# Patient Record
Sex: FEMALE | Race: WHITE | NOT HISPANIC OR LATINO | Employment: UNEMPLOYED | URBAN - METROPOLITAN AREA
[De-identification: names, ages, dates, MRNs, and addresses within clinical notes are randomized per-mention and may not be internally consistent; named-entity substitution may affect disease eponyms.]

---

## 2022-10-25 ENCOUNTER — HOSPITAL ENCOUNTER (INPATIENT)
Facility: HOSPITAL | Age: 52
LOS: 27 days | Discharge: NURSING, REHAB OR LONG-TERM CARE | DRG: 003 | End: 2022-11-21
Attending: STUDENT IN AN ORGANIZED HEALTH CARE EDUCATION/TRAINING PROGRAM | Admitting: INTERNAL MEDICINE
Payer: MEDICARE

## 2022-10-25 DIAGNOSIS — M86.9 OSTEOMYELITIS: ICD-10-CM

## 2022-10-25 DIAGNOSIS — R20.2 PARESTHESIAS: ICD-10-CM

## 2022-10-25 DIAGNOSIS — R78.81 MRSA BACTEREMIA: ICD-10-CM

## 2022-10-25 DIAGNOSIS — M54.9 ACUTE BACK PAIN, UNSPECIFIED BACK LOCATION, UNSPECIFIED BACK PAIN LATERALITY: ICD-10-CM

## 2022-10-25 DIAGNOSIS — Z97.8 ENDOTRACHEALLY INTUBATED: ICD-10-CM

## 2022-10-25 DIAGNOSIS — M46.22 OSTEOMYELITIS OF CERVICAL SPINE: ICD-10-CM

## 2022-10-25 DIAGNOSIS — R20.0 NUMBNESS: ICD-10-CM

## 2022-10-25 DIAGNOSIS — E11.9 DIABETES MELLITUS, NEW ONSET: ICD-10-CM

## 2022-10-25 DIAGNOSIS — B95.62 MRSA BACTEREMIA: ICD-10-CM

## 2022-10-25 DIAGNOSIS — R00.1 BRADYCARDIA: ICD-10-CM

## 2022-10-25 DIAGNOSIS — R06.1 STRIDOR: ICD-10-CM

## 2022-10-25 DIAGNOSIS — J98.8 AIRWAY OBSTRUCTION: ICD-10-CM

## 2022-10-25 DIAGNOSIS — R10.13 EPIGASTRIC PAIN: ICD-10-CM

## 2022-10-25 DIAGNOSIS — R78.81 BACTEREMIA: ICD-10-CM

## 2022-10-25 DIAGNOSIS — D72.829 LEUKOCYTOSIS, UNSPECIFIED TYPE: Primary | ICD-10-CM

## 2022-10-25 DIAGNOSIS — J96.01 ACUTE RESPIRATORY FAILURE WITH HYPOXIA: ICD-10-CM

## 2022-10-25 DIAGNOSIS — M48.02 CERVICAL STENOSIS OF SPINAL CANAL: ICD-10-CM

## 2022-10-25 PROBLEM — G06.2 EPIDURAL ABSCESS: Status: ACTIVE | Noted: 2022-10-25

## 2022-10-25 PROBLEM — R73.9 HYPERGLYCEMIA: Status: ACTIVE | Noted: 2022-10-25

## 2022-10-25 PROBLEM — E87.1 HYPONATREMIA: Status: ACTIVE | Noted: 2022-10-25

## 2022-10-25 PROBLEM — F11.20 OPIOID USE DISORDER, SEVERE, ON MAINTENANCE THERAPY, DEPENDENCE: Status: ACTIVE | Noted: 2022-10-25

## 2022-10-25 PROBLEM — G95.20 SPINAL CORD COMPRESSION: Status: ACTIVE | Noted: 2022-10-25

## 2022-10-25 LAB
ABO + RH BLD: NORMAL
ALBUMIN SERPL BCP-MCNC: 3 G/DL (ref 3.5–5.2)
ALP SERPL-CCNC: 152 U/L (ref 55–135)
ALT SERPL W/O P-5'-P-CCNC: 11 U/L (ref 10–44)
AMPHET+METHAMPHET UR QL: NEGATIVE
ANION GAP SERPL CALC-SCNC: 9 MMOL/L (ref 8–16)
APTT BLDCRRT: 29.7 SEC (ref 21–32)
ASCENDING AORTA: 3.33 CM
AST SERPL-CCNC: 11 U/L (ref 10–40)
AV INDEX (PROSTH): 0.59
AV MEAN GRADIENT: 8 MMHG
AV PEAK GRADIENT: 15 MMHG
AV VALVE AREA: 2.43 CM2
AV VELOCITY RATIO: 0.59
BACTERIA #/AREA URNS AUTO: ABNORMAL /HPF
BACTERIA #/AREA URNS AUTO: ABNORMAL /HPF
BARBITURATES UR QL SCN>200 NG/ML: NEGATIVE
BASOPHILS # BLD AUTO: 0.02 K/UL (ref 0–0.2)
BASOPHILS NFR BLD: 0.1 % (ref 0–1.9)
BENZODIAZ UR QL SCN>200 NG/ML: NEGATIVE
BILIRUB SERPL-MCNC: 0.3 MG/DL (ref 0.1–1)
BILIRUB UR QL STRIP: NEGATIVE
BILIRUB UR QL STRIP: NEGATIVE
BLD GP AB SCN CELLS X3 SERPL QL: NORMAL
BSA FOR ECHO PROCEDURE: 1.72 M2
BUN SERPL-MCNC: 13 MG/DL (ref 6–20)
BZE UR QL SCN: NEGATIVE
CALCIUM SERPL-MCNC: 10.2 MG/DL (ref 8.7–10.5)
CANNABINOIDS UR QL SCN: NEGATIVE
CHLORIDE SERPL-SCNC: 92 MMOL/L (ref 95–110)
CHOLEST SERPL-MCNC: 135 MG/DL (ref 120–199)
CHOLEST/HDLC SERPL: 4.5 {RATIO} (ref 2–5)
CK SERPL-CCNC: 15 U/L (ref 20–180)
CLARITY UR REFRACT.AUTO: ABNORMAL
CLARITY UR REFRACT.AUTO: ABNORMAL
CO2 SERPL-SCNC: 26 MMOL/L (ref 23–29)
COLOR UR AUTO: YELLOW
COLOR UR AUTO: YELLOW
CREAT SERPL-MCNC: 0.8 MG/DL (ref 0.5–1.4)
CREAT UR-MCNC: 75 MG/DL (ref 15–325)
CRP SERPL-MCNC: 113.8 MG/L (ref 0–8.2)
CV ECHO LV RWT: 0.39 CM
DIFFERENTIAL METHOD: ABNORMAL
DOP CALC AO PEAK VEL: 1.93 M/S
DOP CALC AO VTI: 39.92 CM
DOP CALC LVOT AREA: 4.2 CM2
DOP CALC LVOT DIAMETER: 2.3 CM
DOP CALC LVOT PEAK VEL: 1.13 M/S
DOP CALC LVOT STROKE VOLUME: 97.09 CM3
DOP CALCLVOT PEAK VEL VTI: 23.38 CM
ECHO LV POSTERIOR WALL: 0.97 CM (ref 0.6–1.1)
EJECTION FRACTION: 55 %
EOSINOPHIL # BLD AUTO: 0 K/UL (ref 0–0.5)
EOSINOPHIL NFR BLD: 0.1 % (ref 0–8)
ERYTHROCYTE [DISTWIDTH] IN BLOOD BY AUTOMATED COUNT: 12 % (ref 11.5–14.5)
ERYTHROCYTE [SEDIMENTATION RATE] IN BLOOD BY PHOTOMETRIC METHOD: 103 MM/HR (ref 0–36)
EST. GFR  (NO RACE VARIABLE): >60 ML/MIN/1.73 M^2
ESTIMATED AVG GLUCOSE: 128 MG/DL (ref 68–131)
ETHANOL UR-MCNC: <10 MG/DL
FRACTIONAL SHORTENING: 26 % (ref 28–44)
GLUCOSE SERPL-MCNC: 269 MG/DL (ref 70–110)
GLUCOSE UR QL STRIP: NEGATIVE
GLUCOSE UR QL STRIP: NEGATIVE
HBA1C MFR BLD: 6.1 % (ref 4–5.6)
HCT VFR BLD AUTO: 34 % (ref 37–48.5)
HCV AB SERPL QL IA: NORMAL
HDLC SERPL-MCNC: 30 MG/DL (ref 40–75)
HDLC SERPL: 22.2 % (ref 20–50)
HGB BLD-MCNC: 11.3 G/DL (ref 12–16)
HGB UR QL STRIP: ABNORMAL
HGB UR QL STRIP: ABNORMAL
HIV 1+2 AB+HIV1 P24 AG SERPL QL IA: NORMAL
HYALINE CASTS UR QL AUTO: 0 /LPF
HYALINE CASTS UR QL AUTO: 0 /LPF
IMM GRANULOCYTES # BLD AUTO: 0.14 K/UL (ref 0–0.04)
IMM GRANULOCYTES NFR BLD AUTO: 0.8 % (ref 0–0.5)
INR PPP: 1 (ref 0.8–1.2)
INR PPP: 1.1 (ref 0.8–1.2)
INTERVENTRICULAR SEPTUM: 0.75 CM (ref 0.6–1.1)
KETONES UR QL STRIP: NEGATIVE
KETONES UR QL STRIP: NEGATIVE
LA MAJOR: 4.91 CM
LA MINOR: 4.46 CM
LA WIDTH: 3.75 CM
LDLC SERPL CALC-MCNC: 85.4 MG/DL (ref 63–159)
LEFT ATRIUM SIZE: 3.48 CM
LEFT ATRIUM VOLUME INDEX MOD: 31.6 ML/M2
LEFT ATRIUM VOLUME INDEX: 30.1 ML/M2
LEFT ATRIUM VOLUME MOD: 54.41 CM3
LEFT ATRIUM VOLUME: 51.85 CM3
LEFT INTERNAL DIMENSION IN SYSTOLE: 3.65 CM (ref 2.1–4)
LEFT VENTRICLE DIASTOLIC VOLUME INDEX: 66.85 ML/M2
LEFT VENTRICLE DIASTOLIC VOLUME: 114.99 ML
LEFT VENTRICLE MASS INDEX: 85 G/M2
LEFT VENTRICLE SYSTOLIC VOLUME INDEX: 32.8 ML/M2
LEFT VENTRICLE SYSTOLIC VOLUME: 56.37 ML
LEFT VENTRICULAR INTERNAL DIMENSION IN DIASTOLE: 4.94 CM (ref 3.5–6)
LEFT VENTRICULAR MASS: 146.08 G
LEUKOCYTE ESTERASE UR QL STRIP: ABNORMAL
LEUKOCYTE ESTERASE UR QL STRIP: NEGATIVE
LIPASE SERPL-CCNC: 7 U/L (ref 4–60)
LYMPHOCYTES # BLD AUTO: 2.9 K/UL (ref 1–4.8)
LYMPHOCYTES NFR BLD: 17.4 % (ref 18–48)
MCH RBC QN AUTO: 30.3 PG (ref 27–31)
MCHC RBC AUTO-ENTMCNC: 33.2 G/DL (ref 32–36)
MCV RBC AUTO: 91 FL (ref 82–98)
METHADONE UR QL SCN>300 NG/ML: ABNORMAL
MICROSCOPIC COMMENT: ABNORMAL
MICROSCOPIC COMMENT: ABNORMAL
MONOCYTES # BLD AUTO: 1.5 K/UL (ref 0.3–1)
MONOCYTES NFR BLD: 8.6 % (ref 4–15)
NEUTROPHILS # BLD AUTO: 12.4 K/UL (ref 1.8–7.7)
NEUTROPHILS NFR BLD: 73 % (ref 38–73)
NITRITE UR QL STRIP: NEGATIVE
NITRITE UR QL STRIP: NEGATIVE
NONHDLC SERPL-MCNC: 105 MG/DL
NRBC BLD-RTO: 0 /100 WBC
OPIATES UR QL SCN: NEGATIVE
OSMOLALITY UR: 320 MOSM/KG (ref 50–1200)
PCP UR QL SCN>25 NG/ML: NEGATIVE
PH UR STRIP: 6 [PH] (ref 5–8)
PH UR STRIP: 6 [PH] (ref 5–8)
PISA TR MAX VEL: 2.08 M/S
PLATELET # BLD AUTO: 443 K/UL (ref 150–450)
PMV BLD AUTO: 9.2 FL (ref 9.2–12.9)
POCT GLUCOSE: 131 MG/DL (ref 70–110)
POCT GLUCOSE: 266 MG/DL (ref 70–110)
POTASSIUM SERPL-SCNC: 4.2 MMOL/L (ref 3.5–5.1)
PROCALCITONIN SERPL IA-MCNC: 0.13 NG/ML
PROT SERPL-MCNC: 8.7 G/DL (ref 6–8.4)
PROT UR QL STRIP: ABNORMAL
PROT UR QL STRIP: ABNORMAL
PROTHROMBIN TIME: 10.8 SEC (ref 9–12.5)
PROTHROMBIN TIME: 10.9 SEC (ref 9–12.5)
RA MAJOR: 5.08 CM
RA PRESSURE: 3 MMHG
RA WIDTH: 3.28 CM
RBC # BLD AUTO: 3.73 M/UL (ref 4–5.4)
RBC #/AREA URNS AUTO: 41 /HPF (ref 0–4)
RBC #/AREA URNS AUTO: >100 /HPF (ref 0–4)
RIGHT VENTRICULAR END-DIASTOLIC DIMENSION: 3.28 CM
SINUS: 3.38 CM
SODIUM SERPL-SCNC: 127 MMOL/L (ref 136–145)
SODIUM UR-SCNC: 12 MMOL/L (ref 20–250)
SP GR UR STRIP: 1.01 (ref 1–1.03)
SP GR UR STRIP: 1.01 (ref 1–1.03)
SQUAMOUS #/AREA URNS AUTO: 13 /HPF
SQUAMOUS #/AREA URNS AUTO: 4 /HPF
STJ: 2.61 CM
TDI LATERAL: 0.12 M/S
TDI SEPTAL: 0.14 M/S
TDI: 0.13 M/S
TOXICOLOGY INFORMATION: ABNORMAL
TR MAX PG: 17 MMHG
TRICUSPID ANNULAR PLANE SYSTOLIC EXCURSION: 2.08 CM
TRIGL SERPL-MCNC: 98 MG/DL (ref 30–150)
TROPONIN I SERPL DL<=0.01 NG/ML-MCNC: <0.006 NG/ML (ref 0–0.03)
TSH SERPL DL<=0.005 MIU/L-ACNC: 0.71 UIU/ML (ref 0.4–4)
TV REST PULMONARY ARTERY PRESSURE: 20 MMHG
URN SPEC COLLECT METH UR: ABNORMAL
URN SPEC COLLECT METH UR: ABNORMAL
WBC # BLD AUTO: 16.94 K/UL (ref 3.9–12.7)
WBC #/AREA URNS AUTO: 5 /HPF (ref 0–5)
WBC #/AREA URNS AUTO: 5 /HPF (ref 0–5)

## 2022-10-25 PROCEDURE — 25000003 PHARM REV CODE 250: Performed by: INTERNAL MEDICINE

## 2022-10-25 PROCEDURE — 84443 ASSAY THYROID STIM HORMONE: CPT | Performed by: PHYSICIAN ASSISTANT

## 2022-10-25 PROCEDURE — 93005 ELECTROCARDIOGRAM TRACING: CPT

## 2022-10-25 PROCEDURE — 99223 1ST HOSP IP/OBS HIGH 75: CPT | Mod: GC,,,

## 2022-10-25 PROCEDURE — 80307 DRUG TEST PRSMV CHEM ANLYZR: CPT

## 2022-10-25 PROCEDURE — 25000003 PHARM REV CODE 250

## 2022-10-25 PROCEDURE — S4991 NICOTINE PATCH NONLEGEND: HCPCS | Performed by: PHYSICIAN ASSISTANT

## 2022-10-25 PROCEDURE — 25000003 PHARM REV CODE 250: Performed by: PSYCHIATRY & NEUROLOGY

## 2022-10-25 PROCEDURE — 96361 HYDRATE IV INFUSION ADD-ON: CPT

## 2022-10-25 PROCEDURE — 36415 COLL VENOUS BLD VENIPUNCTURE: CPT | Performed by: PSYCHIATRY & NEUROLOGY

## 2022-10-25 PROCEDURE — 86140 C-REACTIVE PROTEIN: CPT

## 2022-10-25 PROCEDURE — 83935 ASSAY OF URINE OSMOLALITY: CPT

## 2022-10-25 PROCEDURE — 25500020 PHARM REV CODE 255: Performed by: PSYCHIATRY & NEUROLOGY

## 2022-10-25 PROCEDURE — 20000000 HC ICU ROOM

## 2022-10-25 PROCEDURE — 63600175 PHARM REV CODE 636 W HCPCS: Performed by: PSYCHIATRY & NEUROLOGY

## 2022-10-25 PROCEDURE — 85652 RBC SED RATE AUTOMATED: CPT | Performed by: INTERNAL MEDICINE

## 2022-10-25 PROCEDURE — 99223 PR INITIAL HOSPITAL CARE,LEVL III: ICD-10-PCS | Mod: FS,,, | Performed by: PHYSICIAN ASSISTANT

## 2022-10-25 PROCEDURE — 87186 SC STD MICRODIL/AGAR DIL: CPT | Performed by: EMERGENCY MEDICINE

## 2022-10-25 PROCEDURE — 80061 LIPID PANEL: CPT | Performed by: PHYSICIAN ASSISTANT

## 2022-10-25 PROCEDURE — 25000003 PHARM REV CODE 250: Performed by: STUDENT IN AN ORGANIZED HEALTH CARE EDUCATION/TRAINING PROGRAM

## 2022-10-25 PROCEDURE — 85730 THROMBOPLASTIN TIME PARTIAL: CPT

## 2022-10-25 PROCEDURE — 85610 PROTHROMBIN TIME: CPT | Performed by: STUDENT IN AN ORGANIZED HEALTH CARE EDUCATION/TRAINING PROGRAM

## 2022-10-25 PROCEDURE — 81001 URINALYSIS AUTO W/SCOPE: CPT | Mod: 91 | Performed by: STUDENT IN AN ORGANIZED HEALTH CARE EDUCATION/TRAINING PROGRAM

## 2022-10-25 PROCEDURE — 99285 EMERGENCY DEPT VISIT HI MDM: CPT | Mod: 25

## 2022-10-25 PROCEDURE — A9585 GADOBUTROL INJECTION: HCPCS | Performed by: PSYCHIATRY & NEUROLOGY

## 2022-10-25 PROCEDURE — 87389 HIV-1 AG W/HIV-1&-2 AB AG IA: CPT | Performed by: PHYSICIAN ASSISTANT

## 2022-10-25 PROCEDURE — 86803 HEPATITIS C AB TEST: CPT | Performed by: PHYSICIAN ASSISTANT

## 2022-10-25 PROCEDURE — 85610 PROTHROMBIN TIME: CPT | Mod: 91

## 2022-10-25 PROCEDURE — 83690 ASSAY OF LIPASE: CPT | Performed by: STUDENT IN AN ORGANIZED HEALTH CARE EDUCATION/TRAINING PROGRAM

## 2022-10-25 PROCEDURE — 25000003 PHARM REV CODE 250: Performed by: PHYSICIAN ASSISTANT

## 2022-10-25 PROCEDURE — 96374 THER/PROPH/DIAG INJ IV PUSH: CPT

## 2022-10-25 PROCEDURE — 99223 PR INITIAL HOSPITAL CARE,LEVL III: ICD-10-PCS | Mod: GC,,,

## 2022-10-25 PROCEDURE — 83036 HEMOGLOBIN GLYCOSYLATED A1C: CPT | Performed by: STUDENT IN AN ORGANIZED HEALTH CARE EDUCATION/TRAINING PROGRAM

## 2022-10-25 PROCEDURE — 99285 EMERGENCY DEPT VISIT HI MDM: CPT | Mod: ,,, | Performed by: STUDENT IN AN ORGANIZED HEALTH CARE EDUCATION/TRAINING PROGRAM

## 2022-10-25 PROCEDURE — 63600175 PHARM REV CODE 636 W HCPCS: Performed by: STUDENT IN AN ORGANIZED HEALTH CARE EDUCATION/TRAINING PROGRAM

## 2022-10-25 PROCEDURE — 99285 PR EMERGENCY DEPT VISIT,LEVEL V: ICD-10-PCS | Mod: ,,, | Performed by: STUDENT IN AN ORGANIZED HEALTH CARE EDUCATION/TRAINING PROGRAM

## 2022-10-25 PROCEDURE — 99223 1ST HOSP IP/OBS HIGH 75: CPT | Mod: FS,,, | Performed by: PHYSICIAN ASSISTANT

## 2022-10-25 PROCEDURE — 87077 CULTURE AEROBIC IDENTIFY: CPT | Performed by: EMERGENCY MEDICINE

## 2022-10-25 PROCEDURE — 93010 EKG 12-LEAD: ICD-10-PCS | Mod: ,,, | Performed by: INTERNAL MEDICINE

## 2022-10-25 PROCEDURE — 82550 ASSAY OF CK (CPK): CPT | Performed by: STUDENT IN AN ORGANIZED HEALTH CARE EDUCATION/TRAINING PROGRAM

## 2022-10-25 PROCEDURE — 84300 ASSAY OF URINE SODIUM: CPT

## 2022-10-25 PROCEDURE — 84145 PROCALCITONIN (PCT): CPT

## 2022-10-25 PROCEDURE — 80307 DRUG TEST PRSMV CHEM ANLYZR: CPT | Performed by: STUDENT IN AN ORGANIZED HEALTH CARE EDUCATION/TRAINING PROGRAM

## 2022-10-25 PROCEDURE — 93010 ELECTROCARDIOGRAM REPORT: CPT | Mod: ,,, | Performed by: INTERNAL MEDICINE

## 2022-10-25 PROCEDURE — 85025 COMPLETE CBC W/AUTO DIFF WBC: CPT | Performed by: STUDENT IN AN ORGANIZED HEALTH CARE EDUCATION/TRAINING PROGRAM

## 2022-10-25 PROCEDURE — 87040 BLOOD CULTURE FOR BACTERIA: CPT | Mod: 59 | Performed by: EMERGENCY MEDICINE

## 2022-10-25 PROCEDURE — 63600175 PHARM REV CODE 636 W HCPCS: Performed by: INTERNAL MEDICINE

## 2022-10-25 PROCEDURE — 81001 URINALYSIS AUTO W/SCOPE: CPT | Performed by: PHYSICIAN ASSISTANT

## 2022-10-25 PROCEDURE — 86901 BLOOD TYPING SEROLOGIC RH(D): CPT | Performed by: PSYCHIATRY & NEUROLOGY

## 2022-10-25 PROCEDURE — 63600175 PHARM REV CODE 636 W HCPCS: Performed by: EMERGENCY MEDICINE

## 2022-10-25 PROCEDURE — 80053 COMPREHEN METABOLIC PANEL: CPT | Performed by: STUDENT IN AN ORGANIZED HEALTH CARE EDUCATION/TRAINING PROGRAM

## 2022-10-25 PROCEDURE — 63600175 PHARM REV CODE 636 W HCPCS: Performed by: PHYSICIAN ASSISTANT

## 2022-10-25 PROCEDURE — 84484 ASSAY OF TROPONIN QUANT: CPT | Performed by: STUDENT IN AN ORGANIZED HEALTH CARE EDUCATION/TRAINING PROGRAM

## 2022-10-25 RX ORDER — IBUPROFEN 200 MG
24 TABLET ORAL
Status: DISCONTINUED | OUTPATIENT
Start: 2022-10-25 | End: 2022-10-29

## 2022-10-25 RX ORDER — SODIUM CHLORIDE 0.9 % (FLUSH) 0.9 %
10 SYRINGE (ML) INJECTION
Status: DISCONTINUED | OUTPATIENT
Start: 2022-10-25 | End: 2022-11-21 | Stop reason: HOSPADM

## 2022-10-25 RX ORDER — KETOROLAC TROMETHAMINE 30 MG/ML
15 INJECTION, SOLUTION INTRAMUSCULAR; INTRAVENOUS
Status: COMPLETED | OUTPATIENT
Start: 2022-10-25 | End: 2022-10-25

## 2022-10-25 RX ORDER — GLUCAGON 1 MG
1 KIT INJECTION
Status: DISCONTINUED | OUTPATIENT
Start: 2022-10-25 | End: 2022-10-29

## 2022-10-25 RX ORDER — INSULIN ASPART 100 [IU]/ML
1-10 INJECTION, SOLUTION INTRAVENOUS; SUBCUTANEOUS
Status: DISCONTINUED | OUTPATIENT
Start: 2022-10-25 | End: 2022-10-29

## 2022-10-25 RX ORDER — OXYCODONE AND ACETAMINOPHEN 5; 325 MG/1; MG/1
1 TABLET ORAL EVERY 4 HOURS PRN
Status: DISCONTINUED | OUTPATIENT
Start: 2022-10-25 | End: 2022-10-26

## 2022-10-25 RX ORDER — ACETAMINOPHEN 325 MG/1
650 TABLET ORAL EVERY 6 HOURS PRN
Status: DISCONTINUED | OUTPATIENT
Start: 2022-10-25 | End: 2022-10-31

## 2022-10-25 RX ORDER — LIDOCAINE, MENTHOL 4; 1 G/100G; G/100G
1 PATCH TOPICAL 2 TIMES DAILY PRN
COMMUNITY

## 2022-10-25 RX ORDER — ONDANSETRON 2 MG/ML
4 INJECTION INTRAMUSCULAR; INTRAVENOUS EVERY 8 HOURS PRN
Status: DISCONTINUED | OUTPATIENT
Start: 2022-10-25 | End: 2022-11-21 | Stop reason: HOSPADM

## 2022-10-25 RX ORDER — HYDROMORPHONE HYDROCHLORIDE 1 MG/ML
1 INJECTION, SOLUTION INTRAMUSCULAR; INTRAVENOUS; SUBCUTANEOUS
Status: COMPLETED | OUTPATIENT
Start: 2022-10-25 | End: 2022-10-25

## 2022-10-25 RX ORDER — AMOXICILLIN 250 MG
1 CAPSULE ORAL 2 TIMES DAILY
Status: DISCONTINUED | OUTPATIENT
Start: 2022-10-25 | End: 2022-10-31

## 2022-10-25 RX ORDER — HYDROXYZINE HYDROCHLORIDE 25 MG/1
25 TABLET, FILM COATED ORAL 3 TIMES DAILY PRN
Status: DISCONTINUED | OUTPATIENT
Start: 2022-10-25 | End: 2022-10-31

## 2022-10-25 RX ORDER — ONDANSETRON 2 MG/ML
4 INJECTION INTRAMUSCULAR; INTRAVENOUS
Status: COMPLETED | OUTPATIENT
Start: 2022-10-25 | End: 2022-10-25

## 2022-10-25 RX ORDER — ASPIRIN 81 MG/1
81 TABLET ORAL DAILY
COMMUNITY

## 2022-10-25 RX ORDER — PREGABALIN 75 MG/1
75 CAPSULE ORAL 2 TIMES DAILY
Status: DISCONTINUED | OUTPATIENT
Start: 2022-10-25 | End: 2022-10-27

## 2022-10-25 RX ORDER — GADOBUTROL 604.72 MG/ML
7 INJECTION INTRAVENOUS
Status: COMPLETED | OUTPATIENT
Start: 2022-10-25 | End: 2022-10-25

## 2022-10-25 RX ORDER — LIDOCAINE 50 MG/G
1 PATCH TOPICAL
Status: COMPLETED | OUTPATIENT
Start: 2022-10-25 | End: 2022-10-25

## 2022-10-25 RX ORDER — IBUPROFEN 200 MG
1 TABLET ORAL DAILY
Status: DISCONTINUED | OUTPATIENT
Start: 2022-10-25 | End: 2022-11-21 | Stop reason: HOSPADM

## 2022-10-25 RX ORDER — HYDROMORPHONE HYDROCHLORIDE 1 MG/ML
0.5 INJECTION, SOLUTION INTRAMUSCULAR; INTRAVENOUS; SUBCUTANEOUS
Status: COMPLETED | OUTPATIENT
Start: 2022-10-25 | End: 2022-10-25

## 2022-10-25 RX ORDER — OXYCODONE AND ACETAMINOPHEN 5; 325 MG/1; MG/1
1 TABLET ORAL
Status: COMPLETED | OUTPATIENT
Start: 2022-10-25 | End: 2022-10-25

## 2022-10-25 RX ORDER — MULTIVITAMIN
1 TABLET ORAL DAILY
COMMUNITY

## 2022-10-25 RX ORDER — IBUPROFEN 200 MG
16 TABLET ORAL
Status: DISCONTINUED | OUTPATIENT
Start: 2022-10-25 | End: 2022-10-29

## 2022-10-25 RX ADMIN — LIDOCAINE 1 PATCH: 50 PATCH CUTANEOUS at 03:10

## 2022-10-25 RX ADMIN — ACETAMINOPHEN 650 MG: 325 TABLET ORAL at 08:10

## 2022-10-25 RX ADMIN — INSULIN ASPART 3 UNITS: 100 INJECTION, SOLUTION INTRAVENOUS; SUBCUTANEOUS at 07:10

## 2022-10-25 RX ADMIN — GADOBUTROL 7 ML: 604.72 INJECTION INTRAVENOUS at 03:10

## 2022-10-25 RX ADMIN — OXYCODONE HYDROCHLORIDE AND ACETAMINOPHEN 1 TABLET: 5; 325 TABLET ORAL at 01:10

## 2022-10-25 RX ADMIN — KETOROLAC TROMETHAMINE 15 MG: 30 INJECTION, SOLUTION INTRAMUSCULAR; INTRAVENOUS at 03:10

## 2022-10-25 RX ADMIN — SODIUM CHLORIDE, SODIUM LACTATE, POTASSIUM CHLORIDE, AND CALCIUM CHLORIDE 1000 ML: .6; .31; .03; .02 INJECTION, SOLUTION INTRAVENOUS at 05:10

## 2022-10-25 RX ADMIN — PREGABALIN 75 MG: 75 CAPSULE ORAL at 08:10

## 2022-10-25 RX ADMIN — OXYCODONE HYDROCHLORIDE AND ACETAMINOPHEN 1 TABLET: 5; 325 TABLET ORAL at 03:10

## 2022-10-25 RX ADMIN — HYDROMORPHONE HYDROCHLORIDE 1 MG: 1 INJECTION, SOLUTION INTRAMUSCULAR; INTRAVENOUS; SUBCUTANEOUS at 10:10

## 2022-10-25 RX ADMIN — OXYCODONE HYDROCHLORIDE AND ACETAMINOPHEN 1 TABLET: 5; 325 TABLET ORAL at 07:10

## 2022-10-25 RX ADMIN — HYDROMORPHONE HYDROCHLORIDE 0.5 MG: 1 INJECTION, SOLUTION INTRAMUSCULAR; INTRAVENOUS; SUBCUTANEOUS at 09:10

## 2022-10-25 RX ADMIN — VANCOMYCIN HYDROCHLORIDE 1500 MG: 1.5 INJECTION, POWDER, LYOPHILIZED, FOR SOLUTION INTRAVENOUS at 11:10

## 2022-10-25 RX ADMIN — PREGABALIN 75 MG: 75 CAPSULE ORAL at 10:10

## 2022-10-25 RX ADMIN — Medication 1 PATCH: at 07:10

## 2022-10-25 RX ADMIN — CEFTRIAXONE 2 G: 2 INJECTION, SOLUTION INTRAVENOUS at 09:10

## 2022-10-25 RX ADMIN — ONDANSETRON 4 MG: 2 INJECTION INTRAMUSCULAR; INTRAVENOUS at 09:10

## 2022-10-25 RX ADMIN — VANCOMYCIN HYDROCHLORIDE 750 MG: 750 INJECTION, POWDER, LYOPHILIZED, FOR SOLUTION INTRAVENOUS at 11:10
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
## 2022-10-25 NOTE — ASSESSMENT & PLAN NOTE
Na 127 on admission. Suspect in the setting of stress vs. Acute neurological sequelae (I.e SIADH)  Continue to monitor Na; repeat in afternoon   Tammy and U Osm
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
## 2022-10-25 NOTE — ASSESSMENT & PLAN NOTE
Hx of, but echo today 55% EF and normal size chambers    The left ventricle is normal in size with normal systolic function. The estimated ejection fraction is 55%.   Normal left ventricular diastolic function.   Normal right ventricular size with normal right ventricular systolic function.   Mild tricuspid regurgitation.   The estimated PA systolic pressure is 20 mmHg.   Normal central venous pressure (3 mmHg).

## 2022-10-25 NOTE — Clinical Note
Diagnosis: Leukocytosis, unspecified type [[id number]]   Admitting Provider:: MOHINDER JOYA [[id number]]   Future Attending Provider: ANG MCDONALD [[id number]]   Reason for IP Medical Treatment  (Clinical interventions that can only be accomplished in the IP setting? ) :: diskitis   Estimated Length of Stay:: 2 midnights   I certify that Inpatient services for greater than or equal to 2 midnights are medically necessary:: Yes   Plans for Post-Acute care--if anticipated (pick the single best option):: A. No post acute care anticipated at this time

## 2022-10-25 NOTE — ASSESSMENT & PLAN NOTE
Hx of opioid dependence on methadone.   If patient shows signs of withdrawal will consider consult to Psychiatry.

## 2022-10-25 NOTE — CONSULTS
Inpatient consult to Physical Medicine Rehab  Consult performed by: Do Beverly NP  Consult ordered by: Christina Ren PA-C    Consult received.  Reviewed patient history and current admission.  PM&R following.    Do Beverly NP  Physical Medicine & Rehabilitation   10/25/2022
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
## 2022-10-25 NOTE — ASSESSMENT & PLAN NOTE
Patient presents with neck pain and leg pain ever since a driving lesson with her daughter. She reports new onset numbness in the abdomen and legs. She is still able to urinate and she control of hew bowels. Ct with C5-C6 discitis/OM, MRI with similar findings and sever canal stenosis.     Patient's presentation is most likely secondary to canal stenosis vs. OM given neurological symptoms in the last couple weeks that have intensified in the last day. He neuro exam is not particularly consistent with imaging findings given a sensory level around T5-T6, however the discitis and OM is consistent with the pain. WBC elevated to 16.94, afebrile; could be secondary to infectious process vs. acute stress. Patient anxious as well on exam.     - Neuro ICU following patient  - NSGY consulted for possible surgical intervention   - Rocephin anc Vanc started in ED, pending UCX, BCX  - continue lyrica 75 bid  - q4h neuro checks  - aspiration and fall precautions
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
## 2022-10-25 NOTE — ASSESSMENT & PLAN NOTE
Patient presents with neck pain and leg pain ever since a driving lesson with her daughter. She reports new onset numbness in the abdomen and legs. She is still able to urinate and she control of hew bowels. Ct with C5-C6 discitis/OM, MRI with similar findings and sever canal stenosis.     Patient's presentation is most likely secondary to canal stenosis vs. OM given neurological symptoms in the last couple weeks that have intensified in the last day. He neuro exam is not particularly consistent with imaging findings given a sensory level around T5-T6, however the discitis and OM is consistent with the pain. WBC down-trending to 12.91 today, afebrile. Blood cultures from 10/25 positive for S aureus (identification and susceptibility pending); repeat blood cultures on 10/27 pending.    - patient is scheduled for neurosurgery on 10/28  - continue Rocephin and vancomycin  - ID consulted for bacteremia  - patient may require LISHA to rule out infective endocarditis  - continue lyrica 75 bid  - continue methocarbamol 500mg QID  - q4h neuro checks  - aspiration and fall precautions
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
## 2022-10-25 NOTE — ASSESSMENT & PLAN NOTE
No noted history of diabetes. Hba1c 6.1. Gluc 269 on admission.  Continue MDSSI  140-180 goal  Accuchecks 4X daily

## 2022-10-25 NOTE — SUBJECTIVE & OBJECTIVE
Past Medical History:   Diagnosis Date    CHF (congestive heart failure)     Essential (primary) hypertension     Opioid dependence on maintenance agonist therapy, no symptoms     Smoking        Past Surgical History:   Procedure Laterality Date    APPENDECTOMY       SECTION      x2    HYSTERECTOMY         Review of patient's allergies indicates:   Allergen Reactions    Morphine Shortness Of Breath and Other (See Comments)     Throat closed       No current facility-administered medications on file prior to encounter.     Current Outpatient Medications on File Prior to Encounter   Medication Sig    aspirin (ECOTRIN) 81 MG EC tablet Take 81 mg by mouth once daily.    LIDOcaine-menthol (ICY HOT PATCH, LIDO-MENTHOL,) 4-1 % PtMd Apply 1 patch topically 2 (two) times daily as needed (Pain).    multivitamin (ONE DAILY MULTIVITAMIN) per tablet Take 1 tablet by mouth once daily.    lorazepam (ATIVAN) 1 MG tablet Take one-half to one tablet (Patient not taking: Reported on 10/25/2022)    [DISCONTINUED] bisoprolol (ZEBETA) 5 MG tablet Take 1 tablet (5 mg total) by mouth once daily.    [DISCONTINUED] furosemide (LASIX) 20 MG tablet Take 1 tablet (20 mg total) by mouth daily as needed.    [DISCONTINUED] lisinopril 10 MG Tab Take 1 tablet (10 mg total) by mouth once daily.    [DISCONTINUED] paroxetine (PAXIL) 20 MG tablet Take one-half tablet a day for first week    [DISCONTINUED] ranitidine (ZANTAC) 150 MG tablet      Family History       Problem Relation (Age of Onset)    Cancer Mother    Hearing loss Father          Tobacco Use    Smoking status: Every Day     Packs/day: 2.00     Years: 34.00     Pack years: 68.00     Types: Cigarettes    Smokeless tobacco: Current   Substance and Sexual Activity    Alcohol use: No    Drug use: No    Sexual activity: Not on file     Review of Systems   Constitutional:  Positive for activity change. Negative for chills and fever.   HENT:  Negative for rhinorrhea and trouble swallowing.     Eyes:  Negative for discharge.   Respiratory:  Negative for cough and shortness of breath.    Cardiovascular:  Negative for chest pain and leg swelling.   Gastrointestinal:  Negative for abdominal pain and nausea.   Genitourinary:  Negative for dysuria and hematuria.   Musculoskeletal:  Positive for back pain, myalgias and neck pain. Negative for arthralgias.   Neurological:  Positive for weakness and numbness. Negative for tremors and headaches.   Psychiatric/Behavioral:  Negative for agitation and confusion. The patient is nervous/anxious.    Objective:     Vital Signs (Most Recent):  Temp: 98.1 °F (36.7 °C) (10/25/22 0811)  Pulse: 68 (10/25/22 1325)  Resp: 16 (10/25/22 1336)  BP: (!) 152/77 (10/25/22 1325)  SpO2: 97 % (10/25/22 1325)   Vital Signs (24h Range):  Temp:  [98 °F (36.7 °C)-98.5 °F (36.9 °C)] 98.1 °F (36.7 °C)  Pulse:  [] 68  Resp:  [15-20] 16  SpO2:  [96 %-98 %] 97 %  BP: (134-178)/(71-94) 152/77     Weight: 63.5 kg (140 lb)  Body mass index is 22.6 kg/m².    Physical Exam  Constitutional:       Appearance: Normal appearance.      Comments: Patient in pain and anxious.    HENT:      Head: Normocephalic and atraumatic.      Nose: Nose normal.      Mouth/Throat:      Pharynx: Oropharynx is clear.   Eyes:      Extraocular Movements: Extraocular movements intact.      Conjunctiva/sclera: Conjunctivae normal.   Cardiovascular:      Rate and Rhythm: Normal rate and regular rhythm.      Pulses: Normal pulses.   Pulmonary:      Effort: Pulmonary effort is normal.   Abdominal:      General: Abdomen is flat. Bowel sounds are normal. There is distension.      Palpations: Abdomen is soft.      Comments: Numbness on palpation. No rebound tenderness or guarding.    Musculoskeletal:         General: Normal range of motion.      Cervical back: Normal range of motion.   Neurological:      General: No focal deficit present.      Mental Status: She is alert.      Sensory: Sensory deficit present.      Motor:  Weakness present.      Comments: Patient alert and oriented. No signs of confusion. She notes posterior neck pain and lower back pain.CN II-XII intact. Strength 3/5 bue, 4/5 ble. Distinct sensory level around T5-T6 with noted numbness. Sensation intact in all extremities but more numbness in bilateral lower extremities. Tingling appreciated in fingers and toes. DTR 2+ bilaterally in all extremities. FNF intact.    Psychiatric:         Mood and Affect: Mood normal.      Comments: Anxious           Significant Labs: All pertinent labs within the past 24 hours have been reviewed.  CBC:   Recent Labs   Lab 10/25/22  0343   WBC 16.94*   HGB 11.3*   HCT 34.0*        CMP:   Recent Labs   Lab 10/25/22  0343   *   K 4.2   CL 92*   CO2 26   *   BUN 13   CREATININE 0.8   CALCIUM 10.2   PROT 8.7*   ALBUMIN 3.0*   BILITOT 0.3   ALKPHOS 152*   AST 11   ALT 11   ANIONGAP 9       Significant Imaging: I have reviewed all pertinent imaging results/findings within the past 24 hours.

## 2022-10-25 NOTE — ED NOTES
Pt up to bathroom via wheel chair (pt states she has BLE numbness) with family stand by assistance. Back to bed without incident.

## 2022-10-25 NOTE — ASSESSMENT & PLAN NOTE
ECHO 2014:  1 - Left ventricular cavity size near the upper limit of normal.     2 - Mildly depressed left ventricular systolic function (EF 45-50%).     3 - Normal right ventricular systolic function .     4 - Normal left ventricular diastolic function.     - Repeat echo   - Sinus tachycardia on EKG

## 2022-10-25 NOTE — ED NOTES
Bed: [facility]  Expected date:   Expected time:   Means of arrival:   Comments:  di

## 2022-10-25 NOTE — ED NOTES
Patient identifiers for Peter Stiles 51 y.o. female checked and correct.  Chief Complaint   Patient presents with    Neck Pain     Pt reports neck pain that started 10/13. Now having generalized numbness and tingling. Pt states can barely walk without assistance. Pt also endorses bilateral leg swelling.      Past Medical History:   Diagnosis Date    CHF (congestive heart failure)      Allergies reported:   Review of patient's allergies indicates:   Allergen Reactions    Morphine Shortness Of Breath and Other (See Comments)     Throat closed         LOC: Patient is awake, alert, and aware of environment with an appropriate affect. Patient is oriented x 4 and speaking appropriately.  APPEARANCE: Patient resting comfortably and in no acute distress. Patient is clean and well groomed, patient's clothing is properly fastened.  HEENT: report neck pain  SKIN: The skin is warm and dry. Patient has normal skin turgor and moist mucus membranes.   MUSKULOSKELETAL: Patient is moving all extremities well, no obvious deformities noted. Pulses intact.   RESPIRATORY: Airway is open and patent. Respirations are spontaneous and non-labored with normal effort and rate.  CARDIAC: Patient has a normal rate and rhythm on cardiac monitor. No peripheral edema noted.   ABDOMEN: No distention noted. Soft and non-tender upon palpation.  NEUROLOGICAL: PERRL. Facial expression is symmetrical. Hand grasps are equal bilaterally. Normal sensation in all extremities when touched with finger. Bilateral leg numbness and tingling

## 2022-10-25 NOTE — SUBJECTIVE & OBJECTIVE
(Not in a hospital admission)      Review of patient's allergies indicates:   Allergen Reactions    Morphine Shortness Of Breath and Other (See Comments)     Throat closed       Past Medical History:   Diagnosis Date    CHF (congestive heart failure)      Past Surgical History:   Procedure Laterality Date    APPENDECTOMY       SECTION      x2    HYSTERECTOMY       Family History       Problem Relation (Age of Onset)    Cancer Mother    Hearing loss Father          Tobacco Use    Smoking status: Some Days    Smokeless tobacco: Not on file   Substance and Sexual Activity    Alcohol use: No    Drug use: No    Sexual activity: Not on file     Review of Systems   Constitutional:  Negative for fatigue and fever.   HENT:  Negative for dental problem.    Eyes:  Negative for visual disturbance.   Respiratory:  Negative for chest tightness and shortness of breath.    Cardiovascular:  Negative for chest pain.   Gastrointestinal:  Positive for abdominal pain. Negative for diarrhea and nausea.   Genitourinary:  Negative for difficulty urinating.   Musculoskeletal:  Positive for gait problem, neck pain and neck stiffness. Negative for back pain.   Neurological:  Positive for numbness. Negative for weakness and headaches.   Psychiatric/Behavioral:  Negative for confusion.      Objective:     Weight: 63.5 kg (140 lb)  Body mass index is 22.6 kg/m².  Vital Signs (Most Recent):  Temp: 98.1 °F (36.7 °C) (10/25/22 08)  Pulse: 89 (10/25/22 08)  Resp: 18 (10/25/22 0901)  BP: (!) 155/74 (10/25/22 08)  SpO2: 96 % (10/25/22 0811)   Vital Signs (24h Range):  Temp:  [98 °F (36.7 °C)-98.5 °F (36.9 °C)] 98.1 °F (36.7 °C)  Pulse:  [] 89  Resp:  [15-20] 18  SpO2:  [96 %-98 %] 96 %  BP: (134-178)/(71-94) 155/74       Neurosurgery Physical Exam  General: Well developed, well nourished, not in acute distress.   Head: Normocephalic, atraumatic.  Neck: TTP of posterior cervical.  Neurologic: AOx4. Thought content appropriate.  GCS:  Motor:6  Verbal:5  Eyes:4   GCS Total: 15  Language: No aphasia.  Speech: No dysarthria.  Cranial nerves: Face symmetric, tongue midline, CN II-XII grossly intact.   Eyes: PEERL, EOMI.   Pulmonary: Normal respirations, no signs of respiratory distress.  Abdomen: Soft, non-distended, non-tender to palpation.  Sensory: Decreased sensation from T5 level down.  Motor Strength: Moves all extremities spontaneously with good tone. No abnormal movements seen.   Strength  Deltoids Triceps Biceps Wrist Extension Wrist Flexion Hand    Upper: R 5/5 5/5 5/5 5/5 5/5 4/5    L 5/5 5/5 5/5 5/5 5/5 5/5     Hip Flexion Knee Extension Knee  Flexion Dorsiflexion Plantar flexion EHL   Lower: R 5/5 5/5 5/5 5/5 5/5 5/5    L 5/5 5/5 5/5 5/5 5/5 5/5   Bilateral hand interosseous 5/5.  Luna: Absent.  Clonus: Absent.  Skin: Extensive scabbed wounds and scarring on bilateral forearms. Small pustule on left. No drainage or odor.    Significant Labs:  Recent Labs   Lab 10/25/22  0343   *   *   K 4.2   CL 92*   CO2 26   BUN 13   CREATININE 0.8   CALCIUM 10.2     Recent Labs   Lab 10/25/22  0343   WBC 16.94*   HGB 11.3*   HCT 34.0*        Recent Labs   Lab 10/25/22  0343   INR 1.0     Microbiology Results (last 7 days)       Procedure Component Value Units Date/Time    Blood culture #1 **CANNOT BE ORDERED STAT** [[id number]] Collected: 10/25/22 0917    Order Status: Sent Specimen: Blood from Peripheral, Antecubital, Right Updated: 10/25/22 0925    Blood culture #2 **CANNOT BE ORDERED STAT** [[id number]] Collected: 10/25/22 0917    Order Status: Sent Specimen: Blood from Peripheral, Wrist, Left Updated: 10/25/22 0925    Blood culture [[id number]]     Order Status: Sent Specimen: Blood           All pertinent labs from the last 24 hours have been reviewed.    Significant Diagnostics:  I have reviewed and interpreted all pertinent imaging results/findings within the past 24 hours.    Imaging Results              CT Cervical  Spine Without Contrast (In process)                       MRI Spine Cervical-Thoracic-Lumbar Without Contrast (XPD) (Final result)  Result time 10/25/22 08:35:36      Final result by Jaxon Gonzáles MD (10/25/22 08:35:36)                   Impression:      Findings suggestive of discitis/osteomyelitis at C5-C6 with associated disc abnormality and epidural collection extending into the central canal resulting in severe central canal stenosis and cord signal abnormality at C5-C6.  Prevertebral soft tissue edema and probable paravertebral abscess or phlegmon at this level.  Overall evaluation is limited by motion and absence of IV contrast.  Suggest spine surgery evaluation and follow-up MRI with contrast and cervical spine CT when clinically appropriate.    No acute abnormality identified in the thoracic or lumbar spine.    Partially visualized nonspecific marrow signal abnormality involving the left iliac bone.  Consider follow-up with pelvic MRI or CT to further evaluate when clinically warranted.    Additional findings discussed in the body of the report.    This report was flagged in Epic as abnormal.      Electronically signed by: Jaxon Gonzáles MD  Date:    10/25/2022  Time:    08:35               Narrative:    EXAMINATION:  MRI SPINE CERVICAL-THORACIC-LUMBAR WITHOUT CONTRAST (XPD)    CLINICAL HISTORY:  Myelopathy, acute;    TECHNIQUE:  Multiplanar, multisequence MR images of the cervical, thoracic, and lumbar spine were performed without the administration of contrast.    COMPARISON:  Neck radiograph, 09/02/2012.    FINDINGS:  CERVICAL    Alignment: Focal kyphosis at C5-C6.  Mild spondylolisthesis at C5-C6.  Sagittal alignment is otherwise within normal limits.    Vertebrae: Increased STIR signal involving the C5 and C6 vertebral bodies.  Question minimal increased STIR signal involving the inferior endplate of C4 vertebral body.  Increased STIR signal involving the posterior elements at C5-C6.    Discs:  Significant loss of disc height and disc space irregularity at C5-C6.  Mild disc space height loss at C4-C5 and C6-C7. Disc and epidural collection extends approximately 6 mm into the central canal at the level of C5-C6 with associated effacement of the ventral and dorsal thecal sacs and cord compression.    Cord: Evaluation of the cord is limited by significant motion on the axial images.  Disc and epidural collection extends approximately 6 mm into the central canal at the level of C5-C6 with associated effacement of the ventral and dorsal thecal sacs and cord compression.  There is probable focal cord signal abnormality at the level of C5-C6 related to compressive myelopathy or edema.    Skull base and craniocervical junction: Normal.    Severe central canal stenosis at C5-C6.  Significant bilateral neural foraminal narrowing at C4 through C7, though likely most pronounced at C5-C6.    Probable epidural collection at C5-C6 though evaluation is limited by motion.  There is significant prevertebral soft tissue thickening extending from C2 through T1.  Prevertebral abscess or phlegmon is suspected at C3 through C7 measuring up to 1.3 cm in AP thickness (sagittal series 16, image 8).  There is inter spinous edema at C5-C7.    THORACIC    Alignment: Normal.    Vertebrae: Normal marrow signal. No fracture.    Discs: Normal height and signal.    Cord: Normal.    No advanced degenerative changes.  No severe central canal stenosis or severe neural foraminal narrowing.    Limited evaluation of the posterior chest and abdomen are unremarkable.    LUMBAR    Suspected transitional lumbosacral vertebral body at S1 with rudimentary S1-S2 disc space.    Alignment: Normal.    Vertebrae: Degenerative endplate changes at L5-S1.  Otherwise, normal marrow signal. No fracture.    Discs: Moderate loss of disc height at L5-S1.  Minimal degenerative disc changes elsewhere in the lumbar spine.    Cord: Normal.    Degenerative  findings:    L1-L2: No significant disc protrusion, central canal stenosis, or neural foraminal narrowing.    L2-L3: Minimal posterior disc bulge.  No significant central canal stenosis or neural foraminal narrowing.    L3-L4: Minimal posterior disc bulge.  Mild facet arthropathy and ligamentum flavum thickening.  No significant central canal stenosis or neural foraminal narrowing.    L4-L5: Minimal posterior disc bulge.  Mild facet arthropathy and ligamentum flavum thickening.  No significant central canal stenosis or neural foraminal narrowing.    L5-S1: Posterior disc bulge extends approximately 5 mm into the central canal.  Mild facet arthropathy and ligamentum flavum thickening.  Mild left neural foraminal narrowing.  No significant central canal stenosis.    Paraspinal muscles & soft tissues: Large region of signal abnormality in the left iliac wing of unclear clinical significance.  This area measures up to 4 cm in size.  Paraspinal soft tissues are unremarkable.

## 2022-10-25 NOTE — PHARMACY MED REC
"Admission Medication History     The home medication history was taken by Tawana Daly.    You may go to "Admission" then "Reconcile Home Medications" tabs to review and/or act upon these items.     The home medication list has been updated by the Pharmacy department.   Please read ALL comments highlighted in yellow.   Please address this information as you see fit.    Feel free to contact us if you have any questions or require assistance.      The medications listed below were removed from the home medication list. Please reorder if appropriate:  Patient reports no longer taking the following medication(s):  BISOPROLOL 5 MG TAB  FUROSEMIDE 20 MG TAB  LISINOPRIL 10 MG TAB  PAROXETINE 20 MG TAB  RANITIDINE 150 MG TAB    Medications listed below were obtained from: Patient    Current Outpatient Medications on File Prior to Encounter   Medication Sig    aspirin (ECOTRIN) 81 MG EC tablet   Take 81 mg by mouth once daily.    LIDOcaine-menthol (ICY HOT PATCH, LIDO-MENTHOL,) 4-1 % PtMd   Apply 1 patch topically 2 (two) times daily as needed (Pain).    methadone (METHADOSE) 40 mg disintegrating tablet   Take 2 &1/4 (90 mg)  tablet by mouth in water once daily.    multivitamin (ONE DAILY MULTIVITAMIN) per tablet   Take 1 tablet by mouth once daily.       Potential issues to be addressed PRIOR TO DISCHARGE  Patient reported not taking the following medications: (ATIVAN). These medications remain on the home medication list. Please address accordingly.     Tawana Daly, [facility]  EXT [id number]                  .        "

## 2022-10-25 NOTE — ASSESSMENT & PLAN NOTE
2/2 to osteomyelitis, discitis and epidural abscess  -NSGY following   -Q 1 neuro checks   -Q 1 vitals   -MAP goal >85  -plan for OR Friday   -c-collar   -PT/OT

## 2022-10-25 NOTE — ASSESSMENT & PLAN NOTE
C5-6, unknown source at this time, possibly from wounds on arms  -NSGY following, plan for OR Thursday   -broad spectrum ABX   -blood cultures   -urine culture
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
## 2022-10-25 NOTE — ASSESSMENT & PLAN NOTE
Peter Stiles is a 51 y.o. female with PMHx of HTN, dilated cardiomyopathy, h/o opioid dependence, cigarette smoker (2pks/day), and daily baby ASA use who presents with 1 day of decreased sensation from T5 level down and tingling in her bilateral fingers and bilateral toes. MRI imaging obtained in the ED showing possible C5-6 osteodiscitis/myelitis. Patient tachycardic and hypertensive on arrival, afebrile, with leukocytosis.     - Recommend admission to hospital medicine given comorbidites, infectious workup/sepsis watch, hyponatremia and hypertension on arrival.   - Recommend q4hr neurochecks.   - All pertinent imaging personally reviewed and interpreted.   - MRI pan spine without contrast showing possible C5/6 discitis/osteomyelitis w/ epidural collection extending into the central spinal canal resulting in severe stenosis and cord signal abnormality.  Prevertebral soft tissue edema and probable paravertebral abscess or phlegmon at this level. Thoracic and lumbar spine unremarkable.   - MRI cervical spine with contrast STAT.  - CT cervical spine without contrast STAT.  - ESR/CRP/Procal & Blood cultures ordered.   - Toxicology screen.  - Protime-INR/APTT/type&screen ordered.  - Monitor for urinary retention with PVR.   - Neurosurgical intervention to be determined upon completion of imaging and discussion with staff. Patient will need medical clearance prior to any intervention if indicated during this stay.     Please contact NSGY with any changes in neuro exam or patient status.
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
## 2022-10-25 NOTE — PROGRESS NOTES
Pharmacokinetic Initial Assessment: IV Vancomycin    Assessment/Plan:    Initiate intravenous vancomycin with loading dose of 1500 mg once followed by a maintenance dose of vancomycin 750mg IV every 12 hours  Desired empiric serum trough concentration is 10 to 20 mcg/mL  Draw vancomycin trough level 60 min prior to fourth dose on 10/26/22 at approximately 2200  Pharmacy will continue to follow and monitor vancomycin.      Please contact pharmacy at extension [phone] with any questions regarding this assessment.     Thank you for the consult,   Cindy Claros       Patient brief summary:  Peter Stiles is a 51 y.o. female initiated on antimicrobial therapy with IV Vancomycin for treatment of suspected bone/joint infection    Drug Allergies:   Review of patient's allergies indicates:   Allergen Reactions    Morphine Shortness Of Breath and Other (See Comments)     Throat closed       Actual Body Weight:   63.5 kg    Renal Function:   Estimated Creatinine Clearance: 77 mL/min (based on SCr of 0.8 mg/dL).,     Dialysis Method (if applicable):  N/A    CBC (last 72 hours):  Recent Labs   Lab Result Units 10/25/22  0343   WBC K/uL 16.94*   Hemoglobin g/dL 11.3*   Hemoglobin A1C % 6.1*   Hematocrit % 34.0*   Platelets K/uL 443   Gran % % 73.0   Lymph % % 17.4*   Mono % % 8.6   Eosinophil % % 0.1   Basophil % % 0.1   Differential Method  Automated       Metabolic Panel (last 72 hours):  Recent Labs   Lab Result Units 10/25/22  0343 10/25/22  0802   Sodium mmol/L 127*  --    Potassium mmol/L 4.2  --    Chloride mmol/L 92*  --    CO2 mmol/L 26  --    Glucose mg/dL 269*  --    Glucose, UA   --  Negative   BUN mg/dL 13  --    Creatinine mg/dL 0.8  --    Albumin g/dL 3.0*  --    Total Bilirubin mg/dL 0.3  --    Alkaline Phosphatase U/L 152*  --    AST U/L 11  --    ALT U/L 11  --        Drug levels (last 3 results):  No results for input(s): VANCOMYCINRA, VANCORANDOM, VANCOMYCINPE, VANCOPEAK, VANCOMYCINTR, VANCOTROUGH in  the last 72 hours.    Microbiologic Results:  Microbiology Results (last 7 days)       Procedure Component Value Units Date/Time    Blood culture #1 **CANNOT BE ORDERED STAT** [[id number]] Collected: 10/25/22 0917    Order Status: Sent Specimen: Blood from Peripheral, Antecubital, Right Updated: 10/25/22 0925    Blood culture #2 **CANNOT BE ORDERED STAT** [[id number]] Collected: 10/25/22 0917    Order Status: Sent Specimen: Blood from Peripheral, Wrist, Left Updated: 10/25/22 0925    Blood culture [[id number]]     Order Status: Canceled Specimen: Blood

## 2022-10-25 NOTE — ASSESSMENT & PLAN NOTE
Methadone prescribed at [facility] clinic on [facility]

## 2022-10-25 NOTE — ED NOTES
Patient turned over to me by Dr. Leger at 0600    Briefly:   50 yo F with new ascending numbness of bilateral LE.  Awaiting MRI.    MRI with diskitis and osteomyelitis.    Discussed with NS who evaluated at bedside.  IV abx and cultures ordered.  Admitted to .    Did bedside teaching.  All questions answered.  Patient acknowledges understanding.      Bony Bhatti MD  10/25/22 0978

## 2022-10-26 LAB
ALBUMIN SERPL BCP-MCNC: 2.5 G/DL (ref 3.5–5.2)
ALP SERPL-CCNC: 128 U/L (ref 55–135)
ALT SERPL W/O P-5'-P-CCNC: 9 U/L (ref 10–44)
ANION GAP SERPL CALC-SCNC: 7 MMOL/L (ref 8–16)
APTT BLDCRRT: 29.6 SEC (ref 21–32)
AST SERPL-CCNC: 10 U/L (ref 10–40)
BASOPHILS # BLD AUTO: 0.04 K/UL (ref 0–0.2)
BASOPHILS NFR BLD: 0.3 % (ref 0–1.9)
BILIRUB SERPL-MCNC: 0.2 MG/DL (ref 0.1–1)
BUN SERPL-MCNC: 13 MG/DL (ref 6–20)
CALCIUM SERPL-MCNC: 9.5 MG/DL (ref 8.7–10.5)
CHLORIDE SERPL-SCNC: 98 MMOL/L (ref 95–110)
CO2 SERPL-SCNC: 30 MMOL/L (ref 23–29)
CREAT SERPL-MCNC: 0.9 MG/DL (ref 0.5–1.4)
DIFFERENTIAL METHOD: ABNORMAL
EOSINOPHIL # BLD AUTO: 0.1 K/UL (ref 0–0.5)
EOSINOPHIL NFR BLD: 1 % (ref 0–8)
ERYTHROCYTE [DISTWIDTH] IN BLOOD BY AUTOMATED COUNT: 12.1 % (ref 11.5–14.5)
EST. GFR  (NO RACE VARIABLE): >60 ML/MIN/1.73 M^2
GLUCOSE SERPL-MCNC: 221 MG/DL (ref 70–110)
HCT VFR BLD AUTO: 30.8 % (ref 37–48.5)
HGB BLD-MCNC: 10.1 G/DL (ref 12–16)
IMM GRANULOCYTES # BLD AUTO: 0.11 K/UL (ref 0–0.04)
IMM GRANULOCYTES NFR BLD AUTO: 0.8 % (ref 0–0.5)
INR PPP: 1 (ref 0.8–1.2)
LYMPHOCYTES # BLD AUTO: 4.1 K/UL (ref 1–4.8)
LYMPHOCYTES NFR BLD: 27.8 % (ref 18–48)
MAGNESIUM SERPL-MCNC: 2 MG/DL (ref 1.6–2.6)
MCH RBC QN AUTO: 30.5 PG (ref 27–31)
MCHC RBC AUTO-ENTMCNC: 32.8 G/DL (ref 32–36)
MCV RBC AUTO: 93 FL (ref 82–98)
MONOCYTES # BLD AUTO: 1.6 K/UL (ref 0.3–1)
MONOCYTES NFR BLD: 10.6 % (ref 4–15)
NEUTROPHILS # BLD AUTO: 8.7 K/UL (ref 1.8–7.7)
NEUTROPHILS NFR BLD: 59.5 % (ref 38–73)
NRBC BLD-RTO: 0 /100 WBC
PHOSPHATE SERPL-MCNC: 2.6 MG/DL (ref 2.7–4.5)
PHOSPHATE SERPL-MCNC: 2.7 MG/DL (ref 2.7–4.5)
PLATELET # BLD AUTO: 372 K/UL (ref 150–450)
PMV BLD AUTO: 9.1 FL (ref 9.2–12.9)
POCT GLUCOSE: 177 MG/DL (ref 70–110)
POCT GLUCOSE: 227 MG/DL (ref 70–110)
POCT GLUCOSE: 238 MG/DL (ref 70–110)
POCT GLUCOSE: 257 MG/DL (ref 70–110)
POCT GLUCOSE: 299 MG/DL (ref 70–110)
POTASSIUM SERPL-SCNC: 4.1 MMOL/L (ref 3.5–5.1)
PROT SERPL-MCNC: 7.6 G/DL (ref 6–8.4)
PROTHROMBIN TIME: 10.9 SEC (ref 9–12.5)
RBC # BLD AUTO: 3.31 M/UL (ref 4–5.4)
SARS-COV-2 RNA RESP QL NAA+PROBE: NOT DETECTED
SODIUM SERPL-SCNC: 135 MMOL/L (ref 136–145)
VANCOMYCIN TROUGH SERPL-MCNC: 11.3 UG/ML (ref 10–22)
WBC # BLD AUTO: 14.65 K/UL (ref 3.9–12.7)

## 2022-10-26 PROCEDURE — 99233 SBSQ HOSP IP/OBS HIGH 50: CPT | Mod: FS,,, | Performed by: NURSE PRACTITIONER

## 2022-10-26 PROCEDURE — 97530 THERAPEUTIC ACTIVITIES: CPT

## 2022-10-26 PROCEDURE — 63600175 PHARM REV CODE 636 W HCPCS: Performed by: PSYCHIATRY & NEUROLOGY

## 2022-10-26 PROCEDURE — 99232 SBSQ HOSP IP/OBS MODERATE 35: CPT | Mod: ,,, | Performed by: STUDENT IN AN ORGANIZED HEALTH CARE EDUCATION/TRAINING PROGRAM

## 2022-10-26 PROCEDURE — 25000003 PHARM REV CODE 250: Performed by: NURSE PRACTITIONER

## 2022-10-26 PROCEDURE — 25000003 PHARM REV CODE 250: Performed by: STUDENT IN AN ORGANIZED HEALTH CARE EDUCATION/TRAINING PROGRAM

## 2022-10-26 PROCEDURE — 85730 THROMBOPLASTIN TIME PARTIAL: CPT | Performed by: PHYSICIAN ASSISTANT

## 2022-10-26 PROCEDURE — 51798 US URINE CAPACITY MEASURE: CPT

## 2022-10-26 PROCEDURE — 83735 ASSAY OF MAGNESIUM: CPT | Performed by: PHYSICIAN ASSISTANT

## 2022-10-26 PROCEDURE — S4991 NICOTINE PATCH NONLEGEND: HCPCS | Performed by: PHYSICIAN ASSISTANT

## 2022-10-26 PROCEDURE — 25000003 PHARM REV CODE 250: Performed by: PHYSICIAN ASSISTANT

## 2022-10-26 PROCEDURE — 99233 PR SUBSEQUENT HOSPITAL CARE,LEVL III: ICD-10-PCS | Mod: FS,,, | Performed by: NURSE PRACTITIONER

## 2022-10-26 PROCEDURE — 97162 PT EVAL MOD COMPLEX 30 MIN: CPT

## 2022-10-26 PROCEDURE — 36415 COLL VENOUS BLD VENIPUNCTURE: CPT | Performed by: PSYCHIATRY & NEUROLOGY

## 2022-10-26 PROCEDURE — 97166 OT EVAL MOD COMPLEX 45 MIN: CPT

## 2022-10-26 PROCEDURE — U0003 INFECTIOUS AGENT DETECTION BY NUCLEIC ACID (DNA OR RNA); SEVERE ACUTE RESPIRATORY SYNDROME CORONAVIRUS 2 (SARS-COV-2) (CORONAVIRUS DISEASE [COVID-19]), AMPLIFIED PROBE TECHNIQUE, MAKING USE OF HIGH THROUGHPUT TECHNOLOGIES AS DESCRIBED BY CMS-2020-01-R: HCPCS | Performed by: STUDENT IN AN ORGANIZED HEALTH CARE EDUCATION/TRAINING PROGRAM

## 2022-10-26 PROCEDURE — 84100 ASSAY OF PHOSPHORUS: CPT | Performed by: PHYSICIAN ASSISTANT

## 2022-10-26 PROCEDURE — 84100 ASSAY OF PHOSPHORUS: CPT | Mod: 91 | Performed by: PSYCHIATRY & NEUROLOGY

## 2022-10-26 PROCEDURE — U0005 INFEC AGEN DETEC AMPLI PROBE: HCPCS | Performed by: STUDENT IN AN ORGANIZED HEALTH CARE EDUCATION/TRAINING PROGRAM

## 2022-10-26 PROCEDURE — 99232 PR SUBSEQUENT HOSPITAL CARE,LEVL II: ICD-10-PCS | Mod: ,,, | Performed by: STUDENT IN AN ORGANIZED HEALTH CARE EDUCATION/TRAINING PROGRAM

## 2022-10-26 PROCEDURE — 94761 N-INVAS EAR/PLS OXIMETRY MLT: CPT

## 2022-10-26 PROCEDURE — 97535 SELF CARE MNGMENT TRAINING: CPT

## 2022-10-26 PROCEDURE — 25000003 PHARM REV CODE 250

## 2022-10-26 PROCEDURE — 85025 COMPLETE CBC W/AUTO DIFF WBC: CPT | Performed by: PHYSICIAN ASSISTANT

## 2022-10-26 PROCEDURE — 85610 PROTHROMBIN TIME: CPT | Performed by: PHYSICIAN ASSISTANT

## 2022-10-26 PROCEDURE — 80053 COMPREHEN METABOLIC PANEL: CPT | Performed by: PHYSICIAN ASSISTANT

## 2022-10-26 PROCEDURE — 25000003 PHARM REV CODE 250: Performed by: PSYCHIATRY & NEUROLOGY

## 2022-10-26 PROCEDURE — 63600175 PHARM REV CODE 636 W HCPCS: Performed by: NURSE PRACTITIONER

## 2022-10-26 PROCEDURE — 11000001 HC ACUTE MED/SURG PRIVATE ROOM

## 2022-10-26 PROCEDURE — 63600175 PHARM REV CODE 636 W HCPCS: Performed by: PHYSICIAN ASSISTANT

## 2022-10-26 PROCEDURE — 51701 INSERT BLADDER CATHETER: CPT

## 2022-10-26 PROCEDURE — 80202 ASSAY OF VANCOMYCIN: CPT | Performed by: PSYCHIATRY & NEUROLOGY

## 2022-10-26 RX ORDER — LANOLIN ALCOHOL/MO/W.PET/CERES
800 CREAM (GRAM) TOPICAL
Status: DISCONTINUED | OUTPATIENT
Start: 2022-10-26 | End: 2022-10-27

## 2022-10-26 RX ORDER — METHOCARBAMOL 500 MG/1
500 TABLET, FILM COATED ORAL 3 TIMES DAILY
Status: DISCONTINUED | OUTPATIENT
Start: 2022-10-26 | End: 2022-10-26

## 2022-10-26 RX ORDER — HYDROCODONE BITARTRATE AND ACETAMINOPHEN 500; 5 MG/1; MG/1
TABLET ORAL
Status: DISCONTINUED | OUTPATIENT
Start: 2022-10-26 | End: 2022-10-29

## 2022-10-26 RX ORDER — SODIUM,POTASSIUM PHOSPHATES 280-250MG
2 POWDER IN PACKET (EA) ORAL
Status: DISCONTINUED | OUTPATIENT
Start: 2022-10-26 | End: 2022-10-27

## 2022-10-26 RX ORDER — FENTANYL CITRATE 50 UG/ML
12.5 INJECTION, SOLUTION INTRAMUSCULAR; INTRAVENOUS ONCE
Status: DISCONTINUED | OUTPATIENT
Start: 2022-10-26 | End: 2022-10-27

## 2022-10-26 RX ORDER — OXYCODONE HYDROCHLORIDE 5 MG/1
5 TABLET ORAL EVERY 4 HOURS PRN
Status: DISCONTINUED | OUTPATIENT
Start: 2022-10-26 | End: 2022-10-26

## 2022-10-26 RX ORDER — METHOCARBAMOL 500 MG/1
500 TABLET, FILM COATED ORAL 4 TIMES DAILY
Status: DISCONTINUED | OUTPATIENT
Start: 2022-10-26 | End: 2022-10-27

## 2022-10-26 RX ORDER — FENTANYL CITRATE 50 UG/ML
25 INJECTION, SOLUTION INTRAMUSCULAR; INTRAVENOUS ONCE
Status: COMPLETED | OUTPATIENT
Start: 2022-10-26 | End: 2022-10-26

## 2022-10-26 RX ADMIN — VANCOMYCIN HYDROCHLORIDE 750 MG: 750 INJECTION, POWDER, LYOPHILIZED, FOR SOLUTION INTRAVENOUS at 10:10

## 2022-10-26 RX ADMIN — METHADONE HYDROCHLORIDE 45 MG: 10 TABLET ORAL at 03:10

## 2022-10-26 RX ADMIN — METHOCARBAMOL 500 MG: 500 TABLET ORAL at 06:10

## 2022-10-26 RX ADMIN — OXYCODONE HYDROCHLORIDE AND ACETAMINOPHEN 1 TABLET: 5; 325 TABLET ORAL at 04:10

## 2022-10-26 RX ADMIN — OXYCODONE 5 MG: 5 TABLET ORAL at 12:10

## 2022-10-26 RX ADMIN — METHOCARBAMOL 500 MG: 500 TABLET ORAL at 03:10

## 2022-10-26 RX ADMIN — PREGABALIN 75 MG: 75 CAPSULE ORAL at 07:10

## 2022-10-26 RX ADMIN — METHOCARBAMOL 500 MG: 500 TABLET ORAL at 12:10

## 2022-10-26 RX ADMIN — INSULIN ASPART 4 UNITS: 100 INJECTION, SOLUTION INTRAVENOUS; SUBCUTANEOUS at 08:10

## 2022-10-26 RX ADMIN — INSULIN ASPART 3 UNITS: 100 INJECTION, SOLUTION INTRAVENOUS; SUBCUTANEOUS at 10:10

## 2022-10-26 RX ADMIN — HYDROXYZINE HYDROCHLORIDE 25 MG: 25 TABLET, FILM COATED ORAL at 10:10

## 2022-10-26 RX ADMIN — ACETAMINOPHEN 650 MG: 325 TABLET ORAL at 12:10

## 2022-10-26 RX ADMIN — Medication 1 PATCH: at 09:10

## 2022-10-26 RX ADMIN — FENTANYL CITRATE 25 MCG: 0.05 INJECTION, SOLUTION INTRAMUSCULAR; INTRAVENOUS at 10:10

## 2022-10-26 RX ADMIN — POTASSIUM & SODIUM PHOSPHATES POWDER PACK 280-160-250 MG 2 PACKET: 280-160-250 PACK at 08:10

## 2022-10-26 RX ADMIN — ACETAMINOPHEN 650 MG: 325 TABLET ORAL at 10:10

## 2022-10-26 RX ADMIN — CEFTRIAXONE SODIUM 2 G: 2 INJECTION, SOLUTION INTRAVENOUS at 08:10

## 2022-10-26 RX ADMIN — SENNOSIDES AND DOCUSATE SODIUM 1 TABLET: 50; 8.6 TABLET ORAL at 08:10

## 2022-10-26 RX ADMIN — INSULIN ASPART 1 UNITS: 100 INJECTION, SOLUTION INTRAVENOUS; SUBCUTANEOUS at 12:10

## 2022-10-26 RX ADMIN — INSULIN ASPART 4 UNITS: 100 INJECTION, SOLUTION INTRAVENOUS; SUBCUTANEOUS at 01:10

## 2022-10-26 RX ADMIN — INSULIN ASPART 6 UNITS: 100 INJECTION, SOLUTION INTRAVENOUS; SUBCUTANEOUS at 05:10

## 2022-10-26 RX ADMIN — POTASSIUM & SODIUM PHOSPHATES POWDER PACK 280-160-250 MG 2 PACKET: 280-160-250 PACK at 01:10

## 2022-10-26 RX ADMIN — OXYCODONE 5 MG: 5 TABLET ORAL at 09:10

## 2022-10-26 RX ADMIN — ACETAMINOPHEN 650 MG: 325 TABLET ORAL at 06:10

## 2022-10-26 RX ADMIN — CEFTRIAXONE 2 G: 2 INJECTION, SOLUTION INTRAVENOUS at 08:10

## 2022-10-26 RX ADMIN — PREGABALIN 75 MG: 75 CAPSULE ORAL at 08:10

## 2022-10-26 NOTE — PLAN OF CARE
Problem: Physical Therapy  Goal: Physical Therapy Goal  Description: PT goals until 11/6/22    1. Pt supine to sit with CGA through sidelying-not met  2. Pt sit to supine with CGA through sidelying-not met  3. Pt sit to stand with RW with minimal assist-not met  4. Pt to perform gait 30ft with RW with minimal assist.-not met  5. Pt to transfer bed to/from bedside chair with RW with minimal assist.-not met  6. Pt to up/down 5 steps with L UE rail with minimal assist.-not met  7. Pt to perform B LE exs in sitting or supine x 10 reps to strengthen B LE to improve functional mobility.-not met    Outcome: Ongoing, Progressing   Pt's goals set and pt will benefit from skilled PT services to work towards improved functional mobility including: bed mobility, transfers, up/down steps, and gait.   10/26/2022

## 2022-10-26 NOTE — ASSESSMENT & PLAN NOTE
2/2 to osteomyelitis, discitis and epidural abscess  -NSGY following   -Q 1 neuro checks   -Q 1 vitals   -MAP goal >85, maintaining on own  -c-collar   -PT/OT  -Electrolytes replaced, Patiño placed, and Plan for OR on 10/28/22

## 2022-10-26 NOTE — PT/OT/SLP EVAL
"Physical Therapy Evaluation/co eval with OT    Patient Name:  Peter Stiles   MRN:  [id number]    Recommendations:     Discharge Recommendations:  rehabilitation facility   Discharge Equipment Recommendations:  (TBD as pt progresses)   Barriers to discharge: Inaccessible home and Decreased caregiver support bed/bathroom on 2nd floor and lives with young daughters    Assessment:     Peter Stiles is a 51 y.o. female admitted with a medical diagnosis of Spinal cord compression.  She presents with the following impairments/functional limitations:  weakness, impaired balance, pain, impaired functional mobility, impaired self care skills, gait instability, decreased lower extremity function, decreased upper extremity function, impaired sensation . Pt is unsafe with functional mobility at this time due to pt requires moderate assist for bed mobility, moderate assist of 2 for transfers, and moderate assist of 2 for gait due to weakness, numbness, and instability. Pt is motivated to progress with functional mobility.     Rehab Prognosis: Good; patient would benefit from acute skilled PT services to address these deficits and reach maximum level of function.    Recent Surgery: Procedure(s) (LRB):  CORPECTOMY, SPINE, CERVICAL (N/A)      Plan:     During this hospitalization, patient to be seen 4 x/week to address the identified rehab impairments via gait training, therapeutic activities, therapeutic exercises, neuromuscular re-education and progress toward the following goals:    Plan of Care Expires:  11/25/22    Subjective   "This all came on suddenly"    Pain/Comfort:  Pain Rating 1: 6/10  Location - Orientation 1: posterior  Location 1: neck (B shoulders)  Pain Addressed 1: Reposition, Cessation of Activity, Nurse notified  Pain Rating Post-Intervention 1: 6/10    Patients cultural, spiritual, Yazidism conflicts given the current situation: no    Living Environment:  Pt lives with her 17 yo and 17yo daughters " in a 2 story home with no CARMELA and full flight of steps with L UE rail.  Prior to admission, patients level of function was independent, used SC at times.  Equipment used at home: none.  Upon discharge, patient will have assistance from unknown.    Objective:     Communicated with nurse prior to session.  Patient found HOB elevated with bed alarm, peripheral IV, pulse ox (continuous), telemetry, oxygen, PureWick  upon PT entry to room.    General Precautions: Standard, fall   Orthopedic Precautions:spinal precautions   Braces: Aspen collar  Respiratory Status: Room air    Exams:  Cognitive Exam:  Patient is oriented to Person, Place, and Time  Sensation:    -      absent/impaired light/touch B LE/pt c/o increased sensitivity to touch in R>L LE  RLE ROM: WFL  RLE Strength: WFL except hip flex 4/5  LLE ROM: WFL  LLE Strength: Deficits: hip flex 4-/5; knee flex/ext 4/5; ankle DF 4/5    Functional Mobility:  Bed Mobility:     Rolling Left:  minimum assistance  Supine to Sit: moderate assistance  Sit to Supine: moderate assistance  Transfers:     Sit to Stand:  moderate assistance and of 2 persons with hand-held assist  Gait: 4 side steps with HHA with moderate assist of 2. Pt with decreased step length and decreased clearance of B feet during swing phase.    AM-PAC 6 CLICK MOBILITY  Total Score:12     Treatment & Education:  Pt sat on the EOB ~ 10 min with CGA between standing trials with HHA with moderate assist of 2. Pt educated in cervical spinal precautions with mobility.  Co-treat with OT due to medical complexity of pt and need for skilled hands for safe intervention.     Patient left HOB elevated with all lines intact, call button in reach, bed alarm on, and nurse notified.    GOALS:   Multidisciplinary Problems       Physical Therapy Goals          Problem: Physical Therapy    Goal Priority Disciplines Outcome Goal Variances Interventions   Physical Therapy Goal     PT, PT/OT Ongoing, Progressing      Description: PT goals until 22    1. Pt supine to sit with CGA through sidelying-not met  2. Pt sit to supine with CGA through sidelying-not met  3. Pt sit to stand with RW with minimal assist-not met  4. Pt to perform gait 30ft with RW with minimal assist.-not met  5. Pt to transfer bed to/from bedside chair with RW with minimal assist.-not met  6. Pt to up/down 5 steps with L UE rail with minimal assist.-not met  7. Pt to perform B LE exs in sitting or supine x 10 reps to strengthen B LE to improve functional mobility.-not met                         History:     Past Medical History:   Diagnosis Date    CHF (congestive heart failure)     Essential (primary) hypertension     Opioid dependence on maintenance agonist therapy, no symptoms     Smoking        Past Surgical History:   Procedure Laterality Date    APPENDECTOMY       SECTION      x2    HYSTERECTOMY         Time Tracking:     PT Received On: 10/26/22  PT Start Time: 906     PT Stop Time: 923  PT Total Time (min): 17 min     Billable Minutes: Evaluation 8 and Therapeutic Activity 9      10/26/2022
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
## 2022-10-26 NOTE — PLAN OF CARE
[facility]  Initial Discharge Assessment       Primary Care Provider: Primary Doctor No    Admission Diagnosis: Numbness [R20.0]  Epigastric pain [R10.13]  Paresthesias [R20.2]  Osteomyelitis [M86.9]  Diabetes mellitus, new onset [E11.9]  Osteomyelitis of cervical spine [M46.22]  Leukocytosis, unspecified type [D72.829]  Acute back pain, unspecified back location, unspecified back pain laterality [M54.9]    Admission Date: 10/25/2022  Expected Discharge Date: 10/31/2022    Discharge Barriers Identified: None    Payor: MEDICARE / Plan: MEDICARE PART A & B / Product Type: Government /     Extended Emergency Contact Information  Primary Emergency Contact: RosyraulAria  Mobile Phone: [phone]  Relation: Daughter  Secondary Emergency Contact: No,Contact   [facility]  Home Phone: [phone]  Relation: None    Discharge Plan A: Rehab  Discharge Plan B: Rehab      [id number] [address] AT [address] [address]  Phone: [phone] Fax: [phone]      Transferred from:     Past Medical History:   Diagnosis Date    CHF (congestive heart failure)     Essential (primary) hypertension     Opioid dependence on maintenance agonist therapy, no symptoms     Smoking          SW met with patient in room for Discharge Planning Assessment.  Patient was able to answer questions.  Per Pt, she lives with her two daughters, 18 and 16 in a two story condo with no step(s) to enter, and a flight of stairs inside the home. Per Pt, she was independent with ADLS and used no DME for ambulation.  Patient will have assistance from her daughters upon discharge. SW reported that Pt will have surgery but therapy has evaluated her  and thinks she will need rehab. Another eval will be completed post op 10/28. Pt agreeable to rehab and will review for preferences. Discharge Planning Booklet given to  patient/family and discussed.  All questions addressed.  CM/SW will follow for needs.      Initial Assessment (most recent)       Adult Discharge Assessment - 10/26/22 8684          Discharge Assessment    Assessment Type Discharge Planning Assessment     Confirmed/corrected address, phone number and insurance Yes     Confirmed Demographics Correct on Facesheet     Source of Information patient     Communicated YESSI with patient/caregiver Date not available/Unable to determine     Reason For Admission Spinal cord compression     Lives With child(fareed), dependent;child(fareed), adult     Do you expect to return to your current living situation? Yes     Do you have help at home or someone to help you manage your care at home? Yes     Who are your caregiver(s) and their phone number(s)? Aria Stiles (daughter) [phone]     Prior to hospitilization cognitive status: Alert/Oriented     Current cognitive status: Alert/Oriented     Walking or Climbing Stairs Difficulty none     Dressing/Bathing Difficulty none     Home Accessibility stairs within home     Stairs, Within Home, Primary 15 with rail on one side and wall on the other     Number of Stairs, Within Home, Primary other (see comments)     Stair Railings, Within Home, Primary railings safe and in good condition     Home Layout Able to live on 1st floor;Bedroom on 2nd floor     Equipment Currently Used at Home none     Readmission within 30 days? No     Patient currently being followed by outpatient case management? No     Do you currently have service(s) that help you manage your care at home? No     Do you take prescription medications? Yes     Do you have prescription coverage? Yes     Coverage Medicare     Do you have any problems affording any of your prescribed medications? No     Is the patient taking medications as prescribed? yes     Who is going to help you get home at discharge? Aria Stiles (daughter) [phone]     How do you get to doctors  appointments? car, drives self;family or friend will provide     Are you on dialysis? No     Do you take coumadin? No     Discharge Plan A Rehab     Discharge Plan B Rehab     DME Needed Upon Discharge  none     Discharge Plan discussed with: Patient     Discharge Barriers Identified None        Relationship/Environment    Name(s) of Who Lives With Patient Aria Stiles (daughter) [phone] and dependent daughter Diann Butts Arlene, TERA  Neurocritical Care   [facility]  [id number]

## 2022-10-26 NOTE — PLAN OF CARE
[facility] Care Plan    POC reviewed with Peter Calderón Rosyraul and family at 1400. Pt verbalized understanding. Questions and concerns addressed. No acute events today. Pt progressing toward goals. Will continue to monitor. See below and flowsheets for full assessment and VS info.     -courtney placed for neurogenic bladder  -replaced Phos  -pain control optimized  -pt maintaining MAP goals without pharmacological intervention   -cBG elevated slide scale insulin given as per MAR  -pt stepped down until surgery Friday, consents in record (anesthesia still needed), site marin done, COVID screen sent to lab.       Is this a stroke patient? no    Neuro:  [place] Coma Scale  Best Eye Response: 4-->(E4) spontaneous  Best Motor Response: 6-->(M6) obeys commands  Best Verbal Response: 5-->(V5) oriented  [place] Coma Scale Score: 15  Assessment Qualifiers: no eye obstruction present  Pupil PERRLA: yes     24 hr Temp:  [98.1 °F (36.7 °C)-99.6 °F (37.6 °C)]     CV:   Rhythm: sinus tachycardia  BP goals:   SBP < 220  MAP > 85    Resp:   O2 Device (Oxygen Therapy): room air       Plan: N/A    GI/:     Diet/Nutrition Received: consistent carb/diabetic diet  Last Bowel Movement: 10/22/22  Voiding Characteristics: due to void, unable to void    Intake/Output Summary (Last 24 hours) at 10/26/2022 1813  Last data filed at 10/26/2022 1701  Gross per 24 hour   Intake 1164.67 ml   Output 3320 ml   Net -2155.33 ml     Unmeasured Output  Stool Occurrence: 0    Labs/Accuchecks:  Recent Labs   Lab 10/26/22  0223   WBC 14.65*   RBC 3.31*   HGB 10.1*   HCT 30.8*         Recent Labs   Lab 10/26/22  0223   *   K 4.1   CO2 30*   CL 98   BUN 13   CREATININE 0.9   ALKPHOS 128   ALT 9*   AST 10   BILITOT 0.2      Recent Labs   Lab 10/26/22  0223   INR 1.0   APTT 29.6      Recent Labs   Lab 10/25/22  0343   CPK 15*   TROPONINI <0.006       Electrolytes: Electrolytes replaced  Accuchecks: ACHS    Gtts:      LDA/Wounds:  Lines/Drains/Airways        Drain  Duration                  Urethral Catheter 10/26/22 1020 Non-latex <1 day              Peripheral Intravenous Line  Duration                  Peripheral IV - Single Lumen 10/25/22 0340 22 G Anterior;Distal;Left Wrist 1 day         Peripheral IV - Single Lumen 10/25/22 1830 20 G Anterior;Left;Proximal Forearm <1 day                  Wounds: Yes  Wound care consulted: Yes

## 2022-10-26 NOTE — PT/OT/SLP EVAL
Occupational Therapy  Co Evaluation    Name: Peter Stiles  MRN: [id number]  Admitting Diagnosis:  Spinal cord compression  Recent Surgery: Procedure(s) (LRB):  CORPECTOMY, SPINE, CERVICAL (N/A)      Recommendations:     Discharge Recommendations: rehabilitation facility  Discharge Equipment Recommendations:   (TBD)  Barriers to discharge:  Decreased caregiver support  Co-evaluation/treatment performed due to patient's multiple deficits requiring two skilled therapists to appropriately and safely assess patient's strength and endurance while facilitating functional tasks in addition to accommodating for patient's activity tolerance.    Assessment:     Peter Stiles is a 51 y.o. female with a medical diagnosis of Spinal cord compression.  She presents with friend in the room and MD entered and discussed upcoming surgery.. Performance deficits affecting function: weakness, gait instability, impaired self care skills, pain, impaired functional mobility, decreased coordination, impaired sensation, impaired fine motor, impaired endurance, impaired balance, decreased lower extremity function, impaired cardiopulmonary response to activity, decreased upper extremity function.  Patient states she lives in two story home with railing on left, her bedroom and full baths are on second story. She lives with two daughters 18 and 15 years old.She has Tub/shower and was independent in home and community without device including money and medication management, driving. She reports numbness in hands and finger tips with banding at belly button level. Diminished sensation in Les with testing. She reports pain in neck and shoulders with movement in cervical collar. Fine motor coordination diminished requiring assist to complete grooming and self feeding. Supine to sit with mod (A), EOB sit with CGA, Sit to stand with HHA mod (A) of two, side steps to head of bed mod (A) of two with LE weakness and instability.     Rehab  "Prognosis: Good; patient would benefit from acute skilled OT services to address these deficits and reach maximum level of function.       Plan:     Patient to be seen 3 x/week to address the above listed problems via self-care/home management, therapeutic activities, therapeutic exercises, neuromuscular re-education  Plan of Care Expires: 11/26/22  Plan of Care Reviewed with: patient, friend    Subjective     Chief Complaint: "My neck and shoulders hurt everytime I move. My legs hurt with touch."   Patient/Family Comments/goals: rehab    Occupational Profile:    Equipment Used at Home:  none  Assistance upon Discharge: teenage daughters    Pain/Comfort:  Pain Rating 1: 6/10  Location - Side 1: Bilateral  Location 1: shoulder  Pain Addressed 1: Pre-medicate for activity, Reposition, Cessation of Activity  Pain Rating Post-Intervention 1: 6/10    Patients cultural, spiritual, Methodist conflicts given the current situation: no    Objective:     Communicated with: RN prior to session.  Patient found HOB elevated with bed alarm, blood pressure cuff, pulse ox (continuous), telemetry, cervical collar, PureWick, oxygen upon OT entry to room.    General Precautions: Standard, fall   Orthopedic Precautions:spinal precautions   Braces: Aspen collar  Respiratory Status: Nasal cannula, flow 2 L/min    Occupational Performance:    Bed Mobility:    Patient completed Supine to Sit with moderate assistance  Patient completed Sit to Supine with moderate assistance    Functional Mobility/Transfers:  Patient completed Sit <> Stand Transfer with moderate assistance and of 2 persons  with  hand-held assist   Functional Mobility: side steps at EOB with mod (A) of two, noted LE instability and weakness    Activities of Daily Living:  Feeding:  moderate assistance    Grooming: moderate assistance    Upper Body Dressing: maximal assistance    Lower Body Dressing: total assistance    Toileting: total assistance      Cognitive/Visual " Perceptual:  Cognitive/Psychosocial Skills:     -       Oriented to: Person, Place, Time, and Situation   -       Follows Commands/attention:Follows multistep  commands  -       Memory: No Deficits noted  -       Safety awareness/insight to disability: intact     Physical Exam:  Upper Extremity Range of Motion:     -       Right Upper Extremity: flexion limited by pain  -       Left Upper Extremity: shoulder flexion limited by pain  Upper Extremity Strength:    -       Right Upper Extremity: decreased strength in shoulders 2/5, hand and  2/5  -       Left Upper Extremity: Deficits: decreased shoulder 2/5, hand and  2/5    AMPAC 6 Click ADL:  AMPAC Total Score: 10    Treatment & Education:  Education on rehab process and POC    Patient left HOB elevated with all lines intact, call button in reach, bed alarm on, and RN notified    GOALS:   Multidisciplinary Problems       Occupational Therapy Goals          Problem: Occupational Therapy    Goal Priority Disciplines Outcome Interventions   Occupational Therapy Goal     OT, PT/OT Ongoing, Progressing    Description: Goals to be met by: 22     Patient will increase functional independence with ADLs by performing:    Feeding with Contact Guard Assistance.  UE Dressing with Contact Guard Assistance.  Grooming while EOB with Contact Guard Assistance  Patient will complete sit to stand with RW min (A).                         History:     Past Medical History:   Diagnosis Date    CHF (congestive heart failure)     Essential (primary) hypertension     Opioid dependence on maintenance agonist therapy, no symptoms     Smoking          Past Surgical History:   Procedure Laterality Date    APPENDECTOMY       SECTION      x2    HYSTERECTOMY         Time Tracking:     OT Date of Treatment: 10/26/22  OT Start Time: 905  OT Stop Time: 929  OT Total Time (min): 24 min    Billable Minutes:Evaluation 5  Self Care/Home Management 19    10/26/2022
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
## 2022-10-26 NOTE — ASSESSMENT & PLAN NOTE
51 y.o. female with PMHx of HTN, dilated cardiomyopathy, h/o opioid dependence, cigarette smoker (2pks/day), and daily baby ASA use who was found to have C5-6 osteodiscitis/myelitis with cord compression. ENT consulted for aid in approach due to prevertebral fluid collection seen on MRI, likely reactive/inflammatory effusion as no discrete abscess is seen.     - To OR with NSGY on Friday  - ENT available to aid with approach if done Friday am   - Bedside scope with normal vocal fold function bilaterally   - Please page ENT with any questions or concerns

## 2022-10-26 NOTE — PLAN OF CARE
Eval and POC in place, spinal precautions and Cervical collar at all times.   Problem: Occupational Therapy  Goal: Occupational Therapy Goal  Description: Goals to be met by: 11/26/22     Patient will increase functional independence with ADLs by performing:    Feeding with Contact Guard Assistance.  UE Dressing with Contact Guard Assistance.  Grooming while EOB with Contact Guard Assistance  Patient will complete sit to stand with RW min (A).    Outcome: Ongoing, Progressing

## 2022-10-26 NOTE — HPI
52 yo female with PMHx of HTN, dilated cardiomyopathy and remote opioid dependence admitted on 10/25 for cervical osteomyelitis s/p C5-C6 anterior corpectomy on 10/28 and C2-T2 posterior fusion with Dr Merino on 11/2. Patient originally failed extubation and was emergently reintubated in the OR after her first surgery. Patient was extubated postoperatively after her second surgery for 14 hours before developing stridor requiring reintubation. NCC requesting tracheostomy as intubation is limiting her ability to advance care and neuro rehab. ENT preop scope prior to 10/28 surgery showed a normal laryngeal exam. Unclear at this time whether patient would also require PEG also. SLP previously recommending NPO but unable to fully assess. Patient is awake with ETT in place today. Communicates with writing. Asking to avoid trach if possible.

## 2022-10-26 NOTE — PROGRESS NOTES
10/26/22 0900   WOCN Assessment   WOCN Total Time (mins) 20   Visit Date 10/26/22   Visit Time 0900   Consult Type New   WOCN Speciality Wound   Intervention assessed;changed;applied;chart review;coordination of care;orders        Altered Skin Integrity 10/26/22 0900 Right anterior;lower Arm   Date First Assessed/Time First Assessed: 10/26/22 0900   Side: Right  Orientation: anterior;lower  Location: Arm   Wound Image    Drainage Amount None   Red (%), Wound Tissue Color 100 %   Wound Length (cm) 15 cm   Wound Width (cm) 7 cm   Wound Depth (cm) 0.1 cm   Wound Volume (cm^3) 10.5 cm^3   Wound Surface Area (cm^2) 105 cm^2        Altered Skin Integrity 10/26/22 0900 Left anterior;distal;lower Arm   Date First Assessed/Time First Assessed: 10/26/22 0900   Side: Left  Orientation: anterior;distal;lower  Location: Arm   Wound Image    Drainage Amount None   Red (%), Wound Tissue Color 100 %   Wound Length (cm) 5 cm   Wound Width (cm) 3.5 cm   Wound Depth (cm) 0.1 cm   Wound Volume (cm^3) 1.75 cm^3   Wound Surface Area (cm^2) 17.5 cm^2   [facility]  Neuro Critical Care  Wound Care    Patient Name:  Peter Stiles   MRN:  [id number]  Date: 10/26/2022  Diagnosis: Spinal cord compression    History:     Past Medical History:   Diagnosis Date    CHF (congestive heart failure)     Essential (primary) hypertension     Opioid dependence on maintenance agonist therapy, no symptoms     Smoking        Social History     Socioeconomic History    Marital status: Single   Tobacco Use    Smoking status: Every Day     Packs/day: 2.00     Years: 34.00     Pack years: 68.00     Types: Cigarettes    Smokeless tobacco: Current   Substance and Sexual Activity    Alcohol use: No    Drug use: No       Precautions:     Allergies as of 10/25/2022 - Reviewed 10/25/2022   Allergen Reaction Noted    Morphine Shortness Of Breath and Other (See Comments) 10/09/2014       WO Assessment Details/Treatment   Patient consult for wound care to -  lateral upper arm. Both arms have dried scabs. The left arm have I.V. sites in place that are secured with co band. The  right arm is cleanse with normal saline apply xeroform to loosen the scabs and heal the skin cover with dry gauze and wrap with kerlix every third day and prn. The left arm dry scab sites are monitored daily for any change.     (Insert flowsheet data here)    Recommendations made to primary team for the above Orders placed.     10/26/2022

## 2022-10-26 NOTE — ASSESSMENT & PLAN NOTE
C5-6, unknown source at this time, possibly from wounds on arms  -NSGY following, plan for OR Friday  -broad spectrum ABX   -blood cultures NGTD  -UA neg

## 2022-10-26 NOTE — ASSESSMENT & PLAN NOTE
Peter Stiles is a 51 y.o. female with PMHx of HTN, dilated cardiomyopathy, h/o opioid dependence, cigarette smoker (2pks/day), and daily baby ASA use who presents with 1 day of decreased sensation from T5 level down and tingling in her bilateral fingers and bilateral toes. MRI imaging obtained in the ED showing possible C5-6 osteodiscitis/myelitis with cord compression. Patient tachycardic and hypertensive on arrival, afebrile, with leukocytosis.     Continue ICU care   Neurochech Q1h  Continue C collar for now  Pain control   MAP >80  OR Friday for C5-C6 corpectomy with C4-C7 anterior column reconstruction   She will need to backup posteriorly   All pertinent imaging personally reviewed and interpreted.   MRI pan spine without contrast showing possible C5/6 discitis/osteomyelitis w/ epidural collection extending into the central spinal canal resulting in severe stenosis and cord signal abnormality.  Prevertebral soft tissue edema and probable paravertebral abscess or phlegmon at this level. Thoracic and lumbar spine unremarkable.   ESR/CRP/Procal elevated. Bcx NGTD  Urinary retention over night, okay to place courtney

## 2022-10-26 NOTE — PLAN OF CARE
[facility] Care Plan    POC reviewed with Peter Stiles at 0500. Pt verbalized understanding. Questions and concerns addressed. No acute events overnight. Pt progressing toward goals. Will continue to monitor. See below and flowsheets for full assessment and VS info.     PRN percocet administered twice overnight for pt report of pain  Straight cath done for retention- yielded 950cc output  MAP >85 overnight  VSS      Is this a stroke patient? no    Neuro:  Lucien Coma Scale  Best Eye Response: 4-->(E4) spontaneous  Best Motor Response: 6-->(M6) obeys commands  Best Verbal Response: 5-->(V5) oriented  [place] Coma Scale Score: 15  Assessment Qualifiers: patient not sedated/intubated, no eye obstruction present  Pupil PERRLA: yes     24hr Temp:  [98 °F (36.7 °C)-98.6 °F (37 °C)]     CV:   Rhythm: normal sinus rhythm  BP goals:   SBP <  200  MAP > 85    Resp:   O2 Device (Oxygen Therapy): room air       Plan: N/A    GI/:     Diet/Nutrition Received: consistent carb/diabetic diet  Last Bowel Movement: 10/22/22  Voiding Characteristics: external catheter    Intake/Output Summary (Last 24 hours) at 10/26/2022 0508  Last data filed at 10/26/2022 0438  Gross per 24 hour   Intake 1416 ml   Output 950 ml   Net 466 ml     Unmeasured Output  Stool Occurrence: 0    Labs/Accuchecks:  Recent Labs   Lab 10/26/22  0223   WBC 14.65*   RBC 3.31*   HGB 10.1*   HCT 30.8*         Recent Labs   Lab 10/26/22  0223   *   K 4.1   CO2 30*   CL 98   BUN 13   CREATININE 0.9   ALKPHOS 128   ALT 9*   AST 10   BILITOT 0.2      Recent Labs   Lab 10/26/22  0223   INR 1.0   APTT 29.6      Recent Labs   Lab 10/25/22  0343   CPK 15*   TROPONINI <0.006       Electrolytes: No replacement orders  Accuchecks: ACHS    Gtts:      LDA/Wounds:  Lines/Drains/Airways       Drain  Duration             Female External Urinary Catheter 10/25/22 1830 <1 day              Peripheral Intravenous Line  Duration                  Peripheral IV - Single  Lumen 10/25/22 0340 22 G Anterior;Distal;Left Wrist 1 day         Peripheral IV - Single Lumen 10/25/22 1830 20 G Anterior;Left;Proximal Forearm <1 day                  Wounds: Yes  Wound care consulted: Yes   Problem: Adult Inpatient Plan of Care  Goal: Plan of Care Review  10/26/2022 0507 by Uriel Torres RN  Outcome: Ongoing, Progressing  10/26/2022 0504 by Uriel Torres RN  Outcome: Ongoing, Progressing  Goal: Patient-Specific Goal (Individualized)  10/26/2022 0507 by Uriel Torres RN  Outcome: Ongoing, Progressing  10/26/2022 0504 by Uriel Torres RN  Outcome: Ongoing, Progressing  Goal: Absence of Hospital-Acquired Illness or Injury  10/26/2022 0507 by Uriel Torres RN  Outcome: Ongoing, Progressing  10/26/2022 0504 by Uriel Torres RN  Outcome: Ongoing, Progressing  Goal: Optimal Comfort and Wellbeing  10/26/2022 0507 by Uriel Torres RN  Outcome: Ongoing, Progressing  10/26/2022 0504 by Uriel Torres RN  Outcome: Ongoing, Progressing  Goal: Readiness for Transition of Care  10/26/2022 0507 by Uriel Torres RN  Outcome: Ongoing, Progressing  10/26/2022 0504 by Uriel Torres RN  Outcome: Ongoing, Progressing     Problem: Fall Injury Risk  Goal: Absence of Fall and Fall-Related Injury  10/26/2022 0507 by Uriel Torres RN  Outcome: Ongoing, Progressing  10/26/2022 0504 by Uriel Torres RN  Outcome: Ongoing, Progressing     Problem: Skin Injury Risk Increased  Goal: Skin Health and Integrity  10/26/2022 0507 by Uriel Torres RN  Outcome: Ongoing, Progressing  10/26/2022 0504 by Uriel Torres RN  Outcome: Ongoing, Progressing     Problem: Pain Acute  Goal: Acceptable Pain Control and Functional Ability  Outcome: Ongoing, Progressing     Problem: Autonomic Dysreflexia (Spinal Cord Injury)  Goal: Effective Autonomic Dysreflexia Prevention  Outcome: Ongoing, Progressing     Problem: Bowel Elimination Impaired (Spinal Cord Injury)  Goal: Effective Bowel Elimination  Outcome: Ongoing, Progressing      Problem: Extended Neurologic Impairment (Spinal Cord Injury)  Goal: Absence of Extended Neurologic Injury  Outcome: Ongoing, Progressing     Problem: Functional Ability Impaired (Spinal Cord Injury)  Goal: Optimal Functional Ability  Outcome: Ongoing, Progressing     Problem: Neurogenic Shock (Spinal Cord Injury)  Goal: Effective Tissue Perfusion  Outcome: Ongoing, Progressing     Problem: Nutrition Impaired (Spinal Cord Injury)  Goal: Optimal Nutrition Intake  Outcome: Ongoing, Progressing

## 2022-10-26 NOTE — SUBJECTIVE & OBJECTIVE
Medications:  Continuous Infusions:  Scheduled Meds:   cefTRIAXone (ROCEPHIN) IVPB  2 g Intravenous Q24H    fentaNYL  12.5 mcg Intravenous Once    methocarbamoL  500 mg Oral TID    nicotine  1 patch Transdermal Daily    pregabalin  75 mg Oral BID    senna-docusate 8.6-50 mg  1 tablet Oral BID    vancomycin (VANCOCIN) IVPB  750 mg Intravenous Q12H     PRN Meds:sodium chloride, acetaminophen, dextrose 10%, dextrose 10%, glucagon (human recombinant), glucose, glucose, hydrOXYzine HCL, insulin aspart U-100, magnesium oxide, magnesium oxide, ondansetron, oxyCODONE, potassium bicarbonate, potassium bicarbonate, potassium bicarbonate, potassium, sodium phosphates, potassium, sodium phosphates, potassium, sodium phosphates, sodium chloride 0.9%, Pharmacy to dose Vancomycin consult **AND** vancomycin - pharmacy to dose     No current facility-administered medications on file prior to encounter.     Current Outpatient Medications on File Prior to Encounter   Medication Sig    aspirin (ECOTRIN) 81 MG EC tablet Take 81 mg by mouth once daily.    LIDOcaine-menthol (ICY HOT PATCH, LIDO-MENTHOL,) 4-1 % PtMd Apply 1 patch topically 2 (two) times daily as needed (Pain).    multivitamin (ONE DAILY MULTIVITAMIN) per tablet Take 1 tablet by mouth once daily.    lorazepam (ATIVAN) 1 MG tablet Take one-half to one tablet (Patient not taking: Reported on 10/25/2022)       Review of patient's allergies indicates:   Allergen Reactions    Morphine Shortness Of Breath and Other (See Comments)     Throat closed       Past Medical History:   Diagnosis Date    CHF (congestive heart failure)     Essential (primary) hypertension     Opioid dependence on maintenance agonist therapy, no symptoms     Smoking      Past Surgical History:   Procedure Laterality Date    APPENDECTOMY       SECTION      x2    HYSTERECTOMY       Family History       Problem Relation (Age of Onset)    Cancer Mother    Hearing loss Father          Tobacco Use     Smoking status: Every Day     Packs/day: 2.00     Years: 34.00     Pack years: 68.00     Types: Cigarettes    Smokeless tobacco: Current   Substance and Sexual Activity    Alcohol use: No    Drug use: No    Sexual activity: Not on file     Review of Systems  See HPI for focused ROS     Objective:     Vital Signs (Most Recent):  Temp: 98.4 °F (36.9 °C) (10/26/22 1101)  Pulse: 83 (10/26/22 1201)  Resp: (!) 24 (10/26/22 1236)  BP: (!) 148/72 (10/26/22 1201)  SpO2: 97 % (10/26/22 1201)   Vital Signs (24h Range):  Temp:  [98.3 °F (36.8 °C)-98.8 °F (37.1 °C)] 98.4 °F (36.9 °C)  Pulse:  [71-97] 83  Resp:  [14-26] 24  SpO2:  [92 %-98 %] 97 %  BP: (112-165)/(59-93) 148/72     Weight: 63.5 kg (139 lb 15.9 oz)  Body mass index is 22.6 kg/m².    Date 10/26/22 0700 - 10/27/22 0659   Shift 4304-0383 [id number] 0754-5522 24 Hour Total   INTAKE   Shift Total(mL/kg)       OUTPUT   Urine(mL/kg/hr) 1350   1350   Shift Total(mL/kg) 1350(21.3)   1350(21.3)   Weight (kg) 63.5 63.5 63.5 63.5       Physical Exam  NAD   Resting in bed with C collar in place   No stridor or stertor   MMM, no pooling of secretions   No dyspnea or respiratory distress   Normal vocal quality    Significant Labs:  CBC:   Recent Labs   Lab 10/26/22  0223   WBC 14.65*   RBC 3.31*   HGB 10.1*   HCT 30.8*      MCV 93   MCH 30.5   MCHC 32.8       Significant Diagnostics:  MRI: I have reviewed all pertinent results/findings within the past 24 hours and my personal findings are:  as mentioned in HPI, cervical osteomyelitis with prevertebral effusion, no well formed or discrete fluid drainable fluid collection       Flexible Fiberoptic Laryngoscopy    After verbal consent was obtained, the flexible laryngoscope was introduced with the following findings:  Nasal cavity: no masses or lesions, pink mucosa, no purulence  Nasopharynx: no masses or lesions, viv wnl  Oropharynx: no masses or lesions, tonsils WNL, BOT WNL  Larynx and Hypopharynx: Bilateral vocal folds  freely mobile to adduction and abduction, no masses or lesions visualized.   The patient tolerated the procedure well.
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
## 2022-10-26 NOTE — ASSESSMENT & PLAN NOTE
Methadone prescribed at [facility] clinic on [facility]
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
## 2022-10-26 NOTE — HOSPITAL COURSE
10/26/2022 Electrolytes replaced, Courtney placed, and Plan for OR on 10/28/22  10/28/22: s/p C5, C6 anterior cervical corpectomy, C4-7 fusion  10/29/2022L place susan tube, start TFm d.c courtney cath, d/c A- line, nutrition consult  10/30/2022: NPO after MN for LISHA tomorrow, plan for OR w/ NSGY on Tuesday. Weaning parameters, atropine SL added for oral secretions, Miralax  10/31/2022: LISHA and NSGY intervention postponed to [place], start TF  11/1/2022: LISHA rescheduled after neurosurgical intervention, NPO after MN for OR tomorrow w/ NSGY, add suppository, cxr tomorrow AM  11/2/2022: OR with NSGY for C2-T2 posterior decompression and fusion, LISHA now on 11/4. Complains of L sided abdominal pain and neck pain.   11/3/2022: POD1. Pain adequately controlled on fent drip, remains intubated. No MAP goals, confirmed with nsgy. Numbness of BLE, increasing lyrica. CXR/KUB with L sided atelectasis/ground glass infiltrate/pleural effusion.   11/4/22: extubate today  11/5/22: re-intubated overnight  11/6/22: possible extubation  11/7/2022: plan for trach placement today   11/8/2022: CXR reviewed today, trach collar tolerating   11/09/2022 transfer to  today

## 2022-10-26 NOTE — CONSULTS
[facility]  Otorhinolaryngology-Head & Neck Surgery  Consult Note    Patient Name: Peter Stiles  MRN: [id number]  Code Status: Full Code  Admission Date: 10/25/2022  Hospital Length of Stay: 1 days  Attending Physician: Julito Quintanilla MD  Primary Care Provider: Primary Doctor No    Patient information was obtained from patient and primary team.     Inpatient consult to ENT  Consult performed by: Amy Joshi MD  Consult ordered by: Juan David Sumner MD        Subjective:     Chief Complaint/Reason for Consult: prevertebral effusion    History of Present Illness: 50 yo female with PMHx of HTN, dilated cardiomyopathy and remote opioid dependence who presented to the ED with neck and back pain and lower body numbness x2 weeks on 10/25. Noted to have WBC elevated and MRI spine revealed C5-6 osteomyelitis, discitis and epidural collection with prevertebral effusion likely reactive in nature. NSGY planning to take pt to OR on Friday and ENT was consulted to aid in anterior approach.     Pt denies any history of prior neck surgery. No current complaints of SOB, difficulty swallowing, change in voice, difficulty speaking.      Medications:  Continuous Infusions:  Scheduled Meds:   cefTRIAXone (ROCEPHIN) IVPB  2 g Intravenous Q24H    fentaNYL  12.5 mcg Intravenous Once    methocarbamoL  500 mg Oral TID    nicotine  1 patch Transdermal Daily    pregabalin  75 mg Oral BID    senna-docusate 8.6-50 mg  1 tablet Oral BID    vancomycin (VANCOCIN) IVPB  750 mg Intravenous Q12H     PRN Meds:sodium chloride, acetaminophen, dextrose 10%, dextrose 10%, glucagon (human recombinant), glucose, glucose, hydrOXYzine HCL, insulin aspart U-100, magnesium oxide, magnesium oxide, ondansetron, oxyCODONE, potassium bicarbonate, potassium bicarbonate, potassium bicarbonate, potassium, sodium phosphates, potassium, sodium phosphates, potassium, sodium phosphates, sodium chloride 0.9%, Pharmacy to dose  Vancomycin consult **AND** vancomycin - pharmacy to dose     No current facility-administered medications on file prior to encounter.     Current Outpatient Medications on File Prior to Encounter   Medication Sig    aspirin (ECOTRIN) 81 MG EC tablet Take 81 mg by mouth once daily.    LIDOcaine-menthol (ICY HOT PATCH, LIDO-MENTHOL,) 4-1 % PtMd Apply 1 patch topically 2 (two) times daily as needed (Pain).    multivitamin (ONE DAILY MULTIVITAMIN) per tablet Take 1 tablet by mouth once daily.    lorazepam (ATIVAN) 1 MG tablet Take one-half to one tablet (Patient not taking: Reported on 10/25/2022)       Review of patient's allergies indicates:   Allergen Reactions    Morphine Shortness Of Breath and Other (See Comments)     Throat closed       Past Medical History:   Diagnosis Date    CHF (congestive heart failure)     Essential (primary) hypertension     Opioid dependence on maintenance agonist therapy, no symptoms     Smoking      Past Surgical History:   Procedure Laterality Date    APPENDECTOMY       SECTION      x2    HYSTERECTOMY       Family History       Problem Relation (Age of Onset)    Cancer Mother    Hearing loss Father          Tobacco Use    Smoking status: Every Day     Packs/day: 2.00     Years: 34.00     Pack years: 68.00     Types: Cigarettes    Smokeless tobacco: Current   Substance and Sexual Activity    Alcohol use: No    Drug use: No    Sexual activity: Not on file     Review of Systems  See HPI for focused ROS     Objective:     Vital Signs (Most Recent):  Temp: 98.4 °F (36.9 °C) (10/26/22 1101)  Pulse: 83 (10/26/22 1201)  Resp: (!) 24 (10/26/22 1236)  BP: (!) 148/72 (10/26/22 1201)  SpO2: 97 % (10/26/22 1201)   Vital Signs (24h Range):  Temp:  [98.3 °F (36.8 °C)-98.8 °F (37.1 °C)] 98.4 °F (36.9 °C)  Pulse:  [71-97] 83  Resp:  [14-26] 24  SpO2:  [92 %-98 %] 97 %  BP: (112-165)/(59-93) 148/72     Weight: 63.5 kg (139 lb 15.9 oz)  Body mass index is 22.6 kg/m².    Date  10/26/22 0700 - 10/27/22 0659   Shift 8641-8603 0159-7644 8902-5900 24 Hour Total   INTAKE   Shift Total(mL/kg)       OUTPUT   Urine(mL/kg/hr) 1350   1350   Shift Total(mL/kg) 1350(21.3)   1350(21.3)   Weight (kg) 63.5 63.5 63.5 63.5       Physical Exam  NAD   Resting in bed with C collar in place   No stridor or stertor   MMM, no pooling of secretions   No dyspnea or respiratory distress   Normal vocal quality    Significant Labs:  CBC:   Recent Labs   Lab 10/26/22  0223   WBC 14.65*   RBC 3.31*   HGB 10.1*   HCT 30.8*      MCV 93   MCH 30.5   MCHC 32.8       Significant Diagnostics:  MRI: I have reviewed all pertinent results/findings within the past 24 hours and my personal findings are:  as mentioned in HPI, cervical osteomyelitis with prevertebral effusion, no well formed or discrete fluid drainable fluid collection       Flexible Fiberoptic Laryngoscopy    After verbal consent was obtained, the flexible laryngoscope was introduced with the following findings:  Nasal cavity: no masses or lesions, pink mucosa, no purulence  Nasopharynx: no masses or lesions, viv wnl  Oropharynx: no masses or lesions, tonsils WNL, BOT WNL  Larynx and Hypopharynx: Bilateral vocal folds freely mobile to adduction and abduction, no masses or lesions visualized.   The patient tolerated the procedure well.             Assessment/Plan:     Osteomyelitis of cervical spine  51 y.o. female with PMHx of HTN, dilated cardiomyopathy, h/o opioid dependence, cigarette smoker (2pks/day), and daily baby ASA use who was found to have C5-6 osteodiscitis/myelitis with cord compression. ENT consulted for aid in approach due to prevertebral fluid collection seen on MRI, likely reactive/inflammatory effusion as no discrete abscess is seen.     - To OR with NSGY on Friday  - ENT available to aid with approach if done Friday am   - Bedside scope with normal vocal fold function bilaterally   - Please page ENT with any questions or concerns        VTE Risk Mitigation (From admission, onward)         Ordered     IP VTE LOW RISK PATIENT  Once         10/25/22 1109     Place sequential compression device  Until discontinued         10/25/22 1109                Thank you for your consult. I will follow-up with patient. Please contact us if you have any additional questions.    Amy Joshi MD  Otorhinolaryngology-Head & Neck Surgery  [facility]

## 2022-10-26 NOTE — SUBJECTIVE & OBJECTIVE
Interval History:  NAEON. On MAP. Urinary retention. Bcx NGTD    Medications:  Continuous Infusions:  Scheduled Meds:   cefTRIAXone (ROCEPHIN) IVPB  2 g Intravenous Q24H    nicotine  1 patch Transdermal Daily    pregabalin  75 mg Oral BID    senna-docusate 8.6-50 mg  1 tablet Oral BID    vancomycin (VANCOCIN) IVPB  750 mg Intravenous Q12H     PRN Meds:acetaminophen, dextrose 10%, dextrose 10%, glucagon (human recombinant), glucose, glucose, hydrOXYzine HCL, insulin aspart U-100, ondansetron, oxyCODONE-acetaminophen, sodium chloride 0.9%, Pharmacy to dose Vancomycin consult **AND** vancomycin - pharmacy to dose     Review of Systems  Objective:     Weight: 63.5 kg (139 lb 15.9 oz)  Body mass index is 22.6 kg/m².  Vital Signs (Most Recent):  Temp: 98.4 °F (36.9 °C) (10/26/22 0305)  Pulse: 81 (10/26/22 0605)  Resp: 18 (10/26/22 0605)  BP: 130/63 (10/26/22 0605)  SpO2: 97 % (10/26/22 0605) Vital Signs (24h Range):  Temp:  [98.1 °F (36.7 °C)-98.6 °F (37 °C)] 98.4 °F (36.9 °C)  Pulse:  [68-97] 81  Resp:  [14-25] 18  SpO2:  [92 %-98 %] 97 %  BP: (112-165)/(59-81) 130/63                     Female External Urinary Catheter 10/25/22 1830 (Active)   Skin no redness;no breakdown;perineum cleansed w/ soap and water 10/26/22 0438   Tolerance did not assist with appliance change 10/26/22 0438   Suction Continuous suction at 70 mmHg 10/26/22 0438   Date of last wick change 10/26/22 10/26/22 0438   Time of last wick change 0435 10/26/22 0438       PE:   General: Well developed, well nourished, not in acute distress.   Head: Normocephalic, atraumatic.  Neck: TTP of posterior cervical.  Neurologic: AOx4. Thought content appropriate.  GCS: Motor:6  Verbal:5  Eyes:4   GCS Total: 15  Language: No aphasia.  Speech: No dysarthria.  Cranial nerves: Face symmetric, tongue midline, CN II-XII grossly intact.   Eyes: PEERL, EOMI.   Pulmonary: Normal respirations, no signs of respiratory distress.  Abdomen: Soft, non-distended, non-tender to  palpation.  Sensory: Decreased sensation from T4/T5 level down.  Motor Strength: Moves all extremities spontaneously with good tone. No abnormal movements seen.   Strength   Deltoids Triceps Biceps Wrist Extension Wrist Flexion Hand    Upper: R 4/5 4/5 4/5 4/5 4/5 3/5     L 4-/5 4-/5 4-/5 4-/5 4-/5 4/5       Hip Flexion Knee Extension Knee  Flexion Dorsiflexion Plantar flexion EHL   Lower: R 4/5 5/5 5/5 5/5 5/5 5/5     L 4-/5 5/5 5/5 5/5 5/5 5/5   Luna: L hand   DTR 3+ on knees   Clonus: Absent.  Skin: Extensive scabbed wounds and scarring on bilateral forearms. Small pustule on left. No drainage or odor.             Significant Labs:  Recent Labs   Lab 10/25/22  0343 10/26/22  0223   * 221*   * 135*   K 4.2 4.1   CL 92* 98   CO2 26 30*   BUN 13 13   CREATININE 0.8 0.9   CALCIUM 10.2 9.5   MG  --  2.0     Recent Labs   Lab 10/25/22  0343 10/26/22  0223   WBC 16.94* 14.65*   HGB 11.3* 10.1*   HCT 34.0* 30.8*    372     Recent Labs   Lab 10/25/22  0343 10/25/22  0916 10/26/22  0223   INR 1.0 1.1 1.0   APTT  --  29.7 29.6     Microbiology Results (last 7 days)       Procedure Component Value Units Date/Time    Blood culture #1 **CANNOT BE ORDERED STAT** [[id number]] Collected: 10/25/22 0917    Order Status: Completed Specimen: Blood from Peripheral, Antecubital, Right Updated: 10/25/22 1715     Blood Culture, Routine No Growth to date    Blood culture #2 **CANNOT BE ORDERED STAT** [[id number]] Collected: 10/25/22 0917    Order Status: Completed Specimen: Blood from Peripheral, Wrist, Left Updated: 10/25/22 1715     Blood Culture, Routine No Growth to date    Blood culture [[id number]]     Order Status: Canceled Specimen: Blood           Recent Lab Results  (Last 5 results in the past 24 hours)        10/26/22  0223   10/26/22  0005   10/25/22  1833   10/25/22  1507   10/25/22  1507        Albumin 2.5               Alkaline Phosphatase 128               ALT 9               Anion Gap 7                Appearance, UA               aPTT 29.6  Comment: aPTT therapeutic range = 39-69 seconds               AST 10               Bacteria, UA         Few       Baso # 0.04               Basophil % 0.3               Bilirubin (UA)               BILIRUBIN TOTAL 0.2  Comment: For infants and newborns, interpretation of results should be based  on gestational age, weight and in agreement with clinical  observations.    Premature Infant recommended reference ranges:  Up to 24 hours.............<8.0 mg/dL  Up to 48 hours............<12.0 mg/dL  3-5 days..................<15.0 mg/dL  6-29 days.................<15.0 mg/dL                 BUN 13               Calcium 9.5               Chloride 98               CO2 30               Color, UA               Creatinine 0.9               Differential Method Automated               eGFR >60.0               Eos # 0.1               Eosinophil % 1.0               Glucose 221               Glucose, UA               Gran # (ANC) 8.7               Gran % 59.5               Hematocrit 30.8               Hemoglobin 10.1               Hyaline Casts, UA         0       Immature Grans (Abs) 0.11  Comment: Mild elevation in immature granulocytes is non specific and   can be seen in a variety of conditions including stress response,   acute inflammation, trauma and pregnancy. Correlation with other   laboratory and clinical findings is essential.                 Immature Granulocytes 0.8               INR 1.0  Comment: Coumadin Therapy:  2.0 - 3.0 for INR for all indicators except mechanical heart valves  and antiphospholipid syndromes which should use 2.5 - 3.5.                 Ketones, UA               Leukocytes, UA               Lymph # 4.1               Lymph % 27.8               Magnesium 2.0               MCH 30.5               MCHC 32.8               MCV 93               Microscopic Comment         SEE COMMENT  Comment: Other formed elements not mentioned in the report are not   present  in the microscopic examination.          Mono # 1.6               Mono % 10.6               MPV 9.1               NITRITE UA               nRBC 0               Occult Blood UA               Osmolality, Urine         320  Comment: The random urine reference ranges provided were established   for 24 hour urine collections.  No reference ranges exist for  random urine specimens.  Correlate clinically.         pH, UA               Phosphorus 2.6               Platelets 372               POCT Glucose   177   266   131         Potassium 4.1               PROTEIN TOTAL 7.6               Protein, UA               Protime 10.9               RBC 3.31               RBC, UA         41       RDW 12.1               Sodium 135               Sodium, Urine         12  Comment: The random urine reference ranges provided were established   for 24 hour urine collections.  No reference ranges exist for  random urine specimens.  Correlate clinically.         Specific Mcgregor, UA               Specimen UA               Squam Epithel, UA         13       WBC, UA         5       WBC 14.65                                      Significant Diagnostics:  CT: No results found in the last 24 hours.  MRI: No results found in the last 24 hours.

## 2022-10-27 ENCOUNTER — ANESTHESIA EVENT (OUTPATIENT)
Dept: SURGERY | Facility: HOSPITAL | Age: 52
DRG: 003 | End: 2022-10-27
Payer: MEDICARE

## 2022-10-27 PROBLEM — Z01.818 PRE-OPERATIVE EXAMINATION: Status: ACTIVE | Noted: 2022-10-27

## 2022-10-27 PROBLEM — R78.81 BACTEREMIA: Status: ACTIVE | Noted: 2022-10-27

## 2022-10-27 LAB
ALBUMIN SERPL BCP-MCNC: 2.3 G/DL (ref 3.5–5.2)
ALP SERPL-CCNC: 152 U/L (ref 55–135)
ALT SERPL W/O P-5'-P-CCNC: 14 U/L (ref 10–44)
AMPHETAMINES SERPL QL: NEGATIVE
ANION GAP SERPL CALC-SCNC: 11 MMOL/L (ref 8–16)
AST SERPL-CCNC: 15 U/L (ref 10–40)
BARBITURATES SERPL QL SCN: NEGATIVE
BASOPHILS # BLD AUTO: 0.03 K/UL (ref 0–0.2)
BASOPHILS NFR BLD: 0.2 % (ref 0–1.9)
BENZODIAZ SERPL QL SCN: NEGATIVE
BILIRUB SERPL-MCNC: 0.1 MG/DL (ref 0.1–1)
BUN SERPL-MCNC: 13 MG/DL (ref 6–20)
BZE SERPL QL: NEGATIVE
CALCIUM SERPL-MCNC: 9.2 MG/DL (ref 8.7–10.5)
CARBOXYTHC SERPL QL SCN: NEGATIVE
CHLORIDE SERPL-SCNC: 97 MMOL/L (ref 95–110)
CO2 SERPL-SCNC: 27 MMOL/L (ref 23–29)
CREAT SERPL-MCNC: 0.9 MG/DL (ref 0.5–1.4)
DIFFERENTIAL METHOD: ABNORMAL
EOSINOPHIL # BLD AUTO: 0.2 K/UL (ref 0–0.5)
EOSINOPHIL NFR BLD: 1.2 % (ref 0–8)
ERYTHROCYTE [DISTWIDTH] IN BLOOD BY AUTOMATED COUNT: 12.2 % (ref 11.5–14.5)
EST. GFR  (NO RACE VARIABLE): >60 ML/MIN/1.73 M^2
ETHANOL SERPL QL SCN: NEGATIVE
GLUCOSE SERPL-MCNC: 253 MG/DL (ref 70–110)
HCT VFR BLD AUTO: 31.3 % (ref 37–48.5)
HGB BLD-MCNC: 10.1 G/DL (ref 12–16)
IMM GRANULOCYTES # BLD AUTO: 0.12 K/UL (ref 0–0.04)
IMM GRANULOCYTES NFR BLD AUTO: 0.9 % (ref 0–0.5)
LYMPHOCYTES # BLD AUTO: 4.6 K/UL (ref 1–4.8)
LYMPHOCYTES NFR BLD: 35.7 % (ref 18–48)
MAGNESIUM SERPL-MCNC: 2 MG/DL (ref 1.6–2.6)
MCH RBC QN AUTO: 29.9 PG (ref 27–31)
MCHC RBC AUTO-ENTMCNC: 32.3 G/DL (ref 32–36)
MCV RBC AUTO: 93 FL (ref 82–98)
METHADONE SERPL QL SCN: POSITIVE
MONOCYTES # BLD AUTO: 1.2 K/UL (ref 0.3–1)
MONOCYTES NFR BLD: 9.5 % (ref 4–15)
NEUTROPHILS # BLD AUTO: 6.8 K/UL (ref 1.8–7.7)
NEUTROPHILS NFR BLD: 52.5 % (ref 38–73)
NRBC BLD-RTO: 0 /100 WBC
OPIATES SERPL QL SCN: NEGATIVE
PCP SERPL QL SCN: NEGATIVE
PHOSPHATE SERPL-MCNC: 3.6 MG/DL (ref 2.7–4.5)
PLATELET # BLD AUTO: 380 K/UL (ref 150–450)
PMV BLD AUTO: 9.3 FL (ref 9.2–12.9)
POCT GLUCOSE: 133 MG/DL (ref 70–110)
POCT GLUCOSE: 202 MG/DL (ref 70–110)
POCT GLUCOSE: 217 MG/DL (ref 70–110)
POTASSIUM SERPL-SCNC: 4.1 MMOL/L (ref 3.5–5.1)
PROPOXYPH SERPL QL: NEGATIVE
PROT SERPL-MCNC: 7.4 G/DL (ref 6–8.4)
RBC # BLD AUTO: 3.38 M/UL (ref 4–5.4)
SODIUM SERPL-SCNC: 135 MMOL/L (ref 136–145)
WBC # BLD AUTO: 12.91 K/UL (ref 3.9–12.7)

## 2022-10-27 PROCEDURE — 99233 SBSQ HOSP IP/OBS HIGH 50: CPT | Mod: GC,,, | Performed by: HOSPITALIST

## 2022-10-27 PROCEDURE — 99223 PR INITIAL HOSPITAL CARE,LEVL III: ICD-10-PCS | Mod: ,,, | Performed by: INTERNAL MEDICINE

## 2022-10-27 PROCEDURE — 36415 COLL VENOUS BLD VENIPUNCTURE: CPT | Performed by: HOSPITALIST

## 2022-10-27 PROCEDURE — 99233 SBSQ HOSP IP/OBS HIGH 50: CPT | Mod: 57,,, | Performed by: PHYSICIAN ASSISTANT

## 2022-10-27 PROCEDURE — 85025 COMPLETE CBC W/AUTO DIFF WBC: CPT | Performed by: PHYSICIAN ASSISTANT

## 2022-10-27 PROCEDURE — 80053 COMPREHEN METABOLIC PANEL: CPT | Performed by: PHYSICIAN ASSISTANT

## 2022-10-27 PROCEDURE — 84100 ASSAY OF PHOSPHORUS: CPT | Performed by: PHYSICIAN ASSISTANT

## 2022-10-27 PROCEDURE — 83735 ASSAY OF MAGNESIUM: CPT | Performed by: PHYSICIAN ASSISTANT

## 2022-10-27 PROCEDURE — 99223 1ST HOSP IP/OBS HIGH 75: CPT | Mod: ,,, | Performed by: INTERNAL MEDICINE

## 2022-10-27 PROCEDURE — 25000003 PHARM REV CODE 250

## 2022-10-27 PROCEDURE — 11000001 HC ACUTE MED/SURG PRIVATE ROOM

## 2022-10-27 PROCEDURE — 63600175 PHARM REV CODE 636 W HCPCS: Performed by: INTERNAL MEDICINE

## 2022-10-27 PROCEDURE — 87040 BLOOD CULTURE FOR BACTERIA: CPT | Mod: 59 | Performed by: HOSPITALIST

## 2022-10-27 PROCEDURE — 99233 PR SUBSEQUENT HOSPITAL CARE,LEVL III: ICD-10-PCS | Mod: GC,,, | Performed by: HOSPITALIST

## 2022-10-27 PROCEDURE — 25000003 PHARM REV CODE 250: Performed by: PHYSICIAN ASSISTANT

## 2022-10-27 PROCEDURE — 25000003 PHARM REV CODE 250: Performed by: INTERNAL MEDICINE

## 2022-10-27 PROCEDURE — 25000003 PHARM REV CODE 250: Performed by: NURSE PRACTITIONER

## 2022-10-27 PROCEDURE — S4991 NICOTINE PATCH NONLEGEND: HCPCS | Performed by: PHYSICIAN ASSISTANT

## 2022-10-27 PROCEDURE — 36415 COLL VENOUS BLD VENIPUNCTURE: CPT | Performed by: PHYSICIAN ASSISTANT

## 2022-10-27 PROCEDURE — 25000003 PHARM REV CODE 250: Performed by: STUDENT IN AN ORGANIZED HEALTH CARE EDUCATION/TRAINING PROGRAM

## 2022-10-27 PROCEDURE — 99222 1ST HOSP IP/OBS MODERATE 55: CPT | Mod: ,,, | Performed by: NURSE PRACTITIONER

## 2022-10-27 PROCEDURE — 63600175 PHARM REV CODE 636 W HCPCS: Performed by: PSYCHIATRY & NEUROLOGY

## 2022-10-27 PROCEDURE — 99222 PR INITIAL HOSPITAL CARE,LEVL II: ICD-10-PCS | Mod: ,,, | Performed by: NURSE PRACTITIONER

## 2022-10-27 PROCEDURE — 25000003 PHARM REV CODE 250: Performed by: PSYCHIATRY & NEUROLOGY

## 2022-10-27 PROCEDURE — 63600175 PHARM REV CODE 636 W HCPCS: Performed by: NURSE PRACTITIONER

## 2022-10-27 PROCEDURE — 99233 PR SUBSEQUENT HOSPITAL CARE,LEVL III: ICD-10-PCS | Mod: 57,,, | Performed by: PHYSICIAN ASSISTANT

## 2022-10-27 RX ORDER — VANCOMYCIN HCL IN 5 % DEXTROSE 1G/250ML
1000 PLASTIC BAG, INJECTION (ML) INTRAVENOUS
Status: DISCONTINUED | OUTPATIENT
Start: 2022-10-27 | End: 2022-10-28

## 2022-10-27 RX ORDER — METHADONE HYDROCHLORIDE 40 MG/1
TABLET ORAL
Status: ON HOLD | COMMUNITY
End: 2022-12-28 | Stop reason: HOSPADM

## 2022-10-27 RX ORDER — METHOCARBAMOL 500 MG/1
1000 TABLET, FILM COATED ORAL 4 TIMES DAILY
Status: DISCONTINUED | OUTPATIENT
Start: 2022-10-27 | End: 2022-10-28

## 2022-10-27 RX ORDER — PREGABALIN 75 MG/1
150 CAPSULE ORAL 2 TIMES DAILY
Status: DISCONTINUED | OUTPATIENT
Start: 2022-10-27 | End: 2022-10-30

## 2022-10-27 RX ORDER — HYDROCODONE BITARTRATE AND ACETAMINOPHEN 500; 5 MG/1; MG/1
TABLET ORAL
Status: DISCONTINUED | OUTPATIENT
Start: 2022-10-27 | End: 2022-10-29

## 2022-10-27 RX ADMIN — PREGABALIN 150 MG: 75 CAPSULE ORAL at 08:10

## 2022-10-27 RX ADMIN — VANCOMYCIN HYDROCHLORIDE 1000 MG: 1 INJECTION, POWDER, LYOPHILIZED, FOR SOLUTION INTRAVENOUS at 12:10

## 2022-10-27 RX ADMIN — CEFTRIAXONE SODIUM 2 G: 2 INJECTION, SOLUTION INTRAVENOUS at 08:10

## 2022-10-27 RX ADMIN — ACETAMINOPHEN 650 MG: 325 TABLET ORAL at 04:10

## 2022-10-27 RX ADMIN — METHOCARBAMOL 500 MG: 500 TABLET ORAL at 12:10

## 2022-10-27 RX ADMIN — METHOCARBAMOL 1000 MG: 500 TABLET ORAL at 04:10

## 2022-10-27 RX ADMIN — INSULIN ASPART 4 UNITS: 100 INJECTION, SOLUTION INTRAVENOUS; SUBCUTANEOUS at 04:10

## 2022-10-27 RX ADMIN — Medication 1 PATCH: at 08:10

## 2022-10-27 RX ADMIN — INSULIN ASPART 4 UNITS: 100 INJECTION, SOLUTION INTRAVENOUS; SUBCUTANEOUS at 12:10

## 2022-10-27 RX ADMIN — ACETAMINOPHEN 650 MG: 325 TABLET ORAL at 01:10

## 2022-10-27 RX ADMIN — SENNOSIDES AND DOCUSATE SODIUM 1 TABLET: 50; 8.6 TABLET ORAL at 08:10

## 2022-10-27 RX ADMIN — PREGABALIN 75 MG: 75 CAPSULE ORAL at 08:10

## 2022-10-27 RX ADMIN — VANCOMYCIN HYDROCHLORIDE 750 MG: 750 INJECTION, POWDER, LYOPHILIZED, FOR SOLUTION INTRAVENOUS at 01:10

## 2022-10-27 RX ADMIN — ACETAMINOPHEN 650 MG: 325 TABLET ORAL at 08:10

## 2022-10-27 RX ADMIN — HYDROXYZINE HYDROCHLORIDE 25 MG: 25 TABLET, FILM COATED ORAL at 10:10

## 2022-10-27 RX ADMIN — METHADONE HYDROCHLORIDE 45 MG: 10 TABLET ORAL at 09:10

## 2022-10-27 RX ADMIN — METHOCARBAMOL 500 MG: 500 TABLET ORAL at 08:10

## 2022-10-27 RX ADMIN — METHOCARBAMOL 1000 MG: 500 TABLET ORAL at 08:10

## 2022-10-27 NOTE — PROGRESS NOTES
Alessandro Graf - Neurosurgery ([facility])  [facility] Medicine  Progress Note    Patient Name: Peter Stiles  MRN: [id number]  Patient Class: IP- Inpatient   Admission Date: 10/25/2022  Length of Stay: 2 days  Attending Physician: Isa Baltazar MD  Primary Care Provider: Og Victoria NP        Subjective:     Principal Problem:Osteomyelitis of cervical spine        HPI:  51 y.o F with hx of htn, opioid dependence on methadone, smoking (2ppd since 17 y.o), and dilated cardiomyopathy presents with neck pain and abdominal numbness. 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 At the time the daughter slammed the breaks and came to an abrupt stop. The patient braced herself by covering her face with both her forearms. She did not notice severe symptoms at the time or a popping sensation but a vague soreness in her neck. In the following 2 weeks her symptoms gradually deteriorated with increased neck pain notably. Her symptoms became worse early this morning when she went to the bathroom. She noticed an unusual numbness and bloating of her stomach and generalized numbness below that level. She was able to urinate. She also had increased pain in her legs, bilateral shoulders, and tingling in fingers and toes. Patient denies IV drug or alcohol use. She smokes 2ppd since 17 y.o.         Overview/Hospital Course:  Ms. Stiles presented for acute neck pain, along with numbness and weakness of the bilateral upper and lower extremities. MRI demonstrated C5-6 osteomyelitis, discitis, and epidural abscess, as well as narrowing and signal abnormality of the spinal cord. She was admitted to the neuroICU and started on Rocephin and vancomycin. Stepped down to hospital medicine on 10/26/22, and is awaiting neurosurgical intervention on 10/28.      Interval History: complaining of neck pain that radiates bilaterally to the arms and legs, as well as numbness; reports  weakness, arms moreso than legs, with motion significantly limited due to pain.    Review of Systems   Constitutional:  Negative for chills and fever.   HENT:  Negative for hearing loss, rhinorrhea and sore throat.    Eyes:  Negative for photophobia and visual disturbance.   Respiratory:  Negative for chest tightness, shortness of breath and wheezing.    Cardiovascular:  Negative for chest pain, palpitations and leg swelling.   Gastrointestinal:  Negative for abdominal pain, diarrhea, nausea and vomiting.   Genitourinary:  Negative for dysuria, hematuria and urgency.        Urinary retention.   Musculoskeletal:  Positive for back pain and neck pain.   Skin:  Negative for rash.   Neurological:  Positive for weakness and numbness. Negative for facial asymmetry and headaches.   Psychiatric/Behavioral:  Negative for agitation and confusion.    Objective:     Vital Signs (Most Recent):  Temp: 98.7 °F (37.1 °C) (10/27/22 1204)  Pulse: 77 (10/27/22 1204)  Resp: 17 (10/27/22 1204)  BP: 139/67 (10/27/22 1204)  SpO2: 95 % (10/27/22 1204) Vital Signs (24h Range):  Temp:  [97.8 °F (36.6 °C)-99.6 °F (37.6 °C)] 98.7 °F (37.1 °C)  Pulse:  [] 77  Resp:  [15-24] 17  SpO2:  [95 %-98 %] 95 %  BP: (115-147)/(64-85) 139/67     Weight: 63.5 kg (139 lb 15.9 oz)  Body mass index is 22.6 kg/m².    Intake/Output Summary (Last 24 hours) at 10/27/2022 1418  Last data filed at 10/27/2022 0818  Gross per 24 hour   Intake 294.67 ml   Output 3180 ml   Net -2885.33 ml      Physical Exam  Vitals and nursing note reviewed.   Constitutional:       Appearance: She is ill-appearing.   HENT:      Head: Normocephalic and atraumatic.      Comments: Cervical collar in place.     Nose: Nose normal.      Mouth/Throat:      Mouth: Mucous membranes are moist.   Eyes:      General:         Right eye: No discharge.         Left eye: No discharge.      Extraocular Movements: Extraocular movements intact.      Conjunctiva/sclera: Conjunctivae normal.       Pupils: Pupils are equal, round, and reactive to light.   Cardiovascular:      Rate and Rhythm: Normal rate and regular rhythm.      Heart sounds: No murmur heard.  Pulmonary:      Effort: Pulmonary effort is normal.   Abdominal:      General: There is no distension.      Tenderness: There is no abdominal tenderness. There is no guarding or rebound.   Musculoskeletal:         General: Tenderness present.      Right lower leg: No edema.      Left lower leg: No edema.   Skin:     General: Skin is warm and dry.   Neurological:      General: No focal deficit present.      Mental Status: She is alert and oriented to person, place, and time.      Motor: Weakness present.      Comments: Upper extremity strength 4- bilaterally.  Lower extremity strength 4+ bilaterally.  Patellar reflex 3+ bilaterally.  Exam significantly encumbered due to patient pain.   Psychiatric:         Mood and Affect: Mood normal.         Behavior: Behavior normal.       Significant Labs: All pertinent labs within the past 24 hours have been reviewed.    Significant Imaging: I have reviewed all pertinent imaging results/findings within the past 24 hours.      Assessment/Plan:     * Osteomyelitis of cervical spine  Patient presents with neck pain and leg pain ever since a driving lesson with her daughter. She reports new onset numbness in the abdomen and legs. She is still able to urinate and she control of hew bowels. Ct with C5-C6 discitis/OM, MRI with similar findings and sever canal stenosis, as well as epidural collection.     Patient's presentation is most likely secondary to canal stenosis vs. OM given neurological symptoms in the last couple weeks that have intensified in the last day. He neuro exam is not particularly consistent with imaging findings given a sensory level around T5-T6, however the discitis and OM is consistent with the pain. WBC down-trending to 12.91 today, afebrile. Blood cultures from 10/25 positive for S aureus  (identification and susceptibility pending); repeat blood cultures on 10/27 pending.    - patient is scheduled for neurosurgery on 10/28  - continue Rocephin and vancomycin  - ID consulted for bacteremia  - patient may require LISHA to rule out infective endocarditis  - continue lyrica 75 bid  - continue methocarbamol 500mg QID  - q4h neuro checks  - aspiration and fall precautions      Pre-operative examination  There is no evidence of acute coronary syndrome or other unstable cardiovascular process that necessitates expedited evaluation and treatment.    Surgical Risk Assessment   Active cardiac issues:  Active decompensated heart failure? No   Unstable angina?  No   Significant uncontrolled arrhythmias? No   Severe valvular heart disease-Aortic or Mitral Stenosis? No   Recent MI or coronary revascularization < 30 days? No     Cardiac Risk Factors  History of CAD/ischemic heart disease? No   History of cerebrovascular disease? No   History of compensated heart failure? Yes   Type 2 diabetes requiring insulin? Yes   Serum Creatinine > 2? No   Total cardiac risk factors 2     Functional mets >4  < 4* METs -unable to walk > 2 blocks on level ground without stopping due to symptoms  - eating, dressing, toileting, walking indoors, light housework. POOR   > 4* METs -climbing > 1 flight of stairs without stopping  -walking up hill > 1-2 blocks  -scrubbing floors  -moving furniture  - golf, bowling, dancing or tennis  -running short distance MODERATE to EXCELLENT   * performance of any one of the activities     Assessment/Plan:   Cardiovascular Risk Assessment:  No active cardiac problems (such as unstable angina, decompensated heart failure, significant uncontrolled arrhythmias or severe valvular disease).  Functional Status: able to climb a flight of stairs (> 4 METS)    Revised Cardiac Risk Index   1. History of ischemic heart disease   2. History of congestive heart failure   3. History of cerebrovascular disease  "(stroke or transient ischemic attack)   4. History of diabetes requiring preoperative insulin use 5. Chronic kidney disease (creatinine > 2 mg/dL)   5. Undergoing suprainguinal vascular, intraperitoneal, or intrathoracic surgery Risk for cardiac death, nonfatal myocardial infarction, and nonfatal cardiac arrest     0 predictors = 3.9%, 1 predictor = 6.0%, 2 predictors = 10.1%, ?3 predictors = >15%    RCRI Calculator Class and Risk percentage:  10.1%             Moderate Risk    Recommendation:  1. Proceed to Surgery    Bacteremia  -blood cultures on 10/25 positive for S aureus  -blood cultures 10/27 pending  -ID consulted: appreciate recommendations  -please see "osteomyelitis"      Spinal cord compression  -see osteomyelitis      Epidural abscess  -see osteomyelitis      Hyponatremia  -Na 127 on admission: suspected to be due to stress vs. Acute neurological sequelae (I.e SIADH)  -has trended up to 135 on 10/27  -will continue to monitor         Hyperglycemia  No noted history of diabetes. Hba1c 6.1. Gluc 269 on admission.  Continue MDSSI  140-180 goal  Accuchecks 4X daily  If continues to be hyperglycemic, may require scheduled insulin: will continue to monitor        Opioid use disorder, severe, on maintenance therapy, dependence  -methadone 45mg daily  -multimodal pain regimen includes Tylenol, Robaxin, and Lyrica      Anxiety and depression  -Atarax prn for agitation      Tobacco abuse  -continue nicotine patch  -smoking cessation counseling      Dilated cardiomyopathy  Echo from 2014  - Left ventricular cavity size near the upper limit of normal.   - Mildly depressed left ventricular systolic function (EF 45-50%).   - Normal right ventricular systolic function .   - Normal left ventricular diastolic function.     Echo from this admission  The left ventricle is normal in size with normal systolic function. The estimated ejection fraction is 55%.  Normal left ventricular diastolic function.  Normal right " ventricular size with normal right ventricular systolic function.  Mild tricuspid regurgitation.  The estimated PA systolic pressure is 20 mmHg.  Normal central venous pressure (3 mmHg).    Patient may require LISHA to evaluate for infective endocarditis given blood cultures positive for S. Aureus: ID on board, and will defer to them on this.    VTE Risk Mitigation (From admission, onward)           Ordered     IP VTE LOW RISK PATIENT  Once         10/25/22 1109     Place sequential compression device  Until discontinued         10/25/22 1109                    Discharge Planning   YESSI: 10/31/2022     Code Status: Full Code   Is the patient medically ready for discharge?: No    Reason for patient still in hospital (select all that apply): Treatment  Discharge Plan A: Rehab                  Jeferson Torres DO  Department of Hospital Medicine   [facility] Neurosurgery ([facility])

## 2022-10-27 NOTE — SUBJECTIVE & OBJECTIVE
Past Medical History:   Diagnosis Date    CHF (congestive heart failure)     Essential (primary) hypertension     Opioid dependence on maintenance agonist therapy, no symptoms     Smoking      Past Surgical History:   Procedure Laterality Date    APPENDECTOMY       SECTION      x2    HYSTERECTOMY       Review of patient's allergies indicates:   Allergen Reactions    Morphine Shortness Of Breath and Other (See Comments)     Throat closed       Scheduled Medications:    cefTRIAXone (ROCEPHIN) IVPB  2 g Intravenous Q12H    methadone  45 mg Oral Daily    methocarbamoL  1,000 mg Oral QID    nicotine  1 patch Transdermal Daily    pregabalin  150 mg Oral BID    senna-docusate 8.6-50 mg  1 tablet Oral BID    vancomycin (VANCOCIN) IVPB  1,000 mg Intravenous Q12H       PRN Medications: sodium chloride, acetaminophen, dextrose 10%, dextrose 10%, glucagon (human recombinant), glucose, glucose, hydrOXYzine HCL, insulin aspart U-100, ondansetron, sodium chloride 0.9%, Pharmacy to dose Vancomycin consult **AND** vancomycin - pharmacy to dose    Family History       Problem Relation (Age of Onset)    Cancer Mother    Hearing loss Father          Tobacco Use    Smoking status: Every Day     Packs/day: 2.00     Years: 34.00     Pack years: 68.00     Types: Cigarettes    Smokeless tobacco: Current   Substance and Sexual Activity    Alcohol use: No    Drug use: No    Sexual activity: Not on file     Review of Systems   Constitutional:  Positive for activity change. Negative for fatigue and fever.   HENT:  Negative for sore throat and trouble swallowing.    Eyes:  Negative for visual disturbance.   Respiratory:  Negative for cough and shortness of breath.    Cardiovascular:  Negative for chest pain and leg swelling.   Gastrointestinal:  Negative for abdominal distention and abdominal pain.   Genitourinary:  Negative for difficulty urinating.   Musculoskeletal:  Positive for gait problem. Negative for back pain.   Skin:  Negative  for color change.   Neurological:  Negative for dizziness, light-headedness and headaches.   Psychiatric/Behavioral:  Negative for agitation and confusion.    Objective:     Vital Signs (Most Recent):  Temp: 98.7 °F (37.1 °C) (10/27/22 1631)  Pulse: 82 (10/27/22 1631)  Resp: 16 (10/27/22 1631)  BP: (!) 119/57 (10/27/22 1631)  SpO2: 97 % (10/27/22 1631)      Vital Signs (24h Range):  Temp:  [97.8 °F (36.6 °C)-99.6 °F (37.6 °C)] 98.7 °F (37.1 °C)  Pulse:  [] 82  Resp:  [16-20] 16  SpO2:  [95 %-98 %] 97 %  BP: (119-147)/(57-85) 119/57     Body mass index is 22.6 kg/m².    Physical Exam  Vitals and nursing note reviewed.   Constitutional:       Appearance: Normal appearance. She is well-developed.   Eyes:      Pupils: Pupils are equal, round, and reactive to light.   Neck:      Comments: C collar  Pulmonary:      Effort: Pulmonary effort is normal. No respiratory distress.   Abdominal:      General: Bowel sounds are normal.      Palpations: Abdomen is soft.   Musculoskeletal:      Comments: BLE weakness   Skin:     General: Skin is warm and dry.   Neurological:      Mental Status: Mental status is at baseline.     NEUROLOGICAL EXAMINATION:     CRANIAL NERVES     CN III, IV, VI   Pupils are equal, round, and reactive to light.    Diagnostic Results: Labs: Reviewed  ECG: Reviewed  MRI: Reviewed
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
## 2022-10-27 NOTE — CONSULTS
Alessandro Graf - Neurosurgery ([facility])  Physical Medicine & Rehab  Consult Note    Patient Name: Peter Stiles  MRN: [id number]  Admission Date: 10/25/2022  Hospital Length of Stay: 2 days  Attending Physician: Isa Baltazar MD    Inpatient consult to Physical Medicine & Rehabilitation  Consult performed by: Beth Che NP  Consult requested by:  Isa Baltazar MD    Collaborating Physician: Yanira Chaudhry MD  Reason for Consult:  Assess rehabilitation needs     Consults  Subjective:     Principal Problem: Osteomyelitis of cervical spine    HPI: Cindi Stiles is a 51-year-old female with PMHx of HTN, dilated cardiomyopathy and remote opioid dependence (on Methadone). Patient presented to [facility] on 10/25/22 for new ascending numbness of bilateral LE. MRI with diskitis and osteomyelitis. Plan  for OR Friday 10/28 for C5-C6 corpectomy with C4-C7 anterior column reconstruction.    Functional History: Patient lives with daughters in a 2 story home. Prior to admission, I. DME: none.       Hospital Course: 10/26/22: participated w/ PT. Bed [facility]. Ambulated 4 steps modA. [facility]  12.      Past Medical History:   Diagnosis Date    CHF (congestive heart failure)     Essential (primary) hypertension     Opioid dependence on maintenance agonist therapy, no symptoms     Smoking      Past Surgical History:   Procedure Laterality Date    APPENDECTOMY       SECTION      x2    HYSTERECTOMY       Review of patient's allergies indicates:   Allergen Reactions    Morphine Shortness Of Breath and Other (See Comments)     Throat closed       Scheduled Medications:    cefTRIAXone (ROCEPHIN) IVPB  2 g Intravenous Q12H    methadone  45 mg Oral Daily    methocarbamoL  1,000 mg Oral QID    nicotine  1 patch Transdermal Daily    pregabalin  150 mg Oral BID    senna-docusate 8.6-50 mg  1 tablet Oral BID    vancomycin (VANCOCIN) IVPB  1,000 mg Intravenous Q12H       PRN Medications: sodium chloride, acetaminophen,  dextrose 10%, dextrose 10%, glucagon (human recombinant), glucose, glucose, hydrOXYzine HCL, insulin aspart U-100, ondansetron, sodium chloride 0.9%, Pharmacy to dose Vancomycin consult **AND** vancomycin - pharmacy to dose    Family History       Problem Relation (Age of Onset)    Cancer Mother    Hearing loss Father          Tobacco Use    Smoking status: Every Day     Packs/day: 2.00     Years: 34.00     Pack years: 68.00     Types: Cigarettes    Smokeless tobacco: Current   Substance and Sexual Activity    Alcohol use: No    Drug use: No    Sexual activity: Not on file     Review of Systems   Constitutional:  Positive for activity change. Negative for fatigue and fever.   HENT:  Negative for sore throat and trouble swallowing.    Eyes:  Negative for visual disturbance.   Respiratory:  Negative for cough and shortness of breath.    Cardiovascular:  Negative for chest pain and leg swelling.   Gastrointestinal:  Negative for abdominal distention and abdominal pain.   Genitourinary:  Negative for difficulty urinating.   Musculoskeletal:  Positive for gait problem. Negative for back pain.   Skin:  Negative for color change.   Neurological:  Negative for dizziness, light-headedness and headaches.   Psychiatric/Behavioral:  Negative for agitation and confusion.    Objective:     Vital Signs (Most Recent):  Temp: 98.7 °F (37.1 °C) (10/27/22 1631)  Pulse: 82 (10/27/22 1631)  Resp: 16 (10/27/22 1631)  BP: (!) 119/57 (10/27/22 1631)  SpO2: 97 % (10/27/22 1631)      Vital Signs (24h Range):  Temp:  [97.8 °F (36.6 °C)-99.6 °F (37.6 °C)] 98.7 °F (37.1 °C)  Pulse:  [] 82  Resp:  [16-20] 16  SpO2:  [95 %-98 %] 97 %  BP: (119-147)/(57-85) 119/57     Body mass index is 22.6 kg/m².    Physical Exam  Vitals and nursing note reviewed.   Constitutional:       Appearance: Normal appearance. She is well-developed.   Eyes:      Pupils: Pupils are equal, round, and reactive to light.   Neck:      Comments: C collar  Pulmonary:       Effort: Pulmonary effort is normal. No respiratory distress.   Abdominal:      General: Bowel sounds are normal.      Palpations: Abdomen is soft.   Musculoskeletal:      Comments: BLE weakness   Skin:     General: Skin is warm and dry.   Neurological:      Mental Status: Mental status is at baseline.     Diagnostic Results:   Labs: Reviewed  ECG: Reviewed  MRI: Reviewed    Assessment/Plan:     * Osteomyelitis of cervical spine  - MRI with diskitis and osteomyelitis.  - Plan  for OR Friday 10/28 for C5-C6 corpectomy with C4-C7 anterior column reconstruction.    - Related to prolonged/acute hospital course.     Recommendations  -  Encourage mobility, OOB in chair at least 3 hours per day, and early ambulation as appropriate  -  PT/OT evaluate and treat  -  Pain management  -  Monitor for and prevent skin breakdown and pressure ulcers  · Early mobility, repositioning/weight shifting every 20-30 minutes when sitting, turn patient every 2 hours, proper mattress/overlay and chair cushioning, pressure relief/heel protector boots  -  DVT prophylaxis    -  Reviewed discharge options (IP rehab, SNF, HH therapy, and OP therapy)    Opioid use disorder, severe, on maintenance therapy, dependence  -on Methadone    PM&R Recommendation:     At this time, the PM&R team has reviewed this patient's ongoing medical case including inpatient diagnosis, medical history, clinical examination, labs, vitals, current social and functional history to provide the post-acute recommendation as follows:     RECOMMENDATIONS: inpatient rehabilitation due to good motivation/participation with therapies, has been determined to tolerate 3 hours of therapy and good potential for recovery.     The patient will be admitted for comprehensive interdisciplinary inpatient rehabilitation to address the impairments due to medical diagnosis of Osteomyelitis of cervical spine. The patient will benefit from an inpatient rehabilitation program to promote  functional recovery, implement compensatory strategies and will undergo assessment for needs for durable medical equipment for safe discharge to the community. This patient will benefit from a coordinated interdisciplinary rehabilitation program services that require close monitoring and treatment with 24-hour rehabilitative nursing and physical/occupational therapies for 3 hours/day for 5 days/week.This interdisciplinary program will be performed under the direction of a physiatrist.    At this time on Methadone and unable to go to [facility].     We will sign off.    Thank you for your consult.     Beth Che NP  Department of Physical Medicine & Rehab  [facility])

## 2022-10-27 NOTE — ASSESSMENT & PLAN NOTE
- MRI with diskitis and osteomyelitis.  - Plan  for OR Friday 10/28 for C5-C6 corpectomy with C4-C7 anterior column reconstruction.    - Related to prolonged/acute hospital course.     Recommendations  -  Encourage mobility, OOB in chair at least 3 hours per day, and early ambulation as appropriate  -  PT/OT evaluate and treat  -  Pain management  -  Monitor for and prevent skin breakdown and pressure ulcers  · Early mobility, repositioning/weight shifting every 20-30 minutes when sitting, turn patient every 2 hours, proper mattress/overlay and chair cushioning, pressure relief/heel protector boots  -  DVT prophylaxis    -  Reviewed discharge options (IP rehab, SNF, HH therapy, and OP therapy)

## 2022-10-27 NOTE — HPI
Cindi Stiles is a 51-year-old female with PMHx of HTN, dilated cardiomyopathy and remote opioid dependence (on Methadone). [place] [facility] [place] for new ascending numbness of bilateral LE. MRI with diskitis and osteomyelitis. Plan  for OR Friday 10/28 for C5-C6 corpectomy with C4-C7 anterior column reconstruction.    Functional History: Patient lives with daughters in a 2 story home. Prior to admission, I. DME: none.

## 2022-10-27 NOTE — ASSESSMENT & PLAN NOTE
Echo from 2014  - Left ventricular cavity size near the upper limit of normal.   - Mildly depressed left ventricular systolic function (EF 45-50%).   - Normal right ventricular systolic function .   - Normal left ventricular diastolic function.     Echo from this admission   The left ventricle is normal in size with normal systolic function. The estimated ejection fraction is 55%.   Normal left ventricular diastolic function.   Normal right ventricular size with normal right ventricular systolic function.   Mild tricuspid regurgitation.   The estimated PA systolic pressure is 20 mmHg.   Normal central venous pressure (3 mmHg).    Patient may require LISHA to evaluate for infective endocarditis given blood cultures positive for S. Aureus: ID on board, and will defer to them on this.
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
## 2022-10-27 NOTE — ASSESSMENT & PLAN NOTE
No noted history of diabetes. Hba1c 6.1. Gluc 269 on admission.  Continue MDSSI  140-180 goal  Accuchecks 4X daily  If continues to be hyperglycemic, may require scheduled insulin: will continue to monitor

## 2022-10-27 NOTE — ASSESSMENT & PLAN NOTE
Peter Stiles is a 51 y.o. female with PMHx of HTN, dilated cardiomyopathy, h/o opioid dependence, cigarette smoker (2pks/day), and daily baby ASA use who presents with 1 day of decreased sensation from T5 level down and tingling in her bilateral fingers and bilateral toes. MRI imaging obtained in the ED showing possible C5-6 osteodiscitis/myelitis with cord compression. Patient tachycardic and hypertensive on arrival, afebrile, with leukocytosis.     -All pertinent imaging/labs personally reviewed and interpreted.    -MRI C/T/L spine w/o contrast 10/25: focal kyphotic deformity at C5/6 with possible discitis/osteomyelitis, epidural collection extending into the central spinal canal resulting in severe stenosis and cord signal abnormality.  Prevertebral soft tissue edema and probable paravertebral abscess or phlegmon at this level. Thoracic and lumbar spine unremarkable.    -MRI C spine w/ contrast 10/25: confirmed enhancement at C5/6 consistent with osteomyelitis/discitis and epidural phlegmon   -ESR/CRP elevated on admission, Procal WNL.   -BCx 10/25 growing S. Aureus, f/u sensitivities. Repeat BCx's 10/27 pending/NGTD.    Admitted to [facility] on admission for MAP goals, maintained independently  Stepped down to floor under  service on 10/26    Plan:  -Neurochecks Q4h on floor  -Continue C collar for now  -Pain control as needed  -MAP goal >80 (maintaining)   -OR Friday 10/28 for C5-C6 corpectomy with C4-C7 anterior column reconstruction    -NPO at midnight tonight   -Hold any AC/AP   -ENT consulted to assist in approach, vocal cord scope WNL   - to document preop risk stratification and clearance, appreciate assistance  -Will need 2nd stage of surgery with posterior decompression/instrumentation, timing TBD   -On broad spectrum Abx with Vanc/Rocephin, continue to follow BCx's and OR Cx's postop  -Patiño placed 10/26 for urinary retention, continue for now  -Please call NSGY with any questions/concerns
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
## 2022-10-27 NOTE — PLAN OF CARE
Following up on discharge planning. Spoke to patient at bedside. She has chosen [facility] if rehab is recommended after her surgery.  Sent referral to [facility] via [facility].  PMR and PT/OT evaluations will be needed after her surgery.    Monique Dooley RN Case Manager  [facility]  10/27/2022

## 2022-10-27 NOTE — ASSESSMENT & PLAN NOTE
There is no evidence of acute coronary syndrome or other unstable cardiovascular process that necessitates expedited evaluation and treatment.    Surgical Risk Assessment   Active cardiac issues:  Active decompensated heart failure? No   Unstable angina?  No   Significant uncontrolled arrhythmias? No   Severe valvular heart disease-Aortic or Mitral Stenosis? No   Recent MI or coronary revascularization < 30 days? No     Cardiac Risk Factors  History of CAD/ischemic heart disease? No   History of cerebrovascular disease? No   History of compensated heart failure? Yes   Type 2 diabetes requiring insulin? Yes   Serum Creatinine > 2? No   Total cardiac risk factors 2     Functional mets >4  < 4* METs -unable to walk > 2 blocks on level ground without stopping due to symptoms  - eating, dressing, toileting, walking indoors, light housework. POOR   > 4* METs -climbing > 1 flight of stairs without stopping  -walking up hill > 1-2 blocks  -scrubbing floors  -moving furniture  - golf, bowling, dancing or tennis  -running short distance MODERATE to EXCELLENT   * performance of any one of the activities     Assessment/Plan:   Cardiovascular Risk Assessment:  No active cardiac problems (such as unstable angina, decompensated heart failure, significant uncontrolled arrhythmias or severe valvular disease).  Functional Status: able to climb a flight of stairs (> 4 METS)    Revised Cardiac Risk Index   1. History of ischemic heart disease   2. History of congestive heart failure   3. History of cerebrovascular disease (stroke or transient ischemic attack)   4. History of diabetes requiring preoperative insulin use 5. Chronic kidney disease (creatinine > 2 mg/dL)   5. Undergoing suprainguinal vascular, intraperitoneal, or intrathoracic surgery Risk for cardiac death, nonfatal myocardial infarction, and nonfatal cardiac arrest     0 predictors = 3.9%, 1 predictor = 6.0%, 2 predictors = 10.1%, ?3 predictors = >15%    RCRI Calculator  Class and Risk percentage:  10.1%             Moderate Risk    Recommendation:  1. Proceed to Surgery

## 2022-10-27 NOTE — SUBJECTIVE & OBJECTIVE
Interval History: NAEON. AFVSS. Stepped down from ICU. Maintaining MAPs independently. Pt reports ongoing posterior neck pain radiating to b/l shoulders, paresthesias to b/l hands and BLE's. OR tomorrow for C5/6 corpectomy. NPO at [facility].     Medications:  Continuous Infusions:  Scheduled Meds:   cefTRIAXone (ROCEPHIN) IVPB  2 g Intravenous Q12H    methadone  45 mg Oral Daily    methocarbamoL  500 mg Oral QID    nicotine  1 patch Transdermal Daily    pregabalin  75 mg Oral BID    senna-docusate 8.6-50 mg  1 tablet Oral BID    vancomycin (VANCOCIN) IVPB  1,000 mg Intravenous Q12H     PRN Meds:sodium chloride, acetaminophen, dextrose 10%, dextrose 10%, glucagon (human recombinant), glucose, glucose, hydrOXYzine HCL, insulin aspart U-100, ondansetron, sodium chloride 0.9%, Pharmacy to dose Vancomycin consult **AND** vancomycin - pharmacy to dose     Review of Systems   Constitutional:  Negative for chills and fever.   HENT:  Negative for facial swelling and trouble swallowing.    Eyes:  Negative for photophobia and visual disturbance.   Respiratory:  Negative for cough and shortness of breath.    Gastrointestinal:  Negative for nausea and vomiting.   Genitourinary:  Positive for difficulty urinating. Negative for dysuria.   Musculoskeletal:  Positive for gait problem and neck pain.   Skin:  Positive for wound. Negative for rash.   Neurological:  Positive for weakness and numbness. Negative for headaches.   Psychiatric/Behavioral:  Negative for agitation and confusion.    Objective:     Weight: 63.5 kg (139 lb 15.9 oz)  Body mass index is 22.6 kg/m².  Vital Signs (Most Recent):  Temp: 97.8 °F (36.6 °C) (10/27/22 0835)  Pulse: 87 (10/27/22 1002)  Resp: 17 (10/27/22 0951)  BP: 135/85 (10/27/22 0821)  SpO2: 98 % (10/27/22 0821) Vital Signs (24h Range):  Temp:  [97.8 °F (36.6 °C)-99.6 °F (37.6 °C)] 97.8 °F (36.6 °C)  Pulse:  [] 87  Resp:  [14-25] 17  SpO2:  [94 %-99 %] 98 %  BP: (115-148)/(58-85) 135/85     Date 10/27/22  "0700 - 10/28/22 0659   Shift 1710-0987 3847-2564 1881-6865 24 Hour Total   INTAKE   Shift Total(mL/kg)       OUTPUT   Urine(mL/kg/hr) 2500   2500   Shift Total(mL/kg) 2500(39.4)   2500(39.4)   Weight (kg) 63.5 63.5 63.5 63.5                        Urethral Catheter 10/26/22 1020 Non-latex (Active)   Site Assessment Clean;Intact 10/26/22 2200   Collection Container Urimeter 10/26/22 2200   Securement Method secured to top of thigh w/ adhesive device 10/26/22 2200   Reason for Continuing Urinary Catheterization Required immobilization 10/26/22 2200   CAUTI Prevention Bundle Drainage bag/urimeter below bladder;Drainage bag/urimeter not overfilled (<2/3 full);No dependent loops or kinks;Sheeting clip in use;Drainage bag/urimeter off the floor;Intact seal between catheter & drainage tubing;Securement Device in place with 1" slack 10/26/22 2000   Output (mL) 250 mL 10/26/22 1701       Physical Exam    Neurosurgery Physical Exam    General: Well developed, well nourished, not in acute distress.   Head: Normocephalic, atraumatic.  Neck: Cervical collar in place  Neurologic: AOx4. Thought content appropriate.  GCS: Motor:6  Verbal:5  Eyes:4   GCS Total: 15  Language: No aphasia.  Speech: No dysarthria.  Cranial nerves: Face symmetric, tongue midline, CN II-XII grossly intact.   Eyes: PEERL, EOMI.   Pulmonary: Normal respirations, no signs of respiratory distress.  Abdomen: Soft, non-distended, non-tender to palpation.  Sensory: Decreased sensation from T4/T5 level down.  Motor Strength: Moves all extremities spontaneously with good tone. No abnormal movements seen.   Strength   Deltoids Triceps Biceps Wrist Extension Wrist Flexion Hand    Upper: R 4/5 4/5 4/5 4/5 4/5 4/5     L 4-/5 4-/5 4-/5 4-/5 4-/5 4/5       Hip Flexion Knee Extension Knee  Flexion Dorsiflexion Plantar flexion EHL   Lower: R 4/5 5/5 5/5 5/5 5/5 5/5     L 4-/5 5/5 5/5 5/5 5/5 5/5   Luna: positive on L hand   DTR 3+ on knees   Clonus: " Absent.  Skin: Healing scabbed wounds on WILBERT HERNANDEZ wrapped in Kerlix dressing, C/D/I. No drainage or odor.    Significant Labs:  Recent Labs   Lab 10/26/22  0223 10/27/22  0303   * 253*   * 135*   K 4.1 4.1   CL 98 97   CO2 30* 27   BUN 13 13   CREATININE 0.9 0.9   CALCIUM 9.5 9.2   MG 2.0 2.0     Recent Labs   Lab 10/26/22  0223 10/27/22  0303   WBC 14.65* 12.91*   HGB 10.1* 10.1*   HCT 30.8* 31.3*    380     Recent Labs   Lab 10/26/22  0223   INR 1.0   APTT 29.6     Microbiology Results (last 7 days)       Procedure Component Value Units Date/Time    Blood culture #2 **CANNOT BE ORDERED STAT** [[id number]] Collected: 10/25/22 0917    Order Status: Completed Specimen: Blood from Peripheral, Wrist, Left Updated: 10/27/22 1012     Blood Culture, Routine No Growth to date      No Growth to date      No Growth to date    Blood culture [[id number]] Collected: 10/27/22 0939    Order Status: Sent Specimen: Blood Updated: 10/27/22 1007    Narrative:      Rt wrist    Blood culture [[id number]] Collected: 10/27/22 0938    Order Status: Sent Specimen: Blood Updated: 10/27/22 1007    Narrative:      Rt AC    Blood culture #1 **CANNOT BE ORDERED STAT** [[id number]]  (Abnormal) Collected: 10/25/22 0917    Order Status: Completed Specimen: Blood from Peripheral, Antecubital, Right Updated: 10/27/22 0945     Blood Culture, Routine Gram stain yumi bottle: Gram positive cocci in clusters resembling Staph      Results called to and read back by: Jaxon Allen RN  17:39  10/26/2022      STAPHYLOCOCCUS AUREUS  Identification and susceptibility pending      Blood culture [[id number]]     Order Status: Canceled Specimen: Blood           All pertinent labs from the last 24 hours have been reviewed.    Significant Diagnostics:  I have reviewed and interpreted all pertinent imaging results/findings within the past 24 hours.

## 2022-10-27 NOTE — PROGRESS NOTES
Pharmacokinetic Assessment Follow Up: IV Vancomycin    Vancomycin serum concentration assessment(s):    The trough level was drawn correctly and can be used to guide therapy at this time. The measurement is below the desired definitive target range of 15 to 20 mcg/mL.  - The trough level was drawn ~12 hours after previous dose and resulted at 11.3.    Vancomycin Regimen Plan:    Change regimen to Vancomycin 1000 mg IV every 12 hours with next serum trough concentration measured at 0000 prior to 4th dose on 10/29.  - Mr. Stiles's renal function appears stable and at baseline.  - Please draw random level sooner than scheduled trough if renal function changes significantly.    Drug levels (last 3 results):  Recent Labs   Lab Result Units 10/26/22  2252   Vancomycin-Trough ug/mL 11.3       Pharmacy will continue to follow and monitor vancomycin.    Please contact pharmacy at extension [phone] for questions regarding this assessment.    Thank you for the consult,   Fela Blanchard       Patient brief summary:  Peter Stiles is a 51 y.o. female initiated on antimicrobial therapy with IV Vancomycin for treatment of bone/joint infection    Actual Body Weight:   63.5 kg    Renal Function:   Estimated Creatinine Clearance: 68.5 mL/min (based on SCr of 0.9 mg/dL).     Dialysis Method (if applicable):  N/A

## 2022-10-27 NOTE — HOSPITAL COURSE
Ms. Stiles presented for acute neck pain, along with numbness and weakness of the bilateral upper and lower extremities. MRI demonstrated C5-6 osteomyelitis, discitis, and epidural abscess, as well as narrowing and signal abnormality of the spinal cord. She was admitted to the neuroICU and started on Rocephin and vancomycin. Stepped down to hospital medicine on 10/26/22. Patient completed C5-C6 corpectomy and C4-C7 anterior column reconstruction with NSGY with assistance from ENT. BCX's subsequently grew MSSA and patient transitioned from vanc/ceftriaxone to oxacillin per ID recommendations. Spiked fever again and antbiotics broadened and re-imaging without new/worsening source identified. Fever did not recur and she felt better. Plan to discharge on long course of IV antibiotics. Patient deemed ready for discharge. Plan discussed with pt, who was agreeable and amenable; medications were discussed and reviewed, outpatient follow-up arranged, ER precautions were given, all questions were answered to the pt's satisfaction, and Peter Stiles  was subsequently discharged.

## 2022-10-27 NOTE — SUBJECTIVE & OBJECTIVE
Interval History: complaining of neck pain that radiates bilaterally to the arms and legs, as well as numbness; reports weakness, arms moreso than legs, with motion significantly limited due to pain.    Review of Systems   Constitutional:  Negative for chills and fever.   HENT:  Negative for hearing loss, rhinorrhea and sore throat.    Eyes:  Negative for photophobia and visual disturbance.   Respiratory:  Negative for chest tightness, shortness of breath and wheezing.    Cardiovascular:  Negative for chest pain, palpitations and leg swelling.   Gastrointestinal:  Negative for abdominal pain, diarrhea, nausea and vomiting.   Genitourinary:  Negative for dysuria, hematuria and urgency.        Urinary retention.   Musculoskeletal:  Positive for back pain and neck pain.   Skin:  Negative for rash.   Neurological:  Positive for weakness and numbness. Negative for facial asymmetry and headaches.   Psychiatric/Behavioral:  Negative for agitation and confusion.    Objective:     Vital Signs (Most Recent):  Temp: 98.7 °F (37.1 °C) (10/27/22 1204)  Pulse: 77 (10/27/22 1204)  Resp: 17 (10/27/22 1204)  BP: 139/67 (10/27/22 1204)  SpO2: 95 % (10/27/22 1204) Vital Signs (24h Range):  Temp:  [97.8 °F (36.6 °C)-99.6 °F (37.6 °C)] 98.7 °F (37.1 °C)  Pulse:  [] 77  Resp:  [15-24] 17  SpO2:  [95 %-98 %] 95 %  BP: (115-147)/(64-85) 139/67     Weight: 63.5 kg (139 lb 15.9 oz)  Body mass index is 22.6 kg/m².    Intake/Output Summary (Last 24 hours) at 10/27/2022 1418  Last data filed at 10/27/2022 0818  Gross per 24 hour   Intake 294.67 ml   Output 3180 ml   Net -2885.33 ml      Physical Exam  Vitals and nursing note reviewed.   Constitutional:       Appearance: She is ill-appearing.   HENT:      Head: Normocephalic and atraumatic.      Comments: Cervical collar in place.     Nose: Nose normal.      Mouth/Throat:      Mouth: Mucous membranes are moist.   Eyes:      General:         Right eye: No discharge.         Left eye: No  discharge.      Extraocular Movements: Extraocular movements intact.      Conjunctiva/sclera: Conjunctivae normal.      Pupils: Pupils are equal, round, and reactive to light.   Cardiovascular:      Rate and Rhythm: Normal rate and regular rhythm.      Heart sounds: No murmur heard.  Pulmonary:      Effort: Pulmonary effort is normal.   Abdominal:      General: There is no distension.      Tenderness: There is no abdominal tenderness. There is no guarding or rebound.   Musculoskeletal:         General: Tenderness present.      Right lower leg: No edema.      Left lower leg: No edema.   Skin:     General: Skin is warm and dry.   Neurological:      General: No focal deficit present.      Mental Status: She is alert and oriented to person, place, and time.      Motor: Weakness present.      Comments: Upper extremity strength 4- bilaterally.  Lower extremity strength 4+ bilaterally.  Patellar reflex 3+ bilaterally.  Exam significantly encumbered due to patient pain.   Psychiatric:         Mood and Affect: Mood normal.         Behavior: Behavior normal.       Significant Labs: All pertinent labs within the past 24 hours have been reviewed.    Significant Imaging: I have reviewed all pertinent imaging results/findings within the past 24 hours.
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
## 2022-10-27 NOTE — ASSESSMENT & PLAN NOTE
"-blood cultures on 10/25 positive for S aureus  -blood cultures 10/27 pending  -ID consulted: appreciate recommendations  -please see "osteomyelitis"    "

## 2022-10-27 NOTE — HOSPITAL COURSE
10/26/22: participated w/ PT. Bed mob Timothy. Ambulated 4 steps modA. [facility]  12.

## 2022-10-27 NOTE — ASSESSMENT & PLAN NOTE
-Na 127 on admission: suspected to be due to stress vs. Acute neurological sequelae (I.e SIADH)  -has trended up to 135 on 10/27  -will continue to monitor
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
## 2022-10-28 ENCOUNTER — ANESTHESIA (OUTPATIENT)
Dept: SURGERY | Facility: HOSPITAL | Age: 52
DRG: 003 | End: 2022-10-28
Payer: MEDICARE

## 2022-10-28 PROBLEM — B95.61 MSSA BACTEREMIA: Status: ACTIVE | Noted: 2022-10-27

## 2022-10-28 PROBLEM — J96.02 ACUTE RESPIRATORY FAILURE WITH HYPERCAPNIA: Status: ACTIVE | Noted: 2022-10-28

## 2022-10-28 LAB
ALLENS TEST: ABNORMAL
BACTERIA BLD CULT: ABNORMAL
BLD PROD TYP BPU: NORMAL
BLOOD UNIT EXPIRATION DATE: NORMAL
BLOOD UNIT TYPE CODE: 5100
BLOOD UNIT TYPE: NORMAL
CODING SYSTEM: NORMAL
DELSYS: ABNORMAL
DISPENSE STATUS: NORMAL
ERYTHROCYTE [SEDIMENTATION RATE] IN BLOOD BY WESTERGREN METHOD: 14 MM/H
FIO2: 50
GRAM STN SPEC: NORMAL
HCO3 UR-SCNC: 28.2 MMOL/L (ref 24–28)
MIN VOL: 8.76
MODE: ABNORMAL
PCO2 BLDA: 48.6 MMHG (ref 35–45)
PEEP: 5
PH SMN: 7.37 [PH] (ref 7.35–7.45)
PO2 BLDA: 85 MMHG (ref 80–100)
POC BE: 3 MMOL/L
POC SATURATED O2: 96 % (ref 95–100)
POC TCO2: 30 MMOL/L (ref 23–27)
POCT GLUCOSE: 185 MG/DL (ref 70–110)
POCT GLUCOSE: 221 MG/DL (ref 70–110)
POCT GLUCOSE: 226 MG/DL (ref 70–110)
POCT GLUCOSE: 273 MG/DL (ref 70–110)
SAMPLE: ABNORMAL
SITE: ABNORMAL
SP02: 94
UNIT NUMBER: NORMAL
VT: 450

## 2022-10-28 PROCEDURE — 20930 PR ALLOGRAFT FOR SPINE SURGERY ONLY MORSELIZED: ICD-10-PCS | Mod: ,,, | Performed by: STUDENT IN AN ORGANIZED HEALTH CARE EDUCATION/TRAINING PROGRAM

## 2022-10-28 PROCEDURE — 63301 REMOVE VERT XDRL BODY THRC: CPT | Mod: ,,, | Performed by: STUDENT IN AN ORGANIZED HEALTH CARE EDUCATION/TRAINING PROGRAM

## 2022-10-28 PROCEDURE — 22554 ARTHRD ANT NTRBD MIN DSC CRV: CPT | Mod: 51,,, | Performed by: STUDENT IN AN ORGANIZED HEALTH CARE EDUCATION/TRAINING PROGRAM

## 2022-10-28 PROCEDURE — 63300 PR REMV VERT,EXDUR,CERV TUMOR: ICD-10-PCS | Mod: 82,,, | Performed by: OTOLARYNGOLOGY

## 2022-10-28 PROCEDURE — 87077 CULTURE AEROBIC IDENTIFY: CPT | Mod: 59 | Performed by: STUDENT IN AN ORGANIZED HEALTH CARE EDUCATION/TRAINING PROGRAM

## 2022-10-28 PROCEDURE — 27800903 OPTIME MED/SURG SUP & DEVICES OTHER IMPLANTS: Performed by: STUDENT IN AN ORGANIZED HEALTH CARE EDUCATION/TRAINING PROGRAM

## 2022-10-28 PROCEDURE — 36000710: Performed by: STUDENT IN AN ORGANIZED HEALTH CARE EDUCATION/TRAINING PROGRAM

## 2022-10-28 PROCEDURE — 63600175 PHARM REV CODE 636 W HCPCS: Performed by: PHYSICIAN ASSISTANT

## 2022-10-28 PROCEDURE — 87186 SC STD MICRODIL/AGAR DIL: CPT | Performed by: STUDENT IN AN ORGANIZED HEALTH CARE EDUCATION/TRAINING PROGRAM

## 2022-10-28 PROCEDURE — 87015 SPECIMEN INFECT AGNT CONCNTJ: CPT | Mod: 59 | Performed by: STUDENT IN AN ORGANIZED HEALTH CARE EDUCATION/TRAINING PROGRAM

## 2022-10-28 PROCEDURE — P9035 PLATELET PHERES LEUKOREDUCED: HCPCS | Performed by: STUDENT IN AN ORGANIZED HEALTH CARE EDUCATION/TRAINING PROGRAM

## 2022-10-28 PROCEDURE — 20000000 HC ICU ROOM

## 2022-10-28 PROCEDURE — 22585 PR ARTHRODESIS ANT INTERBODY MIN DISCECTOMY,EA ADDL: ICD-10-PCS | Mod: ,,, | Performed by: STUDENT IN AN ORGANIZED HEALTH CARE EDUCATION/TRAINING PROGRAM

## 2022-10-28 PROCEDURE — 37000008 HC ANESTHESIA 1ST 15 MINUTES: Performed by: STUDENT IN AN ORGANIZED HEALTH CARE EDUCATION/TRAINING PROGRAM

## 2022-10-28 PROCEDURE — 27201423 OPTIME MED/SURG SUP & DEVICES STERILE SUPPLY: Performed by: STUDENT IN AN ORGANIZED HEALTH CARE EDUCATION/TRAINING PROGRAM

## 2022-10-28 PROCEDURE — 63300 REMOVE VERT XDRL BODY CRVCL: CPT | Mod: 82,,, | Performed by: OTOLARYNGOLOGY

## 2022-10-28 PROCEDURE — 87116 MYCOBACTERIA CULTURE: CPT | Mod: 59 | Performed by: STUDENT IN AN ORGANIZED HEALTH CARE EDUCATION/TRAINING PROGRAM

## 2022-10-28 PROCEDURE — 94002 VENT MGMT INPAT INIT DAY: CPT

## 2022-10-28 PROCEDURE — 87070 CULTURE OTHR SPECIMN AEROBIC: CPT | Mod: 59 | Performed by: STUDENT IN AN ORGANIZED HEALTH CARE EDUCATION/TRAINING PROGRAM

## 2022-10-28 PROCEDURE — 63300 REMOVE VERT XDRL BODY CRVCL: CPT | Mod: ,,, | Performed by: STUDENT IN AN ORGANIZED HEALTH CARE EDUCATION/TRAINING PROGRAM

## 2022-10-28 PROCEDURE — 63600175 PHARM REV CODE 636 W HCPCS: Performed by: NURSE PRACTITIONER

## 2022-10-28 PROCEDURE — 87102 FUNGUS ISOLATION CULTURE: CPT | Mod: 59 | Performed by: STUDENT IN AN ORGANIZED HEALTH CARE EDUCATION/TRAINING PROGRAM

## 2022-10-28 PROCEDURE — 22585 ARTHRD ANT NTRBD MIN DSC EA: CPT | Mod: 82,,, | Performed by: OTOLARYNGOLOGY

## 2022-10-28 PROCEDURE — D9220A PRA ANESTHESIA: Mod: ANES,,, | Performed by: ANESTHESIOLOGY

## 2022-10-28 PROCEDURE — 99900026 HC AIRWAY MAINTENANCE (STAT)

## 2022-10-28 PROCEDURE — 37000009 HC ANESTHESIA EA ADD 15 MINS: Performed by: STUDENT IN AN ORGANIZED HEALTH CARE EDUCATION/TRAINING PROGRAM

## 2022-10-28 PROCEDURE — 63600175 PHARM REV CODE 636 W HCPCS: Performed by: INTERNAL MEDICINE

## 2022-10-28 PROCEDURE — 20930 SP BONE ALGRFT MORSEL ADD-ON: CPT | Mod: ,,, | Performed by: STUDENT IN AN ORGANIZED HEALTH CARE EDUCATION/TRAINING PROGRAM

## 2022-10-28 PROCEDURE — 22554 PR ARTHRODESIS ANT INTERBODY MIN DISCECTOMY, CERVICAL BELOW C2: ICD-10-PCS | Mod: 51,,, | Performed by: STUDENT IN AN ORGANIZED HEALTH CARE EDUCATION/TRAINING PROGRAM

## 2022-10-28 PROCEDURE — 99291 PR CRITICAL CARE, E/M 30-74 MINUTES: ICD-10-PCS | Mod: ,,, | Performed by: NURSE PRACTITIONER

## 2022-10-28 PROCEDURE — 94761 N-INVAS EAR/PLS OXIMETRY MLT: CPT

## 2022-10-28 PROCEDURE — 63301 REMOVE VERT XDRL BODY THRC: CPT | Mod: 82,,, | Performed by: OTOLARYNGOLOGY

## 2022-10-28 PROCEDURE — 22854 PR INS BIOMECH DEV, VERT CORPECTOMY W/INTRBODY ARTHRODESIS EA INTERSPC: ICD-10-PCS | Mod: ,,, | Performed by: STUDENT IN AN ORGANIZED HEALTH CARE EDUCATION/TRAINING PROGRAM

## 2022-10-28 PROCEDURE — 27000221 HC OXYGEN, UP TO 24 HOURS

## 2022-10-28 PROCEDURE — 27200966 HC CLOSED SUCTION SYSTEM

## 2022-10-28 PROCEDURE — 25000003 PHARM REV CODE 250: Performed by: NURSE ANESTHETIST, CERTIFIED REGISTERED

## 2022-10-28 PROCEDURE — 25000003 PHARM REV CODE 250: Performed by: INTERNAL MEDICINE

## 2022-10-28 PROCEDURE — 82800 BLOOD PH: CPT

## 2022-10-28 PROCEDURE — 87075 CULTR BACTERIA EXCEPT BLOOD: CPT | Mod: 59 | Performed by: STUDENT IN AN ORGANIZED HEALTH CARE EDUCATION/TRAINING PROGRAM

## 2022-10-28 PROCEDURE — 27201037 HC PRESSURE MONITORING SET UP

## 2022-10-28 PROCEDURE — 36000711: Performed by: STUDENT IN AN ORGANIZED HEALTH CARE EDUCATION/TRAINING PROGRAM

## 2022-10-28 PROCEDURE — 87176 TISSUE HOMOGENIZATION CULTR: CPT | Performed by: STUDENT IN AN ORGANIZED HEALTH CARE EDUCATION/TRAINING PROGRAM

## 2022-10-28 PROCEDURE — 63600175 PHARM REV CODE 636 W HCPCS: Performed by: NURSE ANESTHETIST, CERTIFIED REGISTERED

## 2022-10-28 PROCEDURE — 99233 PR SUBSEQUENT HOSPITAL CARE,LEVL III: ICD-10-PCS | Mod: GC,,, | Performed by: HOSPITALIST

## 2022-10-28 PROCEDURE — C1769 GUIDE WIRE: HCPCS | Performed by: STUDENT IN AN ORGANIZED HEALTH CARE EDUCATION/TRAINING PROGRAM

## 2022-10-28 PROCEDURE — D9220A PRA ANESTHESIA: ICD-10-PCS | Mod: ANES,,, | Performed by: ANESTHESIOLOGY

## 2022-10-28 PROCEDURE — 87118 MYCOBACTERIC IDENTIFICATION: CPT | Mod: 59 | Performed by: STUDENT IN AN ORGANIZED HEALTH CARE EDUCATION/TRAINING PROGRAM

## 2022-10-28 PROCEDURE — 22846 PR ANTERIOR INSTRUMENTATION 4-7 VERTEBRAL SEGMENTS: ICD-10-PCS | Mod: 59,,, | Performed by: STUDENT IN AN ORGANIZED HEALTH CARE EDUCATION/TRAINING PROGRAM

## 2022-10-28 PROCEDURE — 25000003 PHARM REV CODE 250: Performed by: STUDENT IN AN ORGANIZED HEALTH CARE EDUCATION/TRAINING PROGRAM

## 2022-10-28 PROCEDURE — 36410 VNPNXR 3YR/> PHY/QHP DX/THER: CPT

## 2022-10-28 PROCEDURE — 87186 SC STD MICRODIL/AGAR DIL: CPT | Mod: 91 | Performed by: STUDENT IN AN ORGANIZED HEALTH CARE EDUCATION/TRAINING PROGRAM

## 2022-10-28 PROCEDURE — 82962 GLUCOSE BLOOD TEST: CPT | Performed by: STUDENT IN AN ORGANIZED HEALTH CARE EDUCATION/TRAINING PROGRAM

## 2022-10-28 PROCEDURE — C1713 ANCHOR/SCREW BN/BN,TIS/BN: HCPCS | Performed by: STUDENT IN AN ORGANIZED HEALTH CARE EDUCATION/TRAINING PROGRAM

## 2022-10-28 PROCEDURE — 99900035 HC TECH TIME PER 15 MIN (STAT)

## 2022-10-28 PROCEDURE — 87118 MYCOBACTERIC IDENTIFICATION: CPT

## 2022-10-28 PROCEDURE — 87118 MYCOBACTERIC IDENTIFICATION: CPT | Performed by: STUDENT IN AN ORGANIZED HEALTH CARE EDUCATION/TRAINING PROGRAM

## 2022-10-28 PROCEDURE — 87206 SMEAR FLUORESCENT/ACID STAI: CPT | Mod: 91 | Performed by: STUDENT IN AN ORGANIZED HEALTH CARE EDUCATION/TRAINING PROGRAM

## 2022-10-28 PROCEDURE — 25000242 PHARM REV CODE 250 ALT 637 W/ HCPCS: Performed by: NURSE ANESTHETIST, CERTIFIED REGISTERED

## 2022-10-28 PROCEDURE — C1751 CATH, INF, PER/CENT/MIDLINE: HCPCS

## 2022-10-28 PROCEDURE — 63301 PR REMV VERT,EXUDR,XTHOR,THOR TUMOR: ICD-10-PCS | Mod: ,,, | Performed by: STUDENT IN AN ORGANIZED HEALTH CARE EDUCATION/TRAINING PROGRAM

## 2022-10-28 PROCEDURE — C1729 CATH, DRAINAGE: HCPCS | Performed by: STUDENT IN AN ORGANIZED HEALTH CARE EDUCATION/TRAINING PROGRAM

## 2022-10-28 PROCEDURE — 63301 PR REMV VERT,EXUDR,XTHOR,THOR TUMOR: ICD-10-PCS | Mod: 82,,, | Performed by: OTOLARYNGOLOGY

## 2022-10-28 PROCEDURE — 22846 INSERT SPINE FIXATION DEVICE: CPT | Mod: 59,,, | Performed by: STUDENT IN AN ORGANIZED HEALTH CARE EDUCATION/TRAINING PROGRAM

## 2022-10-28 PROCEDURE — 22585 PR ARTHRODESIS ANT INTERBODY MIN DISCECTOMY,EA ADDL: ICD-10-PCS | Mod: 82,,, | Performed by: OTOLARYNGOLOGY

## 2022-10-28 PROCEDURE — 25000003 PHARM REV CODE 250: Performed by: NURSE PRACTITIONER

## 2022-10-28 PROCEDURE — D9220A PRA ANESTHESIA: ICD-10-PCS | Mod: CRNA,,, | Performed by: NURSE ANESTHETIST, CERTIFIED REGISTERED

## 2022-10-28 PROCEDURE — 63600175 PHARM REV CODE 636 W HCPCS: Performed by: STUDENT IN AN ORGANIZED HEALTH CARE EDUCATION/TRAINING PROGRAM

## 2022-10-28 PROCEDURE — 76937 US GUIDE VASCULAR ACCESS: CPT

## 2022-10-28 PROCEDURE — 99291 CRITICAL CARE FIRST HOUR: CPT | Mod: ,,, | Performed by: NURSE PRACTITIONER

## 2022-10-28 PROCEDURE — 22554 PR ARTHRODESIS ANT INTERBODY MIN DISCECTOMY, CERVICAL BELOW C2: ICD-10-PCS | Mod: 82,51,, | Performed by: OTOLARYNGOLOGY

## 2022-10-28 PROCEDURE — 22854 INSJ BIOMECHANICAL DEVICE: CPT | Mod: ,,, | Performed by: STUDENT IN AN ORGANIZED HEALTH CARE EDUCATION/TRAINING PROGRAM

## 2022-10-28 PROCEDURE — 22585 ARTHRD ANT NTRBD MIN DSC EA: CPT | Mod: ,,, | Performed by: STUDENT IN AN ORGANIZED HEALTH CARE EDUCATION/TRAINING PROGRAM

## 2022-10-28 PROCEDURE — 63300 PR REMV VERT,EXDUR,CERV TUMOR: ICD-10-PCS | Mod: ,,, | Performed by: STUDENT IN AN ORGANIZED HEALTH CARE EDUCATION/TRAINING PROGRAM

## 2022-10-28 PROCEDURE — 82803 BLOOD GASES ANY COMBINATION: CPT

## 2022-10-28 PROCEDURE — 87154 CUL TYP ID BLD PTHGN 6+ TRGT: CPT | Performed by: STUDENT IN AN ORGANIZED HEALTH CARE EDUCATION/TRAINING PROGRAM

## 2022-10-28 PROCEDURE — 25000003 PHARM REV CODE 250: Performed by: PHYSICIAN ASSISTANT

## 2022-10-28 PROCEDURE — 22554 ARTHRD ANT NTRBD MIN DSC CRV: CPT | Mod: 82,51,, | Performed by: OTOLARYNGOLOGY

## 2022-10-28 PROCEDURE — 99233 SBSQ HOSP IP/OBS HIGH 50: CPT | Mod: GC,,, | Performed by: HOSPITALIST

## 2022-10-28 PROCEDURE — D9220A PRA ANESTHESIA: Mod: CRNA,,, | Performed by: NURSE ANESTHETIST, CERTIFIED REGISTERED

## 2022-10-28 PROCEDURE — 87205 SMEAR GRAM STAIN: CPT | Mod: 59 | Performed by: STUDENT IN AN ORGANIZED HEALTH CARE EDUCATION/TRAINING PROGRAM

## 2022-10-28 PROCEDURE — 37799 UNLISTED PX VASCULAR SURGERY: CPT

## 2022-10-28 DEVICE — IMPLANTABLE DEVICE: Type: IMPLANTABLE DEVICE | Site: NECK | Status: FUNCTIONAL

## 2022-10-28 DEVICE — SCREW BONE SPINAL VA 4.2X14MM: Type: IMPLANTABLE DEVICE | Site: NECK | Status: FUNCTIONAL

## 2022-10-28 DEVICE — PIN DISTRACTOR 14MM: Type: IMPLANTABLE DEVICE | Site: NECK | Status: FUNCTIONAL

## 2022-10-28 DEVICE — GRAFT ALTAPORE BIOACTIVE 2.5ML: Type: IMPLANTABLE DEVICE | Site: NECK | Status: FUNCTIONAL

## 2022-10-28 RX ORDER — PROPOFOL 10 MG/ML
VIAL (ML) INTRAVENOUS CONTINUOUS PRN
Status: DISCONTINUED | OUTPATIENT
Start: 2022-10-28 | End: 2022-10-28

## 2022-10-28 RX ORDER — PROPOFOL 10 MG/ML
INJECTION, EMULSION INTRAVENOUS
Status: DISPENSED
Start: 2022-10-28 | End: 2022-10-29

## 2022-10-28 RX ORDER — FAMOTIDINE 10 MG/ML
20 INJECTION INTRAVENOUS 2 TIMES DAILY
Status: DISCONTINUED | OUTPATIENT
Start: 2022-10-28 | End: 2022-10-30

## 2022-10-28 RX ORDER — OXYCODONE HYDROCHLORIDE 10 MG/1
10 TABLET ORAL EVERY 4 HOURS PRN
Status: DISCONTINUED | OUTPATIENT
Start: 2022-10-28 | End: 2022-10-31

## 2022-10-28 RX ORDER — LIDOCAINE HCL/PF 100 MG/5ML
SYRINGE (ML) INTRAVENOUS
Status: DISCONTINUED | OUTPATIENT
Start: 2022-10-28 | End: 2022-10-28

## 2022-10-28 RX ORDER — NICARDIPINE HYDROCHLORIDE 0.2 MG/ML
INJECTION INTRAVENOUS CONTINUOUS PRN
Status: DISCONTINUED | OUTPATIENT
Start: 2022-10-28 | End: 2022-10-28

## 2022-10-28 RX ORDER — LABETALOL HCL 20 MG/4 ML
10 SYRINGE (ML) INTRAVENOUS EVERY 4 HOURS PRN
Status: DISCONTINUED | OUTPATIENT
Start: 2022-10-28 | End: 2022-11-10

## 2022-10-28 RX ORDER — ONDANSETRON 2 MG/ML
4 INJECTION INTRAMUSCULAR; INTRAVENOUS ONCE AS NEEDED
Status: CANCELLED | OUTPATIENT
Start: 2022-10-28 | End: 2034-03-25

## 2022-10-28 RX ORDER — FENTANYL CITRATE-0.9 % NACL/PF 10 MCG/ML
0-200 PLASTIC BAG, INJECTION (ML) INTRAVENOUS CONTINUOUS
Status: DISCONTINUED | OUTPATIENT
Start: 2022-10-28 | End: 2022-11-04

## 2022-10-28 RX ORDER — DIAZEPAM 5 MG/1
5 TABLET ORAL EVERY 8 HOURS PRN
Status: DISCONTINUED | OUTPATIENT
Start: 2022-10-28 | End: 2022-10-31

## 2022-10-28 RX ORDER — HYDROMORPHONE HYDROCHLORIDE 1 MG/ML
0.2 INJECTION, SOLUTION INTRAMUSCULAR; INTRAVENOUS; SUBCUTANEOUS EVERY 5 MIN PRN
Status: CANCELLED | OUTPATIENT
Start: 2022-10-28

## 2022-10-28 RX ORDER — REMIFENTANIL HYDROCHLORIDE 1 MG/ML
INJECTION, POWDER, LYOPHILIZED, FOR SOLUTION INTRAVENOUS CONTINUOUS PRN
Status: DISCONTINUED | OUTPATIENT
Start: 2022-10-28 | End: 2022-10-28

## 2022-10-28 RX ORDER — FENTANYL CITRATE 50 UG/ML
25 INJECTION, SOLUTION INTRAMUSCULAR; INTRAVENOUS EVERY 5 MIN PRN
Status: CANCELLED | OUTPATIENT
Start: 2022-10-28

## 2022-10-28 RX ORDER — KETAMINE HCL IN 0.9 % NACL 50 MG/5 ML
SYRINGE (ML) INTRAVENOUS
Status: DISCONTINUED | OUTPATIENT
Start: 2022-10-28 | End: 2022-10-28

## 2022-10-28 RX ORDER — ONDANSETRON 2 MG/ML
INJECTION INTRAMUSCULAR; INTRAVENOUS
Status: DISCONTINUED | OUTPATIENT
Start: 2022-10-28 | End: 2022-10-28

## 2022-10-28 RX ORDER — FENTANYL CITRATE 50 UG/ML
INJECTION, SOLUTION INTRAMUSCULAR; INTRAVENOUS
Status: DISCONTINUED | OUTPATIENT
Start: 2022-10-28 | End: 2022-10-28

## 2022-10-28 RX ORDER — PROPOFOL 10 MG/ML
VIAL (ML) INTRAVENOUS
Status: DISCONTINUED | OUTPATIENT
Start: 2022-10-28 | End: 2022-10-28

## 2022-10-28 RX ORDER — SODIUM CHLORIDE 9 MG/ML
INJECTION, SOLUTION INTRAVENOUS CONTINUOUS PRN
Status: DISCONTINUED | OUTPATIENT
Start: 2022-10-28 | End: 2022-10-28

## 2022-10-28 RX ORDER — METHYLPREDNISOLONE ACETATE 40 MG/ML
INJECTION, SUSPENSION INTRA-ARTICULAR; INTRALESIONAL; INTRAMUSCULAR; SOFT TISSUE
Status: DISCONTINUED | OUTPATIENT
Start: 2022-10-28 | End: 2022-10-28 | Stop reason: HOSPADM

## 2022-10-28 RX ORDER — DEXAMETHASONE SODIUM PHOSPHATE 4 MG/ML
INJECTION, SOLUTION INTRA-ARTICULAR; INTRALESIONAL; INTRAMUSCULAR; INTRAVENOUS; SOFT TISSUE
Status: DISCONTINUED | OUTPATIENT
Start: 2022-10-28 | End: 2022-10-28

## 2022-10-28 RX ORDER — SUCCINYLCHOLINE CHLORIDE 20 MG/ML
INJECTION INTRAMUSCULAR; INTRAVENOUS
Status: DISCONTINUED | OUTPATIENT
Start: 2022-10-28 | End: 2022-10-28

## 2022-10-28 RX ORDER — LABETALOL HCL 20 MG/4 ML
10 SYRINGE (ML) INTRAVENOUS EVERY 10 MIN PRN
Status: DISCONTINUED | OUTPATIENT
Start: 2022-10-28 | End: 2022-10-28

## 2022-10-28 RX ORDER — PROPOFOL 10 MG/ML
0-50 INJECTION, EMULSION INTRAVENOUS CONTINUOUS
Status: DISCONTINUED | OUTPATIENT
Start: 2022-10-28 | End: 2022-11-04

## 2022-10-28 RX ORDER — HYDROMORPHONE HYDROCHLORIDE 1 MG/ML
1 INJECTION, SOLUTION INTRAMUSCULAR; INTRAVENOUS; SUBCUTANEOUS EVERY 4 HOURS PRN
Status: DISCONTINUED | OUTPATIENT
Start: 2022-10-28 | End: 2022-11-04

## 2022-10-28 RX ORDER — DEXAMETHASONE SODIUM PHOSPHATE 4 MG/ML
4 INJECTION, SOLUTION INTRA-ARTICULAR; INTRALESIONAL; INTRAMUSCULAR; INTRAVENOUS; SOFT TISSUE EVERY 6 HOURS
Status: COMPLETED | OUTPATIENT
Start: 2022-10-28 | End: 2022-10-29

## 2022-10-28 RX ORDER — OXYCODONE HYDROCHLORIDE 10 MG/1
10 TABLET ORAL EVERY 4 HOURS PRN
Status: DISCONTINUED | OUTPATIENT
Start: 2022-10-28 | End: 2022-10-28

## 2022-10-28 RX ORDER — DEXMEDETOMIDINE HYDROCHLORIDE 100 UG/ML
INJECTION, SOLUTION INTRAVENOUS
Status: DISCONTINUED | OUTPATIENT
Start: 2022-10-28 | End: 2022-10-28

## 2022-10-28 RX ORDER — DEXMEDETOMIDINE HYDROCHLORIDE 4 UG/ML
0-1.4 INJECTION, SOLUTION INTRAVENOUS CONTINUOUS
Status: DISCONTINUED | OUTPATIENT
Start: 2022-10-28 | End: 2022-10-28

## 2022-10-28 RX ORDER — LIDOCAINE HYDROCHLORIDE AND EPINEPHRINE 10; 10 MG/ML; UG/ML
INJECTION, SOLUTION INFILTRATION; PERINEURAL
Status: DISCONTINUED | OUTPATIENT
Start: 2022-10-28 | End: 2022-10-28 | Stop reason: HOSPADM

## 2022-10-28 RX ORDER — MIDAZOLAM HYDROCHLORIDE 1 MG/ML
INJECTION INTRAMUSCULAR; INTRAVENOUS
Status: DISCONTINUED | OUTPATIENT
Start: 2022-10-28 | End: 2022-10-28

## 2022-10-28 RX ORDER — ROCURONIUM BROMIDE 10 MG/ML
INJECTION, SOLUTION INTRAVENOUS
Status: DISCONTINUED | OUTPATIENT
Start: 2022-10-28 | End: 2022-10-28

## 2022-10-28 RX ADMIN — SODIUM CHLORIDE, SODIUM GLUCONATE, SODIUM ACETATE, POTASSIUM CHLORIDE, MAGNESIUM CHLORIDE, SODIUM PHOSPHATE, DIBASIC, AND POTASSIUM PHOSPHATE: .53; .5; .37; .037; .03; .012; .00082 INJECTION, SOLUTION INTRAVENOUS at 08:10

## 2022-10-28 RX ADMIN — Medication 50 MCG/HR: at 02:10

## 2022-10-28 RX ADMIN — Medication 10 MG: at 10:10

## 2022-10-28 RX ADMIN — DEXAMETHASONE SODIUM PHOSPHATE 4 MG: 4 INJECTION INTRA-ARTICULAR; INTRALESIONAL; INTRAMUSCULAR; INTRAVENOUS; SOFT TISSUE at 05:10

## 2022-10-28 RX ADMIN — FAMOTIDINE 20 MG: 10 INJECTION INTRAVENOUS at 09:10

## 2022-10-28 RX ADMIN — LIDOCAINE HYDROCHLORIDE 100 MG: 20 INJECTION, SOLUTION INTRAVENOUS at 07:10

## 2022-10-28 RX ADMIN — RACEPINEPHRINE HYDROCHLORIDE 0.5 ML: 11.25 SOLUTION RESPIRATORY (INHALATION) at 12:10

## 2022-10-28 RX ADMIN — CEFTRIAXONE SODIUM 2 G: 2 INJECTION, SOLUTION INTRAVENOUS at 08:10

## 2022-10-28 RX ADMIN — PROPOFOL 50 MCG/KG/MIN: 10 INJECTION, EMULSION INTRAVENOUS at 04:10

## 2022-10-28 RX ADMIN — ROCURONIUM BROMIDE 5 MG: 10 INJECTION INTRAVENOUS at 07:10

## 2022-10-28 RX ADMIN — Medication 15 MG: at 11:10

## 2022-10-28 RX ADMIN — PROPOFOL 200 MCG/KG/MIN: 10 INJECTION, EMULSION INTRAVENOUS at 08:10

## 2022-10-28 RX ADMIN — REMIFENTANIL HYDROCHLORIDE 0.3 MCG/KG/MIN: 1 INJECTION, POWDER, LYOPHILIZED, FOR SOLUTION INTRAVENOUS at 08:10

## 2022-10-28 RX ADMIN — INSULIN ASPART 6 UNITS: 100 INJECTION, SOLUTION INTRAVENOUS; SUBCUTANEOUS at 05:10

## 2022-10-28 RX ADMIN — DEXAMETHASONE SODIUM PHOSPHATE 4 MG: 4 INJECTION INTRA-ARTICULAR; INTRALESIONAL; INTRAMUSCULAR; INTRAVENOUS; SOFT TISSUE at 11:10

## 2022-10-28 RX ADMIN — PROPOFOL 30 MCG/KG/MIN: 10 INJECTION, EMULSION INTRAVENOUS at 08:10

## 2022-10-28 RX ADMIN — NICARDIPINE HYDROCHLORIDE 5 MG/HR: 0.2 INJECTION, SOLUTION INTRAVENOUS at 10:10

## 2022-10-28 RX ADMIN — INSULIN ASPART 2 UNITS: 100 INJECTION, SOLUTION INTRAVENOUS; SUBCUTANEOUS at 11:10

## 2022-10-28 RX ADMIN — DEXAMETHASONE SODIUM PHOSPHATE 10 MG: 4 INJECTION, SOLUTION INTRAMUSCULAR; INTRAVENOUS at 07:10

## 2022-10-28 RX ADMIN — PROPOFOL 200 MG: 10 INJECTION, EMULSION INTRAVENOUS at 07:10

## 2022-10-28 RX ADMIN — MIDAZOLAM HYDROCHLORIDE 2 MG: 1 INJECTION, SOLUTION INTRAMUSCULAR; INTRAVENOUS at 07:10

## 2022-10-28 RX ADMIN — SODIUM CHLORIDE: 0.9 INJECTION, SOLUTION INTRAVENOUS at 07:10

## 2022-10-28 RX ADMIN — ROCURONIUM BROMIDE 50 MG: 10 INJECTION INTRAVENOUS at 12:10

## 2022-10-28 RX ADMIN — SUCCINYLCHOLINE CHLORIDE 160 MG: 20 INJECTION, SOLUTION INTRAMUSCULAR; INTRAVENOUS at 07:10

## 2022-10-28 RX ADMIN — VANCOMYCIN HYDROCHLORIDE 1000 MG: 1 INJECTION, POWDER, LYOPHILIZED, FOR SOLUTION INTRAVENOUS at 12:10

## 2022-10-28 RX ADMIN — DEXMEDETOMIDINE HYDROCHLORIDE 4 MCG: 100 INJECTION, SOLUTION INTRAVENOUS at 07:10

## 2022-10-28 RX ADMIN — DEXMEDETOMIDINE HYDROCHLORIDE 0.4 MCG/KG/HR: 4 INJECTION INTRAVENOUS at 02:10

## 2022-10-28 RX ADMIN — ACETAMINOPHEN 650 MG: 325 TABLET ORAL at 06:10

## 2022-10-28 RX ADMIN — ONDANSETRON 4 MG: 2 INJECTION INTRAMUSCULAR; INTRAVENOUS at 06:10

## 2022-10-28 RX ADMIN — ONDANSETRON 4 MG: 2 INJECTION INTRAMUSCULAR; INTRAVENOUS at 07:10

## 2022-10-28 RX ADMIN — Medication 25 MG: at 07:10

## 2022-10-28 RX ADMIN — FENTANYL CITRATE 100 MCG: 50 INJECTION, SOLUTION INTRAMUSCULAR; INTRAVENOUS at 07:10

## 2022-10-28 RX ADMIN — METHOCARBAMOL 1000 MG: 100 INJECTION, SOLUTION INTRAMUSCULAR; INTRAVENOUS at 09:10

## 2022-10-28 RX ADMIN — OXACILLIN 12 G: 2 INJECTION, POWDER, FOR SOLUTION INTRAMUSCULAR; INTRAVENOUS at 04:10

## 2022-10-28 RX ADMIN — METHOCARBAMOL 1000 MG: 100 INJECTION, SOLUTION INTRAMUSCULAR; INTRAVENOUS at 05:10

## 2022-10-28 RX ADMIN — PROPOFOL 180 MG: 10 INJECTION, EMULSION INTRAVENOUS at 12:10

## 2022-10-28 NOTE — TRANSFER OF CARE
"Anesthesia Transfer of Care Note    Patient: Peter Stiles    Procedure(s) Performed: Procedure(s) (LRB):  CORPECTOMY, SPINE, CERVICAL (N/A)    Patient location: ICU    Transport from OR: Transported from OR intubated on 100% O2 by AMBU with assisted ventilation. Continuous ECG monitoring in transport. Continuos invasive BP monitoring in transport. Continuous SpO2 monitoring in transport    Post pain: adequate analgesia    Post assessment: tolerated procedure well    Post vital signs: stable    Level of consciousness: responds to stimulation and sedated    Nausea/Vomiting: no vomiting    Complications: respiratory complications    Transfer of care protocol was followed      Last vitals:   Visit Vitals  BP (!) 140/66 (BP Location: Right arm, Patient Position: Lying)   Pulse 88   Temp 37.1 °C (98.7 °F) (Oral)   Resp 20   Ht 5' 6" (1.676 m)   Wt 63.5 kg (140 lb)   SpO2 96%   Breastfeeding No   BMI 22.60 kg/m²     "

## 2022-10-28 NOTE — ANESTHESIA POSTPROCEDURE EVALUATION
Anesthesia Post Evaluation    Patient: Peter Stiles    Procedure(s) Performed: Procedure(s) (LRB):  CORPECTOMY, SPINE, CERVICAL (N/A)    Final Anesthesia Type: general      Patient location during evaluation: ICU  Patient participation: No - Unable to Participate, Intubation  Level of consciousness: sedated  Post-procedure vital signs: reviewed and stable  Pain management: adequate  Airway patency: patent    PONV status at discharge: No PONV  Anesthetic complications: yes  Perioperative Events: reintubation      Helen-operative Events Comments:       stridorous on extubation requiring reintubation      Cardiovascular status: hemodynamically stable  Respiratory status: intubated and ventilator  Hydration status: euvolemic  Follow-up not needed.          Vitals Value Taken Time   's over 90's 10/28/22 1257   Temp  10/28/22 1257   Pulse 150's 10/28/22 1257   Resp vent 10/28/22 1257   SpO2 100 10/28/22 1257         No case tracking events are documented in the log.      Pain/Jak Score: Pain Rating Prior to Med Admin: 8 (10/28/2022  6:39 AM)  Pain Rating Post Med Admin: 8 (10/27/2022 10:00 PM)

## 2022-10-28 NOTE — NURSING
Patient arrived to [facility] from [facility] OR by bed     Report received from: CRNA,      Type of stroke/diagnosis:  post op corpectomy     Current symptoms: intubated, sedated, c collar in place     Skin assessment done: yes     Wounds noted: skin scabbing/scarring and wounds of various stages of healing on bilateral forearms     *If wounds noted, was Wound Care consulted? Yes    Jan Completed? Pt intubated     Patient Belongings on Admit: none     NCC notified: MD New

## 2022-10-28 NOTE — RESPIRATORY THERAPY
Patient came from OR intubated with a 7.5 ETT measuring 23 at the lip.  Vent settings are AC/VC  rate-14, VT-450, peep-5, FIO2-50%.  Patient maintaining current settings will continue to monitor.

## 2022-10-28 NOTE — PT/OT/SLP PROGRESS
Physical Therapy      Patient Name:  Peter Stiles   MRN:  [id number]    Patient not seen today secondary to pt off floor first and second attempt. Will follow-up per PT POC.

## 2022-10-28 NOTE — SUBJECTIVE & OBJECTIVE
Interval History: Patient on way to OR for C5-C6 corpectomy and C4-C7 anterior column reconstruction.    Review of Systems   Constitutional:  Negative for chills and fever.   HENT:  Negative for hearing loss, rhinorrhea and sore throat.    Eyes:  Negative for photophobia and visual disturbance.   Respiratory:  Negative for chest tightness, shortness of breath and wheezing.    Cardiovascular:  Negative for chest pain, palpitations and leg swelling.   Gastrointestinal:  Negative for abdominal pain, diarrhea, nausea and vomiting.   Genitourinary:  Negative for dysuria, hematuria and urgency.        Urinary retention.   Musculoskeletal:  Positive for back pain and neck pain.   Skin:  Negative for rash.   Neurological:  Positive for weakness and numbness. Negative for facial asymmetry and headaches.   Psychiatric/Behavioral:  Negative for agitation and confusion.    Objective:     Vital Signs (Most Recent):  Temp: 98.4 °F (36.9 °C) (10/28/22 1301)  Pulse: 98 (10/28/22 1522)  Resp: (!) 39 (10/28/22 1522)  BP: (!) 160/92 (10/28/22 1522)  SpO2: 96 % (10/28/22 1522)   Vital Signs (24h Range):  Temp:  [98.4 °F (36.9 °C)-99.1 °F (37.3 °C)] 98.4 °F (36.9 °C)  Pulse:  [] 98  Resp:  [16-39] 39  SpO2:  [94 %-99 %] 96 %  BP: (108-160)/(57-92) 160/92     Weight: 63.5 kg (140 lb)  Body mass index is 22.6 kg/m².    Intake/Output Summary (Last 24 hours) at 10/28/2022 1551  Last data filed at 10/28/2022 1301  Gross per 24 hour   Intake 2727.83 ml   Output 4350 ml   Net -1622.17 ml      Physical Exam  Vitals and nursing note reviewed.   Constitutional:       Appearance: She is ill-appearing.   HENT:      Head: Normocephalic and atraumatic.      Comments: Cervical collar in place.     Nose: Nose normal.      Mouth/Throat:      Mouth: Mucous membranes are moist.   Eyes:      General:         Right eye: No discharge.         Left eye: No discharge.      Extraocular Movements: Extraocular movements intact.      Conjunctiva/sclera:  Conjunctivae normal.      Pupils: Pupils are equal, round, and reactive to light.   Cardiovascular:      Rate and Rhythm: Normal rate and regular rhythm.      Heart sounds: No murmur heard.  Pulmonary:      Effort: Pulmonary effort is normal.   Abdominal:      General: There is no distension.      Tenderness: There is no abdominal tenderness. There is no guarding or rebound.   Musculoskeletal:         General: Tenderness present.      Right lower leg: No edema.      Left lower leg: No edema.   Skin:     General: Skin is warm and dry.   Neurological:      General: No focal deficit present.      Mental Status: She is alert and oriented to person, place, and time.      Motor: Weakness present.      Comments: Upper extremity strength 4- bilaterally.  Lower extremity strength 4+ bilaterally.  Patellar reflex 3+ bilaterally.  Exam significantly encumbered due to patient pain.   Psychiatric:         Mood and Affect: Mood normal.         Behavior: Behavior normal.       Significant Labs: All pertinent labs within the past 24 hours have been reviewed.  CBC:   Recent Labs   Lab 10/27/22  0303   WBC 12.91*   HGB 10.1*   HCT 31.3*        CMP:   Recent Labs   Lab 10/27/22  0303   *   K 4.1   CL 97   CO2 27   *   BUN 13   CREATININE 0.9   CALCIUM 9.2   PROT 7.4   ALBUMIN 2.3*   BILITOT 0.1   ALKPHOS 152*   AST 15   ALT 14   ANIONGAP 11       Significant Imaging: I have reviewed all pertinent imaging results/findings within the past 24 hours.

## 2022-10-28 NOTE — ASSESSMENT & PLAN NOTE
Echo from 2014  - Left ventricular cavity size near the upper limit of normal.   - Mildly depressed left ventricular systolic function (EF 45-50%).   - Normal right ventricular systolic function .   - Normal left ventricular diastolic function.     Echo from this admission   The left ventricle is normal in size with normal systolic function. The estimated ejection fraction is 55%.   Normal left ventricular diastolic function.   Normal right ventricular size with normal right ventricular systolic function.   Mild tricuspid regurgitation.   The estimated PA systolic pressure is 20 mmHg.   Normal central venous pressure (3 mmHg).    Patient scheduled for LISHA 10/31 to evaluate for infective endocarditis given blood cultures positive for MSSA

## 2022-10-28 NOTE — NURSING
Spoke with surgery to update on pt condition, IV status, NPO since midnight, hysterectomy, activity level- bedfast, allergy-morphine, courtney.

## 2022-10-28 NOTE — PLAN OF CARE
Received message from [facility] - they are unable to accept due to methadone.    Monique Dooley RN Case Manager  [facility]  10/28/2022

## 2022-10-28 NOTE — CONSULTS
SHERIE placed 18g, 10 cm Midline in right basilic vein using u/s guidance.  Max dwell date 11/26/2022.  Lot # [id number].    MIDLINE inserted for long term PVA

## 2022-10-28 NOTE — PROGRESS NOTES
Otorhinolaryngology-Head & Neck Surgery  Progress Note    Subjective:     Interval History: no changes overnight     Objective:     Vital Signs (24h Range):  Temp:  [97.8 °F (36.6 °C)-99.1 °F (37.3 °C)] 98.7 °F (37.1 °C)  Pulse:  [75-94] 88  Resp:  [16-20] 20  SpO2:  [94 %-99 %] 96 %  BP: (108-140)/(57-85) 140/66     Awake, Alert and Oriented. NAD, E4V5M6  Vision grossly intact, Hearing grossly intact  External ears normal, external nose normal  Face symmetric, non-edematous,  Hard cervical collar in place  Normal work of breathing, no stridor  Good phonation    Assessment/Plan:     Cervical sondylodiscitis  - OR today, will assist nsgy

## 2022-10-28 NOTE — PROGRESS NOTES
[facility]  [facility]  Progress Note    Patient Name: Peter Stiles  MRN: [id number]  Patient Class: IP- Inpatient   Admission Date: 10/25/2022  Length of Stay: 3 days  Attending Physician: Hair Wolff MD  Primary Care Provider: Og Victoria NP        Subjective:     Principal Problem:Osteomyelitis of cervical spine        HPI:  51 y.o F with hx of htn, opioid dependence on methadone, smoking (2ppd since 17 y.o), and dilated cardiomyopathy presents with neck pain and abdominal numbness. 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 At the time the daughter slammed the breaks and came to an abrupt stop. The patient braced herself by covering her face with both her forearms. She did not notice severe symptoms at the time or a popping sensation but a vague soreness in her neck. In the following 2 weeks her symptoms gradually deteriorated with increased neck pain notably. Her symptoms became worse early this morning when she went to the bathroom. She noticed an unusual numbness and bloating of her stomach and generalized numbness below that level. She was able to urinate. She also had increased pain in her legs, bilateral shoulders, and tingling in fingers and toes. Patient denies IV drug or alcohol use. She smoke 2ppd since 17 y.o.                 Overview/Hospital Course:  Ms. Stiles presented for acute neck pain, along with numbness and weakness of the bilateral upper and lower extremities. MRI demonstrated C5-6 osteomyelitis, discitis, and epidural abscess, as well as narrowing and signal abnormality of the spinal cord. She was admitted to the neuroICU and started on Rocephin and vancomycin. Stepped down to hospital medicine on 10/26/22. Patient completed C5-C6 corpectomy and C4-C7 anterior column reconstruction with NSGY with assistance from ENT. BCX's subsequently grew MSSA and patient transitioned from  vanc/ceftriaxone to oxacillin per ID recommendations.       Interval History: Patient on way to OR for C5-C6 corpectomy and C4-C7 anterior column reconstruction.    Review of Systems   Constitutional:  Negative for chills and fever.   HENT:  Negative for hearing loss, rhinorrhea and sore throat.    Eyes:  Negative for photophobia and visual disturbance.   Respiratory:  Negative for chest tightness, shortness of breath and wheezing.    Cardiovascular:  Negative for chest pain, palpitations and leg swelling.   Gastrointestinal:  Negative for abdominal pain, diarrhea, nausea and vomiting.   Genitourinary:  Negative for dysuria, hematuria and urgency.        Urinary retention.   Musculoskeletal:  Positive for back pain and neck pain.   Skin:  Negative for rash.   Neurological:  Positive for weakness and numbness. Negative for facial asymmetry and headaches.   Psychiatric/Behavioral:  Negative for agitation and confusion.    Objective:     Vital Signs (Most Recent):  Temp: 98.4 °F (36.9 °C) (10/28/22 1301)  Pulse: 98 (10/28/22 1522)  Resp: (!) 39 (10/28/22 1522)  BP: (!) 160/92 (10/28/22 1522)  SpO2: 96 % (10/28/22 1522)   Vital Signs (24h Range):  Temp:  [98.4 °F (36.9 °C)-99.1 °F (37.3 °C)] 98.4 °F (36.9 °C)  Pulse:  [] 98  Resp:  [16-39] 39  SpO2:  [94 %-99 %] 96 %  BP: (108-160)/(57-92) 160/92     Weight: 63.5 kg (140 lb)  Body mass index is 22.6 kg/m².    Intake/Output Summary (Last 24 hours) at 10/28/2022 1551  Last data filed at 10/28/2022 1301  Gross per 24 hour   Intake 2727.83 ml   Output 4350 ml   Net -1622.17 ml      Physical Exam  Vitals and nursing note reviewed.   Constitutional:       Appearance: She is ill-appearing.   HENT:      Head: Normocephalic and atraumatic.      Comments: Cervical collar in place.     Nose: Nose normal.      Mouth/Throat:      Mouth: Mucous membranes are moist.   Eyes:      General:         Right eye: No discharge.         Left eye: No discharge.      Extraocular Movements:  Extraocular movements intact.      Conjunctiva/sclera: Conjunctivae normal.      Pupils: Pupils are equal, round, and reactive to light.   Cardiovascular:      Rate and Rhythm: Normal rate and regular rhythm.      Heart sounds: No murmur heard.  Pulmonary:      Effort: Pulmonary effort is normal.   Abdominal:      General: There is no distension.      Tenderness: There is no abdominal tenderness. There is no guarding or rebound.   Musculoskeletal:         General: Tenderness present.      Right lower leg: No edema.      Left lower leg: No edema.   Skin:     General: Skin is warm and dry.   Neurological:      General: No focal deficit present.      Mental Status: She is alert and oriented to person, place, and time.      Motor: Weakness present.      Comments: Upper extremity strength 4- bilaterally.  Lower extremity strength 4+ bilaterally.  Patellar reflex 3+ bilaterally.  Exam significantly encumbered due to patient pain.   Psychiatric:         Mood and Affect: Mood normal.         Behavior: Behavior normal.       Significant Labs: All pertinent labs within the past 24 hours have been reviewed.  CBC:   Recent Labs   Lab 10/27/22  0303   WBC 12.91*   HGB 10.1*   HCT 31.3*        CMP:   Recent Labs   Lab 10/27/22  0303   *   K 4.1   CL 97   CO2 27   *   BUN 13   CREATININE 0.9   CALCIUM 9.2   PROT 7.4   ALBUMIN 2.3*   BILITOT 0.1   ALKPHOS 152*   AST 15   ALT 14   ANIONGAP 11       Significant Imaging: I have reviewed all pertinent imaging results/findings within the past 24 hours.      Assessment/Plan:      * Osteomyelitis of cervical spine  Patient presents with neck pain and leg pain ever since a driving lesson with her daughter. She reports new onset numbness in the abdomen and legs. She is still able to urinate and she control of hew bowels. Ct with C5-C6 discitis/OM, MRI with similar findings and sever canal stenosis.     Patient's presentation is most likely secondary to canal stenosis vs.  OM given neurological symptoms in the last couple weeks that have intensified in the last day. He neuro exam is not particularly consistent with imaging findings given a sensory level around T5-T6, however the discitis and OM is consistent with the pain. WBC down-trending to 12.91 today, afebrile. Blood cultures from 10/25 positive for S aureus (identification and susceptibility pending); repeat blood cultures on 10/27 pending.    - patient completed corpectomy and anterior column reconstruction 10/28 with planned second operation on 11/1  - transitioned to oxacillin from Rocephin and vancomycin given BCX MSSA per ID rec's  -  LISHA scheduled for 10/31 to rule out infective endocarditis  - continue lyrica 75 bid  - continue methocarbamol 500mg QID  - q4h neuro checks  - aspiration and fall precautions      Pre-operative examination  There is no evidence of acute coronary syndrome or other unstable cardiovascular process that necessitates expedited evaluation and treatment.    Surgical Risk Assessment   Active cardiac issues:  Active decompensated heart failure? No   Unstable angina?  No   Significant uncontrolled arrhythmias? No   Severe valvular heart disease-Aortic or Mitral Stenosis? No   Recent MI or coronary revascularization < 30 days? No     Cardiac Risk Factors  History of CAD/ischemic heart disease? No   History of cerebrovascular disease? No   History of compensated heart failure? Yes   Type 2 diabetes requiring insulin? Yes   Serum Creatinine > 2? No   Total cardiac risk factors 2     Functional mets >4  < 4* METs -unable to walk > 2 blocks on level ground without stopping due to symptoms  - eating, dressing, toileting, walking indoors, light housework. POOR   > 4* METs -climbing > 1 flight of stairs without stopping  -walking up hill > 1-2 blocks  -scrubbing floors  -moving furniture  - golf, bowling, dancing or tennis  -running short distance MODERATE to EXCELLENT   * performance of any one of the  activities     Assessment/Plan:   Cardiovascular Risk Assessment:  No active cardiac problems (such as unstable angina, decompensated heart failure, significant uncontrolled arrhythmias or severe valvular disease).  Functional Status: able to climb a flight of stairs (> 4 METS)    Revised Cardiac Risk Index   1. History of ischemic heart disease   2. History of congestive heart failure   3. History of cerebrovascular disease (stroke or transient ischemic attack)   4. History of diabetes requiring preoperative insulin use 5. Chronic kidney disease (creatinine > 2 mg/dL)   5. Undergoing suprainguinal vascular, intraperitoneal, or intrathoracic surgery Risk for cardiac death, nonfatal myocardial infarction, and nonfatal cardiac arrest     0 predictors = 3.9%, 1 predictor = 6.0%, 2 predictors = 10.1%, ?3 predictors = >15%    RCRI Calculator Class and Risk percentage:  10.1%             Moderate Risk    Recommendation:  1. Proceed to Surgery    Bacteremia  - Blood cultures with MSSA  - ID recommending transition to oxacillin from vanc/ceftriaxone      Spinal cord compression  -see osteomyelitis      Epidural abscess  -see osteomyelitis      Hyponatremia  -Na 127 on admission: suspected to be due to stress vs. Acute neurological sequelae (I.e SIADH)  -has trended up to 135 on 10/27  -will continue to monitor         Hyperglycemia  No noted history of diabetes. Hba1c 6.1. Gluc 269 on admission.  Continue MDSSI  140-180 goal  Accuchecks 4X daily  If continues to be hyperglycemic, may require scheduled insulin: will continue to monitor        Opioid use disorder, severe, on maintenance therapy, dependence  -methadone 45mg daily; will need to readjust dose after operation   -multimodal pain regimen includes Tylenol, Robaxin, and Lyrica      Anxiety and depression  -Atarax prn for agitation      Tobacco abuse  -continue nicotine patch  -smoking cessation counseling      Dilated cardiomyopathy  Echo from 2014  - Left  ventricular cavity size near the upper limit of normal.   - Mildly depressed left ventricular systolic function (EF 45-50%).   - Normal right ventricular systolic function .   - Normal left ventricular diastolic function.     Echo from this admission   The left ventricle is normal in size with normal systolic function. The estimated ejection fraction is 55%.   Normal left ventricular diastolic function.   Normal right ventricular size with normal right ventricular systolic function.   Mild tricuspid regurgitation.   The estimated PA systolic pressure is 20 mmHg.   Normal central venous pressure (3 mmHg).    Patient scheduled for LISHA 10/31 to evaluate for infective endocarditis given blood cultures positive for MSSA    VTE Risk Mitigation (From admission, onward)         Ordered     IP VTE LOW RISK PATIENT  Once         10/25/22 1109     Place sequential compression device  Until discontinued         10/25/22 1109                Discharge Planning   YESSI: 11/2/2022     Code Status: Full Code   Is the patient medically ready for discharge?: No    Reason for patient still in hospital (select all that apply): Treatment  Discharge Plan A: Rehab                  Fabian Connor MD  Department of Hospital Medicine   [facility] - Neuro Critical Care

## 2022-10-28 NOTE — PLAN OF CARE
[facility] Care Plan    POC reviewed with Peter Stiles at 1400. No acute events today. Pt progressing toward goals. Will continue to monitor. See below and flowsheets for full assessment and VS info.     Propofol gtt at 30   Fentanyl gtt at 12.5   Oxacillin 24 hour running, blood cultures timed for 0600   Failed attempts at NGT placement, see note for details   PO medications held -- Love, NP aware   Postop X rays completed  Neck circumference measured hourly, no changes (16.5 inches)      Is this a stroke patient? no    Neuro:  Sebring Coma Scale  Best Eye Response: 1-->(E1) none  Best Motor Response: 1-->(M1) none  Best Verbal Response: 1-->(V1) none  Sebring Coma Scale Score: 3  Assessment Qualifiers: patient chemically sedated or paralyzed, patient intubated  Pupil PERRLA: yes     24 hr Temp:  [98.4 °F (36.9 °C)-99.1 °F (37.3 °C)]     CV:   Rhythm: sinus tachycardia  BP goals:   SBP < 160  MAP > 65    Resp:   O2 Device (Oxygen Therapy): ventilator  Vent Mode: A/C  Set Rate: 14 BPM  Oxygen Concentration (%): 50  Vt Set: 450 mL  PEEP/CPAP: 5 cmH20    Plan: wean to extubate    GI/:     Diet/Nutrition Received: consistent carb/diabetic diet  Last Bowel Movement: 10/27/22  Voiding Characteristics: urethral catheter (bladder)    Intake/Output Summary (Last 24 hours) at 10/28/2022 1717  Last data filed at 10/28/2022 1701  Gross per 24 hour   Intake 2764.01 ml   Output 3450 ml   Net -685.99 ml     Unmeasured Output  Stool Occurrence: 0    Labs/Accuchecks:  Recent Labs   Lab 10/27/22  0303   WBC 12.91*   RBC 3.38*   HGB 10.1*   HCT 31.3*         Recent Labs   Lab 10/27/22  0303   *   K 4.1   CO2 27   CL 97   BUN 13   CREATININE 0.9   ALKPHOS 152*   ALT 14   AST 15   BILITOT 0.1      Recent Labs   Lab 10/26/22  0223   INR 1.0   APTT 29.6      Recent Labs   Lab 10/25/22  0343   CPK 15*   TROPONINI <0.006       Electrolytes: N/A - electrolytes WDL  Accuchecks: Q6H    Gtts:   fentanyl 12.5 mcg/hr (10/28/22  1701)    propofoL 50 mcg/kg/min (10/28/22 1701)       LDA/Wounds:  Lines/Drains/Airways       Drain  Duration                  Urethral Catheter 10/26/22 1020 Non-latex 2 days         Closed/Suction Drain 10/28/22 1129 Anterior Neck Accordion 10 Fr. <1 day              Airway  Duration                  Airway - Non-Surgical 10/28/22 1245 Endotracheal Tube <1 day              Arterial Line  Duration             Arterial Line 10/28/22 1300 Left Radial <1 day              Peripheral Intravenous Line  Duration                  Peripheral IV - Single Lumen 10/28/22 1300 18 G Anterior;Right Shoulder <1 day         Peripheral IV - Single Lumen 10/28/22 1300 22 G Anterior;Right Foot <1 day                  Wounds: Yes  Wound care consulted: Yes      Problem: Adult Inpatient Plan of Care  Goal: Plan of Care Review  Outcome: Ongoing, Progressing  Goal: Patient-Specific Goal (Individualized)  Outcome: Ongoing, Progressing  Goal: Absence of Hospital-Acquired Illness or Injury  Outcome: Ongoing, Progressing  Goal: Optimal Comfort and Wellbeing  Outcome: Ongoing, Progressing

## 2022-10-28 NOTE — PROGRESS NOTES
Therapy with Vancomycin complete and/or consult discontinued by provider.  Pharmacy will sign off, please re-consult as needed.     Thank you,  Abby Faith, PharmD, [facility]  Internal Medicine Clinical Pharmacy Specialist   Ext [phone]

## 2022-10-28 NOTE — PT/OT/SLP DISCHARGE
Occupational Therapy Discharge Summary    Peter Stiles  MRN: [id number]   Principal Problem: Osteomyelitis of cervical spine      Patient Discharged from acute Occupational Therapy on 10/28/22.  Please refer to prior OT note dated 10/26/22 for functional status.    Assessment:       Patient in surgery will require new orders    Objective:     GOALS:   Multidisciplinary Problems       Occupational Therapy Goals          Problem: Occupational Therapy    Goal Priority Disciplines Outcome Interventions   Occupational Therapy Goal     OT, PT/OT Ongoing, Progressing    Description: Goals to be met by: 11/26/22     Patient will increase functional independence with ADLs by performing:    Feeding with Contact Guard Assistance.  UE Dressing with Contact Guard Assistance.  Grooming while EOB with Contact Guard Assistance  Patient will complete sit to stand with RW min (A).                         Reasons for Discontinuation of Therapy Services  In surgery       Plan:     Patient Discharged to:  new orders required post op.    10/28/2022

## 2022-10-28 NOTE — BRIEF OP NOTE
[facility] - Neurosurgery ([facility])  Brief Operative Note  Neurosurgery    SUMMARY     Surgery Date: 10/28/2022     Surgeon(s) and Role:     * [place],  - Primary     * Judson Cuevas MD - Assisting     * Yash Ag MD - Resident - Assisting        Pre-op Diagnosis:  Osteomyelitis of cervical spine [M46.22]    Post-op Diagnosis: Post-Op Diagnosis Codes:     * Osteomyelitis of cervical spine [M46.22]    Procedure Performed:     Procedure(s) (LRB):  CORPECTOMY, SPINE, CERVICAL (N/A)    Technical Procedures Used:     C5, C6 anterior cervical corpectomy, C4-7 fusion  Expandable cage with anterior plate  Epidural phlegmon sent for culture  1 HV drain    Operative Findings: see full op note    Estimated Blood Loss: 100cc         Specimens:   Specimen (24h ago, onward)      None            At the end of the procedure the patient was extubated and transferred to regular bed in a collar. She was moving all extremities with full strength and became stridorous.  The anesthesiologists felt re-intubation was the safest option and this was obtained with neck in neutral position in cervical collar.  The neck incision was dry and flat, the drain was intact and working.  ICU team notified of patient condition.

## 2022-10-28 NOTE — ASSESSMENT & PLAN NOTE
Patient presents with neck pain and leg pain ever since a driving lesson with her daughter. She reports new onset numbness in the abdomen and legs. She is still able to urinate and she control of hew bowels. Ct with C5-C6 discitis/OM, MRI with similar findings and sever canal stenosis.     Patient's presentation is most likely secondary to canal stenosis vs. OM given neurological symptoms in the last couple weeks that have intensified in the last day. He neuro exam is not particularly consistent with imaging findings given a sensory level around T5-T6, however the discitis and OM is consistent with the pain. WBC down-trending to 12.91 today, afebrile. Blood cultures from 10/25 positive for S aureus (identification and susceptibility pending); repeat blood cultures on 10/27 pending.    - patient completed corpectomy and anterior column reconstruction 10/28 with planned second operation on 11/1  - transitioned to oxacillin from Rocephin and vancomycin given BCX MSSA per ID rec's  -  LISHA scheduled for 10/31 to rule out infective endocarditis  - continue lyrica 75 bid  - continue methocarbamol 500mg QID  - q4h neuro checks  - aspiration and fall precautions
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
## 2022-10-28 NOTE — ASSESSMENT & PLAN NOTE
C5-6, unknown source at this time, possibly from wounds on arms  -NSGY following,  -broad spectrum ABX   -blood cultures NGTD  -UA neg

## 2022-10-28 NOTE — ASSESSMENT & PLAN NOTE
-methadone 45mg daily; will need to readjust dose after operation   -multimodal pain regimen includes Tylenol, Robaxin, and Lyrica

## 2022-10-28 NOTE — NURSING
Verbal ordered received from JEREMIAH Grant, to place NGT. NGT placement attempted by RN x2, attempts unsuccessful. JEREMIAH Grant, notified of inability to place NGT and administer PO medications at this time.

## 2022-10-28 NOTE — PROGRESS NOTES
[facility] - Neuro Critical Care  Wound Care    Patient Name:  Peter Stiles   MRN:  [id number]  Date: 10/28/2022  Diagnosis: Osteomyelitis of cervical spine    History:     Past Medical History:   Diagnosis Date    CHF (congestive heart failure)     Essential (primary) hypertension     Opioid dependence on maintenance agonist therapy, no symptoms     Smoking        Social History     Socioeconomic History    Marital status: Single   Tobacco Use    Smoking status: Every Day     Packs/day: 2.00     Years: 34.00     Pack years: 68.00     Types: Cigarettes    Smokeless tobacco: Current   Substance and Sexual Activity    Alcohol use: No    Drug use: No       Precautions:     Allergies as of 10/25/2022 - Reviewed 10/25/2022   Allergen Reaction Noted    Morphine Shortness Of Breath and Other (See Comments) 10/09/2014       [facility] Assessment Details/Treatment     Wound care consult place by RN for bilateral forearms. Consult for same completed on 10/26. Confirmed with nurse that entered order that there are no new concerns. RN states entered order accidentally.    Wound Care will sign off.       Lizzy Fernando RN  Wound Care-Neuro Critical Care  10/28/2022

## 2022-10-28 NOTE — NURSING
Transport picked up pt- traveled in bed to surgery. VS stable, significant other informed of surgical floor waiting room.

## 2022-10-28 NOTE — SUBJECTIVE & OBJECTIVE
Past Medical History:   Diagnosis Date    CHF (congestive heart failure)     Essential (primary) hypertension     Opioid dependence on maintenance agonist therapy, no symptoms     Smoking      Past Surgical History:   Procedure Laterality Date    APPENDECTOMY       SECTION      x2    HYSTERECTOMY        No current facility-administered medications on file prior to encounter.     Current Outpatient Medications on File Prior to Encounter   Medication Sig Dispense Refill    aspirin (ECOTRIN) 81 MG EC tablet Take 81 mg by mouth once daily.      LIDOcaine-menthol (ICY HOT PATCH, LIDO-MENTHOL,) 4-1 % PtMd Apply 1 patch topically 2 (two) times daily as needed (Pain).      methadone (METHADOSE) 40 mg disintegrating tablet Take 2 &1/4 tablet (90 mg) by mouth in water once daily.      multivitamin (ONE DAILY MULTIVITAMIN) per tablet Take 1 tablet by mouth once daily.      lorazepam (ATIVAN) 1 MG tablet Take one-half to one tablet (Patient not taking: Reported on 10/25/2022) 30 tablet 1      Allergies: Morphine    Family History   Adopted: Yes   Problem Relation Age of Onset    Cancer Mother          69    Hearing loss Father         valve problem;  75     Social History     Tobacco Use    Smoking status: Every Day     Packs/day: 2.00     Years: 34.00     Pack years: 68.00     Types: Cigarettes    Smokeless tobacco: Current   Substance Use Topics    Alcohol use: No    Drug use: No     Review of Systems   Reason unable to perform ROS: intubated decrease LOC.   Objective:     Vitals:    Temp: 98.6 °F (37 °C)  Pulse: 82  Rhythm: sinus tachycardia  BP: (!) 115/58  MAP (mmHg): 80  Resp: 15  SpO2: 98 %  Oxygen Concentration (%): 50  O2 Device (Oxygen Therapy): ventilator  Vent Mode: A/C  Set Rate: 14 BPM  Vt Set: 450 mL  PEEP/CPAP: 5 cmH20  Peak Airway Pressure: 20 cmH2O  Mean Airway Pressure: 7.6 cmH20  Plateau Pressure: 0 cmH20    Temp  Min: 98.4 °F (36.9 °C)  Max: 99.1 °F (37.3 °C)  Pulse  Min: 75  Max: 124  BP   Min: 108/61  Max: 160/92  MAP (mmHg)  Min: 78  Max: 106  Resp  Min: 15  Max: 39  SpO2  Min: 94 %  Max: 99 %  Oxygen Concentration (%)  Min: 50  Max: 50    10/27 0701 - 10/28 0700  In: -   Out: 5100 [Urine:5100]   Unmeasured Output  Stool Occurrence: 0       Physical Exam  Vitals and nursing note reviewed.   Constitutional:       Appearance: She is ill-appearing.   HENT:      Head: Normocephalic.      Right Ear: Tympanic membrane and ear canal normal.      Nose: Nose normal.      Mouth/Throat:      Mouth: Mucous membranes are moist.      Pharynx: Oropharynx is clear.   Cardiovascular:      Rate and Rhythm: Normal rate and regular rhythm.      Pulses: Normal pulses.      Heart sounds: Normal heart sounds.   Pulmonary:      Effort: Pulmonary effort is normal.      Breath sounds: Normal breath sounds.   Abdominal:      General: Bowel sounds are normal.      Palpations: Abdomen is soft.   Musculoskeletal:         General: Normal range of motion.      Cervical back: Normal range of motion.   Skin:     General: Skin is warm and dry.      Capillary Refill: Capillary refill takes less than 2 seconds.   Neurological:      Comments: GCST3- on sedation  PERRL  VILLAGRAN spontaneously  Sensation intact       Unable to test orientation, language, memory, judgment, insight, fund of knowledge, hearing, shoulder shrug, tongue protrusion, coordination, gait due to level of consciousness.    Today I personally reviewed pertinent medications, lines/drains/airways, imaging, cardiology results, laboratory results, microbiology results, notably:

## 2022-10-28 NOTE — ASSESSMENT & PLAN NOTE
2/2 to osteomyelitis, discitis and epidural abscess  -NSGY following   -Q 1 neuro checks   -Q 1 vitals   -c-collar   -PT/OT  -Electrolytes replaced, Patiño placed, and Plan for OR on 10/28/22

## 2022-10-28 NOTE — PLAN OF CARE
Chart reviewed.   Pt in the OR this morning undergoing anterior corpectomy C5/6 and resection of epidural phlegmon.   BCx grew MSSA    --Will switch vanc/ Ceftriaxone to oxacillin.  --will f/u surgical cultures  --repeat BCx with AM labs  --LISHA ordered    ID will follow.  Merced Guido MD  Infectious Disease

## 2022-10-29 LAB
ABO + RH BLD: NORMAL
ALBUMIN SERPL BCP-MCNC: 2.3 G/DL (ref 3.5–5.2)
ALLENS TEST: ABNORMAL
ALP SERPL-CCNC: 144 U/L (ref 55–135)
ALT SERPL W/O P-5'-P-CCNC: 19 U/L (ref 10–44)
ANION GAP SERPL CALC-SCNC: 11 MMOL/L (ref 8–16)
AST SERPL-CCNC: 14 U/L (ref 10–40)
BASOPHILS # BLD AUTO: 0.02 K/UL (ref 0–0.2)
BASOPHILS NFR BLD: 0.1 % (ref 0–1.9)
BILIRUB SERPL-MCNC: 0.2 MG/DL (ref 0.1–1)
BLD GP AB SCN CELLS X3 SERPL QL: NORMAL
BUN SERPL-MCNC: 19 MG/DL (ref 6–20)
CALCIUM SERPL-MCNC: 9.3 MG/DL (ref 8.7–10.5)
CHLORIDE SERPL-SCNC: 102 MMOL/L (ref 95–110)
CO2 SERPL-SCNC: 25 MMOL/L (ref 23–29)
CREAT SERPL-MCNC: 0.8 MG/DL (ref 0.5–1.4)
DELSYS: ABNORMAL
DIFFERENTIAL METHOD: ABNORMAL
EOSINOPHIL # BLD AUTO: 0 K/UL (ref 0–0.5)
EOSINOPHIL NFR BLD: 0 % (ref 0–8)
ERYTHROCYTE [DISTWIDTH] IN BLOOD BY AUTOMATED COUNT: 11.9 % (ref 11.5–14.5)
ERYTHROCYTE [SEDIMENTATION RATE] IN BLOOD BY WESTERGREN METHOD: 14 MM/H
EST. GFR  (NO RACE VARIABLE): >60 ML/MIN/1.73 M^2
FIO2: 40
FIO2: 40
FIO2: 50
GLUCOSE SERPL-MCNC: 197 MG/DL (ref 70–110)
HCO3 UR-SCNC: 25.4 MMOL/L (ref 24–28)
HCO3 UR-SCNC: 26.9 MMOL/L (ref 24–28)
HCO3 UR-SCNC: 29 MMOL/L (ref 24–28)
HCT VFR BLD AUTO: 29.3 % (ref 37–48.5)
HGB BLD-MCNC: 9.7 G/DL (ref 12–16)
IMM GRANULOCYTES # BLD AUTO: 0.2 K/UL (ref 0–0.04)
IMM GRANULOCYTES NFR BLD AUTO: 1.2 % (ref 0–0.5)
LYMPHOCYTES # BLD AUTO: 2.2 K/UL (ref 1–4.8)
LYMPHOCYTES NFR BLD: 13.3 % (ref 18–48)
MAGNESIUM SERPL-MCNC: 2.2 MG/DL (ref 1.6–2.6)
MCH RBC QN AUTO: 30.4 PG (ref 27–31)
MCHC RBC AUTO-ENTMCNC: 33.1 G/DL (ref 32–36)
MCV RBC AUTO: 92 FL (ref 82–98)
MIN VOL: 6.5
MIN VOL: 8.3
MODE: ABNORMAL
MONOCYTES # BLD AUTO: 0.6 K/UL (ref 0.3–1)
MONOCYTES NFR BLD: 3.6 % (ref 4–15)
NEUTROPHILS # BLD AUTO: 13.3 K/UL (ref 1.8–7.7)
NEUTROPHILS NFR BLD: 81.8 % (ref 38–73)
NRBC BLD-RTO: 0 /100 WBC
PCO2 BLDA: 21.8 MMHG (ref 35–45)
PCO2 BLDA: 40.6 MMHG (ref 35–45)
PCO2 BLDA: 47.6 MMHG (ref 35–45)
PEEP: 5
PEEP: 5
PH SMN: 7.39 [PH] (ref 7.35–7.45)
PH SMN: 7.43 [PH] (ref 7.35–7.45)
PH SMN: 7.67 [PH] (ref 7.35–7.45)
PHOSPHATE SERPL-MCNC: 3.6 MG/DL (ref 2.7–4.5)
PIP: 19
PIP: 23
PLATELET # BLD AUTO: 406 K/UL (ref 150–450)
PMV BLD AUTO: 9.4 FL (ref 9.2–12.9)
PO2 BLDA: 45 MMHG (ref 40–60)
PO2 BLDA: 90 MMHG (ref 80–100)
PO2 BLDA: 96 MMHG (ref 80–100)
POC BE: 3 MMOL/L
POC BE: 4 MMOL/L
POC BE: 5 MMOL/L
POC SATURATED O2: 80 % (ref 95–100)
POC SATURATED O2: 98 % (ref 95–100)
POC SATURATED O2: 99 % (ref 95–100)
POC TCO2: 26 MMOL/L (ref 23–27)
POC TCO2: 28 MMOL/L (ref 23–27)
POC TCO2: 30 MMOL/L (ref 24–29)
POCT GLUCOSE: 171 MG/DL (ref 70–110)
POCT GLUCOSE: 200 MG/DL (ref 70–110)
POTASSIUM SERPL-SCNC: 4 MMOL/L (ref 3.5–5.1)
PROT SERPL-MCNC: 7.6 G/DL (ref 6–8.4)
RBC # BLD AUTO: 3.19 M/UL (ref 4–5.4)
SAMPLE: ABNORMAL
SITE: ABNORMAL
SODIUM SERPL-SCNC: 138 MMOL/L (ref 136–145)
SP02: 96
SP02: 97
VT: 450
WBC # BLD AUTO: 16.28 K/UL (ref 3.9–12.7)

## 2022-10-29 PROCEDURE — 25000003 PHARM REV CODE 250

## 2022-10-29 PROCEDURE — 94761 N-INVAS EAR/PLS OXIMETRY MLT: CPT

## 2022-10-29 PROCEDURE — 82803 BLOOD GASES ANY COMBINATION: CPT

## 2022-10-29 PROCEDURE — 25000003 PHARM REV CODE 250: Performed by: INTERNAL MEDICINE

## 2022-10-29 PROCEDURE — 99233 SBSQ HOSP IP/OBS HIGH 50: CPT | Mod: ,,, | Performed by: NURSE PRACTITIONER

## 2022-10-29 PROCEDURE — 99900035 HC TECH TIME PER 15 MIN (STAT)

## 2022-10-29 PROCEDURE — 99233 SBSQ HOSP IP/OBS HIGH 50: CPT | Mod: ,,, | Performed by: INTERNAL MEDICINE

## 2022-10-29 PROCEDURE — 25000003 PHARM REV CODE 250: Performed by: NURSE PRACTITIONER

## 2022-10-29 PROCEDURE — S4991 NICOTINE PATCH NONLEGEND: HCPCS | Performed by: PHYSICIAN ASSISTANT

## 2022-10-29 PROCEDURE — 99233 PR SUBSEQUENT HOSPITAL CARE,LEVL III: ICD-10-PCS | Mod: ,,, | Performed by: NURSE PRACTITIONER

## 2022-10-29 PROCEDURE — 84100 ASSAY OF PHOSPHORUS: CPT | Performed by: PHYSICIAN ASSISTANT

## 2022-10-29 PROCEDURE — 99233 PR SUBSEQUENT HOSPITAL CARE,LEVL III: ICD-10-PCS | Mod: ,,, | Performed by: INTERNAL MEDICINE

## 2022-10-29 PROCEDURE — 85025 COMPLETE CBC W/AUTO DIFF WBC: CPT | Performed by: PHYSICIAN ASSISTANT

## 2022-10-29 PROCEDURE — 99900026 HC AIRWAY MAINTENANCE (STAT)

## 2022-10-29 PROCEDURE — 80053 COMPREHEN METABOLIC PANEL: CPT | Performed by: PHYSICIAN ASSISTANT

## 2022-10-29 PROCEDURE — 63600175 PHARM REV CODE 636 W HCPCS: Performed by: INTERNAL MEDICINE

## 2022-10-29 PROCEDURE — 63600175 PHARM REV CODE 636 W HCPCS

## 2022-10-29 PROCEDURE — 83735 ASSAY OF MAGNESIUM: CPT | Performed by: PHYSICIAN ASSISTANT

## 2022-10-29 PROCEDURE — 94003 VENT MGMT INPAT SUBQ DAY: CPT

## 2022-10-29 PROCEDURE — 63600175 PHARM REV CODE 636 W HCPCS: Performed by: PHYSICIAN ASSISTANT

## 2022-10-29 PROCEDURE — 20000000 HC ICU ROOM

## 2022-10-29 PROCEDURE — 87040 BLOOD CULTURE FOR BACTERIA: CPT | Mod: 59 | Performed by: INTERNAL MEDICINE

## 2022-10-29 PROCEDURE — 27000221 HC OXYGEN, UP TO 24 HOURS

## 2022-10-29 PROCEDURE — 63600175 PHARM REV CODE 636 W HCPCS: Performed by: NURSE PRACTITIONER

## 2022-10-29 PROCEDURE — 25000003 PHARM REV CODE 250: Performed by: STUDENT IN AN ORGANIZED HEALTH CARE EDUCATION/TRAINING PROGRAM

## 2022-10-29 PROCEDURE — 86901 BLOOD TYPING SEROLOGIC RH(D): CPT | Performed by: INTERNAL MEDICINE

## 2022-10-29 PROCEDURE — 25000003 PHARM REV CODE 250: Performed by: PHYSICIAN ASSISTANT

## 2022-10-29 PROCEDURE — 37799 UNLISTED PX VASCULAR SURGERY: CPT

## 2022-10-29 RX ORDER — INSULIN ASPART 100 [IU]/ML
1-10 INJECTION, SOLUTION INTRAVENOUS; SUBCUTANEOUS EVERY 4 HOURS PRN
Status: DISCONTINUED | OUTPATIENT
Start: 2022-10-29 | End: 2022-11-01

## 2022-10-29 RX ORDER — GLUCAGON 1 MG
1 KIT INJECTION
Status: DISCONTINUED | OUTPATIENT
Start: 2022-10-29 | End: 2022-11-01

## 2022-10-29 RX ORDER — DEXTROSE MONOHYDRATE 100 MG/ML
INJECTION, SOLUTION INTRAVENOUS CONTINUOUS PRN
Status: DISCONTINUED | OUTPATIENT
Start: 2022-10-29 | End: 2022-10-31

## 2022-10-29 RX ORDER — SCOLOPAMINE TRANSDERMAL SYSTEM 1 MG/1
1 PATCH, EXTENDED RELEASE TRANSDERMAL
Status: DISCONTINUED | OUTPATIENT
Start: 2022-10-29 | End: 2022-10-29

## 2022-10-29 RX ORDER — FENTANYL CITRATE 50 UG/ML
50 INJECTION, SOLUTION INTRAMUSCULAR; INTRAVENOUS ONCE
Status: COMPLETED | OUTPATIENT
Start: 2022-10-29 | End: 2022-10-29

## 2022-10-29 RX ADMIN — PROPOFOL 50 MCG/KG/MIN: 10 INJECTION, EMULSION INTRAVENOUS at 03:10

## 2022-10-29 RX ADMIN — FAMOTIDINE 20 MG: 10 INJECTION INTRAVENOUS at 09:10

## 2022-10-29 RX ADMIN — DIAZEPAM 5 MG: 5 TABLET ORAL at 09:10

## 2022-10-29 RX ADMIN — ACETAMINOPHEN 650 MG: 325 TABLET ORAL at 09:10

## 2022-10-29 RX ADMIN — PROPOFOL 40 MCG/KG/MIN: 10 INJECTION, EMULSION INTRAVENOUS at 09:10

## 2022-10-29 RX ADMIN — METHOCARBAMOL 1000 MG: 100 INJECTION, SOLUTION INTRAMUSCULAR; INTRAVENOUS at 05:10

## 2022-10-29 RX ADMIN — FENTANYL CITRATE 50 MCG: 50 INJECTION, SOLUTION INTRAMUSCULAR; INTRAVENOUS at 12:10

## 2022-10-29 RX ADMIN — INSULIN ASPART 2 UNITS: 100 INJECTION, SOLUTION INTRAVENOUS; SUBCUTANEOUS at 05:10

## 2022-10-29 RX ADMIN — OXACILLIN 12 G: 2 INJECTION, POWDER, FOR SOLUTION INTRAMUSCULAR; INTRAVENOUS at 02:10

## 2022-10-29 RX ADMIN — PROPOFOL 50 MCG/KG/MIN: 10 INJECTION, EMULSION INTRAVENOUS at 08:10

## 2022-10-29 RX ADMIN — INSULIN ASPART 2 UNITS: 100 INJECTION, SOLUTION INTRAVENOUS; SUBCUTANEOUS at 08:10

## 2022-10-29 RX ADMIN — DEXAMETHASONE SODIUM PHOSPHATE 4 MG: 4 INJECTION INTRA-ARTICULAR; INTRALESIONAL; INTRAMUSCULAR; INTRAVENOUS; SOFT TISSUE at 11:10

## 2022-10-29 RX ADMIN — METHOCARBAMOL 1000 MG: 100 INJECTION, SOLUTION INTRAMUSCULAR; INTRAVENOUS at 01:10

## 2022-10-29 RX ADMIN — Medication 1 PATCH: at 08:10

## 2022-10-29 RX ADMIN — DEXAMETHASONE SODIUM PHOSPHATE 4 MG: 4 INJECTION INTRA-ARTICULAR; INTRALESIONAL; INTRAMUSCULAR; INTRAVENOUS; SOFT TISSUE at 05:10

## 2022-10-29 RX ADMIN — PREGABALIN 150 MG: 75 CAPSULE ORAL at 09:10

## 2022-10-29 RX ADMIN — SCOPALAMINE 1 PATCH: 1 PATCH, EXTENDED RELEASE TRANSDERMAL at 12:10

## 2022-10-29 RX ADMIN — METHADONE HYDROCHLORIDE 45 MG: 10 TABLET ORAL at 03:10

## 2022-10-29 NOTE — PLAN OF CARE
[facility] Care Plan    POC reviewed with Peter Stiles and family at 0300. Pt unable to verbalize understanding. Questions and concerns addressed. No acute events overnight. Pt progressing toward goals. Will continue to monitor. See below and flowsheets for full assessment and VS info.     -Propofol and fentanyl gtts titrated to control agitation and anxiety.  -Blood cultures collected.      Is this a stroke patient? no    Neuro:  Lucien Coma Scale  Best Eye Response: 2-->(E2) to pain  Best Motor Response: 6-->(M6) obeys commands  Best Verbal Response: 1-->(V1) none  Lucien Coma Scale Score: 9  Assessment Qualifiers: patient chemically sedated or paralyzed, patient intubated  Pupil PERRLA: yes     24hr Temp:  [96.7 °F (35.9 °C)-98.7 °F (37.1 °C)]     CV:   Rhythm: normal sinus rhythm  BP goals:   SBP < 160  MAP > 65    Resp:   O2 Device (Oxygen Therapy): ventilator  Vent Mode: A/C  Set Rate: 14 BPM  Oxygen Concentration (%): 50  Vt Set: 450 mL  PEEP/CPAP: 5 cmH20    Plan: wean to extubate    GI/:     Diet/Nutrition Received: NPO  Last Bowel Movement: 10/27/22  Voiding Characteristics: urethral catheter (bladder)    Intake/Output Summary (Last 24 hours) at 10/29/2022 0542  Last data filed at 10/29/2022 0505  Gross per 24 hour   Intake 3447.47 ml   Output 3140 ml   Net 307.47 ml     Unmeasured Output  Stool Occurrence: 0    Labs/Accuchecks:  Recent Labs   Lab 10/29/22  0135   WBC 16.28*   RBC 3.19*   HGB 9.7*   HCT 29.3*         Recent Labs   Lab 10/29/22  0135      K 4.0   CO2 25      BUN 19   CREATININE 0.8   ALKPHOS 144*   ALT 19   AST 14   BILITOT 0.2      Recent Labs   Lab 10/26/22  0223   INR 1.0   APTT 29.6      Recent Labs   Lab 10/25/22  0343   CPK 15*   TROPONINI <0.006       Electrolytes: N/A - electrolytes WDL  Accuchecks: ACHS    Gtts:   fentanyl 75 mcg/hr (10/29/22 0505)    propofoL 50 mcg/kg/min (10/29/22 0505)       LDA/Wounds:  Lines/Drains/Airways       Drain  Duration                   Urethral Catheter 10/26/22 1020 Non-latex 2 days         Closed/Suction Drain 10/28/22 1129 Anterior Neck Accordion 10 Fr. <1 day              Airway  Duration                  Airway - Non-Surgical 10/28/22 1245 Endotracheal Tube <1 day              Arterial Line  Duration             Arterial Line 10/28/22 1300 Left Radial <1 day              Peripheral Intravenous Line  Duration                  Midline Catheter Insertion/Assessment  - Single Lumen 10/28/22 1858 Right basilic vein (medial side of arm) 18g x 10cm <1 day         Peripheral IV - Single Lumen 10/28/22 1300 18 G Anterior;Right Shoulder <1 day         Peripheral IV - Single Lumen 10/28/22 1300 22 G Anterior;Left Foot <1 day         Peripheral IV - Single Lumen 10/28/22 1300 22 G Anterior;Right Foot <1 day                  Wounds: Yes  Wound care consulted: Yes
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
## 2022-10-29 NOTE — ASSESSMENT & PLAN NOTE
Peter Stiles is a 51 y.o. female with PMHx of HTN, dilated cardiomyopathy, h/o opioid dependence, cigarette smoker (2pks/day), and daily baby ASA use who presents with 1 day of decreased sensation from T5 level down and tingling in her bilateral fingers and bilateral toes. MRI imaging obtained in the ED showing possible C5-6 osteodiscitis/myelitis with cord compression. Patient tachycardic and hypertensive on arrival, afebrile, with leukocytosis.     Now s/p C5-6 corpectomy on 10/28/22    -All pertinent imaging/labs personally reviewed and interpreted.    -MRI C/T/L spine w/o contrast 10/25: focal kyphotic deformity at C5/6 with possible discitis/osteomyelitis, epidural collection extending into the central spinal canal resulting in severe stenosis and cord signal abnormality.  Prevertebral soft tissue edema and probable paravertebral abscess or phlegmon at this level. Thoracic and lumbar spine unremarkable.    -MRI C spine w/ contrast 10/25: confirmed enhancement at C5/6 consistent with osteomyelitis/discitis and epidural phlegmon   -BCx 10/25 growing S. Aureus, f/u sensitivities. Repeat BCx's 10/27 pending/NGTD.   -OR cultures 10./28 no organisms on gram stain, culture pending    -Post op xrays, hardware in good position  Admitted to [facility] on admission for MAP goals, maintained independently  Stepped down to floor under  service on 10/26    Plan:  -Neurochecks Q4h on floor  -Continue C collar for now  -Pain control as needed  -Will need 2nd stage of surgery with posterior decompression/instrumentation, timing TBD   -Abx: Oxacillin  -Patiño   -WTE per NCC  -Please call NSGY with any questions/concerns

## 2022-10-29 NOTE — RESPIRATORY THERAPY
VBG done per Dr order to confirm whether abg done earlier was accurate and it was not VBG is as follows PH:7.39 CO2:47.6 PO2 45 HCO3: 29 SO2: 80

## 2022-10-29 NOTE — ASSESSMENT & PLAN NOTE
See epidural abscess  S/p POD#1 corpectomy and fusion   plan to go back to IR for the second part of the procedure w/ NSGY TBD
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
## 2022-10-29 NOTE — ASSESSMENT & PLAN NOTE
2/2 to osteomyelitis, discitis and epidural abscess  -NSGY following   -Q 1 neuro checks   -Q 1 vitals   -c-collar   -PT/OT  - discontinue courtney cath

## 2022-10-29 NOTE — SUBJECTIVE & OBJECTIVE
Review of Systems  Unable to obtain a complete ROS due to level of consciousness.  Objective:     Vitals:  Temp: 97.7 °F (36.5 °C)  Pulse: 79  Rhythm: normal sinus rhythm  BP: (!) 106/55  MAP (mmHg): 77  Resp: 14  SpO2: 98 %  Oxygen Concentration (%): 40  O2 Device (Oxygen Therapy): ventilator  Vent Mode: A/C  Set Rate: 14 BPM  Vt Set: 450 mL  PEEP/CPAP: 5 cmH20  Peak Airway Pressure: 18 cmH2O  Mean Airway Pressure: 7.1 cmH20  Plateau Pressure: 0 cmH20    Temp  Min: 96.7 °F (35.9 °C)  Max: 98.6 °F (37 °C)  Pulse  Min: 65  Max: 98  BP  Min: 106/55  Max: 160/92  MAP (mmHg)  Min: 77  Max: 108  Resp  Min: 14  Max: 39  SpO2  Min: 90 %  Max: 100 %  Oxygen Concentration (%)  Min: 40  Max: 50    10/28 0701 - 10/29 0700  In: 3585.8 [I.V.:1530.3]  Out: 3140 [Urine:3125; Drains:15]   Unmeasured Output  Stool Occurrence: 0       Physical Exam  GA: Alert, comfortable, no acute distress.   HEENT: No scleral icterus or JVD.   Pulmonary: Clear to auscultation A/L.   Cardiac: RRR S1 & S2 w/o rubs/murmurs/gallops.   Abdominal: Bowel sounds present x 4. No appreciable hepatosplenomegaly.  Skin: No jaundice, rashes, or visible lesions.  Neuro:  --GCS: E3 Vt1 M6  --Mental Status:  opens eyes, follows simple commands  --CN II-XII grossly intact.   --Pupils 2mm, PERRL.   --Corneal reflex, gag, cough intact.  -- VILLAGRAN spont    Medications:  Continuousfentanyl, Last Rate: 87.5 mcg/hr (10/29/22 1305)  propofoL, Last Rate: 50 mcg/kg/min (10/29/22 1305)    Scheduledfamotidine (PF), 20 mg, BID  methadone, 45 mg, Daily  methocarbamol (ROBAXIN) IVPB, 1,000 mg, Q8H  nicotine, 1 patch, Daily  oxacillin 12 g in  mL CONTINUOUS INFUSION, 12 g, Q24H  pregabalin, 150 mg, BID  scopolamine, 1 patch, Q3 Days  senna-docusate 8.6-50 mg, 1 tablet, BID    PRNsodium chloride, , Q24H PRN  sodium chloride, , Q24H PRN  acetaminophen, 650 mg, Q6H PRN  dextrose 10%, 12.5 g, PRN  dextrose 10%, 25 g, PRN  diazePAM, 5 mg, Q8H PRN  glucagon (human recombinant),  1 mg, PRN  glucose, 16 g, PRN  glucose, 24 g, PRN  HYDROmorphone, 1 mg, Q4H PRN  hydrOXYzine HCL, 25 mg, TID PRN  insulin aspart U-100, 1-10 Units, QID (AC + HS) PRN  labetalol, 10 mg, Q4H PRN  ondansetron, 4 mg, Q8H PRN  oxyCODONE, 10 mg, Q4H PRN  sodium chloride 0.9%, 10 mL, PRN      Today I personally reviewed pertinent medications, lines/drains/airways, imaging, cardiology results, laboratory results, microbiology results,     Diet  Diet NPO

## 2022-10-29 NOTE — ASSESSMENT & PLAN NOTE
Methadone prescribed at [facility] clinic on [facility]

## 2022-10-29 NOTE — SUBJECTIVE & OBJECTIVE
Interval History: 10/29: OR yesterday for C5-6 corpectomy. Stridorous after extubation and immediately reintubated in OR. Neck circumference stabele at 16.5 in. Agitated overnight, requiring sedation. Moving all extremities AG while off sedation. Hb 9.7. Drain output 10 ml overnight. OR culture pending     Medications:  Continuous Infusions:   fentanyl 87.5 mcg/hr (10/29/22 0705)    propofoL 50 mcg/kg/min (10/29/22 0705)     Scheduled Meds:   dexAMETHasone  4 mg Intravenous Q6H    famotidine (PF)  20 mg Intravenous BID    methadone  45 mg Oral Daily    methocarbamol (ROBAXIN) IVPB  1,000 mg Intravenous Q8H    nicotine  1 patch Transdermal Daily    oxacillin 12 g in  mL CONTINUOUS INFUSION  12 g Intravenous Q24H    pregabalin  150 mg Oral BID    senna-docusate 8.6-50 mg  1 tablet Oral BID     PRN Meds:sodium chloride, sodium chloride, acetaminophen, dextrose 10%, dextrose 10%, diazePAM, glucagon (human recombinant), glucose, glucose, HYDROmorphone, hydrOXYzine HCL, insulin aspart U-100, labetalol, ondansetron, oxyCODONE, sodium chloride 0.9%     Review of Systems   Constitutional:  Negative for chills and fever.   HENT:  Negative for facial swelling and trouble swallowing.    Eyes:  Negative for photophobia and visual disturbance.   Respiratory:  Negative for cough and shortness of breath.    Gastrointestinal:  Negative for nausea and vomiting.   Genitourinary:  Positive for difficulty urinating. Negative for dysuria.   Musculoskeletal:  Positive for gait problem and neck pain.   Skin:  Positive for wound. Negative for rash.   Neurological:  Positive for weakness and numbness. Negative for headaches.   Psychiatric/Behavioral:  Negative for agitation and confusion.    Objective:     Weight: 63.5 kg (140 lb)  Body mass index is 22.6 kg/m².  Vital Signs (Most Recent):  Temp: 97 °F (36.1 °C) (10/29/22 0305)  Pulse: 84 (10/29/22 0716)  Resp: (!) 23 (10/29/22 0716)  BP: 117/63 (10/29/22 0605)  SpO2: (!) 90 %  "(10/29/22 0716) Vital Signs (24h Range):  Temp:  [96.7 °F (35.9 °C)-98.6 °F (37 °C)] 97 °F (36.1 °C)  Pulse:  [] 84  Resp:  [14-39] 23  SpO2:  [90 %-100 %] 90 %  BP: (110-160)/(57-92) 117/63  Arterial Line BP: ()/() 111/67     Date 10/29/22 0700 - 10/30/22 0659   Shift 7711-1110 1711-0979 6975-6480 24 Hour Total   INTAKE   I.V.(mL/kg) 26.6(0.4)   26.6(0.4)   IV Piggyback 27.5   27.5   Shift Total(mL/kg) 54.1(0.9)   54.1(0.9)   OUTPUT   Urine(mL/kg/hr) 125   125   Shift Total(mL/kg) 125(2)   125(2)   Weight (kg) 63.5 63.5 63.5 63.5                Vent Mode: A/C  Oxygen Concentration (%):  [50] 50  Resp Rate Total:  [14 br/min-50 br/min] 17 br/min  Vt Set:  [450 mL] 450 mL  PEEP/CPAP:  [5 cmH20] 5 cmH20  Mean Airway Pressure:  [7.6 cmH20-9.6 cmH20] 7.8 cmH20         Urethral Catheter 10/26/22 1020 Non-latex (Active)   Site Assessment Clean;Intact 10/26/22 2200   Collection Container Urimeter 10/26/22 2200   Securement Method secured to top of thigh w/ adhesive device 10/26/22 2200   Reason for Continuing Urinary Catheterization Required immobilization 10/26/22 2200   CAUTI Prevention Bundle Drainage bag/urimeter below bladder;Drainage bag/urimeter not overfilled (<2/3 full);No dependent loops or kinks;Sheeting clip in use;Drainage bag/urimeter off the floor;Intact seal between catheter & drainage tubing;Securement Device in place with 1" slack 10/26/22 2000   Output (mL) 250 mL 10/26/22 1701       Physical Exam    Neurosurgery Physical Exam    General: Well developed, well nourished, not in acute distress.   Head: Normocephalic, atraumatic.  Neck: Cervical collar in place  Neurologic: AOx4. Thought content appropriate.  GCS: Motor:6  Verbal:T  Eyes:3   GCS  Cranial nerves: Face symmetric, tongue midline, CN II-XII grossly intact.   Eyes: PEERL, EOMI.   Pulmonary: intubated.  Abdomen: Soft, non-distended, non-tender to palpation.  Sensory: Decreased sensation from T4/T5 level down.  Motor Strength: " Moves all extremities spontaneously with good tone. No abnormal movements seen.   Luna: positive on L hand   DTR 3+ on knees   Clonus: Absent.  Skin: Healing scabbed wounds on LUE. RUE wrapped in Kerlix dressing, C/D/I. No drainage or odor.    Follows commands x4 antigravity  Exam effort limited, sedation limited        Significant Labs:  Recent Labs   Lab 10/29/22  0135   *      K 4.0      CO2 25   BUN 19   CREATININE 0.8   CALCIUM 9.3   MG 2.2       Recent Labs   Lab 10/29/22  0135   WBC 16.28*   HGB 9.7*   HCT 29.3*          No results for input(s): LABPT, INR, APTT in the last 48 hours.    Microbiology Results (last 7 days)       Procedure Component Value Units Date/Time    Aerobic culture [[id number]] Collected: 10/28/22 0950    Order Status: Completed Specimen: Wound from Neck Updated: 10/29/22 0731     Aerobic Bacterial Culture No growth    Narrative:      3) Osteodiscitis    Aerobic culture [[id number]] Collected: 10/28/22 1029    Order Status: Completed Specimen: Wound from Neck Updated: 10/29/22 0731     Aerobic Bacterial Culture No growth    Narrative:      4) Epidural phlegman    Blood culture [[id number]] Collected: 10/29/22 0621    Order Status: Sent Specimen: Blood from Peripheral, Foot, Right Updated: 10/29/22 0651    Blood culture [[id number]] Collected: 10/29/22 0623    Order Status: Sent Specimen: Blood from Peripheral, Foot, Left Updated: 10/29/22 0651    Gram stain [[id number]] Collected: 10/28/22 0950    Order Status: Completed Specimen: Wound from Neck Updated: 10/28/22 1538     Gram Stain Result No WBC's      No organisms seen    Narrative:      3) Osteodiscitis    Gram stain [[id number]] Collected: 10/28/22 1029    Order Status: Completed Specimen: Wound from Neck Updated: 10/28/22 1423     Gram Stain Result No WBC's      No organisms seen    Narrative:      4) Epidural phlegman    AFB Culture & Smear [[id number]] Collected: 10/28/22 1029    Order Status: Sent Specimen:  Wound from Neck Updated: 10/28/22 1132    Fungus culture [[id number]] Collected: 10/28/22 1029    Order Status: Sent Specimen: Wound from Neck Updated: 10/28/22 1131    Culture, Anaerobe [[id number]] Collected: 10/28/22 1029    Order Status: Sent Specimen: Wound from Neck Updated: 10/28/22 1131    Gram stain [[id number]] Collected: 10/28/22 0924    Order Status: Completed Specimen: Abscess from Neck Updated: 10/28/22 1046     Gram Stain Result No WBC's      No organisms seen    Narrative:      2) Prevertebral absess    Gram stain [[id number]] Collected: 10/28/22 0906    Order Status: Completed Specimen: Abscess from Back Updated: 10/28/22 1044     Gram Stain Result No WBC's      No organisms seen    Narrative:      Prevertebral Abscess    Blood culture #1 **CANNOT BE ORDERED STAT** [[id number]]  (Abnormal)  (Susceptibility) Collected: 10/25/22 0917    Order Status: Completed Specimen: Blood from Peripheral, Antecubital, Right Updated: 10/28/22 1040     Blood Culture, Routine Gram stain yumi bottle: Gram positive cocci in clusters resembling Staph      Results called to and read back by: Jaxon Allen RN  17:39  10/26/2022      STAPHYLOCOCCUS AUREUS    Blood culture [[id number]] Collected: 10/27/22 0938    Order Status: Completed Specimen: Blood Updated: 10/28/22 1012     Blood Culture, Routine No Growth to date      No Growth to date    Narrative:      Rt AC    Blood culture [[id number]] Collected: 10/27/22 0939    Order Status: Completed Specimen: Blood Updated: 10/28/22 1012     Blood Culture, Routine No Growth to date      No Growth to date    Narrative:      Rt wrist    Blood culture #2 **CANNOT BE ORDERED STAT** [[id number]] Collected: 10/25/22 0917    Order Status: Completed Specimen: Blood from Peripheral, Wrist, Left Updated: 10/28/22 1012     Blood Culture, Routine No Growth to date      No Growth to date      No Growth to date      No Growth to date    Fungus culture [[id number]] Collected: 10/28/22 0950    Order  Status: Sent Specimen: Wound from Neck Updated: 10/28/22 1007    AFB Culture & Smear [[id number]] Collected: 10/28/22 0950    Order Status: Sent Specimen: Wound from Neck Updated: 10/28/22 1007    Culture, Anaerobe [[id number]] Collected: 10/28/22 0950    Order Status: Sent Specimen: Wound from Neck Updated: 10/28/22 1006    AFB Culture & Smear [[id number]] Collected: 10/28/22 0924    Order Status: Sent Specimen: Abscess from Neck Updated: 10/28/22 0937    Fungus culture [[id number]] Collected: 10/28/22 0924    Order Status: Sent Specimen: Abscess from Neck Updated: 10/28/22 0937    Aerobic culture [[id number]] Collected: 10/28/22 0924    Order Status: Sent Specimen: Abscess from Neck Updated: 10/28/22 0936    Culture, Anaerobe [[id number]] Collected: 10/28/22 0924    Order Status: Sent Specimen: Abscess from Neck Updated: 10/28/22 0935    Culture, Anaerobe [[id number]] Collected: 10/28/22 0906    Order Status: Sent Specimen: Abscess from Back Updated: 10/28/22 0914    Fungus culture [[id number]] Collected: 10/28/22 0906    Order Status: Sent Specimen: Abscess from Back Updated: 10/28/22 0914    AFB Culture & Smear [[id number]] Collected: 10/28/22 0906    Order Status: Sent Specimen: Abscess from Back Updated: 10/28/22 0914    Aerobic culture [[id number]] Collected: 10/28/22 0906    Order Status: Sent Specimen: Abscess from Back Updated: 10/28/22 0913    Blood culture [[id number]]     Order Status: Canceled Specimen: Blood           All pertinent labs from the last 24 hours have been reviewed.    Significant Diagnostics:  I have reviewed and interpreted all pertinent imaging results/findings within the past 24 hours.

## 2022-10-29 NOTE — HOSPITAL COURSE
10/29: OR yesterday for C5-6 corpectomy. Stridorous after extubation and immediately reintubated in OR. Neck circumference stabele at 16.5 in. Agitated overnight, requiring sedation. Moving all extremities AG while off sedation. Hb 9.7. Drain output 10 ml overnight. OR culture pending   10/30: NAEON. AFVSS. Exam stable. Remains intubated.   10/31: NAEON. AFVSS. Exam stable. Remains intubated and sedated. Tentative OR tomorrow.  11/1: NAEON. Pending OR Wednesday.   11/3: POD 1 s/p C2-T2 PCDF. Remains intubated but following commands today and moving LE's. AF, VSS. Drains with 10/130cc output.   11/5: Extubated yesterday morning but required reintubation later in the day due to respiratory distress; inspiratory stridor reported. NAEON. AFVSS. Exam stable.   11/6: NAEON. Drains 15/20cc output.   11/9: NAEON. Drain minimal output. Tolerating trach. Moving extremities well.   11/10: NAEON. Stepped down to HM today. Pending further swallow study. NPO per speech.  11/13: NAEON. Tm 100.5, ow VSS. Exam stable. Pain controlled. Pending SLP re-eval.
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
## 2022-10-29 NOTE — ASSESSMENT & PLAN NOTE
52 yo F with PMHx of HTN, dilated cardiomyopathy and remote opioid dependence presented on 10/25 with neck and back pain and lower body numbness and was found to have staph aureus bacteremia due to C5-6 osteomyelitis, discitis and epidural collection. Suspects infection could have started at site of cat scratch on her back or burn wounds on b/l arms. Now s/p corpectomy/ fusion and epidural abscess evacuation on 10/28. Remains intubated.    BCx 10/25-MSSA  TTE no veg    Recommendations:  --continue oxacillin  --LISHA (unclear if already performed; no report available)  --f/u BCx to ensure clearance of bacteremia  --f/u surgical cultures

## 2022-10-30 LAB
ALBUMIN SERPL BCP-MCNC: 2.3 G/DL (ref 3.5–5.2)
ALLENS TEST: ABNORMAL
ALP SERPL-CCNC: 123 U/L (ref 55–135)
ALT SERPL W/O P-5'-P-CCNC: 14 U/L (ref 10–44)
ANION GAP SERPL CALC-SCNC: 12 MMOL/L (ref 8–16)
AST SERPL-CCNC: 9 U/L (ref 10–40)
BACTERIA BLD CULT: NORMAL
BASOPHILS # BLD AUTO: 0.01 K/UL (ref 0–0.2)
BASOPHILS NFR BLD: 0.1 % (ref 0–1.9)
BILIRUB SERPL-MCNC: 0.1 MG/DL (ref 0.1–1)
BUN SERPL-MCNC: 29 MG/DL (ref 6–20)
CALCIUM SERPL-MCNC: 8.5 MG/DL (ref 8.7–10.5)
CHLORIDE SERPL-SCNC: 106 MMOL/L (ref 95–110)
CO2 SERPL-SCNC: 22 MMOL/L (ref 23–29)
CREAT SERPL-MCNC: 0.8 MG/DL (ref 0.5–1.4)
DIFFERENTIAL METHOD: ABNORMAL
EOSINOPHIL # BLD AUTO: 0 K/UL (ref 0–0.5)
EOSINOPHIL NFR BLD: 0 % (ref 0–8)
ERYTHROCYTE [DISTWIDTH] IN BLOOD BY AUTOMATED COUNT: 12.3 % (ref 11.5–14.5)
EST. GFR  (NO RACE VARIABLE): >60 ML/MIN/1.73 M^2
GLUCOSE SERPL-MCNC: 125 MG/DL (ref 70–110)
HCO3 UR-SCNC: 30.4 MMOL/L (ref 24–28)
HCT VFR BLD AUTO: 26.1 % (ref 37–48.5)
HGB BLD-MCNC: 8.5 G/DL (ref 12–16)
IMM GRANULOCYTES # BLD AUTO: 0.21 K/UL (ref 0–0.04)
IMM GRANULOCYTES NFR BLD AUTO: 1.2 % (ref 0–0.5)
LYMPHOCYTES # BLD AUTO: 3.4 K/UL (ref 1–4.8)
LYMPHOCYTES NFR BLD: 19.1 % (ref 18–48)
MAGNESIUM SERPL-MCNC: 2.5 MG/DL (ref 1.6–2.6)
MCH RBC QN AUTO: 30.8 PG (ref 27–31)
MCHC RBC AUTO-ENTMCNC: 32.6 G/DL (ref 32–36)
MCV RBC AUTO: 95 FL (ref 82–98)
MONOCYTES # BLD AUTO: 1 K/UL (ref 0.3–1)
MONOCYTES NFR BLD: 5.8 % (ref 4–15)
NEUTROPHILS # BLD AUTO: 13.2 K/UL (ref 1.8–7.7)
NEUTROPHILS NFR BLD: 73.8 % (ref 38–73)
NRBC BLD-RTO: 0 /100 WBC
PCO2 BLDA: 42.2 MMHG (ref 35–45)
PH SMN: 7.47 [PH] (ref 7.35–7.45)
PHOSPHATE SERPL-MCNC: 4.1 MG/DL (ref 2.7–4.5)
PLATELET # BLD AUTO: 376 K/UL (ref 150–450)
PMV BLD AUTO: 9.2 FL (ref 9.2–12.9)
PO2 BLDA: 81 MMHG (ref 80–100)
POC BE: 7 MMOL/L
POC SATURATED O2: 96 % (ref 95–100)
POC TCO2: 32 MMOL/L (ref 23–27)
POCT GLUCOSE: 105 MG/DL (ref 70–110)
POCT GLUCOSE: 122 MG/DL (ref 70–110)
POCT GLUCOSE: 92 MG/DL (ref 70–110)
POTASSIUM SERPL-SCNC: 4.3 MMOL/L (ref 3.5–5.1)
PROT SERPL-MCNC: 6.4 G/DL (ref 6–8.4)
RBC # BLD AUTO: 2.76 M/UL (ref 4–5.4)
SAMPLE: ABNORMAL
SITE: ABNORMAL
SODIUM SERPL-SCNC: 140 MMOL/L (ref 136–145)
WBC # BLD AUTO: 17.82 K/UL (ref 3.9–12.7)

## 2022-10-30 PROCEDURE — 63600175 PHARM REV CODE 636 W HCPCS: Performed by: INTERNAL MEDICINE

## 2022-10-30 PROCEDURE — 25000003 PHARM REV CODE 250: Performed by: STUDENT IN AN ORGANIZED HEALTH CARE EDUCATION/TRAINING PROGRAM

## 2022-10-30 PROCEDURE — 85025 COMPLETE CBC W/AUTO DIFF WBC: CPT | Performed by: PHYSICIAN ASSISTANT

## 2022-10-30 PROCEDURE — 94761 N-INVAS EAR/PLS OXIMETRY MLT: CPT

## 2022-10-30 PROCEDURE — 27000221 HC OXYGEN, UP TO 24 HOURS

## 2022-10-30 PROCEDURE — 51798 US URINE CAPACITY MEASURE: CPT

## 2022-10-30 PROCEDURE — 83735 ASSAY OF MAGNESIUM: CPT | Performed by: PHYSICIAN ASSISTANT

## 2022-10-30 PROCEDURE — 99233 SBSQ HOSP IP/OBS HIGH 50: CPT | Mod: ,,, | Performed by: NURSE PRACTITIONER

## 2022-10-30 PROCEDURE — 84100 ASSAY OF PHOSPHORUS: CPT | Performed by: PHYSICIAN ASSISTANT

## 2022-10-30 PROCEDURE — 51701 INSERT BLADDER CATHETER: CPT

## 2022-10-30 PROCEDURE — S4991 NICOTINE PATCH NONLEGEND: HCPCS | Performed by: PHYSICIAN ASSISTANT

## 2022-10-30 PROCEDURE — 82803 BLOOD GASES ANY COMBINATION: CPT

## 2022-10-30 PROCEDURE — 25000003 PHARM REV CODE 250: Performed by: INTERNAL MEDICINE

## 2022-10-30 PROCEDURE — 20000000 HC ICU ROOM

## 2022-10-30 PROCEDURE — 99900035 HC TECH TIME PER 15 MIN (STAT)

## 2022-10-30 PROCEDURE — 99233 PR SUBSEQUENT HOSPITAL CARE,LEVL III: ICD-10-PCS | Mod: ,,, | Performed by: NURSE PRACTITIONER

## 2022-10-30 PROCEDURE — 99900026 HC AIRWAY MAINTENANCE (STAT)

## 2022-10-30 PROCEDURE — 25000003 PHARM REV CODE 250: Performed by: PHYSICIAN ASSISTANT

## 2022-10-30 PROCEDURE — 99233 SBSQ HOSP IP/OBS HIGH 50: CPT | Mod: ,,, | Performed by: INTERNAL MEDICINE

## 2022-10-30 PROCEDURE — 36600 WITHDRAWAL OF ARTERIAL BLOOD: CPT

## 2022-10-30 PROCEDURE — 63600175 PHARM REV CODE 636 W HCPCS: Performed by: NURSE PRACTITIONER

## 2022-10-30 PROCEDURE — 99233 PR SUBSEQUENT HOSPITAL CARE,LEVL III: ICD-10-PCS | Mod: ,,, | Performed by: INTERNAL MEDICINE

## 2022-10-30 PROCEDURE — 25000003 PHARM REV CODE 250: Performed by: NURSE PRACTITIONER

## 2022-10-30 PROCEDURE — 80053 COMPREHEN METABOLIC PANEL: CPT | Performed by: PHYSICIAN ASSISTANT

## 2022-10-30 RX ORDER — MUPIROCIN 20 MG/G
OINTMENT TOPICAL 2 TIMES DAILY
Status: DISCONTINUED | OUTPATIENT
Start: 2022-10-30 | End: 2022-10-31

## 2022-10-30 RX ORDER — PREGABALIN 75 MG/1
150 CAPSULE ORAL 3 TIMES DAILY
Status: DISCONTINUED | OUTPATIENT
Start: 2022-10-30 | End: 2022-10-30

## 2022-10-30 RX ORDER — FAMOTIDINE 20 MG/1
20 TABLET, FILM COATED ORAL 2 TIMES DAILY
Status: DISCONTINUED | OUTPATIENT
Start: 2022-10-30 | End: 2022-11-04

## 2022-10-30 RX ORDER — POLYETHYLENE GLYCOL 3350 17 G/17G
17 POWDER, FOR SOLUTION ORAL DAILY
Status: CANCELLED | OUTPATIENT
Start: 2022-10-30

## 2022-10-30 RX ORDER — HEPARIN SODIUM 5000 [USP'U]/ML
5000 INJECTION, SOLUTION INTRAVENOUS; SUBCUTANEOUS EVERY 8 HOURS
Status: CANCELLED | OUTPATIENT
Start: 2022-10-30

## 2022-10-30 RX ORDER — GLYCOPYRROLATE 0.2 MG/ML
0.2 INJECTION INTRAMUSCULAR; INTRAVENOUS ONCE
Status: COMPLETED | OUTPATIENT
Start: 2022-10-30 | End: 2022-10-30

## 2022-10-30 RX ORDER — POLYETHYLENE GLYCOL 3350 17 G/17G
17 POWDER, FOR SOLUTION ORAL DAILY
Status: DISCONTINUED | OUTPATIENT
Start: 2022-10-30 | End: 2022-11-21 | Stop reason: HOSPADM

## 2022-10-30 RX ORDER — ATROPINE SULFATE 10 MG/ML
1 SOLUTION/ DROPS OPHTHALMIC 3 TIMES DAILY
Status: DISPENSED | OUTPATIENT
Start: 2022-10-30 | End: 2022-11-01

## 2022-10-30 RX ORDER — AMOXICILLIN 250 MG
2 CAPSULE ORAL 2 TIMES DAILY
Status: CANCELLED | OUTPATIENT
Start: 2022-10-30

## 2022-10-30 RX ADMIN — MUPIROCIN: 20 OINTMENT TOPICAL at 09:10

## 2022-10-30 RX ADMIN — PROPOFOL 40 MCG/KG/MIN: 10 INJECTION, EMULSION INTRAVENOUS at 01:10

## 2022-10-30 RX ADMIN — PROPOFOL 40 MCG/KG/MIN: 10 INJECTION, EMULSION INTRAVENOUS at 11:10

## 2022-10-30 RX ADMIN — SENNOSIDES AND DOCUSATE SODIUM 1 TABLET: 50; 8.6 TABLET ORAL at 08:10

## 2022-10-30 RX ADMIN — ATROPINE SULFATE 1 DROP: 10 SOLUTION OPHTHALMIC at 03:10

## 2022-10-30 RX ADMIN — OXYCODONE HYDROCHLORIDE 10 MG: 10 TABLET ORAL at 11:10

## 2022-10-30 RX ADMIN — OXACILLIN 12 G: 2 INJECTION, POWDER, FOR SOLUTION INTRAMUSCULAR; INTRAVENOUS at 03:10

## 2022-10-30 RX ADMIN — PROPOFOL 40 MCG/KG/MIN: 10 INJECTION, EMULSION INTRAVENOUS at 06:10

## 2022-10-30 RX ADMIN — HYDROXYZINE HYDROCHLORIDE 25 MG: 25 TABLET, FILM COATED ORAL at 09:10

## 2022-10-30 RX ADMIN — OXYCODONE HYDROCHLORIDE 10 MG: 10 TABLET ORAL at 09:10

## 2022-10-30 RX ADMIN — PREGABALIN 150 MG: 75 CAPSULE ORAL at 08:10

## 2022-10-30 RX ADMIN — Medication 87.5 MCG/HR: at 04:10

## 2022-10-30 RX ADMIN — FAMOTIDINE 20 MG: 20 TABLET ORAL at 08:10

## 2022-10-30 RX ADMIN — PROPOFOL 40 MCG/KG/MIN: 10 INJECTION, EMULSION INTRAVENOUS at 04:10

## 2022-10-30 RX ADMIN — Medication 1 PATCH: at 08:10

## 2022-10-30 RX ADMIN — ACETAMINOPHEN 650 MG: 325 TABLET ORAL at 09:10

## 2022-10-30 RX ADMIN — GLYCOPYRROLATE 0.2 MG: 0.2 INJECTION INTRAMUSCULAR; INTRAVENOUS at 08:10

## 2022-10-30 RX ADMIN — FAMOTIDINE 20 MG: 10 INJECTION INTRAVENOUS at 08:10

## 2022-10-30 RX ADMIN — POLYETHYLENE GLYCOL 3350 17 G: 17 POWDER, FOR SOLUTION ORAL at 11:10

## 2022-10-30 RX ADMIN — ATROPINE SULFATE 1 DROP: 10 SOLUTION OPHTHALMIC at 08:10

## 2022-10-30 RX ADMIN — DIAZEPAM 5 MG: 5 TABLET ORAL at 11:10

## 2022-10-30 RX ADMIN — METHADONE HYDROCHLORIDE 45 MG: 10 TABLET ORAL at 04:10

## 2022-10-30 NOTE — ASSESSMENT & PLAN NOTE
See epidural abscess  S/p POD#2 corpectomy and fusion   plan to go back to IR for the second part of the procedure w/ JORDAN  On tuesday

## 2022-10-30 NOTE — CONSULTS
"  [facility]  Adult Nutrition  Consult Note    SUMMARY     Recommendations    As tolerated, increase TF rate (of Diabetisource) to 50 mL/hr - 1440 kcals, 72 g of protein, 982 mL fluid.  RD to monitor & follow-up.    Goals: Meet % EEN, EPN by RD f/u date  Nutrition Goal Status: new  Communication of RD Recs: reviewed with RN    Assessment and Plan    Nutrition Problem:  Inadequate energy intake    Related to (etiology):   Inability to consume sufficient energy    Signs and Symptoms (as evidenced by):   NPO    Interventions(treatment strategy):  Collaboration of nutrition care w/ other providers  EN    Nutrition Diagnosis Status:   New    Reason for Assessment    Reason For Assessment: consult, new tube feeding  Diagnosis: other (see comments) (Osteomyelitis)  Relevant Medical History: HTN, Opioid dependence  Interdisciplinary Rounds: did not attend    General Information Comments: Pt intubated/sedated, able to communicate through writing. TFs infusing & pt tolerating thus far, states she is starving. Reports good appetite PTA & stable weight. Pt w/ no indicators of malnutrition. S/p corpectomy.  Nutrition Discharge Planning: Pending clinical course    Nutrition/Diet History    Spiritual, Cultural Beliefs, Christianity Practices, Values that Affect Care: no  Factors Affecting Nutritional Intake: NPO, on mechanical ventilation    Anthropometrics    Temp: 98.2 °F (36.8 °C)  Height Method: Stated  Height: 5' 6" (167.6 cm)  Height (inches): 66 in  Weight Method: Bed Scale  Weight: 63.5 kg (139 lb 15.9 oz)  Weight (lb): 139.99 lb  Ideal Body Weight (IBW), Female: 130 lb  % Ideal Body Weight, Female (lb): 107.68 %  BMI (Calculated): 22.6  BMI Grade: 18.5-24.9 - normal    Lab/Procedures/Meds    Pertinent Labs Reviewed: reviewed  Pertinent Labs Comments: A1C 6.1  Pertinent Medications Reviewed: reviewed  Pertinent Medications Comments: Fentanyl, Propofol    Estimated/Assessed Needs    Weight Used For " Calorie Calculations: 63.5 kg (139 lb 15.9 oz)    Energy Calorie Requirements (kcal): 1472 kcal/d  Energy Need Method: [facility]    Protein Requirements: 76-95 g/d (1.2-1.5 g/kg)  Weight Used For Protein Calculations: 63.5 kg (139 lb 15.9 oz)    Estimated Fluid Requirement Method: other (see comments) (Per MD or 1 mL/kcal)  RDA Method (mL): 1472    CHO Requirement: 184g    Nutrition Prescription Ordered    Current Diet Order: NPO  Current Nutrition Support Formula Ordered: Diabetisource AC  Current Nutrition Support Rate Ordered: 30 mL/hr    Evaluation of Received Nutrient/Fluid Intake    Enteral Calories (kcal): 864  Enteral Protein (gm): 43  Enteral (Free Water) Fluid (mL): 589    Other Calories (kcal): 401 (Propofol)    Total Calories (kcal): 1265    % Kcal Needs: 86%  % Protein Needs: 57%    I/O: -6.1L since admit    Energy Calories Required: meeting needs  Protein Required: not meeting needs  Fluid Required: other (see comments) (Per MD)    Comments: LBM: 10/27    Tolerance: tolerating    Nutrition Risk    Level of Risk/Frequency of Follow-up:  (1x/week)     Monitor and Evaluation    Food and Nutrient Intake: energy intake, food and beverage intake, enteral nutrition intake  Food and Nutrient Adminstration: diet order, enteral and parenteral nutrition administration  Physical Activity and Function: nutrition-related ADLs and IADLs  Anthropometric Measurements: weight, weight change  Biochemical Data, Medical Tests and Procedures: glucose/endocrine profile, lipid profile, inflammatory profile, gastrointestinal profile, electrolyte and renal panel  Nutrition-Focused Physical Findings: overall appearance     Nutrition Follow-Up    RD Follow-up?: Yes

## 2022-10-30 NOTE — ASSESSMENT & PLAN NOTE
2/2 to osteomyelitis, discitis and epidural abscess  -NSGY following   -Q 1 neuro checks   -Q 1 vitals   -c-collar   -PT/OT   plan to go to OR on Tuesday for the 2nd portion of surgery
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
## 2022-10-30 NOTE — SUBJECTIVE & OBJECTIVE
Review of Systems   Unable to obtain a complete ROS due to  pt intubated but awake  Objective:     Vitals:  Temp: 98.2 °F (36.8 °C)  Pulse: 84  Rhythm: normal sinus rhythm  BP: 124/73  MAP (mmHg): 93  Resp: 20  SpO2: 98 %  Oxygen Concentration (%): 40  O2 Device (Oxygen Therapy): ventilator  Vent Mode: (S) Spont  Set Rate: 14 BPM  Vt Set: 450 mL  Pressure Support: 10 cmH20  PEEP/CPAP: 5 cmH20  Peak Airway Pressure: 15 cmH2O  Mean Airway Pressure: 7.4 cmH20  Plateau Pressure: 0 cmH20    Temp  Min: 98 °F (36.7 °C)  Max: 98.2 °F (36.8 °C)  Pulse  Min: 60  Max: 91  BP  Min: 112/62  Max: 158/70  MAP (mmHg)  Min: 80  Max: 104  Resp  Min: 14  Max: 34  SpO2  Min: 95 %  Max: 98 %  Oxygen Concentration (%)  Min: 40  Max: 40    10/29 0701 - 10/30 0700  In: 993.4 [I.V.:581.7]  Out: 1523 [Urine:1511; Drains:12]   Unmeasured Output  Stool Occurrence: 0       Physical Exam  GA: Alert, comfortable, no acute distress.   HEENT: No scleral icterus or JVD.   Pulmonary: Clear to auscultation A/P/L. No wheezing, crackles, or rhonchi.  Cardiac: RRR S1 & S2 w/o rubs/murmurs/gallops.   Abdominal: Bowel sounds present x 4. No appreciable hepatosplenomegaly.  Skin: No jaundice, rashes, or visible lesions.  Neuro:  --GCS: E4 Vt1 M6  --Mental Status:  opens eyes, tracks  --CN II-XII grossly intact.   --Pupils 3mm, PERRL.   --Corneal reflex, gag, cough intact.  --VILLAGRAN spont    Medications:  Continuousdextrose 10 % in water (D10W)  fentanyl, Last Rate: 100 mcg/hr (10/30/22 0731)  propofoL, Last Rate: 40 mcg/kg/min (10/30/22 1300)    Scheduledatropine 1%, 1 drop, TID  famotidine (PF), 20 mg, BID  methadone, 45 mg, Daily  nicotine, 1 patch, Daily  oxacillin 12 g in  mL CONTINUOUS INFUSION, 12 g, Q24H  polyethylene glycol, 17 g, Daily  pregabalin, 150 mg, TID  senna-docusate 8.6-50 mg, 1 tablet, BID    PRNacetaminophen, 650 mg, Q6H PRN  dextrose 10 % in water (D10W), , Continuous PRN  dextrose 10%, 12.5 g, PRN  dextrose 10%, 25 g,  PRN  diazePAM, 5 mg, Q8H PRN  glucagon (human recombinant), 1 mg, PRN  HYDROmorphone, 1 mg, Q4H PRN  hydrOXYzine HCL, 25 mg, TID PRN  insulin aspart U-100, 1-10 Units, Q4H PRN  labetalol, 10 mg, Q4H PRN  ondansetron, 4 mg, Q8H PRN  oxyCODONE, 10 mg, Q4H PRN  sodium chloride 0.9%, 10 mL, PRN      Today I personally reviewed pertinent medications, lines/drains/airways, imaging, cardiology results, laboratory results, microbiology results,    Diet  Diet NPO  Diet NPO Except for: Medication
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
## 2022-10-30 NOTE — SUBJECTIVE & OBJECTIVE
Interval History:   Transferred to ICU intubated following surgery 10/28.   Remains intubated.     Review of Systems   Unable to perform ROS: Intubated   Objective:     Vital Signs (Most Recent):  Temp: 98.2 °F (36.8 °C) (10/30/22 0800)  Pulse: 87 (10/30/22 1521)  Resp: (!) 21 (10/30/22 1521)  BP: 124/73 (10/30/22 1100)  SpO2: 96 % (10/30/22 1521) Vital Signs (24h Range):  Temp:  [98 °F (36.7 °C)-98.2 °F (36.8 °C)] 98.2 °F (36.8 °C)  Pulse:  [60-91] 87  Resp:  [14-34] 21  SpO2:  [96 %-98 %] 96 %  BP: (112-158)/(57-75) 124/73     Weight: 63.5 kg (139 lb 15.9 oz)  Body mass index is 22.6 kg/m².    Estimated Creatinine Clearance: 77 mL/min (based on SCr of 0.8 mg/dL).    Physical Exam  Vitals reviewed.   Constitutional:       Appearance: Normal appearance.      Comments: Alert and following commands while intubated.   HENT:      Head: Normocephalic.      Mouth/Throat:      Comments: Poor dentition    Neck brace in place  Eyes:      Conjunctiva/sclera: Conjunctivae normal.      Pupils: Pupils are equal, round, and reactive to light.   Cardiovascular:      Rate and Rhythm: Normal rate and regular rhythm.   Pulmonary:      Effort: Pulmonary effort is normal. No respiratory distress.      Breath sounds: Normal breath sounds.   Skin:     Comments: B/L UE- scabs present at recent burn wounds.  Rt side upper back palpable knot at site of cat scratch- no purulence expressed.   Neurological:      Mental Status: She is alert. Mental status is at baseline.       Significant Labs: Blood Culture:   Recent Labs   Lab 10/25/22  0917 10/27/22  0938 10/27/22  0939 10/29/22  0621 10/29/22  0623   LABBLOO No growth after 5 days.  Gram stain yumi bottle: Gram positive cocci in clusters resembling Staph  Results called to and read back by: Jaxon Allen RN  17:39  10/26/2022  STAPHYLOCOCCUS AUREUS* No Growth to date  No Growth to date  No Growth to date  No Growth to date No Growth to date  No Growth to date  No Growth to date  No  Growth to date No Growth to date  No Growth to date No Growth to date  No Growth to date       All pertinent labs within the past 24 hours have been reviewed.    Significant Imaging: I have reviewed all pertinent imaging results/findings within the past 24 hours.

## 2022-10-30 NOTE — PLAN OF CARE
Recommendations     As tolerated, increase TF rate (of Diabetisource) to 50 mL/hr - 1440 kcals, 72 g of protein, 982 mL fluid.  RD to monitor & follow-up.     Goals: Meet % EEN, EPN by RD f/u date  Nutrition Goal Status: new  Communication of RD Recs: reviewed with RN

## 2022-10-30 NOTE — PLAN OF CARE
[facility] Care Plan    POC reviewed with Peter Stiles and friend at 1000. Pt unable to verbalize understanding. Questions and concerns addressed. No acute events today. Pt progressing toward goals. Will continue to monitor. See below and flowsheets for full assessment and VS info.   -susan tube inserted by MD New; KUB confirmed and OK to use   -courtney dc'd  -ETT advanced 1cm  -tube feedings started and at 10; goal 40; formula diabetisource  -arterial line dc'd per verbal order          Is this a stroke patient? no    Neuro:  [place] Coma Scale  Best Eye Response: 4-->(E4) spontaneous  Best Motor Response: 6-->(M6) obeys commands  Best Verbal Response: 1-->(V1) none (pt can communicate by nodding or writing)  [place] Coma Scale Score: 11  Assessment Qualifiers: patient intubated, patient chemically sedated or paralyzed  Pupil PERRLA: yes     24 hr Temp:  [96.7 °F (35.9 °C)-98.2 °F (36.8 °C)]     CV:   Rhythm: normal sinus rhythm  BP goals:   SBP < 160  MAP > 65    Resp:   O2 Device (Oxygen Therapy): ventilator  Vent Mode: A/C  Set Rate: 14 BPM  Oxygen Concentration (%): 40  Vt Set: 450 mL  PEEP/CPAP: 5 cmH20    Plan: wean to extubate    GI/:     Diet/Nutrition Received: NPO  Last Bowel Movement: 10/27/22  Voiding Characteristics: urethral catheter (bladder)    Intake/Output Summary (Last 24 hours) at 10/29/2022 1926  Last data filed at 10/29/2022 1805  Gross per 24 hour   Intake 1158.35 ml   Output 1691 ml   Net -532.65 ml     Unmeasured Output  Stool Occurrence: 0    Labs/Accuchecks:  Recent Labs   Lab 10/29/22  0135   WBC 16.28*   RBC 3.19*   HGB 9.7*   HCT 29.3*         Recent Labs   Lab 10/29/22  0135      K 4.0   CO2 25      BUN 19   CREATININE 0.8   ALKPHOS 144*   ALT 19   AST 14   BILITOT 0.2      Recent Labs   Lab 10/26/22  0223   INR 1.0   APTT 29.6      Recent Labs   Lab 10/25/22  0343   CPK 15*   TROPONINI <0.006       Electrolytes: No replacement orders  Accuchecks: Q4H    Gtts:    dextrose 10 % in water (D10W)      fentanyl 75 mcg/hr (10/29/22 1805)    propofoL 40 mcg/kg/min (10/29/22 1805)       LDA/Wounds:  Lines/Drains/Airways       Drain  Duration                   3 days         Closed/Suction Drain 10/28/22 1129 Anterior Neck Accordion 10 Fr. 1 day         NG/OG Tube 10/29/22 1000 Left nostril <1 day              Airway  Duration                  Airway - Non-Surgical 10/28/22 1245 Endotracheal Tube 1 day              Peripheral Intravenous Line  Duration                  Midline Catheter Insertion/Assessment  - Single Lumen 10/28/22 1858 Right basilic vein (medial side of arm) 18g x 10cm 1 day         Peripheral IV - Single Lumen 10/28/22 1300 18 G Anterior;Right Shoulder 1 day         Peripheral IV - Single Lumen 10/28/22 1300 22 G Anterior;Left Foot 1 day         Peripheral IV - Single Lumen 10/28/22 1300 22 G Anterior;Right Foot 1 day                  Wounds: Yes  Wound care consulted: No

## 2022-10-30 NOTE — ASSESSMENT & PLAN NOTE
Hx of, but echo today 55% EF and normal size chambers    The left ventricle is normal in size with normal systolic function. The estimated ejection fraction is 55%.   Normal left ventricular diastolic function.   Normal right ventricular size with normal right ventricular systolic function.   Mild tricuspid regurgitation.   The estimated PA systolic pressure is 20 mmHg.   Normal central venous pressure (3 mmHg).    NPO after MN for LISHA tomorrow

## 2022-10-30 NOTE — PLAN OF CARE
[facility] Care Plan    POC reviewed with Peter Stiles and family at 0300. Pt verbalized understanding. Questions and concerns addressed. No acute events overnight. Pt progressing toward goals. Will continue to monitor. See below and flowsheets for full assessment and VS info.           Is this a stroke patient? no    Neuro:  [place] Coma Scale  Best Eye Response: 4-->(E4) spontaneous  Best Motor Response: 6-->(M6) obeys commands  Best Verbal Response: 1-->(V1) none  [place] Coma Scale Score: 11  Assessment Qualifiers: patient intubated  Pupil PERRLA: yes     24hr Temp:  [97.7 °F (36.5 °C)-98.2 °F (36.8 °C)]     CV:   Rhythm: normal sinus rhythm  BP goals:   SBP < 160  MAP > 65    Resp:   O2 Device (Oxygen Therapy): ventilator  Vent Mode: A/C  Set Rate: 14 BPM  Oxygen Concentration (%): 40  Vt Set: 450 mL  PEEP/CPAP: 5 cmH20    Plan: wean to extubate    GI/:     Diet/Nutrition Received: NPO, tube feeding  Last Bowel Movement: 10/27/22  Voiding Characteristics: external catheter    Intake/Output Summary (Last 24 hours) at 10/30/2022 1053  Last data filed at 10/30/2022 0601  Gross per 24 hour   Intake 793.35 ml   Output 1123 ml   Net -329.65 ml     Unmeasured Output  Stool Occurrence: 0    Labs/Accuchecks:  Recent Labs   Lab 10/30/22  0100   WBC 17.82*   RBC 2.76*   HGB 8.5*   HCT 26.1*         Recent Labs   Lab 10/30/22  0100      K 4.3   CO2 22*      BUN 29*   CREATININE 0.8   ALKPHOS 123   ALT 14   AST 9*   BILITOT 0.1      Recent Labs   Lab 10/26/22  0223   INR 1.0   APTT 29.6      Recent Labs   Lab 10/25/22  0343   CPK 15*   TROPONINI <0.006       Electrolytes: N/A - electrolytes WDL  Accuchecks: Q4H    Gtts:   dextrose 10 % in water (D10W)      fentanyl 100 mcg/hr (10/30/22 0731)    propofoL 40 mcg/kg/min (10/30/22 0601)       LDA/Wounds:  Lines/Drains/Airways       Drain  Duration                  Urethral Catheter 10/26/22 1020 Non-latex 4 days         Closed/Suction Drain 10/28/22 1122  Anterior Neck Accordion 10 Fr. 1 day         NG/OG Tube 10/29/22 1000 Left nostril 1 day              Airway  Duration                  Airway - Non-Surgical 10/28/22 1245 Endotracheal Tube 1 day              Peripheral Intravenous Line  Duration                  Midline Catheter Insertion/Assessment  - Single Lumen 10/28/22 1858 Right basilic vein (medial side of arm) 18g x 10cm 1 day         Peripheral IV - Single Lumen 10/28/22 1300 18 G Anterior;Right Shoulder 1 day         Peripheral IV - Single Lumen 10/28/22 1300 22 G Anterior;Left Foot 1 day         Peripheral IV - Single Lumen 10/28/22 1300 22 G Anterior;Right Foot 1 day                  Wounds: Yes  Wound care consulted: Yes

## 2022-10-30 NOTE — SUBJECTIVE & OBJECTIVE
Interval History: 10/30: NAEON. AFVSS. Exam stable. Remains intubated.     Medications:  Continuous Infusions:   dextrose 10 % in water (D10W)      fentanyl 100 mcg/hr (10/30/22 0731)    propofoL 40 mcg/kg/min (10/30/22 1300)     Scheduled Meds:   atropine 1%  1 drop Sublingual TID    famotidine  20 mg Per NG tube BID    methadone  45 mg Oral Daily    mupirocin   Nasal BID    nicotine  1 patch Transdermal Daily    oxacillin 12 g in  mL CONTINUOUS INFUSION  12 g Intravenous Q24H    polyethylene glycol  17 g Per NG tube Daily    senna-docusate 8.6-50 mg  1 tablet Oral BID     PRN Meds:acetaminophen, dextrose 10 % in water (D10W), dextrose 10%, dextrose 10%, diazePAM, glucagon (human recombinant), HYDROmorphone, hydrOXYzine HCL, insulin aspart U-100, labetalol, ondansetron, oxyCODONE, sodium chloride 0.9%     Review of Systems   Constitutional:  Negative for chills and fever.   HENT:  Negative for facial swelling and trouble swallowing.    Eyes:  Negative for photophobia and visual disturbance.   Respiratory:  Negative for cough and shortness of breath.    Gastrointestinal:  Negative for nausea and vomiting.   Genitourinary:  Positive for difficulty urinating. Negative for dysuria.   Musculoskeletal:  Positive for gait problem and neck pain.   Skin:  Positive for wound. Negative for rash.   Neurological:  Positive for weakness and numbness. Negative for headaches.   Psychiatric/Behavioral:  Negative for agitation and confusion.    Objective:     Weight: 63.5 kg (139 lb 15.9 oz)  Body mass index is 22.6 kg/m².  Vital Signs (Most Recent):  Temp: 98.2 °F (36.8 °C) (10/30/22 0800)  Pulse: 87 (10/30/22 1521)  Resp: (!) 22 (10/30/22 1630)  BP: 124/73 (10/30/22 1100)  SpO2: 96 % (10/30/22 1521) Vital Signs (24h Range):  Temp:  [98 °F (36.7 °C)-98.2 °F (36.8 °C)] 98.2 °F (36.8 °C)  Pulse:  [60-91] 87  Resp:  [14-34] 22  SpO2:  [96 %-98 %] 96 %  BP: (112-158)/(57-75) 124/73     Date 10/30/22 0700 - 10/31/22 0659   Shift  "8901-1376 3770-2548 0827-3701 24 Hour Total   INTAKE   NG/GT 30   30   Shift Total(mL/kg) 30(0.5)   30(0.5)   OUTPUT   Shift Total(mL/kg)       Weight (kg) 63.5 63.5 63.5 63.5                Vent Mode: Spont  Oxygen Concentration (%):  [40] 40  Resp Rate Total:  [12 br/min-24 br/min] 15 br/min  Vt Set:  [450 mL] 450 mL  PEEP/CPAP:  [5 cmH20] 5 cmH20  Pressure Support:  [10 cmH20] 10 cmH20  Mean Airway Pressure:  [7.4 cmH20-9.3 cmH20] 8.1 cmH20         Urethral Catheter 10/26/22 1020 Non-latex (Active)   Site Assessment Clean;Intact 10/26/22 2200   Collection Container Urimeter 10/26/22 2200   Securement Method secured to top of thigh w/ adhesive device 10/26/22 2200   Reason for Continuing Urinary Catheterization Required immobilization 10/26/22 2200   CAUTI Prevention Bundle Drainage bag/urimeter below bladder;Drainage bag/urimeter not overfilled (<2/3 full);No dependent loops or kinks;Sheeting clip in use;Drainage bag/urimeter off the floor;Intact seal between catheter & drainage tubing;Securement Device in place with 1" slack 10/26/22 2000   Output (mL) 250 mL 10/26/22 1701       Physical Exam    Neurosurgery Physical Exam    General: Well developed, well nourished, not in acute distress.   Head: Normocephalic, atraumatic.  Neck: Cervical collar in place  Neurologic: AOx4. Thought content appropriate.  GCS: Motor:6  Verbal:T  Eyes:3   GCS  Cranial nerves: Face symmetric, tongue midline, CN II-XII grossly intact.   Eyes: PEERL, EOMI.   Pulmonary: intubated.  Abdomen: Soft, non-distended, non-tender to palpation.  Sensory: Decreased sensation from T4/T5 level down.  Motor Strength: Moves all extremities spontaneously with good tone. No abnormal movements seen.   Luna: positive on L hand   DTR 3+ on knees   Clonus: Absent.  Skin: Healing scabbed wounds on LUE. RUE wrapped in Kerlix dressing, C/D/I. No drainage or odor.    Follows commands x4 antigravity  Exam effort limited, sedation limited        Significant " Labs:  Recent Labs   Lab 10/29/22  0135 10/30/22  0100   * 125*    140   K 4.0 4.3    106   CO2 25 22*   BUN 19 29*   CREATININE 0.8 0.8   CALCIUM 9.3 8.5*   MG 2.2 2.5       Recent Labs   Lab 10/29/22  0135 10/30/22  0100   WBC 16.28* 17.82*   HGB 9.7* 8.5*   HCT 29.3* 26.1*    376       No results for input(s): LABPT, INR, APTT in the last 48 hours.    Microbiology Results (last 7 days)       Procedure Component Value Units Date/Time    Aerobic culture [[id number]]  (Abnormal)  (Susceptibility) Collected: 10/28/22 0924    Order Status: Completed Specimen: Abscess from Neck Updated: 10/30/22 1142     Aerobic Bacterial Culture STAPHYLOCOCCUS AUREUS  Moderate      Narrative:      2) Prevertebral absess    Aerobic culture [[id number]]  (Abnormal) Collected: 10/28/22 0906    Order Status: Completed Specimen: Abscess from Back Updated: 10/30/22 1141     Aerobic Bacterial Culture STAPHYLOCOCCUS AUREUS  Rare  Susceptibility pending      Narrative:      Prevertebral Abscess    Blood culture [[id number]] Collected: 10/27/22 0939    Order Status: Completed Specimen: Blood Updated: 10/30/22 1012     Blood Culture, Routine No Growth to date      No Growth to date      No Growth to date      No Growth to date    Narrative:      Rt wrist    Blood culture [[id number]] Collected: 10/27/22 0938    Order Status: Completed Specimen: Blood Updated: 10/30/22 1012     Blood Culture, Routine No Growth to date      No Growth to date      No Growth to date      No Growth to date    Narrative:      Rt AC    Blood culture #2 **CANNOT BE ORDERED STAT** [[id number]] Collected: 10/25/22 0917    Order Status: Completed Specimen: Blood from Peripheral, Wrist, Left Updated: 10/30/22 1012     Blood Culture, Routine No growth after 5 days.    Blood culture [[id number]] Collected: 10/29/22 0623    Order Status: Completed Specimen: Blood from Peripheral, Foot, Left Updated: 10/30/22 0812     Blood Culture, Routine No Growth to date       No Growth to date    Blood culture [[id number]] Collected: 10/29/22 0621    Order Status: Completed Specimen: Blood from Peripheral, Foot, Right Updated: 10/30/22 0812     Blood Culture, Routine No Growth to date      No Growth to date    AFB Culture & Smear [[id number]] Collected: 10/28/22 0924    Order Status: Completed Specimen: Abscess from Neck Updated: 10/29/22 2127     AFB Culture & Smear Culture in progress    Narrative:      2) Prevertebral absess    AFB Culture & Smear [[id number]] Collected: 10/28/22 0906    Order Status: Completed Specimen: Abscess from Back Updated: 10/29/22 2127     AFB Culture & Smear Culture in progress    Narrative:      Prevertebral Abscess    AFB Culture & Smear [[id number]] Collected: 10/28/22 0950    Order Status: Completed Specimen: Wound from Neck Updated: 10/29/22 2127     AFB Culture & Smear Culture in progress    Narrative:      3) Osteodiscitis    AFB Culture & Smear [[id number]] Collected: 10/28/22 1029    Order Status: Completed Specimen: Wound from Neck Updated: 10/29/22 2127     AFB Culture & Smear Culture in progress    Narrative:      4) Epidural phlegman    Culture, Anaerobe [[id number]] Collected: 10/28/22 0906    Order Status: Completed Specimen: Abscess from Back Updated: 10/29/22 1256     Anaerobic Culture Culture in progress    Narrative:      Prevertebral Abscess    Culture, Anaerobe [[id number]] Collected: 10/28/22 1029    Order Status: Completed Specimen: Wound from Neck Updated: 10/29/22 1256     Anaerobic Culture Culture in progress    Narrative:      4) Epidural phlegman    Culture, Anaerobe [[id number]] Collected: 10/28/22 0924    Order Status: Completed Specimen: Abscess from Neck Updated: 10/29/22 1256     Anaerobic Culture Culture in progress    Narrative:      2) Prevertebral absess    Culture, Anaerobe [[id number]] Collected: 10/28/22 0950    Order Status: Completed Specimen: Wound from Neck Updated: 10/29/22 1256     Anaerobic Culture Culture in  progress    Narrative:      3) Osteodiscitis    Aerobic culture [[id number]] Collected: 10/28/22 0950    Order Status: Completed Specimen: Wound from Neck Updated: 10/29/22 0731     Aerobic Bacterial Culture No growth    Narrative:      3) Osteodiscitis    Aerobic culture [[id number]] Collected: 10/28/22 1029    Order Status: Completed Specimen: Wound from Neck Updated: 10/29/22 0731     Aerobic Bacterial Culture No growth    Narrative:      4) Epidural phlegman    Gram stain [[id number]] Collected: 10/28/22 0950    Order Status: Completed Specimen: Wound from Neck Updated: 10/28/22 1538     Gram Stain Result No WBC's      No organisms seen    Narrative:      3) Osteodiscitis    Gram stain [[id number]] Collected: 10/28/22 1029    Order Status: Completed Specimen: Wound from Neck Updated: 10/28/22 1423     Gram Stain Result No WBC's      No organisms seen    Narrative:      4) Epidural phlegman    Fungus culture [[id number]] Collected: 10/28/22 1029    Order Status: Sent Specimen: Wound from Neck Updated: 10/28/22 1131    Gram stain [[id number]] Collected: 10/28/22 0924    Order Status: Completed Specimen: Abscess from Neck Updated: 10/28/22 1046     Gram Stain Result No WBC's      No organisms seen    Narrative:      2) Prevertebral absess    Gram stain [[id number]] Collected: 10/28/22 0906    Order Status: Completed Specimen: Abscess from Back Updated: 10/28/22 1044     Gram Stain Result No WBC's      No organisms seen    Narrative:      Prevertebral Abscess    Blood culture #1 **CANNOT BE ORDERED STAT** [[id number]]  (Abnormal)  (Susceptibility) Collected: 10/25/22 0917    Order Status: Completed Specimen: Blood from Peripheral, Antecubital, Right Updated: 10/28/22 1040     Blood Culture, Routine Gram stain yumi bottle: Gram positive cocci in clusters resembling Staph      Results called to and read back by: Jaxon Allen RN  17:39  10/26/2022      STAPHYLOCOCCUS AUREUS    Fungus culture [[id number]] Collected: 10/28/22  0950    Order Status: Sent Specimen: Wound from Neck Updated: 10/28/22 1007    Fungus culture [[id number]] Collected: 10/28/22 0924    Order Status: Sent Specimen: Abscess from Neck Updated: 10/28/22 0937    Fungus culture [[id number]] Collected: 10/28/22 0906    Order Status: Sent Specimen: Abscess from Back Updated: 10/28/22 0914    Blood culture [[id number]]     Order Status: Canceled Specimen: Blood           All pertinent labs from the last 24 hours have been reviewed.    Significant Diagnostics:  I have reviewed and interpreted all pertinent imaging results/findings within the past 24 hours.  X-Ray Chest AP Portable    Result Date: 10/30/2022  Stable chest Electronically signed by: Fabian Alba Jr Date:    10/30/2022 Time:    09:01

## 2022-10-30 NOTE — ASSESSMENT & PLAN NOTE
Peter Stiles is a 51 y.o. female with PMHx of HTN, dilated cardiomyopathy, h/o opioid dependence, cigarette smoker (2pks/day), and daily baby ASA use who presents with 1 day of decreased sensation from T5 level down and tingling in her bilateral fingers and bilateral toes. MRI imaging obtained in the ED showing possible C5-6 osteodiscitis/myelitis with cord compression. Patient tachycardic and hypertensive on arrival, afebrile, with leukocytosis.     Now s/p C5-6 corpectomy on 10/28/22    -All pertinent imaging/labs personally reviewed and interpreted.    -MRI C/T/L spine w/o contrast 10/25: focal kyphotic deformity at C5/6 with possible discitis/osteomyelitis, epidural collection extending into the central spinal canal resulting in severe stenosis and cord signal abnormality.  Prevertebral soft tissue edema and probable paravertebral abscess or phlegmon at this level. Thoracic and lumbar spine unremarkable.    -MRI C spine w/ contrast 10/25: confirmed enhancement at C5/6 consistent with osteomyelitis/discitis and epidural phlegmon   -BCx 10/25 growing S. Aureus, f/u sensitivities. Repeat BCx's 10/27 pending/NGTD.   -OR cultures 10./28 no organisms on gram stain, culture pending    -Post op xrays, hardware in good position  Admitted to [facility] on admission for MAP goals, maintained independently  Stepped down to floor under  service on 10/26    Plan:  -Neurochecks Q4h on floor; q1h in ICU  -Continue C collar for now  -Pain control as needed  -Will need 2nd stage of surgery with posterior decompression/instrumentation, tentative 11/1  -Abx: Oxacillin  -Patiño   -Keep intubated for OR tomorrow  -Please call NSGY with any questions/concerns    Dispo: ICU

## 2022-10-31 ENCOUNTER — ANESTHESIA EVENT (OUTPATIENT)
Dept: SURGERY | Facility: HOSPITAL | Age: 52
DRG: 003 | End: 2022-10-31
Payer: MEDICARE

## 2022-10-31 PROBLEM — M48.02 CERVICAL STENOSIS OF SPINAL CANAL: Status: ACTIVE | Noted: 2022-10-31

## 2022-10-31 PROBLEM — Z97.8 ENDOTRACHEALLY INTUBATED: Status: ACTIVE | Noted: 2022-10-31

## 2022-10-31 LAB
ALBUMIN SERPL BCP-MCNC: 2.3 G/DL (ref 3.5–5.2)
ALLENS TEST: ABNORMAL
ALP SERPL-CCNC: 115 U/L (ref 55–135)
ALT SERPL W/O P-5'-P-CCNC: 15 U/L (ref 10–44)
ANION GAP SERPL CALC-SCNC: 8 MMOL/L (ref 8–16)
AST SERPL-CCNC: 17 U/L (ref 10–40)
BACTERIA SPEC AEROBE CULT: ABNORMAL
BACTERIA SPEC AEROBE CULT: NO GROWTH
BASOPHILS # BLD AUTO: 0.02 K/UL (ref 0–0.2)
BASOPHILS NFR BLD: 0.2 % (ref 0–1.9)
BILIRUB SERPL-MCNC: 0.1 MG/DL (ref 0.1–1)
BUN SERPL-MCNC: 32 MG/DL (ref 6–20)
CALCIUM SERPL-MCNC: 8.2 MG/DL (ref 8.7–10.5)
CHLORIDE SERPL-SCNC: 108 MMOL/L (ref 95–110)
CO2 SERPL-SCNC: 26 MMOL/L (ref 23–29)
CREAT SERPL-MCNC: 0.8 MG/DL (ref 0.5–1.4)
DELSYS: ABNORMAL
DIFFERENTIAL METHOD: ABNORMAL
EOSINOPHIL # BLD AUTO: 0.1 K/UL (ref 0–0.5)
EOSINOPHIL NFR BLD: 0.7 % (ref 0–8)
ERYTHROCYTE [DISTWIDTH] IN BLOOD BY AUTOMATED COUNT: 12.4 % (ref 11.5–14.5)
ERYTHROCYTE [SEDIMENTATION RATE] IN BLOOD BY WESTERGREN METHOD: 14 MM/H
EST. GFR  (NO RACE VARIABLE): >60 ML/MIN/1.73 M^2
FIO2: 40
GLUCOSE SERPL-MCNC: 110 MG/DL (ref 70–110)
HCO3 UR-SCNC: 29.9 MMOL/L (ref 24–28)
HCT VFR BLD AUTO: 26.3 % (ref 37–48.5)
HGB BLD-MCNC: 8.6 G/DL (ref 12–16)
IMM GRANULOCYTES # BLD AUTO: 0.16 K/UL (ref 0–0.04)
IMM GRANULOCYTES NFR BLD AUTO: 1.4 % (ref 0–0.5)
LYMPHOCYTES # BLD AUTO: 4.7 K/UL (ref 1–4.8)
LYMPHOCYTES NFR BLD: 39.8 % (ref 18–48)
MAGNESIUM SERPL-MCNC: 2.5 MG/DL (ref 1.6–2.6)
MCH RBC QN AUTO: 30.6 PG (ref 27–31)
MCHC RBC AUTO-ENTMCNC: 32.7 G/DL (ref 32–36)
MCV RBC AUTO: 94 FL (ref 82–98)
MODE: ABNORMAL
MONOCYTES # BLD AUTO: 0.8 K/UL (ref 0.3–1)
MONOCYTES NFR BLD: 6.7 % (ref 4–15)
NEUTROPHILS # BLD AUTO: 6 K/UL (ref 1.8–7.7)
NEUTROPHILS NFR BLD: 51.2 % (ref 38–73)
NRBC BLD-RTO: 0 /100 WBC
PCO2 BLDA: 42.8 MMHG (ref 35–45)
PEEP: 5
PH SMN: 7.45 [PH] (ref 7.35–7.45)
PHOSPHATE SERPL-MCNC: 3.5 MG/DL (ref 2.7–4.5)
PLATELET # BLD AUTO: 388 K/UL (ref 150–450)
PMV BLD AUTO: 9.1 FL (ref 9.2–12.9)
PO2 BLDA: 143 MMHG (ref 80–100)
POC BE: 6 MMOL/L
POC SATURATED O2: 99 % (ref 95–100)
POC TCO2: 31 MMOL/L (ref 23–27)
POCT GLUCOSE: 101 MG/DL (ref 70–110)
POCT GLUCOSE: 266 MG/DL (ref 70–110)
POCT GLUCOSE: 84 MG/DL (ref 70–110)
POCT GLUCOSE: 84 MG/DL (ref 70–110)
POCT GLUCOSE: 99 MG/DL (ref 70–110)
POTASSIUM SERPL-SCNC: 3.8 MMOL/L (ref 3.5–5.1)
PROT SERPL-MCNC: 6.2 G/DL (ref 6–8.4)
RBC # BLD AUTO: 2.81 M/UL (ref 4–5.4)
SAMPLE: ABNORMAL
SARS-COV-2 RDRP RESP QL NAA+PROBE: NORMAL
SITE: ABNORMAL
SODIUM SERPL-SCNC: 142 MMOL/L (ref 136–145)
VT: 450
WBC # BLD AUTO: 11.74 K/UL (ref 3.9–12.7)

## 2022-10-31 PROCEDURE — 84100 ASSAY OF PHOSPHORUS: CPT | Performed by: PHYSICIAN ASSISTANT

## 2022-10-31 PROCEDURE — 36600 WITHDRAWAL OF ARTERIAL BLOOD: CPT

## 2022-10-31 PROCEDURE — 99233 SBSQ HOSP IP/OBS HIGH 50: CPT | Mod: ,,, | Performed by: STUDENT IN AN ORGANIZED HEALTH CARE EDUCATION/TRAINING PROGRAM

## 2022-10-31 PROCEDURE — S4991 NICOTINE PATCH NONLEGEND: HCPCS | Performed by: PHYSICIAN ASSISTANT

## 2022-10-31 PROCEDURE — 25000003 PHARM REV CODE 250: Performed by: NURSE PRACTITIONER

## 2022-10-31 PROCEDURE — 99900035 HC TECH TIME PER 15 MIN (STAT)

## 2022-10-31 PROCEDURE — 25000003 PHARM REV CODE 250: Performed by: INTERNAL MEDICINE

## 2022-10-31 PROCEDURE — 99233 PR SUBSEQUENT HOSPITAL CARE,LEVL III: ICD-10-PCS | Mod: ,,, | Performed by: STUDENT IN AN ORGANIZED HEALTH CARE EDUCATION/TRAINING PROGRAM

## 2022-10-31 PROCEDURE — 99291 CRITICAL CARE FIRST HOUR: CPT | Mod: ,,, | Performed by: PSYCHIATRY & NEUROLOGY

## 2022-10-31 PROCEDURE — 20000000 HC ICU ROOM

## 2022-10-31 PROCEDURE — 63600175 PHARM REV CODE 636 W HCPCS: Performed by: INTERNAL MEDICINE

## 2022-10-31 PROCEDURE — 99900026 HC AIRWAY MAINTENANCE (STAT)

## 2022-10-31 PROCEDURE — 51798 US URINE CAPACITY MEASURE: CPT

## 2022-10-31 PROCEDURE — 94761 N-INVAS EAR/PLS OXIMETRY MLT: CPT

## 2022-10-31 PROCEDURE — 85025 COMPLETE CBC W/AUTO DIFF WBC: CPT | Performed by: PHYSICIAN ASSISTANT

## 2022-10-31 PROCEDURE — 25000003 PHARM REV CODE 250: Performed by: PSYCHIATRY & NEUROLOGY

## 2022-10-31 PROCEDURE — 83735 ASSAY OF MAGNESIUM: CPT | Performed by: PHYSICIAN ASSISTANT

## 2022-10-31 PROCEDURE — 82803 BLOOD GASES ANY COMBINATION: CPT

## 2022-10-31 PROCEDURE — 27000221 HC OXYGEN, UP TO 24 HOURS

## 2022-10-31 PROCEDURE — U0002 COVID-19 LAB TEST NON-CDC: HCPCS | Performed by: INTERNAL MEDICINE

## 2022-10-31 PROCEDURE — 80053 COMPREHEN METABOLIC PANEL: CPT | Performed by: PHYSICIAN ASSISTANT

## 2022-10-31 PROCEDURE — 99291 PR CRITICAL CARE, E/M 30-74 MINUTES: ICD-10-PCS | Mod: ,,, | Performed by: PSYCHIATRY & NEUROLOGY

## 2022-10-31 PROCEDURE — 63600175 PHARM REV CODE 636 W HCPCS: Performed by: NURSE PRACTITIONER

## 2022-10-31 PROCEDURE — 94003 VENT MGMT INPAT SUBQ DAY: CPT

## 2022-10-31 PROCEDURE — 51702 INSERT TEMP BLADDER CATH: CPT

## 2022-10-31 PROCEDURE — 51701 INSERT BLADDER CATHETER: CPT

## 2022-10-31 PROCEDURE — 25000003 PHARM REV CODE 250: Performed by: STUDENT IN AN ORGANIZED HEALTH CARE EDUCATION/TRAINING PROGRAM

## 2022-10-31 PROCEDURE — 25000003 PHARM REV CODE 250: Performed by: PHYSICIAN ASSISTANT

## 2022-10-31 RX ORDER — PREGABALIN 75 MG/1
150 CAPSULE ORAL 2 TIMES DAILY
Status: DISCONTINUED | OUTPATIENT
Start: 2022-10-31 | End: 2022-11-03

## 2022-10-31 RX ORDER — OXYCODONE HYDROCHLORIDE 10 MG/1
10 TABLET ORAL EVERY 4 HOURS PRN
Status: DISCONTINUED | OUTPATIENT
Start: 2022-10-31 | End: 2022-11-04

## 2022-10-31 RX ORDER — HYDROXYZINE HYDROCHLORIDE 25 MG/1
25 TABLET, FILM COATED ORAL 3 TIMES DAILY PRN
Status: DISCONTINUED | OUTPATIENT
Start: 2022-10-31 | End: 2022-11-21 | Stop reason: HOSPADM

## 2022-10-31 RX ORDER — AMOXICILLIN 250 MG
1 CAPSULE ORAL 2 TIMES DAILY
Status: DISCONTINUED | OUTPATIENT
Start: 2022-10-31 | End: 2022-11-21 | Stop reason: HOSPADM

## 2022-10-31 RX ORDER — METHADONE HYDROCHLORIDE 10 MG/1
90 TABLET ORAL DAILY
Status: DISCONTINUED | OUTPATIENT
Start: 2022-10-31 | End: 2022-11-20

## 2022-10-31 RX ORDER — DIAZEPAM 5 MG/1
5 TABLET ORAL EVERY 8 HOURS PRN
Status: DISCONTINUED | OUTPATIENT
Start: 2022-10-31 | End: 2022-11-03

## 2022-10-31 RX ORDER — ACETAMINOPHEN 325 MG/1
650 TABLET ORAL EVERY 6 HOURS PRN
Status: DISCONTINUED | OUTPATIENT
Start: 2022-10-31 | End: 2022-11-21 | Stop reason: HOSPADM

## 2022-10-31 RX ADMIN — HYDROXYZINE HYDROCHLORIDE 25 MG: 25 TABLET, FILM COATED ORAL at 06:10

## 2022-10-31 RX ADMIN — PROPOFOL 30 MCG/KG/MIN: 10 INJECTION, EMULSION INTRAVENOUS at 01:10

## 2022-10-31 RX ADMIN — DIAZEPAM 5 MG: 5 TABLET ORAL at 11:10

## 2022-10-31 RX ADMIN — DIAZEPAM 5 MG: 5 TABLET ORAL at 06:10

## 2022-10-31 RX ADMIN — PROPOFOL 40 MCG/KG/MIN: 10 INJECTION, EMULSION INTRAVENOUS at 06:10

## 2022-10-31 RX ADMIN — SENNOSIDES AND DOCUSATE SODIUM 1 TABLET: 50; 8.6 TABLET ORAL at 11:10

## 2022-10-31 RX ADMIN — PREGABALIN 150 MG: 75 CAPSULE ORAL at 11:10

## 2022-10-31 RX ADMIN — PREGABALIN 150 MG: 75 CAPSULE ORAL at 08:10

## 2022-10-31 RX ADMIN — ATROPINE SULFATE 1 DROP: 10 SOLUTION OPHTHALMIC at 03:10

## 2022-10-31 RX ADMIN — Medication 1 PATCH: at 08:10

## 2022-10-31 RX ADMIN — Medication 112.5 MCG/HR: at 02:10

## 2022-10-31 RX ADMIN — POLYETHYLENE GLYCOL 3350 17 G: 17 POWDER, FOR SOLUTION ORAL at 11:10

## 2022-10-31 RX ADMIN — SENNOSIDES AND DOCUSATE SODIUM 1 TABLET: 50; 8.6 TABLET ORAL at 08:10

## 2022-10-31 RX ADMIN — FAMOTIDINE 20 MG: 20 TABLET ORAL at 11:10

## 2022-10-31 RX ADMIN — ATROPINE SULFATE 1 DROP: 10 SOLUTION OPHTHALMIC at 08:10

## 2022-10-31 RX ADMIN — ACETAMINOPHEN 650 MG: 325 TABLET ORAL at 06:10

## 2022-10-31 RX ADMIN — MUPIROCIN: 20 OINTMENT TOPICAL at 08:10

## 2022-10-31 RX ADMIN — Medication 137.5 MCG/HR: at 11:10

## 2022-10-31 RX ADMIN — METHADONE HYDROCHLORIDE 90 MG: 10 TABLET ORAL at 12:10

## 2022-10-31 RX ADMIN — OXYCODONE HYDROCHLORIDE 10 MG: 10 TABLET ORAL at 04:10

## 2022-10-31 RX ADMIN — PROPOFOL 15 MCG/KG/MIN: 10 INJECTION, EMULSION INTRAVENOUS at 11:10

## 2022-10-31 RX ADMIN — FAMOTIDINE 20 MG: 20 TABLET ORAL at 08:10

## 2022-10-31 RX ADMIN — OXYCODONE HYDROCHLORIDE 10 MG: 10 TABLET ORAL at 08:10

## 2022-10-31 RX ADMIN — OXACILLIN 12 G: 2 INJECTION, POWDER, FOR SOLUTION INTRAMUSCULAR; INTRAVENOUS at 03:10

## 2022-10-31 NOTE — ASSESSMENT & PLAN NOTE
See epidural abscess  S/p POD# corpectomy and fusion   plan to go back to IR for the second part of the procedure w/ JORDAN  On Wednesday

## 2022-10-31 NOTE — ASSESSMENT & PLAN NOTE
50 yo F with PMHx of HTN, dilated cardiomyopathy and remote opioid dependence (on methadone as an outpatient) presented on 10/25 with neck and back pain and lower body numbness and was found to have MSSA bacteremia due to C5-6 osteomyelitis, discitis and epidural collection. Suspects infection could have started at site of cat scratch on her back or burn wounds on b/l arms. TTE negative and repeat blcx ngtd. Now s/p corpectomy/ fusion and epidural abscess evacuation on 10/28. Remains intubated. Plan for OR 11/1. LISHA pending.     Recommendations:  -continue oxacillin given cx data  -surgical disposition pending  -follow up cx data

## 2022-10-31 NOTE — SUBJECTIVE & OBJECTIVE
Interval History: No fevers documented overnight. Remains intubated and on sedation. Plan for OR tomorrow.       Review of Systems   Unable to perform ROS: Intubated   Objective:     Vital Signs (Most Recent):  Temp: 98.1 °F (36.7 °C) (10/31/22 0627)  Pulse: 77 (10/31/22 0737)  Resp: 15 (10/31/22 0737)  BP: 110/74 (10/31/22 0601)  SpO2: 98 % (10/31/22 0737) Vital Signs (24h Range):  Temp:  [98 °F (36.7 °C)-98.2 °F (36.8 °C)] 98.1 °F (36.7 °C)  Pulse:  [56-93] 77  Resp:  [14-39] 15  SpO2:  [96 %-100 %] 98 %  BP: ()/(54-79) 110/74     Weight: 63.5 kg (139 lb 15.9 oz)  Body mass index is 22.6 kg/m².    Estimated Creatinine Clearance: 77 mL/min (based on SCr of 0.8 mg/dL).    Physical Exam  Constitutional:       General: She is not in acute distress.     Appearance: She is not ill-appearing or toxic-appearing.   HENT:      Head: Normocephalic and atraumatic.      Mouth/Throat:      Comments: ETT    Eyes:      General:         Right eye: No discharge.         Left eye: No discharge.   Cardiovascular:      Rate and Rhythm: Normal rate and regular rhythm.   Pulmonary:      Effort: Pulmonary effort is normal. No respiratory distress.      Breath sounds: No stridor. No wheezing or rhonchi.   Abdominal:      General: There is no distension.      Palpations: Abdomen is soft.      Tenderness: There is no abdominal tenderness. There is no guarding.   Skin:     General: Skin is warm and dry.      Coloration: Skin is not jaundiced.      Findings: No bruising.      Comments: R midline  Neck drain  RUE - no drainage present       Significant Labs:   Microbiology Results (last 7 days)       Procedure Component Value Units Date/Time    Blood culture [[id number]] Collected: 10/29/22 0623    Order Status: Completed Specimen: Blood from Peripheral, Foot, Left Updated: 10/31/22 0812     Blood Culture, Routine No Growth to date      No Growth to date      No Growth to date    Blood culture [[id number]] Collected: 10/29/22 0621    Order  Status: Completed Specimen: Blood from Peripheral, Foot, Right Updated: 10/31/22 0812     Blood Culture, Routine No Growth to date      No Growth to date      No Growth to date    Aerobic culture [[id number]]  (Abnormal)  (Susceptibility) Collected: 10/28/22 0924    Order Status: Completed Specimen: Abscess from Neck Updated: 10/30/22 1142     Aerobic Bacterial Culture STAPHYLOCOCCUS AUREUS  Moderate      Narrative:      2) Prevertebral absess    Aerobic culture [[id number]]  (Abnormal) Collected: 10/28/22 0906    Order Status: Completed Specimen: Abscess from Back Updated: 10/30/22 1141     Aerobic Bacterial Culture STAPHYLOCOCCUS AUREUS  Rare  Susceptibility pending      Narrative:      Prevertebral Abscess    Blood culture [[id number]] Collected: 10/27/22 0939    Order Status: Completed Specimen: Blood Updated: 10/30/22 1012     Blood Culture, Routine No Growth to date      No Growth to date      No Growth to date      No Growth to date    Narrative:      Rt wrist    Blood culture [[id number]] Collected: 10/27/22 0938    Order Status: Completed Specimen: Blood Updated: 10/30/22 1012     Blood Culture, Routine No Growth to date      No Growth to date      No Growth to date      No Growth to date    Narrative:      Rt AC    Blood culture #2 **CANNOT BE ORDERED STAT** [[id number]] Collected: 10/25/22 0917    Order Status: Completed Specimen: Blood from Peripheral, Wrist, Left Updated: 10/30/22 1012     Blood Culture, Routine No growth after 5 days.    AFB Culture & Smear [[id number]] Collected: 10/28/22 0924    Order Status: Completed Specimen: Abscess from Neck Updated: 10/29/22 2127     AFB Culture & Smear Culture in progress    Narrative:      2) Prevertebral absess    AFB Culture & Smear [[id number]] Collected: 10/28/22 0906    Order Status: Completed Specimen: Abscess from Back Updated: 10/29/22 2127     AFB Culture & Smear Culture in progress    Narrative:      Prevertebral Abscess    AFB Culture & Smear [[id number]]  Collected: 10/28/22 0950    Order Status: Completed Specimen: Wound from Neck Updated: 10/29/22 2127     AFB Culture & Smear Culture in progress    Narrative:      3) Osteodiscitis    AFB Culture & Smear [[id number]] Collected: 10/28/22 1029    Order Status: Completed Specimen: Wound from Neck Updated: 10/29/22 2127     AFB Culture & Smear Culture in progress    Narrative:      4) Epidural phlegman    Culture, Anaerobe [[id number]] Collected: 10/28/22 0906    Order Status: Completed Specimen: Abscess from Back Updated: 10/29/22 1256     Anaerobic Culture Culture in progress    Narrative:      Prevertebral Abscess    Culture, Anaerobe [[id number]] Collected: 10/28/22 1029    Order Status: Completed Specimen: Wound from Neck Updated: 10/29/22 1256     Anaerobic Culture Culture in progress    Narrative:      4) Epidural phlegman    Culture, Anaerobe [[id number]] Collected: 10/28/22 0924    Order Status: Completed Specimen: Abscess from Neck Updated: 10/29/22 1256     Anaerobic Culture Culture in progress    Narrative:      2) Prevertebral absess    Culture, Anaerobe [[id number]] Collected: 10/28/22 0950    Order Status: Completed Specimen: Wound from Neck Updated: 10/29/22 1256     Anaerobic Culture Culture in progress    Narrative:      3) Osteodiscitis    Aerobic culture [[id number]] Collected: 10/28/22 0950    Order Status: Completed Specimen: Wound from Neck Updated: 10/29/22 0731     Aerobic Bacterial Culture No growth    Narrative:      3) Osteodiscitis    Aerobic culture [[id number]] Collected: 10/28/22 1029    Order Status: Completed Specimen: Wound from Neck Updated: 10/29/22 0731     Aerobic Bacterial Culture No growth    Narrative:      4) Epidural phlegman    Gram stain [[id number]] Collected: 10/28/22 0950    Order Status: Completed Specimen: Wound from Neck Updated: 10/28/22 1538     Gram Stain Result No WBC's      No organisms seen    Narrative:      3) Osteodiscitis    Gram stain [[id number]] Collected: 10/28/22  1029    Order Status: Completed Specimen: Wound from Neck Updated: 10/28/22 1423     Gram Stain Result No WBC's      No organisms seen    Narrative:      4) Epidural phlegman    Fungus culture [[id number]] Collected: 10/28/22 1029    Order Status: Sent Specimen: Wound from Neck Updated: 10/28/22 1131    Gram stain [[id number]] Collected: 10/28/22 0924    Order Status: Completed Specimen: Abscess from Neck Updated: 10/28/22 1046     Gram Stain Result No WBC's      No organisms seen    Narrative:      2) Prevertebral absess    Gram stain [[id number]] Collected: 10/28/22 0906    Order Status: Completed Specimen: Abscess from Back Updated: 10/28/22 1044     Gram Stain Result No WBC's      No organisms seen    Narrative:      Prevertebral Abscess    Blood culture #1 **CANNOT BE ORDERED STAT** [[id number]]  (Abnormal)  (Susceptibility) Collected: 10/25/22 0917    Order Status: Completed Specimen: Blood from Peripheral, Antecubital, Right Updated: 10/28/22 1040     Blood Culture, Routine Gram stain yumi bottle: Gram positive cocci in clusters resembling Staph      Results called to and read back by: Jaxon Allen RN  17:39  10/26/2022      STAPHYLOCOCCUS AUREUS    Fungus culture [[id number]] Collected: 10/28/22 0950    Order Status: Sent Specimen: Wound from Neck Updated: 10/28/22 1007    Fungus culture [[id number]] Collected: 10/28/22 0924    Order Status: Sent Specimen: Abscess from Neck Updated: 10/28/22 0937    Fungus culture [[id number]] Collected: 10/28/22 0906    Order Status: Sent Specimen: Abscess from Back Updated: 10/28/22 0914    Blood culture [[id number]]     Order Status: Canceled Specimen: Blood             Significant Imaging: I have reviewed all pertinent imaging results/findings within the past 24 hours.

## 2022-10-31 NOTE — PLAN OF CARE
[facility] Care Plan    POC reviewed with Peter tSiles and family at 0300. Pt verbalized understanding. Questions and concerns addressed. No acute events overnight. Pt progressing toward goals. Will continue to monitor. See below and flowsheets for full assessment and VS info.           Is this a stroke patient? no    Neuro:  Mallory Coma Scale  Best Eye Response: 4-->(E4) spontaneous  Best Motor Response: 6-->(M6) obeys commands  Best Verbal Response: 1-->(V1) none  Lucien Coma Scale Score: 11  Assessment Qualifiers: patient intubated  Pupil PERRLA: yes     24hr Temp:  [98 °F (36.7 °C)-98.2 °F (36.8 °C)]     CV:   Rhythm: sinus bradycardia  BP goals:   SBP < 160  MAP > 65    Resp:   O2 Device (Oxygen Therapy): ventilator  Vent Mode: A/C  Set Rate: 14 BPM  Oxygen Concentration (%): 40  Vt Set: 450 mL  PEEP/CPAP: 5 cmH20  Pressure Support: 10 cmH20    Plan: wean to extubate    GI/:     Diet/Nutrition Received: NPO, tube feeding  Last Bowel Movement: 10/27/22  Voiding Characteristics: external catheter    Intake/Output Summary (Last 24 hours) at 10/31/2022 1018  Last data filed at 10/31/2022 0601  Gross per 24 hour   Intake 1488.29 ml   Output 700 ml   Net 788.29 ml     Unmeasured Output  Stool Occurrence: 0    Labs/Accuchecks:  Recent Labs   Lab 10/31/22  0030   WBC 11.74   RBC 2.81*   HGB 8.6*   HCT 26.3*         Recent Labs   Lab 10/31/22  0030      K 3.8   CO2 26      BUN 32*   CREATININE 0.8   ALKPHOS 115   ALT 15   AST 17   BILITOT 0.1      Recent Labs   Lab 10/26/22  0223   INR 1.0   APTT 29.6      Recent Labs   Lab 10/25/22  0343   CPK 15*   TROPONINI <0.006       Electrolytes: N/A - electrolytes WDL  Accuchecks: Q4H    Gtts:   dextrose 10 % in water (D10W)      fentanyl 112.5 mcg/hr (10/31/22 0601)    propofoL 40 mcg/kg/min (10/31/22 0613)       LDA/Wounds:  Lines/Drains/Airways       Drain  Duration                  Closed/Suction Drain 10/28/22 1129 Anterior Neck Accordion 10 Fr. 2  days         NG/OG Tube 10/29/22 1000 Left nostril 2 days              Airway  Duration                  Airway - Non-Surgical 10/28/22 1245 Endotracheal Tube 2 days              Peripheral Intravenous Line  Duration                  Midline Catheter Insertion/Assessment  - Single Lumen 10/28/22 1858 Right basilic vein (medial side of arm) 18g x 10cm 2 days         Peripheral IV - Single Lumen 10/28/22 1300 18 G Anterior;Right Shoulder 2 days         Peripheral IV - Single Lumen 10/28/22 1300 22 G Anterior;Left Foot 2 days         Peripheral IV - Single Lumen 10/28/22 1300 22 G Anterior;Right Foot 2 days                  Wounds: Yes  Wound care consulted: Yes

## 2022-10-31 NOTE — PT/OT/SLP DISCHARGE
Physical Therapy Discharge Summary    Name: Peter Stiles  MRN: [id number]   Principal Problem: Osteomyelitis of cervical spine     Patient Discharged from acute Physical Therapy on 10/31/22.  Please refer to prior PT noted date on 10/26/22 for functional status.     Assessment:     Patient transferred to ICU -10/28 and currently intubated. Will need new orders when appropriate for re-evaluation.    Objective:     GOALS:   Multidisciplinary Problems       Physical Therapy Goals          Problem: Physical Therapy    Goal Priority Disciplines Outcome Goal Variances Interventions   Physical Therapy Goal     PT, PT/OT Ongoing, Progressing     Description: PT goals until 11/6/22    1. Pt supine to sit with CGA through sidelying-not met  2. Pt sit to supine with CGA through sidelying-not met  3. Pt sit to stand with RW with minimal assist-not met  4. Pt to perform gait 30ft with RW with minimal assist.-not met  5. Pt to transfer bed to/from bedside chair with RW with minimal assist.-not met  6. Pt to up/down 5 steps with L UE rail with minimal assist.-not met  7. Pt to perform B LE exs in sitting or supine x 10 reps to strengthen B LE to improve functional mobility.-not met                         Reasons for Discontinuation of Therapy Services  Patient is unable to continue work toward goals because of medical or psychosocial complications.      Plan:     Patient Discharged to:  prior rec: IPR; remains in house .      10/31/2022
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
## 2022-10-31 NOTE — ASSESSMENT & PLAN NOTE
Hx of, but echo today 55% EF and normal size chambers    The left ventricle is normal in size with normal systolic function. The estimated ejection fraction is 55%.   Normal left ventricular diastolic function.   Normal right ventricular size with normal right ventricular systolic function.   Mild tricuspid regurgitation.   The estimated PA systolic pressure is 20 mmHg.   Normal central venous pressure (3 mmHg).  LISHA was rescheduled for wednesday

## 2022-10-31 NOTE — SUBJECTIVE & OBJECTIVE
Review of Systems  Unable to obtain a complete ROS due to level of consciousness.  Objective:     Vitals:  Temp: 97.8 °F (36.6 °C)  Pulse: 60  Rhythm: normal sinus rhythm, sinus bradycardia  BP: (!) 98/54  MAP (mmHg): 74  Resp: 14  SpO2: 99 %  Oxygen Concentration (%): 40  O2 Device (Oxygen Therapy): ventilator  Vent Mode: A/C  Set Rate: 14 BPM  Vt Set: 450 mL  PEEP/CPAP: 5 cmH20  Peak Airway Pressure: 27 cmH2O  Mean Airway Pressure: 8.9 cmH20  Plateau Pressure: 0 cmH20    Temp  Min: 97.8 °F (36.6 °C)  Max: 98.4 °F (36.9 °C)  Pulse  Min: 55  Max: 89  BP  Min: 88/53  Max: 129/66  MAP (mmHg)  Min: 67  Max: 92  Resp  Min: 14  Max: 25  SpO2  Min: 97 %  Max: 100 %  Oxygen Concentration (%)  Min: 40  Max: 40    10/30 0701 - 10/31 0700  In: 1677 [I.V.:621.5]  Out: 1125 [Urine:1125]   Unmeasured Output  Stool Occurrence: 0       Physical Exam  GA: Alert, comfortable, no acute distress.   HEENT: No scleral icterus or JVD.   Pulmonary: Clear to auscultation A/L.  Cardiac: RRR S1 & S2 w/o rubs/murmurs/gallops.   Abdominal: Bowel sounds present x 4. No appreciable hepatosplenomegaly.  Skin: No jaundice, rashes, or visible lesions.  Neuro:  --GCS: E4 Vt1 M6  --Mental Status:  awake, oriented, nods appropriately, follows commands  --CN II-XII grossly intact.   --Pupils 3mm, PERRL.   --Corneal reflex, gag, cough intact.  --VILLAGRAN spont    Medications:  Continuousfentanyl, Last Rate: 112.5 mcg/hr (10/31/22 1605)  propofoL, Last Rate: 20 mcg/kg/min (10/31/22 1605)    Scheduledatropine 1%, 1 drop, TID  famotidine, 20 mg, BID  methadone, 90 mg, Daily  nicotine, 1 patch, Daily  oxacillin 12 g in  mL CONTINUOUS INFUSION, 12 g, Q24H  polyethylene glycol, 17 g, Daily  pregabalin, 150 mg, BID  senna-docusate 8.6-50 mg, 1 tablet, BID    PRNacetaminophen, 650 mg, Q6H PRN  dextrose 10%, 12.5 g, PRN  dextrose 10%, 25 g, PRN  diazePAM, 5 mg, Q8H PRN  glucagon (human recombinant), 1 mg, PRN  HYDROmorphone, 1 mg, Q4H PRN  hydrOXYzine HCL,  25 mg, TID PRN  insulin aspart U-100, 1-10 Units, Q4H PRN  labetalol, 10 mg, Q4H PRN  ondansetron, 4 mg, Q8H PRN  oxyCODONE, 10 mg, Q4H PRN  sodium chloride 0.9%, 10 mL, PRN      Today I personally reviewed pertinent medications, lines/drains/airways, imaging, cardiology results, laboratory results, microbiology results,    Diet  Diet NPO Except for: Medication

## 2022-10-31 NOTE — PLAN OF CARE
[facility]  Discharge Reassessment    Primary Care Provider: Og Victoria NP    Expected Discharge Date: 11/6/2022    Per MD notes:  10/28/22: s/p corpectomy and fusion  10/29/2022L place susan tube, start TFm d.c courtney cath, d/c A- line, nutrition consult  10/30/2022: NPO after MN for LISHA tomorrow, plan for OR w/ NSGY on Tuesday. Weaning parameters, atropine SL added for oral secretions, Miralax     Pt to the OR tomorrow. Tentative DC plan is LTAC (referrals sent) pending IV antibiotic needs. Per therapy, recs were rehab.     Reassessment (most recent)       Discharge Reassessment - 10/31/22 1346          Discharge Reassessment    Assessment Type Discharge Planning Reassessment     Did the patient's condition or plan change since previous assessment? No     Discharge Plan discussed with: Patient     Communicated YESSI with patient/caregiver Date not available/Unable to determine     Discharge Plan A Long-term acute care facility (LTAC)     Discharge Plan B Rehab     DME Needed Upon Discharge  none     Discharge Barriers Identified None     Why the patient remains in the hospital Requires continued medical care        Post-Acute Status    Post-Acute Authorization Placement     Post-Acute Placement Status Pending medical clearance/testing   OR 10/28, 11/1    Discharge Delays None known at this time                   Beckie Jacobs LCSW  Neurocritical Care   [facility]  [id number]

## 2022-10-31 NOTE — ANESTHESIA PREPROCEDURE EVALUATION
[facility]  Anesthesia Pre-Operative Evaluation         Patient Name: Peter Stiles  YOB: 1970  MRN: [id number]    SUBJECTIVE:     Pre-operative evaluation for Procedure(s) (LRB):  FUSION, SPINE, CERVICAL, POSTERIOR APPROACH C2-T2 posterior decompression/fusion; Depuy, neuromonitoring monica Marrero (N/A)     10/31/2022    Peter Stiles is a 51 y.o. female w/ a significant PMHx of  HTN, dilated cardiomyopathy and remote opioid dependence who presented to the ED with neck and back pain and lower body numbness. She first noted the symptoms 2 weeks ago when she was in the car with her daughter. Her daughter slammed on the breaks and the patient noted a soreness in her neck. Since then, she has had increasing neck pain. Workup revealed WBC elevation and MRI spine revealed C5-6 osteomyelitis, discitis and epidural collection s/p cervical corpectomy 10/28/22.     Patient now presents for the above procedure(s).    TTE: 10/25/22   The left ventricle is normal in size with normal systolic function. The estimated ejection fraction is 55%.   Normal left ventricular diastolic function.   Normal right ventricular size with normal right ventricular systolic function.   Mild tricuspid regurgitation.  The estimated PA systolic pressure is 20 mmHg.    LDA:        Peripheral IV - Single Lumen 10/28/22 1300 22 G Anterior;Right Foot (Active)   Site Assessment Clean;Dry;Intact;No redness;No swelling 10/30/22 2301   Extremity Assessment Distal to IV No abnormal discoloration;No redness;No swelling;No warmth 10/30/22 2301   Line Status Infusing 10/30/22 2301   Dressing Status Clean;Dry;Intact 10/30/22 2301   Dressing Intervention Integrity maintained 10/30/22 2301   Dressing Change Due 11/01/22 10/30/22 2301   Site Change Due 11/01/22 10/30/22 2301   Reason Not Rotated Not due 10/30/22 2301   Number of days: 2            Peripheral IV - Single Lumen 10/28/22 1300 18 G Anterior;Right  Shoulder (Active)   Site Assessment Clean;Dry;Intact;No redness;No swelling 10/30/22 2301   Extremity Assessment Distal to IV No abnormal discoloration;No redness;No swelling;No warmth 10/30/22 2301   Line Status Infusing 10/30/22 2301   Dressing Status Clean;Dry;Intact 10/30/22 2301   Dressing Intervention Integrity maintained 10/30/22 2301   Dressing Change Due 11/01/22 10/30/22 2301   Site Change Due 11/01/22 10/30/22 2301   Reason Not Rotated Not due 10/30/22 2301   Number of days: 2            Peripheral IV - Single Lumen 10/28/22 1300 22 G Anterior;Left Foot (Active)   Site Assessment Clean;Dry;Intact;No redness;No swelling 10/30/22 2301   Extremity Assessment Distal to IV No abnormal discoloration;No redness;No swelling;No warmth 10/30/22 2301   Line Status Flushed 10/30/22 2301   Dressing Status Clean;Dry;Intact 10/30/22 2301   Dressing Intervention Integrity maintained 10/30/22 2301   Dressing Change Due 11/01/22 10/30/22 2301   Site Change Due 11/01/22 10/30/22 2301   Reason Not Rotated Not due 10/30/22 2301   Number of days: 2            Midline Catheter Insertion/Assessment  - Single Lumen 10/28/22 1858 Right basilic vein (medial side of arm) 18g x 10cm (Active)   Site Assessment Clean;Dry;Intact;No redness;No swelling 10/30/22 2301   IV Device Securement catheter securement device 10/30/22 2301   Line Status Flushed;Saline locked 10/30/22 2301   Dressing Type Biopatch in place;Central line dressing 10/30/22 2301   Dressing Status Clean;Dry;Intact 10/30/22 2301   Dressing Intervention Integrity maintained 10/30/22 2301   Dressing Change Due 11/04/22 10/30/22 2301   Site Change Due 11/26/22 10/30/22 2301   Reason Not Rotated Not due 10/30/22 2301   Number of days: 2            Closed/Suction Drain 10/28/22 1129 Anterior Neck Accordion 10 Fr. (Active)   Site Description Unable to view 10/30/22 2301   Dressing Type Other (Comment) 10/30/22 2301   Dressing Status Clean;Dry;Intact 10/30/22 2301   Dressing  Intervention Integrity maintained 10/30/22 2301   Status To bulb suction 10/30/22 2301   Output (mL) 2 mL 10/30/22 0601   Number of days: 2            NG/OG Tube 10/29/22 1000 Left nostril (Active)   Placement Check placement verified by x-ray 10/30/22 2301   Securement secured to nostril center w/ adhesive device 10/30/22 2301   Clamp Status/Tolerance clamped 10/30/22 2301   Suction Setting/Drainage Method suction at the bedside 10/30/22 2301   Insertion Site Appearance no redness, warmth, tenderness, skin breakdown, drainage 10/30/22 2301   Drainage None 10/30/22 2301   Feeding Type continuous;by pump 10/30/22 2301   Feeding Action feeding continued 10/30/22 2301   Current Rate (mL/hr) 40 mL/hr 10/30/22 2301   Goal Rate (mL/hr) 40 mL/hr 10/30/22 2301   Rate Formula Tube Feeding (mL/hr) 40 mL/hr 10/30/22 2301   Formula Name Diabetisource 10/30/22 2301   Intake (mL) - Formula Tube Feeding 40 10/31/22 0101   Residual Amount (ml) 20 ml 10/30/22 2301   Number of days: 1       Prev airway: None documented.    Drips:    dextrose 10 % in water (D10W)      fentanyl 112.5 mcg/hr (10/31/22 0202)    propofoL 40 mcg/kg/min (10/31/22 0613)       Patient Active Problem List   Diagnosis    Dilated cardiomyopathy    HTN (hypertension)    Tobacco abuse    Anxiety and depression    Osteomyelitis of cervical spine    Opioid use disorder, severe, on maintenance therapy, dependence    Hyperglycemia    Hyponatremia    Epidural abscess    Spinal cord compression    MSSA bacteremia    Pre-operative examination    Acute respiratory failure with hypercapnia       Review of patient's allergies indicates:   Allergen Reactions    Morphine Shortness Of Breath and Other (See Comments)     Throat closed       Current Inpatient Medications:   atropine 1%  1 drop Sublingual TID    famotidine  20 mg Per NG tube BID    methadone  45 mg Oral Daily    mupirocin   Nasal BID    nicotine  1 patch Transdermal Daily    oxacillin 12 g  in  mL CONTINUOUS INFUSION  12 g Intravenous Q24H    polyethylene glycol  17 g Per NG tube Daily    senna-docusate 8.6-50 mg  1 tablet Oral BID       No current facility-administered medications on file prior to encounter.     Current Outpatient Medications on File Prior to Encounter   Medication Sig Dispense Refill    aspirin (ECOTRIN) 81 MG EC tablet Take 81 mg by mouth once daily.      LIDOcaine-menthol (ICY HOT PATCH, LIDO-MENTHOL,) 4-1 % PtMd Apply 1 patch topically 2 (two) times daily as needed (Pain).      methadone (METHADOSE) 40 mg disintegrating tablet Take 2 &1/4 tablet (90 mg) by mouth in water once daily.      multivitamin (ONE DAILY MULTIVITAMIN) per tablet Take 1 tablet by mouth once daily.      lorazepam (ATIVAN) 1 MG tablet Take one-half to one tablet (Patient not taking: Reported on 10/25/2022) 30 tablet 1       Past Surgical History:   Procedure Laterality Date    APPENDECTOMY       SECTION      x2    HYSTERECTOMY      SURGICAL REMOVAL OF VERTEBRAL BODY OF CERVICAL SPINE N/A 10/28/2022    Procedure: CORPECTOMY, SPINE, CERVICAL;  Surgeon: West Merino DO;  Location: [facility] OR [address] [place] [address];  Service: Neurosurgery;  Laterality: N/A;  anterior C5-6 corpectomy, globus, caspar head prado and tongs, omnipaque, 15lbs weights, microscrope, round cutting lynsey and M8, ENT assistance for exposure       Social History     Socioeconomic History    Marital status: Single   Tobacco Use    Smoking status: Every Day     Packs/day: 2.00     Years: 34.00     Pack years: 68.00     Types: Cigarettes    Smokeless tobacco: Current   Substance and Sexual Activity    Alcohol use: No    Drug use: No       OBJECTIVE:     Vital Signs Range (Last 24H):  Temp:  [36.7 °C (98 °F)-36.8 °C (98.2 °F)]   Pulse:  [56-93]   Resp:  [14-39]   BP: ()/(54-79)   SpO2:  [96 %-100 %]       Significant Labs:  Lab Results   Component Value Date    WBC 11.74 10/31/2022    HGB 8.6 (L) 10/31/2022    HCT 26.3  (L) 10/31/2022     10/31/2022    CHOL 135 10/25/2022    TRIG 98 10/25/2022    HDL 30 (L) 10/25/2022    ALT 15 10/31/2022    AST 17 10/31/2022     10/31/2022    K 3.8 10/31/2022     10/31/2022    CREATININE 0.8 10/31/2022    BUN 32 (H) 10/31/2022    CO2 26 10/31/2022    TSH 0.706 10/25/2022    INR 1.0 10/26/2022    HGBA1C 6.1 (H) 10/25/2022       Diagnostic Studies: No relevant studies.    EKG:   Results for orders placed or performed during the hospital encounter of 10/25/22   EKG 12-lead    Collection Time: 10/25/22  3:51 AM    Narrative    Test Reason : R10.13,    Vent. Rate : 102 BPM     Atrial Rate : 102 BPM     P-R Int : 194 ms          QRS Dur : 100 ms      QT Int : 370 ms       P-R-T Axes : 074 022 065 degrees     QTc Int : 482 ms    Sinus tachycardia  Otherwise normal ECG  When compared with ECG of 02-SEP-2012 02:37,  T wave inversion no longer evident in Inferior leads  T wave inversion no longer evident in Anterior leads  QT has lengthened  Confirmed by PK JAIN MD ([id number]) on 10/25/2022 10:16:44 AM    Referred By: [email or web address]   SELF           Confirmed By:PK JAIN MD       2D ECHO:  TTE:  Results for orders placed or performed during the hospital encounter of 10/25/22   Echo   Result Value Ref Range    Ascending aorta 3.33 cm    STJ 2.61 cm    AV mean gradient 8 mmHg    Ao peak denny 1.93 m/s    Ao VTI 39.92 cm    IVS 0.75 0.6 - 1.1 cm    LA size 3.48 cm    Left Atrium Major Axis 4.91 cm    Left Atrium Minor Axis 4.46 cm    LVIDd 4.94 3.5 - 6.0 cm    LVIDs 3.65 2.1 - 4.0 cm    LVOT diameter 2.30 cm    LVOT peak VTI 23.38 cm    Posterior Wall 0.97 0.6 - 1.1 cm    RA Major Axis 5.08 cm    RA Width 3.28 cm    RVDD 3.28 cm    Sinus 3.38 cm    TAPSE 2.08 cm    TR Max Denny 2.08 m/s    TDI LATERAL 0.12 m/s    TDI SEPTAL 0.14 m/s    LA WIDTH 3.75 cm    LV Diastolic Volume 114.99 mL    LV Systolic Volume 56.37 mL    LVOT peak denny 1.13 m/s    LA volume (mod) 54.41 cm3    FS 26 %    LA volume  51.85 cm3    LV mass 146.08 g    Left Ventricle Relative Wall Thickness 0.39 cm    AV valve area 2.43 cm2    AV Velocity Ratio 0.59     AV index (prosthetic) 0.59     Mean e' 0.13 m/s    LVOT area 4.2 cm2    LVOT stroke volume 97.09 cm3    AV peak gradient 15 mmHg    LV Systolic Volume Index 32.8 mL/m2    LV Diastolic Volume Index 66.85 mL/m2    LA Volume Index 30.1 mL/m2    LV Mass Index 85 g/m2    Triscuspid Valve Regurgitation Peak Gradient 17 mmHg    LA Volume Index (Mod) 31.6 mL/m2    BSA 1.72 m2    Right Atrial Pressure (from IVC) 3 mmHg    EF 55 %    TV rest pulmonary artery pressure 20 mmHg    Narrative    · The left ventricle is normal in size with normal systolic function. The   estimated ejection fraction is 55%.  · Normal left ventricular diastolic function.  · Normal right ventricular size with normal right ventricular systolic   function.  · Mild tricuspid regurgitation.  · The estimated PA systolic pressure is 20 mmHg.  · Normal central venous pressure (3 mmHg).          LISHA:  No results found for this or any previous visit.    ASSESSMENT/PLAN:                                                                                                                  10/31/2022  Peter Stiles is a 51 y.o., female.      Pre-op Assessment    I have reviewed the Patient Summary Reports.     I have reviewed the Nursing Notes. I have reviewed the NPO Status.   I have reviewed the Medications.     Review of Systems  Anesthesia Hx:  No problems with previous Anesthesia  History of prior surgery of interest to airway management or planning: Denies Family Hx of Anesthesia complications.   Denies Personal Hx of Anesthesia complications.   Social:  Smoker    Cardiovascular:   Hypertension Denies MI.  Denies CAD.    CHF no hyperlipidemia    Pulmonary:  Pulmonary Normal    Renal/:  Renal/ Normal     Hepatic/GI:   Denies GERD.    Endocrine:  Denies Obesity / BMI > 30  Psych:   Psychiatric History anxiety depression           Physical Exam  General: Well nourished and Cooperative    Airway:  Mallampati: unable to assess   Mouth Opening: Normal  TM Distance: Normal  Tongue: Normal  Neck ROM: Normal ROM  Pre-Existing Airway: Oral Endotracheal tube        Anesthesia Plan  Type of Anesthesia, risks & benefits discussed:    Anesthesia Type: Gen ETT  Intra-op Monitoring Plan: Standard ASA Monitors and Art Line  Post Op Pain Control Plan: multimodal analgesia  Induction:  IV  Airway Plan: Direct, Post-Induction  ASA Score: 3  Day of Surgery Review of History & Physical: H&P Update referred to the surgeon/provider.I have interviewed and examined the patient. I have reviewed the patient's H&P dated: There are no significant changes.     Ready For Surgery From Anesthesia Perspective.     .

## 2022-10-31 NOTE — NURSING
RN spoke with patient's daughter, Aria Stiles, over the phone. Daughter's age (18) confirmed via [employer]. Daughter's phone number updated in chart. Daughter informed that MD would be calling for consent today for LISHA and procedure tmr.

## 2022-10-31 NOTE — SUBJECTIVE & OBJECTIVE
Interval History: 10/31: NAEON. AFVSS. Exam stable. Remains intubated and sedated. Tentative OR tomorrow.    Medications:  Continuous Infusions:   dextrose 10 % in water (D10W)      fentanyl 112.5 mcg/hr (10/31/22 0202)    propofoL 40 mcg/kg/min (10/31/22 0613)     Scheduled Meds:   atropine 1%  1 drop Sublingual TID    famotidine  20 mg Per NG tube BID    methadone  45 mg Oral Daily    mupirocin   Nasal BID    nicotine  1 patch Transdermal Daily    oxacillin 12 g in  mL CONTINUOUS INFUSION  12 g Intravenous Q24H    polyethylene glycol  17 g Per NG tube Daily    senna-docusate 8.6-50 mg  1 tablet Oral BID     PRN Meds:acetaminophen, dextrose 10 % in water (D10W), dextrose 10%, dextrose 10%, diazePAM, glucagon (human recombinant), HYDROmorphone, hydrOXYzine HCL, insulin aspart U-100, labetalol, ondansetron, oxyCODONE, sodium chloride 0.9%     Objective:     Weight: 63.5 kg (139 lb 15.9 oz)  Body mass index is 22.6 kg/m².  Vital Signs (Most Recent):  Temp: 98.1 °F (36.7 °C) (10/31/22 0627)  Pulse: 63 (10/31/22 0450)  Resp: 14 (10/31/22 0450)  BP: (!) 106/58 (10/31/22 0103)  SpO2: 99 % (10/31/22 0450) Vital Signs (24h Range):  Temp:  [98 °F (36.7 °C)-98.2 °F (36.8 °C)] 98.1 °F (36.7 °C)  Pulse:  [56-93] 63  Resp:  [14-39] 14  SpO2:  [96 %-100 %] 99 %  BP: ()/(54-79) 106/58                  Vent Mode: A/C  Oxygen Concentration (%):  [40] 40  Resp Rate Total:  [9.6 br/min-23 br/min] 14 br/min  Vt Set:  [450 mL] 450 mL  PEEP/CPAP:  [5 cmH20] 5 cmH20  Pressure Support:  [10 cmH20] 10 cmH20  Mean Airway Pressure:  [7.4 cmH20-9.1 cmH20] 9 cmH20         Urethral Catheter 10/26/22 1020 Non-latex (Active)   Site Assessment Clean;Intact 10/26/22 2200   Collection Container Urimeter 10/26/22 2200   Securement Method secured to top of thigh w/ adhesive device 10/26/22 2200   Reason for Continuing Urinary Catheterization Required immobilization 10/26/22 2200   CAUTI Prevention Bundle Drainage bag/urimeter below  "bladder;Drainage bag/urimeter not overfilled (<2/3 full);No dependent loops or kinks;Sheeting clip in use;Drainage bag/urimeter off the floor;Intact seal between catheter & drainage tubing;Securement Device in place with 1" slack 10/26/22 2000   Output (mL) 250 mL 10/26/22 1701       Physical Exam    Neurosurgery Physical Exam    General: Well developed, well nourished, not in acute distress.   Head: Normocephalic, atraumatic.  Neck: Cervical collar in place  Neurologic: AOx4. Thought content appropriate.  GCS: Motor:6  Verbal:T  Eyes:3   GCS  Cranial nerves: Face symmetric, tongue midline, CN II-XII grossly intact.   Eyes: PEERL, EOMI.   Pulmonary: intubated.  Abdomen: Soft, non-distended, non-tender to palpation.  Sensory: Decreased sensation from T4/T5 level down.  Motor Strength: Moves all extremities spontaneously with good tone. No abnormal movements seen.   Luna: positive on L hand   DTR 3+ on knees   Clonus: Absent.  Skin: Healing scabbed wounds on LUE. RUE wrapped in Kerlix dressing, C/D/I. No drainage or odor.    Follows commands x4 antigravity  Exam effort limited, sedation limited        Significant Labs:  Recent Labs   Lab 10/30/22  0100 10/31/22  0030   * 110    142   K 4.3 3.8    108   CO2 22* 26   BUN 29* 32*   CREATININE 0.8 0.8   CALCIUM 8.5* 8.2*   MG 2.5 2.5       Recent Labs   Lab 10/30/22  0100 10/31/22  0030   WBC 17.82* 11.74   HGB 8.5* 8.6*   HCT 26.1* 26.3*    388       No results for input(s): LABPT, INR, APTT in the last 48 hours.    Microbiology Results (last 7 days)       Procedure Component Value Units Date/Time    Aerobic culture [[id number]]  (Abnormal)  (Susceptibility) Collected: 10/28/22 0924    Order Status: Completed Specimen: Abscess from Neck Updated: 10/30/22 1142     Aerobic Bacterial Culture STAPHYLOCOCCUS AUREUS  Moderate      Narrative:      2) Prevertebral absess    Aerobic culture [[id number]]  (Abnormal) Collected: 10/28/22 0906    Order " Status: Completed Specimen: Abscess from Back Updated: 10/30/22 1141     Aerobic Bacterial Culture STAPHYLOCOCCUS AUREUS  Rare  Susceptibility pending      Narrative:      Prevertebral Abscess    Blood culture [[id number]] Collected: 10/27/22 0939    Order Status: Completed Specimen: Blood Updated: 10/30/22 1012     Blood Culture, Routine No Growth to date      No Growth to date      No Growth to date      No Growth to date    Narrative:      Rt wrist    Blood culture [[id number]] Collected: 10/27/22 0938    Order Status: Completed Specimen: Blood Updated: 10/30/22 1012     Blood Culture, Routine No Growth to date      No Growth to date      No Growth to date      No Growth to date    Narrative:      Rt AC    Blood culture #2 **CANNOT BE ORDERED STAT** [[id number]] Collected: 10/25/22 0917    Order Status: Completed Specimen: Blood from Peripheral, Wrist, Left Updated: 10/30/22 1012     Blood Culture, Routine No growth after 5 days.    Blood culture [[id number]] Collected: 10/29/22 0623    Order Status: Completed Specimen: Blood from Peripheral, Foot, Left Updated: 10/30/22 0812     Blood Culture, Routine No Growth to date      No Growth to date    Blood culture [[id number]] Collected: 10/29/22 0621    Order Status: Completed Specimen: Blood from Peripheral, Foot, Right Updated: 10/30/22 0812     Blood Culture, Routine No Growth to date      No Growth to date    AFB Culture & Smear [[id number]] Collected: 10/28/22 0924    Order Status: Completed Specimen: Abscess from Neck Updated: 10/29/22 2127     AFB Culture & Smear Culture in progress    Narrative:      2) Prevertebral absess    AFB Culture & Smear [[id number]] Collected: 10/28/22 0906    Order Status: Completed Specimen: Abscess from Back Updated: 10/29/22 2127     AFB Culture & Smear Culture in progress    Narrative:      Prevertebral Abscess    AFB Culture & Smear [[id number]] Collected: 10/28/22 0950    Order Status: Completed Specimen: Wound from Neck Updated:  10/29/22 2127     AFB Culture & Smear Culture in progress    Narrative:      3) Osteodiscitis    AFB Culture & Smear [[id number]] Collected: 10/28/22 1029    Order Status: Completed Specimen: Wound from Neck Updated: 10/29/22 2127     AFB Culture & Smear Culture in progress    Narrative:      4) Epidural phlegman    Culture, Anaerobe [[id number]] Collected: 10/28/22 0906    Order Status: Completed Specimen: Abscess from Back Updated: 10/29/22 1256     Anaerobic Culture Culture in progress    Narrative:      Prevertebral Abscess    Culture, Anaerobe [[id number]] Collected: 10/28/22 1029    Order Status: Completed Specimen: Wound from Neck Updated: 10/29/22 1256     Anaerobic Culture Culture in progress    Narrative:      4) Epidural phlegman    Culture, Anaerobe [[id number]] Collected: 10/28/22 0924    Order Status: Completed Specimen: Abscess from Neck Updated: 10/29/22 1256     Anaerobic Culture Culture in progress    Narrative:      2) Prevertebral absess    Culture, Anaerobe [[id number]] Collected: 10/28/22 0950    Order Status: Completed Specimen: Wound from Neck Updated: 10/29/22 1256     Anaerobic Culture Culture in progress    Narrative:      3) Osteodiscitis    Aerobic culture [[id number]] Collected: 10/28/22 0950    Order Status: Completed Specimen: Wound from Neck Updated: 10/29/22 0731     Aerobic Bacterial Culture No growth    Narrative:      3) Osteodiscitis    Aerobic culture [[id number]] Collected: 10/28/22 1029    Order Status: Completed Specimen: Wound from Neck Updated: 10/29/22 0731     Aerobic Bacterial Culture No growth    Narrative:      4) Epidural phlegman    Gram stain [[id number]] Collected: 10/28/22 0950    Order Status: Completed Specimen: Wound from Neck Updated: 10/28/22 1538     Gram Stain Result No WBC's      No organisms seen    Narrative:      3) Osteodiscitis    Gram stain [[id number]] Collected: 10/28/22 1029    Order Status: Completed Specimen: Wound from Neck Updated: 10/28/22 1423      Gram Stain Result No WBC's      No organisms seen    Narrative:      4) Epidural phlegman    Fungus culture [[id number]] Collected: 10/28/22 1029    Order Status: Sent Specimen: Wound from Neck Updated: 10/28/22 1131    Gram stain [[id number]] Collected: 10/28/22 0924    Order Status: Completed Specimen: Abscess from Neck Updated: 10/28/22 1046     Gram Stain Result No WBC's      No organisms seen    Narrative:      2) Prevertebral absess    Gram stain [[id number]] Collected: 10/28/22 0906    Order Status: Completed Specimen: Abscess from Back Updated: 10/28/22 1044     Gram Stain Result No WBC's      No organisms seen    Narrative:      Prevertebral Abscess    Blood culture #1 **CANNOT BE ORDERED STAT** [[id number]]  (Abnormal)  (Susceptibility) Collected: 10/25/22 0917    Order Status: Completed Specimen: Blood from Peripheral, Antecubital, Right Updated: 10/28/22 1040     Blood Culture, Routine Gram stain yumi bottle: Gram positive cocci in clusters resembling Staph      Results called to and read back by: Jaxon Allen RN  17:39  10/26/2022      STAPHYLOCOCCUS AUREUS    Fungus culture [[id number]] Collected: 10/28/22 0950    Order Status: Sent Specimen: Wound from Neck Updated: 10/28/22 1007    Fungus culture [[id number]] Collected: 10/28/22 0924    Order Status: Sent Specimen: Abscess from Neck Updated: 10/28/22 0937    Fungus culture [[id number]] Collected: 10/28/22 0906    Order Status: Sent Specimen: Abscess from Back Updated: 10/28/22 0914    Blood culture [[id number]]     Order Status: Canceled Specimen: Blood           All pertinent labs from the last 24 hours have been reviewed.    Significant Diagnostics:  I have reviewed and interpreted all pertinent imaging results/findings within the past 24 hours.  No results found in the last 24 hours.

## 2022-10-31 NOTE — PROGRESS NOTES
[facility]  Infectious Disease  Progress Note    Patient Name: Peter Stiles  MRN: [id number]  Admission Date: 10/25/2022  Length of Stay: 6 days  Attending Physician: Truong Bourne DO  Primary Care Provider: Og Victoria NP    Isolation Status: No active isolations  Assessment/Plan:      MSSA bacteremia  See epidural abscess    Epidural abscess  52 yo F with PMHx of HTN, dilated cardiomyopathy and remote opioid dependence (on methadone as an outpatient) presented on 10/25 with neck and back pain and lower body numbness and was found to have MSSA bacteremia due to C5-6 osteomyelitis, discitis and epidural collection. Suspects infection could have started at site of cat scratch on her back or burn wounds on b/l arms. TTE negative and repeat blcx ngtd. Now s/p corpectomy/ fusion and epidural abscess evacuation on 10/28. Remains intubated. Plan for OR 11/1. LISHA pending.     Recommendations:  -continue oxacillin given cx data  -surgical disposition pending  -follow up cx data        Thank you for your consult. I will follow-up with patient. Please contact us if you have any additional questions. Above d/w primary team.     Time: 35 minutes   50% of time spent on face-to-face counseling and coordination of care. Counseling included review of test results, diagnosis, and treatment plan with patient and/or family.        Beth Wagner MD  Infectious Disease  [facility] - Neuro Critical [facility]    Subjective:     Principal Problem:Osteomyelitis of cervical spine    HPI: Ms. Stiles is a 52 yo F with PMHx of HTN, dilated cardiomyopathy and remote opioid dependence who presented on 10/25 with neck and back pain and lower body numbness and was found to have C5-6 osteomyelitis, discitis and epidural collection.    She first noted the symptoms 2 weeks ago when she was in the car with her daughter. Her daughter slammed on the breaks and the patient noted a soreness in her neck. Since then, she has  had increasing neck pain. This morning she noted numbess to her stomach and below and pain in her legs. She also endorses tingling to her fingers. WBC elevated and MRI spine revealed C5-6 osteomyelitis, discitis and epidural collection. She denies IVDU.     Of note, pt reports wounds to her arms after burning herself and her cat with hot grease. She also notes a knot to her R upper back at the site where her cat scratched her. Denies prior back surgery .          Interval History: No fevers documented overnight. Remains intubated and on sedation. Plan for OR tomorrow.       Review of Systems   Unable to perform ROS: Intubated   Objective:     Vital Signs (Most Recent):  Temp: 98.1 °F (36.7 °C) (10/31/22 0627)  Pulse: 77 (10/31/22 0737)  Resp: 15 (10/31/22 0737)  BP: 110/74 (10/31/22 0601)  SpO2: 98 % (10/31/22 0737) Vital Signs (24h Range):  Temp:  [98 °F (36.7 °C)-98.2 °F (36.8 °C)] 98.1 °F (36.7 °C)  Pulse:  [56-93] 77  Resp:  [14-39] 15  SpO2:  [96 %-100 %] 98 %  BP: ()/(54-79) 110/74     Weight: 63.5 kg (139 lb 15.9 oz)  Body mass index is 22.6 kg/m².    Estimated Creatinine Clearance: 77 mL/min (based on SCr of 0.8 mg/dL).    Physical Exam  Constitutional:       General: She is not in acute distress.     Appearance: She is not ill-appearing or toxic-appearing.   HENT:      Head: Normocephalic and atraumatic.      Mouth/Throat:      Comments: ETT    Eyes:      General:         Right eye: No discharge.         Left eye: No discharge.   Cardiovascular:      Rate and Rhythm: Normal rate and regular rhythm.   Pulmonary:      Effort: Pulmonary effort is normal. No respiratory distress.      Breath sounds: No stridor. No wheezing or rhonchi.   Abdominal:      General: There is no distension.      Palpations: Abdomen is soft.      Tenderness: There is no abdominal tenderness. There is no guarding.   Skin:     General: Skin is warm and dry.      Coloration: Skin is not jaundiced.      Findings: No bruising.       Comments: R midline  Neck drain  RUE - no drainage present       Significant Labs:   Microbiology Results (last 7 days)       Procedure Component Value Units Date/Time    Blood culture [[id number]] Collected: 10/29/22 0623    Order Status: Completed Specimen: Blood from Peripheral, Foot, Left Updated: 10/31/22 0812     Blood Culture, Routine No Growth to date      No Growth to date      No Growth to date    Blood culture [[id number]] Collected: 10/29/22 0621    Order Status: Completed Specimen: Blood from Peripheral, Foot, Right Updated: 10/31/22 0812     Blood Culture, Routine No Growth to date      No Growth to date      No Growth to date    Aerobic culture [[id number]]  (Abnormal)  (Susceptibility) Collected: 10/28/22 0924    Order Status: Completed Specimen: Abscess from Neck Updated: 10/30/22 1142     Aerobic Bacterial Culture STAPHYLOCOCCUS AUREUS  Moderate      Narrative:      2) Prevertebral absess    Aerobic culture [[id number]]  (Abnormal) Collected: 10/28/22 0906    Order Status: Completed Specimen: Abscess from Back Updated: 10/30/22 1141     Aerobic Bacterial Culture STAPHYLOCOCCUS AUREUS  Rare  Susceptibility pending      Narrative:      Prevertebral Abscess    Blood culture [[id number]] Collected: 10/27/22 0939    Order Status: Completed Specimen: Blood Updated: 10/30/22 1012     Blood Culture, Routine No Growth to date      No Growth to date      No Growth to date      No Growth to date    Narrative:      Rt wrist    Blood culture [[id number]] Collected: 10/27/22 0938    Order Status: Completed Specimen: Blood Updated: 10/30/22 1012     Blood Culture, Routine No Growth to date      No Growth to date      No Growth to date      No Growth to date    Narrative:      Rt AC    Blood culture #2 **CANNOT BE ORDERED STAT** [[id number]] Collected: 10/25/22 0917    Order Status: Completed Specimen: Blood from Peripheral, Wrist, Left Updated: 10/30/22 1012     Blood Culture, Routine No growth after 5 days.    AFB  Culture & Smear [[id number]] Collected: 10/28/22 0924    Order Status: Completed Specimen: Abscess from Neck Updated: 10/29/22 2127     AFB Culture & Smear Culture in progress    Narrative:      2) Prevertebral absess    AFB Culture & Smear [[id number]] Collected: 10/28/22 0906    Order Status: Completed Specimen: Abscess from Back Updated: 10/29/22 2127     AFB Culture & Smear Culture in progress    Narrative:      Prevertebral Abscess    AFB Culture & Smear [[id number]] Collected: 10/28/22 0950    Order Status: Completed Specimen: Wound from Neck Updated: 10/29/22 2127     AFB Culture & Smear Culture in progress    Narrative:      3) Osteodiscitis    AFB Culture & Smear [[id number]] Collected: 10/28/22 1029    Order Status: Completed Specimen: Wound from Neck Updated: 10/29/22 2127     AFB Culture & Smear Culture in progress    Narrative:      4) Epidural phlegman    Culture, Anaerobe [[id number]] Collected: 10/28/22 0906    Order Status: Completed Specimen: Abscess from Back Updated: 10/29/22 1256     Anaerobic Culture Culture in progress    Narrative:      Prevertebral Abscess    Culture, Anaerobe [[id number]] Collected: 10/28/22 1029    Order Status: Completed Specimen: Wound from Neck Updated: 10/29/22 1256     Anaerobic Culture Culture in progress    Narrative:      4) Epidural phlegman    Culture, Anaerobe [[id number]] Collected: 10/28/22 0924    Order Status: Completed Specimen: Abscess from Neck Updated: 10/29/22 1256     Anaerobic Culture Culture in progress    Narrative:      2) Prevertebral absess    Culture, Anaerobe [[id number]] Collected: 10/28/22 0950    Order Status: Completed Specimen: Wound from Neck Updated: 10/29/22 1256     Anaerobic Culture Culture in progress    Narrative:      3) Osteodiscitis    Aerobic culture [[id number]] Collected: 10/28/22 0950    Order Status: Completed Specimen: Wound from Neck Updated: 10/29/22 0731     Aerobic Bacterial Culture No growth    Narrative:      3) Osteodiscitis     Aerobic culture [[id number]] Collected: 10/28/22 1029    Order Status: Completed Specimen: Wound from Neck Updated: 10/29/22 0731     Aerobic Bacterial Culture No growth    Narrative:      4) Epidural phlegman    Gram stain [[id number]] Collected: 10/28/22 0950    Order Status: Completed Specimen: Wound from Neck Updated: 10/28/22 1538     Gram Stain Result No WBC's      No organisms seen    Narrative:      3) Osteodiscitis    Gram stain [[id number]] Collected: 10/28/22 1029    Order Status: Completed Specimen: Wound from Neck Updated: 10/28/22 1423     Gram Stain Result No WBC's      No organisms seen    Narrative:      4) Epidural phlegman    Fungus culture [[id number]] Collected: 10/28/22 1029    Order Status: Sent Specimen: Wound from Neck Updated: 10/28/22 1131    Gram stain [[id number]] Collected: 10/28/22 0924    Order Status: Completed Specimen: Abscess from Neck Updated: 10/28/22 1046     Gram Stain Result No WBC's      No organisms seen    Narrative:      2) Prevertebral absess    Gram stain [[id number]] Collected: 10/28/22 0906    Order Status: Completed Specimen: Abscess from Back Updated: 10/28/22 1044     Gram Stain Result No WBC's      No organisms seen    Narrative:      Prevertebral Abscess    Blood culture #1 **CANNOT BE ORDERED STAT** [[id number]]  (Abnormal)  (Susceptibility) Collected: 10/25/22 0917    Order Status: Completed Specimen: Blood from Peripheral, Antecubital, Right Updated: 10/28/22 1040     Blood Culture, Routine Gram stain yumi bottle: Gram positive cocci in clusters resembling Staph      Results called to and read back by: Jaxon Allen RN  17:39  10/26/2022      STAPHYLOCOCCUS AUREUS    Fungus culture [[id number]] Collected: 10/28/22 0950    Order Status: Sent Specimen: Wound from Neck Updated: 10/28/22 1007    Fungus culture [[id number]] Collected: 10/28/22 0924    Order Status: Sent Specimen: Abscess from Neck Updated: 10/28/22 0937    Fungus culture [[id number]] Collected: 10/28/22  0906    Order Status: Sent Specimen: Abscess from Back Updated: 10/28/22 0914    Blood culture [[id number]]     Order Status: Canceled Specimen: Blood             Significant Imaging: I have reviewed all pertinent imaging results/findings within the past 24 hours.

## 2022-10-31 NOTE — PLAN OF CARE
[facility] Care Plan    POC reviewed with Peter Calderón Rosyraul and family at 1400. Pt unable to verbalize understanding. Questions and concerns addressed. No acute events today. Pt progressing toward goals. Will continue to monitor. See below and flowsheets for full assessment and VS info.   -courtney inserted per verbal orders for urinary retention/preop  -TFs restarted, diabetisource @ 10, goal 40  -home lyrica and methadone added to scheduled meds  -procedures rescheduled for wednesday  -neck circumferences dc'd per verbal orders from nsgy            Is this a stroke patient? no    Neuro:  Lucien Coma Scale  Best Eye Response: 4-->(E4) spontaneous  Best Motor Response: 6-->(M6) obeys commands  Best Verbal Response: 1-->(V1) none  Toyah Coma Scale Score: 11  Assessment Qualifiers: patient intubated, patient chemically sedated or paralyzed  Pupil PERRLA: yes     24 hr Temp:  [97.8 °F (36.6 °C)-98.4 °F (36.9 °C)]     CV:   Rhythm: normal sinus rhythm, sinus bradycardia  BP goals:   SBP < 160  MAP > 65    Resp:   O2 Device (Oxygen Therapy): ventilator  Vent Mode: A/C  Set Rate: 14 BPM  Oxygen Concentration (%): 40  Vt Set: 450 mL  PEEP/CPAP: 5 cmH20  Pressure Support: 10 cmH20    Plan: wean to extubate    GI/:     Diet/Nutrition Received: tube feeding  Last Bowel Movement: 10/27/22  Voiding Characteristics: urethral catheter (bladder)    Intake/Output Summary (Last 24 hours) at 10/31/2022 1834  Last data filed at 10/31/2022 1805  Gross per 24 hour   Intake 1505.61 ml   Output 1090 ml   Net 415.61 ml     Unmeasured Output  Stool Occurrence: 0    Labs/Accuchecks:  Recent Labs   Lab 10/31/22  0030   WBC 11.74   RBC 2.81*   HGB 8.6*   HCT 26.3*         Recent Labs   Lab 10/31/22  0030      K 3.8   CO2 26      BUN 32*   CREATININE 0.8   ALKPHOS 115   ALT 15   AST 17   BILITOT 0.1      Recent Labs   Lab 10/26/22  0223   INR 1.0   APTT 29.6      Recent Labs   Lab 10/25/22  0343   CPK 15*   TROPONINI <0.006        Electrolytes: N/A - electrolytes WDL  Accuchecks: Q4H    Gtts:   fentanyl 112.5 mcg/hr (10/31/22 1805)    propofoL 20 mcg/kg/min (10/31/22 1805)       LDA/Wounds:  Lines/Drains/Airways       Drain  Duration                  Closed/Suction Drain 10/28/22 1129 Anterior Neck Accordion 10 Fr. 3 days         NG/OG Tube 10/29/22 1000 Left nostril 2 days         Urethral Catheter 10/31/22 1618 <1 day              Airway  Duration                  Airway - Non-Surgical 10/28/22 1245 Endotracheal Tube 3 days              Peripheral Intravenous Line  Duration                  Peripheral IV - Single Lumen 10/28/22 1300 18 G Anterior;Right Shoulder 3 days         Peripheral IV - Single Lumen 10/28/22 1300 22 G Anterior;Left Foot 3 days         Peripheral IV - Single Lumen 10/28/22 1300 22 G Anterior;Right Foot 3 days         Midline Catheter Insertion/Assessment  - Single Lumen 10/28/22 1858 Right basilic vein (medial side of arm) 18g x 10cm 2 days                  Wounds: Yes  Wound care consulted: No

## 2022-10-31 NOTE — ASSESSMENT & PLAN NOTE
Peter Stiles is a 51 y.o. female with PMHx of HTN, dilated cardiomyopathy, h/o opioid dependence, cigarette smoker (2pks/day), and daily baby ASA use who presents with 1 day of decreased sensation from T5 level down and tingling in her bilateral fingers and bilateral toes. MRI imaging obtained in the ED showing possible C5-6 osteodiscitis/myelitis with cord compression. Patient tachycardic and hypertensive on arrival, afebrile, with leukocytosis.     Now s/p C5-6 corpectomy on 10/28/22    -All pertinent imaging/labs personally reviewed and interpreted.    -MRI C/T/L spine w/o contrast 10/25: focal kyphotic deformity at C5/6 with possible discitis/osteomyelitis, epidural collection extending into the central spinal canal resulting in severe stenosis and cord signal abnormality.  Prevertebral soft tissue edema and probable paravertebral abscess or phlegmon at this level. Thoracic and lumbar spine unremarkable.    -MRI C spine w/ contrast 10/25: confirmed enhancement at C5/6 consistent with osteomyelitis/discitis and epidural phlegmon   -BCx 10/25 growing S. Aureus, f/u sensitivities. Repeat BCx's 10/27 pending/NGTD.   -OR cultures 10./28 no organisms on gram stain, culture pending    -Post op xrays, hardware in good position  Admitted to [facility] on admission for MAP goals, maintained independently  Stepped down to floor under  service on 10/26    Plan:  -Neurochecks Q4h on floor; q1h in ICU  -Continue C collar for now  -Pain control as needed  -Will need 2nd stage of surgery with posterior decompression/instrumentation, tentative 11/1  -Abx: Oxacillin  -Patiño   -Keep intubated for OR tomorrow  -Please call NSGY with any questions/concerns    Dispo: ICU

## 2022-10-31 NOTE — PROGRESS NOTES
[facility]  Neurocritical Care  Progress Note    Admit Date: 10/25/2022  Service Date: 10/31/2022  Length of Stay: 6    Subjective:     Chief Complaint: Osteomyelitis of cervical spine    History of Present Illness: Pt is a 52 yo female with PMHx of HTN, dilated cardiomyopathy and remote opioid dependence who presents to the ED with neck and back pain and lower body numbness. She first noted the symptoms 2 weeks ago when she was in the car with her daughter. Her daughter slammed on the breaks and the patient noted a soreness in her neck. Since then, she has had increasing neck pain. This morning she noted numbess to her stomach and below and pain in her legs. She also endorses tingling to her fingers. WBC elevated and MRI spine revealed C5-6 osteomyelitis, discitis and epidural collection. She denies IVDU. Since MRI showed narrowing and cord signal abnormality she will be admitted to [facility] for MAP goals until surgery.       Hospital Course: 10/26/2022 Electrolytes replaced, Courtney placed, and Plan for OR on 10/28/22  10/28/22: s/p corpectomy and fusion  10/29/2022L place susan tube, start TFm d.c courtney cath, d/c A- line, nutrition consult  10/30/2022: NPO after MN for LISHA tomorrow, plan for OR w/ NSGY on Tuesday. Weaning parameters, atropine SL added for oral secretions, Miralax  10/31/2022: LISHA and NSGY intervention postponed to Wensesday, start TF          Review of Systems  Unable to obtain a complete ROS due to level of consciousness.  Objective:     Vitals:  Temp: 97.8 °F (36.6 °C)  Pulse: 60  Rhythm: normal sinus rhythm, sinus bradycardia  BP: (!) 98/54  MAP (mmHg): 74  Resp: 14  SpO2: 99 %  Oxygen Concentration (%): 40  O2 Device (Oxygen Therapy): ventilator  Vent Mode: A/C  Set Rate: 14 BPM  Vt Set: 450 mL  PEEP/CPAP: 5 cmH20  Peak Airway Pressure: 27 cmH2O  Mean Airway Pressure: 8.9 cmH20  Plateau Pressure: 0 cmH20    Temp  Min: 97.8 °F (36.6 °C)  Max: 98.4 °F (36.9 °C)  Pulse  Min: 55  Max:  89  BP  Min: 88/53  Max: 129/66  MAP (mmHg)  Min: 67  Max: 92  Resp  Min: 14  Max: 25  SpO2  Min: 97 %  Max: 100 %  Oxygen Concentration (%)  Min: 40  Max: 40    10/30 0701 - 10/31 0700  In: 1677 [I.V.:621.5]  Out: 1125 [Urine:1125]   Unmeasured Output  Stool Occurrence: 0       Physical Exam  GA: Alert, comfortable, no acute distress.   HEENT: No scleral icterus or JVD.   Pulmonary: Clear to auscultation A/L.  Cardiac: RRR S1 & S2 w/o rubs/murmurs/gallops.   Abdominal: Bowel sounds present x 4. No appreciable hepatosplenomegaly.  Skin: No jaundice, rashes, or visible lesions.  Neuro:  --GCS: E4 Vt1 M6  --Mental Status:  awake, oriented, nods appropriately, follows commands  --CN II-XII grossly intact.   --Pupils 3mm, PERRL.   --Corneal reflex, gag, cough intact.  --VILLAGRAN spont    Medications:  Continuousfentanyl, Last Rate: 112.5 mcg/hr (10/31/22 1605)  propofoL, Last Rate: 20 mcg/kg/min (10/31/22 1605)    Scheduledatropine 1%, 1 drop, TID  famotidine, 20 mg, BID  methadone, 90 mg, Daily  nicotine, 1 patch, Daily  oxacillin 12 g in  mL CONTINUOUS INFUSION, 12 g, Q24H  polyethylene glycol, 17 g, Daily  pregabalin, 150 mg, BID  senna-docusate 8.6-50 mg, 1 tablet, BID    PRNacetaminophen, 650 mg, Q6H PRN  dextrose 10%, 12.5 g, PRN  dextrose 10%, 25 g, PRN  diazePAM, 5 mg, Q8H PRN  glucagon (human recombinant), 1 mg, PRN  HYDROmorphone, 1 mg, Q4H PRN  hydrOXYzine HCL, 25 mg, TID PRN  insulin aspart U-100, 1-10 Units, Q4H PRN  labetalol, 10 mg, Q4H PRN  ondansetron, 4 mg, Q8H PRN  oxyCODONE, 10 mg, Q4H PRN  sodium chloride 0.9%, 10 mL, PRN      Today I personally reviewed pertinent medications, lines/drains/airways, imaging, cardiology results, laboratory results, microbiology results,    Diet  Diet NPO Except for: Medication        Assessment/Plan:     Neuro  Spinal cord compression  2/2 to osteomyelitis, discitis and epidural abscess  -NSGY following   -Q 1 neuro checks   -Q 1 vitals   -c-collar   -PT/OT   plan to  go to OR on Wednesday for the 2nd portion of surgery    Psychiatric  Opioid use disorder, severe, on maintenance therapy, dependence  Methadone prescribed at [facility] on [facility]       Cardiac/Vascular  Dilated cardiomyopathy  Hx of, but echo today 55% EF and normal size chambers    The left ventricle is normal in size with normal systolic function. The estimated ejection fraction is 55%.   Normal left ventricular diastolic function.   Normal right ventricular size with normal right ventricular systolic function.   Mild tricuspid regurgitation.   The estimated PA systolic pressure is 20 mmHg.   Normal central venous pressure (3 mmHg).  LISHA was rescheduled for wednesday         ID  * Osteomyelitis of cervical spine  See epidural abscess  S/p POD# corpectomy and fusion   plan to go back to IR for the second part of the procedure w/ NSGY  On Wednesday    MSSA bacteremia  ID following  On Oxacillin   Plan for LISHA on Wednesday    Epidural abscess  C5-6, unknown source at this time, possibly from wounds on arms  -NSGY following,  -broad spectrum ABX   -blood cultures NGTD  -UA neg      Other  Tobacco abuse  Nicotine patch prn           The patient is being Prophylaxed for:  Venous Thromboembolism with: Chemical  Stress Ulcer with: H2B  Ventilator Pneumonia with: Chlorhexidine            Activity Orders          Diet NPO Except for: Medication: NPO starting at 10/31 1635    Turn patient starting at 10/25 1200    Elevate HOB starting at 10/25 1108        Full Code    Anali Kauffman NP  Neurocritical Care  [facility] Critical [facility]

## 2022-10-31 NOTE — ASSESSMENT & PLAN NOTE
2/2 to osteomyelitis, discitis and epidural abscess  -NSGY following   -Q 1 neuro checks   -Q 1 vitals   -c-collar   -PT/OT   plan to go to OR on Wednesday for the 2nd portion of surgery

## 2022-11-01 ENCOUNTER — ANESTHESIA (OUTPATIENT)
Dept: SURGERY | Facility: HOSPITAL | Age: 52
DRG: 003 | End: 2022-11-01
Payer: MEDICARE

## 2022-11-01 LAB
ABO + RH BLD: NORMAL
ALBUMIN SERPL BCP-MCNC: 2.3 G/DL (ref 3.5–5.2)
ALLENS TEST: ABNORMAL
ALP SERPL-CCNC: 144 U/L (ref 55–135)
ALT SERPL W/O P-5'-P-CCNC: 22 U/L (ref 10–44)
ANION GAP SERPL CALC-SCNC: 7 MMOL/L (ref 8–16)
APTT BLDCRRT: 26 SEC (ref 21–32)
AST SERPL-CCNC: 30 U/L (ref 10–40)
BACTERIA BLD CULT: NORMAL
BACTERIA BLD CULT: NORMAL
BACTERIA SPEC AEROBE CULT: ABNORMAL
BACTERIA SPEC AEROBE CULT: ABNORMAL
BACTERIA SPEC ANAEROBE CULT: NORMAL
BASOPHILS # BLD AUTO: 0.02 K/UL (ref 0–0.2)
BASOPHILS NFR BLD: 0.2 % (ref 0–1.9)
BILIRUB SERPL-MCNC: 0.3 MG/DL (ref 0.1–1)
BLD GP AB SCN CELLS X3 SERPL QL: NORMAL
BUN SERPL-MCNC: 24 MG/DL (ref 6–20)
CALCIUM SERPL-MCNC: 8.6 MG/DL (ref 8.7–10.5)
CHLORIDE SERPL-SCNC: 106 MMOL/L (ref 95–110)
CO2 SERPL-SCNC: 27 MMOL/L (ref 23–29)
CREAT SERPL-MCNC: 0.8 MG/DL (ref 0.5–1.4)
DIFFERENTIAL METHOD: ABNORMAL
EOSINOPHIL # BLD AUTO: 0.2 K/UL (ref 0–0.5)
EOSINOPHIL NFR BLD: 1.9 % (ref 0–8)
ERYTHROCYTE [DISTWIDTH] IN BLOOD BY AUTOMATED COUNT: 12.5 % (ref 11.5–14.5)
EST. GFR  (NO RACE VARIABLE): >60 ML/MIN/1.73 M^2
GLUCOSE SERPL-MCNC: 90 MG/DL (ref 70–110)
HCO3 UR-SCNC: 28.8 MMOL/L (ref 24–28)
HCT VFR BLD AUTO: 26.2 % (ref 37–48.5)
HGB BLD-MCNC: 8.5 G/DL (ref 12–16)
IMM GRANULOCYTES # BLD AUTO: 0.11 K/UL (ref 0–0.04)
IMM GRANULOCYTES NFR BLD AUTO: 1 % (ref 0–0.5)
INR PPP: 1 (ref 0.8–1.2)
LYMPHOCYTES # BLD AUTO: 3.9 K/UL (ref 1–4.8)
LYMPHOCYTES NFR BLD: 35.4 % (ref 18–48)
MAGNESIUM SERPL-MCNC: 2.3 MG/DL (ref 1.6–2.6)
MCH RBC QN AUTO: 30.6 PG (ref 27–31)
MCHC RBC AUTO-ENTMCNC: 32.4 G/DL (ref 32–36)
MCV RBC AUTO: 94 FL (ref 82–98)
MONOCYTES # BLD AUTO: 0.7 K/UL (ref 0.3–1)
MONOCYTES NFR BLD: 6.7 % (ref 4–15)
NEUTROPHILS # BLD AUTO: 6 K/UL (ref 1.8–7.7)
NEUTROPHILS NFR BLD: 54.8 % (ref 38–73)
NRBC BLD-RTO: 0 /100 WBC
PCO2 BLDA: 45.8 MMHG (ref 35–45)
PH SMN: 7.41 [PH] (ref 7.35–7.45)
PHOSPHATE SERPL-MCNC: 4 MG/DL (ref 2.7–4.5)
PLATELET # BLD AUTO: 350 K/UL (ref 150–450)
PMV BLD AUTO: 9.1 FL (ref 9.2–12.9)
PO2 BLDA: 48 MMHG (ref 40–60)
POC BE: 4 MMOL/L
POC SATURATED O2: 83 % (ref 95–100)
POC TCO2: 30 MMOL/L (ref 24–29)
POCT GLUCOSE: 104 MG/DL (ref 70–110)
POCT GLUCOSE: 115 MG/DL (ref 70–110)
POCT GLUCOSE: 84 MG/DL (ref 70–110)
POCT GLUCOSE: 89 MG/DL (ref 70–110)
POTASSIUM SERPL-SCNC: 3.7 MMOL/L (ref 3.5–5.1)
PROT SERPL-MCNC: 6.6 G/DL (ref 6–8.4)
PROTHROMBIN TIME: 10.4 SEC (ref 9–12.5)
RBC # BLD AUTO: 2.78 M/UL (ref 4–5.4)
SAMPLE: ABNORMAL
SITE: ABNORMAL
SODIUM SERPL-SCNC: 140 MMOL/L (ref 136–145)
TRIGL SERPL-MCNC: 197 MG/DL (ref 30–150)
WBC # BLD AUTO: 10.88 K/UL (ref 3.9–12.7)

## 2022-11-01 PROCEDURE — 25000003 PHARM REV CODE 250: Performed by: PHYSICIAN ASSISTANT

## 2022-11-01 PROCEDURE — 63600175 PHARM REV CODE 636 W HCPCS: Performed by: INTERNAL MEDICINE

## 2022-11-01 PROCEDURE — 94003 VENT MGMT INPAT SUBQ DAY: CPT

## 2022-11-01 PROCEDURE — 25000003 PHARM REV CODE 250: Performed by: NURSE PRACTITIONER

## 2022-11-01 PROCEDURE — S4991 NICOTINE PATCH NONLEGEND: HCPCS | Performed by: PHYSICIAN ASSISTANT

## 2022-11-01 PROCEDURE — 20000000 HC ICU ROOM

## 2022-11-01 PROCEDURE — 84100 ASSAY OF PHOSPHORUS: CPT | Performed by: PHYSICIAN ASSISTANT

## 2022-11-01 PROCEDURE — 99900035 HC TECH TIME PER 15 MIN (STAT)

## 2022-11-01 PROCEDURE — 99900026 HC AIRWAY MAINTENANCE (STAT)

## 2022-11-01 PROCEDURE — 85610 PROTHROMBIN TIME: CPT | Performed by: STUDENT IN AN ORGANIZED HEALTH CARE EDUCATION/TRAINING PROGRAM

## 2022-11-01 PROCEDURE — 82803 BLOOD GASES ANY COMBINATION: CPT

## 2022-11-01 PROCEDURE — 85025 COMPLETE CBC W/AUTO DIFF WBC: CPT | Performed by: PHYSICIAN ASSISTANT

## 2022-11-01 PROCEDURE — 99233 PR SUBSEQUENT HOSPITAL CARE,LEVL III: ICD-10-PCS | Mod: ,,, | Performed by: STUDENT IN AN ORGANIZED HEALTH CARE EDUCATION/TRAINING PROGRAM

## 2022-11-01 PROCEDURE — 27000221 HC OXYGEN, UP TO 24 HOURS

## 2022-11-01 PROCEDURE — 63600175 PHARM REV CODE 636 W HCPCS: Performed by: NURSE PRACTITIONER

## 2022-11-01 PROCEDURE — 99233 SBSQ HOSP IP/OBS HIGH 50: CPT | Mod: ,,, | Performed by: STUDENT IN AN ORGANIZED HEALTH CARE EDUCATION/TRAINING PROGRAM

## 2022-11-01 PROCEDURE — 25000003 PHARM REV CODE 250: Performed by: PSYCHIATRY & NEUROLOGY

## 2022-11-01 PROCEDURE — 27200966 HC CLOSED SUCTION SYSTEM

## 2022-11-01 PROCEDURE — 80053 COMPREHEN METABOLIC PANEL: CPT | Performed by: PHYSICIAN ASSISTANT

## 2022-11-01 PROCEDURE — 94761 N-INVAS EAR/PLS OXIMETRY MLT: CPT

## 2022-11-01 PROCEDURE — 84478 ASSAY OF TRIGLYCERIDES: CPT | Performed by: PSYCHIATRY & NEUROLOGY

## 2022-11-01 PROCEDURE — 83735 ASSAY OF MAGNESIUM: CPT | Performed by: PHYSICIAN ASSISTANT

## 2022-11-01 PROCEDURE — 86850 RBC ANTIBODY SCREEN: CPT | Performed by: STUDENT IN AN ORGANIZED HEALTH CARE EDUCATION/TRAINING PROGRAM

## 2022-11-01 PROCEDURE — 99291 PR CRITICAL CARE, E/M 30-74 MINUTES: ICD-10-PCS | Mod: ,,, | Performed by: PSYCHIATRY & NEUROLOGY

## 2022-11-01 PROCEDURE — 85347 COAGULATION TIME ACTIVATED: CPT

## 2022-11-01 PROCEDURE — 85730 THROMBOPLASTIN TIME PARTIAL: CPT | Performed by: STUDENT IN AN ORGANIZED HEALTH CARE EDUCATION/TRAINING PROGRAM

## 2022-11-01 PROCEDURE — 99291 CRITICAL CARE FIRST HOUR: CPT | Mod: ,,, | Performed by: PSYCHIATRY & NEUROLOGY

## 2022-11-01 PROCEDURE — 25000003 PHARM REV CODE 250: Performed by: INTERNAL MEDICINE

## 2022-11-01 RX ORDER — INSULIN ASPART 100 [IU]/ML
0-5 INJECTION, SOLUTION INTRAVENOUS; SUBCUTANEOUS EVERY 6 HOURS PRN
Status: DISCONTINUED | OUTPATIENT
Start: 2022-11-01 | End: 2022-11-04

## 2022-11-01 RX ORDER — PHENAZOPYRIDINE HYDROCHLORIDE 200 MG/1
200 TABLET, FILM COATED ORAL 3 TIMES DAILY
Status: DISCONTINUED | OUTPATIENT
Start: 2022-11-01 | End: 2022-11-01

## 2022-11-01 RX ORDER — PHENAZOPYRIDINE HYDROCHLORIDE 200 MG/1
200 TABLET, FILM COATED ORAL 3 TIMES DAILY
Status: DISPENSED | OUTPATIENT
Start: 2022-11-01 | End: 2022-11-03

## 2022-11-01 RX ORDER — BISACODYL 10 MG
10 SUPPOSITORY, RECTAL RECTAL DAILY
Status: DISCONTINUED | OUTPATIENT
Start: 2022-11-01 | End: 2022-11-10

## 2022-11-01 RX ORDER — GLUCAGON 1 MG
1 KIT INJECTION
Status: DISCONTINUED | OUTPATIENT
Start: 2022-11-01 | End: 2022-11-21 | Stop reason: HOSPADM

## 2022-11-01 RX ADMIN — SENNOSIDES AND DOCUSATE SODIUM 1 TABLET: 50; 8.6 TABLET ORAL at 09:11

## 2022-11-01 RX ADMIN — OXACILLIN 12 G: 2 INJECTION, POWDER, FOR SOLUTION INTRAMUSCULAR; INTRAVENOUS at 03:11

## 2022-11-01 RX ADMIN — OXYCODONE HYDROCHLORIDE 10 MG: 10 TABLET ORAL at 04:11

## 2022-11-01 RX ADMIN — FAMOTIDINE 20 MG: 20 TABLET ORAL at 09:11

## 2022-11-01 RX ADMIN — PROPOFOL 25 MCG/KG/MIN: 10 INJECTION, EMULSION INTRAVENOUS at 10:11

## 2022-11-01 RX ADMIN — DIAZEPAM 5 MG: 5 TABLET ORAL at 07:11

## 2022-11-01 RX ADMIN — PHENAZOPYRIDINE 200 MG: 200 TABLET ORAL at 11:11

## 2022-11-01 RX ADMIN — PHENAZOPYRIDINE 200 MG: 200 TABLET ORAL at 09:11

## 2022-11-01 RX ADMIN — BISACODYL 10 MG: 10 SUPPOSITORY RECTAL at 10:11

## 2022-11-01 RX ADMIN — OXYCODONE HYDROCHLORIDE 10 MG: 10 TABLET ORAL at 07:11

## 2022-11-01 RX ADMIN — PREGABALIN 150 MG: 75 CAPSULE ORAL at 09:11

## 2022-11-01 RX ADMIN — POLYETHYLENE GLYCOL 3350 17 G: 17 POWDER, FOR SOLUTION ORAL at 08:11

## 2022-11-01 RX ADMIN — PROPOFOL 15 MCG/KG/MIN: 10 INJECTION, EMULSION INTRAVENOUS at 04:11

## 2022-11-01 RX ADMIN — Medication 125 MCG/HR: at 09:11

## 2022-11-01 RX ADMIN — PREGABALIN 150 MG: 75 CAPSULE ORAL at 08:11

## 2022-11-01 RX ADMIN — OXYCODONE HYDROCHLORIDE 10 MG: 10 TABLET ORAL at 03:11

## 2022-11-01 RX ADMIN — SENNOSIDES AND DOCUSATE SODIUM 1 TABLET: 50; 8.6 TABLET ORAL at 08:11

## 2022-11-01 RX ADMIN — PHENAZOPYRIDINE 200 MG: 200 TABLET ORAL at 03:11

## 2022-11-01 RX ADMIN — Medication 1 PATCH: at 08:11

## 2022-11-01 RX ADMIN — FAMOTIDINE 20 MG: 20 TABLET ORAL at 08:11

## 2022-11-01 RX ADMIN — METHADONE HYDROCHLORIDE 90 MG: 10 TABLET ORAL at 03:11

## 2022-11-01 RX ADMIN — PROPOFOL 0 MCG/KG/MIN: 10 INJECTION, EMULSION INTRAVENOUS at 08:11

## 2022-11-01 NOTE — ASSESSMENT & PLAN NOTE
Hx of, but echo today 55% EF and normal size chambers    The left ventricle is normal in size with normal systolic function. The estimated ejection fraction is 55%.   Normal left ventricular diastolic function.   Normal right ventricular size with normal right ventricular systolic function.   Mild tricuspid regurgitation.   The estimated PA systolic pressure is 20 mmHg.   Normal central venous pressure (3 mmHg).   after neurosurgical intervention

## 2022-11-01 NOTE — ASSESSMENT & PLAN NOTE
52 yo F with PMHx of HTN, dilated cardiomyopathy and remote opioid dependence (on methadone as an outpatient) presented on 10/25 with neck and back pain and lower body numbness and was found to have MSSA bacteremia due to C5-6 osteomyelitis, discitis and epidural collection. Suspects infection could have started at site of cat scratch on her back or burn wounds on b/l arms. TTE negative and repeat blcx ngtd. Now s/p corpectomy/ fusion and epidural abscess evacuation on 10/28. Remains intubated. LISHA and surgery tentatively planned for 11/2.     Recommendations:  -continue oxacillin given cx data  -surgical disposition/LISHA pending, tentatively planned for 11/2  -follow up cx data

## 2022-11-01 NOTE — PLAN OF CARE
[facility] Care Plan    POC reviewed with Peter Stiles and family at 1400. Pt verbalized understanding. Questions and concerns addressed. No acute events today. Pt progressing toward goals. Will continue to monitor. See below and flowsheets for full assessment and VS info.     -posterior fusion scheduled for 1030am tmr  -LISHA following fusion at 1pm  -suppository given   -c collar changed   -PIVs replaced  -full bath and linen change        Is this a stroke patient? no    Neuro:  Lucien Coma Scale  Best Eye Response: 4-->(E4) spontaneous  Best Motor Response: 6-->(M6) obeys commands  Best Verbal Response: 1-->(V1) none  Lucien Coma Scale Score: 11  Assessment Qualifiers: patient intubated, patient chemically sedated or paralyzed  Pupil PERRLA: yes     24 hr Temp:  [97.5 °F (36.4 °C)-98.9 °F (37.2 °C)]     CV:   Rhythm: normal sinus rhythm, sinus bradycardia  BP goals:   SBP < 160  MAP > 65    Resp:   O2 Device (Oxygen Therapy): ventilator  Vent Mode: A/C  Set Rate: 14 BPM  Oxygen Concentration (%): 40  Vt Set: 450 mL  PEEP/CPAP: 5 cmH20  Pressure Support: 10 cmH20    Plan: wean to extubate    GI/:     Diet/Nutrition Received: NPO, tube feeding  Last Bowel Movement: 10/27/22  Voiding Characteristics: urethral catheter (bladder)    Intake/Output Summary (Last 24 hours) at 11/1/2022 1832  Last data filed at 11/1/2022 1805  Gross per 24 hour   Intake 1888.87 ml   Output 1713 ml   Net 175.87 ml     Unmeasured Output  Urine Occurrence: 1  Stool Occurrence: 0  Pad Count: 1    Labs/Accuchecks:  Recent Labs   Lab 11/01/22  0019   WBC 10.88   RBC 2.78*   HGB 8.5*   HCT 26.2*         Recent Labs   Lab 11/01/22  0019      K 3.7   CO2 27      BUN 24*   CREATININE 0.8   ALKPHOS 144*   ALT 22   AST 30   BILITOT 0.3      Recent Labs   Lab 10/26/22  0223   INR 1.0   APTT 29.6    No results for input(s): CPK, CPKMB, TROPONINI, MB in the last 168 hours.    Electrolytes: No replacement orders  Accuchecks:  Q4H    Gtts:   fentanyl 125 mcg/hr (11/01/22 1805)    propofoL 15 mcg/kg/min (11/01/22 1805)       LDA/Wounds:  Lines/Drains/Airways       Drain  Duration                  Closed/Suction Drain 10/28/22 1129 Anterior Neck Accordion 10 Fr. 4 days         NG/OG Tube 10/29/22 1000 Left nostril 3 days         Urethral Catheter 10/31/22 1618 1 day              Airway  Duration                  Airway - Non-Surgical 10/28/22 1245 Endotracheal Tube 4 days              Peripheral Intravenous Line  Duration                  Peripheral IV - Single Lumen 10/28/22 1300 18 G Anterior;Right Shoulder 4 days         Peripheral IV - Single Lumen 10/28/22 1300 22 G Anterior;Left Foot 4 days         Peripheral IV - Single Lumen 10/28/22 1300 22 G Anterior;Right Foot 4 days         Midline Catheter Insertion/Assessment  - Single Lumen 10/28/22 1858 Right basilic vein (medial side of arm) 18g x 10cm 3 days         Peripheral IV - Single Lumen 11/01/22 1206 18 G;3/4 in Anterior;Proximal;Right Upper Arm <1 day         Peripheral IV - Single Lumen 11/01/22 1417 18 G Anterior;Left Forearm <1 day                  Wounds: Yes  Wound care consulted: Yes

## 2022-11-01 NOTE — PHYSICIAN QUERY
"PT Name: Peter Stiles  MR #: [id number]     DOCUMENTATION CLARIFICATION     CDS/: Fawn Euceda RN, CDS              Contact information: [email or web address]   This form is a permanent document in the medical record.     Query Date: November 1, 2022    By submitting this query, we are merely seeking further clarification of documentation.  Please utilize your independent clinical judgment when addressing the question(s) below.  The Medical Record contains the following:  Indicators Supporting Clinical Findings Location in Medical Record   x HR         RR          BP        Temp --Temp: 98.1->98.9  --HR: 70's-90's VS flowsheets 10/24->11/1    Lactic Acid          Procalcitonin      WBC           Bands          CRP      x Culture(s) --Blood Culture, Routine STAPHYLOCOCCUS AUREUS Abnormal     --Culture  Abnormal STAPHYLOCOCCUS AUREUS Rare      Bl cx 10/25    Neck wound/Back Abscess cx 10/28    AMS, Confusion, LOC, etc.      Organ Dysfunction/Failure     x Bacteremia or Sepsis / Septic --admitted with Staph aureus bacteremia & C5-C6 discitis/osteomyelitis with epidural collection, severe central stenosis, cord signal abnormality, and possible paravertebral abscess  --Bacteremia  -blood cultures on 10/25 positive for S aureus  -blood cultures 10/27 pending  -ID consulted: appreciate recommendations  -please see "osteomyelitis"    --Epidural abscess  --s aureus bacteremia    --PREOPERATIVE DIAGNOSIS:  1. C5/6 osteodiscitis with epidural phlegmon  4. Staph bacteremia    --MSSA bacteremia  ID following  On Oxacillin    Hosp med Note 10/27 (Chidi Baltazar/Brian)                ID c/s 10/27      Op-Note 10/28        NCC Note 10/29-> 10/31   x Known or Suspected Source of Infection documented --found to have staph aureus bacteremia due to C5-6 osteomyelitis, discitis and epidural collection. Suspects infection could have started at site of cat scratch on her back or burn wounds on b/l arms.   ID c/s " 10/27    (Failed) Outpatient Treatment     x Medication --continue Rocephin and vancomycin      --patient transitioned from vanc/ceftriaxone to oxacillin Hosp med Note 10/27 (Chidi Baltazar/Brian)    Hosp med Note 10/28     x Treatment --PROCEDURE PERFORMED:  1. Anterior cervical corpectomy C5/6  2. Resection of epidural phlegmon C4-7  3. Anterior cervical plating C4-7   Op-Note 10/28   x Other --Osteomyelitis of cervical spine      -as well as epidural collection  --Epidural abscess  -see osteomyelitis Hosp med Note 10/27 (Chidi Baltazar/Brian)        Provider, please specify diagnosis or diagnoses associated with above clinical findings.    [  x ] MSSA Bacteremia without Sepsis     [   ] MSSA Bacteremia with Sepsis     [   ] Other Infectious Disease (please specify): __________     [  ] Clinically Undetermined       Please document in your progress notes daily for the duration of treatment until resolved and include in your discharge summary.

## 2022-11-01 NOTE — PROGRESS NOTES
[facility]  Infectious Disease  Progress Note    Patient Name: Peter Stiles  MRN: [id number]  Admission Date: 10/25/2022  Length of Stay: 7 days  Attending Physician: Truong Bourne DO  Primary Care Provider: Og Victoria NP    Isolation Status: No active isolations  Assessment/Plan:      MSSA bacteremia  See epidural abscess    Epidural abscess  52 yo F with PMHx of HTN, dilated cardiomyopathy and remote opioid dependence (on methadone as an outpatient) presented on 10/25 with neck and back pain and lower body numbness and was found to have MSSA bacteremia due to C5-6 osteomyelitis, discitis and epidural collection. Suspects infection could have started at site of cat scratch on her back or burn wounds on b/l arms. TTE negative and repeat blcx ngtd. Now s/p corpectomy/ fusion and epidural abscess evacuation on 10/28. Remains intubated. LISHA and surgery tentatively planned for 11/2.     Recommendations:  -continue oxacillin for MSSA  -surgical disposition/LISHA pending, tentatively planned for 11/2  -follow up cx data            Thank you for your consult. I will follow-up with patient. Please contact us if you have any additional questions. Above d/w primary team.     Time: 35 minutes   50% of time spent on face-to-face counseling and coordination of care. Counseling included review of test results, diagnosis, and treatment plan with patient and/or family.        Beth Wagner MD  Infectious Disease  [facility] - Neuro Critical Care    Subjective:     Principal Problem:Osteomyelitis of cervical spine    HPI: Ms. Stiles is a 52 yo F with PMHx of HTN, dilated cardiomyopathy and remote opioid dependence who presented on 10/25 with neck and back pain and lower body numbness and was found to have C5-6 osteomyelitis, discitis and epidural collection.    She first noted the symptoms 2 weeks ago when she was in the car with her daughter. Her daughter slammed on the breaks and the patient  noted a soreness in her neck. Since then, she has had increasing neck pain. This morning she noted numbess to her stomach and below and pain in her legs. She also endorses tingling to her fingers. WBC elevated and MRI spine revealed C5-6 osteomyelitis, discitis and epidural collection. She denies IVDU.     Of note, pt reports wounds to her arms after burning herself and her cat with hot grease. She also notes a knot to her R upper back at the site where her cat scratched her. Denies prior back surgery .          Interval History: No fevers documented overnight.   OR rescheduled to 11/2. Remains intubated until then.       Review of Systems   Unable to perform ROS: Intubated   Objective:     Vital Signs (Most Recent):  Temp: 97.8 °F (36.6 °C) (11/01/22 0301)  Pulse: 94 (11/01/22 1247)  Resp: 19 (11/01/22 1247)  BP: (!) 90/53 (11/01/22 0601)  SpO2: 98 % (11/01/22 1247) Vital Signs (24h Range):  Temp:  [97.8 °F (36.6 °C)-98.9 °F (37.2 °C)] 97.8 °F (36.6 °C)  Pulse:  [56-94] 94  Resp:  [14-32] 19  SpO2:  [98 %-100 %] 98 %  BP: ()/(50-74) 90/53     Weight: 63.5 kg (139 lb 15.9 oz)  Body mass index is 22.6 kg/m².    Estimated Creatinine Clearance: 77 mL/min (based on SCr of 0.8 mg/dL).    Physical Exam  Constitutional:       General: She is not in acute distress.     Appearance: She is not ill-appearing or toxic-appearing.   HENT:      Head: Normocephalic and atraumatic.      Mouth/Throat:      Comments: ETT    Eyes:      General:         Right eye: No discharge.         Left eye: No discharge.   Cardiovascular:      Rate and Rhythm: Normal rate and regular rhythm.   Pulmonary:      Effort: Pulmonary effort is normal. No respiratory distress.      Breath sounds: No stridor. No wheezing or rhonchi.   Abdominal:      General: There is no distension.      Palpations: Abdomen is soft.      Tenderness: There is no abdominal tenderness. There is no guarding.   Skin:     General: Skin is warm and dry.      Coloration: Skin  is not jaundiced.      Findings: No bruising.      Comments: R midline  Neck drain  LUE/RUE - no drainage present       Significant Labs:   Microbiology Results (last 7 days)       Procedure Component Value Units Date/Time    Aerobic culture [[id number]]  (Abnormal)  (Susceptibility) Collected: 10/28/22 0906    Order Status: Completed Specimen: Abscess from Back Updated: 11/01/22 1059     Aerobic Bacterial Culture STAPHYLOCOCCUS AUREUS  Rare      Narrative:      Prevertebral Abscess    Aerobic culture [[id number]]  (Abnormal)  (Susceptibility) Collected: 10/28/22 0950    Order Status: Completed Specimen: Wound from Neck Updated: 11/01/22 1039     Aerobic Bacterial Culture STAPHYLOCOCCUS AUREUS  Rare      Narrative:      3) Osteodiscitis    Blood culture [[id number]] Collected: 10/27/22 0939    Order Status: Completed Specimen: Blood Updated: 11/01/22 1012     Blood Culture, Routine No growth after 5 days.    Narrative:      Rt wrist    Blood culture [[id number]] Collected: 10/27/22 0938    Order Status: Completed Specimen: Blood Updated: 11/01/22 1012     Blood Culture, Routine No growth after 5 days.    Narrative:      Rt AC    Blood culture [[id number]] Collected: 10/29/22 0623    Order Status: Completed Specimen: Blood from Peripheral, Foot, Left Updated: 11/01/22 0812     Blood Culture, Routine No Growth to date      No Growth to date      No Growth to date      No Growth to date    Blood culture [[id number]] Collected: 10/29/22 0621    Order Status: Completed Specimen: Blood from Peripheral, Foot, Right Updated: 11/01/22 0812     Blood Culture, Routine No Growth to date      No Growth to date      No Growth to date      No Growth to date    Culture, Anaerobe [[id number]] Collected: 10/28/22 1029    Order Status: Completed Specimen: Wound from Neck Updated: 11/01/22 0727     Anaerobic Culture Culture in progress    Narrative:      4) Epidural phlegman    Culture, Anaerobe [[id number]] Collected: 10/28/22 0950    Order  Status: Completed Specimen: Wound from Neck Updated: 11/01/22 0724     Anaerobic Culture No anaerobes isolated    Narrative:      3) Osteodiscitis    Culture, Anaerobe [[id number]] Collected: 10/28/22 0924    Order Status: Completed Specimen: Abscess from Neck Updated: 11/01/22 0722     Anaerobic Culture No anaerobes isolated    Narrative:      2) Prevertebral absess    Culture, Anaerobe [[id number]] Collected: 10/28/22 0906    Order Status: Completed Specimen: Abscess from Back Updated: 11/01/22 0722     Anaerobic Culture No anaerobes isolated    Narrative:      Prevertebral Abscess    AFB Culture & Smear [[id number]] Collected: 10/28/22 1029    Order Status: Completed Specimen: Wound from Neck Updated: 10/31/22 1250     AFB Culture & Smear Culture in progress     AFB CULTURE STAIN No acid fast bacilli seen.    Narrative:      4) Epidural phlegman    AFB Culture & Smear [[id number]] Collected: 10/28/22 0924    Order Status: Completed Specimen: Abscess from Neck Updated: 10/31/22 1250     AFB Culture & Smear Culture in progress     AFB CULTURE STAIN No acid fast bacilli seen.    Narrative:      2) Prevertebral absess    AFB Culture & Smear [[id number]] Collected: 10/28/22 0906    Order Status: Completed Specimen: Abscess from Back Updated: 10/31/22 1250     AFB Culture & Smear Culture in progress     AFB CULTURE STAIN No acid fast bacilli seen.    Narrative:      Prevertebral Abscess    AFB Culture & Smear [[id number]] Collected: 10/28/22 0950    Order Status: Completed Specimen: Wound from Neck Updated: 10/31/22 1250     AFB Culture & Smear Culture in progress     AFB CULTURE STAIN No acid fast bacilli seen.    Narrative:      3) Osteodiscitis    Fungus culture [[id number]] Collected: 10/28/22 0950    Order Status: Completed Specimen: Wound from Neck Updated: 10/31/22 0958     Fungus (Mycology) Culture Culture in progress    Narrative:      3) Osteodiscitis    Fungus culture [[id number]] Collected: 10/28/22 1029    Order  Status: Completed Specimen: Wound from Neck Updated: 10/31/22 0958     Fungus (Mycology) Culture Culture in progress    Narrative:      4) Epidural phlegman    Fungus culture [[id number]] Collected: 10/28/22 0906    Order Status: Completed Specimen: Abscess from Back Updated: 10/31/22 0958     Fungus (Mycology) Culture Culture in progress    Narrative:      Prevertebral Abscess    Fungus culture [[id number]] Collected: 10/28/22 0924    Order Status: Completed Specimen: Abscess from Neck Updated: 10/31/22 0958     Fungus (Mycology) Culture Culture in progress    Narrative:      2) Prevertebral absess    Aerobic culture [[id number]]  (Abnormal)  (Susceptibility) Collected: 10/28/22 0924    Order Status: Completed Specimen: Abscess from Neck Updated: 10/31/22 0928     Aerobic Bacterial Culture STAPHYLOCOCCUS AUREUS  Moderate      Narrative:      2) Prevertebral absess    Aerobic culture [[id number]] Collected: 10/28/22 1029    Order Status: Completed Specimen: Wound from Neck Updated: 10/31/22 0901     Aerobic Bacterial Culture No growth    Narrative:      4) Epidural phlegman    Blood culture #2 **CANNOT BE ORDERED STAT** [[id number]] Collected: 10/25/22 0917    Order Status: Completed Specimen: Blood from Peripheral, Wrist, Left Updated: 10/30/22 1012     Blood Culture, Routine No growth after 5 days.    Gram stain [[id number]] Collected: 10/28/22 0950    Order Status: Completed Specimen: Wound from Neck Updated: 10/28/22 1538     Gram Stain Result No WBC's      No organisms seen    Narrative:      3) Osteodiscitis    Gram stain [[id number]] Collected: 10/28/22 1029    Order Status: Completed Specimen: Wound from Neck Updated: 10/28/22 1423     Gram Stain Result No WBC's      No organisms seen    Narrative:      4) Epidural phlegman    Gram stain [[id number]] Collected: 10/28/22 0924    Order Status: Completed Specimen: Abscess from Neck Updated: 10/28/22 1046     Gram Stain Result No WBC's      No organisms seen     Narrative:      2) Prevertebral absess    Gram stain [[id number]] Collected: 10/28/22 0906    Order Status: Completed Specimen: Abscess from Back Updated: 10/28/22 1044     Gram Stain Result No WBC's      No organisms seen    Narrative:      Prevertebral Abscess    Blood culture #1 **CANNOT BE ORDERED STAT** [[id number]]  (Abnormal)  (Susceptibility) Collected: 10/25/22 0917    Order Status: Completed Specimen: Blood from Peripheral, Antecubital, Right Updated: 10/28/22 1040     Blood Culture, Routine Gram stain yumi bottle: Gram positive cocci in clusters resembling Staph      Results called to and read back by: Jaxon Allen RN  17:39  10/26/2022      STAPHYLOCOCCUS AUREUS            Significant Imaging: I have reviewed all pertinent imaging results/findings within the past 24 hours.

## 2022-11-01 NOTE — SUBJECTIVE & OBJECTIVE
Review of Systems  Unable to obtain a complete ROS due to level of consciousness.  Objective:     Vitals:  Temp: 97.8 °F (36.6 °C)  Pulse: 81  Rhythm: normal sinus rhythm, sinus bradycardia  BP: (!) 121/57  MAP (mmHg): 79  Resp: 14  SpO2: 99 %  Oxygen Concentration (%): 40  O2 Device (Oxygen Therapy): ventilator  Vent Mode: A/C  Set Rate: 14 BPM  Vt Set: 450 mL  PEEP/CPAP: 5 cmH20  Peak Airway Pressure: 47 cmH2O  Mean Airway Pressure: 14 cmH20  Plateau Pressure: 0 cmH20    Temp  Min: 97.5 °F (36.4 °C)  Max: 98.9 °F (37.2 °C)  Pulse  Min: 56  Max: 103  BP  Min: 89/51  Max: 149/86  MAP (mmHg)  Min: 68  Max: 125  Resp  Min: 14  Max: 46  SpO2  Min: 97 %  Max: 100 %  Oxygen Concentration (%)  Min: 40  Max: 40    10/31 0701 - 11/01 0700  In: 1365.2 [I.V.:499.8]  Out: 1590 [Urine:1585; Drains:5]   Unmeasured Output  Urine Occurrence: 1  Stool Occurrence: 0  Pad Count: 1       Physical Exam  GA: comfortable, no acute distress.   HEENT: No scleral icterus or JVD.   Pulmonary: Clear to auscultation A/L.   Cardiac: RRR S1 & S2 w/o rubs/murmurs/gallops.   Abdominal: Bowel sounds present x 4. No appreciable hepatosplenomegaly.  Skin: No jaundice, rashes, or visible lesions.  Neuro:  --GCS: E4 Vt1 M6  --Mental Status:  awake, follows commands, nods appropriately  --CN II-XII grossly intact.   --Pupils 3mm, PERRL.   --Corneal reflex, gag, cough intact.  --VILLAGRAN spont    Medications:  Continuousfentanyl, Last Rate: 125 mcg/hr (11/01/22 1405)  propofoL, Last Rate: 25 mcg/kg/min (11/01/22 1405)    ScheduledbisacodyL, 10 mg, Daily  famotidine, 20 mg, BID  methadone, 90 mg, Daily  nicotine, 1 patch, Daily  oxacillin 12 g in  mL CONTINUOUS INFUSION, 12 g, Q24H  phenazopyridine, 200 mg, TID  polyethylene glycol, 17 g, Daily  pregabalin, 150 mg, BID  senna-docusate 8.6-50 mg, 1 tablet, BID    PRNacetaminophen, 650 mg, Q6H PRN  dextrose 10%, 12.5 g, PRN  dextrose 10%, 25 g, PRN  diazePAM, 5 mg, Q8H PRN  glucagon (human  recombinant), 1 mg, PRN  HYDROmorphone, 1 mg, Q4H PRN  hydrOXYzine HCL, 25 mg, TID PRN  insulin aspart U-100, 1-10 Units, Q4H PRN  labetalol, 10 mg, Q4H PRN  ondansetron, 4 mg, Q8H PRN  oxyCODONE, 10 mg, Q4H PRN  sodium chloride 0.9%, 10 mL, PRN      Today I personally reviewed pertinent medications, lines/drains/airways, imaging, cardiology results, laboratory results, microbiology results,     Diet  Diet NPO Except for: Medication

## 2022-11-01 NOTE — ASSESSMENT & PLAN NOTE
[facility]  HOSPITAL MEDICINE PROGRESS NOTE    Patient: Mayur Darnell Today's Date: 6/19/2022   YOB: 1977 Admission Date: 6/17/2022  3:43 PM   MRN: [id number] Inpatient LOS: 0 day(s)   Room:  [address] Hospital Day:  Hospital Day: 3       History and Subjective complaints     Chief Complaint:  Decubitus ulcer with necrotizing tissue infection and osteomyelitis of ischial tuberosity, paraplegic status, suprapubic catheter in place    Interval history/Subjective and Overnight events:      Patient seen and examined by me in follow up.  Patient was seen together with surgical team that was changing dressing and packing wound.   A patient is complaining of left-sided chest pain since last night..  Informed consent for blood transfusion  was obtained from patient's POA- daughter over the phone-form is completed and placed in blue folder    Hospital Course  Patients interval history reviewed/EHR notes reviewed.   Mayur Darnell is a 45 year old male who presented on 6/17/2022 with complaints of Wound Infection         Reviewed Pertinent Histories: Medical History, Surgical History, Social History, Family History,     ROS: 12-point review of systems negative except as above.    Allergies-not known drug allergies    Medications: Reviewed     Scheduled Medications:    pantoprazole, 40 mg, QAM AC  acetic acid, , Daily  epoetin che-epbx, 10,000 Units, Once per day on Tue Thu Sat  cefepime (MAXIPIME) IVPB, 1,000 mg, Q24H  VANCOMYCIN - PHARMACIST MONITORED, , See Admin Instructions  ARIPiprazole, 20 mg, Daily  B complex-vitamin C-folic acid, 1 tablet, Daily  calcitRIOL, 0.25 mcg, Daily  ferrous sulfate, 325 mg, Daily with breakfast  gabapentin, 100 mg, Nightly  hydroCORTisone, , Daily  oxybutynin, 5 mg, TID  sertraline, 200 mg, Daily  rOPINIRole, 3 mg, Nightly  sodium chloride (PF), 2 mL, 2 times per day  heparin (porcine), 5,000 Units, 3 times per day  vancomycin (VANCOCIN) IVPB, 1,000 mg, Once per day on  a1c 6.1  SSI protocol    TF   Nutrition consult   [facility] Sat      Continuous Infusions:    Current Facility-Administered Medications   Medication Dose Route Frequency Provider Last Rate Last Admin   • sodium chloride 0.9% infusion   Intravenous Continuous PRN Ana Espinal MD         PRN Medications:    Current Facility-Administered Medications   Medication Dose Route Frequency Provider Last Rate Last Admin   • sodium chloride 0.9% infusion   Intravenous Continuous PRN Ana Espinal MD       • nitroGLYcerin (NITROSTAT) sublingual tablet 0.4 mg  0.4 mg Sublingual Q5 Min PRN nAa Espinal MD       • sodium chloride (NORMAL SALINE) 0.9 % bolus 100-200 mL  100-200 mL Intravenous PRN Maria Ines Rosales MD       • albumin human (SPA) 25 % injection 12.5 g  12.5 g Intravenous PRN Maria Ines Rosales MD   Completed at 06/19/22 0100   • acetaminophen (TYLENOL) tablet 650 mg  650 mg Oral Q4H PRN Dejon Viera MD       • tiZANidine (ZANAFLEX) tablet 2 mg  2 mg Oral Q8H PRN Dejon Viera MD       • sodium chloride 0.9 % flush bag 25 mL  25 mL Intravenous PRN Dejon Viera MD             Physical Examination       Vital 24 Hour Range Most Recent Value   Temperature Temp  Min: 97.1 °F (36.2 °C)  Max: 98.4 °F (36.9 °C) 98 °F (36.7 °C)   Pulse Pulse  Min: 56  Max: 83 77   Respiratory Resp  Min: 18  Max: 20 18   Blood Pressure BP  Min: 91/49  Max: 127/59 105/59   Pulse Oximetry SpO2  Min: 90 %  Max: 100 % 100 %   Arterial BP No data recorded     O2 O2 Flow Rate (L/min)  Avg: 3 L/min  Min: 3 L/min   Min taken time: 06/18/22 1535  Max: 3 L/min   Max taken time: 06/18/22 1535       Intake and Output:      Intake/Output Summary (Last 24 hours) at 6/19/2022 1020  Last data filed at 6/19/2022 0600  Gross per 24 hour   Intake 650 ml   Output 1700 ml   Net -1050 ml       Last Stool Occurrence:       Vital Most Recent Value First Value   Weight 113.7 kg (250 lb 10.6 oz) Weight: 104.3 kg (230 lb)   Height 5' 4\" (162.6 cm) Height: 5' 4\" (162.6 cm)   BMI 43.03 N/A     Physical Examination:          General:  Awake and alert oriented x3, in discomfort with wound dressing change ,   HEENT: NCAT, pupils appear equal, pink conjunctiva, sclera anicteric, oral mucosa moist without lesions, trachea midline  CV: normal S1S2, regular rate and rhythm, no MRG appreciated, pedal pulses 2+, no JVD  Resp: normal respiratory effort, no increased work of breathing, CTAB  Abd: soft, non-tender, non-distended, normoactive bowel sounds  Ext: no edema, no cyanosis, no clubbing  Neuro:Non-focal. Paraplegic   Psych: alert and oriented x3  Skin:  after extensive debridement 35x20 cm clean base without purulent discharge, several deep pockets packed    Media Information                Document Information    Photographic Image:  Photo/Graph/Diagram      06/18/2022 09:49   Attached To:   Hospital Encounter on 6/17/22     Source Information    [place] Gomez, ED  Slm 11t       Test Results     Labs: The Laboratory values listed below have been reviewed and pertinent findings discussed in the Assessment and Plan.    Laboratory values:   Recent Labs   Lab 06/19/22  0531 06/18/22  0517 06/17/22  1624   WBC 6.5 9.4 11.2*   HGB 6.8* 7.4* 7.5*   HCT 21.2* 24.0* 23.9*    254 253       Recent Labs   Lab 06/19/22  0531 06/18/22  0517 06/17/22  1624   SODIUM 136 130* 128*   POTASSIUM 3.7 4.8 4.6   CHLORIDE 105 101 95*   CO2 24 22 24   CALCIUM 8.0* 8.3* 8.5   GLUCOSE 110* 89 88   BUN 33* 67* 62*   CREATININE 5.13* 9.73* 9.55*   MG  --   --  1.9        Recent Labs   Lab 06/17/22  1624   CRP 19.0*       Radiology: Imaging studies have been reviewed and pertinent findings discussed in the Assessment and Plan.    Results for orders placed or performed during the hospital encounter of 06/17/22 (from the past 48 hour(s))   XR Pelvis 1 View    Impression    IMPRESSION:  Gas is visualized within the soft tissues adjacent to the initial  tuberosities bilaterally. There are associated osseous erosive changes,  right worse than left, most likely  due to osteomyelitis.       Tubes, Devices, Monitoring     Telemetry: Off      Rushing: Yes-suprapubic catheter    Assessment and Plan     Bilateral pressure ischial wound infection and ischial tuberosity osteomyelitis (R>L), present on admission, S/p debridement in OR 6/18/22  History of paraplegia complicated by spasticity  (wheelchair bound)  -Pelvic radiograph concerning for right ischial tuberosity osteomyelitis  -No evidence of sepsis per sepsis screening tool  -await tissue culture results  -Vanc, cefepime started in the ED . Will continue, pending infectious disease recommendation.   -General surgery is following  -continue with wound care    Chest pain-stat EKG, troponins, BNP, obtain 2D echo and chest x-ray, nitroglycerin as needed, ppi, pain control with Tylenol     ESRD (TThSat)  Neph consulted     Essential HTN  Hold home amlodipine due to reports of lower blood pressures at Wound care clinic     Acute on chronic anemia due to underlying ESRD  and iron deficiency  Hb is 6.8 after wound debridement  -discussed with patient daughter who is POA for healthcare the need for blood transfusion and informed consent was obtained, for completed and placed in blue folder   -will also start IV Venofer with Fe sat 14%    Recurrent urinary tract infection with MDR Escherichia coli secondary to suprapubic catheter in place  Antibiotic per ID    VTE ppx: heparin subq  Code status: Full Resuscitation (usual medical care including resuscitation, intubation, vasopressors, and transfer to the ICU if/when indicated)     Consults:    IP CONSULT TO GENERAL SURGERY  IP CONSULT TO NEPHROLOGY  PHARMACY TO DOSE AND MONITOR VANCOMYCIN  IP CONSULT TO WOUND CARE MEDICAL PROVIDER  IP CONSULT TO NUTRITION SERVICES  IP CONSULT TO INFECTIOUS DISEASES    Diet:  Renal (2400mg Na+, 60meq K+, 1000mg P) Diet  Daily W Breakfast; Ensure High Protein/high Protein Low Carbohydrate Supplement, Vanilla (cold) Oral Nutrition Supplement  Daily W  Dinner; Ensure High Protein/high Protein Low Carbohydrate Supplement, Chocolate (cold) Oral Nutrition Supplement  Therapy Orders:  No orders of the defined types were placed in this encounter.      Smoking status- non smoker    Nutrition status- appropriate  Body mass index is 43.03 kg/m². - Obese (BMI 30-39 without a comorbid condition or 30-34 with a comorbid condition)  DVT Prophylaxis - SCDs  Limited English proficiency with :  No         Advanced Directives     Code Status: Full Resuscitation           Discharge Plan     The patients treatment plans were discussed with patient and RN.     Recommendations for Discharge   SW     PT     OT     SLP       Anticipated discharge destination: TBD pos Rehab pending surgical debridement  Expected Discharge Date: 6/23/22     Barriers to Discharge: Patient is not medically ready and needs to remain in the hospital today due to Severe pressure ulcer with osteomyelitis and necrotizing infection          Ana Espinal MD  Hospitalist  6/19/2022  10:20 AM    (Contact by secure chat)

## 2022-11-01 NOTE — ASSESSMENT & PLAN NOTE
Methadone prescribed at [facility] clinic on [facility]

## 2022-11-01 NOTE — ASSESSMENT & PLAN NOTE
See epidural abscess  S/p  corpectomy and fusion   plan to go back to IR for the second part of the procedure w/ JORDAN  On Wednesday

## 2022-11-01 NOTE — PHYSICIAN QUERY
PT Name: Peter Stiles  MR #: [id number]     Documentation Clarification      CDS/: Fawn Euceda RN, CDS               Contact information: [email or web address]     This form is a permanent document in the medical record.     Query Date: November 1, 2022    By submitting this query, we are merely seeking further clarification of documentation. Please utilize your independent clinical judgment when addressing the question(s) below.    The Medical Record reflects the following:    Supporting Clinical Findings Location in Medical Record     --Of note, her lab results are concerning for a leukocytosis, glucose of 269 in the absence of a diagnosis of diabetes  --Final Diagnosis:          -Diabetes mellitus, new onset    --Hyperglycemia  No noted history of diabetes. Hba1c 6.1. Gluc 269 on admission.  Continue MDSSI  140-180 goal  Accuchecks 4X daily    --Hyperglycemia  a1c 6.1  SSI protocol   Diabetic diet      ED Provider Note          Hosp med Note 10/25-> 10/28            NCC H&P 10/25     Glucose 269 (H) 221 (H) 253 (H) 197 (H) 125 (H) 110 90     Hemoglobin A1C External 6.1 (H)   Estimated Avg Glucose 128      Labs 10/25->11/1      Labs 10/25                                                                                  Provider, please provide diagnosis or diagnoses associated with above clinical findings.    [ x  ] New Onset Diabetes mellitus Type 2   [   ] Pre-Diabetes   [   ] Hyperglycemia, Only   [   ] Other (please specify): ____________   [  ] Clinically undetermined

## 2022-11-01 NOTE — PLAN OF CARE
[facility] Care Plan    POC reviewed with Peter Stiles and family at 0300. Pt indicated understanding via written communication. Questions and concerns addressed. No acute events overnight. Pt progressing toward goals. Will continue to monitor. See below and flowsheets for full assessment and VS info.     - Propofol, fentanyl gtt continued o/n; titrated per orders  - Bath given in early AM  - Improved mobility in LE per patient       Is this a stroke patient? no    Neuro:  [place] Coma Scale  Best Eye Response: 4-->(E4) spontaneous  Best Motor Response: 6-->(M6) obeys commands  Best Verbal Response: 1-->(V1) none  [place] Coma Scale Score: 11  Assessment Qualifiers: patient intubated, patient chemically sedated or paralyzed  Pupil PERRLA: yes     24hr Temp:  [97.8 °F (36.6 °C)-98.9 °F (37.2 °C)]     CV:   Rhythm: normal sinus rhythm  BP goals:   SBP < 160  MAP > 65    Resp:   O2 Device (Oxygen Therapy): ventilator  Vent Mode: A/C  Set Rate: 14 BPM  Oxygen Concentration (%): 40  Vt Set: 450 mL  PEEP/CPAP: 5 cmH20  Pressure Support: 10 cmH20    Plan: wean to extubate    GI/:     Diet/Nutrition Received: NPO, tube feeding  Last Bowel Movement: 10/27/22  Voiding Characteristics: urethral catheter (bladder)    Intake/Output Summary (Last 24 hours) at 11/1/2022 0515  Last data filed at 11/1/2022 0501  Gross per 24 hour   Intake 1344.2 ml   Output 1875 ml   Net -530.8 ml     Unmeasured Output  Stool Occurrence: 0    Labs/Accuchecks:  Recent Labs   Lab 11/01/22  0019   WBC 10.88   RBC 2.78*   HGB 8.5*   HCT 26.2*         Recent Labs   Lab 11/01/22  0019      K 3.7   CO2 27      BUN 24*   CREATININE 0.8   ALKPHOS 144*   ALT 22   AST 30   BILITOT 0.3      Recent Labs   Lab 10/26/22  0223   INR 1.0   APTT 29.6    No results for input(s): CPK, CPKMB, TROPONINI, MB in the last 168 hours.    Electrolytes: N/A - electrolytes WDL  Accuchecks: Q4H    Gtts:   fentanyl 100 mcg/hr (11/01/22 0501)    propofoL 20  mcg/kg/min (11/01/22 0501)       LDA/Wounds:  Lines/Drains/Airways       Drain  Duration                  Closed/Suction Drain 10/28/22 1129 Anterior Neck Accordion 10 Fr. 3 days         NG/OG Tube 10/29/22 1000 Left nostril 2 days         Urethral Catheter 10/31/22 1618 <1 day              Airway  Duration                  Airway - Non-Surgical 10/28/22 1245 Endotracheal Tube 3 days              Peripheral Intravenous Line  Duration                  Midline Catheter Insertion/Assessment  - Single Lumen 10/28/22 1858 Right basilic vein (medial side of arm) 18g x 10cm 3 days         Peripheral IV - Single Lumen 10/28/22 1300 18 G Anterior;Right Shoulder 3 days         Peripheral IV - Single Lumen 10/28/22 1300 22 G Anterior;Left Foot 3 days         Peripheral IV - Single Lumen 10/28/22 1300 22 G Anterior;Right Foot 3 days                  Wounds: Yes  Wound care consulted: Yes

## 2022-11-01 NOTE — PROGRESS NOTES
[facility]  Neurocritical Care  Progress Note    Admit Date: 10/25/2022  Service Date: 11/01/2022  Length of Stay: 7    Subjective:     Chief Complaint: Osteomyelitis of cervical spine    History of Present Illness: Pt is a 52 yo female with PMHx of HTN, dilated cardiomyopathy and remote opioid dependence who presents to the ED with neck and back pain and lower body numbness. She first noted the symptoms 2 weeks ago when she was in the car with her daughter. Her daughter slammed on the breaks and the patient noted a soreness in her neck. Since then, she has had increasing neck pain. This morning she noted numbess to her stomach and below and pain in her legs. She also endorses tingling to her fingers. WBC elevated and MRI spine revealed C5-6 osteomyelitis, discitis and epidural collection. She denies IVDU. Since MRI showed narrowing and cord signal abnormality she will be admitted to [facility] for MAP goals until surgery.       Hospital Course: 10/26/2022 Electrolytes replaced, Courtney placed, and Plan for OR on 10/28/22  10/28/22: s/p corpectomy and fusion  10/29/2022L place susan tube, start TFm d.c courtney cath, d/c A- line, nutrition consult  10/30/2022: NPO after MN for LISHA tomorrow, plan for OR w/ NSGY on Tuesday. Weaning parameters, atropine SL added for oral secretions, Miralax  10/31/2022: LISHA and NSGY intervention postponed to Wensesday, start TF  11/1/2022: LISHA rescheduled after neurosurgical intervention, NPO after MN for OR tomorrow w/ NSGY, add suppository, cxr tomorrow AM,           Review of Systems  Unable to obtain a complete ROS due to level of consciousness.  Objective:     Vitals:  Temp: 97.8 °F (36.6 °C)  Pulse: 81  Rhythm: normal sinus rhythm, sinus bradycardia  BP: (!) 121/57  MAP (mmHg): 79  Resp: 14  SpO2: 99 %  Oxygen Concentration (%): 40  O2 Device (Oxygen Therapy): ventilator  Vent Mode: A/C  Set Rate: 14 BPM  Vt Set: 450 mL  PEEP/CPAP: 5 cmH20  Peak Airway Pressure: 47  cmH2O  Mean Airway Pressure: 14 cmH20  Plateau Pressure: 0 cmH20    Temp  Min: 97.5 °F (36.4 °C)  Max: 98.9 °F (37.2 °C)  Pulse  Min: 56  Max: 103  BP  Min: 89/51  Max: 149/86  MAP (mmHg)  Min: 68  Max: 125  Resp  Min: 14  Max: 46  SpO2  Min: 97 %  Max: 100 %  Oxygen Concentration (%)  Min: 40  Max: 40    10/31 0701 - 11/01 0700  In: 1365.2 [I.V.:499.8]  Out: 1590 [Urine:1585; Drains:5]   Unmeasured Output  Urine Occurrence: 1  Stool Occurrence: 0  Pad Count: 1       Physical Exam  GA: comfortable, no acute distress.   HEENT: No scleral icterus or JVD.   Pulmonary: Clear to auscultation A/L.   Cardiac: RRR S1 & S2 w/o rubs/murmurs/gallops.   Abdominal: Bowel sounds present x 4. No appreciable hepatosplenomegaly.  Skin: No jaundice, rashes, or visible lesions.  Neuro:  --GCS: E4 Vt1 M6  --Mental Status:  awake, follows commands, nods appropriately  --CN II-XII grossly intact.   --Pupils 3mm, PERRL.   --Corneal reflex, gag, cough intact.  --VILLAGRAN spont    Medications:  Continuousfentanyl, Last Rate: 125 mcg/hr (11/01/22 1405)  propofoL, Last Rate: 25 mcg/kg/min (11/01/22 1405)    ScheduledbisacodyL, 10 mg, Daily  famotidine, 20 mg, BID  methadone, 90 mg, Daily  nicotine, 1 patch, Daily  oxacillin 12 g in  mL CONTINUOUS INFUSION, 12 g, Q24H  phenazopyridine, 200 mg, TID  polyethylene glycol, 17 g, Daily  pregabalin, 150 mg, BID  senna-docusate 8.6-50 mg, 1 tablet, BID    PRNacetaminophen, 650 mg, Q6H PRN  dextrose 10%, 12.5 g, PRN  dextrose 10%, 25 g, PRN  diazePAM, 5 mg, Q8H PRN  glucagon (human recombinant), 1 mg, PRN  HYDROmorphone, 1 mg, Q4H PRN  hydrOXYzine HCL, 25 mg, TID PRN  insulin aspart U-100, 1-10 Units, Q4H PRN  labetalol, 10 mg, Q4H PRN  ondansetron, 4 mg, Q8H PRN  oxyCODONE, 10 mg, Q4H PRN  sodium chloride 0.9%, 10 mL, PRN      Today I personally reviewed pertinent medications, lines/drains/airways, imaging, cardiology results, laboratory results, microbiology results,     Diet  Diet NPO Except for:  Medication        Assessment/Plan:     Neuro  Spinal cord compression  2/2 to osteomyelitis, discitis and epidural abscess  -NSGY following   -Q 1 neuro checks   -Q 1 vitals   -c-collar   -PT/OT   plan to go to OR on Wednesday for the 2nd portion of surgery  NPO    Psychiatric  Opioid use disorder, severe, on maintenance therapy, dependence  Methadone prescribed at [facility] clinic on [facility]       Anxiety and depression  No home meds    negative for benzo    Cardiac/Vascular  Dilated cardiomyopathy  Hx of, but echo today 55% EF and normal size chambers    The left ventricle is normal in size with normal systolic function. The estimated ejection fraction is 55%.   Normal left ventricular diastolic function.   Normal right ventricular size with normal right ventricular systolic function.   Mild tricuspid regurgitation.   The estimated PA systolic pressure is 20 mmHg.   Normal central venous pressure (3 mmHg).   after neurosurgical intervention         ID  * Osteomyelitis of cervical spine  See epidural abscess  S/p  corpectomy and fusion   plan to go back to IR for the second part of the procedure w/ NSGY  On Wednesday    MSSA bacteremia  ID following  On Oxacillin   Plan for LISHA later  This week    Endocrine  Hyperglycemia  a1c 6.1  SSI protocol    TF   Nutrition consult    Other  Tobacco abuse  Nicotine patch prn           The patient is being Prophylaxed for:  Venous Thromboembolism with: Mechanical  Stress Ulcer with: H2B  Ventilator Pneumonia with: chlorhexidine oral care    Activity Orders          Diet NPO Except for: Medication: NPO starting at 11/02 0001    Turn patient starting at 10/25 1200    Elevate HOB starting at 10/25 1108        Full Code    Anali Kauffman NP  Neurocritical Care  [facility]

## 2022-11-01 NOTE — ASSESSMENT & PLAN NOTE
2/2 to osteomyelitis, discitis and epidural abscess  -NSGY following   -Q 1 neuro checks   -Q 1 vitals   -c-collar   -PT/OT   plan to go to OR on Wednesday for the 2nd portion of surgery  NPO

## 2022-11-02 PROBLEM — E87.1 HYPONATREMIA: Status: RESOLVED | Noted: 2022-10-25 | Resolved: 2022-11-02

## 2022-11-02 LAB
ALBUMIN SERPL BCP-MCNC: 2.3 G/DL (ref 3.5–5.2)
ALLENS TEST: ABNORMAL
ALP SERPL-CCNC: 193 U/L (ref 55–135)
ALT SERPL W/O P-5'-P-CCNC: 31 U/L (ref 10–44)
ANION GAP SERPL CALC-SCNC: 8 MMOL/L (ref 8–16)
AST SERPL-CCNC: 36 U/L (ref 10–40)
BASOPHILS # BLD AUTO: 0.03 K/UL (ref 0–0.2)
BASOPHILS NFR BLD: 0.2 % (ref 0–1.9)
BILIRUB SERPL-MCNC: 0.4 MG/DL (ref 0.1–1)
BUN SERPL-MCNC: 17 MG/DL (ref 6–20)
CALCIUM SERPL-MCNC: 8.8 MG/DL (ref 8.7–10.5)
CHLORIDE SERPL-SCNC: 108 MMOL/L (ref 95–110)
CO2 SERPL-SCNC: 26 MMOL/L (ref 23–29)
CREAT SERPL-MCNC: 0.8 MG/DL (ref 0.5–1.4)
DELSYS: ABNORMAL
DIFFERENTIAL METHOD: ABNORMAL
EOSINOPHIL # BLD AUTO: 0.3 K/UL (ref 0–0.5)
EOSINOPHIL NFR BLD: 2.4 % (ref 0–8)
ERYTHROCYTE [DISTWIDTH] IN BLOOD BY AUTOMATED COUNT: 12.5 % (ref 11.5–14.5)
ERYTHROCYTE [SEDIMENTATION RATE] IN BLOOD BY WESTERGREN METHOD: 14 MM/H
EST. GFR  (NO RACE VARIABLE): >60 ML/MIN/1.73 M^2
FIO2: 40
GLUCOSE SERPL-MCNC: 112 MG/DL (ref 70–110)
HCO3 UR-SCNC: 31.8 MMOL/L (ref 24–28)
HCT VFR BLD AUTO: 29.4 % (ref 37–48.5)
HGB BLD-MCNC: 9.3 G/DL (ref 12–16)
IMM GRANULOCYTES # BLD AUTO: 0.2 K/UL (ref 0–0.04)
IMM GRANULOCYTES NFR BLD AUTO: 1.5 % (ref 0–0.5)
LYMPHOCYTES # BLD AUTO: 3.5 K/UL (ref 1–4.8)
LYMPHOCYTES NFR BLD: 26.5 % (ref 18–48)
MAGNESIUM SERPL-MCNC: 2.2 MG/DL (ref 1.6–2.6)
MCH RBC QN AUTO: 30 PG (ref 27–31)
MCHC RBC AUTO-ENTMCNC: 31.6 G/DL (ref 32–36)
MCV RBC AUTO: 95 FL (ref 82–98)
MODE: ABNORMAL
MONOCYTES # BLD AUTO: 0.8 K/UL (ref 0.3–1)
MONOCYTES NFR BLD: 6.3 % (ref 4–15)
NEUTROPHILS # BLD AUTO: 8.4 K/UL (ref 1.8–7.7)
NEUTROPHILS NFR BLD: 63.1 % (ref 38–73)
NRBC BLD-RTO: 0 /100 WBC
PCO2 BLDA: 49.4 MMHG (ref 35–45)
PEEP: 5
PH SMN: 7.42 [PH] (ref 7.35–7.45)
PHOSPHATE SERPL-MCNC: 4.1 MG/DL (ref 2.7–4.5)
PLATELET # BLD AUTO: 393 K/UL (ref 150–450)
PMV BLD AUTO: 9.2 FL (ref 9.2–12.9)
PO2 BLDA: 129 MMHG (ref 80–100)
POC BE: 7 MMOL/L
POC SATURATED O2: 99 % (ref 95–100)
POC TCO2: 33 MMOL/L (ref 23–27)
POCT GLUCOSE: 90 MG/DL (ref 70–110)
POTASSIUM SERPL-SCNC: 3.9 MMOL/L (ref 3.5–5.1)
PROT SERPL-MCNC: 6.7 G/DL (ref 6–8.4)
RBC # BLD AUTO: 3.1 M/UL (ref 4–5.4)
SAMPLE: ABNORMAL
SITE: ABNORMAL
SODIUM SERPL-SCNC: 142 MMOL/L (ref 136–145)
SP02: 98
VT: 450
WBC # BLD AUTO: 13.27 K/UL (ref 3.9–12.7)

## 2022-11-02 PROCEDURE — 63600175 PHARM REV CODE 636 W HCPCS: Performed by: INTERNAL MEDICINE

## 2022-11-02 PROCEDURE — 36000711: Performed by: STUDENT IN AN ORGANIZED HEALTH CARE EDUCATION/TRAINING PROGRAM

## 2022-11-02 PROCEDURE — 25000003 PHARM REV CODE 250: Performed by: NURSE ANESTHETIST, CERTIFIED REGISTERED

## 2022-11-02 PROCEDURE — 27201037 HC PRESSURE MONITORING SET UP

## 2022-11-02 PROCEDURE — 36000710: Performed by: STUDENT IN AN ORGANIZED HEALTH CARE EDUCATION/TRAINING PROGRAM

## 2022-11-02 PROCEDURE — 63015 REMOVE SPINE LAMINA >2 CRVCL: CPT | Mod: 58,22,62,GC | Performed by: ORTHOPAEDIC SURGERY

## 2022-11-02 PROCEDURE — 22843 INSERT SPINE FIXATION DEVICE: CPT | Mod: 82,GC,, | Performed by: ORTHOPAEDIC SURGERY

## 2022-11-02 PROCEDURE — D9220A PRA ANESTHESIA: ICD-10-PCS | Mod: ANES,,, | Performed by: STUDENT IN AN ORGANIZED HEALTH CARE EDUCATION/TRAINING PROGRAM

## 2022-11-02 PROCEDURE — 22614 ARTHRD PST TQ 1NTRSPC EA ADD: CPT | Mod: 62,GC,, | Performed by: ORTHOPAEDIC SURGERY

## 2022-11-02 PROCEDURE — 94761 N-INVAS EAR/PLS OXIMETRY MLT: CPT

## 2022-11-02 PROCEDURE — 63015 REMOVE SPINE LAMINA >2 CRVCL: CPT | Mod: 58,22,62,GC | Performed by: STUDENT IN AN ORGANIZED HEALTH CARE EDUCATION/TRAINING PROGRAM

## 2022-11-02 PROCEDURE — 22843 INSERT SPINE FIXATION DEVICE: CPT | Mod: GC,,, | Performed by: STUDENT IN AN ORGANIZED HEALTH CARE EDUCATION/TRAINING PROGRAM

## 2022-11-02 PROCEDURE — 37000008 HC ANESTHESIA 1ST 15 MINUTES: Performed by: STUDENT IN AN ORGANIZED HEALTH CARE EDUCATION/TRAINING PROGRAM

## 2022-11-02 PROCEDURE — 25000003 PHARM REV CODE 250: Performed by: NURSE PRACTITIONER

## 2022-11-02 PROCEDURE — 63600175 PHARM REV CODE 636 W HCPCS: Performed by: STUDENT IN AN ORGANIZED HEALTH CARE EDUCATION/TRAINING PROGRAM

## 2022-11-02 PROCEDURE — D9220A PRA ANESTHESIA: Mod: CRNA,,, | Performed by: NURSE ANESTHETIST, CERTIFIED REGISTERED

## 2022-11-02 PROCEDURE — 27800903 OPTIME MED/SURG SUP & DEVICES OTHER IMPLANTS: Performed by: STUDENT IN AN ORGANIZED HEALTH CARE EDUCATION/TRAINING PROGRAM

## 2022-11-02 PROCEDURE — 63600175 PHARM REV CODE 636 W HCPCS: Performed by: NURSE PRACTITIONER

## 2022-11-02 PROCEDURE — 20930 SP BONE ALGRFT MORSEL ADD-ON: CPT | Mod: GC,,, | Performed by: STUDENT IN AN ORGANIZED HEALTH CARE EDUCATION/TRAINING PROGRAM

## 2022-11-02 PROCEDURE — 22614 ARTHRD PST TQ 1NTRSPC EA ADD: CPT | Mod: 62,GC,, | Performed by: STUDENT IN AN ORGANIZED HEALTH CARE EDUCATION/TRAINING PROGRAM

## 2022-11-02 PROCEDURE — 83735 ASSAY OF MAGNESIUM: CPT | Performed by: PHYSICIAN ASSISTANT

## 2022-11-02 PROCEDURE — 63600175 PHARM REV CODE 636 W HCPCS: Performed by: PHYSICIAN ASSISTANT

## 2022-11-02 PROCEDURE — 63015 PR LAMINECTOMY,>2 SGMT,CERVICAL: ICD-10-PCS | Mod: 58,22,62,GC | Performed by: ORTHOPAEDIC SURGERY

## 2022-11-02 PROCEDURE — 25000003 PHARM REV CODE 250: Performed by: INTERNAL MEDICINE

## 2022-11-02 PROCEDURE — 63600175 PHARM REV CODE 636 W HCPCS: Performed by: NURSE ANESTHETIST, CERTIFIED REGISTERED

## 2022-11-02 PROCEDURE — 22614 PR ARTHRODESIS, POST/POSTLAT, SNGL INTERSPACE, EA ADDTL: ICD-10-PCS | Mod: 62,GC,, | Performed by: STUDENT IN AN ORGANIZED HEALTH CARE EDUCATION/TRAINING PROGRAM

## 2022-11-02 PROCEDURE — 22843 PR POSTERIOR SEGMENTAL INSTRUMENTATION 7-12 VRT SEG: ICD-10-PCS | Mod: GC,,, | Performed by: STUDENT IN AN ORGANIZED HEALTH CARE EDUCATION/TRAINING PROGRAM

## 2022-11-02 PROCEDURE — 84100 ASSAY OF PHOSPHORUS: CPT | Performed by: PHYSICIAN ASSISTANT

## 2022-11-02 PROCEDURE — 22614 PR ARTHRODESIS, POST/POSTLAT, SNGL INTERSPACE, EA ADDTL: ICD-10-PCS | Mod: 62,GC,, | Performed by: ORTHOPAEDIC SURGERY

## 2022-11-02 PROCEDURE — 25000003 PHARM REV CODE 250: Performed by: PSYCHIATRY & NEUROLOGY

## 2022-11-02 PROCEDURE — 22843 PR POSTERIOR SEGMENTAL INSTRUMENTATION 7-12 VRT SEG: ICD-10-PCS | Mod: 82,GC,, | Performed by: ORTHOPAEDIC SURGERY

## 2022-11-02 PROCEDURE — 80053 COMPREHEN METABOLIC PANEL: CPT | Performed by: PHYSICIAN ASSISTANT

## 2022-11-02 PROCEDURE — 37000009 HC ANESTHESIA EA ADD 15 MINS: Performed by: STUDENT IN AN ORGANIZED HEALTH CARE EDUCATION/TRAINING PROGRAM

## 2022-11-02 PROCEDURE — 22600 ARTHRD PST TQ 1NTRSPC CRV: CPT | Mod: 58,62,51,GC | Performed by: STUDENT IN AN ORGANIZED HEALTH CARE EDUCATION/TRAINING PROGRAM

## 2022-11-02 PROCEDURE — 27201423 OPTIME MED/SURG SUP & DEVICES STERILE SUPPLY: Performed by: STUDENT IN AN ORGANIZED HEALTH CARE EDUCATION/TRAINING PROGRAM

## 2022-11-02 PROCEDURE — 22600 PR ARTHRODESIS, POST/POSTLAT, SNGL INTERSPACE, CERVICAL BELOW C2: ICD-10-PCS | Mod: 58,62,51,GC | Performed by: STUDENT IN AN ORGANIZED HEALTH CARE EDUCATION/TRAINING PROGRAM

## 2022-11-02 PROCEDURE — C1713 ANCHOR/SCREW BN/BN,TIS/BN: HCPCS | Performed by: STUDENT IN AN ORGANIZED HEALTH CARE EDUCATION/TRAINING PROGRAM

## 2022-11-02 PROCEDURE — 99291 CRITICAL CARE FIRST HOUR: CPT | Mod: GC,,, | Performed by: PSYCHIATRY & NEUROLOGY

## 2022-11-02 PROCEDURE — 36600 WITHDRAWAL OF ARTERIAL BLOOD: CPT

## 2022-11-02 PROCEDURE — 27000207 HC ISOLATION

## 2022-11-02 PROCEDURE — 63015 PR LAMINECTOMY,>2 SGMT,CERVICAL: ICD-10-PCS | Mod: 58,22,62,GC | Performed by: STUDENT IN AN ORGANIZED HEALTH CARE EDUCATION/TRAINING PROGRAM

## 2022-11-02 PROCEDURE — 94003 VENT MGMT INPAT SUBQ DAY: CPT

## 2022-11-02 PROCEDURE — D9220A PRA ANESTHESIA: ICD-10-PCS | Mod: CRNA,,, | Performed by: NURSE ANESTHETIST, CERTIFIED REGISTERED

## 2022-11-02 PROCEDURE — 20930 PR ALLOGRAFT FOR SPINE SURGERY ONLY MORSELIZED: ICD-10-PCS | Mod: GC,,, | Performed by: STUDENT IN AN ORGANIZED HEALTH CARE EDUCATION/TRAINING PROGRAM

## 2022-11-02 PROCEDURE — 99291 PR CRITICAL CARE, E/M 30-74 MINUTES: ICD-10-PCS | Mod: GC,,, | Performed by: PSYCHIATRY & NEUROLOGY

## 2022-11-02 PROCEDURE — 25000003 PHARM REV CODE 250: Performed by: STUDENT IN AN ORGANIZED HEALTH CARE EDUCATION/TRAINING PROGRAM

## 2022-11-02 PROCEDURE — 27000221 HC OXYGEN, UP TO 24 HOURS

## 2022-11-02 PROCEDURE — 99900026 HC AIRWAY MAINTENANCE (STAT)

## 2022-11-02 PROCEDURE — 22600 PR ARTHRODESIS, POST/POSTLAT, SNGL INTERSPACE, CERVICAL BELOW C2: ICD-10-PCS | Mod: 58,62,51,GC | Performed by: ORTHOPAEDIC SURGERY

## 2022-11-02 PROCEDURE — 85025 COMPLETE CBC W/AUTO DIFF WBC: CPT | Performed by: PHYSICIAN ASSISTANT

## 2022-11-02 PROCEDURE — 20000000 HC ICU ROOM

## 2022-11-02 PROCEDURE — S4991 NICOTINE PATCH NONLEGEND: HCPCS | Performed by: PHYSICIAN ASSISTANT

## 2022-11-02 PROCEDURE — 25000242 PHARM REV CODE 250 ALT 637 W/ HCPCS: Performed by: NURSE ANESTHETIST, CERTIFIED REGISTERED

## 2022-11-02 PROCEDURE — 82803 BLOOD GASES ANY COMBINATION: CPT

## 2022-11-02 PROCEDURE — 25000003 PHARM REV CODE 250: Performed by: PHYSICIAN ASSISTANT

## 2022-11-02 PROCEDURE — 22600 ARTHRD PST TQ 1NTRSPC CRV: CPT | Mod: 58,62,51,GC | Performed by: ORTHOPAEDIC SURGERY

## 2022-11-02 PROCEDURE — D9220A PRA ANESTHESIA: Mod: ANES,,, | Performed by: STUDENT IN AN ORGANIZED HEALTH CARE EDUCATION/TRAINING PROGRAM

## 2022-11-02 PROCEDURE — 99900035 HC TECH TIME PER 15 MIN (STAT)

## 2022-11-02 DEVICE — SET SYMPHONY SCREW NS: Type: IMPLANTABLE DEVICE | Site: SCALP | Status: FUNCTIONAL

## 2022-11-02 DEVICE — SCREW SYMPHONY PA 3.5X16MM: Type: IMPLANTABLE DEVICE | Site: SCALP | Status: FUNCTIONAL

## 2022-11-02 DEVICE — SCREW SYMPHONY PA 3.5X12MM: Type: IMPLANTABLE DEVICE | Site: SCALP | Status: FUNCTIONAL

## 2022-11-02 DEVICE — CONNECTOR SYMPHONY 30-42MM: Type: IMPLANTABLE DEVICE | Site: SCALP | Status: FUNCTIONAL

## 2022-11-02 DEVICE — NUT SYMPHONY OUTER: Type: IMPLANTABLE DEVICE | Site: SCALP | Status: FUNCTIONAL

## 2022-11-02 DEVICE — GRAFT ALTAPORE LARGE CYL 8ML: Type: IMPLANTABLE DEVICE | Site: SCALP | Status: FUNCTIONAL

## 2022-11-02 DEVICE — SCREW SYMPHONY SET TALL: Type: IMPLANTABLE DEVICE | Site: SCALP | Status: FUNCTIONAL

## 2022-11-02 DEVICE — SCREW SYMPHONY PA 3.5X14MM: Type: IMPLANTABLE DEVICE | Site: SCALP | Status: FUNCTIONAL

## 2022-11-02 RX ORDER — DIAZEPAM 10 MG/2ML
5 INJECTION INTRAMUSCULAR EVERY 8 HOURS
Status: DISCONTINUED | OUTPATIENT
Start: 2022-11-02 | End: 2022-11-04

## 2022-11-02 RX ORDER — PROPOFOL 10 MG/ML
VIAL (ML) INTRAVENOUS
Status: DISCONTINUED | OUTPATIENT
Start: 2022-11-02 | End: 2022-11-02

## 2022-11-02 RX ORDER — KETAMINE HCL IN 0.9 % NACL 50 MG/5 ML
SYRINGE (ML) INTRAVENOUS
Status: DISCONTINUED | OUTPATIENT
Start: 2022-11-02 | End: 2022-11-02

## 2022-11-02 RX ORDER — FENTANYL CITRATE 50 UG/ML
INJECTION, SOLUTION INTRAMUSCULAR; INTRAVENOUS
Status: DISCONTINUED | OUTPATIENT
Start: 2022-11-02 | End: 2022-11-02

## 2022-11-02 RX ORDER — BACITRACIN ZINC AND POLYMYXIN B SULFATE 500; 1000 [USP'U]/G; [USP'U]/G
OINTMENT TOPICAL
Status: DISCONTINUED | OUTPATIENT
Start: 2022-11-02 | End: 2022-11-02 | Stop reason: HOSPADM

## 2022-11-02 RX ORDER — PHENYLEPHRINE HCL IN 0.9% NACL 1 MG/10 ML
SYRINGE (ML) INTRAVENOUS
Status: DISCONTINUED | OUTPATIENT
Start: 2022-11-02 | End: 2022-11-02

## 2022-11-02 RX ORDER — HYDRALAZINE HYDROCHLORIDE 20 MG/ML
10 INJECTION INTRAMUSCULAR; INTRAVENOUS EVERY 4 HOURS PRN
Status: DISCONTINUED | OUTPATIENT
Start: 2022-11-02 | End: 2022-11-10

## 2022-11-02 RX ORDER — DEXAMETHASONE SODIUM PHOSPHATE 4 MG/ML
INJECTION, SOLUTION INTRA-ARTICULAR; INTRALESIONAL; INTRAMUSCULAR; INTRAVENOUS; SOFT TISSUE
Status: DISCONTINUED | OUTPATIENT
Start: 2022-11-02 | End: 2022-11-02

## 2022-11-02 RX ORDER — LIDOCAINE HYDROCHLORIDE AND EPINEPHRINE 10; 10 MG/ML; UG/ML
INJECTION, SOLUTION INFILTRATION; PERINEURAL
Status: DISCONTINUED | OUTPATIENT
Start: 2022-11-02 | End: 2022-11-02 | Stop reason: HOSPADM

## 2022-11-02 RX ORDER — SIMETHICONE 80 MG
1 TABLET,CHEWABLE ORAL 3 TIMES DAILY PRN
Status: DISCONTINUED | OUTPATIENT
Start: 2022-11-02 | End: 2022-11-21 | Stop reason: HOSPADM

## 2022-11-02 RX ORDER — NALOXONE HCL 0.4 MG/ML
0.02 VIAL (ML) INJECTION
Status: DISCONTINUED | OUTPATIENT
Start: 2022-11-02 | End: 2022-11-03

## 2022-11-02 RX ORDER — DIAZEPAM 10 MG/2ML
10 INJECTION INTRAMUSCULAR ONCE
Status: COMPLETED | OUTPATIENT
Start: 2022-11-02 | End: 2022-11-02

## 2022-11-02 RX ORDER — ROCURONIUM BROMIDE 10 MG/ML
INJECTION, SOLUTION INTRAVENOUS
Status: DISCONTINUED | OUTPATIENT
Start: 2022-11-02 | End: 2022-11-02

## 2022-11-02 RX ORDER — ALBUTEROL SULFATE 90 UG/1
AEROSOL, METERED RESPIRATORY (INHALATION)
Status: DISCONTINUED | OUTPATIENT
Start: 2022-11-02 | End: 2022-11-02

## 2022-11-02 RX ORDER — MIDAZOLAM HYDROCHLORIDE 1 MG/ML
INJECTION, SOLUTION INTRAMUSCULAR; INTRAVENOUS
Status: DISCONTINUED | OUTPATIENT
Start: 2022-11-02 | End: 2022-11-02

## 2022-11-02 RX ORDER — LABETALOL HCL 20 MG/4 ML
SYRINGE (ML) INTRAVENOUS
Status: DISPENSED
Start: 2022-11-02 | End: 2022-11-03

## 2022-11-02 RX ORDER — PROPOFOL 10 MG/ML
VIAL (ML) INTRAVENOUS CONTINUOUS PRN
Status: DISCONTINUED | OUTPATIENT
Start: 2022-11-02 | End: 2022-11-02

## 2022-11-02 RX ADMIN — PREGABALIN 150 MG: 75 CAPSULE ORAL at 08:11

## 2022-11-02 RX ADMIN — PHENAZOPYRIDINE 200 MG: 200 TABLET ORAL at 09:11

## 2022-11-02 RX ADMIN — SENNOSIDES AND DOCUSATE SODIUM 1 TABLET: 50; 8.6 TABLET ORAL at 09:11

## 2022-11-02 RX ADMIN — DIAZEPAM 10 MG: 5 INJECTION, SOLUTION INTRAMUSCULAR; INTRAVENOUS at 05:11

## 2022-11-02 RX ADMIN — ROCURONIUM BROMIDE 30 MG: 10 INJECTION INTRAVENOUS at 12:11

## 2022-11-02 RX ADMIN — SODIUM CHLORIDE, SODIUM GLUCONATE, SODIUM ACETATE, POTASSIUM CHLORIDE, MAGNESIUM CHLORIDE, SODIUM PHOSPHATE, DIBASIC, AND POTASSIUM PHOSPHATE: .53; .5; .37; .037; .03; .012; .00082 INJECTION, SOLUTION INTRAVENOUS at 11:11

## 2022-11-02 RX ADMIN — ALBUTEROL SULFATE 2 PUFF: 108 INHALANT RESPIRATORY (INHALATION) at 12:11

## 2022-11-02 RX ADMIN — FAMOTIDINE 20 MG: 20 TABLET ORAL at 08:11

## 2022-11-02 RX ADMIN — SODIUM CHLORIDE: 9 INJECTION, SOLUTION INTRAVENOUS at 12:11

## 2022-11-02 RX ADMIN — SENNOSIDES AND DOCUSATE SODIUM 1 TABLET: 50; 8.6 TABLET ORAL at 08:11

## 2022-11-02 RX ADMIN — HYDROMORPHONE HYDROCHLORIDE 1 MG: 1 INJECTION, SOLUTION INTRAMUSCULAR; INTRAVENOUS; SUBCUTANEOUS at 01:11

## 2022-11-02 RX ADMIN — PROPOFOL 200 MG: 10 INJECTION, EMULSION INTRAVENOUS at 12:11

## 2022-11-02 RX ADMIN — MIDAZOLAM HYDROCHLORIDE 2 MG: 1 INJECTION, SOLUTION INTRAMUSCULAR; INTRAVENOUS at 12:11

## 2022-11-02 RX ADMIN — MIDAZOLAM HYDROCHLORIDE 2 MG: 1 INJECTION, SOLUTION INTRAMUSCULAR; INTRAVENOUS at 11:11

## 2022-11-02 RX ADMIN — DIAZEPAM 5 MG: 5 INJECTION, SOLUTION INTRAMUSCULAR; INTRAVENOUS at 09:11

## 2022-11-02 RX ADMIN — PROPOFOL 40 MCG/KG/MIN: 10 INJECTION, EMULSION INTRAVENOUS at 10:11

## 2022-11-02 RX ADMIN — Medication 200 MCG/HR: at 06:11

## 2022-11-02 RX ADMIN — OXACILLIN 12 G: 2 INJECTION, POWDER, FOR SOLUTION INTRAMUSCULAR; INTRAVENOUS at 05:11

## 2022-11-02 RX ADMIN — FAMOTIDINE 20 MG: 20 TABLET ORAL at 09:11

## 2022-11-02 RX ADMIN — PROPOFOL 50 MCG/KG/MIN: 10 INJECTION, EMULSION INTRAVENOUS at 07:11

## 2022-11-02 RX ADMIN — PHENAZOPYRIDINE 200 MG: 200 TABLET ORAL at 08:11

## 2022-11-02 RX ADMIN — HYDROMORPHONE HYDROCHLORIDE 1 MG: 1 INJECTION, SOLUTION INTRAMUSCULAR; INTRAVENOUS; SUBCUTANEOUS at 04:11

## 2022-11-02 RX ADMIN — POLYETHYLENE GLYCOL 3350 17 G: 17 POWDER, FOR SOLUTION ORAL at 08:11

## 2022-11-02 RX ADMIN — Medication 100 MCG: at 12:11

## 2022-11-02 RX ADMIN — Medication 10 MG: at 04:11

## 2022-11-02 RX ADMIN — Medication 150 MCG/KG/MIN: at 12:11

## 2022-11-02 RX ADMIN — HYDROMORPHONE HYDROCHLORIDE 1 MG: 1 INJECTION, SOLUTION INTRAMUSCULAR; INTRAVENOUS; SUBCUTANEOUS at 08:11

## 2022-11-02 RX ADMIN — METHADONE HYDROCHLORIDE 90 MG: 10 TABLET ORAL at 05:11

## 2022-11-02 RX ADMIN — REMIFENTANIL HYDROCHLORIDE 0.2 MCG/KG/MIN: 1 INJECTION, POWDER, LYOPHILIZED, FOR SOLUTION INTRAVENOUS at 12:11

## 2022-11-02 RX ADMIN — OXYCODONE HYDROCHLORIDE 10 MG: 10 TABLET ORAL at 08:11

## 2022-11-02 RX ADMIN — PREGABALIN 150 MG: 75 CAPSULE ORAL at 09:11

## 2022-11-02 RX ADMIN — SODIUM CHLORIDE 0.25 MCG/KG/MIN: 9 INJECTION, SOLUTION INTRAVENOUS at 01:11

## 2022-11-02 RX ADMIN — Medication 100 MCG: at 01:11

## 2022-11-02 RX ADMIN — DEXAMETHASONE SODIUM PHOSPHATE 4 MG: 4 INJECTION, SOLUTION INTRAMUSCULAR; INTRAVENOUS at 01:11

## 2022-11-02 RX ADMIN — HYDRALAZINE HYDROCHLORIDE 10 MG: 20 INJECTION, SOLUTION INTRAMUSCULAR; INTRAVENOUS at 04:11

## 2022-11-02 RX ADMIN — FENTANYL CITRATE 100 MCG: 50 INJECTION, SOLUTION INTRAMUSCULAR; INTRAVENOUS at 11:11

## 2022-11-02 RX ADMIN — Medication 1 PATCH: at 08:11

## 2022-11-02 RX ADMIN — OXYCODONE HYDROCHLORIDE 10 MG: 10 TABLET ORAL at 01:11

## 2022-11-02 RX ADMIN — Medication 50 MG: at 12:11

## 2022-11-02 RX ADMIN — KETAMINE HYDROCHLORIDE 2 MCG/KG/MIN: 50 INJECTION, SOLUTION INTRAMUSCULAR; INTRAVENOUS at 01:11

## 2022-11-02 NOTE — PROGRESS NOTES
[facility]  Neurocritical Care  Progress Note    Admit Date: 10/25/2022  Service Date: 11/02/2022  Length of Stay: 8    Subjective:     Chief Complaint: Osteomyelitis of cervical spine    History of Present Illness: Pt is a 50 yo female with PMHx of HTN, dilated cardiomyopathy and remote opioid dependence who presents to the ED with neck and back pain and lower body numbness. She first noted the symptoms 2 weeks ago when she was in the car with her daughter. Her daughter slammed on the breaks and the patient noted a soreness in her neck. Since then, she has had increasing neck pain. This morning she noted numbess to her stomach and below and pain in her legs. She also endorses tingling to her fingers. WBC elevated and MRI spine revealed C5-6 osteomyelitis, discitis and epidural collection. She denies IVDU. Since MRI showed narrowing and cord signal abnormality she will be admitted to [facility] for MAP goals until surgery.       Hospital Course: 10/26/2022 Electrolytes replaced, Courtney placed, and Plan for OR on 10/28/22  10/28/22: s/p C5, C6 anterior cervical corpectomy, C4-7 fusion  10/29/2022L place susan tube, start TFm d.c courtney cath, d/c A- line, nutrition consult  10/30/2022: NPO after MN for LISHA tomorrow, plan for OR w/ NSGY on Tuesday. Weaning parameters, atropine SL added for oral secretions, Miralax  10/31/2022: LISHA and NSGY intervention postponed to Wensesday, start TF  11/1/2022: LISHA rescheduled after neurosurgical intervention, NPO after MN for OR tomorrow w/ NSGY, add suppository, cxr tomorrow AM  11/2/2022: OR with NSGY for C2-T2 posterior decompression and fusion, LISHA now on 11/4. Complains of L sided abdominal pain and neck pain.       Review of Systems   Constitutional:  Negative for chills, fever and unexpected weight change.   HENT:  Negative for sore throat.    Eyes:  Negative for visual disturbance.   Respiratory:  Negative for cough and shortness of breath.    Cardiovascular:   Negative for chest pain and palpitations.   Gastrointestinal:  Positive for abdominal pain. Negative for blood in stool, constipation, diarrhea, nausea and vomiting.   Genitourinary:  Negative for difficulty urinating, dysuria and hematuria.   Musculoskeletal:  Positive for back pain and neck pain. Negative for myalgias.   Skin:  Negative for pallor and rash.   Neurological:  Negative for seizures, facial asymmetry, speech difficulty, weakness, numbness and headaches.     Objective:     Vitals:  Temp: 98.9 °F (37.2 °C)  Pulse: 101  Rhythm: normal sinus rhythm, sinus bradycardia  BP: 119/86  MAP (mmHg): 96  Resp: (!) 28  SpO2: 98 %  Oxygen Concentration (%): 40  O2 Device (Oxygen Therapy): ventilator  Vent Mode: Spont  Set Rate: 14 BPM  Vt Set: 450 mL  Pressure Support: 10 cmH20  PEEP/CPAP: 5 cmH20  Peak Airway Pressure: 16 cmH2O  Mean Airway Pressure: 8.3 cmH20  Plateau Pressure: 0 cmH20    Temp  Min: 97.6 °F (36.4 °C)  Max: 98.9 °F (37.2 °C)  Pulse  Min: 57  Max: 106  BP  Min: 95/58  Max: 167/82  MAP (mmHg)  Min: 71  Max: 117  Resp  Min: 14  Max: 42  SpO2  Min: 96 %  Max: 100 %  Oxygen Concentration (%)  Min: 40  Max: 40    11/01 0701 - 11/02 0700  In: 1999.9 [I.V.:528.1]  Out: 2093 [Urine:2090; Drains:3]   Unmeasured Output  Urine Occurrence: 1  Stool Occurrence: 1  Pad Count: 3     Physical Exam  General: well developed, well nourished, no distress, intubated  HEENT: normocephalic, atraumatic  CV: regular rate on tele  Pulmonary: intubated, no distress  Abdomen: soft, non-distended, not tender to palpation  Skin: Skin is warm, dry and intact.    Neuro:  --GCS: E4 Vt1 M6, intubated, on fent and prop   --Mental Status: awake, follows commands, interactive, writing  --PERRL, CN II-XII grossly intact.   --VILLAGRAN spont  Will reexamine post operatively    Medications:  Continuousfentanyl, Last Rate: 150 mcg/hr (11/02/22 1005)  propofoL, Last Rate: 40 mcg/kg/min (11/02/22 1005)    ScheduledbisacodyL, 10 mg,  Daily  famotidine, 20 mg, BID  methadone, 90 mg, Daily  nicotine, 1 patch, Daily  oxacillin 12 g in  mL CONTINUOUS INFUSION, 12 g, Q24H  phenazopyridine, 200 mg, TID  polyethylene glycol, 17 g, Daily  pregabalin, 150 mg, BID  senna-docusate 8.6-50 mg, 1 tablet, BID    PRNacetaminophen, 650 mg, Q6H PRN  bacitracin zinc-polymyxin B, , PRN  dextrose 10%, 12.5 g, PRN  dextrose 10%, 25 g, PRN  diazePAM, 5 mg, Q8H PRN  gelatin adsorbable, , PRN  glucagon (human recombinant), 1 mg, PRN  HYDROmorphone, 1 mg, Q4H PRN  hydrOXYzine HCL, 25 mg, TID PRN  insulin aspart U-100, 0-5 Units, Q6H PRN  labetalol, 10 mg, Q4H PRN  LIDOcaine-EPINEPHrine 1%-1:100,000, , PRN  ondansetron, 4 mg, Q8H PRN  oxyCODONE, 10 mg, Q4H PRN  simethicone, 1 tablet, TID PRN  sodium chloride 0.9%, 10 mL, PRN  thrombin (bovine), , PRN  vancomycin (VANCOCIN) irrigation (custom), , PRN      Today I personally reviewed pertinent medications, lines/drains/airways, imaging, laboratory results, microbiology results, notably:    Diet  Diet NPO Except for: Medication        Assessment/Plan:     Neuro  Spinal cord compression  (see epidural abscess)    Psychiatric  Opioid use disorder, severe, on maintenance therapy, dependence  -Methadone 90 daily  (prescribed at [facility] clinic on [facility])      Anxiety and depression  No home meds    negative for benzo    Pulmonary  Acute respiratory failure with hypercapnia  - currently intubated, on spontaneous  - OR today, will reassess and attempt to wean post procedurally   - CXR and KUB after OR    Cardiac/Vascular  Dilated cardiomyopathy  Hx of, but echo today 55% EF and normal size chambers    The left ventricle is normal in size with normal systolic function. The estimated ejection fraction is 55%.   Normal left ventricular diastolic function.   Normal right ventricular size with normal right ventricular systolic function.   Mild tricuspid regurgitation.   The estimated PA systolic pressure is 20  mmHg.   Normal central venous pressure (3 mmHg).   after neurosurgical intervention     LISHA 11/4      ID  * Osteomyelitis of cervical spine  (see epidural abscess)    MSSA bacteremia  10/25 1 of 2 BCx positive for MSSA  -ID following, appreciate recs   -BCx: 10/25 1/2 +MSSA, 10/27 negative, 10/29 NGTD  -OR cultures 10/28: +MSSA  -UA 10/25 negative  -continue on Oxacillin   -LISHA postponed to Friday 11/4    Epidural abscess  Spinal cord compression 2/2 to osteomyelitis, discitis and epidural abscess at C5-6. unknown source at this time, possibly from wounds on arms.  s/p C5, C6 anterior cervical corpectomy, C4-7 fusion on 10/28. OR Cx +MSSA.   -NSGY following, npo for OR today for C2-T2 posterior decompression and fusion  -continue oxacillin  -q1h neuro checks and vitals  -c-collar  -PT/OT    Endocrine  Hyperglycemia  a1c 6.1  SSI protocol    TF   Nutrition consult    Other  Endotracheally intubated  (see acute respiratory failure)    Tobacco abuse  Nicotine patch prn           The patient is being Prophylaxed for:  Venous Thromboembolism with: Mechanical  Stress Ulcer with: H2B  Ventilator Pneumonia with: chlorhexidine oral care    Activity Orders          Diet NPO Except for: Medication: NPO starting at 11/02 0001    Turn patient starting at 10/25 1200    Elevate HOB starting at 10/25 1108        Full Code    Marga Block MD  Neurocritical Care  [facility]

## 2022-11-02 NOTE — ASSESSMENT & PLAN NOTE
Peter Stiles is a 51 y.o. female with PMHx of HTN, dilated cardiomyopathy, h/o opioid dependence, cigarette smoker (2pks/day), and daily baby ASA use who presents with 1 day of decreased sensation from T5 level down and tingling in her bilateral fingers and bilateral toes. MRI imaging obtained in the ED showing possible C5-6 osteodiscitis/myelitis with cord compression. Patient tachycardic and hypertensive on arrival, afebrile, with leukocytosis.           -All pertinent imaging/labs personally reviewed and interpreted.    -MRI C/T/L spine w/o contrast 10/25: focal kyphotic deformity at C5/6 with possible discitis/osteomyelitis, epidural collection extending into the central spinal canal resulting in severe stenosis and cord signal abnormality.  Prevertebral soft tissue edema and probable paravertebral abscess or phlegmon at this level. Thoracic and lumbar spine unremarkable.    -MRI C spine w/ contrast 10/25: confirmed enhancement at C5/6 consistent with osteomyelitis/discitis and epidural phlegmon      Now s/p C5-6 corpectomy on 10/28/22        OR today for posterior cervical fusion   -BCx 10/25 growing S. Aureus, f/u sensitivities. Repeat BCx's 10/27 pending/NGTD.   -OR cultures 10./28 no organisms on gram stain, culture MSSA   -Post op xrays, hardware in good position  ID following   Consent obtained   Keep intubated for OR today   Discontinue HV   Admitted to [facility] on admission for MAP goals, maintained independently  Neurochecks Q1h  Continue C collar for now  Pain control as needed  LISHA today   Abx: Oxacillin  Patiño   Please call NSGY with any questions/concerns    Dispo: ICU. OR today

## 2022-11-02 NOTE — PLAN OF CARE
Tentative plan for surgery/LISHA today. Blcx remain ngtd.     Recommendations:  -continue oxacillin for MSSA  -follow up surgery/LISHA  -will follow up tomorrow

## 2022-11-02 NOTE — ASSESSMENT & PLAN NOTE
Hx of, but echo today 55% EF and normal size chambers    The left ventricle is normal in size with normal systolic function. The estimated ejection fraction is 55%.   Normal left ventricular diastolic function.   Normal right ventricular size with normal right ventricular systolic function.   Mild tricuspid regurgitation.   The estimated PA systolic pressure is 20 mmHg.   Normal central venous pressure (3 mmHg).   after neurosurgical intervention     LISHA 11/4

## 2022-11-02 NOTE — ASSESSMENT & PLAN NOTE
-Methadone 90 daily  (prescribed at [facility] clinic on [facility])

## 2022-11-02 NOTE — SUBJECTIVE & OBJECTIVE
Interval History:       NAEON. OR today     Medications:  Continuous Infusions:   fentanyl 150 mcg/hr (11/02/22 1105)    propofoL 40 mcg/kg/min (11/02/22 1105)     Scheduled Meds:   bisacodyL  10 mg Rectal Daily    famotidine  20 mg Per NG tube BID    methadone  90 mg Per NG tube Daily    nicotine  1 patch Transdermal Daily    oxacillin 12 g in  mL CONTINUOUS INFUSION  12 g Intravenous Q24H    phenazopyridine  200 mg Per NG tube TID    polyethylene glycol  17 g Per NG tube Daily    pregabalin  150 mg Per NG tube BID    senna-docusate 8.6-50 mg  1 tablet Per NG tube BID     PRN Meds:acetaminophen, dextrose 10%, dextrose 10%, diazePAM, glucagon (human recombinant), hydrALAZINE, HYDROmorphone, hydrOXYzine HCL, insulin aspart U-100, labetalol, ondansetron, oxyCODONE, simethicone, sodium chloride 0.9%     Review of Systems  Objective:     Weight: 63.5 kg (139 lb 15.9 oz)  Body mass index is 22.6 kg/m².  Vital Signs (Most Recent):  Temp: 98.4 °F (36.9 °C) (11/02/22 1105)  Pulse: 76 (11/02/22 1553)  Resp: 18 (11/02/22 1613)  BP: 107/83 (11/02/22 1105)  SpO2: 98 % (11/02/22 1553)   Vital Signs (24h Range):  Temp:  [97.6 °F (36.4 °C)-98.9 °F (37.2 °C)] 98.4 °F (36.9 °C)  Pulse:  [] 76  Resp:  [14-42] 18  SpO2:  [96 %-100 %] 98 %  BP: ()/(54-96) 107/83     Date 11/02/22 0700 - 11/03/22 0659   Shift 8413-0622 3329-0098 1681-0889 24 Hour Total   INTAKE   I.V.(mL/kg) 136(2.1)   136(2.1)   IV Piggyback 105.7 1400  1505.7   Shift Total(mL/kg) 241.7(3.8) 1400(22)  1641.7(25.9)   OUTPUT   Urine(mL/kg/hr) 290(0.6)   290   Blood 5   5   Shift Total(mL/kg) 295(4.6)   295(4.6)   Weight (kg) 63.5 63.5 63.5 63.5                Vent Mode: A/C  Oxygen Concentration (%):  [40] 40  Resp Rate Total:  [11 br/min-19 br/min] 14 br/min  Vt Set:  [450 mL] 450 mL  PEEP/CPAP:  [5 cmH20] 5 cmH20  Pressure Support:  [10 cmH20] 10 cmH20  Mean Airway Pressure:  [8.3 cmH20-10 cmH20] 9 cmH20         Closed/Suction Drain 10/28/22 3677  Anterior Neck Accordion 10 Fr. (Active)   Site Description Unable to view 11/01/22 1505   Dressing Type Other (Comment) 11/01/22 1505   Dressing Status Clean;Dry;Intact 11/01/22 1505   Dressing Intervention Integrity maintained 11/01/22 1505   Status To bulb suction 11/01/22 1505   Output (mL) 3 mL 11/01/22 0705            NG/OG Tube 10/29/22 1000 Left nostril (Active)   Placement Check placement verified by x-ray;placement verified by distal tube length measurement;placement verified by aspirate characteristics 11/01/22 1505   Tolerance no signs/symptoms of discomfort 11/01/22 1505   Securement secured to nostril center w/ adhesive device 11/01/22 1505   Clamp Status/Tolerance unclamped 11/01/22 1505   Suction Setting/Drainage Method suction at the bedside 11/01/22 1505   Insertion Site Appearance no redness, warmth, tenderness, skin breakdown, drainage 11/01/22 1505   Drainage None 11/01/22 1505   Flush/Irrigation flushed w/;water;no resistance met 11/01/22 1505   Feeding Type continuous;by pump 11/01/22 1505   Feeding Action feeding restarted 11/01/22 1505   Current Rate (mL/hr) 40 mL/hr 11/01/22 1505   Goal Rate (mL/hr) 40 mL/hr 11/01/22 1505   Intake (mL) 100 mL 11/01/22 1705   Water Bolus (mL) 50 mL 11/01/22 0301   Rate Formula Tube Feeding (mL/hr) 40 mL/hr 10/30/22 2301   Formula Name diabetisource 11/01/22 1505   Intake (mL) - Formula Tube Feeding 40 11/01/22 1805   Residual Amount (ml) 0 ml 10/31/22 1901            Urethral Catheter 10/31/22 1618 (Active)   $ Patiño Insertion Bedside Insertion Performed 10/31/22 1505   Site Assessment Clean;Intact 11/01/22 1505   Collection Container Urimeter 11/01/22 1505   Securement Method secured to top of thigh w/ adhesive device 11/01/22 1505   Catheter Care Performed yes 11/01/22 1505   Reason for Continuing Urinary Catheterization Urinary retention;Post operative;Critically ill in ICU and requiring hourly monitoring of intake/output 11/01/22 1505   CAUTI Prevention  "Bundle Securement Device in place with 1" slack;Intact seal between catheter & drainage tubing;Drainage bag/urimeter off the floor;Sheeting clip in use;No dependent loops or kinks;Drainage bag/urimeter not overfilled (<2/3 full);Drainage bag/urimeter below bladder 11/01/22 1505   Output (mL) 150 mL 11/01/22 1805         Neurosurgery Physical Exam  GCS  [id number]  FC x 4  Intubated, off of sedation  Grossly good strength 4 throughout   Effort dependent   CN intact  Sensation present x 4  Neck soft  Incision c/d/I          Significant Labs:  Recent Labs   Lab 11/01/22  0019 11/02/22  0136   GLU 90 112*    142   K 3.7 3.9    108   CO2 27 26   BUN 24* 17   CREATININE 0.8 0.8   CALCIUM 8.6* 8.8   MG 2.3 2.2       Recent Labs   Lab 11/01/22  0019 11/02/22  0136   WBC 10.88 13.27*   HGB 8.5* 9.3*   HCT 26.2* 29.4*    393       Recent Labs   Lab 11/01/22 2128   INR 1.0   APTT 26.0     Microbiology Results (last 7 days)       Procedure Component Value Units Date/Time    Blood culture [[id number]] Collected: 10/29/22 0623    Order Status: Completed Specimen: Blood from Peripheral, Foot, Left Updated: 11/02/22 0812     Blood Culture, Routine No Growth to date      No Growth to date      No Growth to date      No Growth to date      No Growth to date    Blood culture [[id number]] Collected: 10/29/22 0621    Order Status: Completed Specimen: Blood from Peripheral, Foot, Right Updated: 11/02/22 0812     Blood Culture, Routine No Growth to date      No Growth to date      No Growth to date      No Growth to date      No Growth to date    Aerobic culture [[id number]]  (Abnormal)  (Susceptibility) Collected: 10/28/22 0906    Order Status: Completed Specimen: Abscess from Back Updated: 11/01/22 1059     Aerobic Bacterial Culture STAPHYLOCOCCUS AUREUS  Rare      Narrative:      Prevertebral Abscess    Aerobic culture [[id number]]  (Abnormal)  (Susceptibility) Collected: 10/28/22 0950    Order Status: Completed Specimen: " Wound from Neck Updated: 11/01/22 1039     Aerobic Bacterial Culture STAPHYLOCOCCUS AUREUS  Rare      Narrative:      3) Osteodiscitis    Blood culture [[id number]] Collected: 10/27/22 0939    Order Status: Completed Specimen: Blood Updated: 11/01/22 1012     Blood Culture, Routine No growth after 5 days.    Narrative:      Rt wrist    Blood culture [[id number]] Collected: 10/27/22 0938    Order Status: Completed Specimen: Blood Updated: 11/01/22 1012     Blood Culture, Routine No growth after 5 days.    Narrative:      Rt AC    Culture, Anaerobe [[id number]] Collected: 10/28/22 1029    Order Status: Completed Specimen: Wound from Neck Updated: 11/01/22 0727     Anaerobic Culture Culture in progress    Narrative:      4) Epidural phlegman    Culture, Anaerobe [[id number]] Collected: 10/28/22 0950    Order Status: Completed Specimen: Wound from Neck Updated: 11/01/22 0724     Anaerobic Culture No anaerobes isolated    Narrative:      3) Osteodiscitis    Culture, Anaerobe [[id number]] Collected: 10/28/22 0924    Order Status: Completed Specimen: Abscess from Neck Updated: 11/01/22 0722     Anaerobic Culture No anaerobes isolated    Narrative:      2) Prevertebral absess    Culture, Anaerobe [[id number]] Collected: 10/28/22 0906    Order Status: Completed Specimen: Abscess from Back Updated: 11/01/22 0722     Anaerobic Culture No anaerobes isolated    Narrative:      Prevertebral Abscess    AFB Culture & Smear [[id number]] Collected: 10/28/22 1029    Order Status: Completed Specimen: Wound from Neck Updated: 10/31/22 1250     AFB Culture & Smear Culture in progress     AFB CULTURE STAIN No acid fast bacilli seen.    Narrative:      4) Epidural phlegman    AFB Culture & Smear [[id number]] Collected: 10/28/22 0924    Order Status: Completed Specimen: Abscess from Neck Updated: 10/31/22 1250     AFB Culture & Smear Culture in progress     AFB CULTURE STAIN No acid fast bacilli seen.    Narrative:      2) Prevertebral absess     AFB Culture & Smear [[id number]] Collected: 10/28/22 0906    Order Status: Completed Specimen: Abscess from Back Updated: 10/31/22 1250     AFB Culture & Smear Culture in progress     AFB CULTURE STAIN No acid fast bacilli seen.    Narrative:      Prevertebral Abscess    AFB Culture & Smear [[id number]] Collected: 10/28/22 0950    Order Status: Completed Specimen: Wound from Neck Updated: 10/31/22 1250     AFB Culture & Smear Culture in progress     AFB CULTURE STAIN No acid fast bacilli seen.    Narrative:      3) Osteodiscitis    Fungus culture [[id number]] Collected: 10/28/22 0950    Order Status: Completed Specimen: Wound from Neck Updated: 10/31/22 0958     Fungus (Mycology) Culture Culture in progress    Narrative:      3) Osteodiscitis    Fungus culture [[id number]] Collected: 10/28/22 1029    Order Status: Completed Specimen: Wound from Neck Updated: 10/31/22 0958     Fungus (Mycology) Culture Culture in progress    Narrative:      4) Epidural phlegman    Fungus culture [[id number]] Collected: 10/28/22 0906    Order Status: Completed Specimen: Abscess from Back Updated: 10/31/22 0958     Fungus (Mycology) Culture Culture in progress    Narrative:      Prevertebral Abscess    Fungus culture [[id number]] Collected: 10/28/22 0924    Order Status: Completed Specimen: Abscess from Neck Updated: 10/31/22 0958     Fungus (Mycology) Culture Culture in progress    Narrative:      2) Prevertebral absess    Aerobic culture [[id number]]  (Abnormal)  (Susceptibility) Collected: 10/28/22 0924    Order Status: Completed Specimen: Abscess from Neck Updated: 10/31/22 0928     Aerobic Bacterial Culture STAPHYLOCOCCUS AUREUS  Moderate      Narrative:      2) Prevertebral absess    Aerobic culture [[id number]] Collected: 10/28/22 1029    Order Status: Completed Specimen: Wound from Neck Updated: 10/31/22 0901     Aerobic Bacterial Culture No growth    Narrative:      4) Epidural phlegman    Blood culture #2 **CANNOT BE ORDERED STAT**  [[id number]] Collected: 10/25/22 0917    Order Status: Completed Specimen: Blood from Peripheral, Wrist, Left Updated: 10/30/22 1012     Blood Culture, Routine No growth after 5 days.    Gram stain [[id number]] Collected: 10/28/22 0950    Order Status: Completed Specimen: Wound from Neck Updated: 10/28/22 1538     Gram Stain Result No WBC's      No organisms seen    Narrative:      3) Osteodiscitis    Gram stain [[id number]] Collected: 10/28/22 1029    Order Status: Completed Specimen: Wound from Neck Updated: 10/28/22 1423     Gram Stain Result No WBC's      No organisms seen    Narrative:      4) Epidural phlegman    Gram stain [[id number]] Collected: 10/28/22 0924    Order Status: Completed Specimen: Abscess from Neck Updated: 10/28/22 1046     Gram Stain Result No WBC's      No organisms seen    Narrative:      2) Prevertebral absess    Gram stain [[id number]] Collected: 10/28/22 0906    Order Status: Completed Specimen: Abscess from Back Updated: 10/28/22 1044     Gram Stain Result No WBC's      No organisms seen    Narrative:      Prevertebral Abscess    Blood culture #1 **CANNOT BE ORDERED STAT** [[id number]]  (Abnormal)  (Susceptibility) Collected: 10/25/22 0917    Order Status: Completed Specimen: Blood from Peripheral, Antecubital, Right Updated: 10/28/22 1040     Blood Culture, Routine Gram stain yumi bottle: Gram positive cocci in clusters resembling Staph      Results called to and read back by: Jaxon Allen RN  17:39  10/26/2022      STAPHYLOCOCCUS AUREUS          All pertinent labs from the last 24 hours have been reviewed.    Significant Diagnostics:  I have reviewed all pertinent imaging results/findings within the past 24 hours.
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
## 2022-11-02 NOTE — NURSING
1605 - pt SBP in 180s. RUBIA Hooks NCC notified. 10mg prn hydralazine ordered and given    1615 - pt waking up and in pain. Fent gtt titrated up, prop gtt titrated to max. Ncc notified. Instructed to give 1mg prn dilaudid. Dilaudid given.     1630 - pt SBP in 180s. Pt hitting the bed, grimacing, nods when asked if in pain. RUBIA Hooks NCC notfied. Instructed to increase fentanyl gtt to max. 10mg labetalol verbally ordered and given.     1645  - SBPs in 190s. NCC notified.     1710 - SBP < 160. Pt still c/o pain. NCC aware. RN WCTM.     PCA pump ordered by JORDAN. Patient vented, unable to monitor ETCO2. NCC notified. NSGY paged. Pending orders for adequate pain control.

## 2022-11-02 NOTE — TRANSFER OF CARE
Anesthesia Transfer of Care Note    Patient: Peter Stiles    Procedure(s) Performed: Procedure(s) (LRB):  FUSION, SPINE, CERVICAL, POSTERIOR APPROACH C2-T2 posterior decompression/fusion; Depuy, neuromonitoring monica Marrero (N/A)    Patient location: ICU    Anesthesia Type: general    Transport from OR: Transported from OR intubated on 100% O2 by AMBU with assisted ventilation. Transported from OR intubated on 100% O2 by AMBU with adequate controlled ventilation. Upon arrival to PACU/ICU, patient attached to ventilator and auscultated to confirm bilateral breath sounds and adequate TV. Continuous ECG monitoring in transport. Continuous SpO2 monitoring in transport. Continuos invasive BP monitoring in transport    Post pain: adequate analgesia    Post assessment: no apparent anesthetic complications    Post vital signs: stable    Level of consciousness: sedated    Nausea/Vomiting: no nausea/vomiting    Complications: none    Transfer of care protocol was followed      Last vitals:

## 2022-11-02 NOTE — SUBJECTIVE & OBJECTIVE
Review of Systems   Constitutional:  Negative for chills, fever and unexpected weight change.   HENT:  Negative for sore throat.    Eyes:  Negative for visual disturbance.   Respiratory:  Negative for cough and shortness of breath.    Cardiovascular:  Negative for chest pain and palpitations.   Gastrointestinal:  Positive for abdominal pain. Negative for blood in stool, constipation, diarrhea, nausea and vomiting.   Genitourinary:  Negative for difficulty urinating, dysuria and hematuria.   Musculoskeletal:  Positive for back pain and neck pain. Negative for myalgias.   Skin:  Negative for pallor and rash.   Neurological:  Negative for seizures, facial asymmetry, speech difficulty, weakness, numbness and headaches.     Objective:     Vitals:  Temp: 98.9 °F (37.2 °C)  Pulse: 101  Rhythm: normal sinus rhythm, sinus bradycardia  BP: 119/86  MAP (mmHg): 96  Resp: (!) 28  SpO2: 98 %  Oxygen Concentration (%): 40  O2 Device (Oxygen Therapy): ventilator  Vent Mode: Spont  Set Rate: 14 BPM  Vt Set: 450 mL  Pressure Support: 10 cmH20  PEEP/CPAP: 5 cmH20  Peak Airway Pressure: 16 cmH2O  Mean Airway Pressure: 8.3 cmH20  Plateau Pressure: 0 cmH20    Temp  Min: 97.6 °F (36.4 °C)  Max: 98.9 °F (37.2 °C)  Pulse  Min: 57  Max: 106  BP  Min: 95/58  Max: 167/82  MAP (mmHg)  Min: 71  Max: 117  Resp  Min: 14  Max: 42  SpO2  Min: 96 %  Max: 100 %  Oxygen Concentration (%)  Min: 40  Max: 40    11/01 0701 - 11/02 0700  In: 1999.9 [I.V.:528.1]  Out: 2093 [Urine:2090; Drains:3]   Unmeasured Output  Urine Occurrence: 1  Stool Occurrence: 1  Pad Count: 3     Physical Exam  General: well developed, well nourished, no distress, intubated  HEENT: normocephalic, atraumatic  CV: regular rate on tele  Pulmonary: intubated, no distress  Abdomen: soft, non-distended, not tender to palpation  Skin: Skin is warm, dry and intact.    Neuro:  --GCS: E4 Vt1 M6, intubated, on fent and prop   --Mental Status: awake, follows commands, interactive,  writing  --PERRL, CN II-XII grossly intact.   --VILLAGRAN spont  Will reexamine post operatively    Medications:  Continuousfentanyl, Last Rate: 150 mcg/hr (11/02/22 1005)  propofoL, Last Rate: 40 mcg/kg/min (11/02/22 1005)    ScheduledbisacodyL, 10 mg, Daily  famotidine, 20 mg, BID  methadone, 90 mg, Daily  nicotine, 1 patch, Daily  oxacillin 12 g in  mL CONTINUOUS INFUSION, 12 g, Q24H  phenazopyridine, 200 mg, TID  polyethylene glycol, 17 g, Daily  pregabalin, 150 mg, BID  senna-docusate 8.6-50 mg, 1 tablet, BID    PRNacetaminophen, 650 mg, Q6H PRN  bacitracin zinc-polymyxin B, , PRN  dextrose 10%, 12.5 g, PRN  dextrose 10%, 25 g, PRN  diazePAM, 5 mg, Q8H PRN  gelatin adsorbable, , PRN  glucagon (human recombinant), 1 mg, PRN  HYDROmorphone, 1 mg, Q4H PRN  hydrOXYzine HCL, 25 mg, TID PRN  insulin aspart U-100, 0-5 Units, Q6H PRN  labetalol, 10 mg, Q4H PRN  LIDOcaine-EPINEPHrine 1%-1:100,000, , PRN  ondansetron, 4 mg, Q8H PRN  oxyCODONE, 10 mg, Q4H PRN  simethicone, 1 tablet, TID PRN  sodium chloride 0.9%, 10 mL, PRN  thrombin (bovine), , PRN  vancomycin (VANCOCIN) irrigation (custom), , PRN      Today I personally reviewed pertinent medications, lines/drains/airways, imaging, laboratory results, microbiology results, notably:    Diet  Diet NPO Except for: Medication

## 2022-11-02 NOTE — ASSESSMENT & PLAN NOTE
Spinal cord compression 2/2 to osteomyelitis, discitis and epidural abscess at C5-6. unknown source at this time, possibly from wounds on arms.  s/p C5, C6 anterior cervical corpectomy, C4-7 fusion on 10/28. OR Cx +MSSA.   -NSGY following, npo for OR today for C2-T2 posterior decompression and fusion  -continue oxacillin  -q1h neuro checks and vitals  -c-collar  -PT/OT

## 2022-11-02 NOTE — NURSING
1550 - Pt arrived back to room following OR       Surgical incision care orders in? No, incision with staples intact, bleeding controlled.    Drain care orders in? Verbal orders from nsgy for hemovac drains to full suction    HOB orders: HOB remains flat through shift change    NCC provider notified: RUBIA Hooks        Pt was escorted by OR MD and CRNA on cardiac monitoring, O2, ambu bag, and IV pole.        Patient placed back on bedside monitor with alarms audible, bed in low position with bed alarm on, call light within reach. WCTM.

## 2022-11-02 NOTE — ASSESSMENT & PLAN NOTE
10/25 1 of 2 BCx positive for MSSA  -ID following, appreciate recs   -BCx: 10/25 1/2 +MSSA, 10/27 negative, 10/29 NGTD  -OR cultures 10/28: +MSSA  -UA 10/25 negative  -continue on Oxacillin   -LISHA postponed to Friday 11/4

## 2022-11-02 NOTE — ASSESSMENT & PLAN NOTE
- currently intubated, on spontaneous  - OR today, will reassess and attempt to wean post procedurally   - CXR and KUB after OR

## 2022-11-02 NOTE — SUBJECTIVE & OBJECTIVE
Interval History:   11/1: NAEON. Pending OR Wednesday.     Medications:  Continuous Infusions:   fentanyl 125 mcg/hr (11/01/22 1805)    propofoL 15 mcg/kg/min (11/01/22 1805)     Scheduled Meds:   bisacodyL  10 mg Rectal Daily    famotidine  20 mg Per NG tube BID    methadone  90 mg Per NG tube Daily    nicotine  1 patch Transdermal Daily    oxacillin 12 g in  mL CONTINUOUS INFUSION  12 g Intravenous Q24H    phenazopyridine  200 mg Per NG tube TID    polyethylene glycol  17 g Per NG tube Daily    pregabalin  150 mg Per NG tube BID    senna-docusate 8.6-50 mg  1 tablet Per NG tube BID     PRN Meds:acetaminophen, dextrose 10%, dextrose 10%, diazePAM, glucagon (human recombinant), HYDROmorphone, hydrOXYzine HCL, insulin aspart U-100, labetalol, ondansetron, oxyCODONE, sodium chloride 0.9%     Review of Systems  Objective:     Weight: 63.5 kg (139 lb 15.9 oz)  Body mass index is 22.6 kg/m².  Vital Signs (Most Recent):  Temp: 97.8 °F (36.6 °C) (11/01/22 1505)  Pulse: 76 (11/01/22 1805)  Resp: 14 (11/01/22 1805)  BP: (!) 142/76 (11/01/22 1805)  SpO2: 100 % (11/01/22 1805)   Vital Signs (24h Range):  Temp:  [97.5 °F (36.4 °C)-98.1 °F (36.7 °C)] 97.8 °F (36.6 °C)  Pulse:  [] 76  Resp:  [14-46] 14  SpO2:  [97 %-100 %] 100 %  BP: ()/() 142/76     Date 11/01/22 0700 - 11/02/22 0659   Shift 8441-6702 0202-3250 6665-0366 24 Hour Total   INTAKE   I.V.(mL/kg) 162.9(2.6) 84(1.3)  246.9(3.9)   NG/ 260  620   IV Piggyback 168.1 66.5  234.6   Shift Total(mL/kg) 691(10.9) 410.5(6.5)  1101.5(17.3)   OUTPUT   Urine(mL/kg/hr) 515(1) 425  940   Drains 3   3   Shift Total(mL/kg) 518(8.2) 425(6.7)  943(14.9)   Weight (kg) 63.5 63.5 63.5 63.5              Vent Mode: A/C  Oxygen Concentration (%):  [40] 40  Resp Rate Total:  [14 br/min-31 br/min] 14 br/min  Vt Set:  [450 mL] 450 mL  PEEP/CPAP:  [5 cmH20] 5 cmH20  Mean Airway Pressure:  [8.7 eiI35-77 cmH20] 10 cmH20         Closed/Suction Drain 10/28/22 7345  Anterior Neck Accordion 10 Fr. (Active)   Site Description Unable to view 11/01/22 1505   Dressing Type Other (Comment) 11/01/22 1505   Dressing Status Clean;Dry;Intact 11/01/22 1505   Dressing Intervention Integrity maintained 11/01/22 1505   Status To bulb suction 11/01/22 1505   Output (mL) 3 mL 11/01/22 0705            NG/OG Tube 10/29/22 1000 Left nostril (Active)   Placement Check placement verified by x-ray;placement verified by distal tube length measurement;placement verified by aspirate characteristics 11/01/22 1505   Tolerance no signs/symptoms of discomfort 11/01/22 1505   Securement secured to nostril center w/ adhesive device 11/01/22 1505   Clamp Status/Tolerance unclamped 11/01/22 1505   Suction Setting/Drainage Method suction at the bedside 11/01/22 1505   Insertion Site Appearance no redness, warmth, tenderness, skin breakdown, drainage 11/01/22 1505   Drainage None 11/01/22 1505   Flush/Irrigation flushed w/;water;no resistance met 11/01/22 1505   Feeding Type continuous;by pump 11/01/22 1505   Feeding Action feeding restarted 11/01/22 1505   Current Rate (mL/hr) 40 mL/hr 11/01/22 1505   Goal Rate (mL/hr) 40 mL/hr 11/01/22 1505   Intake (mL) 100 mL 11/01/22 1705   Water Bolus (mL) 50 mL 11/01/22 0301   Rate Formula Tube Feeding (mL/hr) 40 mL/hr 10/30/22 2301   Formula Name diabetisource 11/01/22 1505   Intake (mL) - Formula Tube Feeding 40 11/01/22 1805   Residual Amount (ml) 0 ml 10/31/22 1901            Urethral Catheter 10/31/22 1618 (Active)   $ Patiño Insertion Bedside Insertion Performed 10/31/22 1505   Site Assessment Clean;Intact 11/01/22 1505   Collection Container Urimeter 11/01/22 1505   Securement Method secured to top of thigh w/ adhesive device 11/01/22 1505   Catheter Care Performed yes 11/01/22 1505   Reason for Continuing Urinary Catheterization Urinary retention;Post operative;Critically ill in ICU and requiring hourly monitoring of intake/output 11/01/22 1505   CAUTI Prevention  "Bundle Securement Device in place with 1" slack;Intact seal between catheter & drainage tubing;Drainage bag/urimeter off the floor;Sheeting clip in use;No dependent loops or kinks;Drainage bag/urimeter not overfilled (<2/3 full);Drainage bag/urimeter below bladder 11/01/22 1505   Output (mL) 150 mL 11/01/22 1805       Physical Exam  Neurosurgery Physical Exam  GCS  [id number]  FC x 4  Intubated, off of sedation  FS BUE  FS BLE  CN intact  Sensation present x 4          Significant Labs:  Recent Labs   Lab 10/31/22  0030 11/01/22  0019    90    140   K 3.8 3.7    106   CO2 26 27   BUN 32* 24*   CREATININE 0.8 0.8   CALCIUM 8.2* 8.6*   MG 2.5 2.3     Recent Labs   Lab 10/31/22  0030 11/01/22  0019   WBC 11.74 10.88   HGB 8.6* 8.5*   HCT 26.3* 26.2*    350     No results for input(s): LABPT, INR, APTT in the last 48 hours.  Microbiology Results (last 7 days)       Procedure Component Value Units Date/Time    Aerobic culture [[id number]]  (Abnormal)  (Susceptibility) Collected: 10/28/22 0906    Order Status: Completed Specimen: Abscess from Back Updated: 11/01/22 1059     Aerobic Bacterial Culture STAPHYLOCOCCUS AUREUS  Rare      Narrative:      Prevertebral Abscess    Aerobic culture [[id number]]  (Abnormal)  (Susceptibility) Collected: 10/28/22 0950    Order Status: Completed Specimen: Wound from Neck Updated: 11/01/22 1039     Aerobic Bacterial Culture STAPHYLOCOCCUS AUREUS  Rare      Narrative:      3) Osteodiscitis    Blood culture [[id number]] Collected: 10/27/22 0939    Order Status: Completed Specimen: Blood Updated: 11/01/22 1012     Blood Culture, Routine No growth after 5 days.    Narrative:      Rt wrist    Blood culture [[id number]] Collected: 10/27/22 0938    Order Status: Completed Specimen: Blood Updated: 11/01/22 1012     Blood Culture, Routine No growth after 5 days.    Narrative:      Rt AC    Blood culture [[id number]] Collected: 10/29/22 0623    Order Status: Completed Specimen: " Blood from Peripheral, Foot, Left Updated: 11/01/22 0812     Blood Culture, Routine No Growth to date      No Growth to date      No Growth to date      No Growth to date    Blood culture [[id number]] Collected: 10/29/22 0621    Order Status: Completed Specimen: Blood from Peripheral, Foot, Right Updated: 11/01/22 0812     Blood Culture, Routine No Growth to date      No Growth to date      No Growth to date      No Growth to date    Culture, Anaerobe [[id number]] Collected: 10/28/22 1029    Order Status: Completed Specimen: Wound from Neck Updated: 11/01/22 0727     Anaerobic Culture Culture in progress    Narrative:      4) Epidural phlegman    Culture, Anaerobe [[id number]] Collected: 10/28/22 0950    Order Status: Completed Specimen: Wound from Neck Updated: 11/01/22 0724     Anaerobic Culture No anaerobes isolated    Narrative:      3) Osteodiscitis    Culture, Anaerobe [[id number]] Collected: 10/28/22 0924    Order Status: Completed Specimen: Abscess from Neck Updated: 11/01/22 0722     Anaerobic Culture No anaerobes isolated    Narrative:      2) Prevertebral absess    Culture, Anaerobe [[id number]] Collected: 10/28/22 0906    Order Status: Completed Specimen: Abscess from Back Updated: 11/01/22 0722     Anaerobic Culture No anaerobes isolated    Narrative:      Prevertebral Abscess    AFB Culture & Smear [[id number]] Collected: 10/28/22 1029    Order Status: Completed Specimen: Wound from Neck Updated: 10/31/22 1250     AFB Culture & Smear Culture in progress     AFB CULTURE STAIN No acid fast bacilli seen.    Narrative:      4) Epidural phlegman    AFB Culture & Smear [[id number]] Collected: 10/28/22 0924    Order Status: Completed Specimen: Abscess from Neck Updated: 10/31/22 1250     AFB Culture & Smear Culture in progress     AFB CULTURE STAIN No acid fast bacilli seen.    Narrative:      2) Prevertebral absess    AFB Culture & Smear [[id number]] Collected: 10/28/22 0906    Order Status: Completed Specimen:  Abscess from Back Updated: 10/31/22 1250     AFB Culture & Smear Culture in progress     AFB CULTURE STAIN No acid fast bacilli seen.    Narrative:      Prevertebral Abscess    AFB Culture & Smear [[id number]] Collected: 10/28/22 0950    Order Status: Completed Specimen: Wound from Neck Updated: 10/31/22 1250     AFB Culture & Smear Culture in progress     AFB CULTURE STAIN No acid fast bacilli seen.    Narrative:      3) Osteodiscitis    Fungus culture [[id number]] Collected: 10/28/22 0950    Order Status: Completed Specimen: Wound from Neck Updated: 10/31/22 0958     Fungus (Mycology) Culture Culture in progress    Narrative:      3) Osteodiscitis    Fungus culture [[id number]] Collected: 10/28/22 1029    Order Status: Completed Specimen: Wound from Neck Updated: 10/31/22 0958     Fungus (Mycology) Culture Culture in progress    Narrative:      4) Epidural phlegman    Fungus culture [[id number]] Collected: 10/28/22 0906    Order Status: Completed Specimen: Abscess from Back Updated: 10/31/22 0958     Fungus (Mycology) Culture Culture in progress    Narrative:      Prevertebral Abscess    Fungus culture [[id number]] Collected: 10/28/22 0924    Order Status: Completed Specimen: Abscess from Neck Updated: 10/31/22 0958     Fungus (Mycology) Culture Culture in progress    Narrative:      2) Prevertebral absess    Aerobic culture [[id number]]  (Abnormal)  (Susceptibility) Collected: 10/28/22 0924    Order Status: Completed Specimen: Abscess from Neck Updated: 10/31/22 0928     Aerobic Bacterial Culture STAPHYLOCOCCUS AUREUS  Moderate      Narrative:      2) Prevertebral absess    Aerobic culture [[id number]] Collected: 10/28/22 1029    Order Status: Completed Specimen: Wound from Neck Updated: 10/31/22 0901     Aerobic Bacterial Culture No growth    Narrative:      4) Epidural phlegman    Blood culture #2 **CANNOT BE ORDERED STAT** [[id number]] Collected: 10/25/22 0917    Order Status: Completed Specimen: Blood from  Peripheral, Wrist, Left Updated: 10/30/22 1012     Blood Culture, Routine No growth after 5 days.    Gram stain [[id number]] Collected: 10/28/22 0950    Order Status: Completed Specimen: Wound from Neck Updated: 10/28/22 1538     Gram Stain Result No WBC's      No organisms seen    Narrative:      3) Osteodiscitis    Gram stain [[id number]] Collected: 10/28/22 1029    Order Status: Completed Specimen: Wound from Neck Updated: 10/28/22 1423     Gram Stain Result No WBC's      No organisms seen    Narrative:      4) Epidural phlegman    Gram stain [[id number]] Collected: 10/28/22 0924    Order Status: Completed Specimen: Abscess from Neck Updated: 10/28/22 1046     Gram Stain Result No WBC's      No organisms seen    Narrative:      2) Prevertebral absess    Gram stain [[id number]] Collected: 10/28/22 0906    Order Status: Completed Specimen: Abscess from Back Updated: 10/28/22 1044     Gram Stain Result No WBC's      No organisms seen    Narrative:      Prevertebral Abscess    Blood culture #1 **CANNOT BE ORDERED STAT** [[id number]]  (Abnormal)  (Susceptibility) Collected: 10/25/22 0917    Order Status: Completed Specimen: Blood from Peripheral, Antecubital, Right Updated: 10/28/22 1040     Blood Culture, Routine Gram stain yumi bottle: Gram positive cocci in clusters resembling Staph      Results called to and read back by: Jaxon Allen RN  17:39  10/26/2022      STAPHYLOCOCCUS AUREUS          All pertinent labs from the last 24 hours have been reviewed.    Significant Diagnostics:  I have reviewed all pertinent imaging results/findings within the past 24 hours.
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
## 2022-11-02 NOTE — ASSESSMENT & PLAN NOTE
Peter Stiles is a 51 y.o. female with PMHx of HTN, dilated cardiomyopathy, h/o opioid dependence, cigarette smoker (2pks/day), and daily baby ASA use who presents with 1 day of decreased sensation from T5 level down and tingling in her bilateral fingers and bilateral toes. MRI imaging obtained in the ED showing possible C5-6 osteodiscitis/myelitis with cord compression. Patient tachycardic and hypertensive on arrival, afebrile, with leukocytosis.     Now s/p C5-6 corpectomy on 10/28/22    -All pertinent imaging/labs personally reviewed and interpreted.    -MRI C/T/L spine w/o contrast 10/25: focal kyphotic deformity at C5/6 with possible discitis/osteomyelitis, epidural collection extending into the central spinal canal resulting in severe stenosis and cord signal abnormality.  Prevertebral soft tissue edema and probable paravertebral abscess or phlegmon at this level. Thoracic and lumbar spine unremarkable.   --OR Wednesday 11/2 for posterior cervical fusion   -MRI C spine w/ contrast 10/25: confirmed enhancement at C5/6 consistent with osteomyelitis/discitis and epidural phlegmon   -BCx 10/25 growing S. Aureus, f/u sensitivities. Repeat BCx's 10/27 pending/NGTD.   -OR cultures 10./28 no organisms on gram stain, culture pending    -Post op xrays, hardware in good position  Admitted to [facility] on admission for MAP goals, maintained independently  Stepped down to floor under  service on 10/26    Plan:  -Neurochecks Q4h on floor; q1h in ICU  -Continue C collar for now  -Pain control as needed  -Will need 2nd stage of surgery with posterior decompression/instrumentation, tentative 11/1  -Abx: Oxacillin  -Patiño   -Keep intubated for OR tomorrow  -Please call NSGY with any questions/concerns    Dispo: ICU
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
## 2022-11-03 LAB
ALBUMIN SERPL BCP-MCNC: 2.2 G/DL (ref 3.5–5.2)
ALLENS TEST: ABNORMAL
ALP SERPL-CCNC: 187 U/L (ref 55–135)
ALT SERPL W/O P-5'-P-CCNC: 30 U/L (ref 10–44)
ANION GAP SERPL CALC-SCNC: 11 MMOL/L (ref 8–16)
AST SERPL-CCNC: 36 U/L (ref 10–40)
BACTERIA BLD CULT: NORMAL
BACTERIA BLD CULT: NORMAL
BASOPHILS # BLD AUTO: 0.03 K/UL (ref 0–0.2)
BASOPHILS NFR BLD: 0.2 % (ref 0–1.9)
BILIRUB SERPL-MCNC: 0.4 MG/DL (ref 0.1–1)
BUN SERPL-MCNC: 14 MG/DL (ref 6–20)
CALCIUM SERPL-MCNC: 8.7 MG/DL (ref 8.7–10.5)
CHLORIDE SERPL-SCNC: 108 MMOL/L (ref 95–110)
CO2 SERPL-SCNC: 22 MMOL/L (ref 23–29)
CREAT SERPL-MCNC: 0.7 MG/DL (ref 0.5–1.4)
DELSYS: ABNORMAL
DIFFERENTIAL METHOD: ABNORMAL
EOSINOPHIL # BLD AUTO: 0 K/UL (ref 0–0.5)
EOSINOPHIL NFR BLD: 0.1 % (ref 0–8)
ERYTHROCYTE [DISTWIDTH] IN BLOOD BY AUTOMATED COUNT: 12.5 % (ref 11.5–14.5)
ERYTHROCYTE [SEDIMENTATION RATE] IN BLOOD BY WESTERGREN METHOD: 14 MM/H
EST. GFR  (NO RACE VARIABLE): >60 ML/MIN/1.73 M^2
FIO2: 40
GLUCOSE SERPL-MCNC: 88 MG/DL (ref 70–110)
HCO3 UR-SCNC: 28.3 MMOL/L (ref 24–28)
HCT VFR BLD AUTO: 25.5 % (ref 37–48.5)
HGB BLD-MCNC: 8.5 G/DL (ref 12–16)
IMM GRANULOCYTES # BLD AUTO: 0.42 K/UL (ref 0–0.04)
IMM GRANULOCYTES NFR BLD AUTO: 2.7 % (ref 0–0.5)
LYMPHOCYTES # BLD AUTO: 2.8 K/UL (ref 1–4.8)
LYMPHOCYTES NFR BLD: 17.9 % (ref 18–48)
MAGNESIUM SERPL-MCNC: 2.1 MG/DL (ref 1.6–2.6)
MCH RBC QN AUTO: 30.6 PG (ref 27–31)
MCHC RBC AUTO-ENTMCNC: 33.3 G/DL (ref 32–36)
MCV RBC AUTO: 92 FL (ref 82–98)
MODE: ABNORMAL
MONOCYTES # BLD AUTO: 1.1 K/UL (ref 0.3–1)
MONOCYTES NFR BLD: 7 % (ref 4–15)
NEUTROPHILS # BLD AUTO: 11.2 K/UL (ref 1.8–7.7)
NEUTROPHILS NFR BLD: 72.1 % (ref 38–73)
NRBC BLD-RTO: 0 /100 WBC
PCO2 BLDA: 43.1 MMHG (ref 35–45)
PEEP: 5
PH SMN: 7.43 [PH] (ref 7.35–7.45)
PHOSPHATE SERPL-MCNC: 4.2 MG/DL (ref 2.7–4.5)
PLATELET # BLD AUTO: 406 K/UL (ref 150–450)
PMV BLD AUTO: 9.4 FL (ref 9.2–12.9)
PO2 BLDA: 144 MMHG (ref 80–100)
POC BE: 4 MMOL/L
POC SATURATED O2: 99 % (ref 95–100)
POC TCO2: 30 MMOL/L (ref 23–27)
POCT GLUCOSE: 79 MG/DL (ref 70–110)
POCT GLUCOSE: 88 MG/DL (ref 70–110)
POCT GLUCOSE: 91 MG/DL (ref 70–110)
POTASSIUM SERPL-SCNC: 4.3 MMOL/L (ref 3.5–5.1)
PROT SERPL-MCNC: 6.5 G/DL (ref 6–8.4)
RBC # BLD AUTO: 2.78 M/UL (ref 4–5.4)
SAMPLE: ABNORMAL
SITE: ABNORMAL
SODIUM SERPL-SCNC: 141 MMOL/L (ref 136–145)
SP02: 95
VT: 450
WBC # BLD AUTO: 15.5 K/UL (ref 3.9–12.7)

## 2022-11-03 PROCEDURE — 36600 WITHDRAWAL OF ARTERIAL BLOOD: CPT

## 2022-11-03 PROCEDURE — 99900026 HC AIRWAY MAINTENANCE (STAT)

## 2022-11-03 PROCEDURE — 99233 PR SUBSEQUENT HOSPITAL CARE,LEVL III: ICD-10-PCS | Mod: ,,, | Performed by: STUDENT IN AN ORGANIZED HEALTH CARE EDUCATION/TRAINING PROGRAM

## 2022-11-03 PROCEDURE — 20000000 HC ICU ROOM

## 2022-11-03 PROCEDURE — 63600175 PHARM REV CODE 636 W HCPCS: Performed by: INTERNAL MEDICINE

## 2022-11-03 PROCEDURE — 99291 CRITICAL CARE FIRST HOUR: CPT | Mod: GC,,, | Performed by: PSYCHIATRY & NEUROLOGY

## 2022-11-03 PROCEDURE — 94761 N-INVAS EAR/PLS OXIMETRY MLT: CPT

## 2022-11-03 PROCEDURE — 63600175 PHARM REV CODE 636 W HCPCS: Performed by: PSYCHIATRY & NEUROLOGY

## 2022-11-03 PROCEDURE — 99233 SBSQ HOSP IP/OBS HIGH 50: CPT | Mod: ,,, | Performed by: STUDENT IN AN ORGANIZED HEALTH CARE EDUCATION/TRAINING PROGRAM

## 2022-11-03 PROCEDURE — 63600175 PHARM REV CODE 636 W HCPCS: Performed by: STUDENT IN AN ORGANIZED HEALTH CARE EDUCATION/TRAINING PROGRAM

## 2022-11-03 PROCEDURE — 99291 PR CRITICAL CARE, E/M 30-74 MINUTES: ICD-10-PCS | Mod: GC,,, | Performed by: PSYCHIATRY & NEUROLOGY

## 2022-11-03 PROCEDURE — 99900035 HC TECH TIME PER 15 MIN (STAT)

## 2022-11-03 PROCEDURE — 27000221 HC OXYGEN, UP TO 24 HOURS

## 2022-11-03 PROCEDURE — 82803 BLOOD GASES ANY COMBINATION: CPT

## 2022-11-03 PROCEDURE — 84100 ASSAY OF PHOSPHORUS: CPT | Performed by: PHYSICIAN ASSISTANT

## 2022-11-03 PROCEDURE — 63600175 PHARM REV CODE 636 W HCPCS

## 2022-11-03 PROCEDURE — 63600175 PHARM REV CODE 636 W HCPCS: Performed by: NURSE PRACTITIONER

## 2022-11-03 PROCEDURE — 27200966 HC CLOSED SUCTION SYSTEM

## 2022-11-03 PROCEDURE — 83735 ASSAY OF MAGNESIUM: CPT | Performed by: PHYSICIAN ASSISTANT

## 2022-11-03 PROCEDURE — 25000003 PHARM REV CODE 250: Performed by: NURSE PRACTITIONER

## 2022-11-03 PROCEDURE — S4991 NICOTINE PATCH NONLEGEND: HCPCS | Performed by: PHYSICIAN ASSISTANT

## 2022-11-03 PROCEDURE — 27000207 HC ISOLATION

## 2022-11-03 PROCEDURE — 85025 COMPLETE CBC W/AUTO DIFF WBC: CPT | Performed by: PHYSICIAN ASSISTANT

## 2022-11-03 PROCEDURE — 80053 COMPREHEN METABOLIC PANEL: CPT | Performed by: PHYSICIAN ASSISTANT

## 2022-11-03 PROCEDURE — 25000003 PHARM REV CODE 250: Performed by: PHYSICIAN ASSISTANT

## 2022-11-03 PROCEDURE — 25000003 PHARM REV CODE 250: Performed by: INTERNAL MEDICINE

## 2022-11-03 PROCEDURE — 25000003 PHARM REV CODE 250: Performed by: PSYCHIATRY & NEUROLOGY

## 2022-11-03 PROCEDURE — 94003 VENT MGMT INPAT SUBQ DAY: CPT

## 2022-11-03 RX ORDER — MAGNESIUM SULFATE HEPTAHYDRATE 40 MG/ML
2 INJECTION, SOLUTION INTRAVENOUS ONCE
Status: COMPLETED | OUTPATIENT
Start: 2022-11-03 | End: 2022-11-03

## 2022-11-03 RX ORDER — FUROSEMIDE 10 MG/ML
20 INJECTION INTRAMUSCULAR; INTRAVENOUS ONCE
Status: COMPLETED | OUTPATIENT
Start: 2022-11-03 | End: 2022-11-03

## 2022-11-03 RX ORDER — NALOXONE HCL 0.4 MG/ML
0.02 VIAL (ML) INJECTION
Status: DISCONTINUED | OUTPATIENT
Start: 2022-11-03 | End: 2022-11-04

## 2022-11-03 RX ORDER — PREGABALIN 75 MG/1
300 CAPSULE ORAL 2 TIMES DAILY
Status: DISCONTINUED | OUTPATIENT
Start: 2022-11-03 | End: 2022-11-05

## 2022-11-03 RX ORDER — DEXAMETHASONE SODIUM PHOSPHATE 4 MG/ML
10 INJECTION, SOLUTION INTRA-ARTICULAR; INTRALESIONAL; INTRAMUSCULAR; INTRAVENOUS; SOFT TISSUE ONCE
Status: COMPLETED | OUTPATIENT
Start: 2022-11-03 | End: 2022-11-03

## 2022-11-03 RX ORDER — DEXAMETHASONE SODIUM PHOSPHATE 4 MG/ML
10 INJECTION, SOLUTION INTRA-ARTICULAR; INTRALESIONAL; INTRAMUSCULAR; INTRAVENOUS; SOFT TISSUE ONCE
Status: COMPLETED | OUTPATIENT
Start: 2022-11-04 | End: 2022-11-04

## 2022-11-03 RX ORDER — PREGABALIN 75 MG/1
150 CAPSULE ORAL ONCE
Status: COMPLETED | OUTPATIENT
Start: 2022-11-03 | End: 2022-11-03

## 2022-11-03 RX ADMIN — PREGABALIN 150 MG: 75 CAPSULE ORAL at 11:11

## 2022-11-03 RX ADMIN — OXACILLIN 12 G: 2 INJECTION, POWDER, FOR SOLUTION INTRAMUSCULAR; INTRAVENOUS at 04:11

## 2022-11-03 RX ADMIN — DIAZEPAM 5 MG: 5 INJECTION, SOLUTION INTRAMUSCULAR; INTRAVENOUS at 06:11

## 2022-11-03 RX ADMIN — ACETAMINOPHEN 650 MG: 325 TABLET ORAL at 05:11

## 2022-11-03 RX ADMIN — Medication 1 PATCH: at 09:11

## 2022-11-03 RX ADMIN — METHADONE HYDROCHLORIDE 90 MG: 10 TABLET ORAL at 04:11

## 2022-11-03 RX ADMIN — POLYETHYLENE GLYCOL 3350 17 G: 17 POWDER, FOR SOLUTION ORAL at 09:11

## 2022-11-03 RX ADMIN — FUROSEMIDE 20 MG: 10 INJECTION, SOLUTION INTRAMUSCULAR; INTRAVENOUS at 06:11

## 2022-11-03 RX ADMIN — PROPOFOL 25 MCG/KG/MIN: 10 INJECTION, EMULSION INTRAVENOUS at 05:11

## 2022-11-03 RX ADMIN — DIAZEPAM 5 MG: 5 INJECTION, SOLUTION INTRAMUSCULAR; INTRAVENOUS at 09:11

## 2022-11-03 RX ADMIN — BISACODYL 10 MG: 10 SUPPOSITORY RECTAL at 10:11

## 2022-11-03 RX ADMIN — SENNOSIDES AND DOCUSATE SODIUM 1 TABLET: 50; 8.6 TABLET ORAL at 09:11

## 2022-11-03 RX ADMIN — FAMOTIDINE 20 MG: 20 TABLET ORAL at 09:11

## 2022-11-03 RX ADMIN — DIAZEPAM 5 MG: 5 INJECTION, SOLUTION INTRAMUSCULAR; INTRAVENOUS at 03:11

## 2022-11-03 RX ADMIN — Medication 150 MCG/HR: at 09:11

## 2022-11-03 RX ADMIN — MAGNESIUM SULFATE 2 G: 2 INJECTION INTRAVENOUS at 10:11

## 2022-11-03 RX ADMIN — PREGABALIN 150 MG: 75 CAPSULE ORAL at 09:11

## 2022-11-03 RX ADMIN — PROPOFOL 10 MCG/KG/MIN: 10 INJECTION, EMULSION INTRAVENOUS at 04:11

## 2022-11-03 RX ADMIN — DEXAMETHASONE SODIUM PHOSPHATE 10 MG: 4 INJECTION INTRA-ARTICULAR; INTRALESIONAL; INTRAMUSCULAR; INTRAVENOUS; SOFT TISSUE at 06:11

## 2022-11-03 RX ADMIN — PREGABALIN 300 MG: 75 CAPSULE ORAL at 09:11

## 2022-11-03 NOTE — PLAN OF CARE
[facility] Care Plan    POC reviewed with Peter Stiles  at 0300. Pt wrote on the board to show understanding. Questions and concerns addressed. No acute events over night. Propofol gtt and Fentanyl gtt titrated to the max , plus scheduled diazepam helping control pain. When gave diazepam,could titrated down fentanyl gtt and propofol gtt to maintain SBP goal.  Pt progressing toward goals. Will continue to monitor. See below and flowsheets for full assessment and VS info.           Is this a stroke patient? no    Neuro:  Lucien Coma Scale  Best Eye Response: 4-->(E4) spontaneous  Best Motor Response: 6-->(M6) obeys commands  Best Verbal Response: 1-->(V1) none  Lucien Coma Scale Score: 11  Assessment Qualifiers: patient intubated, patient chemically sedated or paralyzed  Pupil PERRLA: yes     24hr Temp:  [97.8 °F (36.6 °C)-98.9 °F (37.2 °C)]     CV:   Rhythm: normal sinus rhythm  BP goals:   SBP < 160    Resp:   O2 Device (Oxygen Therapy): ventilator  Vent Mode: A/C  Set Rate: 14 BPM  Oxygen Concentration (%): 40  Vt Set: 450 mL  PEEP/CPAP: 5 cmH20  Pressure Support: 10 cmH20    Plan: wean to extubate    GI/:     Diet/Nutrition Received: NPO  Last Bowel Movement: 11/02/22  Voiding Characteristics: urethral catheter (bladder)    Intake/Output Summary (Last 24 hours) at 11/3/2022 0831  Last data filed at 11/3/2022 0705  Gross per 24 hour   Intake 3462.19 ml   Output 2710 ml   Net 752.19 ml     Unmeasured Output  Urine Occurrence: 1  Stool Occurrence: 1  Pad Count: 3    Labs/Accuchecks:  Recent Labs   Lab 11/03/22 0147   WBC 15.50*   RBC 2.78*   HGB 8.5*   HCT 25.5*         Recent Labs   Lab 11/03/22 0147      K 4.3   CO2 22*      BUN 14   CREATININE 0.7   ALKPHOS 187*   ALT 30   AST 36   BILITOT 0.4      Recent Labs   Lab 11/01/22 2128   INR 1.0   APTT 26.0    No results for input(s): CPK, CPKMB, TROPONINI, MB in the last 168 hours.    Electrolytes: N/A - electrolytes WDL  Accuchecks:  Q6H    Gtts:   fentanyl 125 mcg/hr (11/03/22 0705)    propofoL 10 mcg/kg/min (11/03/22 0705)       LDA/Wounds:  Lines/Drains/Airways       Drain  Duration                  Closed/Suction Drain 10/28/22 1129 Anterior Neck Accordion 10 Fr. 5 days         NG/OG Tube 10/29/22 1000 Left nostril 4 days         Urethral Catheter 10/31/22 1618 2 days         Closed/Suction Drain 11/02/22 Right;Superior;Medial Back 1 day         Closed/Suction Drain 11/02/22 Superior;Medial Back Other (Comment) 1 day              Arterial Line  Duration             Arterial Line 11/02/22 1215 Right Radial <1 day              Peripheral Intravenous Line  Duration                  Midline Catheter Insertion/Assessment  - Single Lumen 10/28/22 1858 Right basilic vein (medial side of arm) 18g x 10cm 5 days         Peripheral IV - Single Lumen 10/28/22 1300 18 G Anterior;Right Shoulder 5 days         Peripheral IV - Single Lumen 10/28/22 1300 22 G Anterior;Right Foot 5 days         Peripheral IV - Single Lumen 11/01/22 1206 18 G;3/4 in Anterior;Proximal;Right Upper Arm 1 day         Peripheral IV - Single Lumen 11/01/22 1417 18 G Anterior;Left Forearm 1 day                  Wounds: Yes  Wound care consulted: Yes

## 2022-11-03 NOTE — ANESTHESIA POSTPROCEDURE EVALUATION
Anesthesia Post Evaluation    Patient: Peter Stiles    Procedure(s) Performed: Procedure(s) (LRB):  FUSION, SPINE, CERVICAL, POSTERIOR APPROACH C2-T2 posterior decompression/fusion; Depuy, neuromonitoring monica Marrero (N/A)    Final Anesthesia Type: general      Patient location during evaluation: ICU  Patient participation: No - Unable to Participate, Intubation  Level of consciousness: sedated  Post-procedure vital signs: reviewed and stable  Pain management: adequate  Airway patency: patent    PONV status at discharge: No PONV  Anesthetic complications: no      Cardiovascular status: blood pressure returned to baseline and hemodynamically stable  Respiratory status: intubated and ETT  Hydration status: euvolemic  Follow-up not needed.          Vitals Value Taken Time   /73 11/03/22 1132   Temp 37.1 °C (98.7 °F) 11/03/22 0305   Pulse 108 11/03/22 1324   Resp 13 11/03/22 1324   SpO2 97 % 11/03/22 1324   Vitals shown include unvalidated device data.      No case tracking events are documented in the log.      Pain/Jak Score: Pain Rating Prior to Med Admin: 8 (11/3/2022  9:51 AM)  Pain Rating Post Med Admin: 3 (11/3/2022 10:03 AM)
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
## 2022-11-03 NOTE — SUBJECTIVE & OBJECTIVE
Review of Systems  Intubated, Unable to obtain a complete ROS due to level of consciousness.  Objective:     Vitals:  Temp: 98.7 °F (37.1 °C)  Pulse: 82  Rhythm: normal sinus rhythm  BP: 121/68  MAP (mmHg): 90  Resp: 16  SpO2: 96 %  Oxygen Concentration (%): 40  O2 Device (Oxygen Therapy): ventilator  Vent Mode: A/C  Set Rate: 14 BPM  Vt Set: 450 mL  PEEP/CPAP: 5 cmH20  Peak Airway Pressure: 26 cmH2O  Mean Airway Pressure: 8.6 cmH20  Plateau Pressure: 0 cmH20    Temp  Min: 97.8 °F (36.6 °C)  Max: 98.9 °F (37.2 °C)  Pulse  Min: 76  Max: 102  BP  Min: 105/61  Max: 187/94  MAP (mmHg)  Min: 77  Max: 135  Resp  Min: 14  Max: 33  SpO2  Min: 95 %  Max: 100 %  Oxygen Concentration (%)  Min: 40  Max: 40    11/02 0701 - 11/03 0700  In: 3549.8 [I.V.:646.1]  Out: 2760 [Urine:2540; Drains:215]   Unmeasured Output  Urine Occurrence: 1  Stool Occurrence: 1  Pad Count: 3     Physical Exam  General: well developed, well nourished, no distress, intubated  HEENT: normocephalic, atraumatic  CV: regular rate on tele  Pulmonary: intubated, no distress  Abdomen: soft, non-distended, not tender to palpation  Skin: Skin is warm, dry and intact.     Neuro:   --GCS: E4 Vt1 M6, intubated, on fent 200,   --Mental Status: awake, follows commands x4, interactive, writing  --PERRL, CN II-XII grossly intact.   --VILLAGRAN spontaneously antigravity    Medications:  Continuousfentanyl, Last Rate: 125 mcg/hr (11/03/22 0705)  propofoL, Last Rate: 10 mcg/kg/min (11/03/22 0705)    ScheduledbisacodyL, 10 mg, Daily  diazePAM, 5 mg, Q8H  famotidine, 20 mg, BID  methadone, 90 mg, Daily  nicotine, 1 patch, Daily  oxacillin 12 g in  mL CONTINUOUS INFUSION, 12 g, Q24H  phenazopyridine, 200 mg, TID  polyethylene glycol, 17 g, Daily  pregabalin, 150 mg, BID  senna-docusate 8.6-50 mg, 1 tablet, BID    PRNacetaminophen, 650 mg, Q6H PRN  dextrose 10%, 12.5 g, PRN  dextrose 10%, 25 g, PRN  diazePAM, 5 mg, Q8H PRN  glucagon (human recombinant), 1 mg,  PRN  hydrALAZINE, 10 mg, Q4H PRN  HYDROmorphone, 1 mg, Q4H PRN  hydrOXYzine HCL, 25 mg, TID PRN  insulin aspart U-100, 0-5 Units, Q6H PRN  labetalol, 10 mg, Q4H PRN  ondansetron, 4 mg, Q8H PRN  oxyCODONE, 10 mg, Q4H PRN  simethicone, 1 tablet, TID PRN  sodium chloride 0.9%, 10 mL, PRN      Today I personally reviewed pertinent medications, lines/drains/airways, imaging, laboratory results, notably:    CXR Impression:     Small amount of pleural fluid on the left, as well as atelectatic change and suspected mild infiltrate on the left, these findings are superimposed on diminished depth of inspiration.     KUB Impression:     The distal aspect of the enteric tube extends just past the expected location of the gastroesophageal junction, advancement is recommended.     Mild prominent appearance of the colon with stool.       Diet  Diet NPO Except for: Medication

## 2022-11-03 NOTE — PLAN OF CARE
[facility]  Discharge Reassessment    Primary Care Provider: Og Victoria NP    Expected Discharge Date: 11/9/2022    Per MD notes:  11/2/2022: OR with NSGY for C2-T2 posterior decompression and fusion, LISHA now on 11/4. Complains of L sided abdominal pain and neck pain.   11/3/2022: POD1. Pain adequately controlled on fent drip, remains intubated. No MAP goals, confirmed with nsgy. Numbness of BLE, increasing lyrica. CXR/KUB with L sided atelectasis/ground glass infiltrate/pleural effusion.     Pt to OR tomorrow. Not medically stable to dc. Pending ID recs for long term IV antibiotic needs to determine LTAC vs Rehab.     [facility] [place] ([phone]) who reported she will follow as they are interested in accepting.        Reassessment (most recent)       Discharge Reassessment - 11/03/22 1414          Discharge Reassessment    Assessment Type Discharge Planning Reassessment     Did the patient's condition or plan change since previous assessment? No     Discharge Plan discussed with: Patient     Communicated YESSI with patient/caregiver Date not available/Unable to determine     Discharge Plan A Long-term acute care facility (LTAC)     Discharge Plan B Rehab     DME Needed Upon Discharge  none     Discharge Barriers Identified None     Why the patient remains in the hospital Requires continued medical care        Post-Acute Status    Post-Acute Authorization Other     Other Status See Comments   OR 11/4    Discharge Delays None known at this time                   Beckie Jacobs LCSW  Neurocritical Care   [facility]  [id number]

## 2022-11-03 NOTE — ASSESSMENT & PLAN NOTE
10/25 1 of 2 BCx positive for MSSA  -ID following, appreciate recs   -BCx: 10/25 1/2 +MSSA, 10/27 negative, 10/29 NGTD  -OR cultures 10/28: +MSSA  -UA 10/25 negative  -continue on Oxacillin   -LISHA postponed to Friday 11/4 - will confirm with cardiology. If confirmed will leave intubated for procedure, if not will extubate
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
## 2022-11-03 NOTE — PLAN OF CARE
[facility] Care Plan    POC reviewed with Peter Stiles and family at 1400. Pt unable to verbalize understanding. Questions and concerns addressed with friend and family at bedside. Will continue to monitor. See below and flowsheets for full assessment and VS info.     -posterior decompression and fusion today  -rebekah rescheduled for friday at 2pm  -prn dilaudid, oxycodone, and valium given throughout shift for pain control   -post op xray orders in place  -per nsgy, no c-collar needed until patient gets up with physical therapy  -2 HVDs full suction each output 3cc  -MAPs > 85 per NSGY          Is this a stroke patient? no    Neuro:  [place] Coma Scale  Best Eye Response: 4-->(E4) spontaneous  Best Motor Response: 6-->(M6) obeys commands  Best Verbal Response: 1-->(V1) none  [place] Coma Scale Score: 11  Assessment Qualifiers: patient intubated, patient chemically sedated or paralyzed  Pupil PERRLA: yes     24 hr Temp:  [97.6 °F (36.4 °C)-98.9 °F (37.2 °C)]     CV:   Rhythm: normal sinus rhythm, sinus bradycardia  BP goals:   SBP < 160  MAP > 85    Resp:   O2 Device (Oxygen Therapy): ventilator  Vent Mode: A/C  Set Rate: 14 BPM  Oxygen Concentration (%): 40  Vt Set: 450 mL  PEEP/CPAP: 5 cmH20  Pressure Support: 10 cmH20    Plan: wean to extubate    GI/:     Diet/Nutrition Received: NPO  Last Bowel Movement: 11/02/22  Voiding Characteristics: urethral catheter (bladder)    Intake/Output Summary (Last 24 hours) at 11/2/2022 2042  Last data filed at 11/2/2022 2005  Gross per 24 hour   Intake 3620.25 ml   Output 2885 ml   Net 735.25 ml     Unmeasured Output  Urine Occurrence: 1  Stool Occurrence: 1  Pad Count: 3    Labs/Accuchecks:  Recent Labs   Lab 11/02/22 0136   WBC 13.27*   RBC 3.10*   HGB 9.3*   HCT 29.4*         Recent Labs   Lab 11/02/22 0136      K 3.9   CO2 26      BUN 17   CREATININE 0.8   ALKPHOS 193*   ALT 31   AST 36   BILITOT 0.4      Recent Labs   Lab 11/01/22 2128   INR 1.0   APTT  History & Physical - Cardiothoracic Surgery   Cat Posey 65 y o  female MRN: [id number]    Physician Requesting Consult: Dr Edmundo Benavidez    Reason for Consult / Principal Problem: Aortic stenosis, Non-Rheumatic    History of Present Illness: Cat Posey is a 65y o  year old female who was previously evaluated in our office by CHAVEZ Andino  for transcatheter aortic valve replacement  During this initial consultation, arrangements were made for the following studies to be completed: Gated CTA of the chest/abdomen/pelvis, 3D GARRISON, left and right cardiac catheterization, dental clearance, pulmonary function tests with ABG, and carotid artery ultrasound  Cat Posey now presents to review the results of these tests and obtain a second surgeon to confirm the suitability of proceeding with transcatheter aortic valve replacment  Judy Alves was referred to our office after being hospitalized for acute exacerbation of CHF  She was evaluated with cardiac catheterization and echocardiogram which demonstrated severe aortic stenosis  She was stabilized and discharged home on supplemental oxygen and diuretic therapy  She admits to exertional dyspnea with minimal activity  She denies chest pain, presyncope/syncope, palpitations, PND or orthopnea  She admits to lower extremity edema which is improved with diuretic therapy  She also has recently been in the process of work up for anemia   She was discovered to have heme positive stool and will be seen post op by a GI specialist      Past Medical History:  Past Medical History:   Diagnosis Date    Anemia 08/22/2018    Aortic valve disease     Arthritis     Cardiac disease     "leaky valve"    CHF (congestive heart failure) (HCC)     Coronary artery disease     CPAP (continuous positive airway pressure) dependence     Disease of thyroid gland     Dislocation of right shoulder joint     Hyperlipidemia     Hypertension     Hypothyroid 08/22/2018    Murmur, 26.0    No results for input(s): CPK, CPKMB, TROPONINI, MB in the last 168 hours.    Electrolytes: No replacement orders  Accuchecks: Q6H    Gtts:   fentanyl 200 mcg/hr (11/02/22 2005)    HYDROmorphone PCA syringe 15 mg/30 mL (0.5 mg/mL) NS - HIGH CONC      propofoL 40 mcg/kg/min (11/02/22 2005)       LDA/Wounds:  Lines/Drains/Airways       Drain  Duration                  Closed/Suction Drain 10/28/22 1129 Anterior Neck Accordion 10 Fr. 5 days         NG/OG Tube 10/29/22 1000 Left nostril 4 days         Urethral Catheter 10/31/22 1618 2 days              Arterial Line  Duration             Arterial Line 11/02/22 1215 Right Radial <1 day              Peripheral Intravenous Line  Duration                  Midline Catheter Insertion/Assessment  - Single Lumen 10/28/22 1858 Right basilic vein (medial side of arm) 18g x 10cm 5 days         Peripheral IV - Single Lumen 10/28/22 1300 18 G Anterior;Right Shoulder 5 days         Peripheral IV - Single Lumen 10/28/22 1300 22 G Anterior;Right Foot 5 days         Peripheral IV - Single Lumen 11/01/22 1206 18 G;3/4 in Anterior;Proximal;Right Upper Arm 1 day         Peripheral IV - Single Lumen 11/01/22 1417 18 G Anterior;Left Forearm 1 day                  Wounds: Yes  Wound care consulted: Yes    cardiac     Obesity, morbid ([address] ) 08/22/2018    Pulmonary hypertension ([address] ) 08/22/2018    Shortness of breath     Simple goiter     Skin cyst     within the armpits, right    Sleep apnea          Past Surgical History:   Past Surgical History:   Procedure Laterality Date    BREAST BIOPSY      CARPAL TUNNEL RELEASE      CHOLECYSTECTOMY      DILATION AND CURETTAGE OF UTERUS      HYSTEROSCOPY      JOINT REPLACEMENT      b/l knee     JOINT REPLACEMENT  2007    left hip    PA COLONOSCOPY FLX DX W/COLLJ SPEC WHEN PFRMD N/A 9/6/2018    Procedure: COLONOSCOPY;  Surgeon: Smita Leung MD;  Location: [facility]; Service: Gastroenterology    PA ESOPHAGOGASTRODUODENOSCOPY TRANSORAL DIAGNOSTIC N/A 8/31/2018    Procedure: ESOPHAGOGASTRODUODENOSCOPY (EGD); Surgeon: Smita Leung MD;  Location: [facility]; Service: Gastroenterology         Family History:  Family History   Problem Relation Age of Onset    Diabetes Mother     Stroke Mother     Cancer Father     Lung cancer Father     Diabetes Sister     Heart disease Sister     Coronary artery disease Family     Diabetes Family     Hypertension Family     Cancer Family     Stroke Family          Social History:    History   Alcohol Use No     History   Drug Use No     History   Smoking Status    Never Smoker   Smokeless Tobacco    Never Used       Home Medications:   Prior to Admission medications    Medication Sig Start Date End Date Taking?  Authorizing Provider   aspirin (ECOTRIN LOW STRENGTH) 81 mg EC tablet Take 1 tablet by mouth daily 10/31/17  Yes Christiane Middleton MD   b complex vitamins capsule Take 1 capsule by mouth 2 (two) times a day     Yes Historical Provider, MD   Calcium Carb-Cholecalciferol (CALCIUM 600 + D PO) Take 1 tablet by mouth 2 (two) times a day   Yes Historical Provider, MD   Fesoterodine Fumarate ER (TOVIAZ) 8 MG TB24 Take 8 mg by mouth daily   Yes Historical Provider, MD   fluticasone (FLONASE) 50 mcg/act nasal spray 1 spray into each nostril daily   Yes Historical Provider, MD   furosemide (LASIX) 40 mg tablet Take 1 tablet (40 mg total) by mouth daily 9/18/18  Yes MABEL Castro   IRON-VITAMIN C PO Take 1 tablet by mouth daily   Yes Historical Provider, MD   levothyroxine 50 mcg tablet Take 50 mcg by mouth daily   Yes Historical Provider, MD   loratadine (CLARITIN) 10 mg tablet Take 10 mg by mouth daily   Yes Historical Provider, MD   losartan (COZAAR) 50 mg tablet Take 0 5 tablets (25 mg total) by mouth daily 8/13/18  Yes MABEL Castro   omeprazole (PriLOSEC) 40 MG capsule Take 40 mg by mouth 2 (two) times a day 6/23/18  Yes Historical Provider, MD   sertraline (ZOLOFT) 50 mg tablet Take 50 mg by mouth daily   Yes Historical Provider, MD   simvastatin (ZOCOR) 40 mg tablet Take 40 mg by mouth daily at bedtime   Yes Historical Provider, MD   temazepam (RESTORIL) 15 mg capsule Take 15 mg by mouth daily 7/2/18  Yes Historical Provider, MD   albuterol (2 5 mg/3 mL) 0 083 % nebulizer solution Take 3 mL by nebulization every 6 (six) hours as needed for wheezing or shortness of breath  Patient not taking: Reported on 9/21/2018  10/30/17   Roxie Welch MD   mupirocin (BACTROBAN) 2 % ointment Apply 1 application topically 2 (two) times a day for 5 days Apply to each nostril twice daily for five days before your operation  9/21/18 9/26/18  MABEL Hercules   polyethylene glycol-electrolytes (NULYTELY) 4000 mL solution Take 4,000 mL by mouth once for 1 dose 8/31/18 8/31/18  Mariah New MD       Allergies:   Allergies   Allergen Reactions    Latex Rash    Neosporin [Neomycin-Bacitracin Zn-Polymyx] Rash and Other (See Comments)     hives per [facility] order       Review of Systems:  Review of Systems - History obtained from chart review and the patient  General ROS: positive for  - fatigue  negative for - chills or fever  Psychological ROS: negative  Ophthalmic ROS: negative  Hematological and Lymphatic ROS: positive for anemia; negative for - bleeding problems, blood clots or bruising  Respiratory ROS: positive for - shortness of breath  negative for - cough or hemoptysis  Cardiovascular ROS: positive for - dyspnea on exertion, edema and murmur  negative for - chest pain  Gastrointestinal ROS: no abdominal pain, change in bowel habits, or black or bloody stools  Genito-Urinary ROS: no dysuria, trouble voiding, or hematuria  Musculoskeletal ROS: positive for - gait disturbance secondary to b/l TKR's and THR  negative for - muscular weakness  Neurological ROS: no TIA or stroke symptoms    Vital Signs:     Vitals:    09/21/18 1000 09/21/18 1036   BP: 122/56 126/52   BP Location: Left arm Right arm   Cuff Size: Adult    Pulse: 69    Resp: 16    Temp: 97 6 °F (36 4 °C)    TempSrc: Oral    SpO2: 93%    Weight: 99 8 kg (220 lb)    Height: 4' 7" (1 397 m)        Physical Exam:    General: morbidly obese, no acute distress  HEENT/NECK:  PERRLA  No jugular venous distention  Cardiac:Regular rate and rhythm, III/VI harsh systolic murmur RUSB  Carotids: 1+ pulses, delayed upstrokes, no bruits  Pulmonary:  Breath sounds clear bilaterally  Abdomen:  Non-tender, Non-distended  Positive bowel sounds  Upper extremities: 2+ radial pulses; brisk capillary refill; right hand dominant  Lower extremities: Extremities warm/dry  PT/DP pulses 1+ bilaterally  1+ edema B/L  Neuro: Alert and oriented X 3  Sensation is grossly intact  No focal deficits  Musculoskeletal: MAEE  Ambulates with walker  Skin: Warm/Dry, without rashes or lesions      Lab Results:   Lab Results   Component Value Date    WBC 8 75 08/15/2018    HGB 9 8 (L) 08/15/2018    HCT 33 2 (L) 08/15/2018    MCV 79 (L) 08/15/2018     (H) 08/15/2018     Lab Results   Component Value Date    CALCIUM 10 4 (H) 08/29/2018     08/29/2018    K 3 6 08/29/2018    CO2 34 (H) 08/29/2018     08/29/2018    BUN 24 08/29/2018 CREATININE 0 90 08/29/2018     No results found for: CHOL  Lab Results   Component Value Date    HDL 59 08/15/2018    HDL 65 (H) 06/05/2018     Lab Results   Component Value Date    LDLCALC 72 08/15/2018    LDLCALC 51 06/05/2018     Lab Results   Component Value Date    TRIG 112 08/15/2018    TRIG 83 06/05/2018     No components found for: CHOLHDL      No results found for: HGBA1C  Lab Results   Component Value Date    TROPONINI <0 02 08/14/2018       Imaging Studies:     Gated CTA of the chest/abdomen/pelvis:   VASCULAR STRUCTURES:       Annulus: diameter 25 x 22 mm      area: 430 sq mm    Annulus to LCA: 12 mm    Annulus to RCA: 13 mm    Minimal diameter right iliofemoral segment: 7 8 mm    Minimal diameter left iliofemoral segment: 7 9 mm    3D GARRISON:   SUMMARY     LEFT VENTRICLE:  Systolic function was normal  Ejection fraction was estimated to be 60 %  There were no regional wall motion abnormalities      LEFT ATRIUM:  The atrium was dilated      ATRIAL SEPTUM:  There was increased thickness of the septum, consistent with lipomatous hypertrophy  No defect or patent foramen ovale was identified by color doppler      MITRAL VALVE:  There was mild to moderate annular calcification  There was mild regurgitation      AORTIC VALVE:  LVOT and annular dimentions: Annular area 4 14 cm2; annular perimeter 7 31 cm; transverse diameter 2 31 cm; anteroposterior diameter 2 12 cm; LVOT 1 9 cm; aortic root 2 56cm, sinotubular junction 2 18 cm; ascending aorta 3 34cm  Transaortic velocity was increased due to valvular stenosis  There was severe stenosis  There was mild regurgitation  Valve mean gradient was 38 mmHg  Estimated aortic valve area (by VTI) was 0 71 cm squared      TRICUSPID VALVE:  There was mild regurgitation      AORTA:  There was mild atheroma in the proximal descending aorta  Left and right cardiac catheterization:   CORONARY CIRCULATION:  Left main: Normal   LAD: The vessel was normal sized  Angiography showed minor luminal irregularities  Circumflex: The vessel was small sized  Angiography showed minor luminal irregularities  Ramus intermedius: The vessel was normal sized  Angiography showed minor luminal irregularities  RCA: The vessel was large sized (dominant)  Angiography showed minor luminal irregularities      CARDIAC STRUCTURES:  There was severe aortic stenosis      Pulmonary function tests:   Results:  FEV1/FVC Ratio:  109 %  Forced Vital Capacity:  1 33 L, 72 % predicted  FEV1:  1 13 L, 79 % predicted  After administration of bronchodilator FEV1:  1 37 L, 95% predicted with an appreciable response to the bronchodilator      Lung volumes by body nitrogen wash out : Total Lung Capacity 78 % predicted Residual volume  61 % predicted     DLCO corrected for patients hemoglobin level:  16 % Data set appears appropriate for interpretation except for the DLCO, patient could not give a good effort  Carotid artery ultrasound:   RIGHT:  There is <50% stenosis noted in the internal carotid artery  Plaque is  heterogenous and irregular  Vertebral artery flow is antegrade  There is no significant subclavian artery  disease  LEFT:  There is <50% stenosis noted in the internal carotid artery  Plaque is  heterogenous and irregular  Vertebral artery flow is antegrade  There is no significant subclavian artery  disease  I have personally reviewed pertinent reports        TAVR evaluation Assessment:     5 Meter Walk Test:      Attempt 1: 9 sec   Attempt 2: 9 sec   Attempt 3: 9sec    STS Risk Score: 2 9%        Maribel 122: III    KCCQ-12 completed    Assessment:  Patient Active Problem List    Diagnosis Date Noted    Gastroesophageal reflux disease without esophagitis 08/28/2018    Anemia 08/22/2018    Obesity, morbid ([address] ) 08/22/2018    Shortness of breath 08/14/2018    Chronic diastolic (congestive) heart failure ([address] ) 08/14/2018    Severe aortic stenosis 07/03/2018    Reactive airway disease without complication 00/81/0226    JANICE (obstructive sleep apnea) 01/31/2018    Hyperlipidemia 12/04/2017    Hypertension 12/03/2017    Community acquired pneumonia of left lower lobe of lung [address] [address] ) 12/03/2017    GERD (gastroesophageal reflux disease) 10/29/2017    Hypothyroid 39/16/6036    Diastolic dysfunction 31/28/8545    LVH (left ventricular hypertrophy) 09/22/2016    Mitral annular calcification 09/22/2016    Mitral valve stenosis 09/22/2016    Pulmonary hypertension ([facility] [address] ) 09/22/2016    Morbid obesity ([facility] ) 06/02/2016     Severe aortic stenosis; Proceed with TAVR     Plan:    Shanice Yung has severe symptomatic aortic stenosis  Based on their STS risk assessment, they have undergone testing for transcatheter aortic valve replacement  The results of these studies have been interpreted by two independent cardiac surgeons who have determined the patient to be Intermediate risk for open aortic valve replacement  The risks, benefits, and alternatives to TAVR were discussed in detail with the patient today  They understand and wish to proceed with transfemoral transcatheter aortic valve replacement  Informed consent was obtained and a date for the intervention has been set  Shanice Yung was comfortable with our recommendations, and their questions were answered to their satisfaction  The following preoperative instructions were provided at the conclusion of their consultation:     1  You will receive a phone call from the hospital between 2:00 PM and 8:00 PM the day prior to surgery to confirm arrival time and location  For surgery on Mondays, you will receive a call on Friday  2  Do not drink or eat anything after midnight the night before surgery  That includes no water, candy, gum, lozenges, Lifesavers, etc  We recommend you not eat any salty or fatty snack food, consume alcohol or smoke the night before surgery  3  Continue taking aspirin but only 81 mg daily    4  If you take Coumadin and/or Plavix, discontinue it 5 days before surgery  5  If you are diabetic, do not take any of your diabetic pills the morning of surgery  If you take Lantus insulin, you may take it at your regularly scheduled time the day before surgery  Do not take any other insulins the morning of surgery  6  The 2 nights before surgery, take a shower each night using the special antiseptic soap or soap sponges you received from the office or [facility] Hammer your hair with regular shampoo and rinse completely before using the antiseptic sponges  Use the sponge to wash from your neck down, with special attention to the armpits and groin area  Do not use any other soap or cleanser on your skin  Do not use lotions, powder, deodorant or perfume of any kind on your skin after you shower  Use clean bed linens and wear clean pajamas after your shower  7  You will be prescribed Mupirocin nasal ointment  Apply to both nostrils twice a day for 5 days prior to surgery  8  Do not take a shower the morning of her surgery; you'll be given a special" bath" at the hospital   9  Notify the CT surgery office if you develop a cold, sore throat, cough, fever or other health issues before your surgery    10  Other medication changes included the following: stop multivitamin now and stop Losartan 3 days before surgery      SIGNATURE: MABEL Olsen  DATE: September 21, 2018  TIME: 11:42 AM

## 2022-11-03 NOTE — ASSESSMENT & PLAN NOTE
- currently intubated, on spontaneous  - OR today, will reassess and attempt to wean post procedurally   - CXR and KUB post op with L sided atelectasis/ground glass infiltrate/pleural effusion

## 2022-11-03 NOTE — ASSESSMENT & PLAN NOTE
Peter Stiles is a 51 y.o. female with PMHx of HTN, dilated cardiomyopathy, h/o opioid dependence, cigarette smoker (2pks/day), and daily baby ASA use who presents with 1 day of decreased sensation from T5 level down and tingling in her bilateral fingers and bilateral toes. MRI imaging obtained in the ED showing possible C5-6 osteodiscitis/myelitis with cord compression. Patient tachycardic and hypertensive on arrival, afebrile, with leukocytosis.     -All pertinent imaging/labs personally reviewed and interpreted.    -MRI C/T/L spine w/o contrast 10/25: focal kyphotic deformity at C5/6 with possible discitis/osteomyelitis, epidural collection extending into the central spinal canal resulting in severe stenosis and cord signal abnormality.  Prevertebral soft tissue edema and probable paravertebral abscess or phlegmon at this level. Thoracic and lumbar spine unremarkable.    -MRI C spine w/ contrast 10/25: confirmed enhancement at C5/6 consistent with osteomyelitis/discitis and epidural phlegmon    Now s/p C5-6 corpectomy on 10/28/22; C2-T2 PCDF (11/2).     --Admitted to neuro ICU  --Intubated after procedure; WTE per neuro ICU   May stay intubated for LISHA today, will consider extubation today if procedure delayed.   -BCx 10/25 growing S. Aureus, f/u sensitivities. Repeat BCx's 10/27 pending/NGTD.  -OR cultures 10./28 no organisms on gram stain, culture MSSA  -Post op xrays, hardware in good position  --ID following   --On fent/prop gtt, OK to start dilaudid PCA when extubated from neurosurgery standpoint.   --Maintain drains to full suction  --Neurochecks Q1h  --Continue C collar for now  --Pain control as needed  --LISHA today   --Abx: Oxacillin  --Haroon   --Please call NSGY with any questions/concerns    Dispo: ICU. LISHA, WTE today.
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
## 2022-11-03 NOTE — SUBJECTIVE & OBJECTIVE
Interval History:   11/3: POD 1 s/p C2-T2 PCDF. Remains intubated but following commands today and moving LE's. AF, VSS. Drains with 10/130cc output.     Medications:  Continuous Infusions:   fentanyl 125 mcg/hr (11/03/22 0705)    propofoL 10 mcg/kg/min (11/03/22 0705)     Scheduled Meds:   bisacodyL  10 mg Rectal Daily    diazePAM  5 mg Intravenous Q8H    famotidine  20 mg Per NG tube BID    methadone  90 mg Per NG tube Daily    nicotine  1 patch Transdermal Daily    oxacillin 12 g in  mL CONTINUOUS INFUSION  12 g Intravenous Q24H    phenazopyridine  200 mg Per NG tube TID    polyethylene glycol  17 g Per NG tube Daily    pregabalin  150 mg Per NG tube BID    senna-docusate 8.6-50 mg  1 tablet Per NG tube BID     PRN Meds:acetaminophen, dextrose 10%, dextrose 10%, diazePAM, glucagon (human recombinant), hydrALAZINE, HYDROmorphone, hydrOXYzine HCL, insulin aspart U-100, labetalol, ondansetron, oxyCODONE, simethicone, sodium chloride 0.9%     Review of Systems  Objective:     Weight: 63.5 kg (139 lb 15.9 oz)  Body mass index is 22.6 kg/m².  Vital Signs (Most Recent):  Temp: 98.7 °F (37.1 °C) (11/03/22 0305)  Pulse: 82 (11/03/22 0732)  Resp: 16 (11/03/22 0732)  BP: 121/68 (11/03/22 0732)  SpO2: 96 % (11/03/22 0732) Vital Signs (24h Range):  Temp:  [97.8 °F (36.6 °C)-98.9 °F (37.2 °C)] 98.7 °F (37.1 °C)  Pulse:  [] 82  Resp:  [14-28] 16  SpO2:  [95 %-100 %] 96 %  BP: (105-187)/(61-94) 121/68  Arterial Line BP: (109-313)/() 148/138     Date 11/03/22 0700 - 11/04/22 0659   Shift 3085-5863 9670-8603 6946-0082 24 Hour Total   INTAKE   I.V.(mL/kg) 5(0.1)   5(0.1)   Shift Total(mL/kg) 5(0.1)   5(0.1)   OUTPUT   Urine(mL/kg/hr) 70   70   Shift Total(mL/kg) 70(1.1)   70(1.1)   Weight (kg) 63.5 63.5 63.5 63.5              Vent Mode: A/C  Oxygen Concentration (%):  [40] 40  Resp Rate Total:  [11 br/min-24 br/min] 14 br/min  Vt Set:  [450 mL] 450 mL  PEEP/CPAP:  [5 cmH20] 5 cmH20  Pressure Support:  [10 cmH20]  10 cmH20  Mean Airway Pressure:  [8.3 cmH20-9 cmH20] 8.6 cmH20         Closed/Suction Drain 10/28/22 1129 Anterior Neck Accordion 10 Fr. (Active)   Site Description Unable to view 11/02/22 1550   Dressing Type Other (Comment) 11/02/22 1550   Dressing Status Clean;Dry;Intact 11/02/22 1550   Dressing Intervention Integrity maintained 11/02/22 1550   Status To bulb suction 11/02/22 1550   Output (mL) 3 mL 11/01/22 0705            Closed/Suction Drain 11/02/22 Superior;Medial Back Other (Comment) (Active)   Output (mL) 60 mL 11/03/22 0605            Closed/Suction Drain 11/02/22 Right;Superior;Medial Back (Active)   Output (mL) 15 mL 11/03/22 0605            NG/OG Tube 10/29/22 1000 Left nostril (Active)   Placement Check placement verified by x-ray;placement verified by distal tube length measurement;placement verified by aspirate characteristics 11/02/22 1550   Tolerance no signs/symptoms of discomfort 11/02/22 1550   Securement secured to nostril center w/ adhesive device 11/02/22 1550   Clamp Status/Tolerance clamped 11/02/22 1550   Suction Setting/Drainage Method suction at the bedside 11/02/22 1550   Insertion Site Appearance no redness, warmth, tenderness, skin breakdown, drainage 11/02/22 1550   Drainage None 11/02/22 1550   Flush/Irrigation flushed w/;water;no resistance met 11/02/22 1550   Feeding Type continuous;by pump 11/02/22 1550   Feeding Action feeding held 11/02/22 1550   Current Rate (mL/hr) 40 mL/hr 11/01/22 1901   Goal Rate (mL/hr) 40 mL/hr 11/01/22 1901   Intake (mL) 100 mL 11/02/22 2205   Water Bolus (mL) 50 mL 11/01/22 1901   Rate Formula Tube Feeding (mL/hr) 40 mL/hr 10/30/22 2301   Formula Name diabetisource 11/02/22 1550   Intake (mL) - Formula Tube Feeding 0 11/02/22 1905   Residual Amount (ml) 2 ml 11/01/22 1901            Urethral Catheter 10/31/22 1618 (Active)   $ Patiño Insertion Bedside Insertion Performed 10/31/22 1505   Site Assessment Clean;Intact 11/02/22 1550   Collection Container  "Urimeter 11/02/22 1550   Securement Method secured to top of thigh w/ adhesive device 11/02/22 1550   Catheter Care Performed yes 11/02/22 1550   Reason for Continuing Urinary Catheterization Urinary retention;Post operative;Critically ill in ICU and requiring hourly monitoring of intake/output 11/02/22 1550   CAUTI Prevention Bundle Securement Device in place with 1" slack;Intact seal between catheter & drainage tubing;Drainage bag/urimeter off the floor;Sheeting clip in use;No dependent loops or kinks;Drainage bag/urimeter not overfilled (<2/3 full);Drainage bag/urimeter below bladder 11/02/22 1550   Output (mL) 70 mL 11/03/22 0705       Physical Exam    Neurosurgery Physical Exam  [id number]  FC BLE  FC BUE  Pain limited weakness in BLE proximally  Distally appears full strength, limited due to intubation/sedation  PERRL, EOMI            Significant Labs:  Recent Labs   Lab 11/02/22  0136 11/03/22  0147   * 88    141   K 3.9 4.3    108   CO2 26 22*   BUN 17 14   CREATININE 0.8 0.7   CALCIUM 8.8 8.7   MG 2.2 2.1     Recent Labs   Lab 11/02/22  0136 11/03/22  0147   WBC 13.27* 15.50*   HGB 9.3* 8.5*   HCT 29.4* 25.5*    406     Recent Labs   Lab 11/01/22 2128   INR 1.0   APTT 26.0     Microbiology Results (last 7 days)       Procedure Component Value Units Date/Time    Blood culture [[id number]] Collected: 10/29/22 0623    Order Status: Completed Specimen: Blood from Peripheral, Foot, Left Updated: 11/03/22 0812     Blood Culture, Routine No growth after 5 days.    Blood culture [[id number]] Collected: 10/29/22 0621    Order Status: Completed Specimen: Blood from Peripheral, Foot, Right Updated: 11/03/22 0812     Blood Culture, Routine No growth after 5 days.    Aerobic culture [[id number]]  (Abnormal)  (Susceptibility) Collected: 10/28/22 0906    Order Status: Completed Specimen: Abscess from Back Updated: 11/01/22 1059     Aerobic Bacterial Culture STAPHYLOCOCCUS AUREUS  Rare      Narrative:   "    Prevertebral Abscess    Aerobic culture [[id number]]  (Abnormal)  (Susceptibility) Collected: 10/28/22 0950    Order Status: Completed Specimen: Wound from Neck Updated: 11/01/22 1039     Aerobic Bacterial Culture STAPHYLOCOCCUS AUREUS  Rare      Narrative:      3) Osteodiscitis    Blood culture [[id number]] Collected: 10/27/22 0939    Order Status: Completed Specimen: Blood Updated: 11/01/22 1012     Blood Culture, Routine No growth after 5 days.    Narrative:      Rt wrist    Blood culture [[id number]] Collected: 10/27/22 0938    Order Status: Completed Specimen: Blood Updated: 11/01/22 1012     Blood Culture, Routine No growth after 5 days.    Narrative:      Rt AC    Culture, Anaerobe [[id number]] Collected: 10/28/22 1029    Order Status: Completed Specimen: Wound from Neck Updated: 11/01/22 0727     Anaerobic Culture Culture in progress    Narrative:      4) Epidural phlegman    Culture, Anaerobe [[id number]] Collected: 10/28/22 0950    Order Status: Completed Specimen: Wound from Neck Updated: 11/01/22 0724     Anaerobic Culture No anaerobes isolated    Narrative:      3) Osteodiscitis    Culture, Anaerobe [[id number]] Collected: 10/28/22 0924    Order Status: Completed Specimen: Abscess from Neck Updated: 11/01/22 0722     Anaerobic Culture No anaerobes isolated    Narrative:      2) Prevertebral absess    Culture, Anaerobe [[id number]] Collected: 10/28/22 0906    Order Status: Completed Specimen: Abscess from Back Updated: 11/01/22 0722     Anaerobic Culture No anaerobes isolated    Narrative:      Prevertebral Abscess    AFB Culture & Smear [[id number]] Collected: 10/28/22 1029    Order Status: Completed Specimen: Wound from Neck Updated: 10/31/22 1250     AFB Culture & Smear Culture in progress     AFB CULTURE STAIN No acid fast bacilli seen.    Narrative:      4) Epidural phlegman    AFB Culture & Smear [[id number]] Collected: 10/28/22 0924    Order Status: Completed Specimen: Abscess from Neck Updated:  10/31/22 1250     AFB Culture & Smear Culture in progress     AFB CULTURE STAIN No acid fast bacilli seen.    Narrative:      2) Prevertebral absess    AFB Culture & Smear [[id number]] Collected: 10/28/22 0906    Order Status: Completed Specimen: Abscess from Back Updated: 10/31/22 1250     AFB Culture & Smear Culture in progress     AFB CULTURE STAIN No acid fast bacilli seen.    Narrative:      Prevertebral Abscess    AFB Culture & Smear [[id number]] Collected: 10/28/22 0950    Order Status: Completed Specimen: Wound from Neck Updated: 10/31/22 1250     AFB Culture & Smear Culture in progress     AFB CULTURE STAIN No acid fast bacilli seen.    Narrative:      3) Osteodiscitis    Fungus culture [[id number]] Collected: 10/28/22 0950    Order Status: Completed Specimen: Wound from Neck Updated: 10/31/22 0958     Fungus (Mycology) Culture Culture in progress    Narrative:      3) Osteodiscitis    Fungus culture [[id number]] Collected: 10/28/22 1029    Order Status: Completed Specimen: Wound from Neck Updated: 10/31/22 0958     Fungus (Mycology) Culture Culture in progress    Narrative:      4) Epidural phlegman    Fungus culture [[id number]] Collected: 10/28/22 0906    Order Status: Completed Specimen: Abscess from Back Updated: 10/31/22 0958     Fungus (Mycology) Culture Culture in progress    Narrative:      Prevertebral Abscess    Fungus culture [[id number]] Collected: 10/28/22 0924    Order Status: Completed Specimen: Abscess from Neck Updated: 10/31/22 0958     Fungus (Mycology) Culture Culture in progress    Narrative:      2) Prevertebral absess    Aerobic culture [[id number]]  (Abnormal)  (Susceptibility) Collected: 10/28/22 0924    Order Status: Completed Specimen: Abscess from Neck Updated: 10/31/22 0928     Aerobic Bacterial Culture STAPHYLOCOCCUS AUREUS  Moderate      Narrative:      2) Prevertebral absess    Aerobic culture [[id number]] Collected: 10/28/22 1029    Order Status: Completed Specimen: Wound from Neck  Updated: 10/31/22 0901     Aerobic Bacterial Culture No growth    Narrative:      4) Epidural phlegman    Blood culture #2 **CANNOT BE ORDERED STAT** [[id number]] Collected: 10/25/22 0917    Order Status: Completed Specimen: Blood from Peripheral, Wrist, Left Updated: 10/30/22 1012     Blood Culture, Routine No growth after 5 days.    Gram stain [[id number]] Collected: 10/28/22 0950    Order Status: Completed Specimen: Wound from Neck Updated: 10/28/22 1538     Gram Stain Result No WBC's      No organisms seen    Narrative:      3) Osteodiscitis    Gram stain [[id number]] Collected: 10/28/22 1029    Order Status: Completed Specimen: Wound from Neck Updated: 10/28/22 1423     Gram Stain Result No WBC's      No organisms seen    Narrative:      4) Epidural phlegman    Gram stain [[id number]] Collected: 10/28/22 0924    Order Status: Completed Specimen: Abscess from Neck Updated: 10/28/22 1046     Gram Stain Result No WBC's      No organisms seen    Narrative:      2) Prevertebral absess    Gram stain [[id number]] Collected: 10/28/22 0906    Order Status: Completed Specimen: Abscess from Back Updated: 10/28/22 1044     Gram Stain Result No WBC's      No organisms seen    Narrative:      Prevertebral Abscess    Blood culture #1 **CANNOT BE ORDERED STAT** [[id number]]  (Abnormal)  (Susceptibility) Collected: 10/25/22 0917    Order Status: Completed Specimen: Blood from Peripheral, Antecubital, Right Updated: 10/28/22 1040     Blood Culture, Routine Gram stain yumi bottle: Gram positive cocci in clusters resembling Staph      Results called to and read back by: Jaxon Allen RN  17:39  10/26/2022      STAPHYLOCOCCUS AUREUS          All pertinent labs from the last 24 hours have been reviewed.    Significant Diagnostics:  I have reviewed all pertinent imaging results/findings within the past 24 hours.

## 2022-11-03 NOTE — PLAN OF CARE
[facility] Care Plan    POC reviewed with Peter Stiles and family at 1400. Pt verbalized understanding. Questions and concerns addressed. No acute events today. Pt progressing toward goals. Will continue to monitor. See below and flowsheets for full assessment and VS info.             Is this a stroke patient? yes- Stroke booklet reviewed with patient and family, risk factors identified for patient and stroke booklet remains at bedside for ongoing education.     Neuro:  Lucien Coma Scale  Best Eye Response: 4-->(E4) spontaneous  Best Motor Response: 6-->(M6) obeys commands  Best Verbal Response: 1-->(V1) none  Lucien Coma Scale Score: 11  Assessment Qualifiers: patient intubated  Pupil PERRLA: yes     24 hr Temp:  [97.8 °F (36.6 °C)-98.9 °F (37.2 °C)]     CV:   Rhythm: normal sinus rhythm  BP goals:   SBP < 140  MAP > 65    Resp:   O2 Device (Oxygen Therapy): ventilator  Vent Mode: A/C  Set Rate: 14 BPM  Oxygen Concentration (%): 40  Vt Set: 450 mL  PEEP/CPAP: 5 cmH20  Pressure Support: 10 cmH20    Plan: N/A        GI/:     Diet/Nutrition Received: NPO  Last Bowel Movement: 11/02/22  Voiding Characteristics: ureteral catheter    Intake/Output Summary (Last 24 hours) at 11/3/2022 1820  Last data filed at 11/3/2022 1700  Gross per 24 hour   Intake 1108.37 ml   Output 1420 ml   Net -311.63 ml     Unmeasured Output  Urine Occurrence: 1  Stool Occurrence: 0  Pad Count: 3    Labs/Accuchecks:  Recent Labs   Lab 11/03/22 0147   WBC 15.50*   RBC 2.78*   HGB 8.5*   HCT 25.5*         Recent Labs   Lab 11/03/22 0147      K 4.3   CO2 22*      BUN 14   CREATININE 0.7   ALKPHOS 187*   ALT 30   AST 36   BILITOT 0.4      Recent Labs   Lab 11/01/22 2128   INR 1.0   APTT 26.0    No results for input(s): CPK, CPKMB, TROPONINI, MB in the last 168 hours.    Electrolytes: N/A - electrolytes WDL  Accuchecks: none    Gtts:   fentanyl 10 mcg/hr (11/03/22 1700)    HYDROmorphone PCA syringe 15 mg/30 mL (0.5 mg/mL) NS  - HIGH CONC      propofoL 10 mcg/kg/min (11/03/22 1645)       LDA/Wounds:  Lines/Drains/Airways       Drain  Duration                  Closed/Suction Drain 10/28/22 1129 Anterior Neck Accordion 10 Fr. 6 days         NG/OG Tube 10/29/22 1000 Left nostril 5 days         Urethral Catheter 10/31/22 1618 3 days         Closed/Suction Drain 11/02/22 Right;Superior;Medial Back 1 day         Closed/Suction Drain 11/02/22 Superior;Medial Back Other (Comment) 1 day              Airway  Duration                  Airway - Non-Surgical 10/28/22 1228 Endotracheal Tube 6 days              Arterial Line  Duration             Arterial Line 11/02/22 1215 Right Radial 1 day              Peripheral Intravenous Line  Duration                  Peripheral IV - Single Lumen 10/28/22 1300 18 G Anterior;Right Shoulder 6 days         Peripheral IV - Single Lumen 10/28/22 1300 22 G Anterior;Right Foot 6 days         Midline Catheter Insertion/Assessment  - Single Lumen 10/28/22 1858 Right basilic vein (medial side of arm) 18g x 10cm 5 days         Peripheral IV - Single Lumen 11/01/22 1206 18 G;3/4 in Anterior;Proximal;Right Upper Arm 2 days         Peripheral IV - Single Lumen 11/01/22 1417 18 G Anterior;Left Forearm 2 days                  Wounds: No  Wound care consulted: No

## 2022-11-03 NOTE — SUBJECTIVE & OBJECTIVE
Interval History: S/p OR yesterday with NSGY. No fevers documented overnight. CXR with some L haziness/atelectasis, L effusion. Stable vent settings. LISHA pending.       Review of Systems   Unable to perform ROS: Intubated   Objective:     Vital Signs (Most Recent):  Temp: 98.7 °F (37.1 °C) (11/03/22 0305)  Pulse: 82 (11/03/22 0732)  Resp: 16 (11/03/22 0732)  BP: 121/68 (11/03/22 0732)  SpO2: 96 % (11/03/22 0732) Vital Signs (24h Range):  Temp:  [97.8 °F (36.6 °C)-98.9 °F (37.2 °C)] 98.7 °F (37.1 °C)  Pulse:  [] 82  Resp:  [14-28] 16  SpO2:  [95 %-100 %] 96 %  BP: (105-187)/(61-94) 121/68  Arterial Line BP: (109-313)/() 148/138     Weight: 63.5 kg (139 lb 15.9 oz)  Body mass index is 22.6 kg/m².    Estimated Creatinine Clearance: 88 mL/min (based on SCr of 0.7 mg/dL).    Physical Exam  Constitutional:       General: She is not in acute distress.     Appearance: She is not ill-appearing or toxic-appearing.   HENT:      Head: Normocephalic and atraumatic.      Mouth/Throat:      Comments: ETT    Eyes:      General:         Right eye: No discharge.         Left eye: No discharge.   Neck:      Comments: collar  Cardiovascular:      Rate and Rhythm: Normal rate and regular rhythm.   Pulmonary:      Effort: Pulmonary effort is normal. No respiratory distress.      Breath sounds: No stridor. No wheezing or rhonchi.   Abdominal:      General: There is no distension.      Palpations: Abdomen is soft.      Tenderness: There is no abdominal tenderness. There is no guarding.   Skin:     General: Skin is warm and dry.      Coloration: Skin is not jaundiced.      Findings: No bruising.      Comments: R midline  Neck surgical drains  LUE/RUE scabbing- no drainage present       Significant Labs:   Microbiology Results (last 7 days)       Procedure Component Value Units Date/Time    Culture, Anaerobe [[id number]] Collected: 10/28/22 1029    Order Status: Completed Specimen: Wound from Neck Updated: 11/03/22 0908      Anaerobic Culture Culture in progress    Narrative:      4) Epidural phlegman    Blood culture [[id number]] Collected: 10/29/22 0623    Order Status: Completed Specimen: Blood from Peripheral, Foot, Left Updated: 11/03/22 0812     Blood Culture, Routine No growth after 5 days.    Blood culture [[id number]] Collected: 10/29/22 0621    Order Status: Completed Specimen: Blood from Peripheral, Foot, Right Updated: 11/03/22 0812     Blood Culture, Routine No growth after 5 days.    Aerobic culture [[id number]]  (Abnormal)  (Susceptibility) Collected: 10/28/22 0906    Order Status: Completed Specimen: Abscess from Back Updated: 11/01/22 1059     Aerobic Bacterial Culture STAPHYLOCOCCUS AUREUS  Rare      Narrative:      Prevertebral Abscess    Aerobic culture [[id number]]  (Abnormal)  (Susceptibility) Collected: 10/28/22 0950    Order Status: Completed Specimen: Wound from Neck Updated: 11/01/22 1039     Aerobic Bacterial Culture STAPHYLOCOCCUS AUREUS  Rare      Narrative:      3) Osteodiscitis    Blood culture [[id number]] Collected: 10/27/22 0939    Order Status: Completed Specimen: Blood Updated: 11/01/22 1012     Blood Culture, Routine No growth after 5 days.    Narrative:      Rt wrist    Blood culture [[id number]] Collected: 10/27/22 0938    Order Status: Completed Specimen: Blood Updated: 11/01/22 1012     Blood Culture, Routine No growth after 5 days.    Narrative:      Rt AC    Culture, Anaerobe [[id number]] Collected: 10/28/22 0950    Order Status: Completed Specimen: Wound from Neck Updated: 11/01/22 0724     Anaerobic Culture No anaerobes isolated    Narrative:      3) Osteodiscitis    Culture, Anaerobe [[id number]] Collected: 10/28/22 0924    Order Status: Completed Specimen: Abscess from Neck Updated: 11/01/22 0722     Anaerobic Culture No anaerobes isolated    Narrative:      2) Prevertebral absess    Culture, Anaerobe [[id number]] Collected: 10/28/22 0906    Order Status: Completed Specimen: Abscess from Back  Updated: 11/01/22 0722     Anaerobic Culture No anaerobes isolated    Narrative:      Prevertebral Abscess    AFB Culture & Smear [[id number]] Collected: 10/28/22 1029    Order Status: Completed Specimen: Wound from Neck Updated: 10/31/22 1250     AFB Culture & Smear Culture in progress     AFB CULTURE STAIN No acid fast bacilli seen.    Narrative:      4) Epidural phlegman    AFB Culture & Smear [[id number]] Collected: 10/28/22 0924    Order Status: Completed Specimen: Abscess from Neck Updated: 10/31/22 1250     AFB Culture & Smear Culture in progress     AFB CULTURE STAIN No acid fast bacilli seen.    Narrative:      2) Prevertebral absess    AFB Culture & Smear [[id number]] Collected: 10/28/22 0906    Order Status: Completed Specimen: Abscess from Back Updated: 10/31/22 1250     AFB Culture & Smear Culture in progress     AFB CULTURE STAIN No acid fast bacilli seen.    Narrative:      Prevertebral Abscess    AFB Culture & Smear [[id number]] Collected: 10/28/22 0950    Order Status: Completed Specimen: Wound from Neck Updated: 10/31/22 1250     AFB Culture & Smear Culture in progress     AFB CULTURE STAIN No acid fast bacilli seen.    Narrative:      3) Osteodiscitis    Fungus culture [[id number]] Collected: 10/28/22 0950    Order Status: Completed Specimen: Wound from Neck Updated: 10/31/22 0958     Fungus (Mycology) Culture Culture in progress    Narrative:      3) Osteodiscitis    Fungus culture [[id number]] Collected: 10/28/22 1029    Order Status: Completed Specimen: Wound from Neck Updated: 10/31/22 0958     Fungus (Mycology) Culture Culture in progress    Narrative:      4) Epidural phlegman    Fungus culture [[id number]] Collected: 10/28/22 0906    Order Status: Completed Specimen: Abscess from Back Updated: 10/31/22 0958     Fungus (Mycology) Culture Culture in progress    Narrative:      Prevertebral Abscess    Fungus culture [[id number]] Collected: 10/28/22 0924    Order Status: Completed Specimen: Abscess  from Neck Updated: 10/31/22 0958     Fungus (Mycology) Culture Culture in progress    Narrative:      2) Prevertebral absess    Aerobic culture [[id number]]  (Abnormal)  (Susceptibility) Collected: 10/28/22 0924    Order Status: Completed Specimen: Abscess from Neck Updated: 10/31/22 0928     Aerobic Bacterial Culture STAPHYLOCOCCUS AUREUS  Moderate      Narrative:      2) Prevertebral absess    Aerobic culture [[id number]] Collected: 10/28/22 1029    Order Status: Completed Specimen: Wound from Neck Updated: 10/31/22 0901     Aerobic Bacterial Culture No growth    Narrative:      4) Epidural phlegman    Blood culture #2 **CANNOT BE ORDERED STAT** [[id number]] Collected: 10/25/22 0917    Order Status: Completed Specimen: Blood from Peripheral, Wrist, Left Updated: 10/30/22 1012     Blood Culture, Routine No growth after 5 days.    Gram stain [[id number]] Collected: 10/28/22 0950    Order Status: Completed Specimen: Wound from Neck Updated: 10/28/22 1538     Gram Stain Result No WBC's      No organisms seen    Narrative:      3) Osteodiscitis    Gram stain [[id number]] Collected: 10/28/22 1029    Order Status: Completed Specimen: Wound from Neck Updated: 10/28/22 1423     Gram Stain Result No WBC's      No organisms seen    Narrative:      4) Epidural phlegman    Gram stain [[id number]] Collected: 10/28/22 0924    Order Status: Completed Specimen: Abscess from Neck Updated: 10/28/22 1046     Gram Stain Result No WBC's      No organisms seen    Narrative:      2) Prevertebral absess    Gram stain [[id number]] Collected: 10/28/22 0906    Order Status: Completed Specimen: Abscess from Back Updated: 10/28/22 1044     Gram Stain Result No WBC's      No organisms seen    Narrative:      Prevertebral Abscess    Blood culture #1 **CANNOT BE ORDERED STAT** [[id number]]  (Abnormal)  (Susceptibility) Collected: 10/25/22 0917    Order Status: Completed Specimen: Blood from Peripheral, Antecubital, Right Updated: 10/28/22 1040     Blood  Culture, Routine Gram stain yumi bottle: Gram positive cocci in clusters resembling Staph      Results called to and read back by: Jaxon Allen RN  17:39  10/26/2022      STAPHYLOCOCCUS AUREUS            Significant Imaging: I have reviewed all pertinent imaging results/findings within the past 24 hours.
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
## 2022-11-04 LAB
ALBUMIN SERPL BCP-MCNC: 2.2 G/DL (ref 3.5–5.2)
ALLENS TEST: ABNORMAL
ALP SERPL-CCNC: 172 U/L (ref 55–135)
ALT SERPL W/O P-5'-P-CCNC: 25 U/L (ref 10–44)
ANION GAP SERPL CALC-SCNC: 10 MMOL/L (ref 8–16)
AST SERPL-CCNC: 32 U/L (ref 10–40)
BASOPHILS # BLD AUTO: 0.02 K/UL (ref 0–0.2)
BASOPHILS NFR BLD: 0.1 % (ref 0–1.9)
BILIRUB SERPL-MCNC: 0.5 MG/DL (ref 0.1–1)
BUN SERPL-MCNC: 15 MG/DL (ref 6–20)
CALCIUM SERPL-MCNC: 8.7 MG/DL (ref 8.7–10.5)
CHLORIDE SERPL-SCNC: 108 MMOL/L (ref 95–110)
CO2 SERPL-SCNC: 23 MMOL/L (ref 23–29)
CREAT SERPL-MCNC: 0.8 MG/DL (ref 0.5–1.4)
DELSYS: ABNORMAL
DIFFERENTIAL METHOD: ABNORMAL
EOSINOPHIL # BLD AUTO: 0 K/UL (ref 0–0.5)
EOSINOPHIL NFR BLD: 0.1 % (ref 0–8)
ERYTHROCYTE [DISTWIDTH] IN BLOOD BY AUTOMATED COUNT: 12.6 % (ref 11.5–14.5)
EST. GFR  (NO RACE VARIABLE): >60 ML/MIN/1.73 M^2
FIO2: 40
GLUCOSE SERPL-MCNC: 134 MG/DL (ref 70–110)
HCO3 UR-SCNC: 30.1 MMOL/L (ref 24–28)
HCT VFR BLD AUTO: 24 % (ref 37–48.5)
HGB BLD-MCNC: 7.7 G/DL (ref 12–16)
IMM GRANULOCYTES # BLD AUTO: 0.25 K/UL (ref 0–0.04)
IMM GRANULOCYTES NFR BLD AUTO: 1.8 % (ref 0–0.5)
LYMPHOCYTES # BLD AUTO: 2 K/UL (ref 1–4.8)
LYMPHOCYTES NFR BLD: 14.7 % (ref 18–48)
MAGNESIUM SERPL-MCNC: 2.1 MG/DL (ref 1.6–2.6)
MCH RBC QN AUTO: 30 PG (ref 27–31)
MCHC RBC AUTO-ENTMCNC: 32.1 G/DL (ref 32–36)
MCV RBC AUTO: 93 FL (ref 82–98)
MIN VOL: 7.52
MODE: ABNORMAL
MONOCYTES # BLD AUTO: 0.6 K/UL (ref 0.3–1)
MONOCYTES NFR BLD: 4.3 % (ref 4–15)
NEUTROPHILS # BLD AUTO: 10.7 K/UL (ref 1.8–7.7)
NEUTROPHILS NFR BLD: 79 % (ref 38–73)
NRBC BLD-RTO: 0 /100 WBC
PCO2 BLDA: 49 MMHG (ref 35–45)
PEEP: 5
PH SMN: 7.4 [PH] (ref 7.35–7.45)
PHOSPHATE SERPL-MCNC: 3.9 MG/DL (ref 2.7–4.5)
PLATELET # BLD AUTO: 363 K/UL (ref 150–450)
PMV BLD AUTO: 9.2 FL (ref 9.2–12.9)
PO2 BLDA: 31 MMHG (ref 40–60)
POC BE: 5 MMOL/L
POC SATURATED O2: 59 % (ref 95–100)
POC TCO2: 32 MMOL/L (ref 24–29)
POCT GLUCOSE: 136 MG/DL (ref 70–110)
POCT GLUCOSE: 140 MG/DL (ref 70–110)
POTASSIUM SERPL-SCNC: 4 MMOL/L (ref 3.5–5.1)
PROT SERPL-MCNC: 6.6 G/DL (ref 6–8.4)
PS: 5
RBC # BLD AUTO: 2.57 M/UL (ref 4–5.4)
SAMPLE: ABNORMAL
SITE: ABNORMAL
SODIUM SERPL-SCNC: 141 MMOL/L (ref 136–145)
SP02: 96
SPONT RATE: 19
WBC # BLD AUTO: 13.54 K/UL (ref 3.9–12.7)

## 2022-11-04 PROCEDURE — 99900026 HC AIRWAY MAINTENANCE (STAT)

## 2022-11-04 PROCEDURE — 63600175 PHARM REV CODE 636 W HCPCS

## 2022-11-04 PROCEDURE — 94761 N-INVAS EAR/PLS OXIMETRY MLT: CPT

## 2022-11-04 PROCEDURE — 82803 BLOOD GASES ANY COMBINATION: CPT

## 2022-11-04 PROCEDURE — 27000207 HC ISOLATION

## 2022-11-04 PROCEDURE — 80053 COMPREHEN METABOLIC PANEL: CPT | Performed by: PHYSICIAN ASSISTANT

## 2022-11-04 PROCEDURE — 92610 EVALUATE SWALLOWING FUNCTION: CPT

## 2022-11-04 PROCEDURE — 94003 VENT MGMT INPAT SUBQ DAY: CPT

## 2022-11-04 PROCEDURE — 99291 CRITICAL CARE FIRST HOUR: CPT | Mod: ,,, | Performed by: NURSE PRACTITIONER

## 2022-11-04 PROCEDURE — 99900035 HC TECH TIME PER 15 MIN (STAT)

## 2022-11-04 PROCEDURE — 25000003 PHARM REV CODE 250: Performed by: PSYCHIATRY & NEUROLOGY

## 2022-11-04 PROCEDURE — 85025 COMPLETE CBC W/AUTO DIFF WBC: CPT | Performed by: PHYSICIAN ASSISTANT

## 2022-11-04 PROCEDURE — 63600175 PHARM REV CODE 636 W HCPCS: Performed by: STUDENT IN AN ORGANIZED HEALTH CARE EDUCATION/TRAINING PROGRAM

## 2022-11-04 PROCEDURE — 99233 SBSQ HOSP IP/OBS HIGH 50: CPT | Mod: ,,, | Performed by: STUDENT IN AN ORGANIZED HEALTH CARE EDUCATION/TRAINING PROGRAM

## 2022-11-04 PROCEDURE — 25000003 PHARM REV CODE 250: Performed by: NURSE PRACTITIONER

## 2022-11-04 PROCEDURE — 25000242 PHARM REV CODE 250 ALT 637 W/ HCPCS: Performed by: STUDENT IN AN ORGANIZED HEALTH CARE EDUCATION/TRAINING PROGRAM

## 2022-11-04 PROCEDURE — 20000000 HC ICU ROOM

## 2022-11-04 PROCEDURE — 99291 PR CRITICAL CARE, E/M 30-74 MINUTES: ICD-10-PCS | Mod: ,,, | Performed by: NURSE PRACTITIONER

## 2022-11-04 PROCEDURE — 25000242 PHARM REV CODE 250 ALT 637 W/ HCPCS

## 2022-11-04 PROCEDURE — 63600175 PHARM REV CODE 636 W HCPCS: Performed by: NURSE PRACTITIONER

## 2022-11-04 PROCEDURE — 94640 AIRWAY INHALATION TREATMENT: CPT

## 2022-11-04 PROCEDURE — 27000221 HC OXYGEN, UP TO 24 HOURS

## 2022-11-04 PROCEDURE — S4991 NICOTINE PATCH NONLEGEND: HCPCS | Performed by: PHYSICIAN ASSISTANT

## 2022-11-04 PROCEDURE — 99233 PR SUBSEQUENT HOSPITAL CARE,LEVL III: ICD-10-PCS | Mod: ,,, | Performed by: STUDENT IN AN ORGANIZED HEALTH CARE EDUCATION/TRAINING PROGRAM

## 2022-11-04 PROCEDURE — 27200966 HC CLOSED SUCTION SYSTEM

## 2022-11-04 PROCEDURE — 84100 ASSAY OF PHOSPHORUS: CPT | Performed by: PHYSICIAN ASSISTANT

## 2022-11-04 PROCEDURE — 25000003 PHARM REV CODE 250: Performed by: PHYSICIAN ASSISTANT

## 2022-11-04 PROCEDURE — 83735 ASSAY OF MAGNESIUM: CPT | Performed by: PHYSICIAN ASSISTANT

## 2022-11-04 RX ORDER — HEPARIN SODIUM 5000 [USP'U]/ML
5000 INJECTION, SOLUTION INTRAVENOUS; SUBCUTANEOUS EVERY 8 HOURS
Status: DISCONTINUED | OUTPATIENT
Start: 2022-11-04 | End: 2022-11-21 | Stop reason: HOSPADM

## 2022-11-04 RX ORDER — HYDROMORPHONE HCL IN 0.9% NACL 6 MG/30 ML
PATIENT CONTROLLED ANALGESIA SYRINGE INTRAVENOUS CONTINUOUS
Status: DISCONTINUED | OUTPATIENT
Start: 2022-11-04 | End: 2022-11-04

## 2022-11-04 RX ORDER — NALOXONE HCL 0.4 MG/ML
0.02 VIAL (ML) INJECTION
Status: DISCONTINUED | OUTPATIENT
Start: 2022-11-04 | End: 2022-11-04

## 2022-11-04 RX ORDER — OXYCODONE HYDROCHLORIDE 10 MG/1
10 TABLET ORAL EVERY 6 HOURS
Status: DISCONTINUED | OUTPATIENT
Start: 2022-11-04 | End: 2022-11-05

## 2022-11-04 RX ORDER — DIAZEPAM 5 MG/1
5 TABLET ORAL EVERY 8 HOURS
Status: DISCONTINUED | OUTPATIENT
Start: 2022-11-04 | End: 2022-11-05

## 2022-11-04 RX ADMIN — OXYCODONE HYDROCHLORIDE 10 MG: 10 TABLET ORAL at 12:11

## 2022-11-04 RX ADMIN — OXACILLIN SODIUM 12 G: 10 INJECTION, POWDER, FOR SOLUTION INTRAVENOUS at 02:11

## 2022-11-04 RX ADMIN — POLYETHYLENE GLYCOL 3350 17 G: 17 POWDER, FOR SOLUTION ORAL at 09:11

## 2022-11-04 RX ADMIN — DEXAMETHASONE SODIUM PHOSPHATE 10 MG: 4 INJECTION INTRA-ARTICULAR; INTRALESIONAL; INTRAMUSCULAR; INTRAVENOUS; SOFT TISSUE at 02:11

## 2022-11-04 RX ADMIN — PREGABALIN 300 MG: 75 CAPSULE ORAL at 09:11

## 2022-11-04 RX ADMIN — HYDROXYZINE HYDROCHLORIDE 25 MG: 25 TABLET, FILM COATED ORAL at 05:11

## 2022-11-04 RX ADMIN — RACEPINEPHRINE HYDROCHLORIDE 0.5 ML: 11.25 SOLUTION RESPIRATORY (INHALATION) at 09:11

## 2022-11-04 RX ADMIN — OXYCODONE HYDROCHLORIDE 10 MG: 10 TABLET ORAL at 06:11

## 2022-11-04 RX ADMIN — HEPARIN SODIUM 5000 UNITS: 5000 INJECTION INTRAVENOUS; SUBCUTANEOUS at 09:11

## 2022-11-04 RX ADMIN — DIAZEPAM 5 MG: 5 TABLET ORAL at 09:11

## 2022-11-04 RX ADMIN — SENNOSIDES AND DOCUSATE SODIUM 1 TABLET: 50; 8.6 TABLET ORAL at 09:11

## 2022-11-04 RX ADMIN — DIAZEPAM 5 MG: 5 INJECTION, SOLUTION INTRAMUSCULAR; INTRAVENOUS at 02:11

## 2022-11-04 RX ADMIN — Medication 1 PATCH: at 09:11

## 2022-11-04 RX ADMIN — METHADONE HYDROCHLORIDE 90 MG: 10 TABLET ORAL at 02:11

## 2022-11-04 RX ADMIN — BISACODYL 10 MG: 10 SUPPOSITORY RECTAL at 09:11

## 2022-11-04 RX ADMIN — HYDROXYZINE HYDROCHLORIDE 25 MG: 25 TABLET, FILM COATED ORAL at 09:11

## 2022-11-04 RX ADMIN — FAMOTIDINE 20 MG: 20 TABLET ORAL at 09:11
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
## 2022-11-04 NOTE — ASSESSMENT & PLAN NOTE
Peter Stiles is a 51 y.o. female with PMHx of HTN, dilated cardiomyopathy, h/o opioid dependence, cigarette smoker (2pks/day), and daily baby ASA use who presents with 1 day of decreased sensation from T5 level down and tingling in her bilateral fingers and bilateral toes. MRI imaging obtained in the ED showing possible C5-6 osteodiscitis/myelitis with cord compression. Patient tachycardic and hypertensive on arrival, afebrile, with leukocytosis.     -All pertinent imaging/labs personally reviewed and interpreted.    -MRI C/T/L spine w/o contrast 10/25: focal kyphotic deformity at C5/6 with possible discitis/osteomyelitis, epidural collection extending into the central spinal canal resulting in severe stenosis and cord signal abnormality.  Prevertebral soft tissue edema and probable paravertebral abscess or phlegmon at this level. Thoracic and lumbar spine unremarkable.    -MRI C spine w/ contrast 10/25: confirmed enhancement at C5/6 consistent with osteomyelitis/discitis and epidural phlegmon    Now s/p C5-6 corpectomy on 10/28/22  C2-T2 PCDF on 11/2    --Admitted to neuro ICU  --Intubated after procedure; WTE per neuro ICU   May stay intubated for LISHA today, will consider extubation today if procedure delayed.   -BCx 10/25 growing S. Aureus, f/u sensitivities. Repeat BCx's 10/27 pending/NGTD.  -OR cultures 10./28 no organisms on gram stain, culture MSSA  --Post op xrays, hardware in good position  --C spine xray pending   --ID following   --Maintain drains to full suction  --Neurochecks Q1h  --Continue C collar for now  --Pain control as needed  --WTE today  --Abx: Oxacillin  --Patiño   --PT/OT. SQH  --Please call NSGY with any questions/concerns    Dispo: LISHA, WTE today.

## 2022-11-04 NOTE — SUBJECTIVE & OBJECTIVE
Interval History:  Extubate today to NC. Consult ID and will need 6 weeks of oxacillin.    Review of Systems   Constitutional: Negative.    HENT: Negative.     Eyes: Negative.    Respiratory: Negative.     Cardiovascular: Negative.    Gastrointestinal: Negative.    Endocrine: Negative.    Genitourinary: Negative.    Musculoskeletal: Negative.    Skin: Negative.    Neurological: Negative.    2 systems   Objective:     Vitals:  Temp: 98.6 °F (37 °C)  Pulse: (!) 119  Rhythm: normal sinus rhythm  BP: (!) 87/55  MAP (mmHg): 67  Resp: (!) 6  SpO2: 96 %  Oxygen Concentration (%): (S) 32  O2 Device (Oxygen Therapy): (S) nasal cannula w/ humidification  Vent Mode: Spont  Pressure Support: 5 cmH20  PEEP/CPAP: 5 cmH20  Peak Airway Pressure: 11 cmH2O  Mean Airway Pressure: 6.8 cmH20  Plateau Pressure: 0 cmH20    Temp  Min: 98.3 °F (36.8 °C)  Max: 99.2 °F (37.3 °C)  Pulse  Min: 85  Max: 119  BP  Min: 84/51  Max: 114/67  MAP (mmHg)  Min: 63  Max: 90  Resp  Min: 6  Max: 32  SpO2  Min: 82 %  Max: 100 %  Oxygen Concentration (%)  Min: 32  Max: 40    11/03 0701 - 11/04 0700  In: 1076 [I.V.:307.4]  Out: 2737 [Urine:2532; Drains:205]   Unmeasured Output  Urine Occurrence: 1  Stool Occurrence: 0  Pad Count: 3       Physical Exam  Unable to test orientation, language, memory, judgment, insight, fund of knowledge, hearing, shoulder shrug, tongue protrusion, coordination, gait due to level of consciousness.    Medications:  Continuoushydromorphone in 0.9 % NaCl 6 mg/30 ml    ScheduledbisacodyL, 10 mg, Daily  diazePAM, 5 mg, Q8H  methadone, 90 mg, Daily  nicotine, 1 patch, Daily  oxacillin 12 g in  mL CONTINUOUS INFUSION, 12 g, Q24H  oxyCODONE, 10 mg, Q6H  polyethylene glycol, 17 g, Daily  pregabalin, 300 mg, BID  senna-docusate 8.6-50 mg, 1 tablet, BID    PRNacetaminophen, 650 mg, Q6H PRN  dextrose 10%, 12.5 g, PRN  dextrose 10%, 25 g, PRN  glucagon (human recombinant), 1 mg, PRN  hydrALAZINE, 10 mg, Q4H PRN  hydrOXYzine HCL, 25 mg,  TID PRN  insulin aspart U-100, 0-5 Units, Q6H PRN  labetalol, 10 mg, Q4H PRN  naloxone, 0.02 mg, PRN  ondansetron, 4 mg, Q8H PRN  racepinephrine, 0.5 mL, Q15 Min PRN  simethicone, 1 tablet, TID PRN  sodium chloride 0.9%, 10 mL, PRN      Today I personally reviewed pertinent medications, lines/drains/airways, imaging, cardiology results, laboratory results, microbiology results, notably:    Diet  Diet NPO Except for: Medication  Diet NPO Except for: Medication
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
## 2022-11-04 NOTE — PT/OT/SLP EVAL
Speech Language Pathology Evaluation  Bedside Swallow    Patient Name:  Peter Stiles   MRN:  [id number]  Admitting Diagnosis: Osteomyelitis of cervical spine    Recommendations:                 General Recommendations:  Dysphagia therapy  Diet recommendations:  NPO, NPO   Aspiration Precautions: Frequent oral care and Strict aspiration precautions   General Precautions: Standard, aspiration, fall, contact  Communication strategies:  go to room if call light pushed    History:     Past Medical History:   Diagnosis Date    CHF (congestive heart failure)     Essential (primary) hypertension     Opioid dependence on maintenance agonist therapy, no symptoms     Smoking        Past Surgical History:   Procedure Laterality Date    APPENDECTOMY       SECTION      x2    FUSION OF CERVICAL SPINE BY POSTERIOR APPROACH N/A 2022    Procedure: FUSION, SPINE, CERVICAL, POSTERIOR APPROACH C2-T2 posterior decompression/fusion; Depuy, neuromonitoring monica Marrero;  Surgeon: West Merino DO;  Location: [facility] OR [address] [place] [address];  Service: Neurosurgery;  Laterality: N/A;    HYSTERECTOMY      SURGICAL REMOVAL OF VERTEBRAL BODY OF CERVICAL SPINE N/A 10/28/2022    Procedure: CORPECTOMY, SPINE, CERVICAL;  Surgeon: West Merino DO;  Location: [facility] OR [address] [place] [address];  Service: Neurosurgery;  Laterality: N/A;  anterior C5-6 corpectomy, globus, caspar head prado and tongs, omnipaque, 15lbs weights, microscrope, round cutting lynsey and M8, ENT assistance for exposure       Social History: Patient lives in her home with her two daughters.    Prior Intubation HX:  10/28/22 - 22    Modified Barium Swallow: none prior at this facility     Chest X-Rays: 22: Continued demonstration of subsegmental atelectasis in the left lower lung zone.  Allowing for differences in patient positioning, there has been no significant detrimental interval change in the appearance of the chest since 2022 at 21:14.    Prior diet:  "Patient reports she ate softer textures prior to admit 2/2 dentition, drank thin liquids    Occupation/hobbies/homemaking: Currently not employed, Independent with ADLs prior to admit     Subjective     SLP reviewed Pt with RN, RN cleared for therapy  Pt presents calm  She explains, "I was so worried when they took the tube out"     Pain/Comfort:  Pain Rating 1: other (see comments) (Pt did not rate pain)  Location - Orientation 1: generalized  Location 1: neck  Pain Addressed 1: Nurse notified, Cessation of Activity    Respiratory Status: Nasal cannula, flow 3 L/min    Objective:     Oral Musculature Evaluation  Oral Musculature: WNL  Dentition: teeth in poor condition  Secretion Management: problems swallowing secretions (Pt used yankauer to suction secretions)  Mucosal Quality: adequate  Oral Labial Strength and Mobility: functional pursing, functional retraction  Lingual Strength and Mobility: functional strength  Volitional Cough: present  Volitional Swallow: elicited  Voice Prior to PO Intake: mildly hoarse and breathy, variable intensity    Bedside Swallow Eval:   Consistencies Assessed:  Pt politely declined SLP attempts to initiate PO trials (ice chip).      Oral Phase:   ANAIS    Pharyngeal Phase:   ANAIS    Compensatory Strategies  ANAIS    Treatment: Pt found awake, alert and partially upright in bed with cervical collar, NG and oxygen in place, holding yankauer to oral cavity. Pt suctioned secretions via yankauer x2. She demonstrated mildly decreased breath support at the conversational level and intermittently took deep breaths between sentences.  RR between 14 - 44 t/o simple conversational tasks.  RN notified and aware.  She endorsed neck pain when swallowing secretions.  HOB partially elevated per RN clearance. As assessment progressed,  Pt politely declined PO trials 2/2 pain and recent extubation.  SLP educated Pt on SLP role, swallow anatomy, definition and risk of aspiration, safe swallow precautions " and SLP POC including ongoing assessment of swallow as strength and PO acceptance improve. She verbalized partial understanding. No additional questions. No family present. RN notified of findings upon SLP exit/handoff with RN.       Assessment:     Peter Stiles is a 51 y.o. female with an SLP diagnosis of Dysphagia s/p extubation  She would benefit from ongoing swallow assessment to determine safest, least restrictive means nutrition/hydration.     Goals:   Multidisciplinary Problems       SLP Goals          Problem: SLP    Goal Priority Disciplines Outcome   SLP Goal     SLP Ongoing, Progressing   Description: Speech Language Pathology Goals  Goals expected to be met by 11/11/22    1. Pt will participate in ongoing assessment of swallow function to determine safest, least restrictive means of nutrition/hydration  2. Educate Pt and family on aspiration precautions and SLP POC                         Plan:     Patient to be seen:  4 x/week   Plan of Care expires:  12/04/22  Plan of Care reviewed with:  patient   SLP Follow-Up:  Yes       Discharge recommendations:  rehabilitation facility       Time Tracking:     SLP Treatment Date:   11/04/22  Speech Start Time:  1520  Speech Stop Time:  1534     Speech Total Time (min):  14 min    Billable Minutes: Eval Swallow and Oral Function 14    11/04/2022

## 2022-11-04 NOTE — PLAN OF CARE
Problem: SLP  Goal: SLP Goal  Description: Speech Language Pathology Goals  Goals expected to be met by 11/11/22    1. Pt will participate in ongoing assessment of swallow function to determine safest, least restrictive means of nutrition/hydration  2. Educate Pt and family on aspiration precautions and SLP POC    Outcome: Ongoing, Progressing     SLP Bedside Swallow Evaluation initiated. Pt politely declined PO trials upon SLP attempt. REC: continue NPO and ST to continue to follow for ongoing swallow assessment pending acceptance of PO trials.     11/4/2022

## 2022-11-04 NOTE — ASSESSMENT & PLAN NOTE
- currently intubated, on spontaneous  - OR today, will reassess and attempt to wean post procedurally   - CXR and KUB post op with L sided atelectasis/ground glass infiltrate/pleural effusion  - extubated to NC 11/4/22

## 2022-11-04 NOTE — PROGRESS NOTES
[facility]  Infectious Disease  Progress Note    Patient Name: Peter Stiles  MRN: [id number]  Admission Date: 10/25/2022  Length of Stay: 10 days  Attending Physician: Truong Bourne DO  Primary Care Provider: Og Victoria NP    Isolation Status: Contact  Assessment/Plan:      * Osteomyelitis of cervical spine  See epidural abscess    MSSA bacteremia  See epidural abscess    Epidural abscess  50 yo F with PMHx of HTN, dilated cardiomyopathy and remote opioid dependence (on methadone as an outpatient) presented on 10/25 with neck and back pain and lower body numbness and was found to have MSSA bacteremia due to C5-6 osteomyelitis, discitis and epidural collection. Suspects infection could have started at site of cat scratch on her back or burn wounds on b/l arms. TTE negative and repeat blcx ngtd. Now s/p corpectomy/ fusion and epidural abscess evacuation on 10/28 and s/p C2-T2 decompression/fusion with NSGY on 11/2. Unable to undergo LISHA due to c-collar per cards.          Recommendations:  -continue oxacillin for MSSA, anticipate 6w course from cleared blcx, rosalino 12/9   -unable to use adjunctive rifampin due to DDI (methadone)  -follow up cx data  -plan for placement per patient (SNF/LTAC) - pt requested [facility]    Outpatient Antibiotic Therapy Plan:    1) Infection: MSSA epidural abscess/bacteremia    2) Discharge Antibiotics:    Intravenous antibiotics:   Oxacillin 12g continuous infusion IV daily      3) Therapy Duration:  6w    Estimated end date of IV antibiotics: 12/9/22    4) Outpatient Weekly Labs:    Order the following labs to be drawn on Mondays:    CBC   CMP    CRP    5) Fax Lab Results to Infectious Diseases Provider: Bernard    [facility] Fax Number: [phone]    6) Outpatient Infectious Diseases Follow-up     Follow-up appointment will be arranged by the ID clinic and will be found in the patient's appointments tab.     Prior to discharge, please  ensure the patient's follow-up has been scheduled.     If there is still no follow-up scheduled prior to discharge, please send an EPIC message to Mireille Dougherty in Infectious Diseases.              Tobacco abuse  Discussed tobacco cessation with patient for optimal wound healing- amenable          Thank you for your consult. I will sign off. Please contact us if you have any additional questions. Above d/w primary team.     Time: 35 minutes   50% of time spent on face-to-face counseling and coordination of care. Counseling included review of test results, diagnosis, and treatment plan with patient and/or family.        Beth Wagner MD  Infectious Disease  [facility] - Neuro Critical Care    Subjective:     Principal Problem:Osteomyelitis of cervical spine    HPI: Ms. Stiles is a 52 yo F with PMHx of HTN, dilated cardiomyopathy and remote opioid dependence who presented on 10/25 with neck and back pain and lower body numbness and was found to have C5-6 osteomyelitis, discitis and epidural collection.    She first noted the symptoms 2 weeks ago when she was in the car with her daughter. Her daughter slammed on the breaks and the patient noted a soreness in her neck. Since then, she has had increasing neck pain. This morning she noted numbess to her stomach and below and pain in her legs. She also endorses tingling to her fingers. WBC elevated and MRI spine revealed C5-6 osteomyelitis, discitis and epidural collection. She denies IVDU.     Of note, pt reports wounds to her arms after burning herself and her cat with hot grease. She also notes a knot to her R upper back at the site where her cat scratched her. Denies prior back surgery .          Interval History: No fevers documented overnight. Unable to perform LISHA due to ccollar in place. Leukocytosis downtrending. Extubated this morning - tolerating abx without issues.       Review of Systems   Constitutional:  Negative for chills and fever.    Gastrointestinal:  Positive for constipation. Negative for diarrhea, nausea and vomiting.   Objective:     Vital Signs (Most Recent):  Temp: 98.6 °F (37 °C) (11/04/22 0700)  Pulse: 100 (11/04/22 0747)  Resp: (!) 24 (11/04/22 0747)  BP: (!) 87/55 (11/04/22 0605)  SpO2: 98 % (11/04/22 0747) Vital Signs (24h Range):  Temp:  [98.2 °F (36.8 °C)-99.2 °F (37.3 °C)] 98.6 °F (37 °C)  Pulse:  [] 100  Resp:  [13-32] 24  SpO2:  [93 %-100 %] 98 %  BP: ()/(51-76) 87/55  Arterial Line BP: ()/(55-90) 104/79     Weight: 63.5 kg (139 lb 15.9 oz)  Body mass index is 22.6 kg/m².    Estimated Creatinine Clearance: 77 mL/min (based on SCr of 0.8 mg/dL).    Physical Exam  Constitutional:       General: She is not in acute distress.     Appearance: She is not ill-appearing, toxic-appearing or diaphoretic.   HENT:      Head: Normocephalic and atraumatic.      Mouth/Throat:      Mouth: Mucous membranes are moist.      Pharynx: No oropharyngeal exudate or posterior oropharyngeal erythema.      Comments: Poor dentition  Eyes:      General:         Right eye: No discharge.         Left eye: No discharge.   Neck:      Comments: collar  Cardiovascular:      Rate and Rhythm: Normal rate and regular rhythm.   Pulmonary:      Effort: Pulmonary effort is normal. No respiratory distress.      Breath sounds: No stridor. No wheezing or rhonchi.   Abdominal:      General: There is no distension.      Palpations: Abdomen is soft.      Tenderness: There is no abdominal tenderness. There is no guarding.   Skin:     General: Skin is warm and dry.      Coloration: Skin is not jaundiced.      Findings: No bruising.      Comments: R midline  Neck surgical drains  LUE/RUE scabbing- no drainage present   Neurological:      Mental Status: She is alert and oriented to person, place, and time. Mental status is at baseline.      Motor: No weakness.   Psychiatric:         Mood and Affect: Mood normal.         Behavior: Behavior normal.        Significant Labs:   Microbiology Results (last 7 days)       Procedure Component Value Units Date/Time    Culture, Anaerobe [[id number]] Collected: 10/28/22 1029    Order Status: Completed Specimen: Wound from Neck Updated: 11/03/22 0908     Anaerobic Culture Culture in progress    Narrative:      4) Epidural phlegman    Blood culture [[id number]] Collected: 10/29/22 0623    Order Status: Completed Specimen: Blood from Peripheral, Foot, Left Updated: 11/03/22 0812     Blood Culture, Routine No growth after 5 days.    Blood culture [[id number]] Collected: 10/29/22 0621    Order Status: Completed Specimen: Blood from Peripheral, Foot, Right Updated: 11/03/22 0812     Blood Culture, Routine No growth after 5 days.    Aerobic culture [[id number]]  (Abnormal)  (Susceptibility) Collected: 10/28/22 0906    Order Status: Completed Specimen: Abscess from Back Updated: 11/01/22 1059     Aerobic Bacterial Culture STAPHYLOCOCCUS AUREUS  Rare      Narrative:      Prevertebral Abscess    Aerobic culture [[id number]]  (Abnormal)  (Susceptibility) Collected: 10/28/22 0950    Order Status: Completed Specimen: Wound from Neck Updated: 11/01/22 1039     Aerobic Bacterial Culture STAPHYLOCOCCUS AUREUS  Rare      Narrative:      3) Osteodiscitis    Blood culture [[id number]] Collected: 10/27/22 0939    Order Status: Completed Specimen: Blood Updated: 11/01/22 1012     Blood Culture, Routine No growth after 5 days.    Narrative:      Rt wrist    Blood culture [[id number]] Collected: 10/27/22 0938    Order Status: Completed Specimen: Blood Updated: 11/01/22 1012     Blood Culture, Routine No growth after 5 days.    Narrative:      Rt AC    Culture, Anaerobe [[id number]] Collected: 10/28/22 0950    Order Status: Completed Specimen: Wound from Neck Updated: 11/01/22 0724     Anaerobic Culture No anaerobes isolated    Narrative:      3) Osteodiscitis    Culture, Anaerobe [[id number]] Collected: 10/28/22 0924    Order Status: Completed Specimen:  Abscess from Neck Updated: 11/01/22 0722     Anaerobic Culture No anaerobes isolated    Narrative:      2) Prevertebral absess    Culture, Anaerobe [[id number]] Collected: 10/28/22 0906    Order Status: Completed Specimen: Abscess from Back Updated: 11/01/22 0722     Anaerobic Culture No anaerobes isolated    Narrative:      Prevertebral Abscess    AFB Culture & Smear [[id number]] Collected: 10/28/22 1029    Order Status: Completed Specimen: Wound from Neck Updated: 10/31/22 1250     AFB Culture & Smear Culture in progress     AFB CULTURE STAIN No acid fast bacilli seen.    Narrative:      4) Epidural phlegman    AFB Culture & Smear [[id number]] Collected: 10/28/22 0924    Order Status: Completed Specimen: Abscess from Neck Updated: 10/31/22 1250     AFB Culture & Smear Culture in progress     AFB CULTURE STAIN No acid fast bacilli seen.    Narrative:      2) Prevertebral absess    AFB Culture & Smear [[id number]] Collected: 10/28/22 0906    Order Status: Completed Specimen: Abscess from Back Updated: 10/31/22 1250     AFB Culture & Smear Culture in progress     AFB CULTURE STAIN No acid fast bacilli seen.    Narrative:      Prevertebral Abscess    AFB Culture & Smear [[id number]] Collected: 10/28/22 0950    Order Status: Completed Specimen: Wound from Neck Updated: 10/31/22 1250     AFB Culture & Smear Culture in progress     AFB CULTURE STAIN No acid fast bacilli seen.    Narrative:      3) Osteodiscitis    Fungus culture [[id number]] Collected: 10/28/22 0950    Order Status: Completed Specimen: Wound from Neck Updated: 10/31/22 0958     Fungus (Mycology) Culture Culture in progress    Narrative:      3) Osteodiscitis    Fungus culture [[id number]] Collected: 10/28/22 1029    Order Status: Completed Specimen: Wound from Neck Updated: 10/31/22 0958     Fungus (Mycology) Culture Culture in progress    Narrative:      4) Epidural phlegman    Fungus culture [[id number]] Collected: 10/28/22 0906    Order Status: Completed  Specimen: Abscess from Back Updated: 10/31/22 0958     Fungus (Mycology) Culture Culture in progress    Narrative:      Prevertebral Abscess    Fungus culture [[id number]] Collected: 10/28/22 0924    Order Status: Completed Specimen: Abscess from Neck Updated: 10/31/22 0958     Fungus (Mycology) Culture Culture in progress    Narrative:      2) Prevertebral absess    Aerobic culture [[id number]]  (Abnormal)  (Susceptibility) Collected: 10/28/22 0924    Order Status: Completed Specimen: Abscess from Neck Updated: 10/31/22 0928     Aerobic Bacterial Culture STAPHYLOCOCCUS AUREUS  Moderate      Narrative:      2) Prevertebral absess    Aerobic culture [[id number]] Collected: 10/28/22 1029    Order Status: Completed Specimen: Wound from Neck Updated: 10/31/22 0901     Aerobic Bacterial Culture No growth    Narrative:      4) Epidural phlegman    Blood culture #2 **CANNOT BE ORDERED STAT** [[id number]] Collected: 10/25/22 0917    Order Status: Completed Specimen: Blood from Peripheral, Wrist, Left Updated: 10/30/22 1012     Blood Culture, Routine No growth after 5 days.    Gram stain [[id number]] Collected: 10/28/22 0950    Order Status: Completed Specimen: Wound from Neck Updated: 10/28/22 1538     Gram Stain Result No WBC's      No organisms seen    Narrative:      3) Osteodiscitis    Gram stain [[id number]] Collected: 10/28/22 1029    Order Status: Completed Specimen: Wound from Neck Updated: 10/28/22 1423     Gram Stain Result No WBC's      No organisms seen    Narrative:      4) Epidural phlegman    Gram stain [[id number]] Collected: 10/28/22 0924    Order Status: Completed Specimen: Abscess from Neck Updated: 10/28/22 1046     Gram Stain Result No WBC's      No organisms seen    Narrative:      2) Prevertebral absess    Gram stain [[id number]] Collected: 10/28/22 0906    Order Status: Completed Specimen: Abscess from Back Updated: 10/28/22 1044     Gram Stain Result No WBC's      No organisms seen    Narrative:       Prevertebral Abscess    Blood culture #1 **CANNOT BE ORDERED STAT** [[id number]]  (Abnormal)  (Susceptibility) Collected: 10/25/22 0917    Order Status: Completed Specimen: Blood from Peripheral, Antecubital, Right Updated: 10/28/22 1040     Blood Culture, Routine Gram stain yumi bottle: Gram positive cocci in clusters resembling Staph      Results called to and read back by: Jaxon Allen RN  17:39  10/26/2022      STAPHYLOCOCCUS AUREUS            Significant Imaging: I have reviewed all pertinent imaging results/findings within the past 24 hours.

## 2022-11-04 NOTE — ASSESSMENT & PLAN NOTE
-Methadone 90 daily  (prescribed at [facility] on [facility] [place], [place])

## 2022-11-04 NOTE — SUBJECTIVE & OBJECTIVE
Interval History: No fevers documented overnight. Unable to perform LISHA due to ccollar in place. Leukocytosis downtrending. Extubated this morning - tolerating abx without issues.       Review of Systems   Constitutional:  Negative for chills and fever.   Gastrointestinal:  Positive for constipation. Negative for diarrhea, nausea and vomiting.   Objective:     Vital Signs (Most Recent):  Temp: 98.6 °F (37 °C) (11/04/22 0700)  Pulse: 100 (11/04/22 0747)  Resp: (!) 24 (11/04/22 0747)  BP: (!) 87/55 (11/04/22 0605)  SpO2: 98 % (11/04/22 0747) Vital Signs (24h Range):  Temp:  [98.2 °F (36.8 °C)-99.2 °F (37.3 °C)] 98.6 °F (37 °C)  Pulse:  [] 100  Resp:  [13-32] 24  SpO2:  [93 %-100 %] 98 %  BP: ()/(51-76) 87/55  Arterial Line BP: ()/(55-90) 104/79     Weight: 63.5 kg (139 lb 15.9 oz)  Body mass index is 22.6 kg/m².    Estimated Creatinine Clearance: 77 mL/min (based on SCr of 0.8 mg/dL).    Physical Exam  Constitutional:       General: She is not in acute distress.     Appearance: She is not ill-appearing, toxic-appearing or diaphoretic.   HENT:      Head: Normocephalic and atraumatic.      Mouth/Throat:      Mouth: Mucous membranes are moist.      Pharynx: No oropharyngeal exudate or posterior oropharyngeal erythema.      Comments: Poor dentition  Eyes:      General:         Right eye: No discharge.         Left eye: No discharge.   Neck:      Comments: collar  Cardiovascular:      Rate and Rhythm: Normal rate and regular rhythm.   Pulmonary:      Effort: Pulmonary effort is normal. No respiratory distress.      Breath sounds: No stridor. No wheezing or rhonchi.   Abdominal:      General: There is no distension.      Palpations: Abdomen is soft.      Tenderness: There is no abdominal tenderness. There is no guarding.   Skin:     General: Skin is warm and dry.      Coloration: Skin is not jaundiced.      Findings: No bruising.      Comments: R midline  Neck surgical drains  LUE/RUE scabbing- no drainage  present   Neurological:      Mental Status: She is alert and oriented to person, place, and time. Mental status is at baseline.      Motor: No weakness.   Psychiatric:         Mood and Affect: Mood normal.         Behavior: Behavior normal.       Significant Labs:   Microbiology Results (last 7 days)       Procedure Component Value Units Date/Time    Culture, Anaerobe [[id number]] Collected: 10/28/22 1029    Order Status: Completed Specimen: Wound from Neck Updated: 11/03/22 0908     Anaerobic Culture Culture in progress    Narrative:      4) Epidural phlegman    Blood culture [[id number]] Collected: 10/29/22 0623    Order Status: Completed Specimen: Blood from Peripheral, Foot, Left Updated: 11/03/22 0812     Blood Culture, Routine No growth after 5 days.    Blood culture [[id number]] Collected: 10/29/22 0621    Order Status: Completed Specimen: Blood from Peripheral, Foot, Right Updated: 11/03/22 0812     Blood Culture, Routine No growth after 5 days.    Aerobic culture [[id number]]  (Abnormal)  (Susceptibility) Collected: 10/28/22 0906    Order Status: Completed Specimen: Abscess from Back Updated: 11/01/22 1059     Aerobic Bacterial Culture STAPHYLOCOCCUS AUREUS  Rare      Narrative:      Prevertebral Abscess    Aerobic culture [[id number]]  (Abnormal)  (Susceptibility) Collected: 10/28/22 0950    Order Status: Completed Specimen: Wound from Neck Updated: 11/01/22 1039     Aerobic Bacterial Culture STAPHYLOCOCCUS AUREUS  Rare      Narrative:      3) Osteodiscitis    Blood culture [[id number]] Collected: 10/27/22 0939    Order Status: Completed Specimen: Blood Updated: 11/01/22 1012     Blood Culture, Routine No growth after 5 days.    Narrative:      Rt wrist    Blood culture [[id number]] Collected: 10/27/22 0938    Order Status: Completed Specimen: Blood Updated: 11/01/22 1012     Blood Culture, Routine No growth after 5 days.    Narrative:      Rt AC    Culture, Anaerobe [[id number]] Collected: 10/28/22 0950    Order  Status: Completed Specimen: Wound from Neck Updated: 11/01/22 0724     Anaerobic Culture No anaerobes isolated    Narrative:      3) Osteodiscitis    Culture, Anaerobe [[id number]] Collected: 10/28/22 0924    Order Status: Completed Specimen: Abscess from Neck Updated: 11/01/22 0722     Anaerobic Culture No anaerobes isolated    Narrative:      2) Prevertebral absess    Culture, Anaerobe [[id number]] Collected: 10/28/22 0906    Order Status: Completed Specimen: Abscess from Back Updated: 11/01/22 0722     Anaerobic Culture No anaerobes isolated    Narrative:      Prevertebral Abscess    AFB Culture & Smear [[id number]] Collected: 10/28/22 1029    Order Status: Completed Specimen: Wound from Neck Updated: 10/31/22 1250     AFB Culture & Smear Culture in progress     AFB CULTURE STAIN No acid fast bacilli seen.    Narrative:      4) Epidural phlegman    AFB Culture & Smear [[id number]] Collected: 10/28/22 0924    Order Status: Completed Specimen: Abscess from Neck Updated: 10/31/22 1250     AFB Culture & Smear Culture in progress     AFB CULTURE STAIN No acid fast bacilli seen.    Narrative:      2) Prevertebral absess    AFB Culture & Smear [[id number]] Collected: 10/28/22 0906    Order Status: Completed Specimen: Abscess from Back Updated: 10/31/22 1250     AFB Culture & Smear Culture in progress     AFB CULTURE STAIN No acid fast bacilli seen.    Narrative:      Prevertebral Abscess    AFB Culture & Smear [[id number]] Collected: 10/28/22 0950    Order Status: Completed Specimen: Wound from Neck Updated: 10/31/22 1250     AFB Culture & Smear Culture in progress     AFB CULTURE STAIN No acid fast bacilli seen.    Narrative:      3) Osteodiscitis    Fungus culture [[id number]] Collected: 10/28/22 0950    Order Status: Completed Specimen: Wound from Neck Updated: 10/31/22 0958     Fungus (Mycology) Culture Culture in progress    Narrative:      3) Osteodiscitis    Fungus culture [[id number]] Collected: 10/28/22 1029    Order  Status: Completed Specimen: Wound from Neck Updated: 10/31/22 0958     Fungus (Mycology) Culture Culture in progress    Narrative:      4) Epidural phlegman    Fungus culture [[id number]] Collected: 10/28/22 0906    Order Status: Completed Specimen: Abscess from Back Updated: 10/31/22 0958     Fungus (Mycology) Culture Culture in progress    Narrative:      Prevertebral Abscess    Fungus culture [[id number]] Collected: 10/28/22 0924    Order Status: Completed Specimen: Abscess from Neck Updated: 10/31/22 0958     Fungus (Mycology) Culture Culture in progress    Narrative:      2) Prevertebral absess    Aerobic culture [[id number]]  (Abnormal)  (Susceptibility) Collected: 10/28/22 0924    Order Status: Completed Specimen: Abscess from Neck Updated: 10/31/22 0928     Aerobic Bacterial Culture STAPHYLOCOCCUS AUREUS  Moderate      Narrative:      2) Prevertebral absess    Aerobic culture [[id number]] Collected: 10/28/22 1029    Order Status: Completed Specimen: Wound from Neck Updated: 10/31/22 0901     Aerobic Bacterial Culture No growth    Narrative:      4) Epidural phlegman    Blood culture #2 **CANNOT BE ORDERED STAT** [[id number]] Collected: 10/25/22 0917    Order Status: Completed Specimen: Blood from Peripheral, Wrist, Left Updated: 10/30/22 1012     Blood Culture, Routine No growth after 5 days.    Gram stain [[id number]] Collected: 10/28/22 0950    Order Status: Completed Specimen: Wound from Neck Updated: 10/28/22 1538     Gram Stain Result No WBC's      No organisms seen    Narrative:      3) Osteodiscitis    Gram stain [[id number]] Collected: 10/28/22 1029    Order Status: Completed Specimen: Wound from Neck Updated: 10/28/22 1423     Gram Stain Result No WBC's      No organisms seen    Narrative:      4) Epidural phlegman    Gram stain [[id number]] Collected: 10/28/22 0924    Order Status: Completed Specimen: Abscess from Neck Updated: 10/28/22 1046     Gram Stain Result No WBC's      No organisms seen     Narrative:      2) Prevertebral absess    Gram stain [[id number]] Collected: 10/28/22 0906    Order Status: Completed Specimen: Abscess from Back Updated: 10/28/22 1044     Gram Stain Result No WBC's      No organisms seen    Narrative:      Prevertebral Abscess    Blood culture #1 **CANNOT BE ORDERED STAT** [[id number]]  (Abnormal)  (Susceptibility) Collected: 10/25/22 0917    Order Status: Completed Specimen: Blood from Peripheral, Antecubital, Right Updated: 10/28/22 1040     Blood Culture, Routine Gram stain yumi bottle: Gram positive cocci in clusters resembling Staph      Results called to and read back by: Jaxon Allen RN  17:39  10/26/2022      STAPHYLOCOCCUS AUREUS            Significant Imaging: I have reviewed all pertinent imaging results/findings within the past 24 hours.

## 2022-11-04 NOTE — PLAN OF CARE
[facility] Care Plan    POC reviewed with Peter Stiles and family at 0300. Pt wrote on the papers to show  understanding. Questions and concerns addressed. No acute events overnight. Neuro status little improve, she can move her legs. Able to titrate Propofol  gtt, and Fentanyl  gtt  d/t Her Blood pressure on a lower a side  after gave her  9 o' clock  diazepam and pregabalin 300 mg.  Pt progressing toward goals. Will continue to monitor. See below and flowsheets for full assessment and VS info.           Is this a stroke patient? no    Neuro:  [place] Coma Scale  Best Eye Response: 4-->(E4) spontaneous  Best Motor Response: 6-->(M6) obeys commands  Best Verbal Response: 1-->(V1) none  [place] Coma Scale Score: 11  Assessment Qualifiers: patient intubated  Pupil PERRLA: yes     24hr Temp:  [98.2 °F (36.8 °C)-99.2 °F (37.3 °C)]     CV:   Rhythm: normal sinus rhythm  BP goals:   SBP < 160  MAP > 65    Resp:   O2 Device (Oxygen Therapy): ventilator  Vent Mode: Spont  Set Rate: 14 BPM  Oxygen Concentration (%): 40  Vt Set: 450 mL  PEEP/CPAP: 5 cmH20  Pressure Support: 5 cmH20    Plan: get LISHA today    GI/:     Diet/Nutrition Received: NPO  Last Bowel Movement: 11/02/22  Voiding Characteristics: ureteral catheter    Intake/Output Summary (Last 24 hours) at 11/4/2022 0851  Last data filed at 11/4/2022 0745  Gross per 24 hour   Intake 1106.08 ml   Output 2757 ml   Net -1650.92 ml     Unmeasured Output  Urine Occurrence: 1  Stool Occurrence: 0  Pad Count: 3    Labs/Accuchecks:  Recent Labs   Lab 11/04/22  0150   WBC 13.54*   RBC 2.57*   HGB 7.7*   HCT 24.0*         Recent Labs   Lab 11/04/22  0150      K 4.0   CO2 23      BUN 15   CREATININE 0.8   ALKPHOS 172*   ALT 25   AST 32   BILITOT 0.5      Recent Labs   Lab 11/01/22 2128   INR 1.0   APTT 26.0    No results for input(s): CPK, CPKMB, TROPONINI, MB in the last 168 hours.    Electrolytes: N/A - electrolytes WDL  Accuchecks: Q6H    Gtts:   fentanyl  25 mcg/hr (11/04/22 0745)    HYDROmorphone PCA syringe 15 mg/30 mL (0.5 mg/mL) NS - HIGH CONC      propofoL 5 mcg/kg/min (11/04/22 0745)       LDA/Wounds:  Lines/Drains/Airways       Drain  Duration                  Closed/Suction Drain 10/28/22 1129 Anterior Neck Accordion 10 Fr. 6 days         NG/OG Tube 10/29/22 1000 Left nostril 5 days         Urethral Catheter 10/31/22 1618 3 days         Closed/Suction Drain 11/02/22 Right;Superior;Medial Back 2 days         Closed/Suction Drain 11/02/22 Superior;Medial Back Other (Comment) 2 days              Airway  Duration                  Airway - Non-Surgical 10/28/22 1228 Endotracheal Tube 6 days              Arterial Line  Duration             Arterial Line 11/02/22 1215 Right Radial 1 day              Peripheral Intravenous Line  Duration                  Midline Catheter Insertion/Assessment  - Single Lumen 10/28/22 1858 Right basilic vein (medial side of arm) 18g x 10cm 6 days         Peripheral IV - Single Lumen 10/28/22 1300 18 G Anterior;Right Shoulder 6 days         Peripheral IV - Single Lumen 10/28/22 1300 22 G Anterior;Right Foot 6 days         Peripheral IV - Single Lumen 11/01/22 1206 18 G;3/4 in Anterior;Proximal;Right Upper Arm 2 days         Peripheral IV - Single Lumen 11/01/22 1417 18 G Anterior;Left Forearm 2 days                  Wounds: No  Wound care consulted: No

## 2022-11-04 NOTE — RESPIRATORY THERAPY
Extubated patient per MD order. Patient is on room air with a SPO2 of 99%. Will continue to monitor.
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
## 2022-11-04 NOTE — ASSESSMENT & PLAN NOTE
Hx of, but echo today 55% EF and normal size chambers    The left ventricle is normal in size with normal systolic function. The estimated ejection fraction is 55%.   Normal left ventricular diastolic function.   Normal right ventricular size with normal right ventricular systolic function.   Mild tricuspid regurgitation.   The estimated PA systolic pressure is 20 mmHg.   Normal central venous pressure (3 mmHg).   after neurosurgical intervention     LISHA 11/4 on hold per cardiology
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
## 2022-11-04 NOTE — SUBJECTIVE & OBJECTIVE
Interval History:       POD 2 s/p C2-T2 PCDF  Doing well. Extubated today         Medications:  Continuous Infusions:   fentanyl 125 mcg/hr (11/03/22 0705)    propofoL 10 mcg/kg/min (11/03/22 0705)     Scheduled Meds:   bisacodyL  10 mg Rectal Daily    diazePAM  5 mg Intravenous Q8H    famotidine  20 mg Per NG tube BID    methadone  90 mg Per NG tube Daily    nicotine  1 patch Transdermal Daily    oxacillin 12 g in  mL CONTINUOUS INFUSION  12 g Intravenous Q24H    phenazopyridine  200 mg Per NG tube TID    polyethylene glycol  17 g Per NG tube Daily    pregabalin  150 mg Per NG tube BID    senna-docusate 8.6-50 mg  1 tablet Per NG tube BID     PRN Meds:acetaminophen, dextrose 10%, dextrose 10%, diazePAM, glucagon (human recombinant), hydrALAZINE, HYDROmorphone, hydrOXYzine HCL, insulin aspart U-100, labetalol, ondansetron, oxyCODONE, simethicone, sodium chloride 0.9%     Review of Systems  Objective:     Weight: 63.5 kg (139 lb 15.9 oz)  Body mass index is 22.6 kg/m².  Vital Signs (Most Recent):  Temp: 98.7 °F (37.1 °C) (11/03/22 0305)  Pulse: 82 (11/03/22 0732)  Resp: 16 (11/03/22 0732)  BP: 121/68 (11/03/22 0732)  SpO2: 96 % (11/03/22 0732) Vital Signs (24h Range):  Temp:  [97.8 °F (36.6 °C)-98.9 °F (37.2 °C)] 98.7 °F (37.1 °C)  Pulse:  [] 82  Resp:  [14-28] 16  SpO2:  [95 %-100 %] 96 %  BP: (105-187)/(61-94) 121/68  Arterial Line BP: (109-313)/() 148/138     Date 11/03/22 0700 - 11/04/22 0659   Shift 2916-0058 6484-1426 9031-6284 24 Hour Total   INTAKE   I.V.(mL/kg) 5(0.1)   5(0.1)   Shift Total(mL/kg) 5(0.1)   5(0.1)   OUTPUT   Urine(mL/kg/hr) 70   70   Shift Total(mL/kg) 70(1.1)   70(1.1)   Weight (kg) 63.5 63.5 63.5 63.5              Vent Mode: A/C  Oxygen Concentration (%):  [40] 40  Resp Rate Total:  [11 br/min-24 br/min] 14 br/min  Vt Set:  [450 mL] 450 mL  PEEP/CPAP:  [5 cmH20] 5 cmH20  Pressure Support:  [10 cmH20] 10 cmH20  Mean Airway Pressure:  [8.3 cmH20-9 cmH20] 8.6 cmH20          Closed/Suction Drain 10/28/22 1129 Anterior Neck Accordion 10 Fr. (Active)   Site Description Unable to view 11/02/22 1550   Dressing Type Other (Comment) 11/02/22 1550   Dressing Status Clean;Dry;Intact 11/02/22 1550   Dressing Intervention Integrity maintained 11/02/22 1550   Status To bulb suction 11/02/22 1550   Output (mL) 3 mL 11/01/22 0705            Closed/Suction Drain 11/02/22 Superior;Medial Back Other (Comment) (Active)   Output (mL) 60 mL 11/03/22 0605            Closed/Suction Drain 11/02/22 Right;Superior;Medial Back (Active)   Output (mL) 15 mL 11/03/22 0605            NG/OG Tube 10/29/22 1000 Left nostril (Active)   Placement Check placement verified by x-ray;placement verified by distal tube length measurement;placement verified by aspirate characteristics 11/02/22 1550   Tolerance no signs/symptoms of discomfort 11/02/22 1550   Securement secured to nostril center w/ adhesive device 11/02/22 1550   Clamp Status/Tolerance clamped 11/02/22 1550   Suction Setting/Drainage Method suction at the bedside 11/02/22 1550   Insertion Site Appearance no redness, warmth, tenderness, skin breakdown, drainage 11/02/22 1550   Drainage None 11/02/22 1550   Flush/Irrigation flushed w/;water;no resistance met 11/02/22 1550   Feeding Type continuous;by pump 11/02/22 1550   Feeding Action feeding held 11/02/22 1550   Current Rate (mL/hr) 40 mL/hr 11/01/22 1901   Goal Rate (mL/hr) 40 mL/hr 11/01/22 1901   Intake (mL) 100 mL 11/02/22 2205   Water Bolus (mL) 50 mL 11/01/22 1901   Rate Formula Tube Feeding (mL/hr) 40 mL/hr 10/30/22 2301   Formula Name diabetisource 11/02/22 1550   Intake (mL) - Formula Tube Feeding 0 11/02/22 1905   Residual Amount (ml) 2 ml 11/01/22 1901            Urethral Catheter 10/31/22 1618 (Active)   $ Patiño Insertion Bedside Insertion Performed 10/31/22 1505   Site Assessment Clean;Intact 11/02/22 1550   Collection Container Urimeter 11/02/22 1092   Securement Method secured to top of thigh w/  "adhesive device 11/02/22 1550   Catheter Care Performed yes 11/02/22 1550   Reason for Continuing Urinary Catheterization Urinary retention;Post operative;Critically ill in ICU and requiring hourly monitoring of intake/output 11/02/22 1550   CAUTI Prevention Bundle Securement Device in place with 1" slack;Intact seal between catheter & drainage tubing;Drainage bag/urimeter off the floor;Sheeting clip in use;No dependent loops or kinks;Drainage bag/urimeter not overfilled (<2/3 full);Drainage bag/urimeter below bladder 11/02/22 1550   Output (mL) 70 mL 11/03/22 0705       Physical Exam    Neurosurgery Physical Exam  [id number]  FC BLE  FC BUE  Pain limited weakness in BLE proximally  Distally appears full strength, limited due to intubation/sedation  PERRL, EOMI            Significant Labs:  Recent Labs   Lab 11/02/22  0136 11/03/22  0147   * 88    141   K 3.9 4.3    108   CO2 26 22*   BUN 17 14   CREATININE 0.8 0.7   CALCIUM 8.8 8.7   MG 2.2 2.1     Recent Labs   Lab 11/02/22  0136 11/03/22  0147   WBC 13.27* 15.50*   HGB 9.3* 8.5*   HCT 29.4* 25.5*    406     Recent Labs   Lab 11/01/22 2128   INR 1.0   APTT 26.0     Microbiology Results (last 7 days)       Procedure Component Value Units Date/Time    Blood culture [[id number]] Collected: 10/29/22 0623    Order Status: Completed Specimen: Blood from Peripheral, Foot, Left Updated: 11/03/22 0812     Blood Culture, Routine No growth after 5 days.    Blood culture [[id number]] Collected: 10/29/22 0621    Order Status: Completed Specimen: Blood from Peripheral, Foot, Right Updated: 11/03/22 0812     Blood Culture, Routine No growth after 5 days.    Aerobic culture [[id number]]  (Abnormal)  (Susceptibility) Collected: 10/28/22 0906    Order Status: Completed Specimen: Abscess from Back Updated: 11/01/22 1059     Aerobic Bacterial Culture STAPHYLOCOCCUS AUREUS  Rare      Narrative:      Prevertebral Abscess    Aerobic culture [[id number]]  (Abnormal)  " (Susceptibility) Collected: 10/28/22 0950    Order Status: Completed Specimen: Wound from Neck Updated: 11/01/22 1039     Aerobic Bacterial Culture STAPHYLOCOCCUS AUREUS  Rare      Narrative:      3) Osteodiscitis    Blood culture [[id number]] Collected: 10/27/22 0939    Order Status: Completed Specimen: Blood Updated: 11/01/22 1012     Blood Culture, Routine No growth after 5 days.    Narrative:      Rt wrist    Blood culture [[id number]] Collected: 10/27/22 0938    Order Status: Completed Specimen: Blood Updated: 11/01/22 1012     Blood Culture, Routine No growth after 5 days.    Narrative:      Rt AC    Culture, Anaerobe [[id number]] Collected: 10/28/22 1029    Order Status: Completed Specimen: Wound from Neck Updated: 11/01/22 0727     Anaerobic Culture Culture in progress    Narrative:      4) Epidural phlegman    Culture, Anaerobe [[id number]] Collected: 10/28/22 0950    Order Status: Completed Specimen: Wound from Neck Updated: 11/01/22 0724     Anaerobic Culture No anaerobes isolated    Narrative:      3) Osteodiscitis    Culture, Anaerobe [[id number]] Collected: 10/28/22 0924    Order Status: Completed Specimen: Abscess from Neck Updated: 11/01/22 0722     Anaerobic Culture No anaerobes isolated    Narrative:      2) Prevertebral absess    Culture, Anaerobe [[id number]] Collected: 10/28/22 0906    Order Status: Completed Specimen: Abscess from Back Updated: 11/01/22 0722     Anaerobic Culture No anaerobes isolated    Narrative:      Prevertebral Abscess    AFB Culture & Smear [[id number]] Collected: 10/28/22 1029    Order Status: Completed Specimen: Wound from Neck Updated: 10/31/22 1250     AFB Culture & Smear Culture in progress     AFB CULTURE STAIN No acid fast bacilli seen.    Narrative:      4) Epidural phlegman    AFB Culture & Smear [[id number]] Collected: 10/28/22 0924    Order Status: Completed Specimen: Abscess from Neck Updated: 10/31/22 1250     AFB Culture & Smear Culture in progress     AFB CULTURE  STAIN No acid fast bacilli seen.    Narrative:      2) Prevertebral absess    AFB Culture & Smear [[id number]] Collected: 10/28/22 0906    Order Status: Completed Specimen: Abscess from Back Updated: 10/31/22 1250     AFB Culture & Smear Culture in progress     AFB CULTURE STAIN No acid fast bacilli seen.    Narrative:      Prevertebral Abscess    AFB Culture & Smear [[id number]] Collected: 10/28/22 0950    Order Status: Completed Specimen: Wound from Neck Updated: 10/31/22 1250     AFB Culture & Smear Culture in progress     AFB CULTURE STAIN No acid fast bacilli seen.    Narrative:      3) Osteodiscitis    Fungus culture [[id number]] Collected: 10/28/22 0950    Order Status: Completed Specimen: Wound from Neck Updated: 10/31/22 0958     Fungus (Mycology) Culture Culture in progress    Narrative:      3) Osteodiscitis    Fungus culture [[id number]] Collected: 10/28/22 1029    Order Status: Completed Specimen: Wound from Neck Updated: 10/31/22 0958     Fungus (Mycology) Culture Culture in progress    Narrative:      4) Epidural phlegman    Fungus culture [[id number]] Collected: 10/28/22 0906    Order Status: Completed Specimen: Abscess from Back Updated: 10/31/22 0958     Fungus (Mycology) Culture Culture in progress    Narrative:      Prevertebral Abscess    Fungus culture [[id number]] Collected: 10/28/22 0924    Order Status: Completed Specimen: Abscess from Neck Updated: 10/31/22 0958     Fungus (Mycology) Culture Culture in progress    Narrative:      2) Prevertebral absess    Aerobic culture [[id number]]  (Abnormal)  (Susceptibility) Collected: 10/28/22 0924    Order Status: Completed Specimen: Abscess from Neck Updated: 10/31/22 0928     Aerobic Bacterial Culture STAPHYLOCOCCUS AUREUS  Moderate      Narrative:      2) Prevertebral absess    Aerobic culture [[id number]] Collected: 10/28/22 1029    Order Status: Completed Specimen: Wound from Neck Updated: 10/31/22 0901     Aerobic Bacterial Culture No growth     Narrative:      4) Epidural phlegman    Blood culture #2 **CANNOT BE ORDERED STAT** [[id number]] Collected: 10/25/22 0917    Order Status: Completed Specimen: Blood from Peripheral, Wrist, Left Updated: 10/30/22 1012     Blood Culture, Routine No growth after 5 days.    Gram stain [[id number]] Collected: 10/28/22 0950    Order Status: Completed Specimen: Wound from Neck Updated: 10/28/22 1538     Gram Stain Result No WBC's      No organisms seen    Narrative:      3) Osteodiscitis    Gram stain [[id number]] Collected: 10/28/22 1029    Order Status: Completed Specimen: Wound from Neck Updated: 10/28/22 1423     Gram Stain Result No WBC's      No organisms seen    Narrative:      4) Epidural phlegman    Gram stain [[id number]] Collected: 10/28/22 0924    Order Status: Completed Specimen: Abscess from Neck Updated: 10/28/22 1046     Gram Stain Result No WBC's      No organisms seen    Narrative:      2) Prevertebral absess    Gram stain [[id number]] Collected: 10/28/22 0906    Order Status: Completed Specimen: Abscess from Back Updated: 10/28/22 1044     Gram Stain Result No WBC's      No organisms seen    Narrative:      Prevertebral Abscess    Blood culture #1 **CANNOT BE ORDERED STAT** [[id number]]  (Abnormal)  (Susceptibility) Collected: 10/25/22 0917    Order Status: Completed Specimen: Blood from Peripheral, Antecubital, Right Updated: 10/28/22 1040     Blood Culture, Routine Gram stain yumi bottle: Gram positive cocci in clusters resembling Staph      Results called to and read back by: Jaxon Allen RN  17:39  10/26/2022      STAPHYLOCOCCUS AUREUS          All pertinent labs from the last 24 hours have been reviewed.    Significant Diagnostics:  I have reviewed all pertinent imaging results/findings within the past 24 hours.

## 2022-11-04 NOTE — ASSESSMENT & PLAN NOTE
10/25 1 of 2 BCx positive for MSSA  -ID following, appreciate recs   -BCx: 10/25 1/2 +MSSA, 10/27 negative, 10/29 NGTD  -OR cultures 10/28: +MSSA  -UA 10/25 negative  -continue on Oxacillin   -LISHA postponed per cardiology

## 2022-11-05 ENCOUNTER — ANESTHESIA (OUTPATIENT)
Dept: NEUROLOGY | Facility: HOSPITAL | Age: 52
DRG: 003 | End: 2022-11-05
Payer: MEDICARE

## 2022-11-05 ENCOUNTER — ANESTHESIA EVENT (OUTPATIENT)
Dept: NEUROLOGY | Facility: HOSPITAL | Age: 52
DRG: 003 | End: 2022-11-05
Payer: MEDICARE

## 2022-11-05 PROBLEM — M48.02 CERVICAL STENOSIS OF SPINAL CANAL: Status: ACTIVE | Noted: 2022-11-05

## 2022-11-05 LAB
ABO + RH BLD: NORMAL
ALBUMIN SERPL BCP-MCNC: 2.3 G/DL (ref 3.5–5.2)
ALLENS TEST: ABNORMAL
ALP SERPL-CCNC: 153 U/L (ref 55–135)
ALT SERPL W/O P-5'-P-CCNC: 27 U/L (ref 10–44)
ANION GAP SERPL CALC-SCNC: 10 MMOL/L (ref 8–16)
AST SERPL-CCNC: 30 U/L (ref 10–40)
BASOPHILS # BLD AUTO: 0.01 K/UL (ref 0–0.2)
BASOPHILS NFR BLD: 0.1 % (ref 0–1.9)
BILIRUB SERPL-MCNC: 0.4 MG/DL (ref 0.1–1)
BLD GP AB SCN CELLS X3 SERPL QL: NORMAL
BUN SERPL-MCNC: 25 MG/DL (ref 6–20)
CALCIUM SERPL-MCNC: 9.1 MG/DL (ref 8.7–10.5)
CHLORIDE SERPL-SCNC: 106 MMOL/L (ref 95–110)
CO2 SERPL-SCNC: 27 MMOL/L (ref 23–29)
CREAT SERPL-MCNC: 0.8 MG/DL (ref 0.5–1.4)
CRP SERPL-MCNC: 78.7 MG/L (ref 0–8.2)
DELSYS: ABNORMAL
DIFFERENTIAL METHOD: ABNORMAL
EOSINOPHIL # BLD AUTO: 0.1 K/UL (ref 0–0.5)
EOSINOPHIL NFR BLD: 0.4 % (ref 0–8)
ERYTHROCYTE [DISTWIDTH] IN BLOOD BY AUTOMATED COUNT: 12.9 % (ref 11.5–14.5)
ERYTHROCYTE [SEDIMENTATION RATE] IN BLOOD BY WESTERGREN METHOD: 12 MM/H
EST. GFR  (NO RACE VARIABLE): >60 ML/MIN/1.73 M^2
FIO2: 100
GLUCOSE SERPL-MCNC: 115 MG/DL (ref 70–110)
HCO3 UR-SCNC: 31.6 MMOL/L (ref 24–28)
HCT VFR BLD AUTO: 23.9 % (ref 37–48.5)
HGB BLD-MCNC: 7.6 G/DL (ref 12–16)
IMM GRANULOCYTES # BLD AUTO: 0.27 K/UL (ref 0–0.04)
IMM GRANULOCYTES NFR BLD AUTO: 2.2 % (ref 0–0.5)
LYMPHOCYTES # BLD AUTO: 1.5 K/UL (ref 1–4.8)
LYMPHOCYTES NFR BLD: 12.2 % (ref 18–48)
MAGNESIUM SERPL-MCNC: 2.2 MG/DL (ref 1.6–2.6)
MCH RBC QN AUTO: 30.8 PG (ref 27–31)
MCHC RBC AUTO-ENTMCNC: 31.8 G/DL (ref 32–36)
MCV RBC AUTO: 97 FL (ref 82–98)
MODE: ABNORMAL
MONOCYTES # BLD AUTO: 1.1 K/UL (ref 0.3–1)
MONOCYTES NFR BLD: 8.9 % (ref 4–15)
NEUTROPHILS # BLD AUTO: 9.6 K/UL (ref 1.8–7.7)
NEUTROPHILS NFR BLD: 76.2 % (ref 38–73)
NRBC BLD-RTO: 0 /100 WBC
PCO2 BLDA: 58.8 MMHG (ref 35–45)
PEEP: 5
PH SMN: 7.34 [PH] (ref 7.35–7.45)
PHOSPHATE SERPL-MCNC: 3.4 MG/DL (ref 2.7–4.5)
PIP: 22
PLATELET # BLD AUTO: 365 K/UL (ref 150–450)
PMV BLD AUTO: 9.5 FL (ref 9.2–12.9)
PO2 BLDA: 381 MMHG (ref 80–100)
POC BE: 6 MMOL/L
POC SATURATED O2: 100 % (ref 95–100)
POC TCO2: 33 MMOL/L (ref 23–27)
POCT GLUCOSE: 132 MG/DL (ref 70–110)
POCT GLUCOSE: 80 MG/DL (ref 70–110)
POTASSIUM SERPL-SCNC: 3.7 MMOL/L (ref 3.5–5.1)
PROT SERPL-MCNC: 6.7 G/DL (ref 6–8.4)
RBC # BLD AUTO: 2.47 M/UL (ref 4–5.4)
SAMPLE: ABNORMAL
SITE: ABNORMAL
SODIUM SERPL-SCNC: 143 MMOL/L (ref 136–145)
SP02: 100
VT: 380
WBC # BLD AUTO: 12.53 K/UL (ref 3.9–12.7)

## 2022-11-05 PROCEDURE — 99900026 HC AIRWAY MAINTENANCE (STAT)

## 2022-11-05 PROCEDURE — 86140 C-REACTIVE PROTEIN: CPT | Performed by: STUDENT IN AN ORGANIZED HEALTH CARE EDUCATION/TRAINING PROGRAM

## 2022-11-05 PROCEDURE — 36600 WITHDRAWAL OF ARTERIAL BLOOD: CPT

## 2022-11-05 PROCEDURE — 94761 N-INVAS EAR/PLS OXIMETRY MLT: CPT

## 2022-11-05 PROCEDURE — 31500 INSERT EMERGENCY AIRWAY: CPT

## 2022-11-05 PROCEDURE — 25000003 PHARM REV CODE 250: Performed by: PSYCHIATRY & NEUROLOGY

## 2022-11-05 PROCEDURE — 63600175 PHARM REV CODE 636 W HCPCS

## 2022-11-05 PROCEDURE — 63600175 PHARM REV CODE 636 W HCPCS: Performed by: NURSE PRACTITIONER

## 2022-11-05 PROCEDURE — 25000003 PHARM REV CODE 250: Performed by: NURSE PRACTITIONER

## 2022-11-05 PROCEDURE — 99291 CRITICAL CARE FIRST HOUR: CPT | Mod: ,,, | Performed by: PSYCHIATRY & NEUROLOGY

## 2022-11-05 PROCEDURE — S4991 NICOTINE PATCH NONLEGEND: HCPCS | Performed by: PHYSICIAN ASSISTANT

## 2022-11-05 PROCEDURE — 84100 ASSAY OF PHOSPHORUS: CPT | Performed by: PHYSICIAN ASSISTANT

## 2022-11-05 PROCEDURE — 25000242 PHARM REV CODE 250 ALT 637 W/ HCPCS

## 2022-11-05 PROCEDURE — 20000000 HC ICU ROOM

## 2022-11-05 PROCEDURE — 27000207 HC ISOLATION

## 2022-11-05 PROCEDURE — 25000003 PHARM REV CODE 250

## 2022-11-05 PROCEDURE — 85025 COMPLETE CBC W/AUTO DIFF WBC: CPT | Performed by: PHYSICIAN ASSISTANT

## 2022-11-05 PROCEDURE — 27200966 HC CLOSED SUCTION SYSTEM

## 2022-11-05 PROCEDURE — 31500 INSERT EMERGENCY AIRWAY: CPT | Mod: ,,, | Performed by: ANESTHESIOLOGY

## 2022-11-05 PROCEDURE — 82803 BLOOD GASES ANY COMBINATION: CPT

## 2022-11-05 PROCEDURE — 80053 COMPREHEN METABOLIC PANEL: CPT | Performed by: PHYSICIAN ASSISTANT

## 2022-11-05 PROCEDURE — 25000003 PHARM REV CODE 250: Performed by: PHYSICIAN ASSISTANT

## 2022-11-05 PROCEDURE — 31500 AD HOC INTUBATION: ICD-10-PCS | Mod: ,,, | Performed by: ANESTHESIOLOGY

## 2022-11-05 PROCEDURE — 86920 COMPATIBILITY TEST SPIN: CPT

## 2022-11-05 PROCEDURE — 86850 RBC ANTIBODY SCREEN: CPT | Performed by: PSYCHIATRY & NEUROLOGY

## 2022-11-05 PROCEDURE — 99291 PR CRITICAL CARE, E/M 30-74 MINUTES: ICD-10-PCS | Mod: ,,, | Performed by: PSYCHIATRY & NEUROLOGY

## 2022-11-05 PROCEDURE — 94003 VENT MGMT INPAT SUBQ DAY: CPT

## 2022-11-05 PROCEDURE — 83735 ASSAY OF MAGNESIUM: CPT | Performed by: PHYSICIAN ASSISTANT

## 2022-11-05 PROCEDURE — 99900035 HC TECH TIME PER 15 MIN (STAT)

## 2022-11-05 PROCEDURE — 27000221 HC OXYGEN, UP TO 24 HOURS

## 2022-11-05 RX ORDER — SUCCINYLCHOLINE CHLORIDE 20 MG/ML
INJECTION INTRAMUSCULAR; INTRAVENOUS
Status: COMPLETED
Start: 2022-11-05 | End: 2022-11-05

## 2022-11-05 RX ORDER — MIDAZOLAM HYDROCHLORIDE 1 MG/ML
2 INJECTION INTRAMUSCULAR; INTRAVENOUS ONCE
Status: DISCONTINUED | OUTPATIENT
Start: 2022-11-05 | End: 2022-11-05

## 2022-11-05 RX ORDER — PROPOFOL 10 MG/ML
0-50 INJECTION, EMULSION INTRAVENOUS CONTINUOUS
Status: DISCONTINUED | OUTPATIENT
Start: 2022-11-05 | End: 2022-11-06

## 2022-11-05 RX ORDER — MIDAZOLAM HYDROCHLORIDE 1 MG/ML
2 INJECTION INTRAMUSCULAR; INTRAVENOUS ONCE
Status: COMPLETED | OUTPATIENT
Start: 2022-11-05 | End: 2022-11-05

## 2022-11-05 RX ORDER — FENTANYL CITRATE 50 UG/ML
50 INJECTION, SOLUTION INTRAMUSCULAR; INTRAVENOUS
Status: DISCONTINUED | OUTPATIENT
Start: 2022-11-05 | End: 2022-11-08

## 2022-11-05 RX ORDER — PREGABALIN 75 MG/1
225 CAPSULE ORAL 3 TIMES DAILY
Status: DISCONTINUED | OUTPATIENT
Start: 2022-11-05 | End: 2022-11-08

## 2022-11-05 RX ORDER — PROPOFOL 10 MG/ML
INJECTION, EMULSION INTRAVENOUS
Status: COMPLETED
Start: 2022-11-05 | End: 2022-11-05

## 2022-11-05 RX ORDER — ROCURONIUM BROMIDE 10 MG/ML
INJECTION, SOLUTION INTRAVENOUS
Status: COMPLETED
Start: 2022-11-05 | End: 2022-11-05

## 2022-11-05 RX ORDER — OXYCODONE HYDROCHLORIDE 10 MG/1
10 TABLET ORAL EVERY 6 HOURS
Status: DISCONTINUED | OUTPATIENT
Start: 2022-11-05 | End: 2022-11-07

## 2022-11-05 RX ORDER — ETOMIDATE 2 MG/ML
14 INJECTION INTRAVENOUS ONCE
Status: COMPLETED | OUTPATIENT
Start: 2022-11-05 | End: 2022-11-06

## 2022-11-05 RX ORDER — ETOMIDATE 2 MG/ML
INJECTION INTRAVENOUS
Status: COMPLETED
Start: 2022-11-05 | End: 2022-11-05

## 2022-11-05 RX ORDER — IPRATROPIUM BROMIDE AND ALBUTEROL SULFATE 2.5; .5 MG/3ML; MG/3ML
3 SOLUTION RESPIRATORY (INHALATION) EVERY 4 HOURS PRN
Status: DISCONTINUED | OUTPATIENT
Start: 2022-11-05 | End: 2022-11-21 | Stop reason: HOSPADM

## 2022-11-05 RX ORDER — PROPOFOL 10 MG/ML
VIAL (ML) INTRAVENOUS
Status: DISPENSED
Start: 2022-11-05 | End: 2022-11-05

## 2022-11-05 RX ORDER — ROCURONIUM BROMIDE 10 MG/ML
100 INJECTION, SOLUTION INTRAVENOUS ONCE
Status: COMPLETED | OUTPATIENT
Start: 2022-11-05 | End: 2022-11-05

## 2022-11-05 RX ORDER — PHENYLEPHRINE HCL IN 0.9% NACL 1 MG/10 ML
SYRINGE (ML) INTRAVENOUS
Status: DISPENSED
Start: 2022-11-05 | End: 2022-11-05

## 2022-11-05 RX ORDER — DEXAMETHASONE SODIUM PHOSPHATE 4 MG/ML
10 INJECTION, SOLUTION INTRA-ARTICULAR; INTRALESIONAL; INTRAMUSCULAR; INTRAVENOUS; SOFT TISSUE ONCE
Status: COMPLETED | OUTPATIENT
Start: 2022-11-05 | End: 2022-11-05

## 2022-11-05 RX ORDER — MIDAZOLAM HYDROCHLORIDE 1 MG/ML
INJECTION INTRAMUSCULAR; INTRAVENOUS
Status: COMPLETED
Start: 2022-11-05 | End: 2022-11-05

## 2022-11-05 RX ADMIN — POLYETHYLENE GLYCOL 3350 17 G: 17 POWDER, FOR SOLUTION ORAL at 08:11

## 2022-11-05 RX ADMIN — HEPARIN SODIUM 5000 UNITS: 5000 INJECTION INTRAVENOUS; SUBCUTANEOUS at 05:11

## 2022-11-05 RX ADMIN — FENTANYL CITRATE 50 MCG: 0.05 INJECTION, SOLUTION INTRAMUSCULAR; INTRAVENOUS at 01:11

## 2022-11-05 RX ADMIN — BISACODYL 10 MG: 10 SUPPOSITORY RECTAL at 08:11

## 2022-11-05 RX ADMIN — SENNOSIDES AND DOCUSATE SODIUM 1 TABLET: 50; 8.6 TABLET ORAL at 08:11

## 2022-11-05 RX ADMIN — FENTANYL CITRATE 50 MCG: 0.05 INJECTION, SOLUTION INTRAMUSCULAR; INTRAVENOUS at 10:11

## 2022-11-05 RX ADMIN — ROCURONIUM BROMIDE 100 MG: 10 INJECTION, SOLUTION INTRAVENOUS at 12:11

## 2022-11-05 RX ADMIN — ETOMIDATE 14 MG: 2 INJECTION INTRAVENOUS at 12:11

## 2022-11-05 RX ADMIN — OXYCODONE HYDROCHLORIDE 10 MG: 10 TABLET ORAL at 05:11

## 2022-11-05 RX ADMIN — OXYCODONE HYDROCHLORIDE 10 MG: 10 TABLET ORAL at 11:11

## 2022-11-05 RX ADMIN — FENTANYL CITRATE 50 MCG: 0.05 INJECTION, SOLUTION INTRAMUSCULAR; INTRAVENOUS at 09:11

## 2022-11-05 RX ADMIN — ACETAMINOPHEN 650 MG: 325 TABLET ORAL at 11:11

## 2022-11-05 RX ADMIN — HYDROXYZINE HYDROCHLORIDE 25 MG: 25 TABLET, FILM COATED ORAL at 09:11

## 2022-11-05 RX ADMIN — PREGABALIN 300 MG: 75 CAPSULE ORAL at 08:11

## 2022-11-05 RX ADMIN — HEPARIN SODIUM 5000 UNITS: 5000 INJECTION INTRAVENOUS; SUBCUTANEOUS at 09:11

## 2022-11-05 RX ADMIN — METHADONE HYDROCHLORIDE 90 MG: 10 TABLET ORAL at 03:11

## 2022-11-05 RX ADMIN — HEPARIN SODIUM 5000 UNITS: 5000 INJECTION INTRAVENOUS; SUBCUTANEOUS at 01:11

## 2022-11-05 RX ADMIN — PROPOFOL 50 MCG/KG/MIN: 10 INJECTION, EMULSION INTRAVENOUS at 12:11

## 2022-11-05 RX ADMIN — PREGABALIN 300 MG: 75 CAPSULE ORAL at 09:11

## 2022-11-05 RX ADMIN — MIDAZOLAM HYDROCHLORIDE 2 MG: 1 INJECTION INTRAMUSCULAR; INTRAVENOUS at 12:11

## 2022-11-05 RX ADMIN — MIDAZOLAM 2 MG: 1 INJECTION INTRAMUSCULAR; INTRAVENOUS at 12:11

## 2022-11-05 RX ADMIN — DEXAMETHASONE SODIUM PHOSPHATE 10 MG: 4 INJECTION INTRA-ARTICULAR; INTRALESIONAL; INTRAMUSCULAR; INTRAVENOUS; SOFT TISSUE at 12:11

## 2022-11-05 RX ADMIN — PROPOFOL 25 MCG/KG/MIN: 10 INJECTION, EMULSION INTRAVENOUS at 06:11

## 2022-11-05 RX ADMIN — IPRATROPIUM BROMIDE AND ALBUTEROL SULFATE 3 ML: 2.5; .5 SOLUTION RESPIRATORY (INHALATION) at 12:11

## 2022-11-05 RX ADMIN — SENNOSIDES AND DOCUSATE SODIUM 1 TABLET: 50; 8.6 TABLET ORAL at 09:11

## 2022-11-05 RX ADMIN — Medication 1 PATCH: at 08:11

## 2022-11-05 RX ADMIN — ACETAMINOPHEN 650 MG: 325 TABLET ORAL at 09:11

## 2022-11-05 RX ADMIN — FENTANYL CITRATE 50 MCG: 0.05 INJECTION, SOLUTION INTRAMUSCULAR; INTRAVENOUS at 02:11

## 2022-11-05 RX ADMIN — ROCURONIUM BROMIDE 100 MG: 50 INJECTION INTRAVENOUS at 12:11

## 2022-11-05 RX ADMIN — OXACILLIN SODIUM 12 G: 10 INJECTION, POWDER, FOR SOLUTION INTRAVENOUS at 02:11

## 2022-11-05 NOTE — SUBJECTIVE & OBJECTIVE
Interval History:  Extubate today to NC re-intubated overnight  Review of Systems   Constitutional: Negative.    HENT: Negative.     Eyes: Negative.    Respiratory: Negative.     Cardiovascular: Negative.    Gastrointestinal: Negative.    Endocrine: Negative.    Genitourinary: Negative.    Musculoskeletal: Negative.    Skin: Negative.    Neurological: Negative.    2 systems   Objective:     Vitals:  Temp: 98.4 °F (36.9 °C)  Pulse: 102  Rhythm: sinus tachycardia  BP: (!) 111/90  MAP (mmHg): 99  Resp: 14  SpO2: 100 %  Oxygen Concentration (%): 50  O2 Device (Oxygen Therapy): ventilator  Vent Mode: Spont  Set Rate: 0 BPM  Vt Set: 0 mL  Pressure Support: 10 cmH20  PEEP/CPAP: 5 cmH20  Peak Airway Pressure: 16 cmH2O  Mean Airway Pressure: 7.6 cmH20  Plateau Pressure: 0 cmH20    Temp  Min: 98.2 °F (36.8 °C)  Max: 98.6 °F (37 °C)  Pulse  Min: 72  Max: 134  BP  Min: 84/47  Max: 122/71  MAP (mmHg)  Min: 68  Max: 99  Resp  Min: 0  Max: 49  SpO2  Min: 92 %  Max: 100 %  Oxygen Concentration (%)  Min: 50  Max: 100    11/04 0701 - 11/05 0700  In: 939.6 [I.V.:83.9]  Out: 1040 [Urine:950; Drains:90]   Unmeasured Output  Urine Occurrence: 1  Stool Occurrence: 1  Pad Count: 3       Physical Exam  Vitals and nursing note reviewed.   HENT:      Head: Normocephalic.      Nose: Nose normal.      Mouth/Throat:      Mouth: Mucous membranes are moist.      Pharynx: Oropharynx is clear.   Cardiovascular:      Rate and Rhythm: Normal rate and regular rhythm.      Pulses: Normal pulses.      Heart sounds: Normal heart sounds.   Pulmonary:      Effort: Pulmonary effort is normal.      Breath sounds: Normal breath sounds.   Abdominal:      General: Bowel sounds are normal.      Palpations: Abdomen is soft.   Musculoskeletal:         General: Normal range of motion.   Skin:     General: Skin is warm and dry.      Capillary Refill: Capillary refill takes less than 2 seconds.   Neurological:      Mental Status: She is alert.      Comments: GCS 11t-  on sedation  PERRL  VILLAGRAN to command and spontaneously  Sensation intact in all extremities       Unable to test orientation, language, memory, judgment, insight, fund of knowledge, hearing, shoulder shrug, tongue protrusion, coordination, gait due to level of consciousness.    Medications:  ContinuouspropofoL, Last Rate: Stopped (11/05/22 0740)  ScheduledbisacodyL, 10 mg, Daily  heparin (porcine), 5,000 Units, Q8H  methadone, 90 mg, Daily  nicotine, 1 patch, Daily  oxacillin 12 g in  mL CONTINUOUS INFUSION, 12 g, Q24H  oxyCODONE, 10 mg, Q6H  polyethylene glycol, 17 g, Daily  pregabalin, 300 mg, BID  senna-docusate 8.6-50 mg, 1 tablet, BID  PRNacetaminophen, 650 mg, Q6H PRN  albuterol-ipratropium, 3 mL, Q4H PRN  dextrose 10%, 12.5 g, PRN  dextrose 10%, 25 g, PRN  fentaNYL, 50 mcg, Q1H PRN  glucagon (human recombinant), 1 mg, PRN  hydrALAZINE, 10 mg, Q4H PRN  hydrOXYzine HCL, 25 mg, TID PRN  labetalol, 10 mg, Q4H PRN  ondansetron, 4 mg, Q8H PRN  racepinephrine, 0.5 mL, Q15 Min PRN  simethicone, 1 tablet, TID PRN  sodium chloride 0.9%, 10 mL, PRN    Today I personally reviewed pertinent medications, lines/drains/airways, imaging, cardiology results, laboratory results, microbiology results, notably:    Diet  Diet NPO Except for: Medication  Diet NPO Except for: Medication

## 2022-11-05 NOTE — NURSING
MD Toño called at bedside for emergent intubation. Per MD verbal order, 14mg etomidate administered followed by 100mg zhanna.     Pt intubated with 7.0 ETT, 24 @ lip. Positive color change and bilateral breath sounds noted. Propofol infusion ordered and initiated per RUBIA Medrano order.

## 2022-11-05 NOTE — PLAN OF CARE
[facility] Care Plan    POC reviewed with Peter Stiles and family at 0300. Pt verbalized understanding. Questions and concerns addressed. No acute events overnight. Pt progressing toward goals. Will continue to monitor. See below and flowsheets for full assessment and VS info.     -Pt reintubated d/t increased stridor and SOB, see note  -CT obtained  -tube feeds restarted   -propofol started         Is this a stroke patient? no    Neuro:  [place] Coma Scale  Best Eye Response: 1-->(E1) none  Best Motor Response: 4-->(M4) withdraws from pain  Best Verbal Response: 1-->(V1) none  [place] Coma Scale Score: 6  Assessment Qualifiers: patient intubated, patient chemically sedated or paralyzed  Pupil PERRLA: yes     24hr Temp:  [98.2 °F (36.8 °C)-98.6 °F (37 °C)]     CV:   Rhythm: normal sinus rhythm  BP goals:   SBP < 160  MAP > 65    Resp:   O2 Device (Oxygen Therapy): ventilator  Vent Mode: A/C  Set Rate: 12 BPM  Oxygen Concentration (%): 100  Vt Set: 385 mL  PEEP/CPAP: 5 cmH20  Pressure Support: 5 cmH20    Plan: wean to extubate    GI/:     Diet/Nutrition Received: NPO, tube feeding  Last Bowel Movement: 11/01/22  Voiding Characteristics: urethral catheter (bladder)    Intake/Output Summary (Last 24 hours) at 11/5/2022 0353  Last data filed at 11/5/2022 0305  Gross per 24 hour   Intake 940.6 ml   Output 1175 ml   Net -234.4 ml     Unmeasured Output  Urine Occurrence: 1  Stool Occurrence: 1  Pad Count: 3    Labs/Accuchecks:  Recent Labs   Lab 11/05/22  0240   WBC 12.53   RBC 2.47*   HGB 7.6*   HCT 23.9*         Recent Labs   Lab 11/05/22  0240      K 3.7   CO2 27      BUN 25*   CREATININE 0.8   ALKPHOS 153*   ALT 27   AST 30   BILITOT 0.4      Recent Labs   Lab 11/01/22 2128   INR 1.0   APTT 26.0    No results for input(s): CPK, CPKMB, TROPONINI, MB in the last 168 hours.    Electrolytes: No replacement orders  Accuchecks: Q6H    Gtts:   propofoL 40 mcg/kg/min (11/05/22 8032)        LDA/Wounds:  Lines/Drains/Airways       Drain  Duration                  Closed/Suction Drain 10/28/22 1129 Anterior Neck Accordion 10 Fr. 7 days         NG/OG Tube 10/29/22 1000 Left nostril 6 days         Urethral Catheter 10/31/22 1618 4 days         Closed/Suction Drain 11/02/22 Right;Superior;Medial Back 3 days         Closed/Suction Drain 11/02/22 Superior;Medial Back Other (Comment) 3 days              Airway  Duration                  Airway - Non-Surgical 11/05/22 0045 <1 day       Airway Anesthesia 11/05/22 <1 day              Peripheral Intravenous Line  Duration                  Midline Catheter Insertion/Assessment  - Single Lumen 10/28/22 1858 Right basilic vein (medial side of arm) 18g x 10cm 7 days         Peripheral IV - Single Lumen 10/28/22 1300 18 G Anterior;Right Shoulder 7 days         Peripheral IV - Single Lumen 11/01/22 1417 18 G Anterior;Left Forearm 3 days                  Wounds: Yes  Wound care consulted: Yes

## 2022-11-05 NOTE — PLAN OF CARE
[facility] Care Plan    POC reviewed with Peter Stiles at 1000. Pt nodded understanding. Questions and concerns addressed. No acute events today. Pt progressing toward goals. Will continue to monitor. See below and flowsheets for full assessment and VS info.   - Propofol gtt titrated off   - PRNs given for pain control   - New 20g R FA PIV placed  - Tube feedings at goal         Is this a stroke patient? no    Neuro:  [place] Coma Scale  Best Eye Response: 4-->(E4) spontaneous  Best Motor Response: 6-->(M6) obeys commands  Best Verbal Response: 1-->(V1) none  [place] Coma Scale Score: 11  Assessment Qualifiers: patient intubated  Pupil PERRLA: yes     24 hr Temp:  [98.2 °F (36.8 °C)-98.6 °F (37 °C)]     CV:   Rhythm: sinus tachycardia  BP goals:   SBP < 160  MAP > 65    Resp:   O2 Device (Oxygen Therapy): ventilator  Vent Mode: Spont  Set Rate: 0 BPM  Oxygen Concentration (%): 50  Vt Set: 0 mL  PEEP/CPAP: 5 cmH20  Pressure Support: 10 cmH20    Plan: trach/PEG discussions    GI/:     Diet/Nutrition Received: NPO, tube feeding  Last Bowel Movement: 11/04/22  Voiding Characteristics: urethral catheter (bladder)    Intake/Output Summary (Last 24 hours) at 11/5/2022 1557  Last data filed at 11/5/2022 1505  Gross per 24 hour   Intake 1072.58 ml   Output 980 ml   Net 92.58 ml     Unmeasured Output  Urine Occurrence: 1  Stool Occurrence: 1  Pad Count: 3    Labs/Accuchecks:  Recent Labs   Lab 11/05/22  0240   WBC 12.53   RBC 2.47*   HGB 7.6*   HCT 23.9*         Recent Labs   Lab 11/05/22  0240      K 3.7   CO2 27      BUN 25*   CREATININE 0.8   ALKPHOS 153*   ALT 27   AST 30   BILITOT 0.4      Recent Labs   Lab 11/01/22 2128   INR 1.0   APTT 26.0    No results for input(s): CPK, CPKMB, TROPONINI, MB in the last 168 hours.    Electrolytes: No replacement orders  Accuchecks: Q6H    Gtts:   propofoL Stopped (11/05/22 0740)       LDA/Wounds:  Lines/Drains/Airways       Drain  Duration                   NG/OG Tube 10/29/22 1000 Left nostril 7 days         Urethral Catheter 10/31/22 1618 4 days         Closed/Suction Drain 11/02/22 Right;Superior;Medial Back 3 days         Closed/Suction Drain 11/02/22 Superior;Medial;Left Back Other (Comment) 3 days              Airway  Duration                  Airway - Non-Surgical 11/05/22 0045 <1 day              Peripheral Intravenous Line  Duration                  Midline Catheter Insertion/Assessment  - Single Lumen 10/28/22 1858 Right basilic vein (medial side of arm) 18g x 10cm 7 days         Peripheral IV - Single Lumen 11/05/22 0830 20 G Anterior;Right Forearm <1 day                  Wounds: Yes  Wound care consulted: Yes

## 2022-11-05 NOTE — SUBJECTIVE & OBJECTIVE
Interval History: 11/5: Extubated yesterday morning but required reintubation later in the day due to respiratory distress; inspiratory stridor reported. NAEON. AFVSS. Exam stable.     Medications:  Continuous Infusions:   propofoL Stopped (11/05/22 0740)     Scheduled Meds:   bisacodyL  10 mg Rectal Daily    heparin (porcine)  5,000 Units Subcutaneous Q8H    methadone  90 mg Per NG tube Daily    nicotine  1 patch Transdermal Daily    oxacillin 12 g in  mL CONTINUOUS INFUSION  12 g Intravenous Q24H    oxyCODONE  10 mg Per NG tube Q6H    phenylephrine HCl in 0.9% NaCl        polyethylene glycol  17 g Per NG tube Daily    pregabalin  300 mg Per NG tube BID    propofol        senna-docusate 8.6-50 mg  1 tablet Per NG tube BID     PRN Meds:acetaminophen, albuterol-ipratropium, dextrose 10%, dextrose 10%, fentaNYL, glucagon (human recombinant), hydrALAZINE, hydrOXYzine HCL, labetalol, ondansetron, racepinephrine, simethicone, sodium chloride 0.9%     Review of Systems  Objective:     Weight: 63.5 kg (139 lb 15.9 oz)  Body mass index is 22.6 kg/m².  Vital Signs (Most Recent):  Temp: 98.4 °F (36.9 °C) (11/05/22 1105)  Pulse: (!) 118 (11/05/22 1105)  Resp: 19 (11/05/22 1105)  BP: 90/67 (11/05/22 1105)  SpO2: 98 % (11/05/22 1105)   Vital Signs (24h Range):  Temp:  [98.2 °F (36.8 °C)-98.6 °F (37 °C)] 98.4 °F (36.9 °C)  Pulse:  [] 118  Resp:  [0-39] 19  SpO2:  [92 %-100 %] 98 %  BP: ()/(47-71) 90/67     Date 11/05/22 0700 - 11/06/22 0659   Shift 0972-9402 3714-7758 2639-5180 24 Hour Total   INTAKE   I.V.(mL/kg) 18.6(0.3)   18.6(0.3)   NG/GT 60   60   IV Piggyback 94.4   94.4   Shift Total(mL/kg) 173(2.7)   173(2.7)   OUTPUT   Urine(mL/kg/hr) 210   210   Shift Total(mL/kg) 210(3.3)   210(3.3)   Weight (kg) 63.5 63.5 63.5 63.5              Vent Mode: A/C  Oxygen Concentration (%):  [] 50  Resp Rate Total:  [12 br/min-19 br/min] 19 br/min  Vt Set:  [385 mL] 385 mL  PEEP/CPAP:  [5 cmH20] 5 cmH20  Mean  Airway Pressure:  [7.2 cmH20-8.1 cmH20] 8.1 cmH20         Closed/Suction Drain 10/28/22 1129 Anterior Neck Accordion 10 Fr. (Active)   Site Description Unable to view 11/05/22 0305   Dressing Type Other (Comment) 11/05/22 0305   Dressing Status Clean;Dry;Intact 11/05/22 0305   Dressing Intervention Integrity maintained 11/05/22 0305   Drainage Sanguineous 11/05/22 0305   Status Other (Comment) 11/05/22 0305   Output (mL) 3 mL 11/01/22 0705            Closed/Suction Drain 11/02/22 Superior;Medial;Left Back Other (Comment) (Active)   Site Description Unable to view 11/05/22 1105   Dressing Type Transparent (Tegaderm) 11/05/22 1105   Dressing Status Clean;Dry;Intact 11/05/22 1105   Dressing Intervention Integrity maintained 11/05/22 1105   Drainage Sanguineous 11/05/22 1105   Status Other (Comment) 11/05/22 1105   Output (mL) 30 mL 11/05/22 0605            Closed/Suction Drain 11/02/22 Right;Superior;Medial Back (Active)   Site Description Unable to view 11/05/22 1105   Dressing Type Transparent (Tegaderm) 11/05/22 1105   Dressing Status Clean;Dry;Intact 11/05/22 1105   Dressing Intervention Integrity maintained 11/05/22 1105   Drainage Sanguineous 11/05/22 1105   Status Other (Comment) 11/05/22 1105   Output (mL) 20 mL 11/05/22 0605            NG/OG Tube 10/29/22 1000 Left nostril (Active)   Placement Check placement verified by x-ray 11/05/22 1105   Tolerance no signs/symptoms of discomfort 11/05/22 1105   Securement secured to nostril center w/ adhesive device 11/05/22 1105   Clamp Status/Tolerance unclamped 11/05/22 1105   Suction Setting/Drainage Method suction at the bedside 11/05/22 1105   Insertion Site Appearance no redness, warmth, tenderness, skin breakdown, drainage 11/05/22 1105   Drainage None 11/05/22 1105   Flush/Irrigation flushed w/;water;no resistance met 11/05/22 1105   Feeding Type continuous;by pump 11/05/22 1105   Feeding Action feeding continued 11/05/22 1105   Current Rate (mL/hr) 20 mL/hr  "11/05/22 1105   Goal Rate (mL/hr) 40 mL/hr 11/05/22 1105   Intake (mL) 10 mL 11/05/22 0305   Water Bolus (mL) 50 mL 11/01/22 1901   Rate Formula Tube Feeding (mL/hr) 20 mL/hr 11/05/22 1105   Formula Name Diabetasource 11/05/22 1105   Intake (mL) - Formula Tube Feeding 20 11/05/22 1105   Residual Amount (ml) 2 ml 11/01/22 1901            Urethral Catheter 10/31/22 1618 (Active)   $ Patiño Insertion Bedside Insertion Performed 10/31/22 1505   Site Assessment Clean;Intact 11/05/22 1105   Collection Container Urimeter 11/05/22 1105   Securement Method secured to top of thigh w/ adhesive device 11/05/22 1105   Catheter Care Performed yes 11/05/22 1105   Reason for Continuing Urinary Catheterization Urinary retention;Critically ill in ICU and requiring hourly monitoring of intake/output 11/05/22 1105   CAUTI Prevention Bundle Securement Device in place with 1" slack;Intact seal between catheter & drainage tubing;Drainage bag/urimeter off the floor;Sheeting clip in use;No dependent loops or kinks;Drainage bag/urimeter not overfilled (<2/3 full);Drainage bag/urimeter below bladder 11/05/22 1105   Output (mL) 40 mL 11/05/22 1105       Physical Exam    Neurosurgery Physical Exam    [id number]  Sedated, Intubated  Nods appropriately to questions and communicates in writing  CN grossly intact  Fc x 4 antigravity  SILT    Anterior Cervical wound healing well  Cervical collar in place    Posterior HV x 2 with SS output      Significant Labs:  Recent Labs   Lab 11/04/22  0150 11/05/22  0240   * 115*    143   K 4.0 3.7    106   CO2 23 27   BUN 15 25*   CREATININE 0.8 0.8   CALCIUM 8.7 9.1   MG 2.1 2.2     Recent Labs   Lab 11/04/22  0150 11/05/22  0240   WBC 13.54* 12.53   HGB 7.7* 7.6*   HCT 24.0* 23.9*    365     No results for input(s): LABPT, INR, APTT in the last 48 hours.  Microbiology Results (last 7 days)       Procedure Component Value Units Date/Time    Culture, Anaerobe [[id number]] Collected: " 10/28/22 1029    Order Status: Completed Specimen: Wound from Neck Updated: 11/03/22 0908     Anaerobic Culture Culture in progress    Narrative:      4) Epidural phlegman    Blood culture [[id number]] Collected: 10/29/22 0623    Order Status: Completed Specimen: Blood from Peripheral, Foot, Left Updated: 11/03/22 0812     Blood Culture, Routine No growth after 5 days.    Blood culture [[id number]] Collected: 10/29/22 0621    Order Status: Completed Specimen: Blood from Peripheral, Foot, Right Updated: 11/03/22 0812     Blood Culture, Routine No growth after 5 days.    Aerobic culture [[id number]]  (Abnormal)  (Susceptibility) Collected: 10/28/22 0906    Order Status: Completed Specimen: Abscess from Back Updated: 11/01/22 1059     Aerobic Bacterial Culture STAPHYLOCOCCUS AUREUS  Rare      Narrative:      Prevertebral Abscess    Aerobic culture [[id number]]  (Abnormal)  (Susceptibility) Collected: 10/28/22 0950    Order Status: Completed Specimen: Wound from Neck Updated: 11/01/22 1039     Aerobic Bacterial Culture STAPHYLOCOCCUS AUREUS  Rare      Narrative:      3) Osteodiscitis    Blood culture [[id number]] Collected: 10/27/22 0939    Order Status: Completed Specimen: Blood Updated: 11/01/22 1012     Blood Culture, Routine No growth after 5 days.    Narrative:      Rt wrist    Blood culture [[id number]] Collected: 10/27/22 0938    Order Status: Completed Specimen: Blood Updated: 11/01/22 1012     Blood Culture, Routine No growth after 5 days.    Narrative:      Rt AC    Culture, Anaerobe [[id number]] Collected: 10/28/22 0950    Order Status: Completed Specimen: Wound from Neck Updated: 11/01/22 0724     Anaerobic Culture No anaerobes isolated    Narrative:      3) Osteodiscitis    Culture, Anaerobe [[id number]] Collected: 10/28/22 0924    Order Status: Completed Specimen: Abscess from Neck Updated: 11/01/22 0722     Anaerobic Culture No anaerobes isolated    Narrative:      2) Prevertebral absess    Culture, Anaerobe  [[id number]] Collected: 10/28/22 0906    Order Status: Completed Specimen: Abscess from Back Updated: 11/01/22 0722     Anaerobic Culture No anaerobes isolated    Narrative:      Prevertebral Abscess    AFB Culture & Smear [[id number]] Collected: 10/28/22 1029    Order Status: Completed Specimen: Wound from Neck Updated: 10/31/22 1250     AFB Culture & Smear Culture in progress     AFB CULTURE STAIN No acid fast bacilli seen.    Narrative:      4) Epidural phlegman    AFB Culture & Smear [[id number]] Collected: 10/28/22 0924    Order Status: Completed Specimen: Abscess from Neck Updated: 10/31/22 1250     AFB Culture & Smear Culture in progress     AFB CULTURE STAIN No acid fast bacilli seen.    Narrative:      2) Prevertebral absess    AFB Culture & Smear [[id number]] Collected: 10/28/22 0906    Order Status: Completed Specimen: Abscess from Back Updated: 10/31/22 1250     AFB Culture & Smear Culture in progress     AFB CULTURE STAIN No acid fast bacilli seen.    Narrative:      Prevertebral Abscess    AFB Culture & Smear [[id number]] Collected: 10/28/22 0950    Order Status: Completed Specimen: Wound from Neck Updated: 10/31/22 1250     AFB Culture & Smear Culture in progress     AFB CULTURE STAIN No acid fast bacilli seen.    Narrative:      3) Osteodiscitis    Fungus culture [[id number]] Collected: 10/28/22 0950    Order Status: Completed Specimen: Wound from Neck Updated: 10/31/22 0958     Fungus (Mycology) Culture Culture in progress    Narrative:      3) Osteodiscitis    Fungus culture [[id number]] Collected: 10/28/22 1029    Order Status: Completed Specimen: Wound from Neck Updated: 10/31/22 0958     Fungus (Mycology) Culture Culture in progress    Narrative:      4) Epidural phlegman    Fungus culture [[id number]] Collected: 10/28/22 0906    Order Status: Completed Specimen: Abscess from Back Updated: 10/31/22 0958     Fungus (Mycology) Culture Culture in progress    Narrative:      Prevertebral Abscess    Fungus  culture [[id number]] Collected: 10/28/22 0924    Order Status: Completed Specimen: Abscess from Neck Updated: 10/31/22 0958     Fungus (Mycology) Culture Culture in progress    Narrative:      2) Prevertebral absess    Aerobic culture [[id number]]  (Abnormal)  (Susceptibility) Collected: 10/28/22 0924    Order Status: Completed Specimen: Abscess from Neck Updated: 10/31/22 0928     Aerobic Bacterial Culture STAPHYLOCOCCUS AUREUS  Moderate      Narrative:      2) Prevertebral absess    Aerobic culture [[id number]] Collected: 10/28/22 1029    Order Status: Completed Specimen: Wound from Neck Updated: 10/31/22 0901     Aerobic Bacterial Culture No growth    Narrative:      4) Epidural phlegman    Blood culture #2 **CANNOT BE ORDERED STAT** [[id number]] Collected: 10/25/22 0917    Order Status: Completed Specimen: Blood from Peripheral, Wrist, Left Updated: 10/30/22 1012     Blood Culture, Routine No growth after 5 days.          All pertinent labs from the last 24 hours have been reviewed.    Significant Diagnostics:  I have reviewed all pertinent imaging results/findings within the past 24 hours.  X-Ray Chest 1 View    Result Date: 11/5/2022  Endotracheal tube projects approximately 4 cm above the ines. Bilateral pleural effusions with associated volume loss.  Platelike atelectasis left base. Electronically signed by resident: Jarvis Lopez Date:    11/05/2022 Time:    04:06 Electronically signed by: Partha Hdz MD Date:    11/05/2022 Time:    04:15    CT Soft Tissue Neck WO Contrast    Result Date: 11/5/2022  Postoperative changes of anterior and posterior instrumented fusion of the cervicothoracic spine.  Hardware appears intact without fracture or loosening. Limited evaluation of the soft tissues of the neck due to significant beam hardening artifact.  No large fluid collections within the operative site to indicate hematoma.  If concern remains for soft tissue fluid collections, recommend repeating CT neck with IV  contrast. Endotracheal tube terminates approximately 1.2 cm above the ines.  Recommend retracting endotracheal tube at least 1 cm in reconfirm in with chest radiograph. Electronically signed by resident: Jarvis Lopez Date:    11/05/2022 Time:    02:59 Electronically signed by: Partha Hdz MD Date:    11/05/2022 Time:    04:46    X-Ray Chest AP Portable    Result Date: 11/5/2022  Mild increased right perihilar and bilateral basilar opacities, as discussed above. Electronically signed by: Denys Dooley Date:    11/05/2022 Time:    01:35

## 2022-11-05 NOTE — SIGNIFICANT EVENT
Called to bedside by RN stating that the patient is stridorous with O2 sats between 88-92%. Patient given prn racemic epi x 2 with no resolution. Patient pulling at C-collar, stating she feels SOB. 10 dex given with no resolution and sats around 85%. Decision made to intubate patient and anesthesia called. Patient also extremely anxious, pulling off leads, and thrashing in bed. HR in 130s, though artifact present on telemetry. SBP in 140s. Given 2 versed while awaiting anesthesia. Removed C Collar with NSGY approval which improved sats to 97%, though stridor still present and patient still exhibiting signs of tachypnea/SOB. Anesthesia successfully intubated patient at bedside with rocuronium and etomidate, and patient on propofol for sedation. CXR ordered, as well as STAT CT neck soft tissue to evaluate for soft tissue swelling or hematoma, though no hematoma palpable/visible on exam. JORDAN and Dr. Heaton aware.    Critical condition in that Patient has a condition that poses threat to life and bodily function: Cervical osteomyelitis s/p anterior and posterior fusion requiring re-intubation 2/2 refractory stridor.      55 minutes of Critical care time was spent personally by me on the following activities: development of treatment plan with patient or surrogate and bedside caregivers, discussions with consultants, evaluation of patient's response to treatment, examination of patient, ordering and performing treatments and interventions, ordering and review of laboratory studies, ordering and review of radiographic studies, pulse oximetry, antibiotic titration if applicable, vasopressor titration if applicable, re-evaluation of patient's condition. This critical care time did not overlap with that of any other provider or involve time for any procedures. There is high probability for acute neurological change leading to clinical and possibly life-threatening deterioration requiring highest level of physician  preparedness for urgent intervention.

## 2022-11-05 NOTE — ASSESSMENT & PLAN NOTE
-Methadone 90 daily  (prescribed at [facility] clinic on [facility] [place])
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
## 2022-11-05 NOTE — PT/OT/SLP PROGRESS
Speech Language Pathology  Discharge    Peter Stiles  MRN: [id number]    Patient not seen today secondary to Other (Pt currently intubated). Please re-consult once pt extubated and appropriate for SLP services.

## 2022-11-05 NOTE — ASSESSMENT & PLAN NOTE
- currently intubated, on spontaneous  - OR today, will reassess and attempt to wean post procedurally   - CXR and KUB post op with L sided atelectasis/ground glass infiltrate/pleural effusion  - extubated to NC 11/4/22  - re-intubated 11/5/22 due to stridor  - ENT consulted for trach 2 failed extubations

## 2022-11-05 NOTE — NURSING
Pt arrived back to room following CT        Pt was escorted by RN, second RN, and RT on cardiac monitoring, O2, ambu bag, portable vent, and IV pole.        Patient placed back on bedside monitor with alarms audible, bed in low position with bed alarm on, call light within reach. EKATERINA.

## 2022-11-05 NOTE — ANESTHESIA PROCEDURE NOTES
Ad Hoc Intubation    Date/Time: 11/5/2022 12:50 AM  Performed by: Srinivasa Lundberg MD  Authorized by: Porfirio Velasco MD     Indications:  Hypoxemia and respiratory failure  Diagnosis:  Acute hypoxic respiratory failure  Patient Location:  ICU  Timeout:  11/5/2022 12:45 AM  Procedure Start Time:  11/5/2022 12:46 AM  Procedure End Time:  11/5/2022 12:50 AM  Staff:     Anesthesiologist Present: Yes    Intubation:     Induction:  Intravenous    Intubated:  Postinduction    Mask Ventilation:  Easy mask    Attempts:  1    Attempted By:  Resident anesthesiologist    Method of Intubation:  Video laryngoscopy    Blade:  Garland 3    Laryngeal View Grade: Grade I - full view of chords      Difficult Airway Encountered?: No      Complications:  None    Airway Device:  Oral endotracheal tube    Airway Device Size:  7.0    Style/Cuff Inflation:  Cuffed (inflated to minimal occlusive pressure)    Tube secured:  23    Secured at:  The teeth    Placement Verified By:  Colorimetric ETCO2 device    Complicating Factors:  Poor neck/head extension (S/p cervical fusion)    Findings Post-Intubation:  BS equal bilateral

## 2022-11-05 NOTE — NURSING
Pt with increasing stridor and breathing difficulty, pt desatted to 87% with HR in the 120s sustaining, Mitchell BARKLEY to bedside, order to give 10mg dex and racepinephrine treatment, pt continued to desat further with runs of V tach, Anesthesia called to bedside for emergent intubation

## 2022-11-05 NOTE — ASSESSMENT & PLAN NOTE
Peter Stiles is a 51 y.o. female with PMHx of HTN, dilated cardiomyopathy, h/o opioid dependence, cigarette smoker (2pks/day), and daily baby ASA use who presents with 1 day of decreased sensation from T5 level down and tingling in her bilateral fingers and bilateral toes. MRI imaging obtained in the ED showing possible C5-6 osteodiscitis/myelitis with cord compression. Patient tachycardic and hypertensive on arrival, afebrile, with leukocytosis.     Now s/p C5-6 corpectomy on 10/28/22  C2-T2 PCDF on 11/2    --Admitted to neuro ICU  --All pertinent imaging/labs personally reviewed and interpreted.    -MRI C/T/L spine w/o contrast 10/25: focal kyphotic deformity at C5/6 with possible discitis/osteomyelitis,  epidural collection extending into the central spinal canal resulting in severe stenosis and cord signal  abnormality.  Prevertebral soft tissue edema and probable paravertebral abscess or phlegmon at this level.  Thoracic and lumbar spine unremarkable.    -MRI C spine w/ contrast 10/25: confirmed enhancement at C5/6 consistent with osteomyelitis/discitis and  epidural phlegmon  --Intubated after index procedure and kept intubated for posterior fixation and for tentative LISHA 11/4 which was eventually cancelled due to inability to adequately extend neck. Extubated 11/4 but reintubated later that day for respiratory distress and stridor. Likely to need trach.   --BCx 10/25 growing MSSA, Repeat BCx's 10/27 ngtd.  --OR cultures 10/28 no organisms on gram stain, culture MSSA  --Post op xrays, anterior hardware in good position; xray Csp for posterior hardware pending  --ID following   --Maintain drains to full suction  --Neurochecks Q1h  --Continue C collar for now  --Pain control as needed  --Abx: Oxacillin   --Patiño   --PT/OT. SQH  --Please call NSGY with any questions/concerns    Dispo: ICU

## 2022-11-06 ENCOUNTER — ANESTHESIA EVENT (OUTPATIENT)
Dept: SURGERY | Facility: HOSPITAL | Age: 52
DRG: 003 | End: 2022-11-06
Payer: MEDICARE

## 2022-11-06 PROBLEM — J96.01 ACUTE RESPIRATORY FAILURE WITH HYPOXIA: Status: ACTIVE | Noted: 2022-10-28

## 2022-11-06 PROBLEM — R06.1 STRIDOR: Status: ACTIVE | Noted: 2022-11-06

## 2022-11-06 LAB
ALBUMIN SERPL BCP-MCNC: 2 G/DL (ref 3.5–5.2)
ALBUMIN SERPL BCP-MCNC: 2.3 G/DL (ref 3.5–5.2)
ALLENS TEST: ABNORMAL
ALLENS TEST: ABNORMAL
ALP SERPL-CCNC: 119 U/L (ref 55–135)
ALP SERPL-CCNC: 129 U/L (ref 55–135)
ALT SERPL W/O P-5'-P-CCNC: 18 U/L (ref 10–44)
ALT SERPL W/O P-5'-P-CCNC: 19 U/L (ref 10–44)
ANION GAP SERPL CALC-SCNC: 11 MMOL/L (ref 8–16)
ANION GAP SERPL CALC-SCNC: 12 MMOL/L (ref 8–16)
AST SERPL-CCNC: 21 U/L (ref 10–40)
AST SERPL-CCNC: 23 U/L (ref 10–40)
BASOPHILS # BLD AUTO: 0.01 K/UL (ref 0–0.2)
BASOPHILS # BLD AUTO: 0.02 K/UL (ref 0–0.2)
BASOPHILS NFR BLD: 0.1 % (ref 0–1.9)
BASOPHILS NFR BLD: 0.2 % (ref 0–1.9)
BILIRUB SERPL-MCNC: 0.3 MG/DL (ref 0.1–1)
BILIRUB SERPL-MCNC: 0.4 MG/DL (ref 0.1–1)
BLD PROD TYP BPU: NORMAL
BLOOD UNIT EXPIRATION DATE: NORMAL
BLOOD UNIT TYPE CODE: 5100
BLOOD UNIT TYPE: NORMAL
BUN SERPL-MCNC: 22 MG/DL (ref 6–20)
BUN SERPL-MCNC: 24 MG/DL (ref 6–20)
CALCIUM SERPL-MCNC: 8.4 MG/DL (ref 8.7–10.5)
CALCIUM SERPL-MCNC: 8.7 MG/DL (ref 8.7–10.5)
CHLORIDE SERPL-SCNC: 109 MMOL/L (ref 95–110)
CHLORIDE SERPL-SCNC: 112 MMOL/L (ref 95–110)
CO2 SERPL-SCNC: 22 MMOL/L (ref 23–29)
CO2 SERPL-SCNC: 25 MMOL/L (ref 23–29)
CODING SYSTEM: NORMAL
CREAT SERPL-MCNC: 0.7 MG/DL (ref 0.5–1.4)
CREAT SERPL-MCNC: 0.7 MG/DL (ref 0.5–1.4)
DELSYS: ABNORMAL
DELSYS: ABNORMAL
DIFFERENTIAL METHOD: ABNORMAL
DIFFERENTIAL METHOD: ABNORMAL
DISPENSE STATUS: NORMAL
EOSINOPHIL # BLD AUTO: 0.1 K/UL (ref 0–0.5)
EOSINOPHIL # BLD AUTO: 0.2 K/UL (ref 0–0.5)
EOSINOPHIL NFR BLD: 1.3 % (ref 0–8)
EOSINOPHIL NFR BLD: 2 % (ref 0–8)
ERYTHROCYTE [DISTWIDTH] IN BLOOD BY AUTOMATED COUNT: 13.1 % (ref 11.5–14.5)
ERYTHROCYTE [DISTWIDTH] IN BLOOD BY AUTOMATED COUNT: 13.7 % (ref 11.5–14.5)
ERYTHROCYTE [SEDIMENTATION RATE] IN BLOOD BY WESTERGREN METHOD: 16 MM/H
EST. GFR  (NO RACE VARIABLE): >60 ML/MIN/1.73 M^2
EST. GFR  (NO RACE VARIABLE): >60 ML/MIN/1.73 M^2
FIO2: 50
FIO2: 50
GLUCOSE SERPL-MCNC: 81 MG/DL (ref 70–110)
GLUCOSE SERPL-MCNC: 89 MG/DL (ref 70–110)
HCO3 UR-SCNC: 31.4 MMOL/L (ref 24–28)
HCO3 UR-SCNC: 33.7 MMOL/L (ref 24–28)
HCT VFR BLD AUTO: 20 % (ref 37–48.5)
HCT VFR BLD AUTO: 26.7 % (ref 37–48.5)
HGB BLD-MCNC: 6.2 G/DL (ref 12–16)
HGB BLD-MCNC: 8.3 G/DL (ref 12–16)
IMM GRANULOCYTES # BLD AUTO: 0.13 K/UL (ref 0–0.04)
IMM GRANULOCYTES # BLD AUTO: 0.17 K/UL (ref 0–0.04)
IMM GRANULOCYTES NFR BLD AUTO: 1.4 % (ref 0–0.5)
IMM GRANULOCYTES NFR BLD AUTO: 1.7 % (ref 0–0.5)
LYMPHOCYTES # BLD AUTO: 4.1 K/UL (ref 1–4.8)
LYMPHOCYTES # BLD AUTO: 4.3 K/UL (ref 1–4.8)
LYMPHOCYTES NFR BLD: 41.6 % (ref 18–48)
LYMPHOCYTES NFR BLD: 44.5 % (ref 18–48)
MAGNESIUM SERPL-MCNC: 1.8 MG/DL (ref 1.6–2.6)
MCH RBC QN AUTO: 30.1 PG (ref 27–31)
MCH RBC QN AUTO: 30.2 PG (ref 27–31)
MCHC RBC AUTO-ENTMCNC: 31 G/DL (ref 32–36)
MCHC RBC AUTO-ENTMCNC: 31.1 G/DL (ref 32–36)
MCV RBC AUTO: 97 FL (ref 82–98)
MCV RBC AUTO: 97 FL (ref 82–98)
MIN VOL: 5.8
MODE: ABNORMAL
MODE: ABNORMAL
MONOCYTES # BLD AUTO: 0.9 K/UL (ref 0.3–1)
MONOCYTES # BLD AUTO: 0.9 K/UL (ref 0.3–1)
MONOCYTES NFR BLD: 9.2 % (ref 4–15)
MONOCYTES NFR BLD: 9.8 % (ref 4–15)
NEUTROPHILS # BLD AUTO: 3.9 K/UL (ref 1.8–7.7)
NEUTROPHILS # BLD AUTO: 4.6 K/UL (ref 1.8–7.7)
NEUTROPHILS NFR BLD: 42.9 % (ref 38–73)
NEUTROPHILS NFR BLD: 45.3 % (ref 38–73)
NRBC BLD-RTO: 0 /100 WBC
NRBC BLD-RTO: 0 /100 WBC
PCO2 BLDA: 45.1 MMHG (ref 35–45)
PCO2 BLDA: 47.5 MMHG (ref 35–45)
PEEP: 5
PEEP: 5
PH SMN: 7.45 [PH] (ref 7.35–7.45)
PH SMN: 7.46 [PH] (ref 7.35–7.45)
PHOSPHATE SERPL-MCNC: 2.8 MG/DL (ref 2.7–4.5)
PIP: 33
PLATELET # BLD AUTO: 322 K/UL (ref 150–450)
PLATELET # BLD AUTO: 348 K/UL (ref 150–450)
PMV BLD AUTO: 10 FL (ref 9.2–12.9)
PMV BLD AUTO: 10.1 FL (ref 9.2–12.9)
PO2 BLDA: 142 MMHG (ref 80–100)
PO2 BLDA: 179 MMHG (ref 80–100)
POC BE: 10 MMOL/L
POC BE: 7 MMOL/L
POC SATURATED O2: 100 % (ref 95–100)
POC SATURATED O2: 99 % (ref 95–100)
POC TCO2: 33 MMOL/L (ref 23–27)
POC TCO2: 35 MMOL/L (ref 23–27)
POCT GLUCOSE: 108 MG/DL (ref 70–110)
POCT GLUCOSE: 96 MG/DL (ref 70–110)
POTASSIUM SERPL-SCNC: 3.1 MMOL/L (ref 3.5–5.1)
POTASSIUM SERPL-SCNC: 3.1 MMOL/L (ref 3.5–5.1)
PROT SERPL-MCNC: 5.6 G/DL (ref 6–8.4)
PROT SERPL-MCNC: 6.4 G/DL (ref 6–8.4)
PS: 8
PS: 8
RBC # BLD AUTO: 2.06 M/UL (ref 4–5.4)
RBC # BLD AUTO: 2.75 M/UL (ref 4–5.4)
SAMPLE: ABNORMAL
SAMPLE: ABNORMAL
SITE: ABNORMAL
SITE: ABNORMAL
SODIUM SERPL-SCNC: 145 MMOL/L (ref 136–145)
SODIUM SERPL-SCNC: 146 MMOL/L (ref 136–145)
SP02: 100
SP02: 96
SPONT RATE: 14
TRANS ERYTHROCYTES VOL PATIENT: NORMAL ML
VT: 385
WBC # BLD AUTO: 10.23 K/UL (ref 3.9–12.7)
WBC # BLD AUTO: 9.17 K/UL (ref 3.9–12.7)

## 2022-11-06 PROCEDURE — 51798 US URINE CAPACITY MEASURE: CPT

## 2022-11-06 PROCEDURE — 80053 COMPREHEN METABOLIC PANEL: CPT | Performed by: PHYSICIAN ASSISTANT

## 2022-11-06 PROCEDURE — 63600175 PHARM REV CODE 636 W HCPCS

## 2022-11-06 PROCEDURE — P9021 RED BLOOD CELLS UNIT: HCPCS

## 2022-11-06 PROCEDURE — 85025 COMPLETE CBC W/AUTO DIFF WBC: CPT | Mod: 91 | Performed by: PSYCHIATRY & NEUROLOGY

## 2022-11-06 PROCEDURE — 31500 INSERT EMERGENCY AIRWAY: CPT | Mod: ,,, | Performed by: PSYCHIATRY & NEUROLOGY

## 2022-11-06 PROCEDURE — 25000242 PHARM REV CODE 250 ALT 637 W/ HCPCS: Performed by: NURSE PRACTITIONER

## 2022-11-06 PROCEDURE — 82803 BLOOD GASES ANY COMBINATION: CPT

## 2022-11-06 PROCEDURE — 99900035 HC TECH TIME PER 15 MIN (STAT)

## 2022-11-06 PROCEDURE — 99291 PR CRITICAL CARE, E/M 30-74 MINUTES: ICD-10-PCS | Mod: 25,,, | Performed by: PSYCHIATRY & NEUROLOGY

## 2022-11-06 PROCEDURE — 25000003 PHARM REV CODE 250: Performed by: PHYSICIAN ASSISTANT

## 2022-11-06 PROCEDURE — 84100 ASSAY OF PHOSPHORUS: CPT | Performed by: PHYSICIAN ASSISTANT

## 2022-11-06 PROCEDURE — 27000221 HC OXYGEN, UP TO 24 HOURS

## 2022-11-06 PROCEDURE — 99024 POSTOP FOLLOW-UP VISIT: CPT | Mod: ,,, | Performed by: OTOLARYNGOLOGY

## 2022-11-06 PROCEDURE — 63600175 PHARM REV CODE 636 W HCPCS: Performed by: PSYCHIATRY & NEUROLOGY

## 2022-11-06 PROCEDURE — 25000003 PHARM REV CODE 250: Performed by: PSYCHIATRY & NEUROLOGY

## 2022-11-06 PROCEDURE — 94640 AIRWAY INHALATION TREATMENT: CPT

## 2022-11-06 PROCEDURE — 27200966 HC CLOSED SUCTION SYSTEM

## 2022-11-06 PROCEDURE — 99291 CRITICAL CARE FIRST HOUR: CPT | Mod: 25,,, | Performed by: PSYCHIATRY & NEUROLOGY

## 2022-11-06 PROCEDURE — 36430 TRANSFUSION BLD/BLD COMPNT: CPT

## 2022-11-06 PROCEDURE — 36600 WITHDRAWAL OF ARTERIAL BLOOD: CPT

## 2022-11-06 PROCEDURE — 25000003 PHARM REV CODE 250: Performed by: NURSE PRACTITIONER

## 2022-11-06 PROCEDURE — 85025 COMPLETE CBC W/AUTO DIFF WBC: CPT | Performed by: PHYSICIAN ASSISTANT

## 2022-11-06 PROCEDURE — 94002 VENT MGMT INPAT INIT DAY: CPT

## 2022-11-06 PROCEDURE — 20000000 HC ICU ROOM

## 2022-11-06 PROCEDURE — 99900026 HC AIRWAY MAINTENANCE (STAT)

## 2022-11-06 PROCEDURE — 83735 ASSAY OF MAGNESIUM: CPT | Performed by: PHYSICIAN ASSISTANT

## 2022-11-06 PROCEDURE — 25000003 PHARM REV CODE 250

## 2022-11-06 PROCEDURE — 94003 VENT MGMT INPAT SUBQ DAY: CPT

## 2022-11-06 PROCEDURE — 63600175 PHARM REV CODE 636 W HCPCS: Performed by: NURSE PRACTITIONER

## 2022-11-06 PROCEDURE — 99024 PR POST-OP FOLLOW-UP VISIT: ICD-10-PCS | Mod: ,,, | Performed by: OTOLARYNGOLOGY

## 2022-11-06 PROCEDURE — 94761 N-INVAS EAR/PLS OXIMETRY MLT: CPT

## 2022-11-06 PROCEDURE — 63600175 PHARM REV CODE 636 W HCPCS: Performed by: PHYSICIAN ASSISTANT

## 2022-11-06 PROCEDURE — 80053 COMPREHEN METABOLIC PANEL: CPT | Mod: 91 | Performed by: PSYCHIATRY & NEUROLOGY

## 2022-11-06 PROCEDURE — 27000207 HC ISOLATION

## 2022-11-06 PROCEDURE — 31500 INTUBATION: ICD-10-PCS | Mod: ,,, | Performed by: PSYCHIATRY & NEUROLOGY

## 2022-11-06 PROCEDURE — S4991 NICOTINE PATCH NONLEGEND: HCPCS | Performed by: PHYSICIAN ASSISTANT

## 2022-11-06 PROCEDURE — 51701 INSERT BLADDER CATHETER: CPT

## 2022-11-06 RX ORDER — ETOMIDATE 2 MG/ML
INJECTION INTRAVENOUS
Status: COMPLETED
Start: 2022-11-06 | End: 2022-11-06

## 2022-11-06 RX ORDER — PROPOFOL 10 MG/ML
INJECTION, EMULSION INTRAVENOUS
Status: COMPLETED
Start: 2022-11-06 | End: 2022-11-06

## 2022-11-06 RX ORDER — DEXAMETHASONE SODIUM PHOSPHATE 4 MG/ML
10 INJECTION, SOLUTION INTRA-ARTICULAR; INTRALESIONAL; INTRAMUSCULAR; INTRAVENOUS; SOFT TISSUE ONCE
Status: COMPLETED | OUTPATIENT
Start: 2022-11-06 | End: 2022-11-06

## 2022-11-06 RX ORDER — LANOLIN ALCOHOL/MO/W.PET/CERES
800 CREAM (GRAM) TOPICAL
Status: DISCONTINUED | OUTPATIENT
Start: 2022-11-06 | End: 2022-11-19

## 2022-11-06 RX ORDER — MAGNESIUM SULFATE HEPTAHYDRATE 40 MG/ML
2 INJECTION, SOLUTION INTRAVENOUS
Status: DISCONTINUED | OUTPATIENT
Start: 2022-11-06 | End: 2022-11-06

## 2022-11-06 RX ORDER — CALCIUM GLUCONATE 20 MG/ML
2 INJECTION, SOLUTION INTRAVENOUS
Status: DISCONTINUED | OUTPATIENT
Start: 2022-11-06 | End: 2022-11-06

## 2022-11-06 RX ORDER — FENTANYL CITRATE 50 UG/ML
100 INJECTION, SOLUTION INTRAMUSCULAR; INTRAVENOUS ONCE
Status: COMPLETED | OUTPATIENT
Start: 2022-11-06 | End: 2022-11-06

## 2022-11-06 RX ORDER — MAGNESIUM SULFATE HEPTAHYDRATE 40 MG/ML
4 INJECTION, SOLUTION INTRAVENOUS
Status: DISCONTINUED | OUTPATIENT
Start: 2022-11-06 | End: 2022-11-06

## 2022-11-06 RX ORDER — SODIUM,POTASSIUM PHOSPHATES 280-250MG
2 POWDER IN PACKET (EA) ORAL
Status: DISCONTINUED | OUTPATIENT
Start: 2022-11-06 | End: 2022-11-19

## 2022-11-06 RX ORDER — ATROPINE SULFATE 10 MG/ML
1 SOLUTION/ DROPS OPHTHALMIC EVERY 6 HOURS PRN
Status: DISCONTINUED | OUTPATIENT
Start: 2022-11-06 | End: 2022-11-10

## 2022-11-06 RX ORDER — ROCURONIUM BROMIDE 10 MG/ML
INJECTION, SOLUTION INTRAVENOUS
Status: COMPLETED
Start: 2022-11-06 | End: 2022-11-06

## 2022-11-06 RX ORDER — PROPOFOL 10 MG/ML
0-50 INJECTION, EMULSION INTRAVENOUS CONTINUOUS
Status: DISCONTINUED | OUTPATIENT
Start: 2022-11-06 | End: 2022-11-07

## 2022-11-06 RX ORDER — HYDROCODONE BITARTRATE AND ACETAMINOPHEN 500; 5 MG/1; MG/1
TABLET ORAL
Status: DISCONTINUED | OUTPATIENT
Start: 2022-11-06 | End: 2022-11-07

## 2022-11-06 RX ORDER — CALCIUM GLUCONATE 20 MG/ML
3 INJECTION, SOLUTION INTRAVENOUS
Status: DISCONTINUED | OUTPATIENT
Start: 2022-11-06 | End: 2022-11-06

## 2022-11-06 RX ORDER — PROPOFOL 10 MG/ML
VIAL (ML) INTRAVENOUS
Status: DISCONTINUED
Start: 2022-11-06 | End: 2022-11-06 | Stop reason: WASHOUT

## 2022-11-06 RX ORDER — POTASSIUM CHLORIDE 7.45 MG/ML
60 INJECTION INTRAVENOUS
Status: DISCONTINUED | OUTPATIENT
Start: 2022-11-06 | End: 2022-11-06

## 2022-11-06 RX ORDER — FENTANYL CITRATE 50 UG/ML
INJECTION, SOLUTION INTRAMUSCULAR; INTRAVENOUS
Status: COMPLETED
Start: 2022-11-06 | End: 2022-11-06

## 2022-11-06 RX ORDER — POTASSIUM CHLORIDE 7.45 MG/ML
80 INJECTION INTRAVENOUS
Status: DISCONTINUED | OUTPATIENT
Start: 2022-11-06 | End: 2022-11-06

## 2022-11-06 RX ORDER — CALCIUM GLUCONATE 20 MG/ML
1 INJECTION, SOLUTION INTRAVENOUS
Status: DISCONTINUED | OUTPATIENT
Start: 2022-11-06 | End: 2022-11-06

## 2022-11-06 RX ORDER — POTASSIUM CHLORIDE 7.45 MG/ML
40 INJECTION INTRAVENOUS
Status: DISCONTINUED | OUTPATIENT
Start: 2022-11-06 | End: 2022-11-06

## 2022-11-06 RX ORDER — SUCCINYLCHOLINE CHLORIDE 20 MG/ML
INJECTION INTRAMUSCULAR; INTRAVENOUS
Status: DISCONTINUED
Start: 2022-11-06 | End: 2022-11-06 | Stop reason: WASHOUT

## 2022-11-06 RX ADMIN — SODIUM CHLORIDE 500 ML: 0.9 INJECTION, SOLUTION INTRAVENOUS at 01:11

## 2022-11-06 RX ADMIN — PROPOFOL 10 MCG/KG/MIN: 10 INJECTION, EMULSION INTRAVENOUS at 04:11

## 2022-11-06 RX ADMIN — FENTANYL CITRATE 50 MCG: 0.05 INJECTION, SOLUTION INTRAMUSCULAR; INTRAVENOUS at 08:11

## 2022-11-06 RX ADMIN — Medication 1 PATCH: at 08:11

## 2022-11-06 RX ADMIN — FENTANYL CITRATE 50 MCG: 0.05 INJECTION, SOLUTION INTRAMUSCULAR; INTRAVENOUS at 12:11

## 2022-11-06 RX ADMIN — FENTANYL CITRATE 100 MCG: 0.05 INJECTION, SOLUTION INTRAMUSCULAR; INTRAVENOUS at 04:11

## 2022-11-06 RX ADMIN — SENNOSIDES AND DOCUSATE SODIUM 1 TABLET: 50; 8.6 TABLET ORAL at 08:11

## 2022-11-06 RX ADMIN — PREGABALIN 225 MG: 75 CAPSULE ORAL at 09:11

## 2022-11-06 RX ADMIN — HEPARIN SODIUM 5000 UNITS: 5000 INJECTION INTRAVENOUS; SUBCUTANEOUS at 05:11

## 2022-11-06 RX ADMIN — PREGABALIN 225 MG: 75 CAPSULE ORAL at 08:11

## 2022-11-06 RX ADMIN — ATROPINE SULFATE 1 DROP: 10 SOLUTION OPHTHALMIC at 01:11

## 2022-11-06 RX ADMIN — Medication 800 MG: at 08:11

## 2022-11-06 RX ADMIN — SIMETHICONE 80 MG: 80 TABLET, CHEWABLE ORAL at 09:11

## 2022-11-06 RX ADMIN — POTASSIUM BICARBONATE 35 MEQ: 391 TABLET, EFFERVESCENT ORAL at 08:11

## 2022-11-06 RX ADMIN — HEPARIN SODIUM 5000 UNITS: 5000 INJECTION INTRAVENOUS; SUBCUTANEOUS at 09:11

## 2022-11-06 RX ADMIN — Medication 800 MG: at 11:11

## 2022-11-06 RX ADMIN — ETOMIDATE 40 MG: 2 INJECTION INTRAVENOUS at 04:11

## 2022-11-06 RX ADMIN — PROPOFOL 50 MCG/KG/MIN: 10 INJECTION, EMULSION INTRAVENOUS at 04:11

## 2022-11-06 RX ADMIN — HYDROXYZINE HYDROCHLORIDE 25 MG: 25 TABLET, FILM COATED ORAL at 12:11

## 2022-11-06 RX ADMIN — OXACILLIN SODIUM 12 G: 10 INJECTION, POWDER, FOR SOLUTION INTRAVENOUS at 02:11

## 2022-11-06 RX ADMIN — ONDANSETRON 4 MG: 2 INJECTION INTRAMUSCULAR; INTRAVENOUS at 06:11

## 2022-11-06 RX ADMIN — PREGABALIN 225 MG: 75 CAPSULE ORAL at 02:11

## 2022-11-06 RX ADMIN — POLYETHYLENE GLYCOL 3350 17 G: 17 POWDER, FOR SOLUTION ORAL at 08:11

## 2022-11-06 RX ADMIN — SENNOSIDES AND DOCUSATE SODIUM 1 TABLET: 50; 8.6 TABLET ORAL at 09:11

## 2022-11-06 RX ADMIN — OXYCODONE HYDROCHLORIDE 10 MG: 10 TABLET ORAL at 05:11

## 2022-11-06 RX ADMIN — OXYCODONE HYDROCHLORIDE 10 MG: 10 TABLET ORAL at 11:11

## 2022-11-06 RX ADMIN — POTASSIUM BICARBONATE 35 MEQ: 391 TABLET, EFFERVESCENT ORAL at 11:11

## 2022-11-06 RX ADMIN — FENTANYL CITRATE 100 MCG: 50 INJECTION, SOLUTION INTRAMUSCULAR; INTRAVENOUS at 04:11

## 2022-11-06 RX ADMIN — HEPARIN SODIUM 5000 UNITS: 5000 INJECTION INTRAVENOUS; SUBCUTANEOUS at 02:11

## 2022-11-06 RX ADMIN — RACEPINEPHRINE HYDROCHLORIDE 0.5 ML: 11.25 SOLUTION RESPIRATORY (INHALATION) at 04:11

## 2022-11-06 RX ADMIN — DEXAMETHASONE SODIUM PHOSPHATE 10 MG: 4 INJECTION INTRA-ARTICULAR; INTRALESIONAL; INTRAMUSCULAR; INTRAVENOUS; SOFT TISSUE at 02:11

## 2022-11-06 RX ADMIN — METHADONE HYDROCHLORIDE 90 MG: 10 TABLET ORAL at 02:11

## 2022-11-06 RX ADMIN — ROCURONIUM BROMIDE 70 MG: 10 INJECTION INTRAVENOUS at 04:11

## 2022-11-06 NOTE — SUBJECTIVE & OBJECTIVE
Medications:  Continuous Infusions:  Scheduled Meds:   bisacodyL  10 mg Rectal Daily    heparin (porcine)  5,000 Units Subcutaneous Q8H    methadone  90 mg Per NG tube Daily    nicotine  1 patch Transdermal Daily    oxacillin 12 g in  mL CONTINUOUS INFUSION  12 g Intravenous Q24H    oxyCODONE  10 mg Per NG tube Q6H    polyethylene glycol  17 g Per NG tube Daily    pregabalin  225 mg Per NG tube TID    senna-docusate 8.6-50 mg  1 tablet Per NG tube BID     PRN Meds:sodium chloride, acetaminophen, albuterol-ipratropium, atropine 1%, dextrose 10%, dextrose 10%, fentaNYL, glucagon (human recombinant), hydrALAZINE, hydrOXYzine HCL, labetalol, magnesium oxide, magnesium oxide, ondansetron, potassium bicarbonate, potassium bicarbonate, potassium bicarbonate, potassium, sodium phosphates, potassium, sodium phosphates, potassium, sodium phosphates, racepinephrine, simethicone, sodium chloride 0.9%     No current facility-administered medications on file prior to encounter.     Current Outpatient Medications on File Prior to Encounter   Medication Sig    aspirin (ECOTRIN) 81 MG EC tablet Take 81 mg by mouth once daily.    LIDOcaine-menthol (ICY HOT PATCH, LIDO-MENTHOL,) 4-1 % PtMd Apply 1 patch topically 2 (two) times daily as needed (Pain).    methadone (METHADOSE) 40 mg disintegrating tablet Take 2 &1/4 tablet (90 mg) by mouth in water once daily.    multivitamin (ONE DAILY MULTIVITAMIN) per tablet Take 1 tablet by mouth once daily.    lorazepam (ATIVAN) 1 MG tablet Take one-half to one tablet (Patient not taking: Reported on 10/25/2022)       Review of patient's allergies indicates:   Allergen Reactions    Morphine Shortness Of Breath and Other (See Comments)     Throat closed       Past Medical History:   Diagnosis Date    CHF (congestive heart failure)     Essential (primary) hypertension     Opioid dependence on maintenance agonist therapy, no symptoms     Smoking      Past Surgical History:   Procedure Laterality  Date    APPENDECTOMY       SECTION      x2    FUSION OF CERVICAL SPINE BY POSTERIOR APPROACH N/A 2022    Procedure: FUSION, SPINE, CERVICAL, POSTERIOR APPROACH C2-T2 posterior decompression/fusion; Depuy, neuromonitoring monica Marrero;  Surgeon: West Merino DO;  [facility];  Service: Neurosurgery;  Laterality: N/A;    HYSTERECTOMY      SURGICAL REMOVAL OF VERTEBRAL BODY OF CERVICAL SPINE N/A 10/28/2022    Procedure: CORPECTOMY, SPINE, CERVICAL;  Surgeon: West Merino DO;  Location: [facility] OR 2ND FLR;  Service: Neurosurgery;  Laterality: N/A;  anterior C5-6 corpectomy, globus, caspar head prado and tongs, omnipaque, 15lbs weights, microscrope, round cutting lynsey and M8, ENT assistance for exposure     Family History       Problem Relation (Age of Onset)    Cancer Mother    Hearing loss Father          Tobacco Use    Smoking status: Every Day     Packs/day: 2.00     Years: 34.00     Pack years: 68.00     Types: Cigarettes    Smokeless tobacco: Current   Substance and Sexual Activity    Alcohol use: No    Drug use: No    Sexual activity: Not on file     Review of Systems  Objective:     Vital Signs (Most Recent):  Temp: 98 °F (36.7 °C) (22 0705)  Pulse: 87 (22 1120)  Resp: 17 (22 1204)  BP: (!) 110/58 (22 1120)  SpO2: 99 % (22 1120)   Vital Signs (24h Range):  Temp:  [97.9 °F (36.6 °C)-98.5 °F (36.9 °C)] 98 °F (36.7 °C)  Pulse:  [] 87  Resp:  [12-38] 17  SpO2:  [95 %-100 %] 99 %  BP: ()/(51-90) 110/58     Weight:  (daylight savings)  Body mass index is 22.6 kg/m².        Physical Exam  NAD, converses with writing  EOMI, PERRL  C-collar in place  ETT secured in place  Cuff leak present  Minimal vent settings  Vent Mode: SIMV  Oxygen Concentration (%):  [50] 50  Resp Rate Total:  [12 br/min-24 br/min] 17 br/min  Vt Set:  [385 mL] 385 mL  PEEP/CPAP:  [5 cmH20] 5 cmH20  Pressure Support:  [8 cmH20-10 cmH20] 8 cmH20  Mean Airway  Pressure:  [8.1 cmH20-9.6 cmH20] 8.4 cmH20    Significant Labs:  All pertinent labs from the last 24 hours have been reviewed.    Significant Diagnostics:  I have reviewed and interpreted all pertinent imaging results/findings within the past 24 hours.
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
## 2022-11-06 NOTE — RESPIRATORY THERAPY
Patient re-intubated @ bedside for airway protection.  Patient intubated with size 6.5 ETT placed 22 @ lip.  ETT is intact, patent, and secured with commercial tube prado.  Placed patient on MV set on documented settings.  Will continue to monitor.

## 2022-11-06 NOTE — ASSESSMENT & PLAN NOTE
Recommend another trial of extubation. ENT available to flexible scope the patient following extubation. Unclear if this could be PVFMD vs true vocal fold immobility.     Reportedly straight forward intubation with glidescope due to neck immobility.     - Wean to extubation per NCC  - Page ENT to coordinate post-extubation scope either tomorrow morning or today

## 2022-11-06 NOTE — PLAN OF CARE
[facility] Care Plan    POC reviewed with Peter Stiles and family at 0300. Pt verbalized understanding. Questions and concerns addressed. No acute events overnight. Pt progressing toward goals. Will continue to monitor. See below and flowsheets for full assessment and VS info.     -500ml bolus x2  -1 unit PRBCs  -cervical collar in place          Is this a stroke patient? no    Neuro:  Slade Coma Scale  Best Eye Response: 4-->(E4) spontaneous  Best Motor Response: 6-->(M6) obeys commands  Best Verbal Response: 1-->(V1) none  Lucien Coma Scale Score: 11  Assessment Qualifiers: patient intubated  Pupil PERRLA: yes     24hr Temp:  [97.9 °F (36.6 °C)-98.5 °F (36.9 °C)]     CV:   Rhythm: normal sinus rhythm  BP goals:   SBP < 160  MAP > 65    Resp:   O2 Device (Oxygen Therapy): ventilator  Vent Mode: SIMV  Set Rate: 16 BPM  Oxygen Concentration (%): 50  Vt Set: 385 mL  PEEP/CPAP: 5 cmH20  Pressure Support: 8 cmH20    Plan: trach/PEG discussions    GI/:     Diet/Nutrition Received: NPO, tube feeding  Last Bowel Movement: 11/04/22  Voiding Characteristics: external catheter    Intake/Output Summary (Last 24 hours) at 11/6/2022 0617  Last data filed at 11/6/2022 0605  Gross per 24 hour   Intake 3413.07 ml   Output 475 ml   Net 2938.07 ml     Unmeasured Output  Urine Occurrence: 1  Stool Occurrence: 1  Pad Count: 3    Labs/Accuchecks:  Recent Labs   Lab 11/06/22  0135   WBC 9.17   RBC 2.06*   HGB 6.2*   HCT 20.0*         Recent Labs   Lab 11/06/22  0135   *   K 3.1*   CO2 22*   *   BUN 24*   CREATININE 0.7   ALKPHOS 119   ALT 18   AST 21   BILITOT 0.3      Recent Labs   Lab 11/01/22 2128   INR 1.0   APTT 26.0    No results for input(s): CPK, CPKMB, TROPONINI, MB in the last 168 hours.    Electrolytes: Electrolytes replaced  Accuchecks: Q6H    Gtts:      LDA/Wounds:  Lines/Drains/Airways       Drain  Duration                  NG/OG Tube 10/29/22 1000 Left nostril 7 days         Closed/Suction Drain  11/02/22 Right;Superior;Medial Back 4 days         Closed/Suction Drain 11/02/22 Superior;Medial;Left Back Other (Comment) 4 days    Female External Urinary Catheter 11/05/22 1 day              Airway  Duration                  Airway - Non-Surgical 11/05/22 0045 1 day              Peripheral Intravenous Line  Duration                  Midline Catheter Insertion/Assessment  - Single Lumen 10/28/22 1858 Right basilic vein (medial side of arm) 18g x 10cm 8 days         Peripheral IV - Single Lumen 11/05/22 0830 20 G Anterior;Right Forearm <1 day                  Wounds: Yes  Wound care consulted: Yes
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
## 2022-11-06 NOTE — SUBJECTIVE & OBJECTIVE
Interval History:  NAEON. Possible extubation with ENT at bedside  Review of Systems   Constitutional: Negative.    HENT: Negative.     Eyes: Negative.    Respiratory: Negative.     Cardiovascular: Negative.    Gastrointestinal: Negative.    Endocrine: Negative.    Genitourinary: Negative.    Musculoskeletal: Negative.    Skin: Negative.    Neurological: Negative.    2 systems   Objective:     Vitals:  Temp: 98.2 °F (36.8 °C)  Pulse: (!) 111  Rhythm: normal sinus rhythm  BP: 121/66  MAP (mmHg): 89  Resp: 14  SpO2: 96 %  Oxygen Concentration (%): 50  O2 Device (Oxygen Therapy): ventilator  Vent Mode: Spont  Set Rate: 0 BPM  Vt Set: 0 mL  Pressure Support: 8 cmH20  PEEP/CPAP: 5 cmH20  Peak Airway Pressure: 13 cmH2O  Mean Airway Pressure: 7.8 cmH20  Plateau Pressure: 0 cmH20    Temp  Min: 97.9 °F (36.6 °C)  Max: 98.5 °F (36.9 °C)  Pulse  Min: 62  Max: 116  BP  Min: 92/53  Max: 142/65  MAP (mmHg)  Min: 69  Max: 101  Resp  Min: 12  Max: 45  SpO2  Min: 95 %  Max: 100 %  Oxygen Concentration (%)  Min: 50  Max: 50    11/05 0701 - 11/06 0700  In: 3733.1 [I.V.:18.6]  Out: 1115 [Urine:1040; Drains:75]   Unmeasured Output  Urine Occurrence: 1  Stool Occurrence: 1  Pad Count: 3       Physical Exam  Vitals and nursing note reviewed.   HENT:      Head: Normocephalic.      Nose: Nose normal.      Mouth/Throat:      Mouth: Mucous membranes are moist.      Pharynx: Oropharynx is clear.   Cardiovascular:      Rate and Rhythm: Normal rate and regular rhythm.      Pulses: Normal pulses.      Heart sounds: Normal heart sounds.   Pulmonary:      Effort: Pulmonary effort is normal.      Breath sounds: Normal breath sounds.   Abdominal:      General: Bowel sounds are normal.      Palpations: Abdomen is soft.   Musculoskeletal:         General: Normal range of motion.   Skin:     General: Skin is warm and dry.      Capillary Refill: Capillary refill takes less than 2 seconds.   Neurological:      Mental Status: She is alert.      Comments:  GCS 11t- on sedation  PERRL  VILLAGRAN to command and spontaneously  Sensation intact in all extremities       Unable to test orientation, language, memory, judgment, insight, fund of knowledge, hearing, shoulder shrug, tongue protrusion, coordination, gait due to level of consciousness.    Medications:  Continuous   ScheduledbisacodyL, 10 mg, Daily  heparin (porcine), 5,000 Units, Q8H  methadone, 90 mg, Daily  nicotine, 1 patch, Daily  oxacillin 12 g in  mL CONTINUOUS INFUSION, 12 g, Q24H  oxyCODONE, 10 mg, Q6H  polyethylene glycol, 17 g, Daily  pregabalin, 225 mg, TID  senna-docusate 8.6-50 mg, 1 tablet, BID  PRNsodium chloride, , Q24H PRN  acetaminophen, 650 mg, Q6H PRN  albuterol-ipratropium, 3 mL, Q4H PRN  atropine 1%, 1 drop, Q6H PRN  dextrose 10%, 12.5 g, PRN  dextrose 10%, 25 g, PRN  fentaNYL, 50 mcg, Q1H PRN  glucagon (human recombinant), 1 mg, PRN  hydrALAZINE, 10 mg, Q4H PRN  hydrOXYzine HCL, 25 mg, TID PRN  labetalol, 10 mg, Q4H PRN  magnesium oxide, 800 mg, PRN  magnesium oxide, 800 mg, PRN  ondansetron, 4 mg, Q8H PRN  potassium bicarbonate, 35 mEq, PRN  potassium bicarbonate, 50 mEq, PRN  potassium bicarbonate, 60 mEq, PRN  potassium, sodium phosphates, 2 packet, PRN  potassium, sodium phosphates, 2 packet, PRN  potassium, sodium phosphates, 2 packet, PRN  racepinephrine, 0.5 mL, Q15 Min PRN  simethicone, 1 tablet, TID PRN  sodium chloride 0.9%, 10 mL, PRN    Today I personally reviewed pertinent medications, lines/drains/airways, imaging, cardiology results, laboratory results, microbiology results, notably:    Diet  Diet NPO Except for: Medication  Diet NPO Except for: Medication

## 2022-11-06 NOTE — CONSULTS
[facility]  Otorhinolaryngology-Head & Neck Surgery  Consult Note    Patient Name: Peter Stiles  MRN: [id number]  Code Status: Full Code  Admission Date: 10/25/2022  Hospital Length of Stay: 12 days  Attending Physician: Truong Bourne DO  Primary Care Provider: Og Victoria NP    Patient information was obtained from past medical records and ER records.     Inpatient consult to ENT  Consult performed by: De Tejada MD  Consult ordered by: Olinda Grant NP      Subjective:     Chief Complaint/Reason for Admission: Trach    History of Present Illness:   50 yo female with PMHx of HTN, dilated cardiomyopathy and remote opioid dependence admitted on 10/25 for cervical osteomyelitis s/p C5-C6 anterior corpectomy on 10/28 and C2-T2 posterior fusion with Dr Merino on 11/2. Patient originally failed extubation and was emergently reintubated in the OR after her first surgery. Patient was extubated postoperatively after her second surgery for 14 hours before developing stridor requiring reintubation. NCC requesting tracheostomy as intubation is limiting her ability to advance care and neuro rehab. ENT preop scope prior to 10/28 surgery showed a normal laryngeal exam. Unclear at this time whether patient would also require PEG also. SLP previously recommending NPO but unable to fully assess. Patient is awake with ETT in place today. Communicates with writing. Asking to avoid trach if possible.       Medications:  Continuous Infusions:  Scheduled Meds:   bisacodyL  10 mg Rectal Daily    heparin (porcine)  5,000 Units Subcutaneous Q8H    methadone  90 mg Per NG tube Daily    nicotine  1 patch Transdermal Daily    oxacillin 12 g in  mL CONTINUOUS INFUSION  12 g Intravenous Q24H    oxyCODONE  10 mg Per NG tube Q6H    polyethylene glycol  17 g Per NG tube Daily    pregabalin  225 mg Per NG tube TID    senna-docusate 8.6-50 mg  1 tablet Per NG tube BID     PRN Meds:sodium chloride,  acetaminophen, albuterol-ipratropium, atropine 1%, dextrose 10%, dextrose 10%, fentaNYL, glucagon (human recombinant), hydrALAZINE, hydrOXYzine HCL, labetalol, magnesium oxide, magnesium oxide, ondansetron, potassium bicarbonate, potassium bicarbonate, potassium bicarbonate, potassium, sodium phosphates, potassium, sodium phosphates, potassium, sodium phosphates, racepinephrine, simethicone, sodium chloride 0.9%     No current facility-administered medications on file prior to encounter.     Current Outpatient Medications on File Prior to Encounter   Medication Sig    aspirin (ECOTRIN) 81 MG EC tablet Take 81 mg by mouth once daily.    LIDOcaine-menthol (ICY HOT PATCH, LIDO-MENTHOL,) 4-1 % PtMd Apply 1 patch topically 2 (two) times daily as needed (Pain).    methadone (METHADOSE) 40 mg disintegrating tablet Take 2 &1/4 tablet (90 mg) by mouth in water once daily.    multivitamin (ONE DAILY MULTIVITAMIN) per tablet Take 1 tablet by mouth once daily.    lorazepam (ATIVAN) 1 MG tablet Take one-half to one tablet (Patient not taking: Reported on 10/25/2022)       Review of patient's allergies indicates:   Allergen Reactions    Morphine Shortness Of Breath and Other (See Comments)     Throat closed       Past Medical History:   Diagnosis Date    CHF (congestive heart failure)     Essential (primary) hypertension     Opioid dependence on maintenance agonist therapy, no symptoms     Smoking      Past Surgical History:   Procedure Laterality Date    APPENDECTOMY       SECTION      x2    FUSION OF CERVICAL SPINE BY POSTERIOR APPROACH N/A 2022    Procedure: FUSION, SPINE, CERVICAL, POSTERIOR APPROACH C2-T2 posterior decompression/fusion; Depuy, neuromonitoring monica Marrero;  Surgeon: West Merino DO;  Location: [facility] OR [address] [place] [address];  Service: Neurosurgery;  Laterality: N/A;    HYSTERECTOMY      SURGICAL REMOVAL OF VERTEBRAL BODY OF CERVICAL SPINE N/A 10/28/2022    Procedure: CORPECTOMY, SPINE, CERVICAL;   Surgeon: West Merino DO;  Location: [facility] OR [address] [place] [address];  Service: Neurosurgery;  Laterality: N/A;  anterior C5-6 corpectomy, globus, caspar head prado and tongs, omnipaque, 15lbs weights, microscrope, round cutting lynsey and M8, ENT assistance for exposure     Family History       Problem Relation (Age of Onset)    Cancer Mother    Hearing loss Father          Tobacco Use    Smoking status: Every Day     Packs/day: 2.00     Years: 34.00     Pack years: 68.00     Types: Cigarettes    Smokeless tobacco: Current   Substance and Sexual Activity    Alcohol use: No    Drug use: No    Sexual activity: Not on file     Review of Systems  Objective:     Vital Signs (Most Recent):  Temp: 98 °F (36.7 °C) (11/06/22 0705)  Pulse: 87 (11/06/22 1120)  Resp: 17 (11/06/22 1204)  BP: (!) 110/58 (11/06/22 1120)  SpO2: 99 % (11/06/22 1120)   Vital Signs (24h Range):  Temp:  [97.9 °F (36.6 °C)-98.5 °F (36.9 °C)] 98 °F (36.7 °C)  Pulse:  [] 87  Resp:  [12-38] 17  SpO2:  [95 %-100 %] 99 %  BP: ()/(51-90) 110/58     Weight:  (daylight savings)  Body mass index is 22.6 kg/m².        Physical Exam  NAD, converses with writing  EOMI, PERRL  C-collar in place  ETT secured in place  Cuff leak present  Minimal vent settings  Vent Mode: SIMV  Oxygen Concentration (%):  [50] 50  Resp Rate Total:  [12 br/min-24 br/min] 17 br/min  Vt Set:  [385 mL] 385 mL  PEEP/CPAP:  [5 cmH20] 5 cmH20  Pressure Support:  [8 cmH20-10 cmH20] 8 cmH20  Mean Airway Pressure:  [8.1 cmH20-9.6 cmH20] 8.4 cmH20    Significant Labs:  All pertinent labs from the last 24 hours have been reviewed.    Significant Diagnostics:  I have reviewed and interpreted all pertinent imaging results/findings within the past 24 hours.  Assessment/Plan:     * Osteomyelitis of cervical spine  NCC    Stridor        Acute respiratory failure with hypercapnia  Recommend another trial of extubation. ENT available to flexible scope the patient following extubation. Unclear if this  could be PVFMD vs true vocal fold immobility.     Reportedly straight forward intubation with glidescope due to neck immobility.     - Wean to extubation per NCC  - Page ENT to coordinate post-extubation scope either tomorrow morning or today    VTE Risk Mitigation (From admission, onward)           Ordered     heparin (porcine) injection 5,000 Units  Every 8 hours         11/04/22 1437     IP VTE LOW RISK PATIENT  Once         10/25/22 1109     Place sequential compression device  Until discontinued         10/25/22 1109                    Thank you for your consult. I will follow-up with patient. Please contact us if you have any additional questions.    De Tejada MD  Otorhinolaryngology-Head & Neck Surgery  [facility]

## 2022-11-06 NOTE — ASSESSMENT & PLAN NOTE
-Methadone 90 daily  (prescribed at [facility] [place])

## 2022-11-06 NOTE — RESPIRATORY THERAPY
Patient extubated per MD order.  Patient on room air.  ENT exam patient airway.  Will continue to monitor.
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
## 2022-11-06 NOTE — SUBJECTIVE & OBJECTIVE
Interval History:     11/6: NAEON. Drains 15/20cc output.     Medications:  Continuous Infusions:  Scheduled Meds:   bisacodyL  10 mg Rectal Daily    heparin (porcine)  5,000 Units Subcutaneous Q8H    methadone  90 mg Per NG tube Daily    nicotine  1 patch Transdermal Daily    oxacillin 12 g in  mL CONTINUOUS INFUSION  12 g Intravenous Q24H    oxyCODONE  10 mg Per NG tube Q6H    polyethylene glycol  17 g Per NG tube Daily    pregabalin  225 mg Per NG tube TID    senna-docusate 8.6-50 mg  1 tablet Per NG tube BID     PRN Meds:sodium chloride, acetaminophen, albuterol-ipratropium, atropine 1%, dextrose 10%, dextrose 10%, fentaNYL, glucagon (human recombinant), hydrALAZINE, hydrOXYzine HCL, labetalol, magnesium oxide, magnesium oxide, ondansetron, potassium bicarbonate, potassium bicarbonate, potassium bicarbonate, potassium, sodium phosphates, potassium, sodium phosphates, potassium, sodium phosphates, racepinephrine, simethicone, sodium chloride 0.9%     Review of Systems  Objective:     Weight:  (daylight savings)  Body mass index is 22.6 kg/m².  Vital Signs (Most Recent):  Temp: 97.9 °F (36.6 °C) (11/06/22 0455)  Pulse: 86 (11/06/22 0734)  Resp: 16 (11/06/22 0850)  BP: (!) 104/52 (11/06/22 0734)  SpO2: 97 % (11/06/22 0734)   Vital Signs (24h Range):  Temp:  [97.9 °F (36.6 °C)-98.5 °F (36.9 °C)] 97.9 °F (36.6 °C)  Pulse:  [] 86  Resp:  [11-49] 16  SpO2:  [95 %-100 %] 97 %  BP: ()/(51-90) 104/52                Vent Mode: SIMV  Oxygen Concentration (%):  [50] 50  Resp Rate Total:  [11 br/min-24 br/min] 16 br/min  Vt Set:  [0 mL-385 mL] 385 mL  PEEP/CPAP:  [5 cmH20] 5 cmH20  Pressure Support:  [8 cmH20-10 cmH20] 8 cmH20  Mean Airway Pressure:  [7.6 cmH20-9.6 cmH20] 9.4 cmH20         Closed/Suction Drain 11/02/22 Superior;Medial;Left Back Other (Comment) (Active)   Site Description Unable to view 11/06/22 0305   Dressing Type Transparent (Tegaderm) 11/06/22 0305   Dressing Status Clean;Dry 11/06/22  0305   Dressing Intervention Integrity maintained 11/06/22 0305   Drainage Sanguineous 11/06/22 0305   Status Other (Comment) 11/06/22 0305   Output (mL) 30 mL 11/06/22 0605            Closed/Suction Drain 11/02/22 Right;Superior;Medial Back (Active)   Site Description Unable to view 11/06/22 0305   Dressing Type Transparent (Tegaderm) 11/06/22 0305   Dressing Status Clean;Dry;Intact 11/06/22 0305   Dressing Intervention Integrity maintained 11/06/22 0305   Drainage Sanguineous 11/06/22 0305   Status Other (Comment) 11/06/22 0305   Output (mL) 10 mL 11/06/22 0605            NG/OG Tube 10/29/22 1000 Left nostril (Active)   Placement Check placement verified by x-ray;placement verified by distal tube length measurement 11/06/22 0305   Tolerance no signs/symptoms of discomfort 11/06/22 0305   Securement secured to nostril center w/ adhesive device 11/06/22 0305   Clamp Status/Tolerance unclamped 11/06/22 0305   Suction Setting/Drainage Method suction at the bedside 11/06/22 0305   Insertion Site Appearance no redness, warmth, tenderness, skin breakdown, drainage 11/06/22 0305   Drainage None 11/06/22 0305   Flush/Irrigation flushed w/;water;no resistance met 11/06/22 0305   Feeding Type continuous;by pump 11/06/22 0305   Feeding Action feeding continued 11/06/22 0305   Current Rate (mL/hr) 40 mL/hr 11/05/22 1905   Goal Rate (mL/hr) 40 mL/hr 11/05/22 1905   Intake (mL) 200 mL 11/06/22 0605   Water Bolus (mL) 50 mL 11/01/22 1901   Rate Formula Tube Feeding (mL/hr) 40 mL/hr 11/05/22 1905   Formula Name diabetasource 11/06/22 0305   Intake (mL) - Formula Tube Feeding 40 11/06/22 0605   Residual Amount (ml) 2 ml 11/01/22 1901       Female External Urinary Catheter 11/05/22 (Active)   Skin no redness;no breakdown 11/06/22 0305   Tolerance no signs/symptoms of discomfort 11/06/22 0305   Suction Continuous suction at 60 mmHg 11/05/22 1905   Date of last wick change 11/06/22 11/05/22 1905   Time of last wick change 1930  11/05/22 1905       Physical Exam    Neurosurgery Physical Exam  FC x 4  Intubated  No distress  VILLAGRAN full strength  In collar, incisions c/d/I  SILT        Significant Labs:  Recent Labs   Lab 11/05/22  0240 11/06/22  0135 11/06/22  0740   * 81 89    146* 145   K 3.7 3.1* 3.1*    112* 109   CO2 27 22* 25   BUN 25* 24* 22*   CREATININE 0.8 0.7 0.7   CALCIUM 9.1 8.4* 8.7   MG 2.2 1.8  --      Recent Labs   Lab 11/05/22  0240 11/06/22 0135 11/06/22  0740   WBC 12.53 9.17 10.23   HGB 7.6* 6.2* 8.3*   HCT 23.9* 20.0* 26.7*    322 348     No results for input(s): LABPT, INR, APTT in the last 48 hours.  Microbiology Results (last 7 days)       Procedure Component Value Units Date/Time    Culture, Anaerobe [[id number]] Collected: 10/28/22 1029    Order Status: Completed Specimen: Wound from Neck Updated: 11/03/22 0908     Anaerobic Culture Culture in progress    Narrative:      4) Epidural phlegman    Blood culture [[id number]] Collected: 10/29/22 0623    Order Status: Completed Specimen: Blood from Peripheral, Foot, Left Updated: 11/03/22 0812     Blood Culture, Routine No growth after 5 days.    Blood culture [[id number]] Collected: 10/29/22 0621    Order Status: Completed Specimen: Blood from Peripheral, Foot, Right Updated: 11/03/22 0812     Blood Culture, Routine No growth after 5 days.    Aerobic culture [[id number]]  (Abnormal)  (Susceptibility) Collected: 10/28/22 0906    Order Status: Completed Specimen: Abscess from Back Updated: 11/01/22 1059     Aerobic Bacterial Culture STAPHYLOCOCCUS AUREUS  Rare      Narrative:      Prevertebral Abscess    Aerobic culture [[id number]]  (Abnormal)  (Susceptibility) Collected: 10/28/22 0950    Order Status: Completed Specimen: Wound from Neck Updated: 11/01/22 1039     Aerobic Bacterial Culture STAPHYLOCOCCUS AUREUS  Rare      Narrative:      3) Osteodiscitis    Blood culture [[id number]] Collected: 10/27/22 0939    Order Status: Completed Specimen: Blood  Updated: 11/01/22 1012     Blood Culture, Routine No growth after 5 days.    Narrative:      Rt wrist    Blood culture [[id number]] Collected: 10/27/22 0938    Order Status: Completed Specimen: Blood Updated: 11/01/22 1012     Blood Culture, Routine No growth after 5 days.    Narrative:      Rt AC    Culture, Anaerobe [[id number]] Collected: 10/28/22 0950    Order Status: Completed Specimen: Wound from Neck Updated: 11/01/22 0724     Anaerobic Culture No anaerobes isolated    Narrative:      3) Osteodiscitis    Culture, Anaerobe [[id number]] Collected: 10/28/22 0924    Order Status: Completed Specimen: Abscess from Neck Updated: 11/01/22 0722     Anaerobic Culture No anaerobes isolated    Narrative:      2) Prevertebral absess    Culture, Anaerobe [[id number]] Collected: 10/28/22 0906    Order Status: Completed Specimen: Abscess from Back Updated: 11/01/22 0722     Anaerobic Culture No anaerobes isolated    Narrative:      Prevertebral Abscess    AFB Culture & Smear [[id number]] Collected: 10/28/22 1029    Order Status: Completed Specimen: Wound from Neck Updated: 10/31/22 1250     AFB Culture & Smear Culture in progress     AFB CULTURE STAIN No acid fast bacilli seen.    Narrative:      4) Epidural phlegman    AFB Culture & Smear [[id number]] Collected: 10/28/22 0924    Order Status: Completed Specimen: Abscess from Neck Updated: 10/31/22 1250     AFB Culture & Smear Culture in progress     AFB CULTURE STAIN No acid fast bacilli seen.    Narrative:      2) Prevertebral absess    AFB Culture & Smear [[id number]] Collected: 10/28/22 0906    Order Status: Completed Specimen: Abscess from Back Updated: 10/31/22 1250     AFB Culture & Smear Culture in progress     AFB CULTURE STAIN No acid fast bacilli seen.    Narrative:      Prevertebral Abscess    AFB Culture & Smear [[id number]] Collected: 10/28/22 0950    Order Status: Completed Specimen: Wound from Neck Updated: 10/31/22 1250     AFB Culture & Smear Culture in progress      AFB CULTURE STAIN No acid fast bacilli seen.    Narrative:      3) Osteodiscitis    Fungus culture [[id number]] Collected: 10/28/22 0950    Order Status: Completed Specimen: Wound from Neck Updated: 10/31/22 0958     Fungus (Mycology) Culture Culture in progress    Narrative:      3) Osteodiscitis    Fungus culture [[id number]] Collected: 10/28/22 1029    Order Status: Completed Specimen: Wound from Neck Updated: 10/31/22 0958     Fungus (Mycology) Culture Culture in progress    Narrative:      4) Epidural phlegman    Fungus culture [[id number]] Collected: 10/28/22 0906    Order Status: Completed Specimen: Abscess from Back Updated: 10/31/22 0958     Fungus (Mycology) Culture Culture in progress    Narrative:      Prevertebral Abscess    Fungus culture [[id number]] Collected: 10/28/22 0924    Order Status: Completed Specimen: Abscess from Neck Updated: 10/31/22 0958     Fungus (Mycology) Culture Culture in progress    Narrative:      2) Prevertebral absess    Aerobic culture [[id number]]  (Abnormal)  (Susceptibility) Collected: 10/28/22 0924    Order Status: Completed Specimen: Abscess from Neck Updated: 10/31/22 0928     Aerobic Bacterial Culture STAPHYLOCOCCUS AUREUS  Moderate      Narrative:      2) Prevertebral absess    Aerobic culture [[id number]] Collected: 10/28/22 1029    Order Status: Completed Specimen: Wound from Neck Updated: 10/31/22 0901     Aerobic Bacterial Culture No growth    Narrative:      4) Epidural phlegman          All pertinent labs from the last 24 hours have been reviewed.    Significant Diagnostics:  I have reviewed and interpreted all pertinent imaging results/findings within the past 24 hours.

## 2022-11-06 NOTE — ASSESSMENT & PLAN NOTE
Peter Stiles is a 51 y.o. female with PMHx of HTN, dilated cardiomyopathy, h/o opioid dependence, cigarette smoker (2pks/day), and daily baby ASA use who presents with 1 day of decreased sensation from T5 level down and tingling in her bilateral fingers and bilateral toes. MRI imaging obtained in the ED showing possible C5-6 osteodiscitis/myelitis with cord compression. Patient tachycardic and hypertensive on arrival, afebrile, with leukocytosis.     Now s/p C5-6 corpectomy on 10/28/22  C2-T2 PCDF on 11/2    --Admitted to neuro ICU  --All pertinent imaging/labs personally reviewed and interpreted.    -MRI C/T/L spine w/o contrast 10/25: focal kyphotic deformity at C5/6 with possible discitis/osteomyelitis,  epidural collection extending into the central spinal canal resulting in severe stenosis and cord signal  abnormality.  Prevertebral soft tissue edema and probable paravertebral abscess or phlegmon at this level.  Thoracic and lumbar spine unremarkable.    -MRI C spine w/ contrast 10/25: confirmed enhancement at C5/6 consistent with osteomyelitis/discitis and  epidural phlegmon  --Intubated after index procedure and kept intubated for posterior fixation and for tentative LISHA 11/4 which was eventually cancelled due to inability to adequately extend neck. Extubated 11/4 but reintubated later that day for respiratory distress and stridor. Likely to need trach.   --BCx 10/25 growing MSSA, Repeat BCx's 10/27 ngtd.  --OR cultures 10/28 no organisms on gram stain, culture MSSA  --Post op xrays, anterior hardware in good position; xray Csp for posterior hardware pending  --ID following   --Trach vs. Continued extubation trial per NCC  --Maintain drains to full suction  --Neurochecks Q1h  --Continue C collar when upright   --CT Soft tissue neck reviewed with stable hardware position s/p 360 cervical fusion  --Pain control as needed  --Abx: Oxacillin   --Patiño   --PT/OT. SQH  --Please call NSGY with any  questions/concerns    Dispo: ICU

## 2022-11-06 NOTE — ASSESSMENT & PLAN NOTE
- currently intubated, on spontaneous  - OR today, will reassess and attempt to wean post procedurally   - CXR and KUB post op with L sided atelectasis/ground glass infiltrate/pleural effusion  - extubated to NC 11/4/22  - re-intubated 11/5/22 due to stridor  - ENT consulted for trach 2 failed extubations  - Will re-attempt extubation with ENT @ bedside  - 10 mg Dexa given

## 2022-11-07 ENCOUNTER — ANESTHESIA (OUTPATIENT)
Dept: SURGERY | Facility: HOSPITAL | Age: 52
DRG: 003 | End: 2022-11-07
Payer: MEDICARE

## 2022-11-07 LAB
ALBUMIN SERPL BCP-MCNC: 2.2 G/DL (ref 3.5–5.2)
ALLENS TEST: ABNORMAL
ALP SERPL-CCNC: 143 U/L (ref 55–135)
ALT SERPL W/O P-5'-P-CCNC: 24 U/L (ref 10–44)
ANION GAP SERPL CALC-SCNC: 9 MMOL/L (ref 8–16)
AST SERPL-CCNC: 27 U/L (ref 10–40)
BACTERIA SPEC ANAEROBE CULT: NORMAL
BASOPHILS # BLD AUTO: 0.01 K/UL (ref 0–0.2)
BASOPHILS NFR BLD: 0.1 % (ref 0–1.9)
BILIRUB SERPL-MCNC: 0.5 MG/DL (ref 0.1–1)
BUN SERPL-MCNC: 22 MG/DL (ref 6–20)
CALCIUM SERPL-MCNC: 9.4 MG/DL (ref 8.7–10.5)
CHLORIDE SERPL-SCNC: 106 MMOL/L (ref 95–110)
CO2 SERPL-SCNC: 30 MMOL/L (ref 23–29)
CREAT SERPL-MCNC: 0.7 MG/DL (ref 0.5–1.4)
DELSYS: ABNORMAL
DIFFERENTIAL METHOD: ABNORMAL
EOSINOPHIL # BLD AUTO: 0 K/UL (ref 0–0.5)
EOSINOPHIL NFR BLD: 0 % (ref 0–8)
ERYTHROCYTE [DISTWIDTH] IN BLOOD BY AUTOMATED COUNT: 13.5 % (ref 11.5–14.5)
ERYTHROCYTE [SEDIMENTATION RATE] IN BLOOD BY WESTERGREN METHOD: 10 MM/H
EST. GFR  (NO RACE VARIABLE): >60 ML/MIN/1.73 M^2
FIO2: 100
GLUCOSE SERPL-MCNC: 120 MG/DL (ref 70–110)
HCO3 UR-SCNC: 36.4 MMOL/L (ref 24–28)
HCT VFR BLD AUTO: 26.7 % (ref 37–48.5)
HGB BLD-MCNC: 8.5 G/DL (ref 12–16)
IMM GRANULOCYTES # BLD AUTO: 0.16 K/UL (ref 0–0.04)
IMM GRANULOCYTES NFR BLD AUTO: 1.3 % (ref 0–0.5)
LYMPHOCYTES # BLD AUTO: 1.8 K/UL (ref 1–4.8)
LYMPHOCYTES NFR BLD: 14.9 % (ref 18–48)
MAGNESIUM SERPL-MCNC: 2.3 MG/DL (ref 1.6–2.6)
MCH RBC QN AUTO: 30 PG (ref 27–31)
MCHC RBC AUTO-ENTMCNC: 31.8 G/DL (ref 32–36)
MCV RBC AUTO: 94 FL (ref 82–98)
MODE: ABNORMAL
MONOCYTES # BLD AUTO: 0.7 K/UL (ref 0.3–1)
MONOCYTES NFR BLD: 5.8 % (ref 4–15)
NEUTROPHILS # BLD AUTO: 9.5 K/UL (ref 1.8–7.7)
NEUTROPHILS NFR BLD: 77.9 % (ref 38–73)
NRBC BLD-RTO: 0 /100 WBC
PCO2 BLDA: 59.7 MMHG (ref 35–45)
PEEP: 5
PH SMN: 7.39 [PH] (ref 7.35–7.45)
PHOSPHATE SERPL-MCNC: 4 MG/DL (ref 2.7–4.5)
PIP: 25
PLATELET # BLD AUTO: 329 K/UL (ref 150–450)
PMV BLD AUTO: 9.6 FL (ref 9.2–12.9)
PO2 BLDA: 245 MMHG (ref 80–100)
POC BE: 12 MMOL/L
POC SATURATED O2: 100 % (ref 95–100)
POC TCO2: 38 MMOL/L (ref 23–27)
POCT GLUCOSE: 144 MG/DL (ref 70–110)
POCT GLUCOSE: 80 MG/DL (ref 70–110)
POCT GLUCOSE: 96 MG/DL (ref 70–110)
POTASSIUM SERPL-SCNC: 4.5 MMOL/L (ref 3.5–5.1)
PROT SERPL-MCNC: 6.5 G/DL (ref 6–8.4)
RBC # BLD AUTO: 2.83 M/UL (ref 4–5.4)
SAMPLE: ABNORMAL
SITE: ABNORMAL
SODIUM SERPL-SCNC: 145 MMOL/L (ref 136–145)
SP02: 100
VT: 385
WBC # BLD AUTO: 12.22 K/UL (ref 3.9–12.7)

## 2022-11-07 PROCEDURE — D9220A PRA ANESTHESIA: Mod: CRNA,,, | Performed by: NURSE ANESTHETIST, CERTIFIED REGISTERED

## 2022-11-07 PROCEDURE — 51701 INSERT BLADDER CATHETER: CPT

## 2022-11-07 PROCEDURE — 36600 WITHDRAWAL OF ARTERIAL BLOOD: CPT

## 2022-11-07 PROCEDURE — 25000003 PHARM REV CODE 250: Performed by: NURSE PRACTITIONER

## 2022-11-07 PROCEDURE — 99291 PR CRITICAL CARE, E/M 30-74 MINUTES: ICD-10-PCS | Mod: ,,, | Performed by: PSYCHIATRY & NEUROLOGY

## 2022-11-07 PROCEDURE — 25000003 PHARM REV CODE 250: Performed by: NURSE ANESTHETIST, CERTIFIED REGISTERED

## 2022-11-07 PROCEDURE — 31525 PR LARYNGOSCOPY,DIRECT,DIAGNOSTIC: ICD-10-PCS | Mod: 51,78,GC, | Performed by: OTOLARYNGOLOGY

## 2022-11-07 PROCEDURE — 36000709 HC OR TIME LEV III EA ADD 15 MIN: Performed by: OTOLARYNGOLOGY

## 2022-11-07 PROCEDURE — 80053 COMPREHEN METABOLIC PANEL: CPT | Performed by: PSYCHIATRY & NEUROLOGY

## 2022-11-07 PROCEDURE — D9220A PRA ANESTHESIA: ICD-10-PCS | Mod: ANES,,, | Performed by: ANESTHESIOLOGY

## 2022-11-07 PROCEDURE — 51798 US URINE CAPACITY MEASURE: CPT

## 2022-11-07 PROCEDURE — 85025 COMPLETE CBC W/AUTO DIFF WBC: CPT | Performed by: PSYCHIATRY & NEUROLOGY

## 2022-11-07 PROCEDURE — 25000003 PHARM REV CODE 250: Performed by: PHYSICIAN ASSISTANT

## 2022-11-07 PROCEDURE — 82803 BLOOD GASES ANY COMBINATION: CPT

## 2022-11-07 PROCEDURE — 31600 PLANNED TRACHEOSTOMY: CPT | Mod: 78,GC,, | Performed by: OTOLARYNGOLOGY

## 2022-11-07 PROCEDURE — 27000207 HC ISOLATION

## 2022-11-07 PROCEDURE — D9220A PRA ANESTHESIA: ICD-10-PCS | Mod: CRNA,,, | Performed by: NURSE ANESTHETIST, CERTIFIED REGISTERED

## 2022-11-07 PROCEDURE — 94761 N-INVAS EAR/PLS OXIMETRY MLT: CPT

## 2022-11-07 PROCEDURE — 27201112

## 2022-11-07 PROCEDURE — 27000221 HC OXYGEN, UP TO 24 HOURS

## 2022-11-07 PROCEDURE — 37000009 HC ANESTHESIA EA ADD 15 MINS: Performed by: OTOLARYNGOLOGY

## 2022-11-07 PROCEDURE — 99900035 HC TECH TIME PER 15 MIN (STAT)

## 2022-11-07 PROCEDURE — 63600175 PHARM REV CODE 636 W HCPCS: Performed by: PHYSICIAN ASSISTANT

## 2022-11-07 PROCEDURE — S4991 NICOTINE PATCH NONLEGEND: HCPCS | Performed by: PHYSICIAN ASSISTANT

## 2022-11-07 PROCEDURE — 63600175 PHARM REV CODE 636 W HCPCS

## 2022-11-07 PROCEDURE — 63600175 PHARM REV CODE 636 W HCPCS: Performed by: NURSE PRACTITIONER

## 2022-11-07 PROCEDURE — 25000003 PHARM REV CODE 250: Performed by: PSYCHIATRY & NEUROLOGY

## 2022-11-07 PROCEDURE — D9220A PRA ANESTHESIA: Mod: ANES,,, | Performed by: ANESTHESIOLOGY

## 2022-11-07 PROCEDURE — 99900026 HC AIRWAY MAINTENANCE (STAT)

## 2022-11-07 PROCEDURE — 27200966 HC CLOSED SUCTION SYSTEM

## 2022-11-07 PROCEDURE — 63600175 PHARM REV CODE 636 W HCPCS: Performed by: NURSE ANESTHETIST, CERTIFIED REGISTERED

## 2022-11-07 PROCEDURE — 84100 ASSAY OF PHOSPHORUS: CPT | Performed by: PSYCHIATRY & NEUROLOGY

## 2022-11-07 PROCEDURE — 31600 PR TRACHEOSTOMY, PLANNED: ICD-10-PCS | Mod: 78,GC,, | Performed by: OTOLARYNGOLOGY

## 2022-11-07 PROCEDURE — 31525 DX LARYNGOSCOPY EXCL NB: CPT | Mod: 51,78,GC, | Performed by: OTOLARYNGOLOGY

## 2022-11-07 PROCEDURE — 36000708 HC OR TIME LEV III 1ST 15 MIN: Performed by: OTOLARYNGOLOGY

## 2022-11-07 PROCEDURE — 37000008 HC ANESTHESIA 1ST 15 MINUTES: Performed by: OTOLARYNGOLOGY

## 2022-11-07 PROCEDURE — 94003 VENT MGMT INPAT SUBQ DAY: CPT

## 2022-11-07 PROCEDURE — 20000000 HC ICU ROOM

## 2022-11-07 PROCEDURE — 27201423 OPTIME MED/SURG SUP & DEVICES STERILE SUPPLY: Performed by: OTOLARYNGOLOGY

## 2022-11-07 PROCEDURE — 99291 CRITICAL CARE FIRST HOUR: CPT | Mod: ,,, | Performed by: PSYCHIATRY & NEUROLOGY

## 2022-11-07 PROCEDURE — 25000003 PHARM REV CODE 250

## 2022-11-07 PROCEDURE — 83735 ASSAY OF MAGNESIUM: CPT | Performed by: PSYCHIATRY & NEUROLOGY

## 2022-11-07 RX ORDER — PROPOFOL 10 MG/ML
VIAL (ML) INTRAVENOUS
Status: DISCONTINUED | OUTPATIENT
Start: 2022-11-07 | End: 2022-11-07

## 2022-11-07 RX ORDER — DEXAMETHASONE SODIUM PHOSPHATE 4 MG/ML
INJECTION, SOLUTION INTRA-ARTICULAR; INTRALESIONAL; INTRAMUSCULAR; INTRAVENOUS; SOFT TISSUE
Status: DISCONTINUED | OUTPATIENT
Start: 2022-11-07 | End: 2022-11-07

## 2022-11-07 RX ORDER — FENTANYL CITRATE 50 UG/ML
INJECTION, SOLUTION INTRAMUSCULAR; INTRAVENOUS
Status: DISCONTINUED | OUTPATIENT
Start: 2022-11-07 | End: 2022-11-07

## 2022-11-07 RX ORDER — ROCURONIUM BROMIDE 10 MG/ML
INJECTION, SOLUTION INTRAVENOUS
Status: DISCONTINUED | OUTPATIENT
Start: 2022-11-07 | End: 2022-11-07

## 2022-11-07 RX ORDER — NOREPINEPHRINE BITARTRATE/D5W 4MG/250ML
0-.2 PLASTIC BAG, INJECTION (ML) INTRAVENOUS CONTINUOUS
Status: DISCONTINUED | OUTPATIENT
Start: 2022-11-07 | End: 2022-11-07

## 2022-11-07 RX ORDER — NEOSTIGMINE METHYLSULFATE 0.5 MG/ML
INJECTION, SOLUTION INTRAVENOUS
Status: DISCONTINUED | OUTPATIENT
Start: 2022-11-07 | End: 2022-11-07

## 2022-11-07 RX ORDER — LIDOCAINE HYDROCHLORIDE 20 MG/ML
INJECTION INTRAVENOUS
Status: DISCONTINUED | OUTPATIENT
Start: 2022-11-07 | End: 2022-11-07

## 2022-11-07 RX ORDER — OXYCODONE HYDROCHLORIDE 5 MG/1
5 TABLET ORAL EVERY 6 HOURS
Status: DISCONTINUED | OUTPATIENT
Start: 2022-11-07 | End: 2022-11-19

## 2022-11-07 RX ORDER — MIDAZOLAM HYDROCHLORIDE 1 MG/ML
INJECTION, SOLUTION INTRAMUSCULAR; INTRAVENOUS
Status: DISCONTINUED | OUTPATIENT
Start: 2022-11-07 | End: 2022-11-07

## 2022-11-07 RX ORDER — PHENYLEPHRINE HCL IN 0.9% NACL 1 MG/10 ML
SYRINGE (ML) INTRAVENOUS
Status: DISCONTINUED | OUTPATIENT
Start: 2022-11-07 | End: 2022-11-07

## 2022-11-07 RX ORDER — DEXMEDETOMIDINE HYDROCHLORIDE 100 UG/ML
INJECTION, SOLUTION INTRAVENOUS
Status: DISCONTINUED | OUTPATIENT
Start: 2022-11-07 | End: 2022-11-07

## 2022-11-07 RX ORDER — NOREPINEPHRINE BITARTRATE/D5W 4MG/250ML
0-3 PLASTIC BAG, INJECTION (ML) INTRAVENOUS CONTINUOUS
Status: DISCONTINUED | OUTPATIENT
Start: 2022-11-07 | End: 2022-11-07

## 2022-11-07 RX ORDER — DIAZEPAM 5 MG/1
5 TABLET ORAL EVERY 6 HOURS PRN
Status: DISCONTINUED | OUTPATIENT
Start: 2022-11-07 | End: 2022-11-20

## 2022-11-07 RX ORDER — ONDANSETRON 2 MG/ML
INJECTION INTRAMUSCULAR; INTRAVENOUS
Status: DISCONTINUED | OUTPATIENT
Start: 2022-11-07 | End: 2022-11-07

## 2022-11-07 RX ADMIN — GLYCOPYRROLATE 0.2 MG: 0.2 INJECTION, SOLUTION INTRAMUSCULAR; INTRAVENOUS at 02:11

## 2022-11-07 RX ADMIN — HYDROXYZINE HYDROCHLORIDE 25 MG: 25 TABLET, FILM COATED ORAL at 12:11

## 2022-11-07 RX ADMIN — DEXMEDETOMIDINE HYDROCHLORIDE 20 MCG: 100 INJECTION, SOLUTION INTRAVENOUS at 02:11

## 2022-11-07 RX ADMIN — PREGABALIN 225 MG: 75 CAPSULE ORAL at 08:11

## 2022-11-07 RX ADMIN — SODIUM CHLORIDE 500 ML: 0.9 INJECTION, SOLUTION INTRAVENOUS at 03:11

## 2022-11-07 RX ADMIN — SENNOSIDES AND DOCUSATE SODIUM 1 TABLET: 50; 8.6 TABLET ORAL at 08:11

## 2022-11-07 RX ADMIN — FENTANYL CITRATE 50 MCG: 0.05 INJECTION, SOLUTION INTRAMUSCULAR; INTRAVENOUS at 08:11

## 2022-11-07 RX ADMIN — FENTANYL CITRATE 50 MCG: 0.05 INJECTION, SOLUTION INTRAMUSCULAR; INTRAVENOUS at 04:11

## 2022-11-07 RX ADMIN — FENTANYL CITRATE 100 MCG: 50 INJECTION, SOLUTION INTRAMUSCULAR; INTRAVENOUS at 02:11

## 2022-11-07 RX ADMIN — OXYCODONE 5 MG: 5 TABLET ORAL at 11:11

## 2022-11-07 RX ADMIN — BISACODYL 10 MG: 10 SUPPOSITORY RECTAL at 08:11

## 2022-11-07 RX ADMIN — DEXMEDETOMIDINE HYDROCHLORIDE 12 MCG: 100 INJECTION, SOLUTION INTRAVENOUS at 02:11

## 2022-11-07 RX ADMIN — MIDAZOLAM HYDROCHLORIDE 2 MG: 1 INJECTION, SOLUTION INTRAMUSCULAR; INTRAVENOUS at 02:11

## 2022-11-07 RX ADMIN — PROPOFOL 50 MG: 10 INJECTION, EMULSION INTRAVENOUS at 02:11

## 2022-11-07 RX ADMIN — DEXMEDETOMIDINE HYDROCHLORIDE 8 MCG: 100 INJECTION, SOLUTION INTRAVENOUS at 02:11

## 2022-11-07 RX ADMIN — ONDANSETRON 4 MG: 2 INJECTION INTRAMUSCULAR; INTRAVENOUS at 09:11

## 2022-11-07 RX ADMIN — Medication 100 MCG: at 02:11

## 2022-11-07 RX ADMIN — LIDOCAINE HYDROCHLORIDE 100 MG: 20 INJECTION INTRAVENOUS at 02:11

## 2022-11-07 RX ADMIN — OXACILLIN SODIUM 12 G: 10 INJECTION, POWDER, FOR SOLUTION INTRAVENOUS at 03:11

## 2022-11-07 RX ADMIN — GLYCOPYRROLATE 0.6 MG: 0.2 INJECTION, SOLUTION INTRAMUSCULAR; INTRAVENOUS at 03:11

## 2022-11-07 RX ADMIN — Medication 1 PATCH: at 08:11

## 2022-11-07 RX ADMIN — SIMETHICONE 80 MG: 80 TABLET, CHEWABLE ORAL at 11:11

## 2022-11-07 RX ADMIN — SODIUM CHLORIDE: 0.9 INJECTION, SOLUTION INTRAVENOUS at 02:11

## 2022-11-07 RX ADMIN — MIDAZOLAM HYDROCHLORIDE 2 MG: 1 INJECTION, SOLUTION INTRAMUSCULAR; INTRAVENOUS at 03:11

## 2022-11-07 RX ADMIN — DEXAMETHASONE SODIUM PHOSPHATE 4 MG: 4 INJECTION, SOLUTION INTRAMUSCULAR; INTRAVENOUS at 02:11

## 2022-11-07 RX ADMIN — HYDROXYZINE HYDROCHLORIDE 25 MG: 25 TABLET, FILM COATED ORAL at 04:11

## 2022-11-07 RX ADMIN — OXYCODONE 5 MG: 5 TABLET ORAL at 04:11

## 2022-11-07 RX ADMIN — METHADONE HYDROCHLORIDE 90 MG: 10 TABLET ORAL at 03:11

## 2022-11-07 RX ADMIN — HYDROXYZINE HYDROCHLORIDE 25 MG: 25 TABLET, FILM COATED ORAL at 09:11

## 2022-11-07 RX ADMIN — HEPARIN SODIUM 5000 UNITS: 5000 INJECTION INTRAVENOUS; SUBCUTANEOUS at 10:11

## 2022-11-07 RX ADMIN — NEOSTIGMINE METHYLSULFATE 5 MG: 0.5 INJECTION, SOLUTION INTRAVENOUS at 03:11

## 2022-11-07 RX ADMIN — POLYETHYLENE GLYCOL 3350 17 G: 17 POWDER, FOR SOLUTION ORAL at 08:11

## 2022-11-07 RX ADMIN — ATROPINE SULFATE 1 DROP: 10 SOLUTION OPHTHALMIC at 08:11

## 2022-11-07 RX ADMIN — PROPOFOL 100 MG: 10 INJECTION, EMULSION INTRAVENOUS at 02:11

## 2022-11-07 RX ADMIN — ROCURONIUM BROMIDE 50 MG: 10 INJECTION INTRAVENOUS at 02:11

## 2022-11-07 RX ADMIN — PREGABALIN 225 MG: 75 CAPSULE ORAL at 03:11

## 2022-11-07 RX ADMIN — ONDANSETRON 4 MG: 2 INJECTION INTRAMUSCULAR; INTRAVENOUS at 02:11

## 2022-11-07 RX ADMIN — OXYCODONE HYDROCHLORIDE 10 MG: 10 TABLET ORAL at 12:11

## 2022-11-07 NOTE — PLAN OF CARE
POC reviewed with Peter Stiles and family at 1400. Pt verbalized understanding. Questions and concerns addressed. No acute events today. Pt progressing toward goals. Will continue to monitor. See below and flowsheets for full assessment and VS info.     -Trach placed today in OR  -PRN fentanyl given x3   -Zofran given x1      Neuro:  [place] Coma Scale  Best Eye Response: 3-->(E3) to speech  Best Motor Response: 6-->(M6) obeys commands  Best Verbal Response: 1-->(V1) none  Lucien Coma Scale Score: 10  Assessment Qualifiers: patient intubated  Pupil PERRLA: yes     24 hr Temp:  [97.4 °F (36.3 °C)-98.2 °F (36.8 °C)]     CV:   Rhythm: normal sinus rhythm  BP goals:   SBP < 160  MAP > 65    Resp:   O2 Device (Oxygen Therapy): ventilator  Vent Mode: Spont  Set Rate: 14 BPM  Oxygen Concentration (%): 60  Vt Set: 385 mL  PEEP/CPAP: 5 cmH20  Pressure Support: 5 cmH20    Plan: trach in place    GI/:     Diet/Nutrition Received: NPO  Last Bowel Movement: 11/06/22  Voiding Characteristics: other (see comments) (requiring straight cath)    Intake/Output Summary (Last 24 hours) at 11/7/2022 1724  Last data filed at 11/7/2022 1700  Gross per 24 hour   Intake 665.47 ml   Output 746 ml   Net -80.53 ml     Unmeasured Output  Urine Occurrence: 1  Stool Occurrence: 0  Pad Count: 3    Labs/Accuchecks:  Recent Labs   Lab 11/07/22  0130   WBC 12.22   RBC 2.83*   HGB 8.5*   HCT 26.7*         Recent Labs   Lab 11/07/22  0130      K 4.5   CO2 30*      BUN 22*   CREATININE 0.7   ALKPHOS 143*   ALT 24   AST 27   BILITOT 0.5      Recent Labs   Lab 11/01/22 2128   INR 1.0   APTT 26.0    No results for input(s): CPK, CPKMB, TROPONINI, MB in the last 168 hours.    Electrolytes: N/A - electrolytes WDL  Accuchecks: Q6H    Gtts:      LDA/Wounds:  Lines/Drains/Airways       Drain  Duration                  Closed/Suction Drain 11/02/22 Right;Superior;Medial Back 5 days         Closed/Suction Drain 11/02/22  Superior;Medial;Left Back Other (Comment) 5 days         NG/OG Tube 11/06/22 Right nostril 1 day              Airway  Duration                  Airway - Non-Surgical 11/06/22 1628 Endotracheal Tube 1 day         Surgical Airway 11/07/22 1449 Shiley Cuffed <1 day              Peripheral Intravenous Line  Duration                  Midline Catheter Insertion/Assessment  - Single Lumen 10/28/22 1858 Right basilic vein (medial side of arm) 18g x 10cm 9 days         Peripheral IV - Single Lumen 11/06/22 20 G Anterior;Proximal;Right Upper Arm 1 day                  Wounds: Yes  Wound care consulted: Yes
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
## 2022-11-07 NOTE — ASSESSMENT & PLAN NOTE
- currently intubated, on spontaneous  - OR today, will reassess and attempt to wean post procedurally   - CXR and KUB post op with L sided atelectasis/ground glass infiltrate/pleural effusion  - extubated to NC 11/4/22  - re-intubated 11/5/22 due to stridor  - ENT consulted for trach 2 failed extubations  - Will re-attempt extubation with ENT @ bedside  - 10 mg Dexa given  11/07/2022 plan for trach placement today

## 2022-11-07 NOTE — PROGRESS NOTES
"[facility]  Adult Nutrition  Progress Note    SUMMARY       Recommendations    If/when medically able, resume TF regimen of Diabetisource @ 50 mL/hr - 1440 kcals, 72 g of protein, 982 mL fluid.  RD to monitor & follow-up.    Goals: Meet % EEN, EPN by RD f/u date  Nutrition Goal Status: progressing towards goal  Communication of RD Recs:  (POC)    Assessment and Plan    Nutrition Problem:  Inadequate energy intake     Related to (etiology):   Inability to consume sufficient energy     Signs and Symptoms (as evidenced by):   NPO     Interventions(treatment strategy):  Collaboration of nutrition care w/ other providers  EN     Nutrition Diagnosis Status:   Continues    Reason for Assessment    Reason For Assessment: RD follow-up  Diagnosis: other (see comments) (Osteomyelitis)  Relevant Medical History: HTN, Opioid dependence  Interdisciplinary Rounds: did not attend  General Information Comments: Pt remains intubated/on vent. Noted TF currently held 2/2 OR today for tracheostomy placement. No wt changes since last RD visit. RD to follow and monitor.    (11/7): Pt intubated/sedated, able to communicate through writing. TFs infusing & pt tolerating thus far, states she is starving. Reports good appetite PTA & stable weight. Pt w/ no indicators of malnutrition. S/p corpectomy.  Nutrition Discharge Planning: Pending clinical course    Nutrition Risk Screen    Nutrition Risk Screen: tube feeding or parenteral nutrition, dysphagia or difficulty swallowing    Nutrition/Diet History    Spiritual, Cultural Beliefs, Restorationism Practices, Values that Affect Care: no  Factors Affecting Nutritional Intake: NPO, on mechanical ventilation, difficulty/impaired swallowing    Anthropometrics    Temp: 97.7 °F (36.5 °C)  Height Method: Stated  Height: 5' 6" (167.6 cm)  Height (inches): 66 in  Weight Method: Bed Scale  Weight:  (daylight savings)  Weight (lb): 139.99 lb  Ideal Body Weight (IBW), Female: 130 lb  % " Ideal Body Weight, Female (lb): 107.68 %  BMI (Calculated): 22.6  BMI Grade: 18.5-24.9 - normal     Lab/Procedures/Meds    Pertinent Labs Reviewed: reviewed  Pertinent Labs Comments: Glucose 120, A1C 6.1, Albumin 2.2, BUN 22, Al Phos 143  Pertinent Medications Reviewed: reviewed  Pertinent Medications Comments: bisacodyl, heparin, oxacillin, senna-docusate    Estimated/Assessed Needs    Weight Used For Calorie Calculations: 63.5 kg (139 lb 15.9 oz)  Energy Calorie Requirements (kcal): 1472 kcal/d  Energy Need Method: [facility]  Protein Requirements: 76-95 g/d (1.2-1.5 g/kg)  Weight Used For Protein Calculations: 63.5 kg (139 lb 15.9 oz)  Estimated Fluid Requirement Method: other (see comments) (Per MD or 1 mL/kcal)  RDA Method (mL): 1472  CHO Requirement: 184g    Nutrition Prescription Ordered    Current Diet Order: NPO  Current Nutrition Support Formula Ordered:  (TF held)    Evaluation of Received Nutrient/Fluid Intake    I/O: -4.1 L since admit  Energy Calories Required: meeting needs  Protein Required: not meeting needs  Fluid Required: other (see comments) (Per MD)  Comments: LBM: 11/6  Tolerance: tolerating  % Intake of Estimated Energy Needs: 0%  % Meal Intake: NPO    Nutrition Risk    Level of Risk/Frequency of Follow-up:  (1x/week)     Monitor and Evaluation    Food and Nutrient Intake: enteral nutrition intake  Food and Nutrient Adminstration: enteral and parenteral nutrition administration  Physical Activity and Function: nutrition-related ADLs and IADLs  Anthropometric Measurements: weight, weight change  Biochemical Data, Medical Tests and Procedures: glucose/endocrine profile, lipid profile, inflammatory profile, gastrointestinal profile, electrolyte and renal panel  Nutrition-Focused Physical Findings: overall appearance     Nutrition Follow-Up    RD Follow-up?: Yes    Mayi Colon PL-LDN

## 2022-11-07 NOTE — BRIEF OP NOTE
[facility]  Brief Operative Note     SUMMARY     Surgery Date:   11/7/2022     SURGEON(S):   Surgeon(s) and Role:     * Fritz Nogueira MD - Primary     * Carlos Cardoza MD - Resident - Assisting    ANESTHESIA STAFF:   Velma Santoro MD    OR STAFF:   Circulator: Luis Em RN  Scrub Person: ST Los      Pre-op Diagnosis:  Airway obstruction [J98.8]    Post-op Diagnosis:  Post-Op Diagnosis Codes:     * Airway obstruction [J98.8]    Procedure(s) (LRB):  CREATION, TRACHEOSTOMY (N/A)  LARYNGOSCOPY, DIRECT    Anesthesia: General    Description of the procedure: tracheostomy and direct laryngoscopy    Findings: no arytenoid fixation    Estimated Blood Loss: 5cc         Specimens:   Specimen (24h ago, onward)      None            LENGTH OF SURGERY:   1 Hr 4 Min 42 Sec    Event Time In   In Facility 10/25/2022 0228   In Pre-Procedure    Physician Available    Anesthesia Available    Pre-Op: Bedside Procedure Start    Pre-Op: Bedside Procedure Stop    Pre-Procedure Complete    Out of Pre-Procedure    Holding Start    Holding Stop    Anesthesia Start 11/07/2022 1400   Anesthesia Start Data Collection    Setup Start    Setup Complete    In Room 11/07/2022 1410   Prep Start    Procedure Prep Complete    Procedure Start 11/07/2022 1442   Procedure Closing 11/07/2022 1505   Emergence 11/07/2022 1513   Procedure Finish 11/07/2022 1505   Out of Room 11/07/2022 1514   In OR Recovery    Out of OR Recovery    Cleanup Start    Cleanup Complete    Cosmetic Start    Cosmetic Stop    Pain Mgmt In Room    Pain Mgmt Out Room    In Recovery    Anesthesia Finish    Bedside Procedure Start    Bedside Procedure Stop    Recovery Care Complete    Out of Recovery    In Diagnostic Recovery    Out Diagnostic Recovery    In PACU Phase II    Out PACU Phase II    In PACU Ext    Out PACU Ext    In Recovery DOSC    Out Recovery DOSC    Obs Rec Start    Obs Rec Stop    To Phase II    In Phase II    Phase II Care  Complete    Out of Phase II    In Phase II Ext    Out Phase II Ext    Procedural Care Complete    Pain Follow Up Needed    Pain Follow Up Complete    PACU Bed Request

## 2022-11-07 NOTE — ANESTHESIA POSTPROCEDURE EVALUATION
Anesthesia Post Evaluation    Patient: Peter Stiles    Procedure(s) Performed: Procedure(s) (LRB):  CREATION, TRACHEOSTOMY (N/A)  LARYNGOSCOPY, DIRECT    Final Anesthesia Type: general      Patient location during evaluation: ICU  Patient participation: No - Unable to Participate, Sedation  Level of consciousness: sedated  Post-procedure vital signs: reviewed and stable  Pain management: adequate  Airway patency: patent    PONV status at discharge: No PONV  Anesthetic complications: no      Cardiovascular status: blood pressure returned to baseline  Hydration status: euvolemic  Follow-up not needed.          Vitals Value Taken Time   /71 11/07/22 1643   Temp 36.4 °C (97.5 °F) 11/07/22 1520   Pulse 84 11/07/22 1649   Resp 33 11/07/22 1649   SpO2 100 % 11/07/22 1649   Vitals shown include unvalidated device data.      No case tracking events are documented in the log.      Pain/Jak Score: Pain Rating Prior to Med Admin: 10 (11/7/2022  4:29 PM)

## 2022-11-07 NOTE — PLAN OF CARE
Recommendations     If/when medically able, resume TF regimen of Diabetisource @ 50 mL/hr - 1440 kcals, 72 g of protein, 982 mL fluid.  RD to monitor & follow-up.     Goals: Meet % EEN, EPN by RD f/u date  Nutrition Goal Status: progressing towards goal  Communication of RD Recs:  (POC)    Mayi Todd PL-LDN

## 2022-11-07 NOTE — TRANSFER OF CARE
"Anesthesia Transfer of Care Note    Patient: Peter Stiles    Procedure(s) Performed: Procedure(s) (LRB):  CREATION, TRACHEOSTOMY (N/A)  LARYNGOSCOPY, DIRECT    Patient location: ICU    Anesthesia Type: general    Transport from OR: Transported from OR intubated on 100% O2 by AMBU with assisted ventilation. Continuous ECG monitoring in transport. Continuous SpO2 monitoring in transport    Post pain: adequate analgesia    Post assessment: no apparent anesthetic complications and tolerated procedure well    Post vital signs: stable    Level of consciousness: sedated    Nausea/Vomiting: no nausea/vomiting    Complications: none    Transfer of care protocol was followed      Last vitals:   Visit Vitals  /64   Pulse 92   Temp 36.5 °C (97.7 °F) (Axillary)   Resp 18   Ht 5' 6" (1.676 m)   Wt 63.5 kg (139 lb 15.9 oz)   SpO2 100%   Breastfeeding No   BMI 22.60 kg/m²     "

## 2022-11-07 NOTE — PROGRESS NOTES
Otorhinolaryngology-Head & Neck Surgery  Progress Note    Subjective:     Interval History: no changes overnight     Objective:     Vital Signs (24h Range):  Temp:  [97.4 °F (36.3 °C)-98.2 °F (36.8 °C)] 97.7 °F (36.5 °C)  Pulse:  [] 89  Resp:  [10-50] 20  SpO2:  [90 %-100 %] 100 %  BP: ()/(48-95) 119/60      Intubated  Sedated, opens eyes to voice  Hard c collar in place  Trachea palpable     Vent Mode: A/C  Oxygen Concentration (%):  [] 60  PEEP/CPAP:  [5 cmH20] 5 cmH20    Assessment/Plan:     Upper airway obstruction 2/2 Bilateral vocal fold hypomobility  - to OR today for tracheostomy

## 2022-11-07 NOTE — PLAN OF CARE
[facility] Care Plan    POC reviewed with Peter Stiles and family at 0300. Pt verbalized understanding. Questions and concerns addressed. No acute events overnight. Pt progressing toward goals. Will continue to monitor. See below and flowsheets for full assessment and VS info.     -500 ml bolus given  -propofol weaned   -trach placement possibly 11/7      Is this a stroke patient? no    Neuro:  Lucien Coma Scale  Best Eye Response: 4-->(E4) spontaneous  Best Motor Response: 6-->(M6) obeys commands  Best Verbal Response: 1-->(V1) none  Arabi Coma Scale Score: 11  Assessment Qualifiers: patient intubated, patient chemically sedated or paralyzed  Pupil PERRLA: yes     24hr Temp:  [97.8 °F (36.6 °C)-98.5 °F (36.9 °C)]     CV:   Rhythm: normal sinus rhythm  BP goals:   SBP < 160  MAP > 65    Resp:   O2 Device (Oxygen Therapy): ventilator  Vent Mode: A/C  Set Rate: 10 BPM  Oxygen Concentration (%): 100  Vt Set: 385 mL  PEEP/CPAP: 5 cmH20  Pressure Support: 8 cmH20    Plan: trach/PEG discussions    GI/:     Diet/Nutrition Received: NPO  Last Bowel Movement: 11/06/22  Voiding Characteristics: external catheter    Intake/Output Summary (Last 24 hours) at 11/7/2022 0303  Last data filed at 11/7/2022 0205  Gross per 24 hour   Intake 2229.34 ml   Output 681 ml   Net 1548.34 ml     Unmeasured Output  Urine Occurrence: 1  Stool Occurrence: 1  Pad Count: 3    Labs/Accuchecks:  Recent Labs   Lab 11/07/22  0130   WBC 12.22   RBC 2.83*   HGB 8.5*   HCT 26.7*         Recent Labs   Lab 11/07/22  0130      K 4.5   CO2 30*      BUN 22*   CREATININE 0.7   ALKPHOS 143*   ALT 24   AST 27   BILITOT 0.5      Recent Labs   Lab 11/01/22 2128   INR 1.0   APTT 26.0    No results for input(s): CPK, CPKMB, TROPONINI, MB in the last 168 hours.    Electrolytes: N/A - electrolytes WDL  Accuchecks: Q6H    Gtts:   NORepinephrine bitartrate-D5W      propofoL Stopped (11/06/22 2258)       LDA/Wounds:  Lines/Drains/Airways        Drain  Duration                  Closed/Suction Drain 11/02/22 Right;Superior;Medial Back 5 days         Closed/Suction Drain 11/02/22 Superior;Medial;Left Back Other (Comment) 5 days    Female External Urinary Catheter 11/05/22 2 days         NG/OG Tube 11/06/22 Right nostril 1 day              Airway  Duration                  Airway - Non-Surgical 11/06/22 1628 Endotracheal Tube <1 day              Peripheral Intravenous Line  Duration                  Midline Catheter Insertion/Assessment  - Single Lumen 10/28/22 1858 Right basilic vein (medial side of arm) 18g x 10cm 9 days         Peripheral IV - Single Lumen 11/06/22 20 G Anterior;Proximal;Right Upper Arm 1 day                  Wounds: Yes  Wound care consulted: Yes

## 2022-11-07 NOTE — ASSESSMENT & PLAN NOTE
Peter Stiles is a 51 y.o. female with PMHx of HTN, dilated cardiomyopathy, h/o opioid dependence, cigarette smoker (2pks/day), and daily baby ASA use who presents with 1 day of decreased sensation from T5 level down and tingling in her bilateral fingers and bilateral toes. MRI imaging obtained in the ED showing possible C5-6 osteodiscitis/myelitis with cord compression. Patient tachycardic and hypertensive on arrival, afebrile, with leukocytosis.     --All pertinent imaging/labs personally reviewed and interpreted.   --MRI C/T/L spine w/o contrast 10/25: focal kyphotic deformity at C5/6 with possible discitis/osteomyelitis,  epidural collection extending into the central spinal canal resulting in severe stenosis and cord signal  abnormality.  Prevertebral soft tissue edema and probable paravertebral abscess or phlegmon at this level. --Thoracic and lumbar spine unremarkable.   --MRI C spine w/ contrast 10/25: confirmed enhancement at C5/6 consistent with osteomyelitis/discitis and epidural phlegmon      Now s/p C5-6 corpectomy on 10/28/22  C2-T2 PCDF on 11/2    --Admitted to neuro ICU  --Intubated after index procedure and kept intubated for posterior fixation and for tentative LISHA 11/4 which was eventually cancelled due to inability to adequately extend neck  --Extubated 11/4 but reintubated later that day for respiratory distress and stridor. Extubated again 11/6 but reintubated   --ENT onboard.Vocal cord paresis vs edema. Cuff leak present. Trach per ENT  --BCx 10/25 growing MSSA, Repeat BCx's 10/27 NGTD  --OR cultures 10/28 no organisms on gram stain, culture MSSA  --Post op xrays, anterior hardware in good position; xray Csp for posterior hardware pending  --ID following   --Maintain drains to full suction  --Continue C collar when upright   --CT Soft tissue neck reviewed with stable hardware position s/p 360 cervical fusion ow expected postop changes  --Pain control as needed  --Abx: Oxacillin   --Haroon    --PT/OT. SQH  --Please call NSGY with any questions/concerns    Dispo: ICU. Trach per ENT

## 2022-11-07 NOTE — ANESTHESIA PREPROCEDURE EVALUATION
[facility]  Anesthesia Pre-Operative Evaluation         Patient Name: Peter Stiles  YOB: 1970  MRN: [id number]    SUBJECTIVE:     Pre-operative evaluation for Procedure(s) (LRB):  CREATION, TRACHEOSTOMY (N/A)     11/06/2022     Peter Stiles is a 51 y.o. female w/ a significant PMHx of HTN, dilated cardiomyopathy and remote opioid dependence. She presented with decreased sensation from T5 level down and tingling in her bilateral fingers and bilateral toes. MRI imaging obtained in the ED showing possible C5-6 osteodiscitis/myelitis with cord compression. Now s/p C5-6 corpectomy on 10/28/22. C2-T2 PCDF on 11/2. Patient is on the vent after failing extubation in the Neuro ICU. She was reintubated immediately after C2-T2 posterior fusion and then failed a second extubation.     Patient now presents for the above procedure(s).    TTE 10/25/22   The left ventricle is normal in size with normal systolic function. The estimated ejection fraction is 55%.   Normal left ventricular diastolic function.   Normal right ventricular size with normal right ventricular systolic function.   Mild tricuspid regurgitation.   The estimated PA systolic pressure is 20 mmHg.   Normal central venous pressure (3 mmHg).         LDA:        Peripheral IV - Single Lumen 11/06/22 20 G Anterior;Proximal;Right Upper Arm (Active)        Midline Catheter Insertion/Assessment  - Single Lumen 10/28/22 1858 Right basilic vein (medial side of arm) 18g x 10cm (Active)        Closed/Suction Drain 11/02/22 Superior;Medial;Left Back Other (Comment) (Active)        Closed/Suction Drain 11/02/22 Right;Superior;Medial Back (Active)        NG/OG Tube 11/06/22 Right nostril (Active)   Female External Urinary Catheter 11/05/22 (Active)       Prev airway:   11/5/22  Indications:  Hypoxemia and respiratory failure  Diagnosis:  Acute hypoxic respiratory failure  Patient Location:   ICU  Timeout:  11/5/2022 12:45 AM  Procedure Start Time:  11/5/2022 12:46 AM  Procedure End Time:  11/5/2022 12:50 AM  Staff:     Anesthesiologist Present: Yes    Intubation:     Induction:  Intravenous    Intubated:  Postinduction    Mask Ventilation:  Easy mask    Attempts:  1    Attempted By:  Resident anesthesiologist    Method of Intubation:  Video laryngoscopy    Blade:  Garland 3    Laryngeal View Grade: Grade I - full view of chords      Difficult Airway Encountered?: No      Complications:  None    Airway Device:  Oral endotracheal tube    Airway Device Size:  7.0    Style/Cuff Inflation:  Cuffed (inflated to minimal occlusive pressure)    Tube secured:  23    Secured at:  The teeth    Placement Verified By:  Colorimetric ETCO2 device    Complicating Factors:  Poor neck/head extension (S/p cervical fusion)    Findings Post-Intubation:  BS equal bilateral    Drips:    propofoL 40 mcg/kg/min (11/06/22 1805)       Patient Active Problem List   Diagnosis    Dilated cardiomyopathy    HTN (hypertension)    Tobacco abuse    Anxiety and depression    Osteomyelitis of cervical spine    Opioid use disorder, severe, on maintenance therapy, dependence    Hyperglycemia    Epidural abscess    Spinal cord compression    MSSA bacteremia    Acute respiratory failure with hypoxia    Endotracheally intubated    Cervical stenosis of spinal canal    Stridor       Review of patient's allergies indicates:   Allergen Reactions    Morphine Shortness Of Breath and Other (See Comments)     Throat closed       Current Inpatient Medications:   bisacodyL  10 mg Rectal Daily    heparin (porcine)  5,000 Units Subcutaneous Q8H    methadone  90 mg Per NG tube Daily    nicotine  1 patch Transdermal Daily    oxacillin 12 g in  mL CONTINUOUS INFUSION  12 g Intravenous Q24H    oxyCODONE  10 mg Per NG tube Q6H    polyethylene glycol  17 g Per NG tube Daily    pregabalin  225 mg Per NG tube TID    senna-docusate  8.6-50 mg  1 tablet Per NG tube BID       No current facility-administered medications on file prior to encounter.     Current Outpatient Medications on File Prior to Encounter   Medication Sig Dispense Refill    aspirin (ECOTRIN) 81 MG EC tablet Take 81 mg by mouth once daily.      LIDOcaine-menthol (ICY HOT PATCH, LIDO-MENTHOL,) 4-1 % PtMd Apply 1 patch topically 2 (two) times daily as needed (Pain).      methadone (METHADOSE) 40 mg disintegrating tablet Take 2 &1/4 tablet (90 mg) by mouth in water once daily.      multivitamin (ONE DAILY MULTIVITAMIN) per tablet Take 1 tablet by mouth once daily.      lorazepam (ATIVAN) 1 MG tablet Take one-half to one tablet (Patient not taking: Reported on 10/25/2022) 30 tablet 1       Past Surgical History:   Procedure Laterality Date    APPENDECTOMY       SECTION      x2    FUSION OF CERVICAL SPINE BY POSTERIOR APPROACH N/A 2022    Procedure: FUSION, SPINE, CERVICAL, POSTERIOR APPROACH C2-T2 posterior decompression/fusion; Depuy, neuromonitoring monica Marrero;  Surgeon: West Merino DO;  Location: [facility] OR [address] [place] [address];  Service: Neurosurgery;  Laterality: N/A;    HYSTERECTOMY      SURGICAL REMOVAL OF VERTEBRAL BODY OF CERVICAL SPINE N/A 10/28/2022    Procedure: CORPECTOMY, SPINE, CERVICAL;  Surgeon: West Merino DO;  Location: [facility] OR [address] [place] [address];  Service: Neurosurgery;  Laterality: N/A;  anterior C5-6 corpectomy, globus, caspar head prado and tongs, omnipaque, 15lbs weights, microscrope, round cutting lynsey and M8, ENT assistance for exposure       Social History     Socioeconomic History    Marital status: Single   Tobacco Use    Smoking status: Every Day     Packs/day: 2.00     Years: 34.00     Pack years: 68.00     Types: Cigarettes    Smokeless tobacco: Current   Substance and Sexual Activity    Alcohol use: No    Drug use: No       OBJECTIVE:     Vital Signs Range (Last 24H):  Temp:  [36.6 °C (97.9 °F)-36.9 °C (98.5 °F)]   Pulse:   []   Resp:  [10-50]   BP: ()/(51-95)   SpO2:  [90 %-100 %]       Significant Labs:  Lab Results   Component Value Date    WBC 10.23 11/06/2022    HGB 8.3 (L) 11/06/2022    HCT 26.7 (L) 11/06/2022     11/06/2022    CHOL 135 10/25/2022    TRIG 197 (H) 11/01/2022    HDL 30 (L) 10/25/2022    ALT 19 11/06/2022    AST 23 11/06/2022     11/06/2022    K 3.1 (L) 11/06/2022     11/06/2022    CREATININE 0.7 11/06/2022    BUN 22 (H) 11/06/2022    CO2 25 11/06/2022    TSH 0.706 10/25/2022    INR 1.0 11/01/2022    HGBA1C 6.1 (H) 10/25/2022       Diagnostic Studies: No relevant studies.    EKG:   Results for orders placed or performed during the hospital encounter of 10/25/22   EKG 12-lead    Collection Time: 10/25/22  3:51 AM    Narrative    Test Reason : R10.13,    Vent. Rate : 102 BPM     Atrial Rate : 102 BPM     P-R Int : 194 ms          QRS Dur : 100 ms      QT Int : 370 ms       P-R-T Axes : 074 022 065 degrees     QTc Int : 482 ms    Sinus tachycardia  Otherwise normal ECG  When compared with ECG of 02-SEP-2012 02:37,  T wave inversion no longer evident in Inferior leads  T wave inversion no longer evident in Anterior leads  QT has lengthened  Confirmed by PK JAIN MD ([id number]) on 10/25/2022 10:16:44 AM    Referred By: [email or web address]   SELF           Confirmed By:PK JAIN MD       2D ECHO:  TTE:  Results for orders placed or performed during the hospital encounter of 10/25/22   Echo   Result Value Ref Range    Ascending aorta 3.33 cm    STJ 2.61 cm    AV mean gradient 8 mmHg    Ao peak denny 1.93 m/s    Ao VTI 39.92 cm    IVS 0.75 0.6 - 1.1 cm    LA size 3.48 cm    Left Atrium Major Axis 4.91 cm    Left Atrium Minor Axis 4.46 cm    LVIDd 4.94 3.5 - 6.0 cm    LVIDs 3.65 2.1 - 4.0 cm    LVOT diameter 2.30 cm    LVOT peak VTI 23.38 cm    Posterior Wall 0.97 0.6 - 1.1 cm    RA Major Axis 5.08 cm    RA Width 3.28 cm    RVDD 3.28 cm    Sinus 3.38 cm    TAPSE 2.08 cm    TR Max Denny 2.08 m/s    TDI  LATERAL 0.12 m/s    TDI SEPTAL 0.14 m/s    LA WIDTH 3.75 cm    LV Diastolic Volume 114.99 mL    LV Systolic Volume 56.37 mL    LVOT peak denny 1.13 m/s    LA volume (mod) 54.41 cm3    FS 26 %    LA volume 51.85 cm3    LV mass 146.08 g    Left Ventricle Relative Wall Thickness 0.39 cm    AV valve area 2.43 cm2    AV Velocity Ratio 0.59     AV index (prosthetic) 0.59     Mean e' 0.13 m/s    LVOT area 4.2 cm2    LVOT stroke volume 97.09 cm3    AV peak gradient 15 mmHg    LV Systolic Volume Index 32.8 mL/m2    LV Diastolic Volume Index 66.85 mL/m2    LA Volume Index 30.1 mL/m2    LV Mass Index 85 g/m2    Triscuspid Valve Regurgitation Peak Gradient 17 mmHg    LA Volume Index (Mod) 31.6 mL/m2    BSA 1.72 m2    Right Atrial Pressure (from IVC) 3 mmHg    EF 55 %    TV rest pulmonary artery pressure 20 mmHg    Narrative    · The left ventricle is normal in size with normal systolic function. The   estimated ejection fraction is 55%.  · Normal left ventricular diastolic function.  · Normal right ventricular size with normal right ventricular systolic   function.  · Mild tricuspid regurgitation.  · The estimated PA systolic pressure is 20 mmHg.  · Normal central venous pressure (3 mmHg).          LISHA:  No results found for this or any previous visit.    ASSESSMENT/PLAN:           Pre-op Assessment    I have reviewed the Patient Summary Reports.     I have reviewed the Nursing Notes. I have reviewed the NPO Status.   I have reviewed the Medications.     Review of Systems  Anesthesia Hx:  No problems with previous Anesthesia  History of prior surgery of interest to airway management or planning: Denies Family Hx of Anesthesia complications.   Denies Personal Hx of Anesthesia complications.   Social:  Smoker    Cardiovascular:   Hypertension Denies MI.  Denies CAD.    CHF no hyperlipidemia    Pulmonary:  Pulmonary Normal    Renal/:  Renal/ Normal     Hepatic/GI:   Denies GERD.    Endocrine:  Denies Obesity / BMI > 30  Psych:    Psychiatric History anxiety depression          Physical Exam  General: Well nourished and Cooperative    Airway:  Mallampati: unable to assess   Pre-Existing Airway: Oral Endotracheal tube        Anesthesia Plan  Type of Anesthesia, risks & benefits discussed:    Anesthesia Type: Gen ETT  Intra-op Monitoring Plan: Standard ASA Monitors  Post Op Pain Control Plan: multimodal analgesia and IV/PO Opioids PRN  Induction:  IV  Airway Plan: Direct, Post-Induction  Informed Consent: Informed consent signed with the Patient and all parties understand the risks and agree with anesthesia plan.  All questions answered.   ASA Score: 3  Day of Surgery Review of History & Physical: H&P Update referred to the surgeon/provider.    Ready For Surgery From Anesthesia Perspective.     .

## 2022-11-07 NOTE — SUBJECTIVE & OBJECTIVE
Interval History:     Extubated and re-intubated again. ENT following. Vocal cord paresis sv vocal cord edema. ENT following. Drains 1/40cc output.     Medications:  Continuous Infusions:   propofoL Stopped (11/06/22 2258)     Scheduled Meds:   bisacodyL  10 mg Rectal Daily    heparin (porcine)  5,000 Units Subcutaneous Q8H    methadone  90 mg Per NG tube Daily    nicotine  1 patch Transdermal Daily    oxacillin 12 g in  mL CONTINUOUS INFUSION  12 g Intravenous Q24H    oxyCODONE  5 mg Per NG tube Q6H    polyethylene glycol  17 g Per NG tube Daily    pregabalin  225 mg Per NG tube TID    senna-docusate 8.6-50 mg  1 tablet Per NG tube BID     PRN Meds:acetaminophen, albuterol-ipratropium, atropine 1%, dextrose 10%, dextrose 10%, fentaNYL, glucagon (human recombinant), hydrALAZINE, hydrOXYzine HCL, labetalol, magnesium oxide, magnesium oxide, ondansetron, potassium bicarbonate, potassium bicarbonate, potassium bicarbonate, potassium, sodium phosphates, potassium, sodium phosphates, potassium, sodium phosphates, racepinephrine, simethicone, sodium chloride 0.9%     Review of Systems  Objective:     Weight:  (daylight savings)  Body mass index is 22.6 kg/m².  Vital Signs (Most Recent):  Temp: 97.4 °F (36.3 °C) (11/07/22 0701)  Pulse: 94 (11/07/22 0901)  Resp: (!) 28 (11/07/22 0901)  BP: 113/66 (11/07/22 0901)  SpO2: 100 % (11/07/22 0901)   Vital Signs (24h Range):  Temp:  [97.4 °F (36.3 °C)-98.2 °F (36.8 °C)] 97.4 °F (36.3 °C)  Pulse:  [] 94  Resp:  [10-50] 28  SpO2:  [90 %-100 %] 100 %  BP: ()/(48-95) 113/66     Date 11/07/22 0700 - 11/08/22 0659   Shift 8555-7894 4550-5676 3625-5602 24 Hour Total   INTAKE   IV Piggyback 60.8   60.8   Shift Total(mL/kg) 60.8(1)   60.8(1)   OUTPUT   Shift Total(mL/kg)       Weight (kg) 63.5 63.5 63.5 63.5              Vent Mode: A/C  Oxygen Concentration (%):  [] 60  Resp Rate Total:  [10 br/min-70 br/min] 17 br/min  Vt Set:  [0 mL-385 mL] 385 mL  PEEP/CPAP:  [5  cmH20] 5 cmH20  Pressure Support:  [8 cmH20] 8 cmH20  Mean Airway Pressure:  [7.2 ccK38-92 cmH20] 8.1 cmH20         Closed/Suction Drain 11/02/22 Superior;Medial;Left Back Other (Comment) (Active)   Site Description Unable to view 11/06/22 0305   Dressing Type Transparent (Tegaderm) 11/06/22 0305   Dressing Status Clean;Dry 11/06/22 0305   Dressing Intervention Integrity maintained 11/06/22 0305   Drainage Sanguineous 11/06/22 0305   Status Other (Comment) 11/06/22 0305   Output (mL) 30 mL 11/06/22 0605            Closed/Suction Drain 11/02/22 Right;Superior;Medial Back (Active)   Site Description Unable to view 11/06/22 0305   Dressing Type Transparent (Tegaderm) 11/06/22 0305   Dressing Status Clean;Dry;Intact 11/06/22 0305   Dressing Intervention Integrity maintained 11/06/22 0305   Drainage Sanguineous 11/06/22 0305   Status Other (Comment) 11/06/22 0305   Output (mL) 10 mL 11/06/22 0605            NG/OG Tube 10/29/22 1000 Left nostril (Active)   Placement Check placement verified by x-ray;placement verified by distal tube length measurement 11/06/22 0305   Tolerance no signs/symptoms of discomfort 11/06/22 0305   Securement secured to nostril center w/ adhesive device 11/06/22 0305   Clamp Status/Tolerance unclamped 11/06/22 0305   Suction Setting/Drainage Method suction at the bedside 11/06/22 0305   Insertion Site Appearance no redness, warmth, tenderness, skin breakdown, drainage 11/06/22 0305   Drainage None 11/06/22 0305   Flush/Irrigation flushed w/;water;no resistance met 11/06/22 0305   Feeding Type continuous;by pump 11/06/22 0305   Feeding Action feeding continued 11/06/22 0305   Current Rate (mL/hr) 40 mL/hr 11/05/22 1905   Goal Rate (mL/hr) 40 mL/hr 11/05/22 1905   Intake (mL) 200 mL 11/06/22 0605   Water Bolus (mL) 50 mL 11/01/22 1901   Rate Formula Tube Feeding (mL/hr) 40 mL/hr 11/05/22 1905   Formula Name diabetasource 11/06/22 0305   Intake (mL) - Formula Tube Feeding 40 11/06/22 0605   Residual  Amount (ml) 2 ml 11/01/22 1901       Female External Urinary Catheter 11/05/22 (Active)   Skin no redness;no breakdown 11/06/22 0305   Tolerance no signs/symptoms of discomfort 11/06/22 0305   Suction Continuous suction at 60 mmHg 11/05/22 1905   Date of last wick change 11/06/22 11/05/22 1905   Time of last wick change 1930 11/05/22 1905       Physical Exam    Neurosurgery Physical Exam  FC x 4  Intubated  No distress  VILLAGRAN full strength  In collar, incisions c/d/I  SILT        Significant Labs:  Recent Labs   Lab 11/06/22 0135 11/06/22  0740 11/07/22 0130   GLU 81 89 120*   * 145 145   K 3.1* 3.1* 4.5   * 109 106   CO2 22* 25 30*   BUN 24* 22* 22*   CREATININE 0.7 0.7 0.7   CALCIUM 8.4* 8.7 9.4   MG 1.8  --  2.3       Recent Labs   Lab 11/06/22 0135 11/06/22  0740 11/07/22  0130   WBC 9.17 10.23 12.22   HGB 6.2* 8.3* 8.5*   HCT 20.0* 26.7* 26.7*    348 329       No results for input(s): LABPT, INR, APTT in the last 48 hours.  Microbiology Results (last 7 days)       Procedure Component Value Units Date/Time    Culture, Anaerobe [[id number]] Collected: 10/28/22 1029    Order Status: Completed Specimen: Wound from Neck Updated: 11/07/22 0632     Anaerobic Culture No anaerobes isolated    Narrative:      4) Epidural phlegman    Blood culture [[id number]] Collected: 10/29/22 0623    Order Status: Completed Specimen: Blood from Peripheral, Foot, Left Updated: 11/03/22 0812     Blood Culture, Routine No growth after 5 days.    Blood culture [[id number]] Collected: 10/29/22 0621    Order Status: Completed Specimen: Blood from Peripheral, Foot, Right Updated: 11/03/22 0812     Blood Culture, Routine No growth after 5 days.    Aerobic culture [[id number]]  (Abnormal)  (Susceptibility) Collected: 10/28/22 0906    Order Status: Completed Specimen: Abscess from Back Updated: 11/01/22 1054     Aerobic Bacterial Culture STAPHYLOCOCCUS AUREUS  Rare      Narrative:      Prevertebral Abscess    Aerobic culture  [[id number]]  (Abnormal)  (Susceptibility) Collected: 10/28/22 0950    Order Status: Completed Specimen: Wound from Neck Updated: 11/01/22 1039     Aerobic Bacterial Culture STAPHYLOCOCCUS AUREUS  Rare      Narrative:      3) Osteodiscitis    Blood culture [[id number]] Collected: 10/27/22 0939    Order Status: Completed Specimen: Blood Updated: 11/01/22 1012     Blood Culture, Routine No growth after 5 days.    Narrative:      Rt wrist    Blood culture [[id number]] Collected: 10/27/22 0938    Order Status: Completed Specimen: Blood Updated: 11/01/22 1012     Blood Culture, Routine No growth after 5 days.    Narrative:      Rt AC    Culture, Anaerobe [[id number]] Collected: 10/28/22 0950    Order Status: Completed Specimen: Wound from Neck Updated: 11/01/22 0724     Anaerobic Culture No anaerobes isolated    Narrative:      3) Osteodiscitis    Culture, Anaerobe [[id number]] Collected: 10/28/22 0924    Order Status: Completed Specimen: Abscess from Neck Updated: 11/01/22 0722     Anaerobic Culture No anaerobes isolated    Narrative:      2) Prevertebral absess    Culture, Anaerobe [[id number]] Collected: 10/28/22 0906    Order Status: Completed Specimen: Abscess from Back Updated: 11/01/22 0722     Anaerobic Culture No anaerobes isolated    Narrative:      Prevertebral Abscess    AFB Culture & Smear [[id number]] Collected: 10/28/22 1029    Order Status: Completed Specimen: Wound from Neck Updated: 10/31/22 1250     AFB Culture & Smear Culture in progress     AFB CULTURE STAIN No acid fast bacilli seen.    Narrative:      4) Epidural phlegman    AFB Culture & Smear [[id number]] Collected: 10/28/22 0924    Order Status: Completed Specimen: Abscess from Neck Updated: 10/31/22 1250     AFB Culture & Smear Culture in progress     AFB CULTURE STAIN No acid fast bacilli seen.    Narrative:      2) Prevertebral absess    AFB Culture & Smear [[id number]] Collected: 10/28/22 0906    Order Status: Completed Specimen: Abscess from Back  Updated: 10/31/22 1250     AFB Culture & Smear Culture in progress     AFB CULTURE STAIN No acid fast bacilli seen.    Narrative:      Prevertebral Abscess    AFB Culture & Smear [[id number]] Collected: 10/28/22 0950    Order Status: Completed Specimen: Wound from Neck Updated: 10/31/22 1250     AFB Culture & Smear Culture in progress     AFB CULTURE STAIN No acid fast bacilli seen.    Narrative:      3) Osteodiscitis    Fungus culture [[id number]] Collected: 10/28/22 0950    Order Status: Completed Specimen: Wound from Neck Updated: 10/31/22 0958     Fungus (Mycology) Culture Culture in progress    Narrative:      3) Osteodiscitis    Fungus culture [[id number]] Collected: 10/28/22 1029    Order Status: Completed Specimen: Wound from Neck Updated: 10/31/22 0958     Fungus (Mycology) Culture Culture in progress    Narrative:      4) Epidural phlegman    Fungus culture [[id number]] Collected: 10/28/22 0906    Order Status: Completed Specimen: Abscess from Back Updated: 10/31/22 0958     Fungus (Mycology) Culture Culture in progress    Narrative:      Prevertebral Abscess    Fungus culture [[id number]] Collected: 10/28/22 0924    Order Status: Completed Specimen: Abscess from Neck Updated: 10/31/22 0958     Fungus (Mycology) Culture Culture in progress    Narrative:      2) Prevertebral absess          All pertinent labs from the last 24 hours have been reviewed.    Significant Diagnostics:  I have reviewed and interpreted all pertinent imaging results/findings within the past 24 hours.

## 2022-11-07 NOTE — PROCEDURES
"Peter Stiles is a 51 y.o. female patient.    Temp: 97.9 °F (36.6 °C) (11/06/22 1505)  Pulse: 83 (11/06/22 1805)  Resp: 13 (11/06/22 1805)  BP: (!) 98/54 (11/06/22 1805)  SpO2: 100 % (11/06/22 1805)  Weight:  (daylight savings) (11/06/22 0115)  Height: 5' 6" (167.6 cm) (11/06/22 1518)       Intubation    Date/Time: 11/6/2022 6:25 PM  Location procedure was performed: [facility] NEURO [facility]ITICAL [facility]  Performed by: Truong Bourne DO  Authorized by: Truong Bourne DO   Pre-operative diagnosis: Post-extubation stridor  Post-operative diagnosis: Same  Consent Done: Emergent Situation  Indications: respiratory failure and hypoxemia  Description of findings: Immediately prior to procedure, ENT had performed fiberoptic laryngoscopy which demonstrated Vitaly edema of cords as well as probable cord paresis with poor mobility. Patient became acutely stridorous again during this evaluation requiring re-intubation   Intubation method: video-assisted  Patient status: paralyzed (RSI) (+ sedation)  Preoxygenation: bag valve mask  Sedatives: etomidate  Paralytic: rocuronium  Laryngoscope size: Glide 4  Tube size: 6.5 mm  Tube type: cuffed  Number of attempts: 1  Cricoid pressure: no  Cords visualized: yes (2a view)  Post-procedure assessment: chest rise and ETCO2 monitor  Breath sounds: clear  Cuff inflated: yes  ETT to lip: 23 cm  ETT to teeth: 22 cm  Tube secured with: ETT prado  Chest x-ray interpreted by me.  Chest x-ray findings: endotracheal tube in appropriate position  Patient tolerance: Patient tolerated the procedure well with no immediate complications  Technical procedures used: Video laryngoscope (glidescope)  Complications: No  Specimens: No  Implants: No        11/6/2022    "

## 2022-11-07 NOTE — PLAN OF CARE
[facility] Care Plan    POC reviewed with Peter Stiles and family at 1000. Pt verbalized understanding. Questions and concerns addressed. No acute events today. Pt progressing toward goals. Will continue to monitor. See below and flowsheets for full assessment and VS info.   - New PIV placed x 1  - New R NGT placed and verified   - Propofol gtt titrated  - see RT note on extubation/ reintubation           Is this a stroke patient? no    Neuro:  Lucien Coma Scale  Best Eye Response: 2-->(E2) to pain  Best Motor Response: 4-->(M4) withdraws from pain  Best Verbal Response: 1-->(V1) none  [place] Coma Scale Score: 7  Assessment Qualifiers: patient intubated, patient chemically sedated or paralyzed  Pupil PERRLA: yes     24 hr Temp:  [97.9 °F (36.6 °C)-98.5 °F (36.9 °C)]     CV:   Rhythm: normal sinus rhythm  BP goals:   SBP < 160  MAP > 65    Resp:   O2 Device (Oxygen Therapy): ventilator  Vent Mode: A/C  Set Rate: 10 BPM  Oxygen Concentration (%): 100  Vt Set: 385 mL  PEEP/CPAP: 5 cmH20  Pressure Support: 8 cmH20    Plan: Trach     GI/:     Diet/Nutrition Received: NPO, tube feeding  Last Bowel Movement: 11/06/22  Voiding Characteristics: external catheter    Intake/Output Summary (Last 24 hours) at 11/6/2022 2013  Last data filed at 11/6/2022 1805  Gross per 24 hour   Intake 3677.26 ml   Output 681 ml   Net 2996.26 ml     Unmeasured Output  Urine Occurrence: 1  Stool Occurrence: 1  Pad Count: 3    Labs/Accuchecks:  Recent Labs   Lab 11/06/22  0740   WBC 10.23   RBC 2.75*   HGB 8.3*   HCT 26.7*         Recent Labs   Lab 11/06/22  0740      K 3.1*   CO2 25      BUN 22*   CREATININE 0.7   ALKPHOS 129   ALT 19   AST 23   BILITOT 0.4      Recent Labs   Lab 11/01/22 2128   INR 1.0   APTT 26.0    No results for input(s): CPK, CPKMB, TROPONINI, MB in the last 168 hours.    Electrolytes: Electrolytes replaced  Accuchecks: Q6H    Gtts:   propofoL 40 mcg/kg/min (11/06/22 1805)        LDA/Wounds:  Lines/Drains/Airways       Drain  Duration                  Closed/Suction Drain 11/02/22 Right;Superior;Medial Back 4 days         Closed/Suction Drain 11/02/22 Superior;Medial;Left Back Other (Comment) 4 days    Female External Urinary Catheter 11/05/22 1 day         NG/OG Tube 11/06/22 Right nostril <1 day              Airway  Duration                  Airway - Non-Surgical 11/06/22 1628 Endotracheal Tube <1 day              Peripheral Intravenous Line  Duration                  Midline Catheter Insertion/Assessment  - Single Lumen 10/28/22 1858 Right basilic vein (medial side of arm) 18g x 10cm 9 days         Peripheral IV - Single Lumen 11/06/22 20 G Anterior;Proximal;Right Upper Arm <1 day                  Wounds: No  Wound care consulted: No

## 2022-11-07 NOTE — SUBJECTIVE & OBJECTIVE
Review of Systems  Constitutional: Denies fevers, weight loss, chills, or weakness.  Eyes: Denies changes in vision.  ENT: Denies dysphagia, nasal discharge, ear pain or discharge.  Cardiovascular: Denies chest pain, palpitations, orthopnea, or claudication.  Respiratory: Denies shortness of breath, cough, hemoptysis, or wheezing.  GI: Denies nausea/vomitting, hematochezia, melena, abd pain, or changes in appetite.  : Denies dysuria, incontinence, or hematuria.  Musculoskeletal: Denies joint pain or myalgias.  Skin/breast: Denies rashes, lumps, lesions, or discharge.  Neurologic: Denies headache, dizziness, vertigo, or paresthesias.  Psychiatric: Denies changes in mood or hallucinations.  Endocrine: Denies polyuria, polydipsia, heat/cold intolerance.  Hematologic/Lymph: Denies lymphadenopathy, easy bruising or easy bleeding.  Allergic/Immunologic: Denies rash, rhinitis.    Objective:     Vitals:  Temp: 97.5 °F (36.4 °C)  Pulse: 91  Rhythm: normal sinus rhythm  BP: 115/62  MAP (mmHg): 82  Resp: 16  SpO2: 100 %  Oxygen Concentration (%): 60  O2 Device (Oxygen Therapy): ventilator  Vent Mode: Spont  Set Rate: 14 BPM  Vt Set: 385 mL  Pressure Support: 5 cmH20  PEEP/CPAP: 5 cmH20  Peak Airway Pressure: 11 cmH2O  Mean Airway Pressure: 6.7 cmH20  Plateau Pressure: 0 cmH20    Temp  Min: 97.4 °F (36.3 °C)  Max: 98.2 °F (36.8 °C)  Pulse  Min: 51  Max: 128  BP  Min: 86/51  Max: 137/69  MAP (mmHg)  Min: 62  Max: 94  Resp  Min: 10  Max: 39  SpO2  Min: 95 %  Max: 100 %  Oxygen Concentration (%)  Min: 60  Max: 100    11/06 0701 - 11/07 0700  In: 1064.9 [I.V.:77.7]  Out: 471 [Urine:400; Drains:71]   Unmeasured Output  Urine Occurrence: 1  Stool Occurrence: 0  Pad Count: 3       Physical Exam  GA: Alert, comfortable, no acute distress.   HEENT: No scleral icterus or JVD.   Pulmonary: Clear to auscultation A/L. Cardiac: RRR S1 & S2 w/o rubs/murmurs/gallops.   Abdominal: Bowel sounds present x 4. No appreciable  hepatosplenomegaly.  Skin: No jaundice, rashes, or visible lesions.  Neuro:  --GCS: E3 Vt1 M6  --Mental Status:  awake, oriented X4, follows commands  --CN II-XII grossly intact.   --Pupils 5mm, PERRL.   --Corneal reflex, gag, cough intact.  --VILLAGRAN spont, BLE weaker that BUE    Medications:  Continuous ScheduledbisacodyL, 10 mg, Daily  heparin (porcine), 5,000 Units, Q8H  methadone, 90 mg, Daily  nicotine, 1 patch, Daily  oxacillin 12 g in  mL CONTINUOUS INFUSION, 12 g, Q24H  oxyCODONE, 5 mg, Q6H  polyethylene glycol, 17 g, Daily  pregabalin, 225 mg, TID  senna-docusate 8.6-50 mg, 1 tablet, BID    PRNacetaminophen, 650 mg, Q6H PRN  albuterol-ipratropium, 3 mL, Q4H PRN  atropine 1%, 1 drop, Q6H PRN  dextrose 10%, 12.5 g, PRN  dextrose 10%, 25 g, PRN  diazePAM, 5 mg, Q6H PRN  fentaNYL, 50 mcg, Q1H PRN  glucagon (human recombinant), 1 mg, PRN  hydrALAZINE, 10 mg, Q4H PRN  hydrOXYzine HCL, 25 mg, TID PRN  labetalol, 10 mg, Q4H PRN  magnesium oxide, 800 mg, PRN  magnesium oxide, 800 mg, PRN  ondansetron, 4 mg, Q8H PRN  potassium bicarbonate, 35 mEq, PRN  potassium bicarbonate, 50 mEq, PRN  potassium bicarbonate, 60 mEq, PRN  potassium, sodium phosphates, 2 packet, PRN  potassium, sodium phosphates, 2 packet, PRN  potassium, sodium phosphates, 2 packet, PRN  racepinephrine, 0.5 mL, Q4H PRN  simethicone, 1 tablet, TID PRN  sodium chloride 0.9%, 10 mL, PRN      Today I personally reviewed pertinent medications, lines/drains/airways, imaging, cardiology results, laboratory results, microbiology results,     Diet  Diet NPO Except for: Medication

## 2022-11-07 NOTE — ASSESSMENT & PLAN NOTE
-Methadone 90 daily  (prescribed at Located within [facility] clinic on [facility])
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
## 2022-11-07 NOTE — NURSING
Pt arrived back to room 9074 from OR on portable monitor accompanied by MD NAEL, and RN. Report received. TM

## 2022-11-08 LAB
ALBUMIN SERPL BCP-MCNC: 2.3 G/DL (ref 3.5–5.2)
ALLENS TEST: ABNORMAL
ALLENS TEST: ABNORMAL
ALP SERPL-CCNC: 127 U/L (ref 55–135)
ALT SERPL W/O P-5'-P-CCNC: 20 U/L (ref 10–44)
ANION GAP SERPL CALC-SCNC: 10 MMOL/L (ref 8–16)
APTT BLDCRRT: 25.4 SEC (ref 21–32)
AST SERPL-CCNC: 22 U/L (ref 10–40)
BASOPHILS # BLD AUTO: 0.02 K/UL (ref 0–0.2)
BASOPHILS NFR BLD: 0.2 % (ref 0–1.9)
BILIRUB SERPL-MCNC: 0.5 MG/DL (ref 0.1–1)
BUN SERPL-MCNC: 22 MG/DL (ref 6–20)
CALCIUM SERPL-MCNC: 9.2 MG/DL (ref 8.7–10.5)
CHLORIDE SERPL-SCNC: 109 MMOL/L (ref 95–110)
CO2 SERPL-SCNC: 29 MMOL/L (ref 23–29)
CREAT SERPL-MCNC: 0.8 MG/DL (ref 0.5–1.4)
DELSYS: ABNORMAL
DELSYS: ABNORMAL
DIFFERENTIAL METHOD: ABNORMAL
EOSINOPHIL # BLD AUTO: 0 K/UL (ref 0–0.5)
EOSINOPHIL NFR BLD: 0.2 % (ref 0–8)
ERYTHROCYTE [DISTWIDTH] IN BLOOD BY AUTOMATED COUNT: 13.7 % (ref 11.5–14.5)
EST. GFR  (NO RACE VARIABLE): >60 ML/MIN/1.73 M^2
FIO2: 40
FIO2: 40
GLUCOSE SERPL-MCNC: 96 MG/DL (ref 70–110)
HCO3 UR-SCNC: 31.4 MMOL/L (ref 24–28)
HCO3 UR-SCNC: 33.9 MMOL/L (ref 24–28)
HCT VFR BLD AUTO: 26.4 % (ref 37–48.5)
HGB BLD-MCNC: 8.1 G/DL (ref 12–16)
IMM GRANULOCYTES # BLD AUTO: 0.07 K/UL (ref 0–0.04)
IMM GRANULOCYTES NFR BLD AUTO: 0.8 % (ref 0–0.5)
LYMPHOCYTES # BLD AUTO: 3 K/UL (ref 1–4.8)
LYMPHOCYTES NFR BLD: 32.9 % (ref 18–48)
MAGNESIUM SERPL-MCNC: 2.2 MG/DL (ref 1.6–2.6)
MCH RBC QN AUTO: 30.1 PG (ref 27–31)
MCHC RBC AUTO-ENTMCNC: 30.7 G/DL (ref 32–36)
MCV RBC AUTO: 98 FL (ref 82–98)
MODE: ABNORMAL
MODE: ABNORMAL
MONOCYTES # BLD AUTO: 0.6 K/UL (ref 0.3–1)
MONOCYTES NFR BLD: 6.4 % (ref 4–15)
NEUTROPHILS # BLD AUTO: 5.5 K/UL (ref 1.8–7.7)
NEUTROPHILS NFR BLD: 59.5 % (ref 38–73)
NRBC BLD-RTO: 0 /100 WBC
PCO2 BLDA: 46.9 MMHG (ref 35–45)
PCO2 BLDA: 53.7 MMHG (ref 35–45)
PEEP: 5
PEEP: 5
PH SMN: 7.41 [PH] (ref 7.35–7.45)
PH SMN: 7.43 [PH] (ref 7.35–7.45)
PHOSPHATE SERPL-MCNC: 4.2 MG/DL (ref 2.7–4.5)
PLATELET # BLD AUTO: 338 K/UL (ref 150–450)
PMV BLD AUTO: 10.8 FL (ref 9.2–12.9)
PO2 BLDA: 21 MMHG (ref 80–100)
PO2 BLDA: 89 MMHG (ref 80–100)
POC BE: 7 MMOL/L
POC BE: 9 MMOL/L
POC SATURATED O2: 33 % (ref 95–100)
POC SATURATED O2: 97 % (ref 95–100)
POC TCO2: 33 MMOL/L (ref 23–27)
POC TCO2: 36 MMOL/L (ref 23–27)
POCT GLUCOSE: 129 MG/DL (ref 70–110)
POCT GLUCOSE: 73 MG/DL (ref 70–110)
POCT GLUCOSE: 81 MG/DL (ref 70–110)
POTASSIUM SERPL-SCNC: 4.1 MMOL/L (ref 3.5–5.1)
PROT SERPL-MCNC: 6.5 G/DL (ref 6–8.4)
PS: 5
PS: 5
RBC # BLD AUTO: 2.69 M/UL (ref 4–5.4)
SAMPLE: ABNORMAL
SAMPLE: ABNORMAL
SITE: ABNORMAL
SITE: ABNORMAL
SODIUM SERPL-SCNC: 148 MMOL/L (ref 136–145)
SPONT RATE: 12
SPONT RATE: 13
WBC # BLD AUTO: 9.19 K/UL (ref 3.9–12.7)

## 2022-11-08 PROCEDURE — 94761 N-INVAS EAR/PLS OXIMETRY MLT: CPT

## 2022-11-08 PROCEDURE — 25000003 PHARM REV CODE 250: Performed by: PSYCHIATRY & NEUROLOGY

## 2022-11-08 PROCEDURE — 63600175 PHARM REV CODE 636 W HCPCS: Performed by: NURSE PRACTITIONER

## 2022-11-08 PROCEDURE — 99900035 HC TECH TIME PER 15 MIN (STAT)

## 2022-11-08 PROCEDURE — 25000003 PHARM REV CODE 250

## 2022-11-08 PROCEDURE — 27000207 HC ISOLATION

## 2022-11-08 PROCEDURE — 27000221 HC OXYGEN, UP TO 24 HOURS

## 2022-11-08 PROCEDURE — 82803 BLOOD GASES ANY COMBINATION: CPT

## 2022-11-08 PROCEDURE — 25000003 PHARM REV CODE 250: Performed by: NURSE PRACTITIONER

## 2022-11-08 PROCEDURE — 51798 US URINE CAPACITY MEASURE: CPT

## 2022-11-08 PROCEDURE — 85730 THROMBOPLASTIN TIME PARTIAL: CPT | Performed by: PSYCHIATRY & NEUROLOGY

## 2022-11-08 PROCEDURE — 83735 ASSAY OF MAGNESIUM: CPT | Performed by: PHYSICIAN ASSISTANT

## 2022-11-08 PROCEDURE — 84100 ASSAY OF PHOSPHORUS: CPT | Performed by: PHYSICIAN ASSISTANT

## 2022-11-08 PROCEDURE — 99233 SBSQ HOSP IP/OBS HIGH 50: CPT | Mod: ,,, | Performed by: PSYCHIATRY & NEUROLOGY

## 2022-11-08 PROCEDURE — 99900026 HC AIRWAY MAINTENANCE (STAT)

## 2022-11-08 PROCEDURE — S4991 NICOTINE PATCH NONLEGEND: HCPCS | Performed by: PHYSICIAN ASSISTANT

## 2022-11-08 PROCEDURE — 51701 INSERT BLADDER CATHETER: CPT

## 2022-11-08 PROCEDURE — 85025 COMPLETE CBC W/AUTO DIFF WBC: CPT | Performed by: PHYSICIAN ASSISTANT

## 2022-11-08 PROCEDURE — 63600175 PHARM REV CODE 636 W HCPCS

## 2022-11-08 PROCEDURE — 36600 WITHDRAWAL OF ARTERIAL BLOOD: CPT

## 2022-11-08 PROCEDURE — 94003 VENT MGMT INPAT SUBQ DAY: CPT

## 2022-11-08 PROCEDURE — 99233 PR SUBSEQUENT HOSPITAL CARE,LEVL III: ICD-10-PCS | Mod: ,,, | Performed by: PSYCHIATRY & NEUROLOGY

## 2022-11-08 PROCEDURE — 25000003 PHARM REV CODE 250: Performed by: PHYSICIAN ASSISTANT

## 2022-11-08 PROCEDURE — 20000000 HC ICU ROOM

## 2022-11-08 PROCEDURE — 80053 COMPREHEN METABOLIC PANEL: CPT | Performed by: PHYSICIAN ASSISTANT

## 2022-11-08 RX ORDER — FENTANYL CITRATE 50 UG/ML
50 INJECTION, SOLUTION INTRAMUSCULAR; INTRAVENOUS EVERY 4 HOURS
Status: DISCONTINUED | OUTPATIENT
Start: 2022-11-08 | End: 2022-11-09

## 2022-11-08 RX ORDER — FAMOTIDINE 20 MG/1
20 TABLET, FILM COATED ORAL 2 TIMES DAILY
Status: DISCONTINUED | OUTPATIENT
Start: 2022-11-08 | End: 2022-11-09

## 2022-11-08 RX ORDER — SILODOSIN 4 MG/1
4 CAPSULE ORAL DAILY
Status: DISCONTINUED | OUTPATIENT
Start: 2022-11-08 | End: 2022-11-21 | Stop reason: HOSPADM

## 2022-11-08 RX ADMIN — OXYCODONE 5 MG: 5 TABLET ORAL at 05:11

## 2022-11-08 RX ADMIN — FENTANYL CITRATE 50 MCG: 0.05 INJECTION, SOLUTION INTRAMUSCULAR; INTRAVENOUS at 04:11

## 2022-11-08 RX ADMIN — HEPARIN SODIUM 5000 UNITS: 5000 INJECTION INTRAVENOUS; SUBCUTANEOUS at 09:11

## 2022-11-08 RX ADMIN — PREGABALIN 200 MG: 150 CAPSULE ORAL at 02:11

## 2022-11-08 RX ADMIN — FAMOTIDINE 20 MG: 20 TABLET ORAL at 09:11

## 2022-11-08 RX ADMIN — OXACILLIN SODIUM 12 G: 10 INJECTION, POWDER, FOR SOLUTION INTRAVENOUS at 02:11

## 2022-11-08 RX ADMIN — Medication 1 PATCH: at 08:11

## 2022-11-08 RX ADMIN — SILODOSIN 4 MG: 4 CAPSULE ORAL at 11:11

## 2022-11-08 RX ADMIN — FENTANYL CITRATE 50 MCG: 0.05 INJECTION, SOLUTION INTRAMUSCULAR; INTRAVENOUS at 08:11

## 2022-11-08 RX ADMIN — OXYCODONE 5 MG: 5 TABLET ORAL at 11:11

## 2022-11-08 RX ADMIN — FENTANYL CITRATE 50 MCG: 0.05 INJECTION, SOLUTION INTRAMUSCULAR; INTRAVENOUS at 02:11

## 2022-11-08 RX ADMIN — HEPARIN SODIUM 5000 UNITS: 5000 INJECTION INTRAVENOUS; SUBCUTANEOUS at 05:11

## 2022-11-08 RX ADMIN — DIAZEPAM 5 MG: 5 TABLET ORAL at 01:11

## 2022-11-08 RX ADMIN — METHADONE HYDROCHLORIDE 90 MG: 10 TABLET ORAL at 02:11

## 2022-11-08 RX ADMIN — PREGABALIN 200 MG: 150 CAPSULE ORAL at 09:11

## 2022-11-08 RX ADMIN — SENNOSIDES AND DOCUSATE SODIUM 1 TABLET: 50; 8.6 TABLET ORAL at 08:11

## 2022-11-08 RX ADMIN — FENTANYL CITRATE 50 MCG: 0.05 INJECTION, SOLUTION INTRAMUSCULAR; INTRAVENOUS at 05:11

## 2022-11-08 RX ADMIN — SENNOSIDES AND DOCUSATE SODIUM 1 TABLET: 50; 8.6 TABLET ORAL at 09:11

## 2022-11-08 RX ADMIN — HEPARIN SODIUM 5000 UNITS: 5000 INJECTION INTRAVENOUS; SUBCUTANEOUS at 02:11

## 2022-11-08 RX ADMIN — HYDROXYZINE HYDROCHLORIDE 25 MG: 25 TABLET, FILM COATED ORAL at 09:11

## 2022-11-08 RX ADMIN — OXYCODONE 5 MG: 5 TABLET ORAL at 12:11

## 2022-11-08 RX ADMIN — HYDROXYZINE HYDROCHLORIDE 25 MG: 25 TABLET, FILM COATED ORAL at 04:11

## 2022-11-08 RX ADMIN — PREGABALIN 225 MG: 75 CAPSULE ORAL at 08:11

## 2022-11-08 RX ADMIN — FENTANYL CITRATE 50 MCG: 0.05 INJECTION, SOLUTION INTRAMUSCULAR; INTRAVENOUS at 10:11

## 2022-11-08 RX ADMIN — POLYETHYLENE GLYCOL 3350 17 G: 17 POWDER, FOR SOLUTION ORAL at 08:11

## 2022-11-08 NOTE — SUBJECTIVE & OBJECTIVE
Review of Systems  Review of Symptoms:  Constitutional: Denies fevers, weight loss, chills, or weakness.  Eyes: Denies changes in vision.  ENT: Denies dysphagia, nasal discharge, ear pain or discharge.  Cardiovascular: Denies chest pain, palpitations, orthopnea, or claudication.  Respiratory: Denies shortness of breath, cough, hemoptysis, or wheezing.  GI: Denies nausea/vomitting, hematochezia, melena, abd pain, or changes in appetite.  : Denies dysuria, incontinence, or hematuria.  Musculoskeletal: Denies joint pain or myalgias.  Skin/breast: Denies rashes, lumps, lesions, or discharge.  Neurologic: Denies headache, dizziness, vertigo, or paresthesias.  Psychiatric: Denies changes in mood or hallucinations.  Endocrine: Denies polyuria, polydipsia, heat/cold intolerance.  Hematologic/Lymph: Denies lymphadenopathy, easy bruising or easy bleeding.  Allergic/Immunologic: Denies rash, rhinitis.    Objective:     Vitals:  Temp: 98.9 °F (37.2 °C)  Pulse: 77  Rhythm: normal sinus rhythm  BP: (!) 153/68  MAP (mmHg): 98  Resp: 17  SpO2: 99 %  Oxygen Concentration (%): 28  O2 Device (Oxygen Therapy): Trach Collar  Vent Mode: Spont  Pressure Support: 5 cmH20  PEEP/CPAP: 5 cmH20  Peak Airway Pressure: 11 cmH2O  Mean Airway Pressure: 6.7 cmH20  Plateau Pressure: 0 cmH20    Temp  Min: 97.2 °F (36.2 °C)  Max: 99.1 °F (37.3 °C)  Pulse  Min: 54  Max: 98  BP  Min: 99/51  Max: 180/77  MAP (mmHg)  Min: 69  Max: 139  Resp  Min: 11  Max: 45  SpO2  Min: 96 %  Max: 100 %  Oxygen Concentration (%)  Min: 28  Max: 60    11/07 0701 - 11/08 0700  In: 420.2   Out: 765 [Urine:750; Drains:15]   Unmeasured Output  Urine Occurrence: 1  Stool Occurrence: 0  Pad Count: 3       Physical Exam  GA: Alert, comfortable, no acute distress.   HEENT: No scleral icterus or JVD.   Pulmonary: Clear to auscultation A/L.  Cardiac: RRR S1 & S2 w/o rubs/murmurs/gallops.   Abdominal: Bowel sounds present x 4. No appreciable hepatosplenomegaly.  Skin: No  jaundice, rashes, or visible lesions.  Neuro:  --GCS: E4 V5 M6  --Mental Status:  awake, oriented X4  follows all commands, writes down all her qts  --CN II-XII grossly intact.   --Pupils 4mm, PERRL.   --Corneal reflex, gag, cough intact.  --VILLAGRAN spont        Medications:  Continuous ScheduledbisacodyL, 10 mg, Daily  famotidine, 20 mg, BID  fentaNYL, 50 mcg, Q4H  heparin (porcine), 5,000 Units, Q8H  methadone, 90 mg, Daily  nicotine, 1 patch, Daily  oxacillin 12 g in  mL CONTINUOUS INFUSION, 12 g, Q24H  oxyCODONE, 5 mg, Q6H  polyethylene glycol, 17 g, Daily  pregabalin, 200 mg, TID  senna-docusate 8.6-50 mg, 1 tablet, BID  silodosin, 4 mg, Daily    PRNacetaminophen, 650 mg, Q6H PRN  albuterol-ipratropium, 3 mL, Q4H PRN  atropine 1%, 1 drop, Q6H PRN  benzocaine, , TID PRN  dextrose 10%, 12.5 g, PRN  dextrose 10%, 25 g, PRN  diazePAM, 5 mg, Q6H PRN  glucagon (human recombinant), 1 mg, PRN  hydrALAZINE, 10 mg, Q4H PRN  hydrOXYzine HCL, 25 mg, TID PRN  labetalol, 10 mg, Q4H PRN  magnesium oxide, 800 mg, PRN  magnesium oxide, 800 mg, PRN  ondansetron, 4 mg, Q8H PRN  potassium bicarbonate, 35 mEq, PRN  potassium bicarbonate, 50 mEq, PRN  potassium bicarbonate, 60 mEq, PRN  potassium, sodium phosphates, 2 packet, PRN  potassium, sodium phosphates, 2 packet, PRN  potassium, sodium phosphates, 2 packet, PRN  racepinephrine, 0.5 mL, Q4H PRN  simethicone, 1 tablet, TID PRN  sodium chloride 0.9%, 10 mL, PRN      Today I personally reviewed pertinent medications, lines/drains/airways, imaging, cardiology results, laboratory results, microbiology results,    Diet  Diet NPO Except for: Medication

## 2022-11-08 NOTE — ASSESSMENT & PLAN NOTE
- currently intubated, on spontaneous  - OR today, will reassess and attempt to wean post procedurally   - CXR and KUB post op with L sided atelectasis/ground glass infiltrate/pleural effusion  - extubated to NC 11/4/22  - re-intubated 11/5/22 due to stridor  - ENT consulted for trach 2 failed extubations  - Will re-attempt extubation with ENT @ bedside  - 10 mg Dexa given  11/08/2022 s/p  trach placement  On 11/7, CXR reviewed today, continue to tolerate Trach Collar

## 2022-11-08 NOTE — RESPIRATORY THERAPY
The blood gas listed for 11/8/2022 at 0327 is venous. An arterial radial stick was attempted and venous blood was obtained. The results are:   11/8/2022  0327  pH  7.409  PvCO2 53.7  PvO2 21  HCO3 33.9  BE 9  SvO2 33  This result is venous and is listed as arterial. Chart corrections has been contacted.

## 2022-11-08 NOTE — PLAN OF CARE
[facility] Care Plan    POC reviewed with Peter Stiles and family at 0300. Pt verbalized understanding. Questions and concerns addressed. No acute events overnight. Pt progressing toward goals. Will continue to monitor. See below and flowsheets for full assessment and VS info.           Is this a stroke patient? no    Neuro:  Lucien Coma Scale  Best Eye Response: 3-->(E3) to speech  Best Motor Response: 6-->(M6) obeys commands  Best Verbal Response: 1-->(V1) none  [place] Coma Scale Score: 10  Assessment Qualifiers: patient intubated  Pupil PERRLA: yes     24hr Temp:  [97.2 °F (36.2 °C)-97.7 °F (36.5 °C)]     CV:   Rhythm: sinus bradycardia, normal sinus rhythm  BP goals:   SBP < 160  MAP > 65    Resp:   O2 Device (Oxygen Therapy): ventilator  Vent Mode: Spont  Set Rate: 14 BPM  Oxygen Concentration (%): 40  Vt Set: 385 mL  PEEP/CPAP: 5 cmH20  Pressure Support: 5 cmH20    Plan: trach in place    GI/:     Diet/Nutrition Received: NPO  Last Bowel Movement: 11/06/22  Voiding Characteristics: external catheter    Intake/Output Summary (Last 24 hours) at 11/8/2022 0519  Last data filed at 11/8/2022 0330  Gross per 24 hour   Intake 452.89 ml   Output 795 ml   Net -342.11 ml     Unmeasured Output  Urine Occurrence: 1  Stool Occurrence: 0  Pad Count: 3    Labs/Accuchecks:  Recent Labs   Lab 11/08/22  0041   WBC 9.19   RBC 2.69*   HGB 8.1*   HCT 26.4*         Recent Labs   Lab 11/08/22  0041   *   K 4.1   CO2 29      BUN 22*   CREATININE 0.8   ALKPHOS 127   ALT 20   AST 22   BILITOT 0.5      Recent Labs   Lab 11/01/22  2128   INR 1.0   APTT 26.0    No results for input(s): CPK, CPKMB, TROPONINI, MB in the last 168 hours.    Electrolytes: N/A - electrolytes WDL  Accuchecks: none    Gtts:      LDA/Wounds:  Lines/Drains/Airways       Drain  Duration                  Closed/Suction Drain 11/02/22 Right;Superior;Medial Back 6 days         Closed/Suction Drain 11/02/22 Superior;Medial;Left Back Other  (Comment) 6 days         NG/OG Tube 11/06/22 Right nostril 2 days              Airway  Duration                  Surgical Airway 11/07/22 1449 Shiley Cuffed <1 day              Peripheral Intravenous Line  Duration                  Midline Catheter Insertion/Assessment  - Single Lumen 10/28/22 1858 Right basilic vein (medial side of arm) 18g x 10cm 10 days         Peripheral IV - Single Lumen 11/06/22 20 G Anterior;Proximal;Right Upper Arm 2 days                  Wounds: Yes  Wound care consulted: No

## 2022-11-08 NOTE — SUBJECTIVE & OBJECTIVE
Interval History:     S/p trach with ENT.Neuro exam stable.        Medications:  Continuous Infusions:   propofoL Stopped (11/06/22 2258)     Scheduled Meds:   bisacodyL  10 mg Rectal Daily    heparin (porcine)  5,000 Units Subcutaneous Q8H    methadone  90 mg Per NG tube Daily    nicotine  1 patch Transdermal Daily    oxacillin 12 g in  mL CONTINUOUS INFUSION  12 g Intravenous Q24H    oxyCODONE  5 mg Per NG tube Q6H    polyethylene glycol  17 g Per NG tube Daily    pregabalin  225 mg Per NG tube TID    senna-docusate 8.6-50 mg  1 tablet Per NG tube BID     PRN Meds:acetaminophen, albuterol-ipratropium, atropine 1%, dextrose 10%, dextrose 10%, fentaNYL, glucagon (human recombinant), hydrALAZINE, hydrOXYzine HCL, labetalol, magnesium oxide, magnesium oxide, ondansetron, potassium bicarbonate, potassium bicarbonate, potassium bicarbonate, potassium, sodium phosphates, potassium, sodium phosphates, potassium, sodium phosphates, racepinephrine, simethicone, sodium chloride 0.9%     Review of Systems  Objective:     Weight:  (daylight savings)  Body mass index is 22.6 kg/m².  Vital Signs (Most Recent):  Temp: 97.4 °F (36.3 °C) (11/07/22 0701)  Pulse: 94 (11/07/22 0901)  Resp: (!) 28 (11/07/22 0901)  BP: 113/66 (11/07/22 0901)  SpO2: 100 % (11/07/22 0901)   Vital Signs (24h Range):  Temp:  [97.4 °F (36.3 °C)-98.2 °F (36.8 °C)] 97.4 °F (36.3 °C)  Pulse:  [] 94  Resp:  [10-50] 28  SpO2:  [90 %-100 %] 100 %  BP: ()/(48-95) 113/66     Date 11/07/22 0700 - 11/08/22 0659   Shift 7347-0249 5131-7463 8590-5713 24 Hour Total   INTAKE   IV Piggyback 60.8   60.8   Shift Total(mL/kg) 60.8(1)   60.8(1)   OUTPUT   Shift Total(mL/kg)       Weight (kg) 63.5 63.5 63.5 63.5              Vent Mode: A/C  Oxygen Concentration (%):  [] 60  Resp Rate Total:  [10 br/min-70 br/min] 17 br/min  Vt Set:  [0 mL-385 mL] 385 mL  PEEP/CPAP:  [5 cmH20] 5 cmH20  Pressure Support:  [8 cmH20] 8 cmH20  Mean Airway Pressure:  [7.2  pqN05-52 cmH20] 8.1 cmH20         Closed/Suction Drain 11/02/22 Superior;Medial;Left Back Other (Comment) (Active)   Site Description Unable to view 11/06/22 0305   Dressing Type Transparent (Tegaderm) 11/06/22 0305   Dressing Status Clean;Dry 11/06/22 0305   Dressing Intervention Integrity maintained 11/06/22 0305   Drainage Sanguineous 11/06/22 0305   Status Other (Comment) 11/06/22 0305   Output (mL) 30 mL 11/06/22 0605            Closed/Suction Drain 11/02/22 Right;Superior;Medial Back (Active)   Site Description Unable to view 11/06/22 0305   Dressing Type Transparent (Tegaderm) 11/06/22 0305   Dressing Status Clean;Dry;Intact 11/06/22 0305   Dressing Intervention Integrity maintained 11/06/22 0305   Drainage Sanguineous 11/06/22 0305   Status Other (Comment) 11/06/22 0305   Output (mL) 10 mL 11/06/22 0605            NG/OG Tube 10/29/22 1000 Left nostril (Active)   Placement Check placement verified by x-ray;placement verified by distal tube length measurement 11/06/22 0305   Tolerance no signs/symptoms of discomfort 11/06/22 0305   Securement secured to nostril center w/ adhesive device 11/06/22 0305   Clamp Status/Tolerance unclamped 11/06/22 0305   Suction Setting/Drainage Method suction at the bedside 11/06/22 0305   Insertion Site Appearance no redness, warmth, tenderness, skin breakdown, drainage 11/06/22 0305   Drainage None 11/06/22 0305   Flush/Irrigation flushed w/;water;no resistance met 11/06/22 0305   Feeding Type continuous;by pump 11/06/22 0305   Feeding Action feeding continued 11/06/22 0305   Current Rate (mL/hr) 40 mL/hr 11/05/22 1905   Goal Rate (mL/hr) 40 mL/hr 11/05/22 1905   Intake (mL) 200 mL 11/06/22 0605   Water Bolus (mL) 50 mL 11/01/22 1901   Rate Formula Tube Feeding (mL/hr) 40 mL/hr 11/05/22 1905   Formula Name diabetasource 11/06/22 0305   Intake (mL) - Formula Tube Feeding 40 11/06/22 0605   Residual Amount (ml) 2 ml 11/01/22 1901       Female External Urinary Catheter 11/05/22  (Active)   Skin no redness;no breakdown 11/06/22 0305   Tolerance no signs/symptoms of discomfort 11/06/22 0305   Suction Continuous suction at 60 mmHg 11/05/22 1905   Date of last wick change 11/06/22 11/05/22 1905   Time of last wick change 1930 11/05/22 1905       Neurosurgery Physical Exam  FC x 4  Intubated. Trach in place  No distress  VILLAGRAN full strength  In collar, incisions c/d/I  SILT        Significant Labs:  Recent Labs   Lab 11/06/22 0135 11/06/22 0740 11/07/22 0130   GLU 81 89 120*   * 145 145   K 3.1* 3.1* 4.5   * 109 106   CO2 22* 25 30*   BUN 24* 22* 22*   CREATININE 0.7 0.7 0.7   CALCIUM 8.4* 8.7 9.4   MG 1.8  --  2.3       Recent Labs   Lab 11/06/22 0135 11/06/22 0740 11/07/22 0130   WBC 9.17 10.23 12.22   HGB 6.2* 8.3* 8.5*   HCT 20.0* 26.7* 26.7*    348 329       No results for input(s): LABPT, INR, APTT in the last 48 hours.  Microbiology Results (last 7 days)       Procedure Component Value Units Date/Time    Culture, Anaerobe [[id number]] Collected: 10/28/22 1029    Order Status: Completed Specimen: Wound from Neck Updated: 11/07/22 0632     Anaerobic Culture No anaerobes isolated    Narrative:      4) Epidural phlegman    Blood culture [[id number]] Collected: 10/29/22 0623    Order Status: Completed Specimen: Blood from Peripheral, Foot, Left Updated: 11/03/22 0812     Blood Culture, Routine No growth after 5 days.    Blood culture [[id number]] Collected: 10/29/22 0621    Order Status: Completed Specimen: Blood from Peripheral, Foot, Right Updated: 11/03/22 0812     Blood Culture, Routine No growth after 5 days.    Aerobic culture [[id number]]  (Abnormal)  (Susceptibility) Collected: 10/28/22 0906    Order Status: Completed Specimen: Abscess from Back Updated: 11/01/22 1059     Aerobic Bacterial Culture STAPHYLOCOCCUS AUREUS  Rare      Narrative:      Prevertebral Abscess    Aerobic culture [[id number]]  (Abnormal)  (Susceptibility) Collected: 10/28/22 0904    Order Status:  Completed Specimen: Wound from Neck Updated: 11/01/22 1039     Aerobic Bacterial Culture STAPHYLOCOCCUS AUREUS  Rare      Narrative:      3) Osteodiscitis    Blood culture [[id number]] Collected: 10/27/22 0939    Order Status: Completed Specimen: Blood Updated: 11/01/22 1012     Blood Culture, Routine No growth after 5 days.    Narrative:      Rt wrist    Blood culture [[id number]] Collected: 10/27/22 0938    Order Status: Completed Specimen: Blood Updated: 11/01/22 1012     Blood Culture, Routine No growth after 5 days.    Narrative:      Rt AC    Culture, Anaerobe [[id number]] Collected: 10/28/22 0950    Order Status: Completed Specimen: Wound from Neck Updated: 11/01/22 0724     Anaerobic Culture No anaerobes isolated    Narrative:      3) Osteodiscitis    Culture, Anaerobe [[id number]] Collected: 10/28/22 0924    Order Status: Completed Specimen: Abscess from Neck Updated: 11/01/22 0722     Anaerobic Culture No anaerobes isolated    Narrative:      2) Prevertebral absess    Culture, Anaerobe [[id number]] Collected: 10/28/22 0906    Order Status: Completed Specimen: Abscess from Back Updated: 11/01/22 0722     Anaerobic Culture No anaerobes isolated    Narrative:      Prevertebral Abscess    AFB Culture & Smear [[id number]] Collected: 10/28/22 1029    Order Status: Completed Specimen: Wound from Neck Updated: 10/31/22 1250     AFB Culture & Smear Culture in progress     AFB CULTURE STAIN No acid fast bacilli seen.    Narrative:      4) Epidural phlegman    AFB Culture & Smear [[id number]] Collected: 10/28/22 0924    Order Status: Completed Specimen: Abscess from Neck Updated: 10/31/22 1250     AFB Culture & Smear Culture in progress     AFB CULTURE STAIN No acid fast bacilli seen.    Narrative:      2) Prevertebral absess    AFB Culture & Smear [[id number]] Collected: 10/28/22 0906    Order Status: Completed Specimen: Abscess from Back Updated: 10/31/22 1250     AFB Culture & Smear Culture in progress     AFB CULTURE  STAIN No acid fast bacilli seen.    Narrative:      Prevertebral Abscess    AFB Culture & Smear [[id number]] Collected: 10/28/22 0950    Order Status: Completed Specimen: Wound from Neck Updated: 10/31/22 1250     AFB Culture & Smear Culture in progress     AFB CULTURE STAIN No acid fast bacilli seen.    Narrative:      3) Osteodiscitis    Fungus culture [[id number]] Collected: 10/28/22 0950    Order Status: Completed Specimen: Wound from Neck Updated: 10/31/22 0958     Fungus (Mycology) Culture Culture in progress    Narrative:      3) Osteodiscitis    Fungus culture [[id number]] Collected: 10/28/22 1029    Order Status: Completed Specimen: Wound from Neck Updated: 10/31/22 0958     Fungus (Mycology) Culture Culture in progress    Narrative:      4) Epidural phlegman    Fungus culture [[id number]] Collected: 10/28/22 0906    Order Status: Completed Specimen: Abscess from Back Updated: 10/31/22 0958     Fungus (Mycology) Culture Culture in progress    Narrative:      Prevertebral Abscess    Fungus culture [[id number]] Collected: 10/28/22 0924    Order Status: Completed Specimen: Abscess from Neck Updated: 10/31/22 0958     Fungus (Mycology) Culture Culture in progress    Narrative:      2) Prevertebral absess          All pertinent labs from the last 24 hours have been reviewed.    Significant Diagnostics:  I have reviewed and interpreted all pertinent imaging results/findings within the past 24 hours.
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
## 2022-11-08 NOTE — ASSESSMENT & PLAN NOTE
Peter Stiles is a 51 y.o. female with PMHx of HTN, dilated cardiomyopathy, h/o opioid dependence, cigarette smoker (2pks/day), and daily baby ASA use who presents with 1 day of decreased sensation from T5 level down and tingling in her bilateral fingers and bilateral toes. MRI imaging obtained in the ED showing possible C5-6 osteodiscitis/myelitis with cord compression. Patient tachycardic and hypertensive on arrival, afebrile, with leukocytosis.     --All pertinent imaging/labs personally reviewed and interpreted.   --MRI C/T/L spine w/o contrast 10/25: focal kyphotic deformity at C5/6 with possible discitis/osteomyelitis,  epidural collection extending into the central spinal canal resulting in severe stenosis and cord signal  abnormality.  Prevertebral soft tissue edema and probable paravertebral abscess or phlegmon at this level. --Thoracic and lumbar spine unremarkable.   --MRI C spine w/ contrast 10/25: confirmed enhancement at C5/6 consistent with osteomyelitis/discitis and epidural phlegmon      Now s/p C5-6 corpectomy on 10/28/22  C2-T2 PCDF on 11/2  S/p tarch 11/7    --Admitted to neuro ICU  --Intubated after index procedure and kept intubated for posterior fixation and for tentative LISHA 11/4 which was eventually cancelled due to inability to adequately extend neck  --Extubated 11/4 but reintubated later that day for respiratory distress and stridor. Extubated again 11/6 but reintubated now s/p tarc 11/7  --ENT onboard.Vocal cord paresis vs edema. Cuff leak present. Trach per ENT  --BCx 10/25 growing MSSA, Repeat BCx's 10/27 NGTD  --OR cultures 10/28 no organisms on gram stain, culture MSSA  --Post op xrays, anterior hardware in good position; xray Csp for posterior hardware pending  --ID following   --Maintain drains to full suction. Will pull out one of the drains   --Continue C collar when upright   --CT Soft tissue neck reviewed with stable hardware position s/p 360 cervical fusion ow expected  postop changes  --Pain control as needed  --Abx: Oxacillin   --Haroon   --PT/OT. SQH  --Please call NSGY with any questions/concerns    Dispo: ICU. Wean trach per NCC

## 2022-11-08 NOTE — PROGRESS NOTES
Otorhinolaryngology-Head & Neck Surgery  Progress Note    Subjective:     Interval History: no changes overnight     Objective:     Vital Signs (24h Range):  Temp:  [97.2 °F (36.2 °C)-97.7 °F (36.5 °C)] 97.2 °F (36.2 °C)  Pulse:  [54-94] 88  Resp:  [11-41] 16  SpO2:  [95 %-100 %] 99 %  BP: ()/() 162/127        Spont eye opening  Hard c collar in place  6-0 cuffed tracheostomy tube sutured to anterior neck and collared     Assessment/Plan:     Upper airway obstruction 2/2 Bilateral vocal fold hypomobility  - s/p tracheostomy 11/7  - will cut trach sutures and exchange to cuffless trach on 11/12 if no longer requiring ppv
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
## 2022-11-09 LAB
ALBUMIN SERPL BCP-MCNC: 2.6 G/DL (ref 3.5–5.2)
ALLENS TEST: ABNORMAL
ALP SERPL-CCNC: 142 U/L (ref 55–135)
ALT SERPL W/O P-5'-P-CCNC: 32 U/L (ref 10–44)
ANION GAP SERPL CALC-SCNC: 11 MMOL/L (ref 8–16)
AST SERPL-CCNC: 41 U/L (ref 10–40)
BASOPHILS # BLD AUTO: 0.02 K/UL (ref 0–0.2)
BASOPHILS NFR BLD: 0.2 % (ref 0–1.9)
BILIRUB SERPL-MCNC: 0.9 MG/DL (ref 0.1–1)
BUN SERPL-MCNC: 19 MG/DL (ref 6–20)
CALCIUM SERPL-MCNC: 9.7 MG/DL (ref 8.7–10.5)
CHLORIDE SERPL-SCNC: 106 MMOL/L (ref 95–110)
CO2 SERPL-SCNC: 28 MMOL/L (ref 23–29)
CREAT SERPL-MCNC: 0.8 MG/DL (ref 0.5–1.4)
DELSYS: ABNORMAL
DIFFERENTIAL METHOD: ABNORMAL
EOSINOPHIL # BLD AUTO: 0.2 K/UL (ref 0–0.5)
EOSINOPHIL NFR BLD: 2.1 % (ref 0–8)
ERYTHROCYTE [DISTWIDTH] IN BLOOD BY AUTOMATED COUNT: 13.9 % (ref 11.5–14.5)
EST. GFR  (NO RACE VARIABLE): >60 ML/MIN/1.73 M^2
FIO2: 28
FLOW: 5
GLUCOSE SERPL-MCNC: 60 MG/DL (ref 70–110)
HCO3 UR-SCNC: 30.2 MMOL/L (ref 24–28)
HCT VFR BLD AUTO: 32.3 % (ref 37–48.5)
HGB BLD-MCNC: 9.8 G/DL (ref 12–16)
IMM GRANULOCYTES # BLD AUTO: 0.08 K/UL (ref 0–0.04)
IMM GRANULOCYTES NFR BLD AUTO: 0.9 % (ref 0–0.5)
LYMPHOCYTES # BLD AUTO: 4.2 K/UL (ref 1–4.8)
LYMPHOCYTES NFR BLD: 45.2 % (ref 18–48)
MAGNESIUM SERPL-MCNC: 2 MG/DL (ref 1.6–2.6)
MCH RBC QN AUTO: 29.9 PG (ref 27–31)
MCHC RBC AUTO-ENTMCNC: 30.3 G/DL (ref 32–36)
MCV RBC AUTO: 99 FL (ref 82–98)
MODE: ABNORMAL
MONOCYTES # BLD AUTO: 0.6 K/UL (ref 0.3–1)
MONOCYTES NFR BLD: 6 % (ref 4–15)
NEUTROPHILS # BLD AUTO: 4.2 K/UL (ref 1.8–7.7)
NEUTROPHILS NFR BLD: 45.6 % (ref 38–73)
NRBC BLD-RTO: 0 /100 WBC
PCO2 BLDA: 40.8 MMHG (ref 35–45)
PH SMN: 7.48 [PH] (ref 7.35–7.45)
PHOSPHATE SERPL-MCNC: 4.3 MG/DL (ref 2.7–4.5)
PLATELET # BLD AUTO: 331 K/UL (ref 150–450)
PMV BLD AUTO: 10.2 FL (ref 9.2–12.9)
PO2 BLDA: 78 MMHG (ref 80–100)
POC BE: 7 MMOL/L
POC SATURATED O2: 96 % (ref 95–100)
POC TCO2: 31 MMOL/L (ref 23–27)
POTASSIUM SERPL-SCNC: 3.4 MMOL/L (ref 3.5–5.1)
PROT SERPL-MCNC: 7.2 G/DL (ref 6–8.4)
RBC # BLD AUTO: 3.28 M/UL (ref 4–5.4)
SAMPLE: ABNORMAL
SITE: ABNORMAL
SODIUM SERPL-SCNC: 145 MMOL/L (ref 136–145)
WBC # BLD AUTO: 9.19 K/UL (ref 3.9–12.7)

## 2022-11-09 PROCEDURE — 99900035 HC TECH TIME PER 15 MIN (STAT)

## 2022-11-09 PROCEDURE — 84100 ASSAY OF PHOSPHORUS: CPT | Performed by: PHYSICIAN ASSISTANT

## 2022-11-09 PROCEDURE — 97530 THERAPEUTIC ACTIVITIES: CPT

## 2022-11-09 PROCEDURE — 97535 SELF CARE MNGMENT TRAINING: CPT

## 2022-11-09 PROCEDURE — 83735 ASSAY OF MAGNESIUM: CPT | Performed by: PHYSICIAN ASSISTANT

## 2022-11-09 PROCEDURE — 20000000 HC ICU ROOM

## 2022-11-09 PROCEDURE — 25000003 PHARM REV CODE 250: Performed by: NURSE PRACTITIONER

## 2022-11-09 PROCEDURE — 92597 ORAL SPEECH DEVICE EVAL: CPT

## 2022-11-09 PROCEDURE — 63600175 PHARM REV CODE 636 W HCPCS: Performed by: NURSE PRACTITIONER

## 2022-11-09 PROCEDURE — S4991 NICOTINE PATCH NONLEGEND: HCPCS | Performed by: PHYSICIAN ASSISTANT

## 2022-11-09 PROCEDURE — 63600175 PHARM REV CODE 636 W HCPCS: Performed by: PHYSICIAN ASSISTANT

## 2022-11-09 PROCEDURE — 37799 UNLISTED PX VASCULAR SURGERY: CPT

## 2022-11-09 PROCEDURE — 82803 BLOOD GASES ANY COMBINATION: CPT

## 2022-11-09 PROCEDURE — 25000003 PHARM REV CODE 250: Performed by: PSYCHIATRY & NEUROLOGY

## 2022-11-09 PROCEDURE — 99233 PR SUBSEQUENT HOSPITAL CARE,LEVL III: ICD-10-PCS | Mod: ,,, | Performed by: PSYCHIATRY & NEUROLOGY

## 2022-11-09 PROCEDURE — L8501 TRACHEOSTOMY SPEAKING VALVE: HCPCS

## 2022-11-09 PROCEDURE — 51701 INSERT BLADDER CATHETER: CPT

## 2022-11-09 PROCEDURE — 51798 US URINE CAPACITY MEASURE: CPT

## 2022-11-09 PROCEDURE — 25000003 PHARM REV CODE 250

## 2022-11-09 PROCEDURE — 80053 COMPREHEN METABOLIC PANEL: CPT | Performed by: PHYSICIAN ASSISTANT

## 2022-11-09 PROCEDURE — 94761 N-INVAS EAR/PLS OXIMETRY MLT: CPT

## 2022-11-09 PROCEDURE — 97164 PT RE-EVAL EST PLAN CARE: CPT

## 2022-11-09 PROCEDURE — 97110 THERAPEUTIC EXERCISES: CPT

## 2022-11-09 PROCEDURE — 27100171 HC OXYGEN HIGH FLOW UP TO 24 HOURS

## 2022-11-09 PROCEDURE — 27000207 HC ISOLATION

## 2022-11-09 PROCEDURE — 82800 BLOOD PH: CPT

## 2022-11-09 PROCEDURE — 25000003 PHARM REV CODE 250: Performed by: PHYSICIAN ASSISTANT

## 2022-11-09 PROCEDURE — 99900026 HC AIRWAY MAINTENANCE (STAT)

## 2022-11-09 PROCEDURE — 85025 COMPLETE CBC W/AUTO DIFF WBC: CPT | Performed by: PHYSICIAN ASSISTANT

## 2022-11-09 PROCEDURE — 97168 OT RE-EVAL EST PLAN CARE: CPT

## 2022-11-09 PROCEDURE — 99233 SBSQ HOSP IP/OBS HIGH 50: CPT | Mod: ,,, | Performed by: PSYCHIATRY & NEUROLOGY

## 2022-11-09 RX ORDER — FAMOTIDINE 20 MG/1
20 TABLET, FILM COATED ORAL 2 TIMES DAILY
Status: DISCONTINUED | OUTPATIENT
Start: 2022-11-09 | End: 2022-11-21 | Stop reason: HOSPADM

## 2022-11-09 RX ORDER — FENTANYL CITRATE 50 UG/ML
50 INJECTION, SOLUTION INTRAMUSCULAR; INTRAVENOUS EVERY 4 HOURS PRN
Status: DISCONTINUED | OUTPATIENT
Start: 2022-11-09 | End: 2022-11-17

## 2022-11-09 RX ORDER — FAMOTIDINE 20 MG/1
20 TABLET, FILM COATED ORAL 2 TIMES DAILY
Status: DISCONTINUED | OUTPATIENT
Start: 2022-11-09 | End: 2022-11-09

## 2022-11-09 RX ADMIN — DIAZEPAM 5 MG: 5 TABLET ORAL at 08:11

## 2022-11-09 RX ADMIN — Medication 1 PATCH: at 08:11

## 2022-11-09 RX ADMIN — PREGABALIN 200 MG: 150 CAPSULE ORAL at 09:11

## 2022-11-09 RX ADMIN — HEPARIN SODIUM 5000 UNITS: 5000 INJECTION INTRAVENOUS; SUBCUTANEOUS at 02:11

## 2022-11-09 RX ADMIN — FENTANYL CITRATE 50 MCG: 0.05 INJECTION, SOLUTION INTRAMUSCULAR; INTRAVENOUS at 05:11

## 2022-11-09 RX ADMIN — HYDROXYZINE HYDROCHLORIDE 25 MG: 25 TABLET, FILM COATED ORAL at 08:11

## 2022-11-09 RX ADMIN — OXYCODONE 5 MG: 5 TABLET ORAL at 11:11

## 2022-11-09 RX ADMIN — OXACILLIN SODIUM 12 G: 10 INJECTION, POWDER, FOR SOLUTION INTRAVENOUS at 02:11

## 2022-11-09 RX ADMIN — OXYCODONE 5 MG: 5 TABLET ORAL at 12:11

## 2022-11-09 RX ADMIN — PREGABALIN 200 MG: 150 CAPSULE ORAL at 08:11

## 2022-11-09 RX ADMIN — FAMOTIDINE 20 MG: 20 TABLET ORAL at 12:11

## 2022-11-09 RX ADMIN — FAMOTIDINE 20 MG: 20 TABLET ORAL at 09:11

## 2022-11-09 RX ADMIN — HEPARIN SODIUM 5000 UNITS: 5000 INJECTION INTRAVENOUS; SUBCUTANEOUS at 05:11

## 2022-11-09 RX ADMIN — HYDROXYZINE HYDROCHLORIDE 25 MG: 25 TABLET, FILM COATED ORAL at 09:11

## 2022-11-09 RX ADMIN — ONDANSETRON 4 MG: 2 INJECTION INTRAMUSCULAR; INTRAVENOUS at 06:11

## 2022-11-09 RX ADMIN — SILODOSIN 4 MG: 4 CAPSULE ORAL at 08:11

## 2022-11-09 RX ADMIN — OXYCODONE 5 MG: 5 TABLET ORAL at 05:11

## 2022-11-09 RX ADMIN — SENNOSIDES AND DOCUSATE SODIUM 1 TABLET: 50; 8.6 TABLET ORAL at 08:11

## 2022-11-09 RX ADMIN — OXYCODONE 5 MG: 5 TABLET ORAL at 06:11

## 2022-11-09 RX ADMIN — SENNOSIDES AND DOCUSATE SODIUM 1 TABLET: 50; 8.6 TABLET ORAL at 09:11

## 2022-11-09 RX ADMIN — METHADONE HYDROCHLORIDE 90 MG: 10 TABLET ORAL at 02:11

## 2022-11-09 RX ADMIN — PREGABALIN 200 MG: 150 CAPSULE ORAL at 02:11

## 2022-11-09 RX ADMIN — HEPARIN SODIUM 5000 UNITS: 5000 INJECTION INTRAVENOUS; SUBCUTANEOUS at 09:11

## 2022-11-09 RX ADMIN — FENTANYL CITRATE 50 MCG: 0.05 INJECTION, SOLUTION INTRAMUSCULAR; INTRAVENOUS at 02:11

## 2022-11-09 RX ADMIN — ACETAMINOPHEN 650 MG: 325 TABLET ORAL at 08:11

## 2022-11-09 RX ADMIN — ATROPINE SULFATE 1 DROP: 10 SOLUTION OPHTHALMIC at 05:11

## 2022-11-09 NOTE — PT/OT/SLP RE-EVAL
Occupational Therapy  Co Re-evaluation    Name: Peter Stiles  MRN: [id number]  Admitting Diagnosis:  Osteomyelitis of cervical spine  Recent Surgery: Procedure(s) (LRB):  CREATION, TRACHEOSTOMY (N/A)  LARYNGOSCOPY, DIRECT 2 Days Post-Op    Recommendations:     Discharge Recommendations: rehabilitation facility  Discharge Equipment Recommendations:   (TBD)  Barriers to discharge:  Decreased caregiver support  Co-evaluation/treatment performed due to patient's multiple deficits requiring two skilled therapists to appropriately and safely assess patient's strength and endurance while facilitating functional tasks in addition to accommodating for patient's activity tolerance.    Assessment:     Peter Stiles is a 51 y.o. female with a medical diagnosis of Osteomyelitis of cervical spine.  She presents with friend in the room.  Performance deficits affecting function are weakness, gait instability, impaired endurance, impaired balance, impaired cardiopulmonary response to activity, impaired functional mobility, impaired self care skills, pain, orthopedic precautions.  Patient has new trach but communicates well with hand and face gestures, using tablet for complex communication. She c/o neck pain and being hungry. Great participation and motivation to recover.  She desires to return home with her children.  She reports tingling in all area below breast level, she states this has improved since surgery.  Supine to sit with mod (A) of two,  EOB sit with mod (A) poor dynamic sitting balance, not ready for stand with limitations with trunk control. Patient suctioning her mouth by herself.     Rehab Prognosis:  RN; patient would benefit from acute skilled OT services to address these deficits and reach maximum level of function.       Plan:     Patient to be seen 4 x/week to address the above listed problems via self-care/home management, therapeutic activities, therapeutic exercises, neuromuscular  "re-education  Plan of Care Expires: 12/09/22  Plan of Care Reviewed with: patient    Subjective     Chief Complaint: "I am so hungry."  Patient/Family stated goals: return home with family  Communicated with: RN prior to session.  Pain/Comfort:  Pain Rating 1: 6/10  Location 1: neck  Pain Addressed 1: Pre-medicate for activity, Cessation of Activity  Pain Rating Post-Intervention 1: 6/10    Objective:     Communicated with: RN prior to session.  Patient found HOB elevated with: bed alarm, blood pressure cuff, pulse ox (continuous), SCD, telemetry, pressure relief boots, PureWick upon OT entry to room.    General Precautions: Standard, aspiration, fall, NPO   Orthopedic Precautions:spinal precautions   Braces: Aspen collar  Respiratory Status:  trach mask 5 liters    Occupational Performance:    Bed Mobility:    Patient completed Supine to Sit with moderate assistance and 2 persons  Patient completed Sit to Supine with moderate assistance and 2 persons      Activities of Daily Living:  Grooming: supervision    Upper Body Dressing: minimum assistance with gown  Lower Body Dressing: maximal assistance with socks  Toileting: maximal assistance purewick    Cognitive/Visual Perceptual:  Cognitive/Psychosocial Skills:     -       Oriented to: Person, Place, Time, and Situation   -       Follows Commands/attention:Follows multistep  commands  -       Memory: No Deficits noted  -       Safety awareness/insight to disability: intact     Physical Exam:  Upper Extremity Range of Motion:     -       Right Upper Extremity: WFL  -       Left Upper Extremity: WFL  Upper Extremity Strength:    -       Right Upper Extremity: WFL  -       Left Upper Extremity: WFL    AMPAC 6 Click:  AMPAC Total Score: 10    Treatment & Education:  Pt educated on role of OT, POC, and goals for therapy.    POC was dicussed with patient/caregiver, who was included in its development and is in agreement with the identified goals and treatment plan. "   Patient and family aware of patient's deficits and therapy progression.   Time provided for therapeutic counseling and discussion of health disposition.   Educated on importance of EOB/OOB mobility, maintaining routine, sitting up in chair, and maximizing independence with ADLs during admission   Pt completed ADLs and functional mobility for treatment session as noted above   Pt/caregiver verbalized understanding and expressed no further concerns/questions.         Patient left HOB elevated with all lines intact, call button in reach, Rn notified, and friend present    GOALS:   Multidisciplinary Problems       Occupational Therapy Goals          Problem: Occupational Therapy    Goal Priority Disciplines Outcome Interventions   Occupational Therapy Goal     OT, PT/OT Ongoing, Progressing    Description: Goals to be met by:     Patient will increase functional independence with ADLs by performing:      UE Dressing with Contact Guard Assistance.  Grooming while EOB with Contact Guard Assistance  Patient will complete sit to stand with RW min (A).  Stand pivot to chair with mod (A)                         History:     Past Medical History:   Diagnosis Date    CHF (congestive heart failure)     Essential (primary) hypertension     Opioid dependence on maintenance agonist therapy, no symptoms     Smoking          Past Surgical History:   Procedure Laterality Date    APPENDECTOMY       SECTION      x2    FUSION OF CERVICAL SPINE BY POSTERIOR APPROACH N/A 2022    Procedure: FUSION, SPINE, CERVICAL, POSTERIOR APPROACH C2-T2 posterior decompression/fusion; Depuy, neuromonitoring monica Marrero;  Surgeon: West Merino DO;  Location: [facility] OR [address] [place] [address];  Service: Neurosurgery;  Laterality: N/A;    HYSTERECTOMY      SURGICAL REMOVAL OF VERTEBRAL BODY OF CERVICAL SPINE N/A 10/28/2022    Procedure: CORPECTOMY, SPINE, CERVICAL;  Surgeon: West Merino DO;  Location: [facility] OR [address] [place] [address];  Service:  Neurosurgery;  Laterality: N/A;  anterior C5-6 corpectomy, globus, caspar head prado and tongs, omnipaque, 15lbs weights, microscrope, round cutting lynsey and M8, ENT assistance for exposure    TRACHEOSTOMY N/A 11/7/2022    Procedure: CREATION, TRACHEOSTOMY;  Surgeon: Fritz Nogueira MD;  Location: [facility] OR [address] [place] [address];  Service: ENT;  Laterality: N/A;       Time Tracking:     OT Date of Treatment: 11/09/22  OT Start Time: 0910  OT Stop Time: 0940  OT Total Time (min): 30 min    Billable Minutes:Re-eval 15  Therapeutic Activity 15    11/9/2022

## 2022-11-09 NOTE — ASSESSMENT & PLAN NOTE
Peter Stiles is a 51 y.o. female with PMHx of HTN, dilated cardiomyopathy, h/o opioid dependence, cigarette smoker (2pks/day), and daily baby ASA use who presents with 1 day of decreased sensation from T5 level down and tingling in her bilateral fingers and bilateral toes. MRI imaging obtained in the ED showing possible C5-6 osteodiscitis/myelitis with cord compression. Patient tachycardic and hypertensive on arrival, afebrile, with leukocytosis.     --All pertinent imaging/labs personally reviewed and interpreted  --MRI C/T/L spine w/o contrast 10/25: focal kyphotic deformity at C5/6 with possible discitis/osteomyelitis,  epidural collection extending into the central spinal canal resulting in severe stenosis and cord signal  abnormality.  Prevertebral soft tissue edema and probable paravertebral abscess or phlegmon at this level. --Thoracic and lumbar spine unremarkable.   --MRI C spine w/ contrast 10/25: confirmed enhancement at C5/6 consistent with osteomyelitis/discitis and epidural phlegmon  Now s/p C5-6 corpectomy on 10/28/22  C2-T2 PCDF on 11/2  S/p tarch 11/7    --Admitted to neuro ICU  --Intubated after index procedure and kept intubated for posterior fixation and for tentative LISHA 11/4 which was eventually cancelled due to inability to adequately extend neck  --Extubated 11/4 but reintubated later that day for respiratory distress and stridor. Extubated again 11/6 but reintubated now s/p tarc 11/7  --ENT onboard.Vocal cord paresis vs edema. Cuff leak present. Trach per ENT  --Plan to pull posterior HV today  --Step down to medicine  --BCx 10/25 growing MSSA, Repeat BCx's 10/27 NGTD  --OR cultures 10/28 no organisms on gram stain, culture MSSA  --Post op xrays, anterior hardware in good position; xray Csp for posterior hardware pending  --ID following   --Continue C collar when upright   --CT Soft tissue neck reviewed with stable hardware position s/p 360 cervical fusion ow expected postop  changes  --Pain control as needed  --Abx: Oxacillin   --PT/OT. SQH  --Please call NSGY with any questions/concerns    Dispo: ICU. Wean trach per NCC; step down to Medicine

## 2022-11-09 NOTE — PT/OT/SLP EVAL
Speech Language Pathology Evaluation  One Way Speaking Valve Evaluation    Patient Name:  Peter Stiles   MRN:  [id number]  Admitting Diagnosis: Osteomyelitis of cervical spine    IMPORTANT  CAUTION: CUFF MUST ALWAYS BE DEFLATED WHEN VALVE IN USE.  Passy Gilman Speaking Valve trials with ST only at this time.     Recommendations:                 General Recommendations:  One Way Speaking Valve training and further assessment of swallow function when feasible  Diet recommendations:  NPO, NPO   Aspiration Precautions: Frequent oral care and Strict aspiration precautions   General Precautions: Standard, aspiration, fall, respiratory, contact  Communication strategies:  go to room if call light pushed and pt writing and mouthing to communicate wants/needs  Passy Gilman Speaking Valve Precautions: Only wear when cuff is deflated, trials of valve only with ST at this time, remove valve with any S/S respiratory distress, remove valve when sleeping. Please note, to clean valve:  1. Swish valve in pure soap and warm water, 2. Rinse valve thoroughly in warm running water, 3. Allow valve to air dry thoroughly before placing it in storage container, 4. DO NOT use hot water, peroxide, bleach, vinegar, alcohol, brushes, or cotton swabs to clean valve.      History:     Past Medical History:   Diagnosis Date    CHF (congestive heart failure)     Essential (primary) hypertension     Opioid dependence on maintenance agonist therapy, no symptoms     Smoking        Past Surgical History:   Procedure Laterality Date    APPENDECTOMY       SECTION      x2    FUSION OF CERVICAL SPINE BY POSTERIOR APPROACH N/A 2022    Procedure: FUSION, SPINE, CERVICAL, POSTERIOR APPROACH C2-T2 posterior decompression/fusion; Depuy, neuromonitoring monica Marrero;  Surgeon: West Merino DO;  Location: [facility] OR [address] [place] [address];  Service: Neurosurgery;  Laterality: N/A;    HYSTERECTOMY      SURGICAL REMOVAL OF VERTEBRAL BODY OF CERVICAL SPINE  "N/A 10/28/2022    Procedure: CORPECTOMY, SPINE, CERVICAL;  Surgeon: West Merino DO;  [facility];  Service: Neurosurgery;  Laterality: N/A;  anterior C5-6 corpectomy, globus, caspar head prado and tongs, omnipaque, 15lbs weights, microscrope, round cutting lynsey and M8, ENT assistance for exposure    TRACHEOSTOMY N/A 11/7/2022    Procedure: CREATION, TRACHEOSTOMY;  Surgeon: Fritz Nogueira MD;  [facility];  Service: ENT;  Laterality: N/A;       Social History: Patient lives at her home with her two daughters    Prior Intubation HX:  10/28/22 - 11/4/22, 11/5/22 - 11/7/22, trach placed 11/7/22.    Modified Barium Swallow none prior at this facility     Chest X-Rays: 11/8/22: Decreased opacity in the both inferior hemithoraces since 11/07/2022 is appreciated, right greater than left, consistent with improved aeration in both lower lobes and/or decreasing pleural fluid on each side.  There has been no significant detrimental interval change in the appearance of the chest since that time.  No pneumothorax.    Occupation/hobbies/homemaking: Currently not employed, Independent with ADLs prior to admit.    Subjective     SLP reviewed Pt with RN and RT prior to assessment, both cleared for therapy  Pt presents calm  She writes "When can I eat?"      Objective:     Level of Alertness:  Alert  Cooperative    Secretion Management:   Patient was suctioned prior to placement of Passy Autumn Valve (PMV)    Pain/Comfort:  Pain Rating 1: 0/10  Location - Orientation 1: generalized  Location 1: neck  Pain Addressed 1: Nurse notified, Cessation of Activity    Respiratory Status:  trach collar 5L, 28%     Barriers to Learning: none    Oral Musculature Evaluation  Oral Musculature: WNL  Dentition: teeth in poor condition  Secretion Management: problems swallowing secretions (Pt used yankauer to suction secretions)  Mucosal Quality: adequate  Oral Labial Strength and Mobility: functional pursing, " functional retraction  Lingual Strength and Mobility: functional strength  Volitional Cough: limited  Volitional Swallow: elicited  Voice Prior to PO Intake: wet    Trach/Vent:  Tracheostomy Type  Shiley  Tracheostomy Size: 6.0 cuffed  Tolerates cuff deflated: Yes  If not, were there changes in vitals signs or signs of discomfort: n/a    Pre Treatment Measures  Trach Collar    O2 100  Heartrate 89    One Way Speaking Valve Placement:  Type of speaking valve: One Way Speaking Valve  Clear    Sp02 before trial: 98%  Sp02 during trial: 98%  Time on valve: 2 seconds, 1 second  Phonation on exhalation: minimal  Achieved phonation on 5% of words  Overall speech intelligibility: poor    Patient reaction: Anxious    Laryngeal function:    Excursion:  Limited    Voluntary Cough  Limited    Voluntary Throat Clear:   Limited    Voice Quality:  Wet    Education provided: SLP educated Pt on safety precautions for cuffed trach, one-way speaking valve technology, cleaning instructions for Passy marcy speaking valve and SLP POC. Patient asked questions re: option for PO trials, SLP reviewed anatomy s/p trach and swallow anatomy and ongoing SLP POC with Pt. Pt with partial demonstration of understanding.  No additional questions. Pt remained upright in bed with call light in reach, pend and paper on bedside table, upon SLP exit.     Post Treatment Measures:  Trach Collar    O2 98  Heartrate 89    Assessment:     Peter Stiles is a 51 y.o. female with an SLP diagnosis of S/P Trach and One Way Speaking Valve Training.  She presents with moderate backflow pressure upon initial PMSV trials this service day. Swallow assessment deferred 2/2 wet vocal quality and decreased tolerance of valve.  ST to continue to follow.     Goals:   Multidisciplinary Problems       SLP Goals          Problem: SLP    Goal Priority Disciplines Outcome   SLP Goal     SLP Ongoing, Progressing   Description: Speech Language Pathology Goals  Goals expected  to be met by 11/16/22    1. Pt will tolerate one-way speaking valve for 10 minutes w/o S/S respiratory distress, MIN A  2. Pt will recall 3 +safety precautions for PMSV with cuffed trach, 90%, MIN A  3. Pt will participate in evaluation of swallow when feasible   4. Educate Pt and family on safety awareness and safety precautions for one-way speaking valve                         Plan:     Patient to be seen:  4 x/week   Plan of Care expires:  12/09/22  Plan of Care reviewed with:  patient   SLP Follow-Up:  Yes       Discharge recommendations:  rehabilitation facility       Time Tracking:     SLP Treatment Date:   11/09/22  Speech Start Time:  1011  Speech Stop Time:  1036     Speech Total Time (min):  25 min    Billable Minutes: Evaluation Use/Fit Voiced Prosthetic 10 and Self Care/Home Management Training 13    11/09/2022

## 2022-11-09 NOTE — PT/OT/SLP RE-EVAL
Physical Therapy Re-Evaluation and Co-Treatment with OT    Patient Name:  Peter Stiles   MRN:  [id number]    Recent Surgery: Procedure(s) (LRB):  CREATION, TRACHEOSTOMY (N/A)  LARYNGOSCOPY, DIRECT 2 Days Post-Op    *Co-treatment with OT due to expected patient complexity and need for two skilled therapists to ensure safe mobilization.    Recommendations:     Discharge Recommendations:  rehabilitation facility   Discharge Equipment Recommendations:  (tbd @ rehab)   Barriers to discharge: Increased level of assist    Highest Level of Mobility: Supine to sit  Assistance Required: Mod(A)X2 persons    Assessment:     Peter Stiles is a 51 y.o. female admitted with a medical diagnosis of Osteomyelitis of cervical spine. She presents with the following impairments/functional limitations:  weakness, impaired balance, impaired endurance, orthopedic precautions, gait instability, decreased lower extremity function, decreased upper extremity function, impaired functional mobility, impaired self care skills, impaired sensation    Pt met with HOB elevated, friend present and agreeable to PT session. Pt is met for re-evaluation s/p trach and PEG placement. Pt is nonverbal at this time 2/2 trach, but is able to communicate appropriately with head nods, gestures, and mouthing words. Her PLOF is (I) with functional mobility and ADLs using SPC prn. She is currently limited by above listed deficits and now requires mod(A)X2 persons for supine to sit. Pt has poor static and dynamic sitting balance at EOB and is a high fall risk at this time.     Pt would benefit from continued skilled acute PT 4x/wk to address above listed functional deficits, provide patient/caregiver education, reduce fall risk, and maximize (I) and safety with functional mobility. After hospital discharge, pt would benefit from inpatient rehab to maximize rehab potential.    Rehab Prognosis: Good; patient would benefit from acute skilled PT services to  "address these deficits and reach maximum level of function.      Plan:     During this hospitalization, patient to be seen 4 x/week to address the identified rehab impairments via gait training, therapeutic activities, therapeutic exercises, neuromuscular re-education and progress toward the following goals:    Plan of Care Expires:  12/09/22    This plan of care has been discussed with the patient/caregiver, who was included in its development and is in agreement with the identified goals and treatment plan.     Subjective     Communicated with RN prior to session.  Patient agreeable to participate.     Chief Complaint: Osteomyelitis of cervical spine  Patient/Family Comments/goals: *Thumbs up*    Pain/Comfort:  Pain Rating 1: 6/10  Location - Side 1: Bilateral  Location - Orientation 1: generalized  Location 1: neck  Pain Addressed 1: Pre-medicate for activity, Reposition, Cessation of Activity  Pain Rating Post-Intervention 1: 6/10    Patients cultural, spiritual, Tenriism conflicts given the current situation: no    See eval for living environment    Objective:     Patient found HOB elevated with bed alarm, blood pressure cuff, pulse ox (continuous), SCD, telemetry, pressure relief boots, PureWick  upon PT entry to room.    General Precautions: Standard, aspiration, fall   Orthopedic Precautions:spinal precautions   Braces: Aspen collar   BP (!) 117/59   Pulse 69   Temp 98.7 °F (37.1 °C) (Oral)   Resp 18   Ht 5' 6" (1.676 m)   Wt 63.5 kg (139 lb 15.9 oz)   SpO2 97%   Breastfeeding No   BMI 22.60 kg/m²   Oxygen Device:  Trach collar       Exams:    Cognition:  Patient is oriented to Person, Place, Time, Situation  Follows two-step commands  Insight to deficits/safety awareness: intact    Edema: None present    Postural examination/scapula alignment: Rounded shoulder and Head forward    Lower Extremity Range of Motion:  Right Lower Extremity: WNL  Left Lower Extremity: WNL    Lower Extremity " Strength:      Iliopsoas Quadriceps Knee  Flexion (HS) Tibialis  anterior Gastro- cnemius EHL   R LE 4/5 4/5 4/5 4/5 4/5 NT   L LE 4/5 4/5 4/5 4/5 4/5 NT      Sensation:   Light touch sensation: Intact BLEs, however pt reports hypersensitivity and light touch at times is perceived as pain    Functional Mobility:    Bed Mobility:  Supine to Sit: Moderate Assistance and 2 persons  on L side of bed  Sit to Supine: Moderate Assistance and 2 persons  Scooting anteriorly to EOB to plant feet on floor: Moderate Assistance    Transfers:   Sit to Stand Transfer: Activity did not occur due to poor static and dynamic sitting balance at EOB    Balance:  Static Sit:   Mod Assist at EOB ~10 mins  Poor: Patient requires handhold support and moderate to maximal assistance to maintain position.  Posterior lean  PT provided tactile cues to abdominal mm to promote anterior lean to neutral  Dynamic sit:  Max Assist   Poor: Patient requires handhold support and moderate to maximal assistance to maintain position.    Therapeutic Activities/Exercises     Patient assisted with functional mobility as noted above  Discussed at length benefits of PT as well as d/c recommendations. Pt agreeable  Patient educated on the importance of early mobility, OOB to prevent functional decline during hospital stay  Patient was instructed to utilize staff assistance for mobility/transfers.  Patient is appropriate to transfer with dependence to medichair via drawsheet and RN/PCT assist  Patient educated on PT POC and role of PT in acute care  White board updated to include patient's safest level of mobility with staff assistance, RN also updated    AM-PAC 6 CLICK MOBILITY  Turning over in bed (including adjusting bedclothes, sheets and blankets)?: 2  Sitting down on and standing up from a chair with arms (e.g., wheelchair, bedside commode, etc.): 2  Moving from lying on back to sitting on the side of the bed?: 2  Moving to and from a bed to a chair  (including a wheelchair)?: 2  Need to walk in hospital room?: 2  Climbing 3-5 steps with a railing?: 1  Basic Mobility Total Score: 11      Patient left HOB elevated with all lines intact, call button in reach, bed alarm on, RN notified, and friend present.      History/Goals:     PAST MEDICAL HISTORY:  Past Medical History:   Diagnosis Date    CHF (congestive heart failure)     Essential (primary) hypertension     Opioid dependence on maintenance agonist therapy, no symptoms     Smoking        Past Surgical History:   Procedure Laterality Date    APPENDECTOMY       SECTION      x2    FUSION OF CERVICAL SPINE BY POSTERIOR APPROACH N/A 2022    Procedure: FUSION, SPINE, CERVICAL, POSTERIOR APPROACH C2-T2 posterior decompression/fusion; Depuy, neuromonitoring Marrero, gell rolls;  Surgeon: West Merino DO;  Location: [facility] OR [address] [place] [address];  Service: Neurosurgery;  Laterality: N/A;    HYSTERECTOMY      SURGICAL REMOVAL OF VERTEBRAL BODY OF CERVICAL SPINE N/A 10/28/2022    Procedure: CORPECTOMY, SPINE, CERVICAL;  Surgeon: West Merino DO;  Location: [facility] OR [address] [place] [address];  Service: Neurosurgery;  Laterality: N/A;  anterior C5-6 corpectomy, globus, caspar head prado and tongs, omnipaque, 15lbs weights, microscrope, round cutting lynsey and M8, ENT assistance for exposure    TRACHEOSTOMY N/A 2022    Procedure: CREATION, TRACHEOSTOMY;  Surgeon: Fritz Nogueira MD;  Location: [facility] OR [address] [place] [address];  Service: ENT;  Laterality: N/A;       GOALS:   Multidisciplinary Problems       Physical Therapy Goals          Problem: Physical Therapy    Goal Priority Disciplines Outcome Goal Variances Interventions   Physical Therapy Goal     PT, PT/OT Ongoing, Progressing     Description: PT goals until 22    1. Pt supine to sit with Min(A)   2. Pt sit to supine with Min(A)  3. Pt sit to stand with RW with minimal assist-not met  4. Pt to perform gait 30ft with RW with minimal assist.-not met  5. Pt to transfer bed  to/from bedside chair with RW with minimal assist.-not met  6. Pt to up/down 5 steps with L UE rail with minimal assist.-not met  7. Pt to perform B LE exs in sitting or supine x 10 reps to strengthen B LE to improve functional mobility.-not met                         Time Tracking:     PT Received On: 11/09/22  PT Start Time: 0911     PT Stop Time: 0940  PT Total Time (min): 29 min     Billable Minutes: Re-eval 15 and Therapeutic Exercise 14      Ladonna Peck, PT  11/09/2022  Pager# [phone]

## 2022-11-09 NOTE — ASSESSMENT & PLAN NOTE
-Methadone 90 daily  (prescribed at [facility] on [facility])  -requiring oxycodone as well, wean as tolerated

## 2022-11-09 NOTE — ASSESSMENT & PLAN NOTE
- currently intubated, on spontaneous  - OR today, will reassess and attempt to wean post procedurally   - CXR and KUB post op with L sided atelectasis/ground glass infiltrate/pleural effusion  - extubated to NC 11/4/22  - re-intubated 11/5/22 due to stridor  - ENT consulted for trach 2 failed extubations  - Will re-attempt extubation with ENT @ bedside  - 10 mg Dexa given   s/p  trach placement  On 11/7, CXR reviewed today, continue to tolerate Trach Collar

## 2022-11-09 NOTE — SUBJECTIVE & OBJECTIVE
Interval History: No issues overnight with trach.     Medications:  Continuous Infusions:  Scheduled Meds:   bisacodyL  10 mg Rectal Daily    heparin (porcine)  5,000 Units Subcutaneous Q8H    methadone  90 mg Per NG tube Daily    nicotine  1 patch Transdermal Daily    oxacillin 12 g in  mL CONTINUOUS INFUSION  12 g Intravenous Q24H    oxyCODONE  5 mg Per NG tube Q6H    polyethylene glycol  17 g Per NG tube Daily    pregabalin  200 mg Per NG tube TID    senna-docusate 8.6-50 mg  1 tablet Per NG tube BID    silodosin  4 mg Per NG tube Daily     PRN Meds:acetaminophen, albuterol-ipratropium, atropine 1%, benzocaine, dextrose 10%, dextrose 10%, diazePAM, fentaNYL, glucagon (human recombinant), hydrALAZINE, hydrOXYzine HCL, labetalol, magnesium oxide, magnesium oxide, ondansetron, potassium bicarbonate, potassium bicarbonate, potassium bicarbonate, potassium, sodium phosphates, potassium, sodium phosphates, potassium, sodium phosphates, racepinephrine, simethicone, sodium chloride 0.9%     Review of patient's allergies indicates:   Allergen Reactions    Morphine Shortness Of Breath and Other (See Comments)     Throat closed     Objective:     Vital Signs (24h Range):  Temp:  [97.3 °F (36.3 °C)-99.1 °F (37.3 °C)] 97.5 °F (36.4 °C)  Pulse:  [] 93  Resp:  [9-45] 36  SpO2:  [94 %-100 %] 96 %  BP: ()/(49-78) 134/78       Lines/Drains/Airways       Drain  Duration                  Closed/Suction Drain 11/02/22 Right;Superior;Medial Back 7 days         Closed/Suction Drain 11/02/22 Superior;Medial;Left Back Other (Comment) 7 days         NG/OG Tube 11/06/22 Right nostril 3 days              Airway  Duration                  Surgical Airway 11/07/22 1449 Shiley Cuffed 1 day              Peripheral Intravenous Line  Duration                  Midline Catheter Insertion/Assessment  - Single Lumen 10/28/22 1858 Right basilic vein (medial side of arm) 18g x 10cm 11 days         Peripheral IV - Single Lumen 11/06/22  20 G Anterior;Proximal;Right Upper Arm 3 days                    Physical Exam  Trach in place, cuff down, sutured in  Minimal trach secretions  C-collar in place  On TC  Oxygen Concentration (%):  [28-40] 28  Resp Rate Total:  [14 br/min-15 br/min] 14 br/min    Significant Labs:  Recent Lab Results  (Last 5 results in the past 24 hours)        11/09/22  0535   11/09/22  0202   11/08/22  1717   11/08/22  1442   11/08/22  0937        Albumin   2.6             Alkaline Phosphatase   142             Allens Test Pass               ALT   32             Anion Gap   11             aPTT         25.4  Comment: aPTT therapeutic range = 39-69 seconds       AST   41             Baso #   0.02             Basophil %   0.2             BILIRUBIN TOTAL   0.9  Comment: For infants and newborns, interpretation of results should be based  on gestational age, weight and in agreement with clinical  observations.    Premature Infant recommended reference ranges:  Up to 24 hours.............<8.0 mg/dL  Up to 48 hours............<12.0 mg/dL  3-5 days..................<15.0 mg/dL  6-29 days.................<15.0 mg/dL               Site LR               BUN   19             Calcium   9.7             Chloride   106             CO2   28             Creatinine   0.8             [facility] T-Collar               Differential Method   Automated             eGFR   >60.0             Eos #   0.2             Eosinophil %   2.1             FiO2 28               Flow 5               Glucose   60             Gran # (ANC)   4.2             Gran %   45.6             Hematocrit   32.3             Hemoglobin   9.8             Immature Grans (Abs)   0.08  Comment: Mild elevation in immature granulocytes is non specific and   can be seen in a variety of conditions including stress response,   acute inflammation, trauma and pregnancy. Correlation with other   laboratory and clinical findings is essential.               Immature Granulocytes   0.9             Lymph  #   4.2             Lymph %   45.2             Magnesium   2.0             MCH   29.9             MCHC   30.3             MCV   99             Mode SPONT               Mono #   0.6             Mono %   6.0             MPV   10.2             nRBC   0             Phosphorus   4.3             Platelets   331             POC BE 7               POC HCO3 30.2               POC PCO2 40.8               POC PH 7.478               POC PO2 78               POC SATURATED O2 96               POC TCO2 31               POCT Glucose     73   81         Potassium   3.4             PROTEIN TOTAL   7.2             RBC   3.28             RDW   13.9             Sample ARTERIAL               Sodium   145             WBC   9.19                                    Significant Diagnostics:  I have reviewed all pertinent imaging results/findings within the past 24 hours.

## 2022-11-09 NOTE — PROGRESS NOTES
[facility]  Otorhinolaryngology-Head & Neck Surgery  Progress Note    Subjective:     Post-Op Info:  Procedure(s) (LRB):  CREATION, TRACHEOSTOMY (N/A)  LARYNGOSCOPY, DIRECT   2 Days Post-Op  Hospital Day: 16     Interval History: No issues overnight with trach.     Medications:  Continuous Infusions:  Scheduled Meds:   bisacodyL  10 mg Rectal Daily    heparin (porcine)  5,000 Units Subcutaneous Q8H    methadone  90 mg Per NG tube Daily    nicotine  1 patch Transdermal Daily    oxacillin 12 g in  mL CONTINUOUS INFUSION  12 g Intravenous Q24H    oxyCODONE  5 mg Per NG tube Q6H    polyethylene glycol  17 g Per NG tube Daily    pregabalin  200 mg Per NG tube TID    senna-docusate 8.6-50 mg  1 tablet Per NG tube BID    silodosin  4 mg Per NG tube Daily     PRN Meds:acetaminophen, albuterol-ipratropium, atropine 1%, benzocaine, dextrose 10%, dextrose 10%, diazePAM, fentaNYL, glucagon (human recombinant), hydrALAZINE, hydrOXYzine HCL, labetalol, magnesium oxide, magnesium oxide, ondansetron, potassium bicarbonate, potassium bicarbonate, potassium bicarbonate, potassium, sodium phosphates, potassium, sodium phosphates, potassium, sodium phosphates, racepinephrine, simethicone, sodium chloride 0.9%     Review of patient's allergies indicates:   Allergen Reactions    Morphine Shortness Of Breath and Other (See Comments)     Throat closed     Objective:     Vital Signs (24h Range):  Temp:  [97.3 °F (36.3 °C)-99.1 °F (37.3 °C)] 97.5 °F (36.4 °C)  Pulse:  [] 93  Resp:  [9-45] 36  SpO2:  [94 %-100 %] 96 %  BP: ()/(49-78) 134/78       Lines/Drains/Airways       Drain  Duration                  Closed/Suction Drain 11/02/22 Right;Superior;Medial Back 7 days         Closed/Suction Drain 11/02/22 Superior;Medial;Left Back Other (Comment) 7 days         NG/OG Tube 11/06/22 Right nostril 3 days              Airway  Duration                  Surgical Airway 11/07/22 1449 Shiley Cuffed 1  day              Peripheral Intravenous Line  Duration                  Midline Catheter Insertion/Assessment  - Single Lumen 10/28/22 1858 Right basilic vein (medial side of arm) 18g x 10cm 11 days         Peripheral IV - Single Lumen 11/06/22 20 G Anterior;Proximal;Right Upper Arm 3 days                    Physical Exam  Trach in place, cuff down, sutured in  Minimal trach secretions  C-collar in place  On TC  Oxygen Concentration (%):  [28-40] 28  Resp Rate Total:  [14 br/min-15 br/min] 14 br/min    Significant Labs:  Recent Lab Results  (Last 5 results in the past 24 hours)        11/09/22  0535   11/09/22  0202   11/08/22  1717   11/08/22  1442   11/08/22  0937        Albumin   2.6             Alkaline Phosphatase   142             Allens Test Pass               ALT   32             Anion Gap   11             aPTT         25.4  Comment: aPTT therapeutic range = 39-69 seconds       AST   41             Baso #   0.02             Basophil %   0.2             BILIRUBIN TOTAL   0.9  Comment: For infants and newborns, interpretation of results should be based  on gestational age, weight and in agreement with clinical  observations.    Premature Infant recommended reference ranges:  Up to 24 hours.............<8.0 mg/dL  Up to 48 hours............<12.0 mg/dL  3-5 days..................<15.0 mg/dL  6-29 days.................<15.0 mg/dL               Site LR               BUN   19             Calcium   9.7             Chloride   106             CO2   28             Creatinine   0.8             DelSys T-Collar               Differential Method   Automated             eGFR   >60.0             Eos #   0.2             Eosinophil %   2.1             FiO2 28               Flow 5               Glucose   60             Gran # (ANC)   4.2             Gran %   45.6             Hematocrit   32.3             Hemoglobin   9.8             Immature Grans (Abs)   0.08  Comment: Mild elevation in immature granulocytes is non specific  and   can be seen in a variety of conditions including stress response,   acute inflammation, trauma and pregnancy. Correlation with other   laboratory and clinical findings is essential.               Immature Granulocytes   0.9             Lymph #   4.2             Lymph %   45.2             Magnesium   2.0             MCH   29.9             MCHC   30.3             MCV   99             Mode SPONT               Mono #   0.6             Mono %   6.0             MPV   10.2             nRBC   0             Phosphorus   4.3             Platelets   331             POC BE 7               POC HCO3 30.2               POC PCO2 40.8               POC PH 7.478               POC PO2 78               POC SATURATED O2 96               POC TCO2 31               POCT Glucose     73   81         Potassium   3.4             PROTEIN TOTAL   7.2             RBC   3.28             RDW   13.9             Sample ARTERIAL               Sodium   145             WBC   9.19                                    Significant Diagnostics:  I have reviewed all pertinent imaging results/findings within the past 24 hours.    Assessment/Plan:     * Osteomyelitis of cervical spine  NCC    Stridor        Acute respiratory failure with hypoxia  - Fresh trach care  - Humidified trach collar  - Recommend SLP consult for speaking valve  - ENT to perform first trach change on 11/12        De Tejada MD  Otorhinolaryngology-Head & Neck Surgery  [facility]

## 2022-11-09 NOTE — ASSESSMENT & PLAN NOTE
- Fresh trach care  - Humidified trach collar  - Recommend SLP consult for speaking valve  - ENT to perform first trach change on 11/12

## 2022-11-09 NOTE — PLAN OF CARE
Re-eval completed, POC updated  Problem: Occupational Therapy  Goal: Occupational Therapy Goal  Description: Goals to be met by: 12/09    Patient will increase functional independence with ADLs by performing:      UE Dressing with Contact Guard Assistance.  Grooming while EOB with Contact Guard Assistance  Patient will complete sit to stand with RW min (A).  Stand pivot to chair with mod (A)    Outcome: Ongoing, Progressing
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
## 2022-11-09 NOTE — PLAN OF CARE
Re-eval complete 11/9    Problem: Physical Therapy  Goal: Physical Therapy Goal  Description: PT goals until 11/23/22    1. Pt supine to sit with Min(A)   2. Pt sit to supine with Min(A)  3. Pt sit to stand with RW with minimal assist-not met  4. Pt to perform gait 30ft with RW with minimal assist.-not met  5. Pt to transfer bed to/from bedside chair with RW with minimal assist.-not met  6. Pt to up/down 5 steps with L UE rail with minimal assist.-not met  7. Pt to perform B LE exs in sitting or supine x 10 reps to strengthen B LE to improve functional mobility.-not met    Outcome: Ongoing, Progressing

## 2022-11-09 NOTE — SUBJECTIVE & OBJECTIVE
Interval History:     11/9: NAEON. Drain minimal output. Tolerating trach. Moving extremities well.     Medications:  Continuous Infusions:  Scheduled Meds:   bisacodyL  10 mg Rectal Daily    fentaNYL  50 mcg Intravenous Q4H    heparin (porcine)  5,000 Units Subcutaneous Q8H    methadone  90 mg Per NG tube Daily    nicotine  1 patch Transdermal Daily    oxacillin 12 g in  mL CONTINUOUS INFUSION  12 g Intravenous Q24H    oxyCODONE  5 mg Per NG tube Q6H    polyethylene glycol  17 g Per NG tube Daily    pregabalin  200 mg Per NG tube TID    senna-docusate 8.6-50 mg  1 tablet Per NG tube BID    silodosin  4 mg Per NG tube Daily     PRN Meds:acetaminophen, albuterol-ipratropium, atropine 1%, benzocaine, dextrose 10%, dextrose 10%, diazePAM, glucagon (human recombinant), hydrALAZINE, hydrOXYzine HCL, labetalol, magnesium oxide, magnesium oxide, ondansetron, potassium bicarbonate, potassium bicarbonate, potassium bicarbonate, potassium, sodium phosphates, potassium, sodium phosphates, potassium, sodium phosphates, racepinephrine, simethicone, sodium chloride 0.9%     Review of Systems  Objective:     Weight:  (daylight savings)  Body mass index is 22.6 kg/m².  Vital Signs (Most Recent):  Temp: 97.5 °F (36.4 °C) (11/09/22 0301)  Pulse: 93 (11/09/22 0714)  Resp: (!) 36 (11/09/22 0714)  BP: 134/78 (11/09/22 0705)  SpO2: 96 % (11/09/22 0714)   Vital Signs (24h Range):  Temp:  [97.3 °F (36.3 °C)-99.1 °F (37.3 °C)] 97.5 °F (36.4 °C)  Pulse:  [] 93  Resp:  [9-45] 36  SpO2:  [94 %-100 %] 96 %  BP: ()/(49-78) 134/78                Oxygen Concentration (%):  [28-40] 28  Resp Rate Total:  [14 br/min-15 br/min] 14 br/min         Closed/Suction Drain 11/02/22 Superior;Medial;Left Back Other (Comment) (Active)   Site Description Unable to view 11/08/22 1500   Dressing Type Transparent (Tegaderm) 11/09/22 0301   Dressing Status Clean;Dry;Intact 11/09/22 0301   Dressing Intervention Integrity maintained 11/09/22 0301    Drainage Serosanguineous;Sanguineous 11/09/22 0301   Status To bulb suction 11/09/22 0301   Output (mL) 15 mL 11/08/22 1630            Closed/Suction Drain 11/02/22 Right;Superior;Medial Back (Active)   Site Description Unable to view 11/08/22 1500   Dressing Type Transparent (Tegaderm) 11/09/22 0301   Dressing Status Dry;Clean;Intact 11/09/22 0301   Dressing Intervention Integrity maintained 11/09/22 0301   Drainage Sanguineous;Serosanguineous 11/09/22 0301   Status To bulb suction 11/09/22 0301   Output (mL) 15 mL 11/08/22 1630            NG/OG Tube 11/06/22 Right nostril (Active)   Placement Check placement verified by aspirate characteristics;placement verified by x-ray 11/09/22 0301   Tolerance no signs/symptoms of discomfort 11/08/22 1500   Securement secured to nostril center w/ adhesive device 11/09/22 0301   Clamp Status/Tolerance clamped;no abdominal discomfort;no abdominal distention 11/09/22 0301   Suction Setting/Drainage Method low;intermittent setting;suction at 11/07/22 1501   Insertion Site Appearance no redness, warmth, tenderness, skin breakdown, drainage 11/08/22 1500   Drainage Green 11/07/22 1501   Flush/Irrigation flushed w/;water;no resistance met 11/08/22 1500   Feeding Action feeding held 11/07/22 1501   Current Rate (mL/hr) 0 mL/hr 11/07/22 0701   Goal Rate (mL/hr) 50 mL/hr 11/06/22 1905   Intake (mL) 80 mL 11/07/22 0005   Formula Name Dibetasource 11/07/22 0305       Physical Exam  Neurosurgery Physical Exam  [id number]  FC x 4  VILLAGRAN full strength  Incision c/d/I            Significant Labs:  Recent Labs   Lab 11/08/22  0041 11/09/22  0202   GLU 96 60*   * 145   K 4.1 3.4*    106   CO2 29 28   BUN 22* 19   CREATININE 0.8 0.8   CALCIUM 9.2 9.7   MG 2.2 2.0     Recent Labs   Lab 11/08/22 0041 11/09/22  0202   WBC 9.19 9.19   HGB 8.1* 9.8*   HCT 26.4* 32.3*    331     Recent Labs   Lab 11/08/22  0937   APTT 25.4     Microbiology Results (last 7 days)       Procedure  Component Value Units Date/Time    Culture, Anaerobe [[id number]] Collected: 10/28/22 1029    Order Status: Completed Specimen: Wound from Neck Updated: 11/07/22 0632     Anaerobic Culture No anaerobes isolated    Narrative:      4) Epidural phlegman    Blood culture [[id number]] Collected: 10/29/22 0623    Order Status: Completed Specimen: Blood from Peripheral, Foot, Left Updated: 11/03/22 0812     Blood Culture, Routine No growth after 5 days.    Blood culture [[id number]] Collected: 10/29/22 0621    Order Status: Completed Specimen: Blood from Peripheral, Foot, Right Updated: 11/03/22 0812     Blood Culture, Routine No growth after 5 days.          All pertinent labs from the last 24 hours have been reviewed.    Significant Diagnostics:  I have reviewed all pertinent imaging results/findings within the past 24 hours.

## 2022-11-09 NOTE — PLAN OF CARE
[facility] Care Plan    POC reviewed with Peter Stiles and family at 0300. Pt verbalized understanding. Questions and concerns addressed. No acute events overnight. Pt progressing toward goals. Will continue to monitor. See below and flowsheets for full assessment and VS info.           Is this a stroke patient? no    Neuro:  Lucien Coma Scale  Best Eye Response: 3-->(E3) to speech  Best Motor Response: 6-->(M6) obeys commands  Best Verbal Response: 1-->(V1) none  Sherwood Coma Scale Score: 10  Assessment Qualifiers: patient not sedated/intubated  Pupil PERRLA: yes     24hr Temp:  [97.3 °F (36.3 °C)-99.1 °F (37.3 °C)]     CV:   Rhythm: sinus bradycardia, normal sinus rhythm  BP goals:   SBP < 160  MAP > 65    Resp:   O2 Device (Oxygen Therapy): Trach Collar  Vent Mode: Spont  Set Rate: 14 BPM  Oxygen Concentration (%): 28  Vt Set: 385 mL  PEEP/CPAP: 5 cmH20  Pressure Support: 5 cmH20    Plan: trach in place    GI/:     Diet/Nutrition Received: tube feeding  Last Bowel Movement: 11/06/22  Voiding Characteristics: external catheter    Intake/Output Summary (Last 24 hours) at 11/9/2022 0644  Last data filed at 11/9/2022 0355  Gross per 24 hour   Intake 484.22 ml   Output 530 ml   Net -45.78 ml     Unmeasured Output  Urine Occurrence: 1  Stool Occurrence: 0  Pad Count: 3    Labs/Accuchecks:  Recent Labs   Lab 11/09/22  0202   WBC 9.19   RBC 3.28*   HGB 9.8*   HCT 32.3*         Recent Labs   Lab 11/09/22  0202      K 3.4*   CO2 28      BUN 19   CREATININE 0.8   ALKPHOS 142*   ALT 32   AST 41*   BILITOT 0.9      Recent Labs   Lab 11/08/22  0937   APTT 25.4    No results for input(s): CPK, CPKMB, TROPONINI, MB in the last 168 hours.    Electrolytes: N/A - electrolytes WDL  Accuchecks: none    Gtts:      LDA/Wounds:  Lines/Drains/Airways       Drain  Duration                  Closed/Suction Drain 11/02/22 Right;Superior;Medial Back 7 days         Closed/Suction Drain 11/02/22 Superior;Medial;Left Back  Other (Comment) 7 days         NG/OG Tube 11/06/22 Right nostril 3 days              Airway  Duration                  Surgical Airway 11/07/22 1449 Shiley Cuffed 1 day              Peripheral Intravenous Line  Duration                  Midline Catheter Insertion/Assessment  - Single Lumen 10/28/22 1858 Right basilic vein (medial side of arm) 18g x 10cm 11 days         Peripheral IV - Single Lumen 11/06/22 20 G Anterior;Proximal;Right Upper Arm 3 days                  Wounds: Yes  Wound care consulted: No
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
## 2022-11-09 NOTE — ASSESSMENT & PLAN NOTE
10/25 1 of 2 BCx positive for MSSA  -ID following, appreciate recs   -BCx: 10/25 1/2 +MSSA, 10/27 negative, 10/29 NGTD  -OR cultures 10/28: +MSSA  -UA 10/25 negative  -continue oxacillin   -LISHA postponed per cardiology
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
## 2022-11-09 NOTE — PLAN OF CARE
Problem: SLP  Goal: SLP Goal  Description: Speech Language Pathology Goals  Goals expected to be met by 11/16/22    1. Pt will tolerate one-way speaking valve for 10 minutes w/o S/S respiratory distress, MIN A  2. Pt will recall 3 +safety precautions for PMSV with cuffed trach, 90%, MIN A  3. Pt will participate in evaluation of swallow when feasible   4. Educate Pt and family on safety awareness and safety precautions for one-way speaking valve    Outcome: Ongoing, Progressing     SLP One-way speaking valve evaluation completed. Pt with minimal tolerance of valve upon initial evaluation 2/2 significant backflow pressure. POC initiated and ST to continue to follow. Recommend trials of PMSV with ST only at this time for safety.  Findings reviewed with RN.     11/9/2022

## 2022-11-10 PROBLEM — J38.02 BILATERAL VOCAL FOLD PARESIS: Status: ACTIVE | Noted: 2022-11-10

## 2022-11-10 LAB
ALBUMIN SERPL BCP-MCNC: 2.2 G/DL (ref 3.5–5.2)
ALP SERPL-CCNC: 152 U/L (ref 55–135)
ALT SERPL W/O P-5'-P-CCNC: 41 U/L (ref 10–44)
ANION GAP SERPL CALC-SCNC: 10 MMOL/L (ref 8–16)
AST SERPL-CCNC: 50 U/L (ref 10–40)
BASOPHILS # BLD AUTO: 0.02 K/UL (ref 0–0.2)
BASOPHILS NFR BLD: 0.2 % (ref 0–1.9)
BILIRUB SERPL-MCNC: 0.9 MG/DL (ref 0.1–1)
BUN SERPL-MCNC: 16 MG/DL (ref 6–20)
CALCIUM SERPL-MCNC: 9.3 MG/DL (ref 8.7–10.5)
CHLORIDE SERPL-SCNC: 106 MMOL/L (ref 95–110)
CO2 SERPL-SCNC: 27 MMOL/L (ref 23–29)
CREAT SERPL-MCNC: 0.8 MG/DL (ref 0.5–1.4)
DIFFERENTIAL METHOD: ABNORMAL
EOSINOPHIL # BLD AUTO: 0.2 K/UL (ref 0–0.5)
EOSINOPHIL NFR BLD: 2.4 % (ref 0–8)
ERYTHROCYTE [DISTWIDTH] IN BLOOD BY AUTOMATED COUNT: 14.2 % (ref 11.5–14.5)
EST. GFR  (NO RACE VARIABLE): >60 ML/MIN/1.73 M^2
GLUCOSE SERPL-MCNC: 83 MG/DL (ref 70–110)
HCT VFR BLD AUTO: 27.8 % (ref 37–48.5)
HGB BLD-MCNC: 8.5 G/DL (ref 12–16)
IMM GRANULOCYTES # BLD AUTO: 0.09 K/UL (ref 0–0.04)
IMM GRANULOCYTES NFR BLD AUTO: 1.1 % (ref 0–0.5)
LYMPHOCYTES # BLD AUTO: 2.3 K/UL (ref 1–4.8)
LYMPHOCYTES NFR BLD: 27.6 % (ref 18–48)
MAGNESIUM SERPL-MCNC: 2 MG/DL (ref 1.6–2.6)
MCH RBC QN AUTO: 29.8 PG (ref 27–31)
MCHC RBC AUTO-ENTMCNC: 30.6 G/DL (ref 32–36)
MCV RBC AUTO: 98 FL (ref 82–98)
MONOCYTES # BLD AUTO: 0.6 K/UL (ref 0.3–1)
MONOCYTES NFR BLD: 6.9 % (ref 4–15)
NEUTROPHILS # BLD AUTO: 5.1 K/UL (ref 1.8–7.7)
NEUTROPHILS NFR BLD: 61.8 % (ref 38–73)
NRBC BLD-RTO: 0 /100 WBC
PHOSPHATE SERPL-MCNC: 4 MG/DL (ref 2.7–4.5)
PLATELET # BLD AUTO: 299 K/UL (ref 150–450)
PMV BLD AUTO: 10.5 FL (ref 9.2–12.9)
POTASSIUM SERPL-SCNC: 3.4 MMOL/L (ref 3.5–5.1)
POTASSIUM SERPL-SCNC: 3.8 MMOL/L (ref 3.5–5.1)
PROT SERPL-MCNC: 6.1 G/DL (ref 6–8.4)
RBC # BLD AUTO: 2.85 M/UL (ref 4–5.4)
SODIUM SERPL-SCNC: 143 MMOL/L (ref 136–145)
WBC # BLD AUTO: 8.25 K/UL (ref 3.9–12.7)

## 2022-11-10 PROCEDURE — 92610 EVALUATE SWALLOWING FUNCTION: CPT

## 2022-11-10 PROCEDURE — 63600175 PHARM REV CODE 636 W HCPCS: Performed by: NURSE PRACTITIONER

## 2022-11-10 PROCEDURE — 25000003 PHARM REV CODE 250: Performed by: PSYCHIATRY & NEUROLOGY

## 2022-11-10 PROCEDURE — 84100 ASSAY OF PHOSPHORUS: CPT | Performed by: PHYSICIAN ASSISTANT

## 2022-11-10 PROCEDURE — 99222 1ST HOSP IP/OBS MODERATE 55: CPT | Mod: GC,,, | Performed by: INTERNAL MEDICINE

## 2022-11-10 PROCEDURE — 99222 PR INITIAL HOSPITAL CARE,LEVL II: ICD-10-PCS | Mod: GC,,, | Performed by: INTERNAL MEDICINE

## 2022-11-10 PROCEDURE — 25000003 PHARM REV CODE 250: Performed by: STUDENT IN AN ORGANIZED HEALTH CARE EDUCATION/TRAINING PROGRAM

## 2022-11-10 PROCEDURE — S4991 NICOTINE PATCH NONLEGEND: HCPCS | Performed by: PHYSICIAN ASSISTANT

## 2022-11-10 PROCEDURE — 25000003 PHARM REV CODE 250

## 2022-11-10 PROCEDURE — 25000003 PHARM REV CODE 250: Performed by: NURSE PRACTITIONER

## 2022-11-10 PROCEDURE — 11000001 HC ACUTE MED/SURG PRIVATE ROOM

## 2022-11-10 PROCEDURE — 99900026 HC AIRWAY MAINTENANCE (STAT)

## 2022-11-10 PROCEDURE — 84132 ASSAY OF SERUM POTASSIUM: CPT | Performed by: PSYCHIATRY & NEUROLOGY

## 2022-11-10 PROCEDURE — 27000221 HC OXYGEN, UP TO 24 HOURS

## 2022-11-10 PROCEDURE — 99900035 HC TECH TIME PER 15 MIN (STAT)

## 2022-11-10 PROCEDURE — 36415 COLL VENOUS BLD VENIPUNCTURE: CPT | Performed by: PSYCHIATRY & NEUROLOGY

## 2022-11-10 PROCEDURE — 25000003 PHARM REV CODE 250: Performed by: PHYSICIAN ASSISTANT

## 2022-11-10 PROCEDURE — 27000207 HC ISOLATION

## 2022-11-10 PROCEDURE — 94761 N-INVAS EAR/PLS OXIMETRY MLT: CPT

## 2022-11-10 PROCEDURE — 97535 SELF CARE MNGMENT TRAINING: CPT

## 2022-11-10 PROCEDURE — 92507 TX SP LANG VOICE COMM INDIV: CPT

## 2022-11-10 PROCEDURE — 80053 COMPREHEN METABOLIC PANEL: CPT | Performed by: PHYSICIAN ASSISTANT

## 2022-11-10 PROCEDURE — 83735 ASSAY OF MAGNESIUM: CPT | Performed by: PHYSICIAN ASSISTANT

## 2022-11-10 PROCEDURE — 85025 COMPLETE CBC W/AUTO DIFF WBC: CPT | Performed by: PHYSICIAN ASSISTANT

## 2022-11-10 RX ORDER — SCOLOPAMINE TRANSDERMAL SYSTEM 1 MG/1
1 PATCH, EXTENDED RELEASE TRANSDERMAL
Status: DISCONTINUED | OUTPATIENT
Start: 2022-11-10 | End: 2022-11-21 | Stop reason: HOSPADM

## 2022-11-10 RX ORDER — HYDRALAZINE HYDROCHLORIDE 20 MG/ML
10 INJECTION INTRAMUSCULAR; INTRAVENOUS EVERY 4 HOURS PRN
Status: DISCONTINUED | OUTPATIENT
Start: 2022-11-10 | End: 2022-11-21 | Stop reason: HOSPADM

## 2022-11-10 RX ORDER — LABETALOL HCL 20 MG/4 ML
10 SYRINGE (ML) INTRAVENOUS EVERY 4 HOURS PRN
Status: DISCONTINUED | OUTPATIENT
Start: 2022-11-10 | End: 2022-11-12

## 2022-11-10 RX ORDER — BISACODYL 10 MG
10 SUPPOSITORY, RECTAL RECTAL DAILY PRN
Status: DISCONTINUED | OUTPATIENT
Start: 2022-11-10 | End: 2022-11-21 | Stop reason: HOSPADM

## 2022-11-10 RX ADMIN — SILODOSIN 4 MG: 4 CAPSULE ORAL at 08:11

## 2022-11-10 RX ADMIN — PREGABALIN 200 MG: 150 CAPSULE ORAL at 03:11

## 2022-11-10 RX ADMIN — PREGABALIN 200 MG: 150 CAPSULE ORAL at 08:11

## 2022-11-10 RX ADMIN — OXACILLIN SODIUM 12 G: 10 INJECTION, POWDER, FOR SOLUTION INTRAVENOUS at 03:11

## 2022-11-10 RX ADMIN — OXYCODONE 5 MG: 5 TABLET ORAL at 05:11

## 2022-11-10 RX ADMIN — OXYCODONE 5 MG: 5 TABLET ORAL at 11:11

## 2022-11-10 RX ADMIN — HEPARIN SODIUM 5000 UNITS: 5000 INJECTION INTRAVENOUS; SUBCUTANEOUS at 03:11

## 2022-11-10 RX ADMIN — HEPARIN SODIUM 5000 UNITS: 5000 INJECTION INTRAVENOUS; SUBCUTANEOUS at 06:11

## 2022-11-10 RX ADMIN — OXYCODONE 5 MG: 5 TABLET ORAL at 06:11

## 2022-11-10 RX ADMIN — FAMOTIDINE 20 MG: 20 TABLET ORAL at 08:11

## 2022-11-10 RX ADMIN — ACETAMINOPHEN 650 MG: 325 TABLET ORAL at 05:11

## 2022-11-10 RX ADMIN — SENNOSIDES AND DOCUSATE SODIUM 1 TABLET: 50; 8.6 TABLET ORAL at 08:11

## 2022-11-10 RX ADMIN — POLYETHYLENE GLYCOL 3350 17 G: 17 POWDER, FOR SOLUTION ORAL at 08:11

## 2022-11-10 RX ADMIN — HYDROXYZINE HYDROCHLORIDE 25 MG: 25 TABLET, FILM COATED ORAL at 09:11

## 2022-11-10 RX ADMIN — ACETAMINOPHEN 650 MG: 325 TABLET ORAL at 11:11

## 2022-11-10 RX ADMIN — METHADONE HYDROCHLORIDE 90 MG: 10 TABLET ORAL at 03:11

## 2022-11-10 RX ADMIN — DIAZEPAM 5 MG: 5 TABLET ORAL at 05:11

## 2022-11-10 RX ADMIN — SCOPALAMINE 1 PATCH: 1 PATCH, EXTENDED RELEASE TRANSDERMAL at 08:11

## 2022-11-10 RX ADMIN — POTASSIUM BICARBONATE 35 MEQ: 391 TABLET, EFFERVESCENT ORAL at 09:11

## 2022-11-10 RX ADMIN — POTASSIUM BICARBONATE 35 MEQ: 391 TABLET, EFFERVESCENT ORAL at 06:11

## 2022-11-10 RX ADMIN — Medication 1 PATCH: at 08:11

## 2022-11-10 RX ADMIN — HEPARIN SODIUM 5000 UNITS: 5000 INJECTION INTRAVENOUS; SUBCUTANEOUS at 08:11

## 2022-11-10 NOTE — PT/OT/SLP EVAL
Speech Language Pathology Evaluation  Bedside Swallow    Patient Name:  Peter Stiles   MRN:  [id number]  Admitting Diagnosis: Osteomyelitis of cervical spine       IMPORTANT  CAUTION: CUFF MUST ALWAYS BE DEFLATED WHEN VALVE IN USE.  Passy Autumn Speaking Valve trials with ST only at this time.        Recommendations:                General Recommendations:  One Way Speaking Valve training and further assessment of swallow function when feasible  Diet recommendations:  NPO, NPO   Aspiration Precautions: Frequent oral care and Strict aspiration precautions   General Precautions: Standard, aspiration, fall, respiratory, contact  Communication strategies:  go to room if call light pushed and pt writing and mouthing to communicate wants/needs  Passy Atuumn Speaking Valve Precautions: Only wear when cuff is deflated, trials of valve only with ST at this time, remove valve with any S/S respiratory distress, remove valve when sleeping. Please note, to clean valve:  1. Swish valve in pure soap and warm water, 2. Rinse valve thoroughly in warm running water, 3. Allow valve to air dry thoroughly before placing it in storage container, 4. DO NOT use hot water, peroxide, bleach, vinegar, alcohol, brushes, or cotton swabs to clean valve.    History:     Past Medical History:   Diagnosis Date    CHF (congestive heart failure)     Essential (primary) hypertension     Opioid dependence on maintenance agonist therapy, no symptoms     Smoking        Past Surgical History:   Procedure Laterality Date    APPENDECTOMY       SECTION      x2    FUSION OF CERVICAL SPINE BY POSTERIOR APPROACH N/A 2022    Procedure: FUSION, SPINE, CERVICAL, POSTERIOR APPROACH C2-T2 posterior decompression/fusion; Depuy, neuromonitoring monica Marrero;  Surgeon: West Merino DO;  Location: [facility] OR [address] [place] [address];  Service: Neurosurgery;  Laterality: N/A;    HYSTERECTOMY      SURGICAL REMOVAL OF VERTEBRAL BODY OF CERVICAL SPINE N/A 10/28/2022  "   Procedure: CORPECTOMY, SPINE, CERVICAL;  Surgeon: West Merino DO;  [facility];  Service: Neurosurgery;  Laterality: N/A;  anterior C5-6 corpectomy, globus, caspar head prado and tongs, omnipaque, 15lbs weights, microscrope, round cutting lynsey and M8, ENT assistance for exposure    TRACHEOSTOMY N/A 11/7/2022    Procedure: CREATION, TRACHEOSTOMY;  Surgeon: Fritz Nogueira MD;  [facility];  Service: ENT;  Laterality: N/A;       Social History: Patient lives at her home with her two daughters     Prior Intubation HX:  10/28/22 - 11/4/22, 11/5/22 - 11/7/22, trach placed 11/7/22.     Modified Barium Swallow none prior at this facility      Chest X-Rays: 11/8/22: Decreased opacity in the both inferior hemithoraces since 11/07/2022 is appreciated, right greater than left, consistent with improved aeration in both lower lobes and/or decreasing pleural fluid on each side.  There has been no significant detrimental interval change in the appearance of the chest since that time.  No pneumothorax.     Occupation/hobbies/homemaking: Currently not employed, Independent with ADLs prior to admit.       Subjective     SLP reviewed Pt with RN and RT prior to session, both cleared for tx  Pt presents calm  She asks, "How long til I can have coffee?"    Pain/Comfort:  Pain Rating 1: 0/10  Location - Orientation 1: generalized  Location 1: neck  Pain Addressed 1: Nurse notified, Cessation of Activity    Respiratory Status:  trach collar 5L, 28%     Objective:     Oral Musculature Evaluation  Oral Musculature: WNL  Dentition: teeth in poor condition  Secretion Management: coughing on secretions, problems swallowing secretions  Mucosal Quality: sticky  Mandibular Strength and Mobility: WNL  Oral Labial Strength and Mobility: WNL  Lingual Strength and Mobility: functional strength  Volitional Cough: limited  Volitional Swallow: elicited  Voice Prior to PO Intake: strained with wet change in vocal " qaulity with PMSV in place    Bedside Swallow Eval:   Consistencies Assessed:  Thin liquids : ice chip sliver x1      Oral Phase:   WNL    Pharyngeal Phase:   decreased hyolaryngeal excursion to palpation  wet vocal quality after swallow    Compensatory Strategies  None    Treatment: Pt found asleep in bed with NG, Shiley 6.0 cuffed with cuff deflated and cervical collar in place.  Pt woke per minimal verbal cues from SLP. Friend at bedside at start of session. SLP educated Pt and Friend on SLP role, anatomy s/p trach, one-way speaking valve technology and precautions for PMSV with cuffed trach. Pt with partial demonstration of understanding. Pt with productive cough during SLP education and requested suction. RN notified and in room to review Pt. RN elevated HOB. RT entered into room to suction Pt. Friend exited room. Following suction, Patient with SpO2 95 and . She willingly accepted trials of PMSV. SLP assisted in donning valve to trach for Pt. Pt tolerated trials of valve for 15 seconds, 3 minutes and 2.5 minutes. Pt with HR 92 and SpO2 95% with PMSV in place.  Pt with phonation achieved 100% of attempts. Her vocal quality was moderately strained with intermittent wet change in vocal quality. Pt with productive cough x4 with PMSV and suctioned oral cavity with Ebony.  Pt seen with trial of ice chip sliver x1 with PMSV in place. Pt with wet change in vocal quality and productive cough x1. Additional trials deferred 2/2 secretions, wet vocal quality and SLP unable to r/o aspiration at the bedside. PMSV returned to case and placed with trach supplies in room. SLP educated Pt on findings, progress, ongoing SLP POC including need for ongoing swallow assessment and safety precautions including ongoing trials of PMSV with ST only at this time.  She nodded along t/o education but did not verbalize understanding. No additional questions. Whiteboard current.  RN remained in room with Pt at bedside upon SLP exit.      Assessment:     Peter Stiles is a 51 y.o. female with an SLP diagnosis of Dysphagia, Dysphonia, S/P Trach, and One Way Speaking Valve Training.  She would benefit from ongoing swallow assessment to determine safest, least restrictive means nutrition/hydration.     Goals:   Multidisciplinary Problems       SLP Goals          Problem: SLP    Goal Priority Disciplines Outcome   SLP Goal     SLP Ongoing, Progressing   Description: Speech Language Pathology Goals  Goals expected to be met by 11/16/22    1. Pt will tolerate one-way speaking valve for 10 minutes w/o S/S respiratory distress, MIN A  2. Pt will recall 3 +safety precautions for PMSV with cuffed trach, 90%, MIN A  3. Pt will participate in ongoing swallow assessment to determine safest, least restrictive means nutrition/hydration    4. Educate Pt and family on safety awareness and safety precautions for one-way speaking valve                         Plan:     Patient to be seen:  4 x/week   Plan of Care expires:  12/09/22  Plan of Care reviewed with:  patient, friend   SLP Follow-Up:  Yes       Discharge recommendations:  rehabilitation facility   Barriers to Discharge:   NPO    Time Tracking:     SLP Treatment Date:   11/10/22  Speech Start Time:  1447  Speech Stop Time:  1530     Speech Total Time (min):  43 min    Billable Minutes: Speech Therapy Individual 15, Eval Swallow and Oral Function 8, and Self Care/Home Management Training 15    11/10/2022
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
## 2022-11-10 NOTE — SUBJECTIVE & OBJECTIVE
Interval History:  patient seen and examined at bedside. Communicates by writing on a notebook. Reports feeling congested in sinuses for a few days and wishes to get a pill for it. She wishes to see how she does with speech for next 2 weeks before getting Gtube.       Review of Systems  Objective:     Vital Signs (Most Recent):  Temp: 98.1 °F (36.7 °C) (11/10/22 1104)  Pulse: 80 (11/10/22 1143)  Resp: 19 (11/10/22 1114)  BP: 131/78 (11/10/22 1104)  SpO2: 100 % (11/10/22 1104) Vital Signs (24h Range):  Temp:  [97.3 °F (36.3 °C)-98.7 °F (37.1 °C)] 98.1 °F (36.7 °C)  Pulse:  [64-94] 80  Resp:  [14-32] 19  SpO2:  [95 %-100 %] 100 %  BP: (101-176)/(50-94) 131/78     Weight:  (daylight savings)  Body mass index is 22.6 kg/m².    Intake/Output Summary (Last 24 hours) at 11/10/2022 1308  Last data filed at 11/10/2022 1015  Gross per 24 hour   Intake 1768.27 ml   Output 400 ml   Net 1368.27 ml      Physical Exam  Vitals and nursing note reviewed.   HENT:      Head: Normocephalic.      Nose: Nose normal.      Mouth/Throat:      Mouth: Mucous membranes are moist.      Pharynx: Oropharynx is clear.   Cardiovascular:      Rate and Rhythm: Normal rate and regular rhythm.      Pulses: Normal pulses.      Heart sounds: Normal heart sounds.   Pulmonary:      Effort: Pulmonary effort is normal.      Breath sounds: Normal breath sounds.   Abdominal:      General: Bowel sounds are normal.      Palpations: Abdomen is soft.   Musculoskeletal:         General: Normal range of motion.   Skin:     General: Skin is warm and dry.   Neurological:      Mental Status: She is alert and oriented to person, place, and time. Mental status is at baseline.      Comments:      Psychiatric:         Mood and Affect: Mood normal.         Behavior: Behavior normal.       MELD-Na score: 6 at 11/3/2022  1:47 AM  MELD score: 6 at 11/3/2022  1:47 AM  Calculated from:  Serum Creatinine: 0.7 mg/dL (Using min of 1 mg/dL) at 11/3/2022  1:47 AM  Serum Sodium: 141  mmol/L (Using max of 137 mmol/L) at 11/3/2022  1:47 AM  Total Bilirubin: 0.4 mg/dL (Using min of 1 mg/dL) at 11/3/2022  1:47 AM  INR(ratio): 1.0 at 11/1/2022  9:28 PM  Age: 51 years    Significant Labs:  CBC:  Recent Labs   Lab 11/09/22  0202 11/10/22  0235   WBC 9.19 8.25   HGB 9.8* 8.5*   HCT 32.3* 27.8*    299     CMP:  Recent Labs   Lab 11/09/22  0202 11/10/22  0235    143   K 3.4* 3.4*    106   CO2 28 27   GLU 60* 83   BUN 19 16   CREATININE 0.8 0.8   CALCIUM 9.7 9.3   PROT 7.2 6.1   ALBUMIN 2.6* 2.2*   BILITOT 0.9 0.9   ALKPHOS 142* 152*   AST 41* 50*   ALT 32 41   ANIONGAP 11 10

## 2022-11-10 NOTE — PLAN OF CARE
Problem: SLP  Goal: SLP Goal  Description: Speech Language Pathology Goals  Goals expected to be met by 11/16/22    1. Pt will tolerate one-way speaking valve for 10 minutes w/o S/S respiratory distress, MIN A  2. Pt will recall 3 +safety precautions for PMSV with cuffed trach, 90%, MIN A  3. Pt will participate in ongoing swallow assessment to determine safest, least restrictive means nutrition/hydration    4. Educate Pt and family on safety awareness and safety precautions for one-way speaking valve    Outcome: Ongoing, Progressing     Pt seen for one-way speaking valve training and SLP bedside swallow evaluation.  Pt with minimal tolerance of valve 2/2 thick secretions this service day.  Pt presents with Dysphagia and Dysphonia s/p trach. REC: continue NPO, strict aspiration precautions, good oral care. Recommend PMSV trials with ST only at this time.  ST to continue to follow.    11/10/2022

## 2022-11-10 NOTE — PLAN OF CARE
Problem: Adult Inpatient Plan of Care  Goal: Plan of Care Review  Outcome: Ongoing, Progressing  Goal: Patient-Specific Goal (Individualized)  Description: Admit Date: 10/25/2022    Osteomyelitis of cervical spine    Past Medical History:  No date: CHF (congestive heart failure)  No date: Essential (primary) hypertension  No date: Opioid dependence on maintenance agonist therapy, no symptoms  No date: Smoking    Past Surgical History:  No date: APPENDECTOMY  No date:  SECTION      Comment:  x2  2022: FUSION OF CERVICAL SPINE BY POSTERIOR APPROACH; N/A      Comment:  Procedure: FUSION, SPINE, CERVICAL, POSTERIOR APPROACH                C2-T2 posterior decompression/fusion; Depuy,                neuromonitoring Marrero, gell rolls;  Surgeon: West Merino DO;  [facility];  Service:                Neurosurgery;  Laterality: N/A;  No date: HYSTERECTOMY  10/28/2022: SURGICAL REMOVAL OF VERTEBRAL BODY OF CERVICAL SPINE; N/A      Comment:  Procedure: CORPECTOMY, SPINE, CERVICAL;  Surgeon:                West Merino DO;  [facility];                 Service: Neurosurgery;  Laterality: N/A;  anterior C5-6                corpectomy, globus, caspar head prado and tongs,                omnipaque, 15lbs weights, microscrope, round cutting lynsey               and M8, ENT assistance for exposure    Individualization:   1.     Restraints:   Restraint Order  Length of Order: Order good for next 24 hours or when removed.  Date that the current order will : 22  Time that the current order will : 010  Order Upon Application: Yes          Outcome: Ongoing, Progressing  Goal: Absence of Hospital-Acquired Illness or Injury  Outcome: Ongoing, Progressing  Goal: Optimal Comfort and Wellbeing  Outcome: Ongoing, Progressing  Goal: Readiness for Transition of Care  Outcome: Ongoing, Progressing     Problem: Fall Injury Risk  Goal: Absence of Fall and  Fall-Related Injury  Outcome: Ongoing, Progressing     Problem: Skin Injury Risk Increased  Goal: Skin Health and Integrity  Outcome: Ongoing, Progressing     Problem: Pain Acute  Goal: Acceptable Pain Control and Functional Ability  Outcome: Ongoing, Progressing     Problem: Autonomic Dysreflexia (Spinal Cord Injury)  Goal: Effective Autonomic Dysreflexia Prevention  Outcome: Ongoing, Progressing     Problem: Bowel Elimination Impaired (Spinal Cord Injury)  Goal: Effective Bowel Elimination  Outcome: Ongoing, Progressing     Problem: Extended Neurologic Impairment (Spinal Cord Injury)  Goal: Absence of Extended Neurologic Injury  Outcome: Ongoing, Progressing     Problem: Functional Ability Impaired (Spinal Cord Injury)  Goal: Optimal Functional Ability  Outcome: Ongoing, Progressing     Problem: Neurogenic Shock (Spinal Cord Injury)  Goal: Effective Tissue Perfusion  Outcome: Ongoing, Progressing     Problem: Nutrition Impaired (Spinal Cord Injury)  Goal: Optimal Nutrition Intake  Outcome: Ongoing, Progressing     Problem: Infection  Goal: Absence of Infection Signs and Symptoms  Outcome: Ongoing, Progressing     Problem: Restraint, Nonbehavioral (Nonviolent)  Goal: Absence of Harm or Injury  Outcome: Ongoing, Progressing     Problem: Communication Impairment (Artificial Airway)  Goal: Effective Communication  Outcome: Ongoing, Progressing     Problem: Device-Related Complication Risk (Artificial Airway)  Goal: Optimal Device Function  Outcome: Ongoing, Progressing     Problem: Skin and Tissue Injury (Artificial Airway)  Goal: Absence of Device-Related Skin or Tissue Injury  Outcome: Ongoing, Progressing     Problem: Noninvasive Ventilation Acute  Goal: Effective Unassisted Ventilation and Oxygenation  Outcome: Ongoing, Progressing     Problem: Adjustment to Illness (Delirium)  Goal: Optimal Coping  Outcome: Ongoing, Progressing     Problem: Sleep Disturbance (Delirium)  Goal: Improved Sleep  Outcome: Ongoing,  Progressing

## 2022-11-10 NOTE — NURSING TRANSFER
Nursing Transfer Note        Transfer To 952    Transfer via bed    Transfer with 5 L to O2 via trach collar, cardiac monitoring, and IV pump (Oxacillin and TF paused)    Transported by RN and RT    Medicines sent: yes    Chart sent with patient: Yes    Belongings sent with patient: All belongings transported to room per friend at bedside    Notified: Friend at bedside for transfer    Bedside Neuro assessment performed: Yes    Bedside Handoff given to: LESLEE Pham    Upon arrival to floor: cardiac monitor applied, patient oriented to room, call bell in reach, and bed in lowest position

## 2022-11-10 NOTE — PLAN OF CARE
[facility] Care Plan    POC reviewed with Peter Stiles and family at 0300. Pt verbalized understanding. Questions and concerns addressed. No acute events overnight. Pt progressing toward goals. Will continue to monitor. See below and flowsheets for full assessment and VS info.           Is this a stroke patient? no    Neuro:  Lucien Coma Scale  Best Eye Response: 3-->(E3) to speech  Best Motor Response: 6-->(M6) obeys commands  Best Verbal Response: 1-->(V1) none  [place] Coma Scale Score: 10  Assessment Qualifiers: patient not sedated/intubated  Pupil PERRLA: yes     24hr Temp:  [97.3 °F (36.3 °C)-98.8 °F (37.1 °C)]     CV:   Rhythm: sinus bradycardia, normal sinus rhythm  BP goals:   SBP < 160  MAP > 65    Resp:   O2 Device (Oxygen Therapy): Trach Collar  Vent Mode: Spont  Set Rate: 14 BPM  Oxygen Concentration (%): 28  Vt Set: 385 mL  PEEP/CPAP: 5 cmH20  Pressure Support: 5 cmH20    Plan: trach in place    GI/:     Diet/Nutrition Received: tube feeding  Last Bowel Movement: 11/08/22  Voiding Characteristics: external catheter    Intake/Output Summary (Last 24 hours) at 11/10/2022 0658  Last data filed at 11/10/2022 0644  Gross per 24 hour   Intake 1346.48 ml   Output 450 ml   Net 896.48 ml     Unmeasured Output  Urine Occurrence: 1  Stool Occurrence: 0  Pad Count: 3    Labs/Accuchecks:  Recent Labs   Lab 11/10/22  0235   WBC 8.25   RBC 2.85*   HGB 8.5*   HCT 27.8*         Recent Labs   Lab 11/10/22  0235      K 3.4*   CO2 27      BUN 16   CREATININE 0.8   ALKPHOS 152*   ALT 41   AST 50*   BILITOT 0.9      Recent Labs   Lab 11/08/22  0937   APTT 25.4    No results for input(s): CPK, CPKMB, TROPONINI, MB in the last 168 hours.    Electrolytes: Electrolytes replaced  Accuchecks: none    Gtts:      LDA/Wounds:  Lines/Drains/Airways       Drain  Duration                  NG/OG Tube 11/06/22 Right nostril 4 days              Airway  Duration                  Surgical Airway 11/07/22 [facility]9 Joyce  Cuffed 2 days              Peripheral Intravenous Line  Duration                  Midline Catheter Insertion/Assessment  - Single Lumen 10/28/22 1858 Right basilic vein (medial side of arm) 18g x 10cm 12 days         Peripheral IV - Single Lumen 11/06/22 20 G Anterior;Proximal;Right Upper Arm 4 days                  Wounds: Yes  Wound care consulted: No

## 2022-11-10 NOTE — PLAN OF CARE
[facility] Care Plan    POC reviewed with Peter Calderón Rosyraul and family at 1400. Pt verbalized understanding. Questions and concerns addressed. No acute events today. Pt progressing toward goals. Will continue to monitor. See below and flowsheets for full assessment and VS info.     -Moderate amount of secretions. PT trac suctioned multiple times  -Speech eval for diet failed due to secretions  -Transfer orders placed   -Pt not able to void regularly. Required in and out catheterization        Is this a stroke patient? no    Neuro:  Lucien Coma Scale  Best Eye Response: 4-->(E4) spontaneous  Best Motor Response: 6-->(M6) obeys commands  Best Verbal Response: 1-->(V1) none (Trach in place)  Lucien Coma Scale Score: 11  Assessment Qualifiers: patient not sedated/intubated  Pupil PERRLA: yes     24 hr Temp:  [97.3 °F (36.3 °C)-98.8 °F (37.1 °C)]     CV:   Rhythm: normal sinus rhythm  BP goals:   SBP < 160  MAP > 65    Resp:   O2 Device (Oxygen Therapy): Trach Collar  Vent Mode: Spont  Set Rate: 14 BPM  Oxygen Concentration (%): 28  Vt Set: 385 mL  PEEP/CPAP: 5 cmH20  Pressure Support: 5 cmH20    Plan: trach in place    GI/:     Diet/Nutrition Received: tube feeding  Last Bowel Movement: 11/08/22  Voiding Characteristics: hesitancy    Intake/Output Summary (Last 24 hours) at 11/9/2022 1806  Last data filed at 11/9/2022 1749  Gross per 24 hour   Intake 1021.97 ml   Output 865 ml   Net 156.97 ml     Unmeasured Output  Urine Occurrence: 1  Stool Occurrence: 0  Pad Count: 3    Labs/Accuchecks:  Recent Labs   Lab 11/09/22  0202   WBC 9.19   RBC 3.28*   HGB 9.8*   HCT 32.3*         Recent Labs   Lab 11/09/22  0202      K 3.4*   CO2 28      BUN 19   CREATININE 0.8   ALKPHOS 142*   ALT 32   AST 41*   BILITOT 0.9      Recent Labs   Lab 11/08/22  0937   APTT 25.4    No results for input(s): CPK, CPKMB, TROPONINI, MB in the last 168 hours.    Electrolytes: N/A - electrolytes WDL  Accuchecks:  none    Gtts:      LDA/Wounds:  Lines/Drains/Airways       Drain  Duration                  NG/OG Tube 11/06/22 Right nostril 3 days              Airway  Duration                  Surgical Airway 11/07/22 1449 Shiley Cuffed 2 days              Peripheral Intravenous Line  Duration                  Midline Catheter Insertion/Assessment  - Single Lumen 10/28/22 1858 Right basilic vein (medial side of arm) 18g x 10cm 12 days         Peripheral IV - Single Lumen 11/06/22 20 G Anterior;Proximal;Right Upper Arm 3 days                  Wounds: Yes  Wound care consulted: No
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
## 2022-11-10 NOTE — OP NOTE
Date of Operation: 11/7/2022    Surgeon: JULIO CESAR ARAGON     Assistants: Carlos Cardoza (RES)    Anesthesia: General endotracheal anesthesia    ASA Class:     Preoperative Diagnosis:   1) Respiratory failure  2) Stridor   3) Cervical osteomyelitis    Postoperative diagnosis:  1) Respiratory failure  2) Stridor   3) Bilateral vocal fold immobility  4) Cervical osteomyelitis    Procedures performed:  1) Tracheostomy  2) Direct laryngoscopy - diagnostic    Closing Set: No    Indications for operation:  Peter Stiles is a 51 y.o. female who presented with cervical osteomyelitis requiring spinal surgery. She failed extubation in the immediate perioperative period and then twice in the ICU, with bilateral vocal fold immobility noted on flexible endoscopy.     The risks, benefits, indications, and alternatives to this procedure were thoroughly discussed with the patient preoperatively, and informed consent was obtained. Please see my detailed preoperative notes for a thorough account of this discussion.    Findings: On DL, the arytenoids were not fixed. There were no lesions or areas of stenosis. A 6-0 Shiley trach was placed.      EBL: 5mL    IVF:  May be found in the operative record    Detailed Account of Technique Employed:    The patient was brought into the operating room and placed supine on the operating table. The patient was already intubated transorally by the anesthesiology service, and an adequate level of general endotracheal anesthesia was obtained. The patient's face and neck were prepped and draped in the standard, sterile fashion. A timeout was performed according to the universal protocol.     The thyroid notch, sternal notch, and cricoid were marked, and a suitable crease about 2 fingerbreaths above the sternal notch marked for incision - this was the central component of her ACDF incision. This was opened with a 15-blade, and then the bovie cautery on 20/20 was used to incise the subcutaneous  tissues. The median raphe of the strap muscles was identified and incised. The straps were retracted laterally with Army-Nacy retractors. The thyroid isthmus was noted. The pretracheal space was bluntly dissected deep to the isthmus, and the isthmus was doubly clamped, divided, and suture ligated with the bovie and 2-0 silk. The cricoid was retracted superiorly with a hook. The tracheotomy was performed between the 2nd and 3rd rings, with an inferiorly based Jeni flap secured to the inferior subcutaneous tissues with a 3-0 vicryl. A 6-0 Shiley tracheotomy tube was inserted through the defect after backing out the ETT. Once adequate placement was confirmed by  CO2 and bilateral chest rise with normal ventilation, the cricoid hook was removed. The tracheotomy tube was secured with 2-0 silk suture and a velcro tie.    Next we removed the ETT and placed a maxillary tooth guard. The Dedo laryngoscope was then inserted in the right lingual gutter and advanced to visualize the posterior oral cavity, oropharynx, hypopharynx, and endolarynx with the above findings noted. The scope was suspended with the Lewy arm and the arytenoids were gently palpated with the suction - they appeared to be mobile. No stenosis was noted. The scope was then removed and the procedure terminated.     The patient was allowed to awaken from anesthesia and taken back to the ICU in stable condition.     All sponge, needle, and instrument counts were correct at the end of the procedure x 2.     I was present for and performed all portions of the operation as noted above.

## 2022-11-10 NOTE — PT/OT/SLP PROGRESS
Physical Therapy      Patient Name:  Peter Stiles   MRN:  [id number]    PT attempted to visit pt 2x today. During AM attempt, pt politely declined because she did not want to get too tired before her SLP swallow eval. During the afternoon attempt, pt stated the same and requested PT to return tomorrow. Will follow-up as scheduled.

## 2022-11-10 NOTE — ASSESSMENT & PLAN NOTE
Peter Stiles is a 51 y.o. female with PMHx of HTN, dilated cardiomyopathy, h/o opioid dependence, cigarette smoker (2pks/day), and daily baby ASA use who presents with 1 day of decreased sensation from T5 level down and tingling in her bilateral fingers and bilateral toes. MRI imaging obtained in the ED showing possible C5-6 osteodiscitis/myelitis with cord compression. Patient tachycardic and hypertensive on arrival, afebrile, with leukocytosis. Now s/p C5-6 corpectomy on 10/28/22; C2-T2 PCDF on 11/2; S/p trach 11/7.     --All pertinent imaging/labs personally reviewed and interpreted.   MRI C/T/L spine w/o contrast 10/25: focal kyphotic deformity at C5/6 with possible discitis/osteomyelitis,  epidural collection extending into the central spinal canal resulting in severe stenosis and cord signal  abnormality.  Prevertebral soft tissue edema and probable paravertebral abscess or phlegmon at this level. Thoracic and lumbar spine unremarkable. MRI C spine w/ contrast 10/25: confirmed enhancement at C5/6 consistent with osteomyelitis/discitis and epidural phlegmon      -Admitted to neuro ICU  --Intubated after index procedure and kept intubated for posterior fixation and for tentative LISHA 11/4 which was eventually cancelled due to inability to adequately extend neck  --Step down to medicine  --Continue Speech therapy: MBSS.   --BCx 10/25 growing MSSA, Repeat BCx's 10/27 NGTD  --OR cultures 10/28 no organisms on gram stain, culture MSSA   ID following on oxacillin  --Continue C collar when upright   --CT Soft tissue neck reviewed with stable hardware position s/p 360 cervical fusion ow expected postop changes  --Pain control as needed  --PT/OT. SQH  --Please call NSGY with any questions/concerns    Dispo: ICU. Wean trach per NCC, MBSS per SLP; step down to Medicine

## 2022-11-11 LAB
ALBUMIN SERPL BCP-MCNC: 2.1 G/DL (ref 3.5–5.2)
ALP SERPL-CCNC: 155 U/L (ref 55–135)
ALT SERPL W/O P-5'-P-CCNC: 34 U/L (ref 10–44)
ANION GAP SERPL CALC-SCNC: 9 MMOL/L (ref 8–16)
AST SERPL-CCNC: 37 U/L (ref 10–40)
BASOPHILS # BLD AUTO: 0.02 K/UL (ref 0–0.2)
BASOPHILS NFR BLD: 0.2 % (ref 0–1.9)
BILIRUB SERPL-MCNC: 0.6 MG/DL (ref 0.1–1)
BUN SERPL-MCNC: 16 MG/DL (ref 6–20)
CALCIUM SERPL-MCNC: 9 MG/DL (ref 8.7–10.5)
CHLORIDE SERPL-SCNC: 107 MMOL/L (ref 95–110)
CO2 SERPL-SCNC: 27 MMOL/L (ref 23–29)
CREAT SERPL-MCNC: 0.8 MG/DL (ref 0.5–1.4)
DIFFERENTIAL METHOD: ABNORMAL
EOSINOPHIL # BLD AUTO: 0.2 K/UL (ref 0–0.5)
EOSINOPHIL NFR BLD: 2.4 % (ref 0–8)
ERYTHROCYTE [DISTWIDTH] IN BLOOD BY AUTOMATED COUNT: 14.6 % (ref 11.5–14.5)
EST. GFR  (NO RACE VARIABLE): >60 ML/MIN/1.73 M^2
GLUCOSE SERPL-MCNC: 120 MG/DL (ref 70–110)
HCT VFR BLD AUTO: 23.9 % (ref 37–48.5)
HGB BLD-MCNC: 7.5 G/DL (ref 12–16)
IMM GRANULOCYTES # BLD AUTO: 0.09 K/UL (ref 0–0.04)
IMM GRANULOCYTES NFR BLD AUTO: 1 % (ref 0–0.5)
LYMPHOCYTES # BLD AUTO: 2.6 K/UL (ref 1–4.8)
LYMPHOCYTES NFR BLD: 28.9 % (ref 18–48)
MAGNESIUM SERPL-MCNC: 2 MG/DL (ref 1.6–2.6)
MCH RBC QN AUTO: 31 PG (ref 27–31)
MCHC RBC AUTO-ENTMCNC: 31.4 G/DL (ref 32–36)
MCV RBC AUTO: 99 FL (ref 82–98)
MONOCYTES # BLD AUTO: 0.7 K/UL (ref 0.3–1)
MONOCYTES NFR BLD: 7.5 % (ref 4–15)
NEUTROPHILS # BLD AUTO: 5.3 K/UL (ref 1.8–7.7)
NEUTROPHILS NFR BLD: 60 % (ref 38–73)
NRBC BLD-RTO: 0 /100 WBC
PHOSPHATE SERPL-MCNC: 3.1 MG/DL (ref 2.7–4.5)
PLATELET # BLD AUTO: 251 K/UL (ref 150–450)
PMV BLD AUTO: 10.8 FL (ref 9.2–12.9)
POCT GLUCOSE: 100 MG/DL (ref 70–110)
POCT GLUCOSE: 131 MG/DL (ref 70–110)
POTASSIUM SERPL-SCNC: 4 MMOL/L (ref 3.5–5.1)
PROT SERPL-MCNC: 6.2 G/DL (ref 6–8.4)
RBC # BLD AUTO: 2.42 M/UL (ref 4–5.4)
SODIUM SERPL-SCNC: 143 MMOL/L (ref 136–145)
WBC # BLD AUTO: 8.9 K/UL (ref 3.9–12.7)

## 2022-11-11 PROCEDURE — 94761 N-INVAS EAR/PLS OXIMETRY MLT: CPT

## 2022-11-11 PROCEDURE — 25000003 PHARM REV CODE 250: Performed by: NURSE PRACTITIONER

## 2022-11-11 PROCEDURE — 97110 THERAPEUTIC EXERCISES: CPT

## 2022-11-11 PROCEDURE — 11000001 HC ACUTE MED/SURG PRIVATE ROOM

## 2022-11-11 PROCEDURE — 99024 POSTOP FOLLOW-UP VISIT: CPT | Mod: POP,,,

## 2022-11-11 PROCEDURE — 25000003 PHARM REV CODE 250: Performed by: PSYCHIATRY & NEUROLOGY

## 2022-11-11 PROCEDURE — 27000221 HC OXYGEN, UP TO 24 HOURS

## 2022-11-11 PROCEDURE — 83735 ASSAY OF MAGNESIUM: CPT | Performed by: PHYSICIAN ASSISTANT

## 2022-11-11 PROCEDURE — 99024 PR POST-OP FOLLOW-UP VISIT: ICD-10-PCS | Mod: POP,,,

## 2022-11-11 PROCEDURE — 99232 SBSQ HOSP IP/OBS MODERATE 35: CPT | Mod: ,,, | Performed by: STUDENT IN AN ORGANIZED HEALTH CARE EDUCATION/TRAINING PROGRAM

## 2022-11-11 PROCEDURE — 92507 TX SP LANG VOICE COMM INDIV: CPT

## 2022-11-11 PROCEDURE — 27201112

## 2022-11-11 PROCEDURE — 36415 COLL VENOUS BLD VENIPUNCTURE: CPT | Performed by: STUDENT IN AN ORGANIZED HEALTH CARE EDUCATION/TRAINING PROGRAM

## 2022-11-11 PROCEDURE — 99232 PR SUBSEQUENT HOSPITAL CARE,LEVL II: ICD-10-PCS | Mod: ,,, | Performed by: STUDENT IN AN ORGANIZED HEALTH CARE EDUCATION/TRAINING PROGRAM

## 2022-11-11 PROCEDURE — 25000003 PHARM REV CODE 250: Performed by: PHYSICIAN ASSISTANT

## 2022-11-11 PROCEDURE — 97112 NEUROMUSCULAR REEDUCATION: CPT

## 2022-11-11 PROCEDURE — 80053 COMPREHEN METABOLIC PANEL: CPT | Performed by: PHYSICIAN ASSISTANT

## 2022-11-11 PROCEDURE — 63600175 PHARM REV CODE 636 W HCPCS: Performed by: NURSE PRACTITIONER

## 2022-11-11 PROCEDURE — 25000003 PHARM REV CODE 250: Performed by: STUDENT IN AN ORGANIZED HEALTH CARE EDUCATION/TRAINING PROGRAM

## 2022-11-11 PROCEDURE — 25000003 PHARM REV CODE 250

## 2022-11-11 PROCEDURE — 99900035 HC TECH TIME PER 15 MIN (STAT)

## 2022-11-11 PROCEDURE — S4991 NICOTINE PATCH NONLEGEND: HCPCS | Performed by: PHYSICIAN ASSISTANT

## 2022-11-11 PROCEDURE — 97535 SELF CARE MNGMENT TRAINING: CPT

## 2022-11-11 PROCEDURE — 36415 COLL VENOUS BLD VENIPUNCTURE: CPT | Performed by: PHYSICIAN ASSISTANT

## 2022-11-11 PROCEDURE — 99900026 HC AIRWAY MAINTENANCE (STAT)

## 2022-11-11 PROCEDURE — 97530 THERAPEUTIC ACTIVITIES: CPT

## 2022-11-11 PROCEDURE — 84100 ASSAY OF PHOSPHORUS: CPT | Performed by: PHYSICIAN ASSISTANT

## 2022-11-11 PROCEDURE — 85025 COMPLETE CBC W/AUTO DIFF WBC: CPT | Performed by: STUDENT IN AN ORGANIZED HEALTH CARE EDUCATION/TRAINING PROGRAM

## 2022-11-11 RX ORDER — CETIRIZINE HYDROCHLORIDE 5 MG/1
5 TABLET ORAL DAILY
Status: DISCONTINUED | OUTPATIENT
Start: 2022-11-11 | End: 2022-11-21 | Stop reason: HOSPADM

## 2022-11-11 RX ADMIN — HEPARIN SODIUM 5000 UNITS: 5000 INJECTION INTRAVENOUS; SUBCUTANEOUS at 09:11

## 2022-11-11 RX ADMIN — ACETAMINOPHEN 650 MG: 325 TABLET ORAL at 09:11

## 2022-11-11 RX ADMIN — FAMOTIDINE 20 MG: 20 TABLET ORAL at 09:11

## 2022-11-11 RX ADMIN — OXYCODONE 5 MG: 5 TABLET ORAL at 11:11

## 2022-11-11 RX ADMIN — SILODOSIN 4 MG: 4 CAPSULE ORAL at 09:11

## 2022-11-11 RX ADMIN — PREGABALIN 200 MG: 150 CAPSULE ORAL at 09:11

## 2022-11-11 RX ADMIN — DIAZEPAM 5 MG: 5 TABLET ORAL at 09:11

## 2022-11-11 RX ADMIN — METHADONE HYDROCHLORIDE 90 MG: 10 TABLET ORAL at 02:11

## 2022-11-11 RX ADMIN — HEPARIN SODIUM 5000 UNITS: 5000 INJECTION INTRAVENOUS; SUBCUTANEOUS at 02:11

## 2022-11-11 RX ADMIN — POLYETHYLENE GLYCOL 3350 17 G: 17 POWDER, FOR SOLUTION ORAL at 09:11

## 2022-11-11 RX ADMIN — CETIRIZINE HYDROCHLORIDE 5 MG: 5 TABLET, FILM COATED ORAL at 02:11

## 2022-11-11 RX ADMIN — OXYCODONE 5 MG: 5 TABLET ORAL at 05:11

## 2022-11-11 RX ADMIN — Medication 1 PATCH: at 09:11

## 2022-11-11 RX ADMIN — DIAZEPAM 5 MG: 5 TABLET ORAL at 05:11

## 2022-11-11 RX ADMIN — OXACILLIN SODIUM 12 G: 10 INJECTION, POWDER, FOR SOLUTION INTRAVENOUS at 02:11

## 2022-11-11 RX ADMIN — SENNOSIDES AND DOCUSATE SODIUM 1 TABLET: 50; 8.6 TABLET ORAL at 09:11

## 2022-11-11 RX ADMIN — HEPARIN SODIUM 5000 UNITS: 5000 INJECTION INTRAVENOUS; SUBCUTANEOUS at 05:11

## 2022-11-11 RX ADMIN — PREGABALIN 200 MG: 150 CAPSULE ORAL at 02:11

## 2022-11-11 NOTE — PT/OT/SLP PROGRESS
Physical Therapy Co-Treatment with OT    Patient Name:  Peter Stiles   MRN:  [id number]    Recent Surgery: Procedure(s) (LRB):  CREATION, TRACHEOSTOMY (N/A)  LARYNGOSCOPY, DIRECT 4 Days Post-Op    *Co-treatment with OT due to patient complexity and need for two skilled therapists to ensure safe mobilization.    Recommendations:     Discharge Recommendations:  rehabilitation facility   Discharge Equipment Recommendations:  (tbd @ rehab)   Barriers to discharge: Increased level of assist    Highest Level of Mobility: STS  Assistance Required: Mod(A)X2 persons     Assessment:     Peter Stiles is a 51 y.o. female admitted with a medical diagnosis of Osteomyelitis of cervical spine.    Pt met with HOB elevated and agreeable to PT treatment. Today's PT treatment focus was on sitting balance at EOB and transfer training to improve function. Pt able to stand today with mod(A)X2 persons. She continues to be limited by hypersensitive B LEs, as she perceives light touch as pain. Pt was educated on desensitization during treatment. OT provided back massage vibration tool to pt for family assistance with sensory stimulation during the day. Pt is very motivated to participate. She requires oral suctioning throughout session.    Pt is progressing towards acute PT goals appropriately and continues to benefit from acute PT sessions. After hospital discharge, pt would benefit from inpatient rehab to maximize rehab potential.    Rehab Prognosis: Good; patient would benefit from acute skilled PT services to address these deficits and reach maximum level of function.      Plan:     During this hospitalization, patient to be seen 4 x/week to address the identified rehab impairments via therapeutic exercises, gait training, therapeutic activities, neuromuscular re-education and progress toward the following goals:    Plan of Care Expires:  12/09/22    This plan of care has been discussed with the patient/caregiver, who was  included in its development and is in agreement with the identified goals and treatment plan.     Subjective     Communicated with RN prior to session.  Patient agreeable to participate.     Pain/Comfort:  Pain Rating 1:  (pressure at back of cervical collar)  Location - Orientation 1: generalized  Location 1: neck  Pain Addressed 1: Pre-medicate for activity, Reposition, Distraction, Cessation of Activity  Pain Rating Post-Intervention 1:  (unrated)    Chief Complaint: Osteomyelitis of cervical spine   Patient/Family Comments/goals: Return to PLOF      Objective:     Patient found HOB elevated with bed alarm, blood pressure cuff, NG tube, cervical collar, courtney catheter, telemetry, Tracheostomy  upon PT entry to room.    General Precautions: Standard, aspiration, fall, NPO   Orthopedic Precautions:spinal precautions   Braces: Aspen collar         Exams:    Cognition:  Patient is oriented to Person, Place, Time, Situation  Follows two-step commands  Insight to deficits/safety awareness: impaired    Functional Mobility:    Bed Mobility:  Supine to Sit: Maximum Assistance and 2 persons  on R side of bed  Sit to Supine: Maximum Assistance and 2 persons  Scooting anteriorly to EOB to plant feet on floor: Maximum Assistance  Scooting/Bridging in supine to HOB: Maximum Assistance and 2 persons via drawsheet    Transfers:   Sit to Stand Transfer: Moderate Assistance and 2 persons   from EOB with HHAx2  Increased knee FL B  Pt reports pain 2/2 hypersensitivity in B LEs  X2 trials from EOB. Tolerated each trial ~15 seconds             Balance:  Static Sit:   CGA-Max(A)  at EOB\  Pt initially requires CGA, but requires max(A) when fatigued  Dynamic sit:  Max(A)    Static Stand:   Mod(A)X2 persons  with Hand-held assist x 2      Therapeutic Activities/Exercises     Patient assisted with functional mobility as noted above  STSx2 from EOB  Patient educated on the importance of early mobility to prevent functional decline during  hospital stay  Patient was instructed to utilize staff assistance for mobility/transfers.  Patient is appropriate to transfer with dependence to medichair via drawsheet and RN/PCT assist  Patient educated on PT POC and role of PT in acute care  White board updated regarding patient's safest level of mobility with staff assistance, RN also updated.     AM-PAC 6 CLICK MOBILITY  Turning over in bed (including adjusting bedclothes, sheets and blankets)?: 2  Sitting down on and standing up from a chair with arms (e.g., wheelchair, bedside commode, etc.): 2  Moving from lying on back to sitting on the side of the bed?: 2  Moving to and from a bed to a chair (including a wheelchair)?: 2  Need to walk in hospital room?: 2  Climbing 3-5 steps with a railing?: 1  Basic Mobility Total Score: 11     Patient left HOB elevated with all lines intact, call button in reach, bed alarm on, RN notified, and daughter present.        History/Goals:     PAST MEDICAL HISTORY:  Past Medical History:   Diagnosis Date    CHF (congestive heart failure)     Essential (primary) hypertension     Opioid dependence on maintenance agonist therapy, no symptoms     Smoking        Past Surgical History:   Procedure Laterality Date    APPENDECTOMY       SECTION      x2    FUSION OF CERVICAL SPINE BY POSTERIOR APPROACH N/A 2022    Procedure: FUSION, SPINE, CERVICAL, POSTERIOR APPROACH C2-T2 posterior decompression/fusion; Depuy, neuromonitoring monica Marrero;  Surgeon: West Merino DO;  Location: [facility] OR [address] [place] [address];  Service: Neurosurgery;  Laterality: N/A;    HYSTERECTOMY      SURGICAL REMOVAL OF VERTEBRAL BODY OF CERVICAL SPINE N/A 10/28/2022    Procedure: CORPECTOMY, SPINE, CERVICAL;  Surgeon: West Merino DO;  Location: [facility] OR [address] [place] [address];  Service: Neurosurgery;  Laterality: N/A;  anterior C5-6 corpectomy, globus, caspar head prado and tongs, omnipaque, 15lbs weights, microscrope, round cutting lynsey and M8, ENT  assistance for exposure    TRACHEOSTOMY N/A 11/7/2022    Procedure: CREATION, TRACHEOSTOMY;  Surgeon: Fritz Nogueira MD;  Location: [facility] OR [address] [place] [address];  Service: ENT;  Laterality: N/A;       GOALS:   Multidisciplinary Problems       Physical Therapy Goals          Problem: Physical Therapy    Goal Priority Disciplines Outcome Goal Variances Interventions   Physical Therapy Goal     PT, PT/OT Ongoing, Progressing     Description: PT goals until 11/23/22    1. Pt supine to sit with Min(A)   2. Pt sit to supine with Min(A)  3. Pt sit to stand with RW with minimal assist-not met  4. Pt to perform gait 30ft with RW with minimal assist.-not met  5. Pt to transfer bed to/from bedside chair with RW with minimal assist.-not met  6. Pt to up/down 5 steps with L UE rail with minimal assist.-not met  7. Pt to perform B LE exs in sitting or supine x 10 reps to strengthen B LE to improve functional mobility.-not met                         Time Tracking:     PT Received On: 11/11/22  PT Start Time: 1337     PT Stop Time: 1415  PT Total Time (min): 38 min     Billable Minutes: Therapeutic Activity 10 and Therapeutic Exercise 28      Ladonna Peck, PT  11/11/2022  Pager# [phone]

## 2022-11-11 NOTE — PLAN OF CARE
Problem: Adult Inpatient Plan of Care  Goal: Plan of Care Review  Outcome: Ongoing, Progressing  Goal: Patient-Specific Goal (Individualized)  Description: Admit Date: 10/25/2022    Osteomyelitis of cervical spine    Past Medical History:  No date: CHF (congestive heart failure)  No date: Essential (primary) hypertension  No date: Opioid dependence on maintenance agonist therapy, no symptoms  No date: Smoking    Past Surgical History:  No date: APPENDECTOMY  No date:  SECTION      Comment:  x2  2022: FUSION OF CERVICAL SPINE BY POSTERIOR APPROACH; N/A      Comment:  Procedure: FUSION, SPINE, CERVICAL, POSTERIOR APPROACH                C2-T2 posterior decompression/fusion; Depuy,                neuromonitoring Marrero, gell rolls;  Surgeon: West Merino DO;  [facility];  Service:                Neurosurgery;  Laterality: N/A;  No date: HYSTERECTOMY  10/28/2022: SURGICAL REMOVAL OF VERTEBRAL BODY OF CERVICAL SPINE; N/A      Comment:  Procedure: CORPECTOMY, SPINE, CERVICAL;  Surgeon:                West Merino DO;  Location: [facility] OR [facility];                 Service: Neurosurgery;  Laterality: N/A;  anterior C5-6                corpectomy, globus, caspar head prado and tongs,                omnipaque, 15lbs weights, microscrope, round cutting lynsey               and M8, ENT assistance for exposure    Individualization:   1.     Restraints:   Restraint Order  Length of Order: Order good for next 24 hours or when removed.  Date that the current order will : 22  Time that the current order will : 010  Order Upon Application: Yes          Outcome: Ongoing, Progressing  Goal: Absence of Hospital-Acquired Illness or Injury  Outcome: Ongoing, Progressing  Goal: Optimal Comfort and Wellbeing  Outcome: Ongoing, Progressing  Goal: Readiness for Transition of Care  Outcome: Ongoing, Progressing     Problem: Fall Injury Risk  Goal: Absence of Fall and  Fall-Related Injury  Outcome: Ongoing, Progressing     Problem: Skin Injury Risk Increased  Goal: Skin Health and Integrity  Outcome: Ongoing, Progressing     Problem: Pain Acute  Goal: Acceptable Pain Control and Functional Ability  Outcome: Ongoing, Progressing     Problem: Autonomic Dysreflexia (Spinal Cord Injury)  Goal: Effective Autonomic Dysreflexia Prevention  Outcome: Ongoing, Progressing     Problem: Bowel Elimination Impaired (Spinal Cord Injury)  Goal: Effective Bowel Elimination  Outcome: Ongoing, Progressing     Problem: Extended Neurologic Impairment (Spinal Cord Injury)  Goal: Absence of Extended Neurologic Injury  Outcome: Ongoing, Progressing     Problem: Functional Ability Impaired (Spinal Cord Injury)  Goal: Optimal Functional Ability  Outcome: Ongoing, Progressing     Problem: Neurogenic Shock (Spinal Cord Injury)  Goal: Effective Tissue Perfusion  Outcome: Ongoing, Progressing     Problem: Nutrition Impaired (Spinal Cord Injury)  Goal: Optimal Nutrition Intake  Outcome: Ongoing, Progressing     Problem: Infection  Goal: Absence of Infection Signs and Symptoms  Outcome: Ongoing, Progressing     Problem: Restraint, Nonbehavioral (Nonviolent)  Goal: Absence of Harm or Injury  Outcome: Ongoing, Progressing     Problem: Communication Impairment (Artificial Airway)  Goal: Effective Communication  Outcome: Ongoing, Progressing     Problem: Device-Related Complication Risk (Artificial Airway)  Goal: Optimal Device Function  Outcome: Ongoing, Progressing     Problem: Skin and Tissue Injury (Artificial Airway)  Goal: Absence of Device-Related Skin or Tissue Injury  Outcome: Ongoing, Progressing     Problem: Noninvasive Ventilation Acute  Goal: Effective Unassisted Ventilation and Oxygenation  Outcome: Ongoing, Progressing     Problem: Adjustment to Illness (Delirium)  Goal: Optimal Coping  Outcome: Ongoing, Progressing     Problem: Sleep Disturbance (Delirium)  Goal: Improved Sleep  Outcome: Ongoing,  Progressing

## 2022-11-11 NOTE — PT/OT/SLP PROGRESS
Occupational Therapy  Co Treatment    Name: Peter Stiles  MRN: [id number]  Admitting Diagnosis:  Osteomyelitis of cervical spine  4 Days Post-Op    Recommendations:     Discharge Recommendations: rehabilitation facility  Discharge Equipment Recommendations:   (TBD)  Barriers to discharge:  Decreased caregiver support  Co-evaluation/treatment performed due to patient's multiple deficits requiring two skilled therapists to appropriately and safely assess patient's strength and endurance while facilitating functional tasks in addition to accommodating for patient's activity tolerance.     Assessment:     Peter Stiles is a 51 y.o. female with a medical diagnosis of Osteomyelitis of cervical spine.  She presents with daughter and friend in room. Performance deficits affecting function are weakness, gait instability, impaired cardiopulmonary response to activity, impaired endurance, impaired balance, decreased lower extremity function, impaired sensation, decreased safety awareness, impaired self care skills, impaired functional mobility, pain, orthopedic precautions. Patient able to make needs known with writing, mouthing words and gestures including need to have trach suctioned. Patient completed supine to sit with log roll with max (A) of two. EOB sit balance varied between mod to briefly CGA using RUE for stability on railing. Training at EOB with trunk control with reach and recover. Patient reports banding continues at level just under breast. Sit to stand, twice, mod (A) of two with c/o hypersensitivity in Les. Education to daughter and patient on sensory stimulation to decrease hypersensitivity with back massage vibration and lotion.     Rehab Prognosis:  Good; patient would benefit from acute skilled OT services to address these deficits and reach maximum level of function.       Plan:     Patient to be seen 4 x/week to address the above listed problems via self-care/home management, therapeutic  activities, therapeutic exercises, neuromuscular re-education  Plan of Care Expires: 12/09/22  Plan of Care Reviewed with: patient    Subjective     Pain/Comfort:  Pain Rating 1:  (pressure at back of cervical collar)    Objective:     Communicated with: RN prior to session.  Patient found HOB elevated with bed alarm, blood pressure cuff, NG tube, cervical collar, courtney catheter upon OT entry to room.    General Precautions: Standard, aspiration, fall, NPO   Orthopedic Precautions:spinal precautions   Braces: Aspen collar  Respiratory Status:  trach support 5 liters     Occupational Performance:     Bed Mobility:    Patient completed Supine to Sit with maximal assistance and 2 persons  Patient completed Sit to Supine with maximal assistance and 2 persons     Functional Mobility/Transfers:  Patient completed Sit <> Stand Transfer with moderate assistance and of 2 persons  with  hand-held assist       Activities of Daily Living:  Grooming: stand by assistance    Upper Body Dressing: moderate assistance gown  Lower Body Dressing: total assistance        AMPAC 6 Click ADL: 10    Treatment & Education:  See above    Patient left HOB elevated with all lines intact, call button in reach, bed alarm on, RN notified, and daughter present    GOALS:   Multidisciplinary Problems       Occupational Therapy Goals          Problem: Occupational Therapy    Goal Priority Disciplines Outcome Interventions   Occupational Therapy Goal     OT, PT/OT Ongoing, Progressing    Description: Goals to be met by: 12/09    Patient will increase functional independence with ADLs by performing:      UE Dressing with Contact Guard Assistance.  Grooming while EOB with Contact Guard Assistance  Patient will complete sit to stand with RW min (A).  Stand pivot to chair with mod (A)                         Time Tracking:     OT Date of Treatment: 11/11/22  OT Start Time: 1337  OT Stop Time: 1415  OT Total Time (min): 38 min    Billable Minutes:Self  Care/Home Management 23  Neuromuscular Re-education 16    OT/BE: OT          11/11/2022
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
## 2022-11-11 NOTE — PLAN OF CARE
Otolaryngology Plan of Care    A routine tracheostomy tube change was performed at the bedside. Verbal consent was obtained from the patient and/or family prior to proceeding. Universal protocol was followed throughout. Deferred placing pt in the supine position given c-collar precautions. The original tracheostomy tube was examined, and the stoma appeared to be healing well with no signs of infection, bleeding, irritation, or wound breakdown. There were no signs of granulation tissue or airway irritation.     A suction catheter was used to clear the tracheostomy tube and trachea or secretions. The sutures securing the tracheostomy tube to the skin were cut, and the trach ties were undone. The balloon on the existing trach was confirmed to be deflated. The existing trach was gently removed. The tract was examined, and appeared to be healing appropriately. A new 6-0 Shiley cuffless tracheostomy tube was placed through the stoma. The tube appeared to be in good position, without abutting the walls of the trachea. The tube was secured with Velcro ties. The patient tolerated the procedure well with no desaturations or complications. C-collar was replaced.     ENT will sign off. Please do not hesitate to page the Otolaryngology on call resident with any additional questions or concerns.        Gordy Groves MD, MPH   Otolaryngology Head & Neck Surgery

## 2022-11-11 NOTE — PROGRESS NOTES
[facility] - Neurosurgery ([facility]  [facility] Medicine  Progress Note    Patient Name: Peter Stiles  MRN: [id number]  Patient Class: IP- Inpatient   Admission Date: 10/25/2022  Length of Stay: 17 days  Attending Physician: Alejandro Recio MD  Primary Care Provider: Og Victoria NP        Subjective:     Principal Problem:Osteomyelitis of cervical spine        HPI:  51 y.o F with hx of htn, opioid dependence on methadone, smoking (2ppd since 17 y.o), and dilated cardiomyopathy presents with neck pain and abdominal numbness. 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 At the time the daughter slammed the breaks and came to an abrupt stop. The patient braced herself by covering her face with both her forearms. She did not notice severe symptoms at the time or a popping sensation but a vague soreness in her neck. In the following 2 weeks her symptoms gradually deteriorated with increased neck pain notably. Her symptoms became worse early this morning when she went to the bathroom. She noticed an unusual numbness and bloating of her stomach and generalized numbness below that level. She was able to urinate. She also had increased pain in her legs, bilateral shoulders, and tingling in fingers and toes. Patient denies IV drug or alcohol use. She smoke 2ppd since 17 y.o.                 Overview/Hospital Course:  Ms. Stiles presented for acute neck pain, along with numbness and weakness of the bilateral upper and lower extremities. MRI demonstrated C5-6 osteomyelitis, discitis, and epidural abscess, as well as narrowing and signal abnormality of the spinal cord. She was admitted to the neuroICU and started on Rocephin and vancomycin. Stepped down to hospital medicine on 10/26/22. Patient completed C5-C6 corpectomy and C4-C7 anterior column reconstruction with NSGY with assistance from ENT. BCX's subsequently grew MSSA and patient transitioned from  vanc/ceftriaxone to oxacillin per ID recommendations.       Interval History:  patient seen and examined at bedside. Communicates by writing on a notebook. Reports feeling congested in sinuses for a few days and wishes to get a pill for it. She wishes to see how she does with speech for next 2 weeks before getting Gtube.       Review of Systems  Objective:     Vital Signs (Most Recent):  Temp: 98.1 °F (36.7 °C) (11/10/22 1104)  Pulse: 80 (11/10/22 1143)  Resp: 19 (11/10/22 1114)  BP: 131/78 (11/10/22 1104)  SpO2: 100 % (11/10/22 1104) Vital Signs (24h Range):  Temp:  [97.3 °F (36.3 °C)-98.7 °F (37.1 °C)] 98.1 °F (36.7 °C)  Pulse:  [64-94] 80  Resp:  [14-32] 19  SpO2:  [95 %-100 %] 100 %  BP: (101-176)/(50-94) 131/78     Weight:  (daylight savings)  Body mass index is 22.6 kg/m².    Intake/Output Summary (Last 24 hours) at 11/10/2022 1308  Last data filed at 11/10/2022 1015  Gross per 24 hour   Intake 1768.27 ml   Output 400 ml   Net 1368.27 ml      Physical Exam  Vitals and nursing note reviewed.   HENT:      Head: Normocephalic.      Nose: Nose normal.      Mouth/Throat:      Mouth: Mucous membranes are moist.      Pharynx: Oropharynx is clear.   Cardiovascular:      Rate and Rhythm: Normal rate and regular rhythm.      Pulses: Normal pulses.      Heart sounds: Normal heart sounds.   Pulmonary:      Effort: Pulmonary effort is normal.      Breath sounds: Normal breath sounds.   Abdominal:      General: Bowel sounds are normal.      Palpations: Abdomen is soft.   Musculoskeletal:         General: Normal range of motion.   Skin:     General: Skin is warm and dry.   Neurological:      Mental Status: She is alert and oriented to person, place, and time. Mental status is at baseline.      Comments:      Psychiatric:         Mood and Affect: Mood normal.         Behavior: Behavior normal.       MELD-Na score: 6 at 11/3/2022  1:47 AM  MELD score: 6 at 11/3/2022  1:47 AM  Calculated from:  Serum Creatinine: 0.7 mg/dL  (Using min of 1 mg/dL) at 11/3/2022  1:47 AM  Serum Sodium: 141 mmol/L (Using max of 137 mmol/L) at 11/3/2022  1:47 AM  Total Bilirubin: 0.4 mg/dL (Using min of 1 mg/dL) at 11/3/2022  1:47 AM  INR(ratio): 1.0 at 11/1/2022  9:28 PM  Age: 51 years    Significant Labs:  CBC:  Recent Labs   Lab 11/09/22  0202 11/10/22  0235   WBC 9.19 8.25   HGB 9.8* 8.5*   HCT 32.3* 27.8*    299     CMP:  Recent Labs   Lab 11/09/22  0202 11/10/22  0235    143   K 3.4* 3.4*    106   CO2 28 27   GLU 60* 83   BUN 19 16   CREATININE 0.8 0.8   CALCIUM 9.7 9.3   PROT 7.2 6.1   ALBUMIN 2.6* 2.2*   BILITOT 0.9 0.9   ALKPHOS 142* 152*   AST 41* 50*   ALT 32 41   ANIONGAP 11 10           Assessment/Plan:    * Osteomyelitis of cervical spine  Peter Stiles is a 51 y.o. female with PMHx of HTN, dilated cardiomyopathy, h/o opioid dependence, cigarette smoker (2pks/day), and daily baby ASA use who presents with 1 day of decreased sensation from T5 level down and tingling in her bilateral fingers and bilateral toes. MRI imaging obtained in the ED showing possible C5-6 osteodiscitis/myelitis with cord compression. Patient tachycardic and hypertensive on arrival, afebrile, with leukocytosis. Now s/p C5-6 corpectomy on 10/28/22; C2-T2 PCDF on 11/2; S/p trach 11/7.        MRI C/T/L spine w/o contrast 10/25: focal kyphotic deformity at C5/6 with possible discitis/osteomyelitis,   epidural collection extending into the central spinal canal resulting in severe stenosis and cord signal  abnormality.  Prevertebral soft tissue edema and probable paravertebral abscess or phlegmon at this level. Thoracic and lumbar spine unremarkable. MRI C spine w/ contrast 10/25: confirmed enhancement at C5/6 consistent with osteomyelitis/discitis and epidural phlegmon        -Admitted to neuro ICU.  --Intubated after index procedure and kept intubated for posterior fixation and for tentative LISHA 11/4 which was eventually cancelled due to inability to  adequately extend neck  --Step down to medicine on 11/11  --Continue Speech therapy. GI consulted for G Tube eval. Patient wishes to see her progress in next 2 weeks before getting Gtube.   --BCx 10/25 growing MSSA, Repeat BCx's 10/27 NGTD  --OR cultures 10/28 no organisms on gram stain, culture MSSA. ID following on oxacillin  --Continue C collar when upright   --CT Soft tissue neck reviewed with stable hardware position s/p 360 cervical fusion ow expected postop changes  --Pain control as needed  --PT/OT. [facility]      VTE Risk Mitigation (From admission, onward)         Ordered     heparin (porcine) injection 5,000 Units  Every 8 hours         11/04/22 1437     IP VTE LOW RISK PATIENT  Once         10/25/22 1109     Place sequential compression device  Until discontinued         10/25/22 1109                Discharge Planning   YESSI: 11/15/2022     Code Status: Full Code   Is the patient medically ready for discharge?: No    Reason for patient still in hospital (select all that apply): Patient trending condition  Discharge Plan A: Long-term acute care facility (LTAC)   Discharge Delays: None known at this time              Alejandro Recio MD  Department of Hospital Medicine   [facility] - Neurosurgery ([facility])

## 2022-11-11 NOTE — ASSESSMENT & PLAN NOTE
Peter Stiles is a 51 y.o. female with PMHx of HTN, dilated cardiomyopathy, h/o opioid dependence, cigarette smoker (2pks/day), and daily baby ASA use who presents with 1 day of decreased sensation from T5 level down and tingling in her bilateral fingers and bilateral toes. MRI imaging obtained in the ED showing possible C5-6 osteodiscitis/myelitis with cord compression. Patient tachycardic and hypertensive on arrival, afebrile, with leukocytosis.    Now s/p C5-6 corpectomy on 10/28/22; C2-T2 PCDF on 11/2.    - Stepped down to medicine service.  - Q4hr neurochecks.  - All pertinent imaging/labs personally reviewed and interpreted.   - Surgical drains removed with no issues.  - Requiring trach on 11/7 after failed extubation multiple times.  - Holding off on gtube placement to see if progressing with speech therapy.  - ID:   --BCx 10/25 w/ MSSA, Repeat BCx's 10/27 NGTD   --OR cultures 10/28 w/ MSSA   --ID consulted, rec for IV Oxacillin.  - Continue c-collar when out of bed.  - Pain control with multimodal regimen.  - Okay for [facility].  - Please call NSGY with any questions/concerns    Dispo: per primary

## 2022-11-11 NOTE — SUBJECTIVE & OBJECTIVE
Interval History: NAEON. AFVSS. Neuro stable. Holding off on gtube to assess progress with speech.    Medications:  Continuous Infusions:  Scheduled Meds:   cetirizine  5 mg Per NG tube Daily    famotidine  20 mg Per NG tube BID    heparin (porcine)  5,000 Units Subcutaneous Q8H    methadone  90 mg Per NG tube Daily    nicotine  1 patch Transdermal Daily    oxacillin 12 g in  mL CONTINUOUS INFUSION  12 g Intravenous Q24H    oxyCODONE  5 mg Per NG tube Q6H    polyethylene glycol  17 g Per NG tube Daily    pregabalin  200 mg Per NG tube TID    scopolamine  1 patch Transdermal Q3 Days    senna-docusate 8.6-50 mg  1 tablet Per NG tube BID    silodosin  4 mg Per NG tube Daily     PRN Meds:acetaminophen, albuterol-ipratropium, benzocaine, bisacodyL, dextrose 10%, dextrose 10%, diazePAM, fentaNYL, glucagon (human recombinant), hydrALAZINE, hydrOXYzine HCL, labetalol, magnesium oxide, magnesium oxide, ondansetron, potassium bicarbonate, potassium bicarbonate, potassium bicarbonate, potassium, sodium phosphates, potassium, sodium phosphates, potassium, sodium phosphates, racepinephrine, simethicone, sodium chloride 0.9%     Review of Systems  Objective:     Weight:  (daylight savings)  Body mass index is 22.6 kg/m².  Vital Signs (Most Recent):  Temp: 98.7 °F (37.1 °C) (11/11/22 1205)  Pulse: 68 (11/11/22 1224)  Resp: 15 (11/11/22 1424)  BP: 131/84 (11/11/22 1205)  SpO2: 97 % (11/11/22 1224) Vital Signs (24h Range):  Temp:  [97.3 °F (36.3 °C)-100.3 °F (37.9 °C)] 98.7 °F (37.1 °C)  Pulse:  [62-94] 68  Resp:  [8-20] 15  SpO2:  [95 %-98 %] 97 %  BP: (100-131)/(52-91) 131/84                Oxygen Concentration (%):  [28-35] 28         NG/OG Tube 11/06/22 Right nostril (Active)   Placement Check placement verified by aspirate characteristics;placement verified by distal tube length measurement;placement verified by x-ray 11/10/22 0702   Tolerance no signs/symptoms of discomfort 11/11/22 0800   Securement secured to nostril  "center 11/11/22 0800   Clamp Status/Tolerance unclamped 11/11/22 0800   Suction Setting/Drainage Method low;intermittent setting;suction at 11/07/22 1501   Insertion Site Appearance no redness, warmth, tenderness, skin breakdown, drainage 11/10/22 0702   Drainage Green 11/07/22 1501   Flush/Irrigation flushed w/;water;no resistance met 11/11/22 0800   Feeding Type continuous 11/11/22 0800   Feeding Action feeding continued 11/11/22 0800   Current Rate (mL/hr) 50 mL/hr 11/10/22 2000   Goal Rate (mL/hr) 50 mL/hr 11/10/22 2000   Intake (mL) 150 mL 11/10/22 2000   Water Bolus (mL) 150 mL 11/10/22 2000   Formula Name Diabetisource 11/10/22 2000   Intake (mL) - Formula Tube Feeding 50 11/10/22 1002   Residual Amount (ml) 0 ml 11/10/22 0702            Urethral Catheter 11/10/22 1335 Latex 16 Fr. (Active)   Site Assessment Clean 11/11/22 0800   Collection Container Standard drainage bag 11/11/22 0800   Securement Method secured to top of thigh w/ adhesive device 11/11/22 0800   Catheter Care Performed yes 11/11/22 0800   Reason for Continuing Urinary Catheterization Urinary retention 11/11/22 0800   CAUTI Prevention Bundle Securement Device in place with 1" slack;Intact seal between catheter & drainage tubing;Drainage bag/urimeter off the floor;Sheeting clip in use;No dependent loops or kinks;Drainage bag/urimeter not overfilled (<2/3 full);Drainage bag/urimeter below bladder 11/11/22 0800   Output (mL) 350 mL 11/11/22 0532       Physical Exam    Neurosurgery Physical Exam  General: Well developed, well nourished, not in acute distress.   Head: Normocephalic, atraumatic.  Neck: Trach in place.  Neurologic: Alert and oriented x 4. Thought content appropriate.  GCS: Motor:6  Verbal:5  Eyes:4   GCS Total: 15  Language: No aphasia.  Speech: No dysarthria.  Cranial nerves: Face symmetric, tongue midline, CN II-XII grossly intact.   Eyes: Pupils equal, round, reactive to light with accomodation, EOMI.  Pulmonary: Normal " respirations, no signs of respiratory distress.  Abdomen: Soft, non-distended, non-tender to palpation.  Sensory: Intact to light touch throughout.  Motor Strength: Moves all extremities spontaneously with good tone. Full strength upper and lower extremities. No abnormal movements seen.     Strength  Deltoids Triceps Biceps Wrist Extension Wrist Flexion Hand    Upper: R 5/5 5/5 5/5 5/5 5/5 5/5    L 5/5 5/5 5/5 5/5 5/5 5/5     Hip Flexion Knee Extension Knee  Flexion Dorsiflexion Plantar flexion EHL   Lower: R 5/5 5/5 5/5 5/5 5/5 5/5    L 5/5 5/5 5/5 5/5 5/5 5/5     Skin: Skin is warm, dry and intact.      Significant Labs:  Recent Labs   Lab 11/10/22  0235 11/10/22  1906 11/11/22  0301   GLU 83  --  120*     --  143   K 3.4* 3.8 4.0     --  107   CO2 27  --  27   BUN 16  --  16   CREATININE 0.8  --  0.8   CALCIUM 9.3  --  9.0   MG 2.0  --  2.0     Recent Labs   Lab 11/10/22  0235 11/11/22  0459   WBC 8.25 8.90   HGB 8.5* 7.5*   HCT 27.8* 23.9*    251     No results for input(s): LABPT, INR, APTT in the last 48 hours.  Microbiology Results (last 7 days)       Procedure Component Value Units Date/Time    Culture, Anaerobe [[id number]] Collected: 10/28/22 1029    Order Status: Completed Specimen: Wound from Neck Updated: 11/07/22 0632     Anaerobic Culture No anaerobes isolated    Narrative:      4) Epidural phlegman          All pertinent labs from the last 24 hours have been reviewed.    Significant Diagnostics:  I have reviewed all pertinent imaging results/findings within the past 24 hours.

## 2022-11-11 NOTE — PT/OT/SLP PROGRESS
"Speech Language Pathology Treatment    Patient Name:  Peter Stiles   MRN:  [id number]  Admitting Diagnosis: Osteomyelitis of cervical spine      IMPORTANT  CAUTION: CUFF MUST ALWAYS BE DEFLATED WHEN VALVE IN USE.  Passy Autumn Speaking Valve trials with ST only at this time.      Recommendations:                 General Recommendations:  One Way Speaking Valve training and further assessment of swallow function when feasible  Diet recommendations:  NPO, NPO   Aspiration Precautions: Frequent oral care and Strict aspiration precautions   General Precautions: Standard, aspiration, fall, respiratory, contact  Communication strategies:  go to room if call light pushed and pt writing and mouthing to communicate wants/needs  Passy Hamel Speaking Valve Precautions: Only wear when cuff is deflated, trials of valve only with ST at this time, remove valve with any S/S respiratory distress, remove valve when sleeping. Please note, to clean valve:  1. Swish valve in pure soap and warm water, 2. Rinse valve thoroughly in warm running water, 3. Allow valve to air dry thoroughly before placing it in storage container, 4. DO NOT use hot water, peroxide, bleach, vinegar, alcohol, brushes, or cotton swabs to clean valve.    Subjective     SLP reviewed Pt with RN prior to session, RN cleared Pt for therapy, explained she would be in room to suction Pt  Pt presents calm  She asks, "how long will it take?"     Pain/Comfort:  Pain Rating 1: 0/10    Respiratory Status:  trach collar 5L, 28%     Objective:     Has the patient been evaluated by SLP for swallowing?   Yes  Keep patient NPO? Yes   Current Respiratory Status:    trach collar 5L, 28%    Pt found awake and alert with cervical collar, NG and shiley 6.0 cuffed trach with cuff deflated in place. RN in room at start of session per Pt request to suction Pt prior to PO trials.  Pt with thick secretions suctioned by RN.  SpO2 between 95-96% with HR 79 prior to one-way speaking valve " trials. SLP assisted in donning valve to trach.  Pt tolerated valve for 14 seconds, 2 seconds, 2 seconds across three trials. Pt achieved some limited phonation while donning valve; however, limited vocalizations elicited 2/2 minimal tolerance of valve this service day. Pt demonstrated moderately strained vocal quality with variable intensity with productive cough while donning valve x1 and used Yankauer to clear oral secretions.Valve quickly noted to come off of trach with moderate backflow pressure appreciated.  Upon subsequent attempts to don valve to trach, Pt with persistent backflow pressure and not able to tolerate valve. HR 81, SPO2 94% following one-way speaking valve trials. Valve returned to case and placed near trach supplies.  PO trials deferred 2/2 Pt with minimal tolerance of one-way speaking valve and thick secretions this service day. SLP educated Pt on findings, safety precautions for PMSV with cuffed trach,  ongoing SLP POC including need for ongoing swallow assessment and safety precautions including ongoing trials of PMSV with ST only at this time.  She demonstrated partial understanding and asked questions about timely for trach removal and medications. Pt questions deferred to MD.  MD entered into the room and remained at bedside with Pt upon SLP exit.      Assessment:     Peter Stiles is a 51 y.o. female with an SLP diagnosis of Dysphagia, Dysphonia, S/P Trach, and One Way Speaking Valve Training.  She presents with minimal tolerance of one-way speaking valve this service day 2/2 moderate backflow pressure across attempts. PO trials deferred 2/2 decreased tolerance of PMSV and thick secretions.  ST to continue to follow.     Goals:   Multidisciplinary Problems       SLP Goals          Problem: SLP    Goal Priority Disciplines Outcome   SLP Goal     SLP Ongoing, Progressing   Description: Speech Language Pathology Goals  Goals expected to be met by 11/16/22    1. Pt will tolerate  one-way speaking valve for 10 minutes w/o S/S respiratory distress, MIN A  2. Pt will recall 3 +safety precautions for PMSV with cuffed trach, 90%, MIN A  3. Pt will participate in ongoing swallow assessment to determine safest, least restrictive means nutrition/hydration    4. Educate Pt and family on safety awareness and safety precautions for one-way speaking valve                         Plan:     Patient to be seen:  4 x/week   Plan of Care expires:  12/09/22  Plan of Care reviewed with:  patient   SLP Follow-Up:  Yes       Discharge recommendations:  rehabilitation facility     Time Tracking:     SLP Treatment Date:   11/11/22  Speech Start Time:  1047  Speech Stop Time:  1103     Speech Total Time (min):  16 min    Billable Minutes: Speech Therapy Individual 8 and Self Care/Home Management Training 8    11/11/2022

## 2022-11-12 LAB
ALBUMIN SERPL BCP-MCNC: 2.1 G/DL (ref 3.5–5.2)
ALP SERPL-CCNC: 149 U/L (ref 55–135)
ALT SERPL W/O P-5'-P-CCNC: 30 U/L (ref 10–44)
ANION GAP SERPL CALC-SCNC: 8 MMOL/L (ref 8–16)
AST SERPL-CCNC: 29 U/L (ref 10–40)
BASOPHILS # BLD AUTO: 0.02 K/UL (ref 0–0.2)
BASOPHILS NFR BLD: 0.2 % (ref 0–1.9)
BILIRUB SERPL-MCNC: 0.5 MG/DL (ref 0.1–1)
BUN SERPL-MCNC: 16 MG/DL (ref 6–20)
CALCIUM SERPL-MCNC: 9.2 MG/DL (ref 8.7–10.5)
CHLORIDE SERPL-SCNC: 106 MMOL/L (ref 95–110)
CO2 SERPL-SCNC: 28 MMOL/L (ref 23–29)
CREAT SERPL-MCNC: 0.7 MG/DL (ref 0.5–1.4)
DIFFERENTIAL METHOD: ABNORMAL
EOSINOPHIL # BLD AUTO: 0.3 K/UL (ref 0–0.5)
EOSINOPHIL NFR BLD: 2.9 % (ref 0–8)
ERYTHROCYTE [DISTWIDTH] IN BLOOD BY AUTOMATED COUNT: 14.9 % (ref 11.5–14.5)
EST. GFR  (NO RACE VARIABLE): >60 ML/MIN/1.73 M^2
GLUCOSE SERPL-MCNC: 90 MG/DL (ref 70–110)
HCT VFR BLD AUTO: 23.9 % (ref 37–48.5)
HGB BLD-MCNC: 7.7 G/DL (ref 12–16)
IMM GRANULOCYTES # BLD AUTO: 0.11 K/UL (ref 0–0.04)
IMM GRANULOCYTES NFR BLD AUTO: 1.2 % (ref 0–0.5)
LYMPHOCYTES # BLD AUTO: 2.9 K/UL (ref 1–4.8)
LYMPHOCYTES NFR BLD: 29.9 % (ref 18–48)
MAGNESIUM SERPL-MCNC: 2 MG/DL (ref 1.6–2.6)
MCH RBC QN AUTO: 30.6 PG (ref 27–31)
MCHC RBC AUTO-ENTMCNC: 32.2 G/DL (ref 32–36)
MCV RBC AUTO: 95 FL (ref 82–98)
MONOCYTES # BLD AUTO: 0.8 K/UL (ref 0.3–1)
MONOCYTES NFR BLD: 8.5 % (ref 4–15)
NEUTROPHILS # BLD AUTO: 5.5 K/UL (ref 1.8–7.7)
NEUTROPHILS NFR BLD: 57.3 % (ref 38–73)
NRBC BLD-RTO: 0 /100 WBC
PHOSPHATE SERPL-MCNC: 3.8 MG/DL (ref 2.7–4.5)
PLATELET # BLD AUTO: 276 K/UL (ref 150–450)
PMV BLD AUTO: 10.6 FL (ref 9.2–12.9)
POCT GLUCOSE: 81 MG/DL (ref 70–110)
POCT GLUCOSE: 88 MG/DL (ref 70–110)
POCT GLUCOSE: 89 MG/DL (ref 70–110)
POCT GLUCOSE: 96 MG/DL (ref 70–110)
POTASSIUM SERPL-SCNC: 3.9 MMOL/L (ref 3.5–5.1)
PROT SERPL-MCNC: 6.1 G/DL (ref 6–8.4)
RBC # BLD AUTO: 2.52 M/UL (ref 4–5.4)
SODIUM SERPL-SCNC: 142 MMOL/L (ref 136–145)
WBC # BLD AUTO: 9.52 K/UL (ref 3.9–12.7)

## 2022-11-12 PROCEDURE — 99900026 HC AIRWAY MAINTENANCE (STAT)

## 2022-11-12 PROCEDURE — 25000003 PHARM REV CODE 250: Performed by: STUDENT IN AN ORGANIZED HEALTH CARE EDUCATION/TRAINING PROGRAM

## 2022-11-12 PROCEDURE — 25000003 PHARM REV CODE 250: Performed by: NURSE PRACTITIONER

## 2022-11-12 PROCEDURE — 99900035 HC TECH TIME PER 15 MIN (STAT)

## 2022-11-12 PROCEDURE — 99024 PR POST-OP FOLLOW-UP VISIT: ICD-10-PCS | Mod: POP,,, | Performed by: PHYSICIAN ASSISTANT

## 2022-11-12 PROCEDURE — 25000003 PHARM REV CODE 250: Performed by: PHYSICIAN ASSISTANT

## 2022-11-12 PROCEDURE — 36415 COLL VENOUS BLD VENIPUNCTURE: CPT | Performed by: PHYSICIAN ASSISTANT

## 2022-11-12 PROCEDURE — 93010 EKG 12-LEAD: ICD-10-PCS | Mod: 76,,, | Performed by: INTERNAL MEDICINE

## 2022-11-12 PROCEDURE — 25000003 PHARM REV CODE 250

## 2022-11-12 PROCEDURE — 93010 ELECTROCARDIOGRAM REPORT: CPT | Mod: ,,, | Performed by: INTERNAL MEDICINE

## 2022-11-12 PROCEDURE — 63600175 PHARM REV CODE 636 W HCPCS: Performed by: NURSE PRACTITIONER

## 2022-11-12 PROCEDURE — 99232 PR SUBSEQUENT HOSPITAL CARE,LEVL II: ICD-10-PCS | Mod: ,,, | Performed by: STUDENT IN AN ORGANIZED HEALTH CARE EDUCATION/TRAINING PROGRAM

## 2022-11-12 PROCEDURE — 85025 COMPLETE CBC W/AUTO DIFF WBC: CPT | Performed by: PHYSICIAN ASSISTANT

## 2022-11-12 PROCEDURE — 99024 POSTOP FOLLOW-UP VISIT: CPT | Mod: POP,,, | Performed by: PHYSICIAN ASSISTANT

## 2022-11-12 PROCEDURE — S4991 NICOTINE PATCH NONLEGEND: HCPCS | Performed by: PHYSICIAN ASSISTANT

## 2022-11-12 PROCEDURE — 93005 ELECTROCARDIOGRAM TRACING: CPT

## 2022-11-12 PROCEDURE — 93010 ELECTROCARDIOGRAM REPORT: CPT | Mod: 76,,, | Performed by: INTERNAL MEDICINE

## 2022-11-12 PROCEDURE — 99232 SBSQ HOSP IP/OBS MODERATE 35: CPT | Mod: ,,, | Performed by: STUDENT IN AN ORGANIZED HEALTH CARE EDUCATION/TRAINING PROGRAM

## 2022-11-12 PROCEDURE — 27000221 HC OXYGEN, UP TO 24 HOURS

## 2022-11-12 PROCEDURE — 80053 COMPREHEN METABOLIC PANEL: CPT | Performed by: PHYSICIAN ASSISTANT

## 2022-11-12 PROCEDURE — 25000003 PHARM REV CODE 250: Performed by: PSYCHIATRY & NEUROLOGY

## 2022-11-12 PROCEDURE — 83735 ASSAY OF MAGNESIUM: CPT | Performed by: PHYSICIAN ASSISTANT

## 2022-11-12 PROCEDURE — 11000001 HC ACUTE MED/SURG PRIVATE ROOM

## 2022-11-12 PROCEDURE — 94761 N-INVAS EAR/PLS OXIMETRY MLT: CPT

## 2022-11-12 PROCEDURE — 84100 ASSAY OF PHOSPHORUS: CPT | Performed by: PHYSICIAN ASSISTANT

## 2022-11-12 RX ADMIN — HEPARIN SODIUM 5000 UNITS: 5000 INJECTION INTRAVENOUS; SUBCUTANEOUS at 02:11

## 2022-11-12 RX ADMIN — OXYCODONE 5 MG: 5 TABLET ORAL at 12:11

## 2022-11-12 RX ADMIN — OXACILLIN SODIUM 12 G: 10 INJECTION, POWDER, FOR SOLUTION INTRAVENOUS at 02:11

## 2022-11-12 RX ADMIN — FAMOTIDINE 20 MG: 20 TABLET ORAL at 09:11

## 2022-11-12 RX ADMIN — CETIRIZINE HYDROCHLORIDE 5 MG: 5 TABLET, FILM COATED ORAL at 09:11

## 2022-11-12 RX ADMIN — OXYCODONE 5 MG: 5 TABLET ORAL at 05:11

## 2022-11-12 RX ADMIN — DIAZEPAM 5 MG: 5 TABLET ORAL at 09:11

## 2022-11-12 RX ADMIN — OXYCODONE 5 MG: 5 TABLET ORAL at 06:11

## 2022-11-12 RX ADMIN — Medication 1 PATCH: at 09:11

## 2022-11-12 RX ADMIN — HEPARIN SODIUM 5000 UNITS: 5000 INJECTION INTRAVENOUS; SUBCUTANEOUS at 09:11

## 2022-11-12 RX ADMIN — SENNOSIDES AND DOCUSATE SODIUM 1 TABLET: 50; 8.6 TABLET ORAL at 09:11

## 2022-11-12 RX ADMIN — ACETAMINOPHEN 650 MG: 325 TABLET ORAL at 05:11

## 2022-11-12 RX ADMIN — POLYETHYLENE GLYCOL 3350 17 G: 17 POWDER, FOR SOLUTION ORAL at 09:11

## 2022-11-12 RX ADMIN — ACETAMINOPHEN 650 MG: 325 TABLET ORAL at 09:11

## 2022-11-12 RX ADMIN — METHADONE HYDROCHLORIDE 90 MG: 10 TABLET ORAL at 02:11

## 2022-11-12 RX ADMIN — PREGABALIN 200 MG: 150 CAPSULE ORAL at 09:11

## 2022-11-12 RX ADMIN — HEPARIN SODIUM 5000 UNITS: 5000 INJECTION INTRAVENOUS; SUBCUTANEOUS at 06:11

## 2022-11-12 RX ADMIN — PREGABALIN 200 MG: 150 CAPSULE ORAL at 02:11

## 2022-11-12 RX ADMIN — SILODOSIN 4 MG: 4 CAPSULE ORAL at 09:11

## 2022-11-12 NOTE — SUBJECTIVE & OBJECTIVE
Interval History:  Per RN patient vomited overnight with displacement of NGT.  NGT was later successfully replaced. Patient did not vomit since tehn. TF resumes. Awaiting placement to rehab.       Review of Systems  Objective:     Vital Signs (Most Recent):  Temp: 98.7 °F (37.1 °C) (11/12/22 0730)  Pulse: (!) 56 (11/12/22 1052)  Resp: 16 (11/12/22 0730)  BP: 110/62 (11/12/22 0730)  SpO2: 96 % (11/12/22 0730)   Vital Signs (24h Range):  Temp:  [98.5 °F (36.9 °C)-100.6 °F (38.1 °C)] 98.7 °F (37.1 °C)  Pulse:  [50-84] 56  Resp:  [9-21] 16  SpO2:  [93 %-97 %] 96 %  BP: (100-131)/(56-84) 110/62     Weight:  (daylight savings)  Body mass index is 22.6 kg/m².    Intake/Output Summary (Last 24 hours) at 11/12/2022 1141  Last data filed at 11/12/2022 0800  Gross per 24 hour   Intake 150 ml   Output 400 ml   Net -250 ml      Physical Exam  Vitals and nursing note reviewed.   HENT:      Head: Normocephalic.      Nose: Nose normal.      Mouth/Throat:      Mouth: Mucous membranes are moist.      Pharynx: Oropharynx is clear.   Cardiovascular:      Rate and Rhythm: Normal rate and regular rhythm.      Pulses: Normal pulses.      Heart sounds: Normal heart sounds.   Pulmonary:      Effort: Pulmonary effort is normal.      Breath sounds: Normal breath sounds.   Abdominal:      General: Bowel sounds are normal.      Palpations: Abdomen is soft.   Musculoskeletal:         General: Normal range of motion.   Skin:     General: Skin is warm and dry.   Neurological:      Mental Status: She is alert and oriented to person, place, and time. Mental status is at baseline.      Comments:      Psychiatric:         Mood and Affect: Mood normal.         Behavior: Behavior normal.       MELD-Na score: 6 at 11/3/2022  1:47 AM  MELD score: 6 at 11/3/2022  1:47 AM  Calculated from:  Serum Creatinine: 0.7 mg/dL (Using min of 1 mg/dL) at 11/3/2022  1:47 AM  Serum Sodium: 141 mmol/L (Using max of 137 mmol/L) at 11/3/2022  1:47 AM  Total Bilirubin: 0.4  mg/dL (Using min of 1 mg/dL) at 11/3/2022  1:47 AM  INR(ratio): 1.0 at 11/1/2022  9:28 PM  Age: 51 years    Significant Labs:  CBC:  Recent Labs   Lab 11/11/22  0459 11/12/22 0522   WBC 8.90 9.52   HGB 7.5* 7.7*   HCT 23.9* 23.9*    276     CMP:  Recent Labs   Lab 11/10/22  1906 11/11/22  0301 11/12/22 0522   NA  --  143 142   K 3.8 4.0 3.9   CL  --  107 106   CO2  --  27 28   GLU  --  120* 90   BUN  --  16 16   CREATININE  --  0.8 0.7   CALCIUM  --  9.0 9.2   PROT  --  6.2 6.1   ALBUMIN  --  2.1* 2.1*   BILITOT  --  0.6 0.5   ALKPHOS  --  155* 149*   AST  --  37 29   ALT  --  34 30   ANIONGAP  --  9 8     PTINR:  No results for input(s): INR in the last 48 hours.

## 2022-11-12 NOTE — PLAN OF CARE
Patient remains AAOx4, pleasant but is very drowsy today due to lack of sleep overnight. NGT in place and infusing tube feeds throughout the day. 150 ml water flushes given Q4 hr. Moving all extremities. Frequent suction required for thick oral-tracheal secretions.    RN notified by telemetry in afternoon about new onset/intermittent bradycardia as low as 37. Telemetry tech explained that pt HR would dip to 30s-40s range for 10-20 seconds and then return to 60s-70s range. RN assessed patient- She reported no symptoms and her BP remained st baseline (112/55). MD notified and ordered EKG. EKG completed and put in pt chart. Will monitor patient.    Problem: Adult Inpatient Plan of Care  Goal: Plan of Care Review  Outcome: Ongoing, Progressing  Goal: Patient-Specific Goal (Individualized)  Description: Admit Date: 10/25/2022  Outcome: Ongoing, Progressing  Goal: Absence of Hospital-Acquired Illness or Injury  Outcome: Ongoing, Progressing  Goal: Optimal Comfort and Wellbeing  Outcome: Ongoing, Progressing  Goal: Readiness for Transition of Care  Outcome: Ongoing, Progressing     Problem: Fall Injury Risk  Goal: Absence of Fall and Fall-Related Injury  Outcome: Ongoing, Progressing     Problem: Skin Injury Risk Increased  Goal: Skin Health and Integrity  Outcome: Ongoing, Progressing     Problem: Pain Acute  Goal: Acceptable Pain Control and Functional Ability  Outcome: Ongoing, Progressing     Problem: Autonomic Dysreflexia (Spinal Cord Injury)  Goal: Effective Autonomic Dysreflexia Prevention  Outcome: Ongoing, Progressing     Problem: Bowel Elimination Impaired (Spinal Cord Injury)  Goal: Effective Bowel Elimination  Outcome: Ongoing, Progressing     Problem: Extended Neurologic Impairment (Spinal Cord Injury)  Goal: Absence of Extended Neurologic Injury  Outcome: Ongoing, Progressing     Problem: Functional Ability Impaired (Spinal Cord Injury)  Goal: Optimal Functional Ability  Outcome: Ongoing, Progressing      Problem: Neurogenic Shock (Spinal Cord Injury)  Goal: Effective Tissue Perfusion  Outcome: Ongoing, Progressing     Problem: Nutrition Impaired (Spinal Cord Injury)  Goal: Optimal Nutrition Intake  Outcome: Ongoing, Progressing     Problem: Infection  Goal: Absence of Infection Signs and Symptoms  Outcome: Ongoing, Progressing     Problem: Communication Impairment (Artificial Airway)  Goal: Effective Communication  Outcome: Ongoing, Progressing     Problem: Device-Related Complication Risk (Artificial Airway)  Goal: Optimal Device Function  Outcome: Ongoing, Progressing     Problem: Skin and Tissue Injury (Artificial Airway)  Goal: Absence of Device-Related Skin or Tissue Injury  Outcome: Ongoing, Progressing     Problem: Noninvasive Ventilation Acute  Goal: Effective Unassisted Ventilation and Oxygenation  Outcome: Ongoing, Progressing     Problem: Adjustment to Illness (Delirium)  Goal: Optimal Coping  Outcome: Ongoing, Progressing     Problem: Sleep Disturbance (Delirium)  Goal: Improved Sleep  Outcome: Ongoing, Progressing

## 2022-11-12 NOTE — PLAN OF CARE
Problem: Adult Inpatient Plan of Care  Goal: Plan of Care Review  Outcome: Ongoing, Progressing  Goal: Patient-Specific Goal (Individualized)  Description: Admit Date: 10/25/2022    Osteomyelitis of cervical spine    Past Medical History:  No date: CHF (congestive heart failure)  No date: Essential (primary) hypertension  No date: Opioid dependence on maintenance agonist therapy, no symptoms  No date: Smoking    Past Surgical History:  No date: APPENDECTOMY  No date:  SECTION      Comment:  x2  2022: FUSION OF CERVICAL SPINE BY POSTERIOR APPROACH; N/A      Comment:  Procedure: FUSION, SPINE, CERVICAL, POSTERIOR APPROACH                C2-T2 posterior decompression/fusion; Depuy,                neuromonitoring Marrero, gell rolls;  Surgeon: West Merino DO;  [facility];  Service:                Neurosurgery;  Laterality: N/A;  No date: HYSTERECTOMY  10/28/2022: SURGICAL REMOVAL OF VERTEBRAL BODY OF CERVICAL SPINE; N/A      Comment:  Procedure: CORPECTOMY, SPINE, CERVICAL;  Surgeon:                West Merino DO;  Location: [facility] OR [facility];                 Service: Neurosurgery;  Laterality: N/A;  anterior C5-6                corpectomy, globus, caspar head prado and tongs,                omnipaque, 15lbs weights, microscrope, round cutting lynsey               and M8, ENT assistance for exposure    Individualization:   1.     Restraints:   Restraint Order  Length of Order: Order good for next 24 hours or when removed.  Date that the current order will : 22  Time that the current order will : 010  Order Upon Application: Yes          Outcome: Ongoing, Progressing  Goal: Absence of Hospital-Acquired Illness or Injury  Outcome: Ongoing, Progressing  Goal: Optimal Comfort and Wellbeing  Outcome: Ongoing, Progressing  Goal: Readiness for Transition of Care  Outcome: Ongoing, Progressing     Problem: Fall Injury Risk  Goal: Absence of Fall and  Fall-Related Injury  Outcome: Ongoing, Progressing     Problem: Skin Injury Risk Increased  Goal: Skin Health and Integrity  Outcome: Ongoing, Progressing     Problem: Pain Acute  Goal: Acceptable Pain Control and Functional Ability  Outcome: Ongoing, Progressing     Problem: Autonomic Dysreflexia (Spinal Cord Injury)  Goal: Effective Autonomic Dysreflexia Prevention  Outcome: Ongoing, Progressing     Problem: Bowel Elimination Impaired (Spinal Cord Injury)  Goal: Effective Bowel Elimination  Outcome: Ongoing, Progressing     Problem: Extended Neurologic Impairment (Spinal Cord Injury)  Goal: Absence of Extended Neurologic Injury  Outcome: Ongoing, Progressing     Problem: Functional Ability Impaired (Spinal Cord Injury)  Goal: Optimal Functional Ability  Outcome: Ongoing, Progressing     Problem: Neurogenic Shock (Spinal Cord Injury)  Goal: Effective Tissue Perfusion  Outcome: Ongoing, Progressing     Problem: Nutrition Impaired (Spinal Cord Injury)  Goal: Optimal Nutrition Intake  Outcome: Ongoing, Progressing     Problem: Infection  Goal: Absence of Infection Signs and Symptoms  Outcome: Ongoing, Progressing     Problem: Restraint, Nonbehavioral (Nonviolent)  Goal: Absence of Harm or Injury  Outcome: Ongoing, Progressing     Problem: Communication Impairment (Artificial Airway)  Goal: Effective Communication  Outcome: Ongoing, Progressing     Problem: Device-Related Complication Risk (Artificial Airway)  Goal: Optimal Device Function  Outcome: Ongoing, Progressing     Problem: Skin and Tissue Injury (Artificial Airway)  Goal: Absence of Device-Related Skin or Tissue Injury  Outcome: Ongoing, Progressing     Problem: Noninvasive Ventilation Acute  Goal: Effective Unassisted Ventilation and Oxygenation  Outcome: Ongoing, Progressing     Problem: Adjustment to Illness (Delirium)  Goal: Optimal Coping  Outcome: Ongoing, Progressing     Problem: Sleep Disturbance (Delirium)  Goal: Improved Sleep  Outcome: Ongoing,  Progressing

## 2022-11-12 NOTE — PROGRESS NOTES
[facility] - Neurosurgery ([facility])  [facility] Medicine  Progress Note    Patient Name: Peter Stiles  MRN: [id number]  Patient Class: IP- Inpatient   Admission Date: 10/25/2022  Length of Stay: 18 days  Attending Physician: Alejandro Recio MD  Primary Care Provider: Og Victoria NP        Subjective:     Principal Problem:Osteomyelitis of cervical spine        HPI:  51 y.o F with hx of htn, opioid dependence on methadone, smoking (2ppd since 17 y.o), and dilated cardiomyopathy presents with neck pain and abdominal numbness. 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 At the time the daughter slammed the breaks and came to an abrupt stop. The patient braced herself by covering her face with both her forearms. She did not notice severe symptoms at the time or a popping sensation but a vague soreness in her neck. In the following 2 weeks her symptoms gradually deteriorated with increased neck pain notably. Her symptoms became worse early this morning when she went to the bathroom. She noticed an unusual numbness and bloating of her stomach and generalized numbness below that level. She was able to urinate. She also had increased pain in her legs, bilateral shoulders, and tingling in fingers and toes. Patient denies IV drug or alcohol use. She smoke 2ppd since 17 y.o.                 Overview/Hospital Course:  Ms. Stiles presented for acute neck pain, along with numbness and weakness of the bilateral upper and lower extremities. MRI demonstrated C5-6 osteomyelitis, discitis, and epidural abscess, as well as narrowing and signal abnormality of the spinal cord. She was admitted to the neuroICU and started on Rocephin and vancomycin. Stepped down to hospital medicine on 10/26/22. Patient completed C5-C6 corpectomy and C4-C7 anterior column reconstruction with NSGY with assistance from ENT. BCX's subsequently grew MSSA and patient transitioned from  vanc/ceftriaxone to oxacillin per ID recommendations.       Interval History:  Per RN patient vomited overnight with displacement of NGT.  NGT was later successfully replaced. Patient did not vomit since tehn. TF resumes. Awaiting placement to rehab.       Review of Systems  Objective:     Vital Signs (Most Recent):  Temp: 98.7 °F (37.1 °C) (11/12/22 0730)  Pulse: (!) 56 (11/12/22 1052)  Resp: 16 (11/12/22 0730)  BP: 110/62 (11/12/22 0730)  SpO2: 96 % (11/12/22 0730)   Vital Signs (24h Range):  Temp:  [98.5 °F (36.9 °C)-100.6 °F (38.1 °C)] 98.7 °F (37.1 °C)  Pulse:  [50-84] 56  Resp:  [9-21] 16  SpO2:  [93 %-97 %] 96 %  BP: (100-131)/(56-84) 110/62     Weight:  (daylight savings)  Body mass index is 22.6 kg/m².    Intake/Output Summary (Last 24 hours) at 11/12/2022 1141  Last data filed at 11/12/2022 0800  Gross per 24 hour   Intake 150 ml   Output 400 ml   Net -250 ml      Physical Exam  Vitals and nursing note reviewed.   HENT:      Head: Normocephalic.      Nose: Nose normal.      Mouth/Throat:      Mouth: Mucous membranes are moist.      Pharynx: Oropharynx is clear.   Cardiovascular:      Rate and Rhythm: Normal rate and regular rhythm.      Pulses: Normal pulses.      Heart sounds: Normal heart sounds.   Pulmonary:      Effort: Pulmonary effort is normal.      Breath sounds: Normal breath sounds.   Abdominal:      General: Bowel sounds are normal.      Palpations: Abdomen is soft.   Musculoskeletal:         General: Normal range of motion.   Skin:     General: Skin is warm and dry.   Neurological:      Mental Status: She is alert and oriented to person, place, and time. Mental status is at baseline.      Comments:      Psychiatric:         Mood and Affect: Mood normal.         Behavior: Behavior normal.       MELD-Na score: 6 at 11/3/2022  1:47 AM  MELD score: 6 at 11/3/2022  1:47 AM  Calculated from:  Serum Creatinine: 0.7 mg/dL (Using min of 1 mg/dL) at 11/3/2022  1:47 AM  Serum Sodium: 141 mmol/L (Using max  of 137 mmol/L) at 11/3/2022  1:47 AM  Total Bilirubin: 0.4 mg/dL (Using min of 1 mg/dL) at 11/3/2022  1:47 AM  INR(ratio): 1.0 at 11/1/2022  9:28 PM  Age: 51 years    Significant Labs:  CBC:  Recent Labs   Lab 11/11/22  0459 11/12/22  0522   WBC 8.90 9.52   HGB 7.5* 7.7*   HCT 23.9* 23.9*    276     CMP:  Recent Labs   Lab 11/10/22  1906 11/11/22  0301 11/12/22  0522   NA  --  143 142   K 3.8 4.0 3.9   CL  --  107 106   CO2  --  27 28   GLU  --  120* 90   BUN  --  16 16   CREATININE  --  0.8 0.7   CALCIUM  --  9.0 9.2   PROT  --  6.2 6.1   ALBUMIN  --  2.1* 2.1*   BILITOT  --  0.6 0.5   ALKPHOS  --  155* 149*   AST  --  37 29   ALT  --  34 30   ANIONGAP  --  9 8     PTINR:  No results for input(s): INR in the last 48 hours.        Assessment/Plan:      * Osteomyelitis of cervical spine  Peter Stiles is a 51 y.o. female with PMHx of HTN, dilated cardiomyopathy, h/o opioid dependence, cigarette smoker (2pks/day), and daily baby ASA use who presents with 1 day of decreased sensation from T5 level down and tingling in her bilateral fingers and bilateral toes. MRI imaging obtained in the ED showing possible C5-6 osteodiscitis/myelitis with cord compression. Patient tachycardic and hypertensive on arrival, afebrile, with leukocytosis. Now s/p C5-6 corpectomy on 10/28/22; C2-T2 PCDF on 11/2; S/p trach 11/7.         MRI C/T/L spine w/o contrast 10/25: focal kyphotic deformity at C5/6 with possible discitis/osteomyelitis,   epidural collection extending into the central spinal canal resulting in severe stenosis and cord signal  abnormality.  Prevertebral soft tissue edema and probable paravertebral abscess or phlegmon at this level. Thoracic and lumbar spine unremarkable. MRI C spine w/ contrast 10/25: confirmed enhancement at C5/6 consistent with osteomyelitis/discitis and epidural phlegmon        -Admitted to neuro ICU.  --Intubated after index procedure and kept intubated for posterior fixation and for  tentative LISHA 11/4 which was eventually cancelled due to inability to adequately extend neck  --Step down to medicine on 11/11  --Continue Speech therapy. GI consulted for G Tube eval. Patient wishes to see her progress in next 2 weeks before getting Gtube.   --BCx 10/25 growing MSSA, Repeat BCx's 10/27 NGTD  --OR cultures 10/28 no organisms on gram stain, culture MSSA. ID following on oxacillin  --Continue C collar when upright   --CT Soft tissue neck reviewed with stable hardware position s/p 360 cervical fusion ow expected postop changes  --Pain control as needed  --PT/OT. SQH     ID recs fro discharge:   -continue oxacillin for MSSA, anticipate 6w course from cleared blcx, rosalino 12/9  -unable to use adjunctive rifampin due to DDI (methadone)  -follow up cx data  -plan for placement per patient (SNF/LTAC) - pt requested [facility]     Outpatient Antibiotic Therapy Plan:     1) Infection: MSSA epidural abscess/bacteremia     2) Discharge Antibiotics:     Intravenous antibiotics:  Oxacillin 12g continuous infusion IV daily      3) Therapy Duration:  6w     Estimated end date of IV antibiotics: 12/9/22     4) Outpatient Weekly Labs:     Order the following labs to be drawn on Mondays:    CBC   CMP    CRP     5) Fax Lab Results to Infectious Diseases Provider: Bernard     [facility] Fax Number: [phone]     6) Outpatient Infectious Diseases Follow-up    Patient will need a PICC line for OP antibiotics     VTE Risk Mitigation (From admission, onward)         Ordered     heparin (porcine) injection 5,000 Units  Every 8 hours         11/04/22 1437     IP VTE LOW RISK PATIENT  Once         10/25/22 1109     Place sequential compression device  Until discontinued         10/25/22 1109                Discharge Planning   YESSI: 11/15/2022     Code Status: Full Code   Is the patient medically ready for discharge?: No    Reason for patient still in hospital (select all that apply): Patient trending condition and  Pending disposition  Discharge Plan A: Long-term acute care facility (LTAC)   Discharge Delays: None known at this time              Alejandro Recio MD  Department of Hospital Medicine   [facility] - Neurosurgery ([facility])

## 2022-11-12 NOTE — RESPIRATORY THERAPY
RAPID RESPONSE RESPIRATORY THERAPY PROACTIVE NOTE           Time of visit: 846     Code Status: Full Code   : 1970  Bed: 952/952 A:   MRN: [id number]  Time spent at the bedside: < 15 min    SITUATION    Evaluated patient for: LDA Check     BACKGROUND    Patient has a past medical history of CHF (congestive heart failure), Essential (primary) hypertension, Opioid dependence on maintenance agonist therapy, no symptoms, and Smoking.  Clinically Significant Surgical Hx: tracheostomy    24 Hours Vitals Range:  Temp:  [98.5 °F (36.9 °C)-100.6 °F (38.1 °C)]   Pulse:  [50-84]   Resp:  [9-21]   BP: (100-131)/(56-84)   SpO2:  [93 %-97 %]     Labs:    Recent Labs     11/10/22  0235 11/10/22  1906 22  0301 22  0522     --  143 142   K 3.4* 3.8 4.0 3.9     --  107 106   CO2 27  --  27 28   CREATININE 0.8  --  0.8 0.7   GLU 83  --  120* 90   PHOS 4.0  --  3.1 3.8   MG 2.0  --  2.0 2.0        No results for input(s): PH, PCO2, PO2, HCO3, POCSATURATED, BE in the last 72 hours.    ASSESSMENT/INTERVENTIONS  Pt resting with no complaint for respiratory during visit. Trach clean and secure, all supplies at bedside.      Last VS   Temp: 98.7 °F (37.1 °C) (730)  Pulse: 59 (746)  Resp: 16 (730)  BP: 110/62 (730)  SpO2: 96 % (730)      Extra trachs at bedside: 6.0 & 4.0 Shiley Cuffed  Level of Consciousness: Level of Consciousness (AVPU): alert  Respiratory Effort: Respiratory Effort: Normal, Unlabored Expansion/Accessory Muscle Usage: Expansion/Accessory Muscles/Retractions: no use of accessory muscles, no retractions, expansion symmetric  All Lung Field Breath Sounds: All Lung Fields Breath Sounds: Anterior:, Lateral:, diminished  NANETTE Breath Sounds: coarse  LLL Breath Sounds: coarse  RLL Breath Sounds: coarse  O2 Device/Concentration: 5L/28%  Surgical airway: Yes, Type: Shiley Size: 6, uncuffed  Ambu at bedside: Ambu bag with the patient?: Yes, Adult Ambu     Active Orders    Respiratory Care    Inhalation Treatment Q4H PRN     Frequency: Q4H PRN     Number of Occurrences: Until Specified    Oxygen Continuous     Frequency: Continuous     Number of Occurrences: Until Specified     Order Questions:      Device type: Low flow      Device: Trach Collar      FiO2%: 28      Titrate O2 per Oxygen Titration Protocol: Yes      To maintain SpO2 goal of: >= 90%      Notify MD of: Inability to achieve desired SpO2; Sudden change in patient status and requires 20% increase in FiO2; Patient requires >60% FiO2    POCT ARTERIAL BLOOD GAS Blood Gas     Frequency: PRN     Number of Occurrences: Until Specified     Order Comments: Notify Physician if: see parameters below.       Order Questions:      Component: Blood Gas    Trach care every 12 hours and as needed     Frequency: BID     Number of Occurrences: Until Specified    Trach: Assess suction need every 2 hours and as needed     Frequency: BID     Number of Occurrences: Until Specified       RECOMMENDATIONS    We recommend: RRT Recs: Continue POC per primary team.      FOLLOW-UP    Please call back the Rapid Response RT, Steve Madrid, RRT at x [phone] for any questions or concerns.
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
## 2022-11-12 NOTE — PLAN OF CARE
Problem: Adult Inpatient Plan of Care  Goal: Plan of Care Review  Outcome: Ongoing, Progressing  Goal: Patient-Specific Goal (Individualized)  Description: Admit Date: 10/25/2022    Osteomyelitis of cervical spine    Past Medical History:  No date: CHF (congestive heart failure)  No date: Essential (primary) hypertension  No date: Opioid dependence on maintenance agonist therapy, no symptoms  No date: Smoking    Past Surgical History:  No date: APPENDECTOMY  No date:  SECTION      Comment:  x2  2022: FUSION OF CERVICAL SPINE BY POSTERIOR APPROACH; N/A      Comment:  Procedure: FUSION, SPINE, CERVICAL, POSTERIOR APPROACH                C2-T2 posterior decompression/fusion; Depuy,                neuromonitoring Marrero, gell rolls;  Surgeon: West Merino DO;  [facility];  Service:                Neurosurgery;  Laterality: N/A;  No date: HYSTERECTOMY  10/28/2022: SURGICAL REMOVAL OF VERTEBRAL BODY OF CERVICAL SPINE; N/A      Comment:  Procedure: CORPECTOMY, SPINE, CERVICAL;  Surgeon:                West Merino DO;  Location: [facility] OR [facility];                 Service: Neurosurgery;  Laterality: N/A;  anterior C5-6                corpectomy, globus, caspar head prado and tongs,                omnipaque, 15lbs weights, microscrope, round cutting lynsey               and M8, ENT assistance for exposure    Individualization:   1.     Restraints:   Restraint Order  Length of Order: Order good for next 24 hours or when removed.  Date that the current order will : 22  Time that the current order will : 010  Order Upon Application: Yes          Outcome: Ongoing, Progressing  Goal: Absence of Hospital-Acquired Illness or Injury  Outcome: Ongoing, Progressing  Goal: Optimal Comfort and Wellbeing  Outcome: Ongoing, Progressing  Goal: Readiness for Transition of Care  Outcome: Ongoing, Progressing     Problem: Fall Injury Risk  Goal: Absence of Fall and  Fall-Related Injury  Outcome: Ongoing, Progressing     Problem: Skin Injury Risk Increased  Goal: Skin Health and Integrity  Outcome: Ongoing, Progressing     Problem: Pain Acute  Goal: Acceptable Pain Control and Functional Ability  Outcome: Ongoing, Progressing     Problem: Autonomic Dysreflexia (Spinal Cord Injury)  Goal: Effective Autonomic Dysreflexia Prevention  Outcome: Ongoing, Progressing     Problem: Bowel Elimination Impaired (Spinal Cord Injury)  Goal: Effective Bowel Elimination  Outcome: Ongoing, Progressing     Problem: Extended Neurologic Impairment (Spinal Cord Injury)  Goal: Absence of Extended Neurologic Injury  Outcome: Ongoing, Progressing     Problem: Functional Ability Impaired (Spinal Cord Injury)  Goal: Optimal Functional Ability  Outcome: Ongoing, Progressing     Problem: Neurogenic Shock (Spinal Cord Injury)  Goal: Effective Tissue Perfusion  Outcome: Ongoing, Progressing     Problem: Nutrition Impaired (Spinal Cord Injury)  Goal: Optimal Nutrition Intake  Outcome: Ongoing, Progressing     Problem: Infection  Goal: Absence of Infection Signs and Symptoms  Outcome: Ongoing, Progressing     Problem: Restraint, Nonbehavioral (Nonviolent)  Goal: Absence of Harm or Injury  Outcome: Ongoing, Progressing     Problem: Communication Impairment (Artificial Airway)  Goal: Effective Communication  Outcome: Ongoing, Progressing     Problem: Device-Related Complication Risk (Artificial Airway)  Goal: Optimal Device Function  Outcome: Ongoing, Progressing     Problem: Skin and Tissue Injury (Artificial Airway)  Goal: Absence of Device-Related Skin or Tissue Injury  Outcome: Ongoing, Progressing     Problem: Noninvasive Ventilation Acute  Goal: Effective Unassisted Ventilation and Oxygenation  Outcome: Ongoing, Progressing     Problem: Adjustment to Illness (Delirium)  Goal: Optimal Coping  Outcome: Ongoing, Progressing     Problem: Sleep Disturbance (Delirium)  Goal: Improved Sleep  Outcome: Ongoing,  Progressing

## 2022-11-12 NOTE — PROGRESS NOTES
Abdominal X-ray reviewed with tip of NG tube at gastric fundus. Okay to resume tube feeding at previous rate.     Felecia Padilla DO.  Staff Physician, Hospital Medicine.

## 2022-11-12 NOTE — PT/OT/SLP PROGRESS
Physical Therapy      Patient Name:  Peter Stiles   MRN:  [id number]    Patient not seen today secondary to pt in nursing care both AM, and PM attempt. Will follow-up per PT POC.

## 2022-11-12 NOTE — ASSESSMENT & PLAN NOTE
Peter Stiles is a 51 y.o. female with PMHx of HTN, dilated cardiomyopathy, h/o opioid dependence, cigarette smoker (2pks/day), and daily baby ASA use who presents with 1 day of decreased sensation from T5 level down and tingling in her bilateral fingers and bilateral toes. MRI imaging obtained in the ED showing possible C5-6 osteodiscitis/myelitis with cord compression. Patient tachycardic and hypertensive on arrival, afebrile, with leukocytosis.    Now s/p C5-6 corpectomy on 10/28/22; C2-T2 PCDF on 11/2.    - Stepped down to medicine service.  - Q4hr neurochecks.  - All pertinent imaging/labs personally reviewed and interpreted.   - Surgical drains removed with no issues.  - Requiring trach on 11/7 after failed extubation multiple times.  - Holding off on gtube placement to see if progressing with speech therapy.  - ID:   --Febrile 11/12 Tmax 100.6, Without leukocytosis. BLE tender to palpation. F/u BLE US. Rest of fever workup per primary.  Encourage IS hourly.    --BCx 10/25 w/ MSSA, Repeat BCx's 10/27 NGTD   --OR cultures 10/28 w/ MSSA   --ID consulted, rec for IV Oxacillin.  - Continue c-collar when out of bed.  - Pain control with multimodal regimen.  - Okay for SQH.  - Please call NSGY with any questions/concerns    Dispo: per primary

## 2022-11-13 LAB
INFLUENZA A, MOLECULAR: NOT DETECTED
INFLUENZA B, MOLECULAR: NOT DETECTED
POCT GLUCOSE: 91 MG/DL (ref 70–110)
POCT GLUCOSE: 94 MG/DL (ref 70–110)
POCT GLUCOSE: 95 MG/DL (ref 70–110)
RSV AG BY MOLECULAR METHOD: NOT DETECTED
SARS-COV-2 RNA RESP QL NAA+PROBE: NOT DETECTED

## 2022-11-13 PROCEDURE — 11000001 HC ACUTE MED/SURG PRIVATE ROOM

## 2022-11-13 PROCEDURE — 25000003 PHARM REV CODE 250: Performed by: PSYCHIATRY & NEUROLOGY

## 2022-11-13 PROCEDURE — 97112 NEUROMUSCULAR REEDUCATION: CPT

## 2022-11-13 PROCEDURE — 97530 THERAPEUTIC ACTIVITIES: CPT | Mod: CQ

## 2022-11-13 PROCEDURE — 27000221 HC OXYGEN, UP TO 24 HOURS

## 2022-11-13 PROCEDURE — 0241U SARS-COV2 (COVID) WITH FLU/RSV BY PCR: CPT | Performed by: STUDENT IN AN ORGANIZED HEALTH CARE EDUCATION/TRAINING PROGRAM

## 2022-11-13 PROCEDURE — S4991 NICOTINE PATCH NONLEGEND: HCPCS | Performed by: PHYSICIAN ASSISTANT

## 2022-11-13 PROCEDURE — 25000003 PHARM REV CODE 250: Performed by: STUDENT IN AN ORGANIZED HEALTH CARE EDUCATION/TRAINING PROGRAM

## 2022-11-13 PROCEDURE — 94761 N-INVAS EAR/PLS OXIMETRY MLT: CPT

## 2022-11-13 PROCEDURE — 97535 SELF CARE MNGMENT TRAINING: CPT

## 2022-11-13 PROCEDURE — 99232 SBSQ HOSP IP/OBS MODERATE 35: CPT | Mod: ,,, | Performed by: STUDENT IN AN ORGANIZED HEALTH CARE EDUCATION/TRAINING PROGRAM

## 2022-11-13 PROCEDURE — 25000003 PHARM REV CODE 250: Performed by: NURSE PRACTITIONER

## 2022-11-13 PROCEDURE — 99900035 HC TECH TIME PER 15 MIN (STAT)

## 2022-11-13 PROCEDURE — 99232 PR SUBSEQUENT HOSPITAL CARE,LEVL II: ICD-10-PCS | Mod: ,,, | Performed by: STUDENT IN AN ORGANIZED HEALTH CARE EDUCATION/TRAINING PROGRAM

## 2022-11-13 PROCEDURE — 99900026 HC AIRWAY MAINTENANCE (STAT)

## 2022-11-13 PROCEDURE — 25000003 PHARM REV CODE 250: Performed by: PHYSICIAN ASSISTANT

## 2022-11-13 PROCEDURE — 25000003 PHARM REV CODE 250

## 2022-11-13 PROCEDURE — 63600175 PHARM REV CODE 636 W HCPCS: Performed by: NURSE PRACTITIONER

## 2022-11-13 RX ADMIN — SENNOSIDES AND DOCUSATE SODIUM 1 TABLET: 50; 8.6 TABLET ORAL at 09:11

## 2022-11-13 RX ADMIN — OXYCODONE 5 MG: 5 TABLET ORAL at 06:11

## 2022-11-13 RX ADMIN — HEPARIN SODIUM 5000 UNITS: 5000 INJECTION INTRAVENOUS; SUBCUTANEOUS at 06:11

## 2022-11-13 RX ADMIN — OXYCODONE 5 MG: 5 TABLET ORAL at 12:11

## 2022-11-13 RX ADMIN — OXYCODONE 5 MG: 5 TABLET ORAL at 05:11

## 2022-11-13 RX ADMIN — HEPARIN SODIUM 5000 UNITS: 5000 INJECTION INTRAVENOUS; SUBCUTANEOUS at 09:11

## 2022-11-13 RX ADMIN — PREGABALIN 200 MG: 150 CAPSULE ORAL at 09:11

## 2022-11-13 RX ADMIN — FAMOTIDINE 20 MG: 20 TABLET ORAL at 09:11

## 2022-11-13 RX ADMIN — CETIRIZINE HYDROCHLORIDE 5 MG: 5 TABLET, FILM COATED ORAL at 09:11

## 2022-11-13 RX ADMIN — HEPARIN SODIUM 5000 UNITS: 5000 INJECTION INTRAVENOUS; SUBCUTANEOUS at 02:11

## 2022-11-13 RX ADMIN — ACETAMINOPHEN 650 MG: 325 TABLET ORAL at 03:11

## 2022-11-13 RX ADMIN — SCOPALAMINE 1 PATCH: 1 PATCH, EXTENDED RELEASE TRANSDERMAL at 08:11

## 2022-11-13 RX ADMIN — METHADONE HYDROCHLORIDE 90 MG: 10 TABLET ORAL at 03:11

## 2022-11-13 RX ADMIN — OXACILLIN SODIUM 12 G: 10 INJECTION, POWDER, FOR SOLUTION INTRAVENOUS at 02:11

## 2022-11-13 RX ADMIN — POLYETHYLENE GLYCOL 3350 17 G: 17 POWDER, FOR SOLUTION ORAL at 09:11

## 2022-11-13 RX ADMIN — Medication 1 PATCH: at 09:11

## 2022-11-13 RX ADMIN — SILODOSIN 4 MG: 4 CAPSULE ORAL at 09:11

## 2022-11-13 RX ADMIN — PREGABALIN 200 MG: 150 CAPSULE ORAL at 02:11

## 2022-11-13 RX ADMIN — DIAZEPAM 5 MG: 5 TABLET ORAL at 06:11
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
## 2022-11-13 NOTE — ASSESSMENT & PLAN NOTE
Peter Stiles is a 51 y.o. female with PMHx of HTN, dilated cardiomyopathy, h/o opioid dependence, cigarette smoker (2pks/day), and daily baby ASA use who presents with 1 day of decreased sensation from T5 level down and tingling in her bilateral fingers and bilateral toes. MRI imaging obtained in the ED showing possible C5-6 osteodiscitis/myelitis with cord compression. Patient tachycardic and hypertensive on arrival, afebrile, with leukocytosis.    Now s/p C5-6 corpectomy on 10/28/22; C2-T2 PCDF on 11/2.    - Stepped down to medicine service.  - Q4hr neurochecks.  - All pertinent imaging/labs personally reviewed and interpreted.   - Surgical drains removed with no issues.  - Requiring trach on 11/7 after failed extubation multiple times.  - Holding off on gtube placement to see if progressing with speech therapy.  - ID:   --Febrile 11/12 Tmax 100.6, Without leukocytosis. BLE tender to palpation. F/u BLE US -> negative. Rest of fever workup per primary.  Encourage IS hourly.    --BCx 10/25 w/ MSSA, Repeat BCx's 10/27 NGTD   --OR cultures 10/28 w/ MSSA   --ID consulted, rec for IV Oxacillin.  - Continue c-collar when out of bed.  - Pain control with multimodal regimen.  - Okay for SQH.  - Pending SLP re-eval  - Please call NSGY with any questions/concerns    Dispo: per primary

## 2022-11-13 NOTE — PLAN OF CARE
[facility] Care Plan    POC reviewed with Peter Stiles and family at 1400. Pt verbalized understanding. Questions and concerns addressed. No acute events today. Pt progressing toward goals. Will continue to monitor. See below and flowsheets for full assessment and VS info.   -Tmax 100.5  -Pt neurologically intact  -Pt has moderate thick secretions  -Pain in legs difficult to control  -Blood cultures obtained  -Ultrasound of legs obtained          Is this a stroke patient? no    Neuro:  Lucien Coma Scale  Best Eye Response: 4-->(E4) spontaneous  Best Motor Response: 6-->(M6) obeys commands  Best Verbal Response: 1-->(V1) none  Maud Coma Scale Score: 11  Assessment Qualifiers: other (see comments) (trach)  Pupil PERRLA: yes     24 hr Temp:  [97.6 °F (36.4 °C)-100.5 °F (38.1 °C)]     CV:   Rhythm: normal sinus rhythm  BP goals:   SBP < 160  MAP > 65    Resp:   O2 Device (Oxygen Therapy): Trach Collar  Vent Mode: Spont  Set Rate: 14 BPM  Oxygen Concentration (%): 28  Vt Set: 385 mL  PEEP/CPAP: 5 cmH20  Pressure Support: 5 cmH20    Plan: trach in place    GI/:     Diet/Nutrition Received: tube feeding  Last Bowel Movement: 11/08/22  Voiding Characteristics: urethral catheter (bladder)    Intake/Output Summary (Last 24 hours) at 11/13/2022 1718  Last data filed at 11/13/2022 1705  Gross per 24 hour   Intake 2203.14 ml   Output 1600 ml   Net 603.14 ml     Unmeasured Output  Urine Occurrence: 1  Stool Occurrence: 0  Pad Count: 3    Labs/Accuchecks:  Recent Labs   Lab 11/12/22  0522   WBC 9.52   RBC 2.52*   HGB 7.7*   HCT 23.9*         Recent Labs   Lab 11/12/22  0522      K 3.9   CO2 28      BUN 16   CREATININE 0.7   ALKPHOS 149*   ALT 30   AST 29   BILITOT 0.5      Recent Labs   Lab 11/08/22  0937   APTT 25.4    No results for input(s): CPK, CPKMB, TROPONINI, MB in the last 168 hours.    Electrolytes: N/A - electrolytes WDL  Accuchecks: Q6H    Gtts:      LDA/Wounds:  Lines/Drains/Airways        Drain  Duration                  NG/OG Tube 11/06/22 Right nostril 7 days         Urethral Catheter 11/10/22 1335 Latex 16 Fr. 3 days              Airway  Duration                  Surgical Airway 11/07/22 1449 Shiley Cuffed 6 days              Peripheral Intravenous Line  Duration                  Midline Catheter Insertion/Assessment  - Single Lumen 10/28/22 1858 Right basilic vein (medial side of arm) 18g x 10cm 15 days         Peripheral IV - Single Lumen 11/06/22 20 G Anterior;Proximal;Right Upper Arm 7 days                  Wounds: Yes  Wound care consulted: No
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
## 2022-11-13 NOTE — PT/OT/SLP PROGRESS
Physical Therapy Treatment  Co-Treatment     Patient Name:  Peter Stiles   MRN:  [id number]    Recommendations:     Discharge Recommendations:  rehabilitation facility   Discharge Equipment Recommendations:  (tbd @ rehab)   Barriers to discharge:  Increased level of assistance     Assessment:     Peter Stiles is a 51 y.o. female admitted with a medical diagnosis of Osteomyelitis of cervical spine.  She presents with the following impairments/functional limitations:  weakness, impaired endurance, impaired self care skills, impaired functional mobility, impaired balance, gait instability, impaired cognition, decreased upper extremity function, decreased lower extremity function, decreased safety awareness, pain, impaired coordination, impaired fine motor. Pt tolerated treatment well, but continues to require significant assistance at this time. Pt will continue to benefit from skilled PT services to improve all deficits noted above. Resume PT POC as indicated.     Rehab Prognosis: Good; patient would benefit from acute skilled PT services to address these deficits and reach maximum level of function.    Recent Surgery: Procedure(s) (LRB):  CREATION, TRACHEOSTOMY (N/A)  LARYNGOSCOPY, DIRECT 6 Days Post-Op    Plan:     During this hospitalization, patient to be seen 4 x/week to address the identified rehab impairments via gait training, therapeutic exercises, therapeutic activities, neuromuscular re-education and progress toward the following goals:    Plan of Care Expires:  12/09/22    Subjective     Chief Complaint: pain to BLE   Patient/Family Comments/goals: none stated  Pain/Comfort:  Pain Rating 1:  (not rated)  Location - Side 1: Bilateral  Location - Orientation 1: generalized  Location 1: leg  Pain Addressed 1: Pre-medicate for activity, Reposition, Distraction  Pain Rating Post-Intervention 1:  (not rated)      Objective:     Communicated with nursing prior to session.  Patient found HOB elevated  with  (all lines intact) upon PT entry to room.     General Precautions: Standard, aspiration, fall, NPO   Orthopedic Precautions:spinal precautions   Braces: Aspen collar       Functional Mobility:  Bed Mobility:     Rolling Left:  maximal assistance  Rolling Right: maximal assistance  Scooting: maximal assistance  Supine to Sit: maximal assistance and of 2 persons  Sit to Supine: maximal assistance and of 2 persons  Transfers:  Sit to Stand:  moderate assistance and of 2 persons with hand-held assist  Balance: Pt sat EOB ~15 min with Mod-Max A; Pt stood ~10 seconds with Mod A x2 HHA. Cueing for upright posture.       AM-PAC 6 CLICK MOBILITY   Total Score: 11       Treatment & Education:  -Answered all questions/concerns within PTA scope of practice.     Patient left HOB elevated with all lines intact, call button in reach, and nursing notified..    GOALS:   Multidisciplinary Problems       Physical Therapy Goals          Problem: Physical Therapy    Goal Priority Disciplines Outcome Goal Variances Interventions   Physical Therapy Goal     PT, PT/OT Ongoing, Progressing     Description: PT goals until 11/23/22    1. Pt supine to sit with Min(A)   2. Pt sit to supine with Min(A)  3. Pt sit to stand with RW with minimal assist-not met  4. Pt to perform gait 30ft with RW with minimal assist.-not met  5. Pt to transfer bed to/from bedside chair with RW with minimal assist.-not met  6. Pt to up/down 5 steps with L UE rail with minimal assist.-not met  7. Pt to perform B LE exs in sitting or supine x 10 reps to strengthen B LE to improve functional mobility.-not met                         Time Tracking:     PT Received On: 11/13/22  PT Start Time: 1308     PT Stop Time: 1348  PT Total Time (min): 40 min     Billable Minutes: Therapeutic Activity 40    Treatment Type: Treatment  PT/PTA: PTA     PTA Visit Number: 1     11/13/2022

## 2022-11-13 NOTE — RESPIRATORY THERAPY
[facility] RESPIRATORY THERAPY PROACTIVE NOTE           Time of visit: 0754     Code Status: Full Code   : 1970  Bed: 952/952 A:   MRN: [id number]  Time spent at the bedside: < 15 min    SITUATION    Evaluated patient for: LDA Check     BACKGROUND    Patient has a past medical history of CHF (congestive heart failure), Essential (primary) hypertension, Opioid dependence on maintenance agonist therapy, no symptoms, and Smoking.  Clinically Significant Surgical Hx: tracheostomy    24 Hours Vitals Range:  Temp:  [97.6 °F (36.4 °C)-100.5 °F (38.1 °C)]   Pulse:  [36-70]   Resp:  [9-18]   BP: (103-131)/(53-86)   SpO2:  [93 %-97 %]     Labs:    Recent Labs     11/10/22  1906 22  0301 22  0522   NA  --  143 142   K 3.8 4.0 3.9   CL  --  107 106   CO2  --  27 28   CREATININE  --  0.8 0.7   GLU  --  120* 90   PHOS  --  3.1 3.8   MG  --  2.0 2.0        No results for input(s): PH, PCO2, PO2, HCO3, POCSATURATED, BE in the last 72 hours.    ASSESSMENT/INTERVENTIONS  Pt receiving care from bedside RT, no additional need at this time.      Last VS   Temp: 99.9 °F (37.7 °C) (809)  Pulse: 67 (809)  Resp: 18 (724)  BP: 131/60 (809)  SpO2: 96 % (809)      Extra trachs at bedside: 6.0 & 4.0 Shiley Cuffed  Level of Consciousness: Level of Consciousness (AVPU): alert  Respiratory Effort: Respiratory Effort: Normal, Unlabored Expansion/Accessory Muscle Usage: Expansion/Accessory Muscles/Retractions: no use of accessory muscles, no retractions, expansion symmetric  All Lung Field Breath Sounds: All Lung Fields Breath Sounds: diminished, coarse  NANETTE Breath Sounds: coarse  LLL Breath Sounds: coarse  RLL Breath Sounds: coarse  O2 Device/Concentration: trach collar 5L/28%  Surgical airway: Yes, Type: Shiley Size: 6, uncuffed  Ambu at bedside: Ambu bag with the patient?: Yes, Adult Ambu     Active Orders   Respiratory Care    Inhalation Treatment Q4H PRN     Frequency: Q4H PRN     Number  of Occurrences: Until Specified    Oxygen Continuous     Frequency: Continuous     Number of Occurrences: Until Specified     Order Questions:      Device type: Low flow      Device: Trach Collar      FiO2%: 28      Titrate O2 per Oxygen Titration Protocol: Yes      To maintain SpO2 goal of: >= 90%      Notify MD of: Inability to achieve desired SpO2; Sudden change in patient status and requires 20% increase in FiO2; Patient requires >60% FiO2    POCT ARTERIAL BLOOD GAS Blood Gas     Frequency: PRN     Number of Occurrences: Until Specified     Order Comments: Notify Physician if: see parameters below.       Order Questions:      Component: Blood Gas    Trach care every 12 hours and as needed     Frequency: BID     Number of Occurrences: Until Specified    Trach: Assess suction need every 2 hours and as needed     Frequency: BID     Number of Occurrences: Until Specified       RECOMMENDATIONS    We recommend: RRT Recs: Continue POC per primary team.      FOLLOW-UP    Please call back the Rapid Response RT, Steve Madrid, RRT at x [phone] for any questions or concerns.

## 2022-11-13 NOTE — PLAN OF CARE
Problem: Adult Inpatient Plan of Care  Goal: Plan of Care Review  Outcome: Ongoing, Progressing  Goal: Patient-Specific Goal (Individualized)  Description: Admit Date: 10/25/2022    Osteomyelitis of cervical spine    Past Medical History:  No date: CHF (congestive heart failure)  No date: Essential (primary) hypertension  No date: Opioid dependence on maintenance agonist therapy, no symptoms  No date: Smoking    Past Surgical History:  No date: APPENDECTOMY  No date:  SECTION      Comment:  x2  2022: FUSION OF CERVICAL SPINE BY POSTERIOR APPROACH; N/A      Comment:  Procedure: FUSION, SPINE, CERVICAL, POSTERIOR APPROACH                C2-T2 posterior decompression/fusion; Depuy,                neuromonitoring Marrero, gell rolls;  Surgeon: West Merino DO;  [facility];  Service:                Neurosurgery;  Laterality: N/A;  No date: HYSTERECTOMY  10/28/2022: SURGICAL REMOVAL OF VERTEBRAL BODY OF CERVICAL SPINE; N/A      Comment:  Procedure: CORPECTOMY, SPINE, CERVICAL;  Surgeon:                West Merino DO;  Location: [facility] OR [facility];                 Service: Neurosurgery;  Laterality: N/A;  anterior C5-6                corpectomy, globus, caspar head prado and tongs,                omnipaque, 15lbs weights, microscrope, round cutting lynsey               and M8, ENT assistance for exposure    Individualization:   1.     Restraints:   Restraint Order  Length of Order: Order good for next 24 hours or when removed.  Date that the current order will : 22  Time that the current order will : 010  Order Upon Application: Yes          Outcome: Ongoing, Progressing  Goal: Absence of Hospital-Acquired Illness or Injury  Outcome: Ongoing, Progressing  Goal: Optimal Comfort and Wellbeing  Outcome: Ongoing, Progressing  Goal: Readiness for Transition of Care  Outcome: Ongoing, Progressing     Problem: Fall Injury Risk  Goal: Absence of Fall and  Fall-Related Injury  Outcome: Ongoing, Progressing     Problem: Skin Injury Risk Increased  Goal: Skin Health and Integrity  Outcome: Ongoing, Progressing     Problem: Pain Acute  Goal: Acceptable Pain Control and Functional Ability  Outcome: Ongoing, Progressing     Problem: Autonomic Dysreflexia (Spinal Cord Injury)  Goal: Effective Autonomic Dysreflexia Prevention  Outcome: Ongoing, Progressing     Problem: Bowel Elimination Impaired (Spinal Cord Injury)  Goal: Effective Bowel Elimination  Outcome: Ongoing, Progressing     Problem: Extended Neurologic Impairment (Spinal Cord Injury)  Goal: Absence of Extended Neurologic Injury  Outcome: Ongoing, Progressing     Problem: Functional Ability Impaired (Spinal Cord Injury)  Goal: Optimal Functional Ability  Outcome: Ongoing, Progressing     Problem: Neurogenic Shock (Spinal Cord Injury)  Goal: Effective Tissue Perfusion  Outcome: Ongoing, Progressing     Problem: Nutrition Impaired (Spinal Cord Injury)  Goal: Optimal Nutrition Intake  Outcome: Ongoing, Progressing     Problem: Infection  Goal: Absence of Infection Signs and Symptoms  Outcome: Ongoing, Progressing     Problem: Communication Impairment (Artificial Airway)  Goal: Effective Communication  Outcome: Ongoing, Progressing     Problem: Device-Related Complication Risk (Artificial Airway)  Goal: Optimal Device Function  Outcome: Ongoing, Progressing     Problem: Skin and Tissue Injury (Artificial Airway)  Goal: Absence of Device-Related Skin or Tissue Injury  Outcome: Ongoing, Progressing     Problem: Noninvasive Ventilation Acute  Goal: Effective Unassisted Ventilation and Oxygenation  Outcome: Ongoing, Progressing     Problem: Adjustment to Illness (Delirium)  Goal: Optimal Coping  Outcome: Ongoing, Progressing     Problem: Sleep Disturbance (Delirium)  Goal: Improved Sleep  Outcome: Ongoing, Progressing

## 2022-11-13 NOTE — SUBJECTIVE & OBJECTIVE
Interval History:  Patient spiked a fever again overnight. No other complaints today. Will repeat blood cultures and consult ID again.      Review of Systems  Objective:     Vital Signs (Most Recent):  Temp: 98.8 °F (37.1 °C) (11/13/22 1228)  Pulse: 74 (11/13/22 1228)  Resp: 16 (11/13/22 1147)  BP: (!) 104/54 (11/13/22 1228)  SpO2: 95 % (11/13/22 1228)   Vital Signs (24h Range):  Temp:  [97.6 °F (36.4 °C)-100.5 °F (38.1 °C)] 98.8 °F (37.1 °C)  Pulse:  [36-74] 74  Resp:  [9-18] 16  SpO2:  [93 %-97 %] 95 %  BP: (103-131)/(53-86) 104/54     Weight:  (daylight savings)  Body mass index is 22.6 kg/m².    Intake/Output Summary (Last 24 hours) at 11/13/2022 1235  Last data filed at 11/13/2022 1201  Gross per 24 hour   Intake 150 ml   Output 1200 ml   Net -1050 ml      Physical Exam    MELD-Na score: 6 at 11/3/2022  1:47 AM  MELD score: 6 at 11/3/2022  1:47 AM  Calculated from:  Serum Creatinine: 0.7 mg/dL (Using min of 1 mg/dL) at 11/3/2022  1:47 AM  Serum Sodium: 141 mmol/L (Using max of 137 mmol/L) at 11/3/2022  1:47 AM  Total Bilirubin: 0.4 mg/dL (Using min of 1 mg/dL) at 11/3/2022  1:47 AM  INR(ratio): 1.0 at 11/1/2022  9:28 PM  Age: 51 years    Significant Labs:  CBC:  Recent Labs   Lab 11/12/22 0522   WBC 9.52   HGB 7.7*   HCT 23.9*        CMP:  Recent Labs   Lab 11/12/22 0522      K 3.9      CO2 28   GLU 90   BUN 16   CREATININE 0.7   CALCIUM 9.2   PROT 6.1   ALBUMIN 2.1*   BILITOT 0.5   ALKPHOS 149*   AST 29   ALT 30   ANIONGAP 8

## 2022-11-13 NOTE — NURSING
Pt asleep,no signs of acute distress. Occasional bradycardia throughout the night.MD was notified see charts

## 2022-11-13 NOTE — PLAN OF CARE
Problem: Adult Inpatient Plan of Care  Goal: Plan of Care Review  Outcome: Ongoing, Progressing  Goal: Patient-Specific Goal (Individualized)  Description: Admit Date: 10/25/2022    Osteomyelitis of cervical spine    Past Medical History:  No date: CHF (congestive heart failure)  No date: Essential (primary) hypertension  No date: Opioid dependence on maintenance agonist therapy, no symptoms  No date: Smoking    Past Surgical History:  No date: APPENDECTOMY  No date:  SECTION      Comment:  x2  2022: FUSION OF CERVICAL SPINE BY POSTERIOR APPROACH; N/A      Comment:  Procedure: FUSION, SPINE, CERVICAL, POSTERIOR APPROACH                C2-T2 posterior decompression/fusion; Depuy,                neuromonitoring Marrero, gell rolls;  Surgeon: West Merino DO;  [facility];  Service:                Neurosurgery;  Laterality: N/A;  No date: HYSTERECTOMY  10/28/2022: SURGICAL REMOVAL OF VERTEBRAL BODY OF CERVICAL SPINE; N/A      Comment:  Procedure: CORPECTOMY, SPINE, CERVICAL;  Surgeon:                West Merino DO;  Location: [facility] OR [facility];                 Service: Neurosurgery;  Laterality: N/A;  anterior C5-6                corpectomy, globus, caspar head prado and tongs,                omnipaque, 15lbs weights, microscrope, round cutting lynsey               and M8, ENT assistance for exposure    Individualization:   1.     Restraints:   Restraint Order  Length of Order: Order good for next 24 hours or when removed.  Date that the current order will : 22  Time that the current order will : 010  Order Upon Application: Yes          Outcome: Ongoing, Progressing  Goal: Absence of Hospital-Acquired Illness or Injury  Outcome: Ongoing, Progressing  Goal: Optimal Comfort and Wellbeing  Outcome: Ongoing, Progressing  Goal: Readiness for Transition of Care  Outcome: Ongoing, Progressing     Problem: Fall Injury Risk  Goal: Absence of Fall and  Fall-Related Injury  Outcome: Ongoing, Progressing     Problem: Skin Injury Risk Increased  Goal: Skin Health and Integrity  Outcome: Ongoing, Progressing     Problem: Pain Acute  Goal: Acceptable Pain Control and Functional Ability  Outcome: Ongoing, Progressing     Problem: Autonomic Dysreflexia (Spinal Cord Injury)  Goal: Effective Autonomic Dysreflexia Prevention  Outcome: Ongoing, Progressing     Problem: Bowel Elimination Impaired (Spinal Cord Injury)  Goal: Effective Bowel Elimination  Outcome: Ongoing, Progressing     Problem: Extended Neurologic Impairment (Spinal Cord Injury)  Goal: Absence of Extended Neurologic Injury  Outcome: Ongoing, Progressing     Problem: Functional Ability Impaired (Spinal Cord Injury)  Goal: Optimal Functional Ability  Outcome: Ongoing, Progressing     Problem: Neurogenic Shock (Spinal Cord Injury)  Goal: Effective Tissue Perfusion  Outcome: Ongoing, Progressing     Problem: Nutrition Impaired (Spinal Cord Injury)  Goal: Optimal Nutrition Intake  Outcome: Ongoing, Progressing     Problem: Infection  Goal: Absence of Infection Signs and Symptoms  Outcome: Ongoing, Progressing     Problem: Communication Impairment (Artificial Airway)  Goal: Effective Communication  Outcome: Ongoing, Progressing     Problem: Device-Related Complication Risk (Artificial Airway)  Goal: Optimal Device Function  Outcome: Ongoing, Progressing     Problem: Skin and Tissue Injury (Artificial Airway)  Goal: Absence of Device-Related Skin or Tissue Injury  Outcome: Ongoing, Progressing     Problem: Noninvasive Ventilation Acute  Goal: Effective Unassisted Ventilation and Oxygenation  Outcome: Ongoing, Progressing     Problem: Adjustment to Illness (Delirium)  Goal: Optimal Coping  Outcome: Ongoing, Progressing     Problem: Sleep Disturbance (Delirium)  Goal: Improved Sleep  Outcome: Ongoing, Progressing

## 2022-11-13 NOTE — SUBJECTIVE & OBJECTIVE
Interval History: 11/13: NAEON. Tm 100.5, ow VSS. Exam stable. Pain controlled. Pending SLP re-eval.     Medications:  Continuous Infusions:  Scheduled Meds:   cetirizine  5 mg Per NG tube Daily    famotidine  20 mg Per NG tube BID    heparin (porcine)  5,000 Units Subcutaneous Q8H    methadone  90 mg Per NG tube Daily    nicotine  1 patch Transdermal Daily    oxacillin 12 g in  mL CONTINUOUS INFUSION  12 g Intravenous Q24H    oxyCODONE  5 mg Per NG tube Q6H    polyethylene glycol  17 g Per NG tube Daily    pregabalin  200 mg Per NG tube TID    scopolamine  1 patch Transdermal Q3 Days    senna-docusate 8.6-50 mg  1 tablet Per NG tube BID    silodosin  4 mg Per NG tube Daily     PRN Meds:acetaminophen, albuterol-ipratropium, benzocaine, bisacodyL, dextrose 10%, dextrose 10%, diazePAM, fentaNYL, glucagon (human recombinant), hydrALAZINE, hydrOXYzine HCL, magnesium oxide, magnesium oxide, ondansetron, potassium bicarbonate, potassium bicarbonate, potassium bicarbonate, potassium, sodium phosphates, potassium, sodium phosphates, potassium, sodium phosphates, racepinephrine, simethicone, sodium chloride 0.9%     Review of Systems  Objective:     Weight:  (daylight savings)  Body mass index is 22.6 kg/m².  Vital Signs (Most Recent):  Temp: 99.9 °F (37.7 °C) (11/13/22 0809)  Pulse: 67 (11/13/22 0809)  Resp: 18 (11/13/22 0724)  BP: 131/60 (11/13/22 0809)  SpO2: 96 % (11/13/22 0809)   Vital Signs (24h Range):  Temp:  [97.6 °F (36.4 °C)-100.5 °F (38.1 °C)] 99.9 °F (37.7 °C)  Pulse:  [36-68] 67  Resp:  [9-18] 18  SpO2:  [93 %-97 %] 96 %  BP: (103-131)/(53-86) 131/60     Date 11/13/22 0700 - 11/14/22 0659   Shift 8974-2837 0715-4156 8314-2550 24 Hour Total   INTAKE   Shift Total(mL/kg)       OUTPUT   Urine(mL/kg/hr) 200   200   Shift Total(mL/kg) 200(3.1)   200(3.1)   Weight (kg) 63.5 63.5 63.5 63.5              Oxygen Concentration (%):  [28] 28         NG/OG Tube 11/06/22 Right nostril (Active)   Placement Check  "placement verified by aspirate characteristics;placement verified by distal tube length measurement;placement verified by x-ray 11/10/22 0702   Tolerance no signs/symptoms of discomfort 11/11/22 1800   Securement secured to nostril center 11/11/22 1800   Clamp Status/Tolerance unclamped 11/11/22 1800   Suction Setting/Drainage Method low;intermittent setting;suction at 11/07/22 1501   Insertion Site Appearance no redness, warmth, tenderness, skin breakdown, drainage 11/10/22 0702   Drainage Green 11/07/22 1501   Flush/Irrigation flushed w/;sterile normal saline;no resistance met 11/11/22 2000   Feeding Type continuous 11/11/22 2000   Feeding Action feeding continued 11/11/22 2000   Current Rate (mL/hr) 50 mL/hr 11/11/22 2000   Goal Rate (mL/hr) 50 mL/hr 11/11/22 2000   Intake (mL) 150 mL 11/10/22 2000   Water Bolus (mL) 150 mL 11/10/22 2000   Formula Name diabetasource 11/11/22 2000   Intake (mL) - Formula Tube Feeding 50 11/10/22 1002   Residual Amount (ml) 40 ml 11/11/22 2000            Urethral Catheter 11/10/22 1335 Latex 16 Fr. (Active)   Site Assessment Clean;Intact 11/11/22 2000   Collection Container Standard drainage bag 11/11/22 2000   Securement Method secured to top of thigh w/ adhesive device 11/11/22 2000   Catheter Care Performed yes 11/11/22 1800   Reason for Continuing Urinary Catheterization Urinary retention 11/11/22 1800   CAUTI Prevention Bundle Securement Device in place with 1" slack;Intact seal between catheter & drainage tubing;Drainage bag/urimeter off the floor;Sheeting clip in use;No dependent loops or kinks;Drainage bag/urimeter not overfilled (<2/3 full);Drainage bag/urimeter below bladder 11/11/22 0800   Output (mL) 350 mL 11/11/22 0532       Neurosurgery Physical Exam  General: Well developed, well nourished, not in acute distress.   Head: Normocephalic, atraumatic.  Neck: Trach in place.  Neurologic: Alert and oriented x 4. Thought content appropriate.  GCS: Motor:6  Verbal:5  " Eyes:4   GCS Total: 15  Language: No aphasia.  Speech: No dysarthria.  Cranial nerves: Face symmetric, tongue midline, CN II-XII grossly intact.   Eyes: Pupils equal, round, reactive to light with accomodation, EOMI.  Pulmonary: Normal respirations, no signs of respiratory distress.  Abdomen: Soft, non-distended, non-tender to palpation.  Sensory: Intact to light touch throughout.  Motor Strength: Moves all extremities spontaneously with good tone. Full strength upper and lower extremities. No abnormal movements seen.      Strength   Deltoids Triceps Biceps Wrist Extension Wrist Flexion Hand    Upper: R 5/5 5/5 5/5 5/5 5/5 5/5     L 5/5 5/5 5/5 5/5 5/5 5/5       Hip Flexion Knee Extension Knee  Flexion Dorsiflexion Plantar flexion EHL   Lower: R 5/5 5/5 5/5 5/5 5/5 5/5     L 5/5 5/5 5/5 5/5 5/5 5/5      Skin: Skin is warm, dry and intact.  Significant Labs:  Recent Labs   Lab 11/12/22  0522   GLU 90      K 3.9      CO2 28   BUN 16   CREATININE 0.7   CALCIUM 9.2   MG 2.0       Recent Labs   Lab 11/12/22  0522   WBC 9.52   HGB 7.7*   HCT 23.9*          No results for input(s): LABPT, INR, APTT in the last 48 hours.  Microbiology Results (last 7 days)       Procedure Component Value Units Date/Time    Culture, Anaerobe [[id number]] Collected: 10/28/22 1029    Order Status: Completed Specimen: Wound from Neck Updated: 11/07/22 0632     Anaerobic Culture No anaerobes isolated    Narrative:      4) Epidural phlegman          All pertinent labs from the last 24 hours have been reviewed.    Significant Diagnostics:  I have reviewed all pertinent imaging results/findings within the past 24 hours.  US Lower Extremity Veins Bilateral    Result Date: 11/13/2022  No evidence of deep venous thrombosis in either lower extremity. Electronically signed by: Faith Coleman MD Date:    11/13/2022 Time:    09:41

## 2022-11-14 LAB
ALBUMIN SERPL BCP-MCNC: 1.8 G/DL (ref 3.5–5.2)
ALP SERPL-CCNC: 132 U/L (ref 55–135)
ALT SERPL W/O P-5'-P-CCNC: 27 U/L (ref 10–44)
ANION GAP SERPL CALC-SCNC: 7 MMOL/L (ref 8–16)
ANISOCYTOSIS BLD QL SMEAR: SLIGHT
AST SERPL-CCNC: 31 U/L (ref 10–40)
BASOPHILS # BLD AUTO: 0.03 K/UL (ref 0–0.2)
BASOPHILS NFR BLD: 0.4 % (ref 0–1.9)
BILIRUB SERPL-MCNC: 0.3 MG/DL (ref 0.1–1)
BUN SERPL-MCNC: 14 MG/DL (ref 6–20)
CALCIUM SERPL-MCNC: 8.5 MG/DL (ref 8.7–10.5)
CHLORIDE SERPL-SCNC: 109 MMOL/L (ref 95–110)
CO2 SERPL-SCNC: 24 MMOL/L (ref 23–29)
CREAT SERPL-MCNC: 0.8 MG/DL (ref 0.5–1.4)
DIFFERENTIAL METHOD: ABNORMAL
EOSINOPHIL # BLD AUTO: 0.2 K/UL (ref 0–0.5)
EOSINOPHIL NFR BLD: 2.2 % (ref 0–8)
ERYTHROCYTE [DISTWIDTH] IN BLOOD BY AUTOMATED COUNT: 15.3 % (ref 11.5–14.5)
EST. GFR  (NO RACE VARIABLE): >60 ML/MIN/1.73 M^2
GLUCOSE SERPL-MCNC: 101 MG/DL (ref 70–110)
HCT VFR BLD AUTO: 24.7 % (ref 37–48.5)
HGB BLD-MCNC: 7.4 G/DL (ref 12–16)
IMM GRANULOCYTES # BLD AUTO: 0.08 K/UL (ref 0–0.04)
IMM GRANULOCYTES NFR BLD AUTO: 0.9 % (ref 0–0.5)
LYMPHOCYTES # BLD AUTO: 3.1 K/UL (ref 1–4.8)
LYMPHOCYTES NFR BLD: 36 % (ref 18–48)
MAGNESIUM SERPL-MCNC: 2 MG/DL (ref 1.6–2.6)
MCH RBC QN AUTO: 30 PG (ref 27–31)
MCHC RBC AUTO-ENTMCNC: 30 G/DL (ref 32–36)
MCV RBC AUTO: 100 FL (ref 82–98)
MONOCYTES # BLD AUTO: 1.1 K/UL (ref 0.3–1)
MONOCYTES NFR BLD: 13.1 % (ref 4–15)
NEUTROPHILS # BLD AUTO: 4 K/UL (ref 1.8–7.7)
NEUTROPHILS NFR BLD: 47.4 % (ref 38–73)
NRBC BLD-RTO: 0 /100 WBC
OVALOCYTES BLD QL SMEAR: ABNORMAL
PHOSPHATE SERPL-MCNC: 4.2 MG/DL (ref 2.7–4.5)
PLATELET # BLD AUTO: 229 K/UL (ref 150–450)
PLATELET BLD QL SMEAR: ABNORMAL
PMV BLD AUTO: 11.3 FL (ref 9.2–12.9)
POCT GLUCOSE: 114 MG/DL (ref 70–110)
POIKILOCYTOSIS BLD QL SMEAR: SLIGHT
POTASSIUM SERPL-SCNC: 4 MMOL/L (ref 3.5–5.1)
PROT SERPL-MCNC: 5.5 G/DL (ref 6–8.4)
RBC # BLD AUTO: 2.47 M/UL (ref 4–5.4)
SODIUM SERPL-SCNC: 140 MMOL/L (ref 136–145)
WBC # BLD AUTO: 8.5 K/UL (ref 3.9–12.7)

## 2022-11-14 PROCEDURE — 80053 COMPREHEN METABOLIC PANEL: CPT | Performed by: PHYSICIAN ASSISTANT

## 2022-11-14 PROCEDURE — 99232 PR SUBSEQUENT HOSPITAL CARE,LEVL II: ICD-10-PCS | Mod: ,,, | Performed by: STUDENT IN AN ORGANIZED HEALTH CARE EDUCATION/TRAINING PROGRAM

## 2022-11-14 PROCEDURE — 87186 SC STD MICRODIL/AGAR DIL: CPT | Mod: 91 | Performed by: STUDENT IN AN ORGANIZED HEALTH CARE EDUCATION/TRAINING PROGRAM

## 2022-11-14 PROCEDURE — 97535 SELF CARE MNGMENT TRAINING: CPT

## 2022-11-14 PROCEDURE — 36415 COLL VENOUS BLD VENIPUNCTURE: CPT | Performed by: STUDENT IN AN ORGANIZED HEALTH CARE EDUCATION/TRAINING PROGRAM

## 2022-11-14 PROCEDURE — 27200966 HC CLOSED SUCTION SYSTEM

## 2022-11-14 PROCEDURE — 27000221 HC OXYGEN, UP TO 24 HOURS

## 2022-11-14 PROCEDURE — 87118 MYCOBACTERIC IDENTIFICATION: CPT | Mod: 91 | Performed by: STUDENT IN AN ORGANIZED HEALTH CARE EDUCATION/TRAINING PROGRAM

## 2022-11-14 PROCEDURE — C1751 CATH, INF, PER/CENT/MIDLINE: HCPCS

## 2022-11-14 PROCEDURE — 94761 N-INVAS EAR/PLS OXIMETRY MLT: CPT

## 2022-11-14 PROCEDURE — 36573 INSJ PICC RS&I 5 YR+: CPT

## 2022-11-14 PROCEDURE — 99024 PR POST-OP FOLLOW-UP VISIT: ICD-10-PCS | Mod: POP,,,

## 2022-11-14 PROCEDURE — 99024 POSTOP FOLLOW-UP VISIT: CPT | Mod: POP,,,

## 2022-11-14 PROCEDURE — 99900026 HC AIRWAY MAINTENANCE (STAT)

## 2022-11-14 PROCEDURE — 63600175 PHARM REV CODE 636 W HCPCS: Performed by: NURSE PRACTITIONER

## 2022-11-14 PROCEDURE — 25000003 PHARM REV CODE 250: Performed by: PHYSICIAN ASSISTANT

## 2022-11-14 PROCEDURE — 92526 ORAL FUNCTION THERAPY: CPT

## 2022-11-14 PROCEDURE — 25000003 PHARM REV CODE 250: Performed by: PSYCHIATRY & NEUROLOGY

## 2022-11-14 PROCEDURE — 99900035 HC TECH TIME PER 15 MIN (STAT)

## 2022-11-14 PROCEDURE — 99232 SBSQ HOSP IP/OBS MODERATE 35: CPT | Mod: ,,, | Performed by: STUDENT IN AN ORGANIZED HEALTH CARE EDUCATION/TRAINING PROGRAM

## 2022-11-14 PROCEDURE — 25000003 PHARM REV CODE 250: Performed by: STUDENT IN AN ORGANIZED HEALTH CARE EDUCATION/TRAINING PROGRAM

## 2022-11-14 PROCEDURE — S4991 NICOTINE PATCH NONLEGEND: HCPCS | Performed by: PHYSICIAN ASSISTANT

## 2022-11-14 PROCEDURE — 87040 BLOOD CULTURE FOR BACTERIA: CPT | Performed by: STUDENT IN AN ORGANIZED HEALTH CARE EDUCATION/TRAINING PROGRAM

## 2022-11-14 PROCEDURE — 84100 ASSAY OF PHOSPHORUS: CPT | Performed by: PHYSICIAN ASSISTANT

## 2022-11-14 PROCEDURE — 87118 MYCOBACTERIC IDENTIFICATION: CPT | Performed by: STUDENT IN AN ORGANIZED HEALTH CARE EDUCATION/TRAINING PROGRAM

## 2022-11-14 PROCEDURE — 76937 US GUIDE VASCULAR ACCESS: CPT

## 2022-11-14 PROCEDURE — 92507 TX SP LANG VOICE COMM INDIV: CPT

## 2022-11-14 PROCEDURE — 83735 ASSAY OF MAGNESIUM: CPT | Performed by: PHYSICIAN ASSISTANT

## 2022-11-14 PROCEDURE — 85025 COMPLETE CBC W/AUTO DIFF WBC: CPT | Performed by: PHYSICIAN ASSISTANT

## 2022-11-14 PROCEDURE — 87186 SC STD MICRODIL/AGAR DIL: CPT | Performed by: STUDENT IN AN ORGANIZED HEALTH CARE EDUCATION/TRAINING PROGRAM

## 2022-11-14 PROCEDURE — 36415 COLL VENOUS BLD VENIPUNCTURE: CPT | Performed by: PHYSICIAN ASSISTANT

## 2022-11-14 PROCEDURE — 11000001 HC ACUTE MED/SURG PRIVATE ROOM

## 2022-11-14 PROCEDURE — 25000003 PHARM REV CODE 250

## 2022-11-14 PROCEDURE — 97530 THERAPEUTIC ACTIVITIES: CPT

## 2022-11-14 PROCEDURE — 25000003 PHARM REV CODE 250: Performed by: NURSE PRACTITIONER

## 2022-11-14 RX ORDER — SODIUM CHLORIDE 0.9 % (FLUSH) 0.9 %
10 SYRINGE (ML) INJECTION
Status: DISCONTINUED | OUTPATIENT
Start: 2022-11-14 | End: 2022-11-21 | Stop reason: HOSPADM

## 2022-11-14 RX ORDER — SODIUM CHLORIDE 0.9 % (FLUSH) 0.9 %
10 SYRINGE (ML) INJECTION EVERY 6 HOURS
Status: DISCONTINUED | OUTPATIENT
Start: 2022-11-14 | End: 2022-11-21 | Stop reason: HOSPADM

## 2022-11-14 RX ADMIN — FAMOTIDINE 20 MG: 20 TABLET ORAL at 09:11

## 2022-11-14 RX ADMIN — OXACILLIN SODIUM 12 G: 10 INJECTION, POWDER, FOR SOLUTION INTRAVENOUS at 02:11

## 2022-11-14 RX ADMIN — OXYCODONE 5 MG: 5 TABLET ORAL at 12:11

## 2022-11-14 RX ADMIN — PREGABALIN 200 MG: 150 CAPSULE ORAL at 03:11

## 2022-11-14 RX ADMIN — OXYCODONE 5 MG: 5 TABLET ORAL at 06:11

## 2022-11-14 RX ADMIN — SENNOSIDES AND DOCUSATE SODIUM 1 TABLET: 50; 8.6 TABLET ORAL at 09:11

## 2022-11-14 RX ADMIN — METHADONE HYDROCHLORIDE 90 MG: 10 TABLET ORAL at 03:11

## 2022-11-14 RX ADMIN — CETIRIZINE HYDROCHLORIDE 5 MG: 5 TABLET, FILM COATED ORAL at 09:11

## 2022-11-14 RX ADMIN — SILODOSIN 4 MG: 4 CAPSULE ORAL at 09:11

## 2022-11-14 RX ADMIN — PREGABALIN 200 MG: 150 CAPSULE ORAL at 09:11

## 2022-11-14 RX ADMIN — HEPARIN SODIUM 5000 UNITS: 5000 INJECTION INTRAVENOUS; SUBCUTANEOUS at 09:11

## 2022-11-14 RX ADMIN — HEPARIN SODIUM 5000 UNITS: 5000 INJECTION INTRAVENOUS; SUBCUTANEOUS at 02:11

## 2022-11-14 RX ADMIN — HEPARIN SODIUM 5000 UNITS: 5000 INJECTION INTRAVENOUS; SUBCUTANEOUS at 06:11

## 2022-11-14 RX ADMIN — POLYETHYLENE GLYCOL 3350 17 G: 17 POWDER, FOR SOLUTION ORAL at 09:11

## 2022-11-14 RX ADMIN — Medication 1 PATCH: at 09:11

## 2022-11-14 NOTE — PLAN OF CARE
Problem: Adult Inpatient Plan of Care  Goal: Plan of Care Review  Outcome: Ongoing, Progressing  Goal: Patient-Specific Goal (Individualized)  Description: Admit Date: 10/25/2022    Osteomyelitis of cervical spine    Past Medical History:  No date: CHF (congestive heart failure)  No date: Essential (primary) hypertension  No date: Opioid dependence on maintenance agonist therapy, no symptoms  No date: Smoking    Past Surgical History:  No date: APPENDECTOMY  No date:  SECTION      Comment:  x2  2022: FUSION OF CERVICAL SPINE BY POSTERIOR APPROACH; N/A      Comment:  Procedure: FUSION, SPINE, CERVICAL, POSTERIOR APPROACH                C2-T2 posterior decompression/fusion; Depuy,                neuromonitoring Marrero, gell rolls;  Surgeon: West Merino DO;  [facility];  Service:                Neurosurgery;  Laterality: N/A;  No date: HYSTERECTOMY  10/28/2022: SURGICAL REMOVAL OF VERTEBRAL BODY OF CERVICAL SPINE; N/A      Comment:  Procedure: CORPECTOMY, SPINE, CERVICAL;  Surgeon:                West Merino DO;  Location: [facility] OR [facility];                 Service: Neurosurgery;  Laterality: N/A;  anterior C5-6                corpectomy, globus, caspar head prado and tongs,                omnipaque, 15lbs weights, microscrope, round cutting lynsey               and M8, ENT assistance for exposure    Individualization:   1.     Restraints:   Restraint Order  Length of Order: Order good for next 24 hours or when removed.  Date that the current order will : 22  Time that the current order will : 010  Order Upon Application: Yes          Outcome: Ongoing, Progressing  Goal: Absence of Hospital-Acquired Illness or Injury  Outcome: Ongoing, Progressing  Goal: Optimal Comfort and Wellbeing  Outcome: Ongoing, Progressing  Goal: Readiness for Transition of Care  Outcome: Ongoing, Progressing     Problem: Fall Injury Risk  Goal: Absence of Fall and  Fall-Related Injury  Outcome: Ongoing, Progressing     Problem: Skin Injury Risk Increased  Goal: Skin Health and Integrity  Outcome: Ongoing, Progressing     Problem: Pain Acute  Goal: Acceptable Pain Control and Functional Ability  Outcome: Ongoing, Progressing     Problem: Autonomic Dysreflexia (Spinal Cord Injury)  Goal: Effective Autonomic Dysreflexia Prevention  Outcome: Ongoing, Progressing     Problem: Bowel Elimination Impaired (Spinal Cord Injury)  Goal: Effective Bowel Elimination  Outcome: Ongoing, Progressing     Problem: Extended Neurologic Impairment (Spinal Cord Injury)  Goal: Absence of Extended Neurologic Injury  Outcome: Ongoing, Progressing     Problem: Functional Ability Impaired (Spinal Cord Injury)  Goal: Optimal Functional Ability  Outcome: Ongoing, Progressing     Problem: Neurogenic Shock (Spinal Cord Injury)  Goal: Effective Tissue Perfusion  Outcome: Ongoing, Progressing     Problem: Nutrition Impaired (Spinal Cord Injury)  Goal: Optimal Nutrition Intake  Outcome: Ongoing, Progressing     Problem: Infection  Goal: Absence of Infection Signs and Symptoms  Outcome: Ongoing, Progressing     Problem: Communication Impairment (Artificial Airway)  Goal: Effective Communication  Outcome: Ongoing, Progressing     Problem: Device-Related Complication Risk (Artificial Airway)  Goal: Optimal Device Function  Outcome: Ongoing, Progressing     Problem: Skin and Tissue Injury (Artificial Airway)  Goal: Absence of Device-Related Skin or Tissue Injury  Outcome: Ongoing, Progressing     Problem: Noninvasive Ventilation Acute  Goal: Effective Unassisted Ventilation and Oxygenation  Outcome: Ongoing, Progressing     Problem: Adjustment to Illness (Delirium)  Goal: Optimal Coping  Outcome: Ongoing, Progressing     Problem: Sleep Disturbance (Delirium)  Goal: Improved Sleep  Outcome: Ongoing, Progressing

## 2022-11-14 NOTE — SUBJECTIVE & OBJECTIVE
Interval History: NAEON. No fevers overnight. Neuro stable. BLE US negative. Incisions clean/dry/intact.    Medications:  Continuous Infusions:  Scheduled Meds:   cetirizine  5 mg Per NG tube Daily    famotidine  20 mg Per NG tube BID    heparin (porcine)  5,000 Units Subcutaneous Q8H    methadone  90 mg Per NG tube Daily    nicotine  1 patch Transdermal Daily    oxacillin 12 g in  mL CONTINUOUS INFUSION  12 g Intravenous Q24H    oxyCODONE  5 mg Per NG tube Q6H    polyethylene glycol  17 g Per NG tube Daily    pregabalin  200 mg Per NG tube TID    scopolamine  1 patch Transdermal Q3 Days    senna-docusate 8.6-50 mg  1 tablet Per NG tube BID    silodosin  4 mg Per NG tube Daily     PRN Meds:acetaminophen, albuterol-ipratropium, benzocaine, bisacodyL, dextrose 10%, dextrose 10%, diazePAM, fentaNYL, glucagon (human recombinant), hydrALAZINE, hydrOXYzine HCL, magnesium oxide, magnesium oxide, ondansetron, potassium bicarbonate, potassium bicarbonate, potassium bicarbonate, potassium, sodium phosphates, potassium, sodium phosphates, potassium, sodium phosphates, racepinephrine, simethicone, sodium chloride 0.9%     Review of Systems  Objective:     Weight:  (daylight savings)  Body mass index is 22.6 kg/m².  Vital Signs (Most Recent):  Temp: 98.6 °F (37 °C) (11/14/22 0615)  Pulse: (!) 55 (11/14/22 0833)  Resp: 13 (11/14/22 0800)  BP: 112/74 (11/14/22 0800)  SpO2: 96 % (11/14/22 0800)   Vital Signs (24h Range):  Temp:  [98.4 °F (36.9 °C)-100.5 °F (38.1 °C)] 98.6 °F (37 °C)  Pulse:  [53-81] 55  Resp:  [11-18] 13  SpO2:  [90 %-98 %] 96 %  BP: ()/(50-83) 112/74                Oxygen Concentration (%):  [28] 28         NG/OG Tube 11/06/22 Right nostril (Active)   Placement Check placement verified by aspirate characteristics;placement verified by x-ray 11/13/22 1105   Tolerance no signs/symptoms of discomfort 11/12/22 0800   Securement secured to nostril center 11/12/22 0800   Clamp Status/Tolerance unclamped  "11/12/22 0800   Suction Setting/Drainage Method low;intermittent setting;suction at 11/07/22 1501   Insertion Site Appearance no redness, warmth, tenderness, skin breakdown, drainage 11/10/22 0702   Drainage Green 11/07/22 1501   Flush/Irrigation flushed w/;water;no resistance met 11/12/22 0800   Feeding Type continuous 11/12/22 0800   Feeding Action feeding continued 11/12/22 0800   Current Rate (mL/hr) 50 mL/hr 11/13/22 1939   Goal Rate (mL/hr) 50 mL/hr 11/13/22 1939   Intake (mL) 150 mL 11/10/22 2000   Water Bolus (mL) 150 mL 11/13/22 1939   Formula Name diabetasource 11/13/22 1939   Intake (mL) - Formula Tube Feeding 550 11/13/22 1648   Residual Amount (ml) 30 ml 11/12/22 2000            Urethral Catheter 11/10/22 1335 Latex 16 Fr. (Active)   Site Assessment Clean;Intact 11/13/22 1939   Collection Container Standard drainage bag 11/12/22 2000   Securement Method secured to top of thigh w/ adhesive device 11/13/22 1939   Catheter Care Performed yes 11/12/22 2000   Reason for Continuing Urinary Catheterization Urinary retention 11/13/22 1939   CAUTI Prevention Bundle Securement Device in place with 1" slack;Intact seal between catheter & drainage tubing;Drainage bag/urimeter off the floor;Sheeting clip in use;No dependent loops or kinks;Drainage bag/urimeter not overfilled (<2/3 full);Drainage bag/urimeter below bladder 11/13/22 0756   Output (mL) 650 mL 11/13/22 1856       Neurosurgery Physical Exam  General: Well developed, well nourished, not in acute distress.   Head: Normocephalic, atraumatic.  Neck: Trach in place.  Neurologic: Alert and oriented x 4. Thought content appropriate.  GCS: Motor:6  Verbal:5  Eyes:4   GCS Total: 15  Language: No aphasia.  Speech: No dysarthria.  Cranial nerves: Face symmetric, tongue midline, CN II-XII grossly intact.   Eyes: Pupils equal, round, reactive to light with accomodation, EOMI.  Pulmonary: Normal respirations, no signs of respiratory distress.  Abdomen: Soft, " non-distended, non-tender to palpation.  Sensory: Intact to light touch throughout.  Motor Strength: Moves all extremities spontaneously with good tone. BUE full strength, BLE significantly pain limited and hypersensitivity but at least AG. No abnormal movements seen.   Skin: Skin is warm, dry and intact.  Posterior incision clean/dry/intact with prineo.   Anterior incision with limited visualization under trach collar but clean/dry/intact with dermabond.    Significant Labs:  Recent Labs   Lab 11/14/22  0303         K 4.0      CO2 24   BUN 14   CREATININE 0.8   CALCIUM 8.5*   MG 2.0     Recent Labs   Lab 11/14/22  0302   WBC 8.50   HGB 7.4*   HCT 24.7*        No results for input(s): LABPT, INR, APTT in the last 48 hours.  Microbiology Results (last 7 days)       Procedure Component Value Units Date/Time    Blood culture [[id number]]     Order Status: Canceled Specimen: Blood     Blood culture [[id number]]     Order Status: Canceled Specimen: Blood           All pertinent labs from the last 24 hours have been reviewed.    Significant Diagnostics:  I have reviewed and interpreted all pertinent imaging results/findings within the past 24 hours.

## 2022-11-14 NOTE — CONSULTS
D/L PICC placed in right basilic vein, 36cm in length with 0cm exposed. Arm circumference 32cm. Lot# [id number]

## 2022-11-14 NOTE — NURSING
0051 secure chat  regarding pt low BP trends.  See charts Manual /58. Pt is asleep,yet easily aroused ..no signs of acute distress. [facility]      0323: secure chat dr. Padilla pt BP is 98/65. Pt is asleep,yet easily aroused ..no signs of acute distress. [facility] all other vitals are stable see charts    0517 patient hemodynamically stable. See charts for most recent vitals, no signs of acute distress. Patient is alert and awake. [facility]      0616 patient asleep, no signs of acute distress.VS stable.

## 2022-11-14 NOTE — SUBJECTIVE & OBJECTIVE
Interval History: daily fevers noted from 11/11, tmax 100.6, last fever 11/13 at 1600. No leukocytosis. Blood cultures ordered on 11/14, pending. Remains with trach. Remains on oxacillin.       Review of Systems   Constitutional:  Negative for chills, diaphoresis, fatigue and fever.   HENT:          With trach. Reports increase oral secretions.    Respiratory:  Positive for cough. Negative for shortness of breath.    Cardiovascular:  Negative for chest pain, palpitations and leg swelling.   Gastrointestinal:  Negative for diarrhea, nausea and vomiting.   Genitourinary:  Negative for difficulty urinating and dysuria.   Musculoskeletal:  Positive for myalgias. Negative for arthralgias and back pain.        Right leg pain    Skin:  Negative for color change, rash and wound.   Neurological:  Positive for speech difficulty (trach with o2 collar. communicates with white board and yes/no answers). Negative for dizziness, weakness and numbness.   Psychiatric/Behavioral:  Negative for agitation and confusion.    Objective:     Vital Signs (Most Recent):  Temp: 98.6 °F (37 °C) (11/14/22 0615)  Pulse: 70 (11/14/22 0920)  Resp: 16 (11/14/22 0920)  BP: 108/71 (11/14/22 0920)  SpO2: 96 % (11/14/22 0920) Vital Signs (24h Range):  Temp:  [98.4 °F (36.9 °C)-100.5 °F (38.1 °C)] 98.6 °F (37 °C)  Pulse:  [53-81] 70  Resp:  [11-18] 16  SpO2:  [90 %-98 %] 96 %  BP: ()/(50-83) 108/71     Weight:  (daylight savings)  Body mass index is 22.6 kg/m².    Estimated Creatinine Clearance: 77 mL/min (based on SCr of 0.8 mg/dL).    Physical Exam  Vitals and nursing note reviewed.   Constitutional:       General: She is not in acute distress.     Appearance: Normal appearance. She is normal weight. She is not ill-appearing, toxic-appearing or diaphoretic.   HENT:      Head: Normocephalic and atraumatic.      Nose: No congestion.      Mouth/Throat:      Pharynx: Oropharyngeal exudate present.      Comments: Poor dentition    Oral and tracheal  secretions noted   Eyes:      General:         Right eye: No discharge.         Left eye: No discharge.   Neck:      Comments: With trach with o2 collar   Cardiovascular:      Rate and Rhythm: Normal rate and regular rhythm.      Heart sounds: Normal heart sounds. No murmur heard.  Pulmonary:      Effort: Pulmonary effort is normal. No respiratory distress.      Comments: Wet cough noted   Abdominal:      General: Bowel sounds are normal. There is no distension.      Palpations: Abdomen is soft.      Tenderness: There is no abdominal tenderness. There is no guarding.   Musculoskeletal:         General: Tenderness (right leg) present.      Right lower leg: No edema.      Left lower leg: No edema.   Skin:     General: Skin is warm and dry.      Coloration: Skin is not jaundiced.      Findings: No bruising.   Neurological:      General: No focal deficit present.      Mental Status: She is alert and oriented to person, place, and time. Mental status is at baseline.      Motor: No weakness.      Gait: Gait normal.   Psychiatric:         Mood and Affect: Mood normal.         Behavior: Behavior normal.         Thought Content: Thought content normal.         Judgment: Judgment normal.       Significant Labs:   Microbiology Results (last 7 days)       Procedure Component Value Units Date/Time    Blood culture [[id number]] Collected: 11/14/22 0940    Order Status: Sent Specimen: Blood from Peripheral, Right Hand Updated: 11/14/22 0952    Blood culture [[id number]] Collected: 11/14/22 0939    Order Status: Sent Specimen: Blood from Peripheral, Left Hand Updated: 11/14/22 0952    Blood culture [[id number]]     Order Status: Canceled Specimen: Blood     Blood culture [[id number]]     Order Status: Canceled Specimen: Blood             Significant Imaging: I have reviewed all pertinent imaging results/findings within the past 24 hours.

## 2022-11-14 NOTE — RESPIRATORY THERAPY
RAPID RESPONSE RESPIRATORY THERAPY PROACTIVE NOTE           Time of visit: 920     Code Status: Full Code   : 1970  Bed: 952/952 A:   MRN: [id number]  Time spent at the bedside: < 15 min    SITUATION    Evaluated patient for: LDA Check     BACKGROUND    Patient has a past medical history of CHF (congestive heart failure), Essential (primary) hypertension, Opioid dependence on maintenance agonist therapy, no symptoms, and Smoking.  Clinically Significant Surgical Hx: tracheostomy    24 Hours Vitals Range:  Temp:  [98.4 °F (36.9 °C)-100.5 °F (38.1 °C)]   Pulse:  [53-81]   Resp:  [11-18]   BP: ()/(50-83)   SpO2:  [90 %-98 %]     Labs:    Recent Labs     22  0522 22  0303    140   K 3.9 4.0    109   CO2 28 24   CREATININE 0.7 0.8   GLU 90 101   PHOS 3.8 4.2   MG 2.0 2.0        No results for input(s): PH, PCO2, PO2, HCO3, POCSATURATED, BE in the last 72 hours.    ASSESSMENT/INTERVENTIONS  Patient resting comfortably. No respiratory concerns at this time      Last VS   Temp: 98.6 °F (37 °C) (615)  Pulse: 70 (920)  Resp: 16 (920)  BP: 108/71 (920)  SpO2: 96 % (920)      Extra trachs at bedside: 4.0 cuffed Shiley, 6.0 cuffed Shiley  Level of Consciousness: Level of Consciousness (AVPU): alert  Respiratory Effort: Respiratory Effort: Normal, Unlabored Expansion/Accessory Muscle Usage: Expansion/Accessory Muscles/Retractions: no retractions, expansion symmetric, no use of accessory muscles  All Lung Field Breath Sounds: All Lung Fields Breath Sounds: Anterior:, Lateral:, coarse  NANETTE Breath Sounds: equal bilaterally  LLL Breath Sounds: coarse  RLL Breath Sounds: coarse  O2 Device/Concentration: 5L/28%  Surgical airway: Yes, Type: Shiley Size: 6, cuffed  Ambu at bedside: Ambu bag with the patient?: Yes, Adult Ambu     Active Orders   Respiratory Care    Inhalation Treatment Q4H PRN     Frequency: Q4H PRN     Number of Occurrences: Until Specified    Oxygen  Continuous     Frequency: Continuous     Number of Occurrences: Until Specified     Order Questions:      Device type: Low flow      Device: Trach Collar      FiO2%: 28      Titrate O2 per Oxygen Titration Protocol: Yes      To maintain SpO2 goal of: >= 90%      Notify MD of: Inability to achieve desired SpO2; Sudden change in patient status and requires 20% increase in FiO2; Patient requires >60% FiO2    POCT ARTERIAL BLOOD GAS Blood Gas     Frequency: PRN     Number of Occurrences: Until Specified     Order Comments: Notify Physician if: see parameters below.       Order Questions:      Component: Blood Gas    Trach care every 12 hours and as needed     Frequency: BID     Number of Occurrences: Until Specified    Trach: Assess suction need every 2 hours and as needed     Frequency: BID     Number of Occurrences: Until Specified       RECOMMENDATIONS    We recommend: RRT Recs: Continue POC per primary team.      FOLLOW-UP    Please call back the Rapid Response RT, Ashley Nunez RRT at x [phone] for any questions or concerns.

## 2022-11-14 NOTE — PT/OT/SLP PROGRESS
Occupational Therapy   Treatment    Name: Peter Stiles  MRN: [id number]  Admitting Diagnosis:  Osteomyelitis of cervical spine  7 Days Post-Op    Recommendations:     Discharge Recommendations: rehabilitation facility  Discharge Equipment Recommendations:  wheelchair, lift device, hospital bed  Barriers to discharge:  Decreased caregiver support    Assessment:     Pteer Stiles is a 51 y.o. female with a medical diagnosis of Osteomyelitis of cervical spine.  She presents with difficulty managing secretions she suctioned mouth more than 5 times. Performance deficits affecting function are weakness, gait instability, impaired endurance, impaired balance, impaired cardiopulmonary response to activity, decreased safety awareness, impaired sensation, impaired self care skills, impaired cognition, pain, impaired functional mobility. Patient had periods of confusion asking for water, attempting to suction with ink pen, unable to locate suction tubing many times when on her lap. She reports increased pain to touch to LLE, pressure relief boots not on, noted increased in plantar flexed position in LLE. Her friend in room says he stopped sensory stimulation secondary to increased pain in LLE.  She was able to speak for short phrase initially in session then returned to writing her communications. Patient repositioned with bed in chair position to increase breathing with decreased secretions, educated friend in room, pressure boots applied with focus on maintaining 90 degree ankle - initially c/o of great pain she stated is it increased hypersensitivity, pain decreased within a minute.  Education on need for skin protection routine, waffle mattress supplied by PT in closet to be applied by nursing. Patient's vitals remained stable through out session.     Rehab Prognosis:  Good; patient would benefit from acute skilled OT services to address these deficits and reach maximum level of function.       Plan:     Patient  to be seen 4 x/week to address the above listed problems via self-care/home management, therapeutic activities, therapeutic exercises, neuromuscular re-education  Plan of Care Expires: 12/09/22  Plan of Care Reviewed with: patient    Subjective     Pain/Comfort:  Pain Rating 1: 10/10  Location - Side 1: Left  Location 1: leg  Pain Addressed 1: Reposition  Pain Rating Post-Intervention 1: 5/10    Objective:     Communicated with: RN prior to session.  Patient found HOB elevated with bed alarm, pressure relief boots, peripheral IV, NG tube, cervical collar, oxygen upon OT entry to room.    General Precautions: Standard, aspiration, fall, NPO   Orthopedic Precautions:spinal precautions   Braces: Cervical collar  Respiratory Status: Nasal cannula, flow 5 L/min     Occupational Performance:       [facility] 6 Click ADL: 8    Treatment & Education:  See above    Patient left with bed in chair position with all lines intact, call button in reach, bed alarm on, RN notified, and friend present    GOALS:   Multidisciplinary Problems       Occupational Therapy Goals          Problem: Occupational Therapy    Goal Priority Disciplines Outcome Interventions   Occupational Therapy Goal     OT, PT/OT Ongoing, Progressing    Description: Goals to be met by: 12/09    Patient will increase functional independence with ADLs by performing:      UE Dressing with Contact Guard Assistance.  Grooming while EOB with Contact Guard Assistance  Patient will complete sit to stand with RW min (A).  Stand pivot to chair with mod (A)                         Time Tracking:     OT Date of Treatment: 11/14/22  OT Start Time: 1306  OT Stop Time: 1332  OT Total Time (min): 26 min    Billable Minutes:Self Care/Home Management 13  Therapeutic Activity 13    OT/BE: OT     EB Visit Number: 0    11/14/2022

## 2022-11-14 NOTE — PROCEDURES
"Peter Stiles is a 51 y.o. female patient.    Temp: 98.9 °F (37.2 °C) (11/14/22 1137)  Pulse: 86 (11/14/22 1528)  Resp: 20 (11/14/22 1516)  BP: 102/67 (11/14/22 1436)  SpO2: 96 % (11/14/22 1436)  Weight:  (daylight savings) (11/06/22 0115)  Height: 5' 6" (167.6 cm) (11/06/22 1518)    PICC  Date/Time: 11/14/2022 3:47 PM  Location procedure was performed: [facility] PICC LINE PLACEMENT  Performed by: Ronald Lala RN  Assisting provider: King Fernandez LPN  Consent Done: Yes  Time out: Immediately prior to procedure a time out was called to verify the correct patient, procedure, equipment, support staff and site/side marked as required  Indications: med administration and vascular access  Anesthesia: local infiltration  Local anesthetic: lidocaine 1% without epinephrine  Anesthetic Total (mL): 3  Preparation: skin prepped with ChloraPrep  Skin prep agent dried: skin prep agent completely dried prior to procedure  Sterile barriers: all five maximum sterile barriers used - cap, mask, sterile gown, sterile gloves, and large sterile sheet  Hand hygiene: hand hygiene performed prior to central venous catheter insertion  Location details: right basilic  Catheter type: double lumen  Catheter size: 5 Fr  Catheter Length: 36cm    Ultrasound guidance: yes  Vessel Caliber: medium and patent, compressibility normal  Vascular Doppler: not done  Needle advanced into vessel with real time Ultrasound guidance.  Guidewire confirmed in vessel.  Image recorded and saved.  Sterile sheath used.  no esophageal manometryNumber of attempts: 1  Post-procedure: blood return through all ports, chlorhexidine patch and sterile dressing applied  Technical procedures used: 3CG  Specimens: No  Implants: No  Assessment: placement verified by x-ray  Complications: none        Name King Fernandez LPN   11/14/2022    "

## 2022-11-14 NOTE — PT/OT/SLP PROGRESS
Physical Therapy      Patient Name:  Peter Stiles   MRN:  [id number]    Patient not seen today secondary to Pain. Pt declining participation in therapy session this date 2* increased pain in LLE. Encouragement provided; however, pt reporting she is unable to tolerate ROM or light touch to LLE at this time. Assist provided for re-positioning with bed in chair position and donning B pressure relief boots with B ankle in neutral position. Edu provided on the importance of re-positioning for pressure relief and elevating HOB for improved respiratory status and management of secretions. Pt v/u. Waffle mattress also obtained to assist with pressure relief. RN notified of pt request for deep suction. Will follow-up at next scheduled session as able.    11/14/2022
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
## 2022-11-14 NOTE — PLAN OF CARE
[facility] - Neurosurgery (Hospital)  Discharge Reassessment    Primary Care Provider: Og Victoria NP    Expected Discharge Date: 11/14/2022    Patient is not medically ready for discharge.  Referrals out.    Reassessment (most recent)       Discharge Reassessment - 11/14/22 1548          Discharge Reassessment    Assessment Type Discharge Planning Reassessment     Did the patient's condition or plan change since previous assessment? No     Communicated YESSI with patient/caregiver Date not available/Unable to determine     Discharge Plan A Long-term acute care facility (LTAC)     Discharge Plan B Home with family;Home Health     DME Needed Upon Discharge  other (see comments)   tbd    Discharge Barriers Identified None     Why the patient remains in the hospital Requires continued medical care        Post-Acute Status    Post-Acute Authorization Placement     Post-Acute Placement Status Pending post-acute provider review/more information requested     Discharge Delays None known at this time

## 2022-11-14 NOTE — PROGRESS NOTES
[facility] - Neurosurgery ([facility])  Infectious Disease  Progress Note    Patient Name: Peter Stiles  MRN: [id number]  Admission Date: 10/25/2022  Length of Stay: 20 days  Attending Physician: Alejandro Recio MD  Primary Care Provider: Og Victoria NP    Isolation Status: No active isolations  Assessment/Plan:      * Osteomyelitis of cervical spine  See AP below     MSSA bacteremia  See AP above    Epidural abscess   52 yo F with PMHx of HTN, dilated cardiomyopathy and remote opioid dependence (on methadone as an outpatient) presented on 10/25 with neck and back pain and lower body numbness and was found to have MSSA bacteremia due to C5-6 osteomyelitis, discitis and epidural collection. Suspects infection could have started at site of cat scratch on her back or burn wounds on b/l arms. TTE negative and repeat blcx ngtd. Now s/p corpectomy/ fusion and epidural abscess evacuation on 10/28 and s/p C2-T2 decompression/fusion with NSGY on 11/2. Unable to undergo LISHA due to c-collar (per previous discussion noted on chart review).     11/14/22- reconsulted per primary team d/t intermittent daily fevers starting on 11/11. tmax 100.6. last fever on 11/13 at 1530. Blood cultures ordered for 11/14 pending. Last blood cultures negative from 10/29/22. Pt with trach d/t multiple failed extubations. With moderate amt of tracheal and oral secretions, appearing yellow/thick during assessment. Reports from nursing that they were green in nature over night. Last abdominal xray on 11/12 with limited view of chest but noting small pleural effusion on the left > right. Pt c/o left leg pain, not TTP. Unable to describe pain. No wounds/rash appreciated. Lower extremities vein ultrasound negative bilaterally. Pt without leukocytosis. Remains on oxacillin for previous MSSA bacteremia and epidural abscess.     Recommendations:  -Continue oxacillin for MSSA bacteremia/epidural abscess. Original EOC 12/9.   - Will follow blood culture  data   - Rec repeat chest xray   - Repeat inflammatory markers- sed rate/crp   - Consider ghosh stain/urine eosinophil   - Plan reviewed with ID staff, Dr. Walker. ID will follow.                         Thank you for your consult. I will follow-up with patient. Please contact us if you have any additional questions.    Francesca Barreto NP  Infectious Disease  [facility] - Neurosurgery ([facility])    Subjective:     Principal Problem:Osteomyelitis of cervical spine    HPI: Ms. Stiles is a 52 yo F with PMHx of HTN, dilated cardiomyopathy and remote opioid dependence who presented on 10/25 with neck and back pain and lower body numbness and was found to have C5-6 osteomyelitis, discitis and epidural collection.    She first noted the symptoms 2 weeks ago when she was in the car with her daughter. Her daughter slammed on the breaks and the patient noted a soreness in her neck. Since then, she has had increasing neck pain. This morning she noted numbess to her stomach and below and pain in her legs. She also endorses tingling to her fingers. WBC elevated and MRI spine revealed C5-6 osteomyelitis, discitis and epidural collection. She denies IVDU.     Of note, pt reports wounds to her arms after burning herself and her cat with hot grease. She also notes a knot to her R upper back at the site where her cat scratched her. Denies prior back surgery .          Interval History: daily fevers noted from 11/11, tmax 100.6, last fever 11/13 at 1600. No leukocytosis. Blood cultures ordered on 11/14, pending. Remains with trach. Remains on oxacillin.       Review of Systems   Constitutional:  Negative for chills, diaphoresis, fatigue and fever.   HENT:          With trach. Reports increase oral secretions.    Respiratory:  Positive for cough. Negative for shortness of breath.    Cardiovascular:  Negative for chest pain, palpitations and leg swelling.   Gastrointestinal:  Negative for diarrhea, nausea and vomiting.    Genitourinary:  Negative for difficulty urinating and dysuria.   Musculoskeletal:  Positive for myalgias. Negative for arthralgias and back pain.        Right leg pain    Skin:  Negative for color change, rash and wound.   Neurological:  Positive for speech difficulty (trach with o2 collar. communicates with white board and yes/no answers). Negative for dizziness, weakness and numbness.   Psychiatric/Behavioral:  Negative for agitation and confusion.    Objective:     Vital Signs (Most Recent):  Temp: 98.6 °F (37 °C) (11/14/22 0615)  Pulse: 70 (11/14/22 0920)  Resp: 16 (11/14/22 0920)  BP: 108/71 (11/14/22 0920)  SpO2: 96 % (11/14/22 0920) Vital Signs (24h Range):  Temp:  [98.4 °F (36.9 °C)-100.5 °F (38.1 °C)] 98.6 °F (37 °C)  Pulse:  [53-81] 70  Resp:  [11-18] 16  SpO2:  [90 %-98 %] 96 %  BP: ()/(50-83) 108/71     Weight:  (daylight savings)  Body mass index is 22.6 kg/m².    Estimated Creatinine Clearance: 77 mL/min (based on SCr of 0.8 mg/dL).    Physical Exam  Vitals and nursing note reviewed.   Constitutional:       General: She is not in acute distress.     Appearance: Normal appearance. She is normal weight. She is not ill-appearing, toxic-appearing or diaphoretic.   HENT:      Head: Normocephalic and atraumatic.      Nose: No congestion.      Mouth/Throat:      Pharynx: Oropharyngeal exudate present.      Comments: Poor dentition    Oral and tracheal secretions noted   Eyes:      General:         Right eye: No discharge.         Left eye: No discharge.   Neck:      Comments: With trach with o2 collar   Cardiovascular:      Rate and Rhythm: Normal rate and regular rhythm.      Heart sounds: Normal heart sounds. No murmur heard.  Pulmonary:      Effort: Pulmonary effort is normal. No respiratory distress.      Comments: Wet cough noted   Abdominal:      General: Bowel sounds are normal. There is no distension.      Palpations: Abdomen is soft.      Tenderness: There is no abdominal tenderness. There is  no guarding.   Musculoskeletal:         General: Tenderness (right leg) present.      Right lower leg: No edema.      Left lower leg: No edema.   Skin:     General: Skin is warm and dry.      Coloration: Skin is not jaundiced.      Findings: No bruising.   Neurological:      General: No focal deficit present.      Mental Status: She is alert and oriented to person, place, and time. Mental status is at baseline.      Motor: No weakness.      Gait: Gait normal.   Psychiatric:         Mood and Affect: Mood normal.         Behavior: Behavior normal.         Thought Content: Thought content normal.         Judgment: Judgment normal.       Significant Labs:   Microbiology Results (last 7 days)       Procedure Component Value Units Date/Time    Blood culture [[id number]] Collected: 11/14/22 0940    Order Status: Sent Specimen: Blood from Peripheral, Right Hand Updated: 11/14/22 0952    Blood culture [[id number]] Collected: 11/14/22 0939    Order Status: Sent Specimen: Blood from Peripheral, Left Hand Updated: 11/14/22 0952    Blood culture [[id number]]     Order Status: Canceled Specimen: Blood     Blood culture [[id number]]     Order Status: Canceled Specimen: Blood             Significant Imaging: I have reviewed all pertinent imaging results/findings within the past 24 hours.

## 2022-11-14 NOTE — ASSESSMENT & PLAN NOTE
50 yo F with PMHx of HTN, dilated cardiomyopathy and remote opioid dependence (on methadone as an outpatient) presented on 10/25 with neck and back pain and lower body numbness and was found to have MSSA bacteremia due to C5-6 osteomyelitis, discitis and epidural collection. Suspects infection could have started at site of cat scratch on her back or burn wounds on b/l arms. TTE negative and repeat blcx ngtd. Now s/p corpectomy/ fusion and epidural abscess evacuation on 10/28 and s/p C2-T2 decompression/fusion with NSGY on 11/2. Unable to undergo LISHA due to c-collar (per previous discussion noted on chart review).     11/14/22- reconsulted per primary team d/t intermittent daily fevers starting on 11/11. tmax 100.6. last fever on 11/13 at 1530. Blood cultures ordered for 11/14 pending. Last blood cultures negative from 10/29/22. Pt with trach d/t multiple failed extubations. With moderate amt of tracheal and oral secretions, appearing yellow/thick during assessment. Reports from nursing that they were green in nature over night. Last abdominal xray on 11/12 with limited view of chest but noting small pleural effusion on the left > right. Pt c/o left leg pain, not TTP. Unable to describe pain. No wounds/rash appreciated. Lower extremities vein ultrasound negative bilaterally. Pt without leukocytosis. Remains on oxacillin for previous MSSA bacteremia and epidural abscess.     Recommendations:  -Continue oxacillin for MSSA bacteremia/epidural abscess. Original EOC 12/9.   - Will follow blood culture data   - Rec repeat chest xray   - Repeat inflammatory markers- sed rate/crp   - Consider ghosh stain/urine eosinophil   - Plan reviewed with ID staff, Dr. Walker. ID will follow.

## 2022-11-14 NOTE — ASSESSMENT & PLAN NOTE
Peter Stiles is a 51 y.o. female with PMHx of HTN, dilated cardiomyopathy, h/o opioid dependence, cigarette smoker (2pks/day), and daily baby ASA use who presents with 1 day of decreased sensation from T5 level down and tingling in her bilateral fingers and bilateral toes. MRI imaging obtained in the ED showing possible C5-6 osteodiscitis/myelitis with cord compression. Patient tachycardic and hypertensive on arrival, afebrile, with leukocytosis.    Now s/p C5-6 corpectomy on 10/28/22; C2-T2 PCDF on 11/2.    - Stepped down to medicine service.  - Q4hr neurochecks.  - All pertinent imaging/labs personally reviewed and interpreted.   - Surgical drains removed with no issues.  - Requiring trach on 11/7 after failed extubation multiple times.  - Holding off on gtube placement to see if progressing with speech therapy. Pending re-eval.  - ID:   --Febrile 11/12 and 11/13 w/o leukocytosis. BLE tender to palpation. F/u BLE US -> negative. Rest of fever workup per primary. Encourage IS hourly. No fevers overnight.   --BCx 10/25 w/ MSSA, Repeat BCx's 10/27 NGTD   --OR cultures 10/28 w/ MSSA   --ID consulted, rec for IV Oxacillin.  - Continue c-collar when out of bed.  - Pain control with multimodal regimen.  - Okay for SQ.  - Please call NSGY with any questions/concerns    Dispo: per primary

## 2022-11-14 NOTE — PLAN OF CARE
Recommendations     Continue TF regimen of Diabetisource @ 50 mL/hr - provides 1440 kcals, 72 g of protein, 982 mL fluid.  RD to monitor and follow-up.     Goals: Meet % EEN, EPN by RD f/u date  Nutrition Goal Status: goal met  Communication of RD Recs:  (POC)    Mayi Todd PL-LDN

## 2022-11-14 NOTE — SUBJECTIVE & OBJECTIVE
Interval History:  Patient did not spike a fever last night. Repeat blood cx 10/14 NGTD.  Repeat CXR 11/15  With Patchy infiltrates and pleural effusion and developed since yesterday. ID following. Consulted pulm    Review of Systems  Objective:     Vital Signs (Most Recent):  Temp: 98.9 °F (37.2 °C) (11/14/22 1137)  Pulse: 70 (11/14/22 0920)  Resp: 20 (11/14/22 1216)  BP: 108/71 (11/14/22 0920)  SpO2: 96 % (11/14/22 0920) Vital Signs (24h Range):  Temp:  [98.4 °F (36.9 °C)-100.5 °F (38.1 °C)] 98.9 °F (37.2 °C)  Pulse:  [53-81] 70  Resp:  [13-20] 20  SpO2:  [90 %-98 %] 96 %  BP: ()/(50-83) 108/71     Weight:  (daylight savings)  Body mass index is 22.6 kg/m².    Intake/Output Summary (Last 24 hours) at 11/14/2022 1343  Last data filed at 11/14/2022 0750  Gross per 24 hour   Intake 2353.14 ml   Output 1050 ml   Net 1303.14 ml      Physical Exam  Vitals and nursing note reviewed.   HENT:      Head: Normocephalic.      Nose: Nose normal.      Mouth/Throat:      Mouth: Mucous membranes are moist.      Pharynx: Oropharynx is clear.   Cardiovascular:      Rate and Rhythm: Normal rate and regular rhythm.      Pulses: Normal pulses.      Heart sounds: Normal heart sounds.   Pulmonary:      Effort: Pulmonary effort is normal.      Breath sounds: Normal breath sounds.   Abdominal:      General: Bowel sounds are normal.      Palpations: Abdomen is soft.   Musculoskeletal:         General: Normal range of motion.   Skin:     General: Skin is warm and dry.   Neurological:      Mental Status: She is alert and oriented to person, place, and time. Mental status is at baseline.      Comments:      Psychiatric:         Mood and Affect: Mood normal.         Behavior: Behavior normal.       MELD-Na score: 6 at 11/3/2022  1:47 AM  MELD score: 6 at 11/3/2022  1:47 AM  Calculated from:  Serum Creatinine: 0.7 mg/dL (Using min of 1 mg/dL) at 11/3/2022  1:47 AM  Serum Sodium: 141 mmol/L (Using max of 137 mmol/L) at 11/3/2022  1:47  AM  Total Bilirubin: 0.4 mg/dL (Using min of 1 mg/dL) at 11/3/2022  1:47 AM  INR(ratio): 1.0 at 11/1/2022  9:28 PM  Age: 51 years    Significant Labs:  CBC:  Recent Labs   Lab 11/14/22  0302   WBC 8.50   HGB 7.4*   HCT 24.7*        CMP:  Recent Labs   Lab 11/14/22  0303      K 4.0      CO2 24      BUN 14   CREATININE 0.8   CALCIUM 8.5*   PROT 5.5*   ALBUMIN 1.8*   BILITOT 0.3   ALKPHOS 132   AST 31   ALT 27   ANIONGAP 7*

## 2022-11-14 NOTE — PROGRESS NOTES
"[facility] - Neurosurgery ([facility])  Adult Nutrition  Progress Note    SUMMARY       Recommendations    Continue TF regimen of Diabetisource @ 50 mL/hr - provides 1440 kcals, 72 g of protein, 982 mL fluid.  RD to monitor and follow-up.    Goals: Meet % EEN, EPN by RD f/u date  Nutrition Goal Status: goal met  Communication of RD Recs:  (POC)    Assessment and Plan    Nutrition Problem:  Inadequate energy intake     Related to (etiology):   Inability to consume sufficient energy     Signs and Symptoms (as evidenced by):   NPO     Interventions(treatment strategy):  Collaboration of nutrition care w/ other providers  EN     Nutrition Diagnosis Status:   Continues    Reason for Assessment    Reason For Assessment: RD follow-up  Diagnosis: other (see comments) (Osteomyelitis)  Relevant Medical History: HTN, Opioid dependence  Interdisciplinary Rounds: did not attend  General Information Comments: Noted pt is now on trach. Noted TF of Diabetisource @ 50 mL/hr running. Reports good appetite PTA & stable weight. Pt w/ no indicators of malnutrition. S/p corpectomy.  Nutrition Discharge Planning: Pending clinical course    Nutrition Risk Screen    Nutrition Risk Screen: tube feeding or parenteral nutrition, dysphagia or difficulty swallowing    Nutrition/Diet History    Spiritual, Cultural Beliefs, Latter-day Practices, Values that Affect Care: no  Food Allergies: NKFA  Factors Affecting Nutritional Intake: NPO, difficulty/impaired swallowing    Anthropometrics    Temp: 98.9 °F (37.2 °C)  Height Method: Stated  Height: 5' 6" (167.6 cm)  Height (inches): 66 in  Weight Method: Bed Scale  Weight:  (daylight savings)  Weight (lb): 139.99 lb  Ideal Body Weight (IBW), Female: 130 lb  % Ideal Body Weight, Female (lb): 107.68 %  BMI (Calculated): 22.6  BMI Grade: 18.5-24.9 - normal     Lab/Procedures/Meds    Pertinent Labs Reviewed: reviewed  Pertinent Labs Comments: A1C 6.1, Albumin 1.8, Ca 8.5  Pertinent Medications Reviewed: " reviewed  Pertinent Medications Comments: famotidine, heparin, senna-docusate    Estimated/Assessed Needs    Weight Used For Calorie Calculations: 63.5 kg (139 lb 15.9 oz)  Energy Calorie Requirements (kcal): 7273-9288 kcals  Energy Need Method: [place] (MSJ x 1.25-1.3 PAL)  Protein Requirements: 64-70 g (1.0-1.1 g/kg)  Weight Used For Protein Calculations: 63.5 kg (139 lb 15.9 oz)  Fluid Requirements (mL): 1 ml or fluid per MD  Estimated Fluid Requirement Method: RDA Method  RDA Method (mL): 1577  CHO Requirement: 197 g    Nutrition Prescription Ordered    Current Diet Order: NPO  Current Nutrition Support Formula Ordered: Diabetisource AC  Current Nutrition Support Rate Ordered: 50 (ml)  Current Nutrition Support Frequency Ordered: mL/hr    Evaluation of Received Nutrient/Fluid Intake    Enteral Calories (kcal): 1440  Enteral Protein (gm): 72  Enteral (Free Water) Fluid (mL): 982  % Kcal Needs: 91%  % Protein Needs: 103%  I/O: +2.0 L since 10/31  Energy Calories Required: meeting needs  Protein Required: meeting needs  Fluid Required: meeting needs  Comments: LBM: 11/8  Tolerance: tolerating  % Intake of Estimated Energy Needs: 91%  % Meal Intake: NPO    Nutrition Risk    Level of Risk/Frequency of Follow-up:  (1 time/week)     Monitor and Evaluation    Food and Nutrient Intake: enteral nutrition intake  Food and Nutrient Adminstration: enteral and parenteral nutrition administration  Physical Activity and Function: nutrition-related ADLs and IADLs  Anthropometric Measurements: weight, weight change  Biochemical Data, Medical Tests and Procedures: glucose/endocrine profile, lipid profile, inflammatory profile, gastrointestinal profile, electrolyte and renal panel  Nutrition-Focused Physical Findings: overall appearance     Nutrition Follow-Up    RD Follow-up?: Yes    Mayi Colon PL-LDN

## 2022-11-14 NOTE — PT/OT/SLP PROGRESS
"Speech Language Pathology Treatment    Patient Name:  Peter Stiles   MRN:  [id number]  Admitting Diagnosis: Osteomyelitis of cervical spine    Recommendations:                 General Recommendations:  Dysphagia therapy and One Way Speaking Valve training   Diet recommendations:  NPO, Liquid Diet Level: NPO   Aspiration Precautions: Frequent oral care and Strict aspiration precautions   General Precautions: Standard, aspiration, fall, NPO  Communication strategies:  go to room if call light pushed and Pt using writing/mouthing to communicate  Passy Autumn Speaking Valve Precautions: Only with therapy at this time, remove valve with any S/S respiratory distress, remove valve when sleeping. Please note, to clean valve:  1. Swish valve in pure soap and warm water, 2. Rinse valve thoroughly in warm running water, 3. Allow valve to air dry thoroughly before placing it in storage container, 4. DO NOT use hot water, peroxide, bleach, vinegar, alcohol, brushes, or cotton swabs to clean valve.      Subjective     SLP reviewed Pt with RN pre/post session, RN cleared for tx  Pt presents easily distractible  She explains, "I haven't had a dream like that in a long time"      Pain/Comfort:  Pain Rating 1: other (see comments) (did not rate pain)  Location - Orientation 1: generalized  Location 1: leg  Pain Addressed 1: Nurse notified    Respiratory Status:  trach collar 5L, 28%    Objective:     Has the patient been evaluated by SLP for swallowing?   Yes  Keep patient NPO? Yes   Current Respiratory Status:    trach collar 5L, 28%    Pt found awake and upright in bed with cervical collar, NG, Shiley 6.0 cuffless trach with oxygen in place. Pt with some phonation around trach without valve in place and requested suction upon SLP entrance to room. RT in doorway and entered into room to provide suction prior to PMSV trials. HR 82 and SpO2 96% prior to one-way speaking valve trials. SLP assisted Pt in donning valve. Pt with " significant backflow pressure and PMSV easily came off trach upon initial attempts to don. RT at bedside provided additional suction.  RT exited room as session continued. Upon third attempt PMSV trials, Pt tolerated valve for ~ 9 minutes w/o S/S respiratory distress. She demonstrated moderately strained vocal quality while donning valve. Her cough and throat clear were moderately reduced in strength. She appeared pleased with valve in place. She was seen with trials of thin liquids (ice chips x3.) Pt with delayed cough 3 of 3 attempts. Pt with mildly wet change in vocal quality following third trial of oce chip and was cued to cough to clear.  Suction provided via [place]. Additional trials deferred 2/2 decreased endurance, reduced strength of cough and aspiration risk. SLP educated Pt on ongoing safety precautions for PMSV, aspiration precautions and SLP POC. Pt with minimal sustained attention and did not verbalize understanding. No additional questions.  SLP assisted Pt to doff valve and returned valve to case by trach supplies. HR 84 and SpO2 97% following trials. Pt remained upright in bed with call light in reach upon SLP exit. RN notified off findings upon SLP exit.     Assessment:     Peter Stiles is a 51 y.o. female with an SLP diagnosis of Dysphagia, Dysphonia, and S/P Trach.  Pt with improved tolerance of one-way speaking valve today. ST to continue to follow.      Goals:   Multidisciplinary Problems       SLP Goals          Problem: SLP    Goal Priority Disciplines Outcome   SLP Goal     SLP Ongoing, Progressing   Description: Speech Language Pathology Goals  Goals expected to be met by 11/16/22    1. Pt will tolerate one-way speaking valve for 10 minutes w/o S/S respiratory distress, MIN A  2. Pt will recall 3 +safety precautions for PMSV with cuffed trach, 90%, MIN A  3. Pt will participate in ongoing swallow assessment to determine safest, least restrictive means nutrition/hydration    4.  Educate Pt and family on safety awareness and safety precautions for one-way speaking valve                         Plan:     Patient to be seen:  4 x/week   Plan of Care expires:  12/09/22  Plan of Care reviewed with:  patient   SLP Follow-Up:  Yes       Discharge recommendations:  rehabilitation facility       Time Tracking:     SLP Treatment Date:   11/14/22  Speech Start Time:  1410  Speech Stop Time:  1442     Speech Total Time (min):  32 min    Billable Minutes: Speech Therapy Individual 15 and Treatment Swallowing Dysfunction 8    11/14/2022

## 2022-11-15 PROBLEM — R50.9 FEVER OF UNKNOWN ORIGIN (FUO): Status: ACTIVE | Noted: 2022-11-15

## 2022-11-15 PROBLEM — G06.2 EPIDURAL ABSCESS: Status: RESOLVED | Noted: 2022-10-25 | Resolved: 2022-11-15

## 2022-11-15 PROBLEM — B95.61 MSSA BACTEREMIA: Status: RESOLVED | Noted: 2022-10-27 | Resolved: 2022-11-15

## 2022-11-15 PROBLEM — Z97.8 ENDOTRACHEALLY INTUBATED: Status: RESOLVED | Noted: 2022-10-31 | Resolved: 2022-11-15

## 2022-11-15 PROBLEM — J38.02 BILATERAL VOCAL FOLD PARESIS: Status: RESOLVED | Noted: 2022-11-10 | Resolved: 2022-11-15

## 2022-11-15 PROBLEM — R78.81 MSSA BACTEREMIA: Status: RESOLVED | Noted: 2022-10-27 | Resolved: 2022-11-15

## 2022-11-15 PROBLEM — J96.01 ACUTE RESPIRATORY FAILURE WITH HYPOXIA: Status: RESOLVED | Noted: 2022-10-28 | Resolved: 2022-11-15

## 2022-11-15 PROBLEM — G95.20 SPINAL CORD COMPRESSION: Status: RESOLVED | Noted: 2022-10-25 | Resolved: 2022-11-15

## 2022-11-15 PROBLEM — M48.02 CERVICAL STENOSIS OF SPINAL CANAL: Status: RESOLVED | Noted: 2022-11-05 | Resolved: 2022-11-15

## 2022-11-15 LAB
ALBUMIN SERPL BCP-MCNC: 1.9 G/DL (ref 3.5–5.2)
ALP SERPL-CCNC: 146 U/L (ref 55–135)
ALT SERPL W/O P-5'-P-CCNC: 28 U/L (ref 10–44)
ANION GAP SERPL CALC-SCNC: 7 MMOL/L (ref 8–16)
AST SERPL-CCNC: 34 U/L (ref 10–40)
BASOPHILS # BLD AUTO: 0.01 K/UL (ref 0–0.2)
BASOPHILS NFR BLD: 0.1 % (ref 0–1.9)
BILIRUB SERPL-MCNC: 0.3 MG/DL (ref 0.1–1)
BUN SERPL-MCNC: 16 MG/DL (ref 6–20)
CALCIUM SERPL-MCNC: 8.9 MG/DL (ref 8.7–10.5)
CHLORIDE SERPL-SCNC: 106 MMOL/L (ref 95–110)
CO2 SERPL-SCNC: 26 MMOL/L (ref 23–29)
CREAT SERPL-MCNC: 0.9 MG/DL (ref 0.5–1.4)
CRP SERPL-MCNC: 93.4 MG/L (ref 0–8.2)
DIFFERENTIAL METHOD: ABNORMAL
EOSINOPHIL # BLD AUTO: 0.1 K/UL (ref 0–0.5)
EOSINOPHIL NFR BLD: 1.6 % (ref 0–8)
EOSINOPHIL URNS QL WRIGHT STN: ABNORMAL
ERYTHROCYTE [DISTWIDTH] IN BLOOD BY AUTOMATED COUNT: 15.4 % (ref 11.5–14.5)
ERYTHROCYTE [SEDIMENTATION RATE] IN BLOOD BY PHOTOMETRIC METHOD: 92 MM/HR (ref 0–36)
EST. GFR  (NO RACE VARIABLE): >60 ML/MIN/1.73 M^2
GLUCOSE SERPL-MCNC: 108 MG/DL (ref 70–110)
HCT VFR BLD AUTO: 25.4 % (ref 37–48.5)
HGB BLD-MCNC: 7.5 G/DL (ref 12–16)
IMM GRANULOCYTES # BLD AUTO: 0.07 K/UL (ref 0–0.04)
IMM GRANULOCYTES NFR BLD AUTO: 0.8 % (ref 0–0.5)
LYMPHOCYTES # BLD AUTO: 3 K/UL (ref 1–4.8)
LYMPHOCYTES NFR BLD: 34.7 % (ref 18–48)
MAGNESIUM SERPL-MCNC: 2.1 MG/DL (ref 1.6–2.6)
MCH RBC QN AUTO: 29.6 PG (ref 27–31)
MCHC RBC AUTO-ENTMCNC: 29.5 G/DL (ref 32–36)
MCV RBC AUTO: 100 FL (ref 82–98)
MONOCYTES # BLD AUTO: 0.9 K/UL (ref 0.3–1)
MONOCYTES NFR BLD: 10.3 % (ref 4–15)
NEUTROPHILS # BLD AUTO: 4.6 K/UL (ref 1.8–7.7)
NEUTROPHILS NFR BLD: 52.5 % (ref 38–73)
NRBC BLD-RTO: 0 /100 WBC
PHOSPHATE SERPL-MCNC: 3.5 MG/DL (ref 2.7–4.5)
PLATELET # BLD AUTO: 257 K/UL (ref 150–450)
PMV BLD AUTO: 10.8 FL (ref 9.2–12.9)
POCT GLUCOSE: 106 MG/DL (ref 70–110)
POTASSIUM SERPL-SCNC: 3.9 MMOL/L (ref 3.5–5.1)
PROT SERPL-MCNC: 6 G/DL (ref 6–8.4)
RBC # BLD AUTO: 2.53 M/UL (ref 4–5.4)
SODIUM SERPL-SCNC: 139 MMOL/L (ref 136–145)
WBC # BLD AUTO: 8.71 K/UL (ref 3.9–12.7)

## 2022-11-15 PROCEDURE — 99900026 HC AIRWAY MAINTENANCE (STAT)

## 2022-11-15 PROCEDURE — 99024 POSTOP FOLLOW-UP VISIT: CPT | Mod: POP,,,

## 2022-11-15 PROCEDURE — A4216 STERILE WATER/SALINE, 10 ML: HCPCS | Performed by: STUDENT IN AN ORGANIZED HEALTH CARE EDUCATION/TRAINING PROGRAM

## 2022-11-15 PROCEDURE — 99223 1ST HOSP IP/OBS HIGH 75: CPT | Mod: ,,, | Performed by: INTERNAL MEDICINE

## 2022-11-15 PROCEDURE — 99233 PR SUBSEQUENT HOSPITAL CARE,LEVL III: ICD-10-PCS | Mod: ,,, | Performed by: NURSE PRACTITIONER

## 2022-11-15 PROCEDURE — 99900035 HC TECH TIME PER 15 MIN (STAT)

## 2022-11-15 PROCEDURE — 25000003 PHARM REV CODE 250

## 2022-11-15 PROCEDURE — 99223 PR INITIAL HOSPITAL CARE,LEVL III: ICD-10-PCS | Mod: ,,, | Performed by: INTERNAL MEDICINE

## 2022-11-15 PROCEDURE — 25000003 PHARM REV CODE 250: Performed by: PSYCHIATRY & NEUROLOGY

## 2022-11-15 PROCEDURE — 27000221 HC OXYGEN, UP TO 24 HOURS

## 2022-11-15 PROCEDURE — 36415 COLL VENOUS BLD VENIPUNCTURE: CPT | Performed by: PHYSICIAN ASSISTANT

## 2022-11-15 PROCEDURE — 36415 COLL VENOUS BLD VENIPUNCTURE: CPT | Performed by: STUDENT IN AN ORGANIZED HEALTH CARE EDUCATION/TRAINING PROGRAM

## 2022-11-15 PROCEDURE — 85652 RBC SED RATE AUTOMATED: CPT | Performed by: STUDENT IN AN ORGANIZED HEALTH CARE EDUCATION/TRAINING PROGRAM

## 2022-11-15 PROCEDURE — 99232 PR SUBSEQUENT HOSPITAL CARE,LEVL II: ICD-10-PCS | Mod: ,,, | Performed by: STUDENT IN AN ORGANIZED HEALTH CARE EDUCATION/TRAINING PROGRAM

## 2022-11-15 PROCEDURE — 63600175 PHARM REV CODE 636 W HCPCS: Performed by: NURSE PRACTITIONER

## 2022-11-15 PROCEDURE — 94761 N-INVAS EAR/PLS OXIMETRY MLT: CPT

## 2022-11-15 PROCEDURE — 80053 COMPREHEN METABOLIC PANEL: CPT | Performed by: PHYSICIAN ASSISTANT

## 2022-11-15 PROCEDURE — 25000003 PHARM REV CODE 250: Performed by: NURSE PRACTITIONER

## 2022-11-15 PROCEDURE — 83735 ASSAY OF MAGNESIUM: CPT | Performed by: PHYSICIAN ASSISTANT

## 2022-11-15 PROCEDURE — 99024 PR POST-OP FOLLOW-UP VISIT: ICD-10-PCS | Mod: POP,,,

## 2022-11-15 PROCEDURE — 84100 ASSAY OF PHOSPHORUS: CPT | Performed by: PHYSICIAN ASSISTANT

## 2022-11-15 PROCEDURE — 25000003 PHARM REV CODE 250: Performed by: PHYSICIAN ASSISTANT

## 2022-11-15 PROCEDURE — 27200966 HC CLOSED SUCTION SYSTEM

## 2022-11-15 PROCEDURE — 25000003 PHARM REV CODE 250: Performed by: STUDENT IN AN ORGANIZED HEALTH CARE EDUCATION/TRAINING PROGRAM

## 2022-11-15 PROCEDURE — 85025 COMPLETE CBC W/AUTO DIFF WBC: CPT | Performed by: PHYSICIAN ASSISTANT

## 2022-11-15 PROCEDURE — 86140 C-REACTIVE PROTEIN: CPT | Performed by: STUDENT IN AN ORGANIZED HEALTH CARE EDUCATION/TRAINING PROGRAM

## 2022-11-15 PROCEDURE — 99232 SBSQ HOSP IP/OBS MODERATE 35: CPT | Mod: ,,, | Performed by: STUDENT IN AN ORGANIZED HEALTH CARE EDUCATION/TRAINING PROGRAM

## 2022-11-15 PROCEDURE — S4991 NICOTINE PATCH NONLEGEND: HCPCS | Performed by: PHYSICIAN ASSISTANT

## 2022-11-15 PROCEDURE — 87205 SMEAR GRAM STAIN: CPT | Performed by: STUDENT IN AN ORGANIZED HEALTH CARE EDUCATION/TRAINING PROGRAM

## 2022-11-15 PROCEDURE — 99233 SBSQ HOSP IP/OBS HIGH 50: CPT | Mod: ,,, | Performed by: NURSE PRACTITIONER

## 2022-11-15 PROCEDURE — 11000001 HC ACUTE MED/SURG PRIVATE ROOM

## 2022-11-15 RX ORDER — FAMOTIDINE 20 MG/1
20 TABLET, FILM COATED ORAL 2 TIMES DAILY
Qty: 60 TABLET | Refills: 11
Start: 2022-11-15 | End: 2023-11-15

## 2022-11-15 RX ADMIN — METHADONE HYDROCHLORIDE 90 MG: 10 TABLET ORAL at 04:11

## 2022-11-15 RX ADMIN — Medication 10 ML: at 12:11

## 2022-11-15 RX ADMIN — SILODOSIN 4 MG: 4 CAPSULE ORAL at 09:11

## 2022-11-15 RX ADMIN — FAMOTIDINE 20 MG: 20 TABLET ORAL at 08:11

## 2022-11-15 RX ADMIN — POLYETHYLENE GLYCOL 3350 17 G: 17 POWDER, FOR SOLUTION ORAL at 09:11

## 2022-11-15 RX ADMIN — OXYCODONE 5 MG: 5 TABLET ORAL at 06:11

## 2022-11-15 RX ADMIN — SENNOSIDES AND DOCUSATE SODIUM 1 TABLET: 50; 8.6 TABLET ORAL at 09:11

## 2022-11-15 RX ADMIN — OXACILLIN SODIUM 12 G: 10 INJECTION, POWDER, FOR SOLUTION INTRAVENOUS at 02:11

## 2022-11-15 RX ADMIN — DIAZEPAM 5 MG: 5 TABLET ORAL at 09:11

## 2022-11-15 RX ADMIN — OXYCODONE 5 MG: 5 TABLET ORAL at 12:11

## 2022-11-15 RX ADMIN — PREGABALIN 200 MG: 150 CAPSULE ORAL at 06:11

## 2022-11-15 RX ADMIN — HEPARIN SODIUM 5000 UNITS: 5000 INJECTION INTRAVENOUS; SUBCUTANEOUS at 01:11

## 2022-11-15 RX ADMIN — CETIRIZINE HYDROCHLORIDE 5 MG: 5 TABLET, FILM COATED ORAL at 09:11

## 2022-11-15 RX ADMIN — FAMOTIDINE 20 MG: 20 TABLET ORAL at 09:11

## 2022-11-15 RX ADMIN — PREGABALIN 200 MG: 150 CAPSULE ORAL at 11:11

## 2022-11-15 RX ADMIN — Medication 10 ML: at 06:11

## 2022-11-15 RX ADMIN — Medication 10 ML: at 11:11

## 2022-11-15 RX ADMIN — OXYCODONE 5 MG: 5 TABLET ORAL at 11:11

## 2022-11-15 RX ADMIN — SENNOSIDES AND DOCUSATE SODIUM 1 TABLET: 50; 8.6 TABLET ORAL at 08:11

## 2022-11-15 RX ADMIN — HEPARIN SODIUM 5000 UNITS: 5000 INJECTION INTRAVENOUS; SUBCUTANEOUS at 08:11

## 2022-11-15 RX ADMIN — OXYCODONE 5 MG: 5 TABLET ORAL at 01:11

## 2022-11-15 RX ADMIN — Medication 1 PATCH: at 09:11

## 2022-11-15 RX ADMIN — HEPARIN SODIUM 5000 UNITS: 5000 INJECTION INTRAVENOUS; SUBCUTANEOUS at 06:11

## 2022-11-15 NOTE — CONSULTS
[facility] - Neurosurgery [facility])  Infectious Disease  Consult Note    Patient Name: Peter Stiles  MRN: [id number]  Admission Date: 10/25/2022  Hospital Length of Stay: 21 days  Attending Physician: Alejandro Recio MD  Primary Care Provider: Og Victoria NP     Isolation Status: No active isolations  Inpatient consult to Infectious Diseases  Consult performed by: Francesca Barreto NP  Consult ordered by: Alejandro Recio MD           See progress note from 11/14      Francesca Barreto NP  Infectious Disease  [facility])

## 2022-11-15 NOTE — PLAN OF CARE
Problem: Adult Inpatient Plan of Care  Goal: Plan of Care Review  Outcome: Ongoing, Progressing  Goal: Optimal Comfort and Wellbeing  Outcome: Ongoing, Progressing     Patient is AAO x4. POC reviewed with patient. Patient verbalized understanding. Patient's breathing is unlabored with equal chest expansion. Patient has a trache with a trache collar in place with 5L. Patient has an incision to posterior neck. Patient has NGT to R nare with Diabetisource infusing at goal rate of 50 ml/hr. Patient has PICC to SYD with oxacillin infusing at 20.8ml/hr. Patient voids per courtney due to urinary retention. Patient remained free from falls. Patient rested well through shift. Bed in lowest position,bed alarm on, side rails up x3, no complaints or signs of distress. WCTM during shift.  See flowsheets for full assessment and VS info.
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
## 2022-11-15 NOTE — ASSESSMENT & PLAN NOTE
Peter Stiles is a 51 y.o. female with PMHx of HTN, dilated cardiomyopathy, h/o opioid dependence, cigarette smoker (2pks/day), and daily baby ASA use who presents with 1 day of decreased sensation from T5 level down and tingling in her bilateral fingers and bilateral toes. MRI imaging obtained in the ED showing possible C5-6 osteodiscitis/myelitis with cord compression. Patient tachycardic and hypertensive on arrival, afebrile, with leukocytosis.    Now s/p C5-6 corpectomy on 10/28/22; C2-T2 PCDF on 11/2.    - Stepped down to medicine service.  - Q4hr neurochecks.  - All pertinent imaging/labs personally reviewed and interpreted.   - Surgical drains removed with no issues.  - Requiring trach on 11/7 after failed extubation multiple times.  - Holding off on gtube placement to see if progressing with speech therapy. Ongoing.  - ID:   --Febrile 11/12 and 11/13 w/o leukocytosis. BLE tender to palpation. F/u BLE US -> negative. Rest of fever workup per primary. Encourage IS hourly. No fevers overnight. Repeat CXR showing increased consolidation and pleural effusion at right lung base.   --BCx 10/25 w/ MSSA, Repeat BCx's 10/27 NGTD   --OR cultures 10/28 w/ MSSA   --ID consulted, rec for IV Oxacillin.  - Continue c-collar when out of bed.  - Pain control with multimodal regimen.  - Okay for [facility].  - Please call NSGY with any questions/concerns    Dispo: per primary
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
## 2022-11-15 NOTE — CONSULTS
Pulmonary & Critical Care Medicine Consult Note    Reason for Consultation:  Pleural effusion iso intermittent fevers and increased O2 rec on trach mask      HPI:     50YO lady with dilated cardiomyopathy, OUD on MMT, being treated for C5-6 osteo/discitis/epidural collection (likely skin source). S/p corporectomy/fusion/epidural abscess evacuation on 10/28 and C2-T2 decompression on 11/2. Course c/b MSSA bacteremia 10/28; cultures clear as of 10/29 (though pending repeat from 11/14 which is NGTD). Has been treated per ID and maintained on oxacillin; now reconsulted iso intermittent low-grade fevers. Of note, has been unable to undergo LISHA iso c-collar (per notes documented in chart review). She is s/p tracheostomy iso several failed extubations iso bilateral vocal fold immobility and with NG tube.    We are consulted for pleural effusion noted on CXR obtained iso new intermittent fevers and rising oxygen requirement with increased right sided infiltrate. CXR repeat 11/15 noted patchy infiltrates and new effusion since 11/14 film (though positioning on later film is RLO which makes comparison less straightforward).     The patient notes increased secretions and suctioning more frequently. She is aware of her fevers from vital sign monitoring but says she does not feel feverish (and is hence not able to alert the team at intervals in between routine monitoring). She does not feel more dyspneic than last week, per her account.    Bedside ultrasound performed with optimal positioning thanks to PT/OT at bedside able to help patient sit upright. Good bilateral view without significant fluid collection, trace fluid L>R. Both lung bases with some consolidation on bedside ultrasound and b-lines. No loculations or fluid pockets or other signs of complicated effusion.      Past Medical History:   Diagnosis Date    CHF (congestive heart failure)     Essential (primary) hypertension     Opioid dependence on maintenance agonist  therapy, no symptoms     Smoking      Past Surgical History:   Procedure Laterality Date    APPENDECTOMY       SECTION      x2    FUSION OF CERVICAL SPINE BY POSTERIOR APPROACH N/A 2022    Procedure: FUSION, SPINE, CERVICAL, POSTERIOR APPROACH C2-T2 posterior decompression/fusion; Depuy, neuromonitoring monica Marrero;  Surgeon: West Merino DO;  Location: [facility] OR [address] [place] [address];  Service: Neurosurgery;  Laterality: N/A;    HYSTERECTOMY      SURGICAL REMOVAL OF VERTEBRAL BODY OF CERVICAL SPINE N/A 10/28/2022    Procedure: CORPECTOMY, SPINE, CERVICAL;  Surgeon: West Merino DO;  [facility];  Service: Neurosurgery;  Laterality: N/A;  anterior C5-6 corpectomy, globus, caspar head prado and tongs, omnipaque, 15lbs weights, microscrope, round cutting lynsey and M8, ENT assistance for exposure    TRACHEOSTOMY N/A 2022    Procedure: CREATION, TRACHEOSTOMY;  Surgeon: Fritz Nogueira MD;  Location[facility];  Service: ENT;  Laterality: N/A;     Social History:   Social History     Socioeconomic History    Marital status: Single   Tobacco Use    Smoking status: Every Day     Packs/day: 2.00     Years: 34.00     Pack years: 68.00     Types: Cigarettes    Smokeless tobacco: Current   Substance and Sexual Activity    Alcohol use: No    Drug use: No     Family History   Adopted: Yes   Problem Relation Age of Onset    Cancer Mother          69    Hearing loss Father         valve problem;  75     Drug Allergies:   Review of patient's allergies indicates:   Allergen Reactions    Morphine Shortness Of Breath and Other (See Comments)     Throat closed     Current Infusions:    Scheduled Medications:     cetirizine  5 mg Per NG tube Daily    famotidine  20 mg Per NG tube BID    heparin (porcine)  5,000 Units Subcutaneous Q8H    methadone  90 mg Per NG tube Daily    nicotine  1 patch Transdermal Daily    oxacillin 12 g in  mL CONTINUOUS INFUSION  12 g Intravenous Q24H     oxyCODONE  5 mg Per NG tube Q6H    polyethylene glycol  17 g Per NG tube Daily    pregabalin  200 mg Per NG tube TID    scopolamine  1 patch Transdermal Q3 Days    senna-docusate 8.6-50 mg  1 tablet Per NG tube BID    silodosin  4 mg Per NG tube Daily    sodium chloride 0.9%  10 mL Intravenous Q6H     PRN Medications:   acetaminophen, albuterol-ipratropium, benzocaine, bisacodyL, dextrose 10%, dextrose 10%, diazePAM, fentaNYL, glucagon (human recombinant), hydrALAZINE, hydrOXYzine HCL, magnesium oxide, magnesium oxide, ondansetron, potassium bicarbonate, potassium bicarbonate, potassium bicarbonate, potassium, sodium phosphates, potassium, sodium phosphates, potassium, sodium phosphates, racepinephrine, simethicone, sodium chloride 0.9%, Flushing PICC Protocol **AND** sodium chloride 0.9% **AND** sodium chloride 0.9%    Review of Systems:   A comprehensive 12-point review of systems was performed, and is negative except for those items mentioned above in the HPI section of this note.     Vital Signs:    Vitals:    11/15/22 1346   BP:    Pulse:    Resp: 20   Temp:        Fluid Balance:     Intake/Output Summary (Last 24 hours) at 11/15/2022 1618  Last data filed at 11/15/2022 0720  Gross per 24 hour   Intake 1017.65 ml   Output 2100 ml   Net -1082.35 ml       Physical Exam:   General: uncomfortable appearing but NAD  HEENT: NC/AT, PERRL, NG tube  Neck: in C-collar, no stridor, trach in place with mask, able to phonate when asked questions  Cardiac: S1S2, RRR  Resp: scattered crackles at bases b/L, no wheezes  Abd: Soft, NT/ND  Ext: No edema, no cyanosis, no clubbing, 2+ pulses present, healing skin lesions on arms non-inflamed appearing  Neuro: AAOx3, CN II-XII grossly intact, no focal deficits  Psych: Appropriate mood and affect, cooperative & interactive    Personal Review and Summary of Prior Diagnostics    Laboratory Studies:   No results for input(s): PH, PCO2, PO2, HCO3, POCSATURATED, BE in the last 24  hours.  Recent Labs   Lab 11/15/22  0403   WBC 8.71   RBC 2.53*   HGB 7.5*   HCT 25.4*      *   MCH 29.6   MCHC 29.5*     Recent Labs   Lab 11/15/22  0403      K 3.9      CO2 26   BUN 16   CREATININE 0.9   MG 2.1       Microbiology Data:   Microbiology Results (last 7 days)       Procedure Component Value Units Date/Time    AFB Culture & Smear [[id number]] Collected: 10/28/22 0924    Order Status: Completed Specimen: Abscess from Neck Updated: 11/15/22 1557     AFB Culture & Smear ~Culture in progress     AFB CULTURE STAIN No acid fast bacilli seen.    Narrative:      2) Prevertebral absess    AFB Culture & Smear [[id number]] Collected: 10/28/22 0906    Order Status: Completed Specimen: Abscess from Back Updated: 11/15/22 1557     AFB Culture & Smear Culture in progress     AFB CULTURE STAIN No acid fast bacilli seen.    Narrative:      Prevertebral Abscess    Blood culture [[id number]] Collected: 11/14/22 0939    Order Status: Completed Specimen: Blood from Peripheral, Left Hand Updated: 11/15/22 1012     Blood Culture, Routine No Growth to date      No Growth to date    Blood culture [[id number]] Collected: 11/14/22 0940    Order Status: Completed Specimen: Blood from Peripheral, Right Hand Updated: 11/15/22 1012     Blood Culture, Routine No Growth to date      No Growth to date    Blood culture [[id number]]     Order Status: Canceled Specimen: Blood     Blood culture [[id number]]     Order Status: Canceled Specimen: Blood               Impression & Recommendations    50YO lady with dilated cardiomyopathy, OUD on MMT, being treated for C5-6 osteo/discitis/epidural collection (likely skin source). S/p corporectomy/fusion/epidural abscess evacuation on 10/28 and C2-T2 decompression on 11/2. Course c/b MSSA bacteremia 10/28; cultures clear as of 10/29 (pending repeat from 11/14 which is NGTD). Has been treated per ID and maintained on oxacillin; now with intermittent low-grade fevers, rising O2  requirement, and some evidence for increased pulmonary opacity iso increased secretions.    Bedside ultrasound without complicated effusion or loculations, small amount of free fluid at bases L>R which is not of significant quantity for bedside thora. Bases look consolidated, could represent atelectasis vs. pneumonia. Pt is at risk for aspiration even with NGT given her immobility, of course given prior MSSA bacteremia that is a risk as well though cultures have been clear and she is well covered for staph with oxacillin under ID guidance.    Will of course defer to ID, but would recommend broadening antibiotics to cover gram-negative/atypical agents of CAP (commonly H. flu and M pneumo).    Would rec further:  - sputum culture from tracheal aspirate  - follow blood cultures  - can initiate respiratory clearance techniques per pt comfort; nebs which are q4H PRN might be made q6H while awake, can also add HTS nebs for clearance to thin thick, sticky secretions if desired  - appreciate and continue PT/OT    Thank you for involving us in the care of this patient. We will continue to follow along. Please call with any questions.    Mary Ronquillo MD  [employer] PCCM Fellow, PGYIV

## 2022-11-15 NOTE — PROGRESS NOTES
[facility] - Neurosurgery ([facility])  [facility] Medicine  Progress Note    Patient Name: Peter Stiles  MRN: [id number]  Patient Class: IP- Inpatient   Admission Date: 10/25/2022  Length of Stay: 21 days  Attending Physician: Alejandro Recio MD  Primary Care Provider: Og Victoria NP        Subjective:     Principal Problem:Osteomyelitis of cervical spine        HPI:  51 y.o F with hx of htn, opioid dependence on methadone, smoking (2ppd since 17 y.o), and dilated cardiomyopathy presents with neck pain and abdominal numbness. 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 At the time the daughter slammed the breaks and came to an abrupt stop. The patient braced herself by covering her face with both her forearms. She did not notice severe symptoms at the time or a popping sensation but a vague soreness in her neck. In the following 2 weeks her symptoms gradually deteriorated with increased neck pain notably. Her symptoms became worse early this morning when she went to the bathroom. She noticed an unusual numbness and bloating of her stomach and generalized numbness below that level. She was able to urinate. She also had increased pain in her legs, bilateral shoulders, and tingling in fingers and toes. Patient denies IV drug or alcohol use. She smoke 2ppd since 17 y.o.                 Overview/Hospital Course:  Ms. Stiles presented for acute neck pain, along with numbness and weakness of the bilateral upper and lower extremities. MRI demonstrated C5-6 osteomyelitis, discitis, and epidural abscess, as well as narrowing and signal abnormality of the spinal cord. She was admitted to the neuroICU and started on Rocephin and vancomycin. Stepped down to hospital medicine on 10/26/22. Patient completed C5-C6 corpectomy and C4-C7 anterior column reconstruction with NSGY with assistance from ENT. BCX's subsequently grew MSSA and patient transitioned from  vanc/ceftriaxone to oxacillin per ID recommendations.       Interval History:  Patient did not spike a fever last night. Repeat blood cx 10/14 NGTD.  Repeat CXR 11/15  With Patchy infiltrates and pleural effusion and developed since yesterday. ID following. Consulted pulm    Review of Systems  Objective:     Vital Signs (Most Recent):  Temp: 98.9 °F (37.2 °C) (11/14/22 1137)  Pulse: 70 (11/14/22 0920)  Resp: 20 (11/14/22 1216)  BP: 108/71 (11/14/22 0920)  SpO2: 96 % (11/14/22 0920) Vital Signs (24h Range):  Temp:  [98.4 °F (36.9 °C)-100.5 °F (38.1 °C)] 98.9 °F (37.2 °C)  Pulse:  [53-81] 70  Resp:  [13-20] 20  SpO2:  [90 %-98 %] 96 %  BP: ()/(50-83) 108/71     Weight:  (daylight savings)  Body mass index is 22.6 kg/m².    Intake/Output Summary (Last 24 hours) at 11/14/2022 1343  Last data filed at 11/14/2022 0750  Gross per 24 hour   Intake 2353.14 ml   Output 1050 ml   Net 1303.14 ml      Physical Exam  Vitals and nursing note reviewed.   HENT:      Head: Normocephalic.      Nose: Nose normal.      Mouth/Throat:      Mouth: Mucous membranes are moist.      Pharynx: Oropharynx is clear.   Cardiovascular:      Rate and Rhythm: Normal rate and regular rhythm.      Pulses: Normal pulses.      Heart sounds: Normal heart sounds.   Pulmonary:      Effort: Pulmonary effort is normal.      Breath sounds: Normal breath sounds.   Abdominal:      General: Bowel sounds are normal.      Palpations: Abdomen is soft.   Musculoskeletal:         General: Normal range of motion.   Skin:     General: Skin is warm and dry.   Neurological:      Mental Status: She is alert and oriented to person, place, and time. Mental status is at baseline.      Comments:      Psychiatric:         Mood and Affect: Mood normal.         Behavior: Behavior normal.       MELD-Na score: 6 at 11/3/2022  1:47 AM  MELD score: 6 at 11/3/2022  1:47 AM  Calculated from:  Serum Creatinine: 0.7 mg/dL (Using min of 1 mg/dL) at 11/3/2022  1:47 AM  Serum Sodium:  141 mmol/L (Using max of 137 mmol/L) at 11/3/2022  1:47 AM  Total Bilirubin: 0.4 mg/dL (Using min of 1 mg/dL) at 11/3/2022  1:47 AM  INR(ratio): 1.0 at 11/1/2022  9:28 PM  Age: 51 years    Significant Labs:  CBC:  Recent Labs   Lab 11/14/22  0302   WBC 8.50   HGB 7.4*   HCT 24.7*        CMP:  Recent Labs   Lab 11/14/22  0303      K 4.0      CO2 24      BUN 14   CREATININE 0.8   CALCIUM 8.5*   PROT 5.5*   ALBUMIN 1.8*   BILITOT 0.3   ALKPHOS 132   AST 31   ALT 27   ANIONGAP 7*           Assessment/Plan:      * Osteomyelitis of cervical spine  Patient presents with neck pain and leg pain ever since a driving lesson with her daughter. She reports new onset numbness in the abdomen and legs. She is still able to urinate and she control of hew bowels. Ct with C5-C6 discitis/OM, MRI with similar findings and sever canal stenosis.     Patient's presentation is most likely secondary to canal stenosis vs. OM given neurological symptoms in the last couple weeks that have intensified in the last day. He neuro exam is not particularly consistent with imaging findings given a sensory level around T5-T6, however the discitis and OM is consistent with the pain. WBC down-trending to 12.91 today, afebrile. Blood cultures from 10/25 positive for S aureus (identification and susceptibility pending); repeat blood cultures on 10/27 pending.    - patient completed corpectomy and anterior column reconstruction 10/28 with planned second operation on 11/1  - transitioned to oxacillin from Rocephin and vancomycin given BCX MSSA per ID rec's  -  LISHA scheduled for 10/31 to rule out infective endocarditis  - continue lyrica 75 bid  - continue methocarbamol 500mg QID  - q4h neuro checks  - aspiration and fall precautions      MSSA bacteremia  - Blood cultures with MSSA  - ID recommending transition to oxacillin from vanc/ceftriaxone      Spinal cord compression  -see osteomyelitis      Epidural abscess  -see  osteomyelitis      Hyperglycemia  No noted history of diabetes. Hba1c 6.1. Gluc 269 on admission.  Continue MDSSI  140-180 goal  Accuchecks 4X daily  If continues to be hyperglycemic, may require scheduled insulin: will continue to monitor        Opioid use disorder, severe, on maintenance therapy, dependence  -methadone 45mg daily; will need to readjust dose after operation   -multimodal pain regimen includes Tylenol, Robaxin, and Lyrica      Anxiety and depression  -Atarax prn for agitation      Tobacco abuse  -continue nicotine patch  -smoking cessation counseling      Dilated cardiomyopathy  Echo from 2014  - Left ventricular cavity size near the upper limit of normal.   - Mildly depressed left ventricular systolic function (EF 45-50%).   - Normal right ventricular systolic function .   - Normal left ventricular diastolic function.     Echo from this admission   The left ventricle is normal in size with normal systolic function. The estimated ejection fraction is 55%.   Normal left ventricular diastolic function.   Normal right ventricular size with normal right ventricular systolic function.   Mild tricuspid regurgitation.   The estimated PA systolic pressure is 20 mmHg.   Normal central venous pressure (3 mmHg).    Patient scheduled for LISHA 10/31 to evaluate for infective endocarditis given blood cultures positive for MSSA      VTE Risk Mitigation (From admission, onward)         Ordered     heparin (porcine) injection 5,000 Units  Every 8 hours         11/04/22 1437     IP VTE LOW RISK PATIENT  Once         10/25/22 1109     Place sequential compression device  Until discontinued         10/25/22 1109                Discharge Planning   YESSI: 11/18/2022     Code Status: Full Code   Is the patient medically ready for discharge?: No    Reason for patient still in hospital (select all that apply): Patient trending condition  Discharge Plan A: Long-term acute care facility (LTAC)   Discharge Delays: None  known at this time              Alejandro Recio MD  Department of Hospital Medicine   [facility] - Neurosurgery ([facility])

## 2022-11-15 NOTE — ASSESSMENT & PLAN NOTE
# MSSA Bacteremia  # Epidural abscess    Peter Stiles is a 51 y.o. female with PMHx of HTN, dilated cardiomyopathy, h/o opioid dependence, cigarette smoker (2pks/day), and daily baby ASA use who presents with 1 day of decreased sensation from T5 level down and tingling in her bilateral fingers and bilateral toes. MRI imaging obtained in the ED showing possible C5-6 osteodiscitis/myelitis with cord compression. Patient tachycardic and hypertensive on arrival, afebrile, with leukocytosis. Now s/p C5-6 corpectomy on 10/28/22; C2-T2 PCDF on 11/2; S/p trach 11/7.         MRI C/T/L spine w/o contrast 10/25: focal kyphotic deformity at C5/6 with possible discitis/osteomyelitis,   epidural collection extending into the central spinal canal resulting in severe stenosis and cord signal  abnormality.  Prevertebral soft tissue edema and probable paravertebral abscess or phlegmon at this level. Thoracic and lumbar spine unremarkable. MRI C spine w/ contrast 10/25: confirmed enhancement at C5/6 consistent with osteomyelitis/discitis and epidural phlegmon        -Admitted to neuro ICU initially.  --Intubated after index procedure and kept intubated for posterior fixation and for tentative LISHA 11/4 which was eventually cancelled due to inability to adequately extend neck  --Step down to medicine on 11/11  --Continue Speech therapy. GI consulted for G Tube eval. Patient wishes to see her progress in next 2 weeks before getting Gtube.   --BCx 10/25 growing MSSA, Repeat BCx's 10/27 NGTD  --OR cultures 10/28 no organisms on gram stain, culture MSSA. ID following on oxacillin  --Continue C collar when upright   --CT Soft tissue neck reviewed with stable hardware position s/p 360 cervical fusion ow expected postop changes  --Pain control as needed  --PT/OT. SQH     -- Patient noted to be febrile on 11/12 and 11/3.  -- Reconsulted ID  -- Repeat blood cx 11/14 NGTD. Tested neg for RSV and covid 19.  -- CXR 11/15 Patchy infiltrates  and pleural effusion and developed since yesterday. Consulted Pulm.  --Repeat inflammatory markers- sed rate/crp   - Sent ghosh stain/urine eosinophil   ---------------------------------  ID recs for discharge:   -continue oxacillin for MSSA, anticipate 6w course from cleared blcx, rosalino 12/9  -unable to use adjunctive rifampin due to DDI (methadone)  -follow up cx data  -plan for placement per patient (SNF/LTAC) - pt requested [facility]     Outpatient Antibiotic Therapy Plan:     1) Infection: MSSA epidural abscess/bacteremia     2) Discharge Antibiotics:     Intravenous antibiotics:  Oxacillin 12g continuous infusion IV daily      3) Therapy Duration:  6w     Estimated end date of IV antibiotics: 12/9/22     4) Outpatient Weekly Labs:     Order the following labs to be drawn on Mondays:               CBC              CMP               CRP     5) Fax Lab Results to Infectious Diseases Provider: Bernard     [facility] Fax Number: [phone]     6) Outpatient Infectious Diseases Follow-up

## 2022-11-15 NOTE — ASSESSMENT & PLAN NOTE
Echo from this admission   The left ventricle is normal in size with normal systolic function. The estimated ejection fraction is 55%.   Normal left ventricular diastolic function.   Normal right ventricular size with normal right ventricular systolic function.   Mild tricuspid regurgitation.   The estimated PA systolic pressure is 20 mmHg.   Normal central venous pressure (3 mmHg).

## 2022-11-15 NOTE — ASSESSMENT & PLAN NOTE
No noted history of diabetes. Hba1c 6.1. Gluc 269 on admission.  Continue MDSSI  140-180 goal  Accuchecks 4X daily  If continues to be hyperglycemic, may require scheduled insulin: will continue to monitor       no strength deficits were identified

## 2022-11-15 NOTE — RESPIRATORY THERAPY
RAPID RESPONSE RESPIRATORY THERAPY PROACTIVE NOTE           Time of visit: 1527     Code Status: Full Code   : 1970  Bed: 952/952 A:   MRN: [id number]  Time spent at the bedside: 15 -30 min    SITUATION    Evaluated patient for: LDA Check     BACKGROUND    Patient has a past medical history of CHF (congestive heart failure), Essential (primary) hypertension, Opioid dependence on maintenance agonist therapy, no symptoms, and Smoking.  Clinically Significant Surgical Hx: tracheostomy    24 Hours Vitals Range:  Temp:  [98.5 °F (36.9 °C)-99.5 °F (37.5 °C)]   Pulse:  [57-72]   Resp:  [16-20]   BP: ()/(48-53)   SpO2:  [92 %-97 %]     Labs:    Recent Labs     22  0303 11/15/22  0403    139   K 4.0 3.9    106   CO2 24 26   CREATININE 0.8 0.9    108   PHOS 4.2 3.5   MG 2.0 2.1        No results for input(s): PH, PCO2, PO2, HCO3, POCSATURATED, BE in the last 72 hours.    ASSESSMENT/INTERVENTIONS  Upon entry into room RT notes Pt having difficulty expectorating secretions. Incessant cough, and use of yankuer orally      Last VS   Temp: 99.5 °F (37.5 °C) (11/15 0757)  Pulse: 65 (11/15 0757)  Resp: 20 (11/15 1346)  BP: 91/53 (11/15 075)  SpO2: 94 % (11/15 075)      Extra trachs at bedside: y  Level of Consciousness: Level of Consciousness (AVPU): alert  Respiratory Effort: Respiratory Effort: Mild Expansion/Accessory Muscle Usage: Expansion/Accessory Muscles/Retractions: no use of accessory muscles, no retractions, expansion symmetric  All Lung Field Breath Sounds: All Lung Fields Breath Sounds: Anterior:, Posterior:, Lateral:, coarse, equal bilaterally  NANETTE Breath Sounds: equal bilaterally  LLL Breath Sounds: coarse  RLL Breath Sounds: coarse  O2 Device/Concentration: 5/28%  Surgical airway: Yes, Type: Shiley Size: 6, uncuffed  Ambu at bedside: Ambu bag with the patient?: Yes, Adult Ambu     Active Orders   Respiratory Care    Inhalation Treatment Q4H PRN     Frequency: Q4H PRN     Number  of Occurrences: Until Specified    Oxygen Continuous     Frequency: Continuous     Number of Occurrences: Until Specified     Order Questions:      Device type: Low flow      Device: Trach Collar      FiO2%: 28      Titrate O2 per Oxygen Titration Protocol: Yes      To maintain SpO2 goal of: >= 90%      Notify MD of: Inability to achieve desired SpO2; Sudden change in patient status and requires 20% increase in FiO2; Patient requires >60% FiO2    POCT ARTERIAL BLOOD GAS Blood Gas     Frequency: PRN     Number of Occurrences: Until Specified     Order Comments: Notify Physician if: see parameters below.       Order Questions:      Component: Blood Gas    Trach care every 12 hours and as needed     Frequency: BID     Number of Occurrences: Until Specified    Trach: Assess suction need every 2 hours and as needed     Frequency: BID     Number of Occurrences: Until Specified       RECOMMENDATIONS    We recommend: RRT Recs: pt airway suctioned for clearance.  Pt reports relief and reduced need to cough.      FOLLOW-UP    Please call back the Rapid Response RT, Mehul Becker RRT at x [phone] for any questions or concerns.

## 2022-11-15 NOTE — SUBJECTIVE & OBJECTIVE
Interval History: NAEON. Afebrile, hypotensive. Repeat CXR today showing increased right lung base consolidation. Neuro exam stable. Reports right ankle pain. SLP recs ongoing.    Medications:  Continuous Infusions:  Scheduled Meds:   cetirizine  5 mg Per NG tube Daily    famotidine  20 mg Per NG tube BID    heparin (porcine)  5,000 Units Subcutaneous Q8H    methadone  90 mg Per NG tube Daily    nicotine  1 patch Transdermal Daily    oxacillin 12 g in  mL CONTINUOUS INFUSION  12 g Intravenous Q24H    oxyCODONE  5 mg Per NG tube Q6H    polyethylene glycol  17 g Per NG tube Daily    pregabalin  200 mg Per NG tube TID    scopolamine  1 patch Transdermal Q3 Days    senna-docusate 8.6-50 mg  1 tablet Per NG tube BID    silodosin  4 mg Per NG tube Daily    sodium chloride 0.9%  10 mL Intravenous Q6H     PRN Meds:acetaminophen, albuterol-ipratropium, benzocaine, bisacodyL, dextrose 10%, dextrose 10%, diazePAM, fentaNYL, glucagon (human recombinant), hydrALAZINE, hydrOXYzine HCL, magnesium oxide, magnesium oxide, ondansetron, potassium bicarbonate, potassium bicarbonate, potassium bicarbonate, potassium, sodium phosphates, potassium, sodium phosphates, potassium, sodium phosphates, racepinephrine, simethicone, sodium chloride 0.9%, Flushing PICC Protocol **AND** sodium chloride 0.9% **AND** sodium chloride 0.9%     Review of Systems  Objective:     Weight:  (daylight savings)  Body mass index is 22.6 kg/m².  Vital Signs (Most Recent):  Temp: 99.5 °F (37.5 °C) (11/15/22 0757)  Pulse: 65 (11/15/22 0757)  Resp: 16 (11/15/22 0757)  BP: (!) 91/53 (11/15/22 0757)  SpO2: (!) 94 % (11/15/22 0757)   Vital Signs (24h Range):  Temp:  [98.2 °F (36.8 °C)-99.5 °F (37.5 °C)] 99.5 °F (37.5 °C)  Pulse:  [57-90] 65  Resp:  [16-20] 16  SpO2:  [92 %-97 %] 94 %  BP: ()/(48-67) 91/53     Date 11/15/22 0700 - 11/16/22 0659   Shift 9668-7262 5402-7359 0153-6458 24 Hour Total   INTAKE   NG/   150   Shift Total(mL/kg) 150(2.4)    "150(2.4)   OUTPUT   Shift Total(mL/kg)       Weight (kg) 63.5 63.5 63.5 63.5              Oxygen Concentration (%):  [] 28         NG/OG Tube 11/06/22 Right nostril (Active)   Placement Check placement verified by aspirate characteristics;placement verified by x-ray 11/13/22 1105   Tolerance no signs/symptoms of discomfort 11/14/22 2140   Securement secured to nostril center w/ adhesive device 11/14/22 2140   Clamp Status/Tolerance unclamped 11/14/22 2140   Suction Setting/Drainage Method dependent drainage 11/14/22 2140   Insertion Site Appearance no redness, warmth, tenderness, skin breakdown, drainage 11/14/22 2140   Drainage Milky 11/14/22 2140   Flush/Irrigation flushed w/;sterile normal saline 11/14/22 2140   Feeding Type continuous;by pump 11/14/22 2140   Feeding Action feeding continued 11/14/22 2140   Current Rate (mL/hr) 50 mL/hr 11/15/22 0720   Goal Rate (mL/hr) 50 mL/hr 11/15/22 0720   Intake (mL) 40 mL 11/15/22 0034   Water Bolus (mL) 150 mL 11/15/22 0720   Formula Name Diabetasource 11/15/22 0720   Intake (mL) - Formula Tube Feeding 550 11/13/22 1648   Residual Amount (ml) 60 ml 11/15/22 0614            Urethral Catheter 11/10/22 1335 Latex 16 Fr. (Active)   Site Assessment Clean;Intact 11/15/22 0720   Collection Container Urimeter 11/15/22 0400   Securement Method secured to top of thigh w/ adhesive device 11/15/22 0720   Catheter Care Performed yes 11/15/22 0400   Reason for Continuing Urinary Catheterization Urinary retention 11/15/22 0720   CAUTI Prevention Bundle Securement Device in place with 1" slack;Intact seal between catheter & drainage tubing;Drainage bag/urimeter off the floor;Sheeting clip in use;No dependent loops or kinks;Drainage bag/urimeter not overfilled (<2/3 full);Drainage bag/urimeter below bladder 11/14/22 2000   Output (mL) 2100 mL 11/15/22 0642     Neurosurgery Physical Exam  General: Well developed, well nourished, not in acute distress.   Head: Normocephalic, " atraumatic.  Neck: Trach in place.  Neurologic: Alert and oriented x 4. Thought content appropriate.  GCS: Motor:6  Verbal:5  Eyes:4   GCS Total: 15  Language: No aphasia.  Speech: No dysarthria.  Cranial nerves: Face symmetric, tongue midline, CN II-XII grossly intact.   Eyes: Pupils equal, round, reactive to light with accomodation, EOMI.  Pulmonary: Normal respirations, no signs of respiratory distress.  Abdomen: Soft, non-distended, non-tender to palpation.  Sensory: Intact to light touch throughout.  Motor Strength: Moves all extremities spontaneously with good tone. BUE full strength, BLE significantly pain limited and hypersensitivity but at least AG. No abnormal movements seen.   Skin: Skin is warm, dry and intact.  Posterior incision clean/dry/intact with prineo.   Anterior incision with limited visualization under trach collar but clean/dry/intact with dermabond.    Significant Labs:  Recent Labs   Lab 11/14/22  0303 11/15/22  0403    108    139   K 4.0 3.9    106   CO2 24 26   BUN 14 16   CREATININE 0.8 0.9   CALCIUM 8.5* 8.9   MG 2.0 2.1     Recent Labs   Lab 11/14/22  0302 11/15/22  0403   WBC 8.50 8.71   HGB 7.4* 7.5*   HCT 24.7* 25.4*    257     No results for input(s): LABPT, INR, APTT in the last 48 hours.  Microbiology Results (last 7 days)       Procedure Component Value Units Date/Time    Blood culture [[id number]] Collected: 11/14/22 0939    Order Status: Completed Specimen: Blood from Peripheral, Left Hand Updated: 11/15/22 1012     Blood Culture, Routine No Growth to date      No Growth to date    Blood culture [[id number]] Collected: 11/14/22 0940    Order Status: Completed Specimen: Blood from Peripheral, Right Hand Updated: 11/15/22 1012     Blood Culture, Routine No Growth to date      No Growth to date    Blood culture [[id number]]     Order Status: Canceled Specimen: Blood     Blood culture [[id number]]     Order Status: Canceled Specimen: Blood           All  pertinent labs from the last 24 hours have been reviewed.    Significant Diagnostics:  I have reviewed and interpreted all pertinent imaging results/findings within the past 24 hours.

## 2022-11-15 NOTE — PLAN OF CARE
Patient accepted and bed available at [facility].  Per MD, ID is doing a more extensive workup.  Plan for discharge tomorrow.   11/15/22 1522   Post-Acute Status   Post-Acute Authorization Placement   Post-Acute Placement Status Pending medical clearance/testing

## 2022-11-15 NOTE — SUBJECTIVE & OBJECTIVE
Interval History:  Patient did not spike a fever last night. Repeat blood cx 10/14 NGTD.  Repeat CXR 11/15  With Patchy infiltrates and pleural effusion and developed since yesterday. ID following. Consulted pulm    Review of Systems  Objective:     Vital Signs (Most Recent):  Temp: 99.5 °F (37.5 °C) (11/15/22 0757)  Pulse: 65 (11/15/22 0757)  Resp: 20 (11/15/22 1346)  BP: (!) 91/53 (11/15/22 0757)  SpO2: (!) 94 % (11/15/22 0757)   Vital Signs (24h Range):  Temp:  [98.2 °F (36.8 °C)-99.5 °F (37.5 °C)] 99.5 °F (37.5 °C)  Pulse:  [57-86] 65  Resp:  [16-20] 20  SpO2:  [92 %-97 %] 94 %  BP: ()/(48-67) 91/53     Weight:  (daylight savings)  Body mass index is 22.6 kg/m².    Intake/Output Summary (Last 24 hours) at 11/15/2022 1421  Last data filed at 11/15/2022 0720  Gross per 24 hour   Intake 1017.65 ml   Output 2100 ml   Net -1082.35 ml      Physical Exam  Vitals and nursing note reviewed.   HENT:      Head: Normocephalic.      Nose: Nose normal.      Mouth/Throat:      Mouth: Mucous membranes are moist.      Pharynx: Oropharynx is clear.   Cardiovascular:      Rate and Rhythm: Normal rate and regular rhythm.      Pulses: Normal pulses.      Heart sounds: Normal heart sounds.   Pulmonary:      Effort: Pulmonary effort is normal.      Breath sounds: Normal breath sounds.   Abdominal:      General: Bowel sounds are normal.      Palpations: Abdomen is soft.   Musculoskeletal:         General: Normal range of motion.   Skin:     General: Skin is warm and dry.   Neurological:      Mental Status: She is alert and oriented to person, place, and time. Mental status is at baseline.      Comments:      Psychiatric:         Mood and Affect: Mood normal.         Behavior: Behavior normal.       MELD-Na score: 6 at 11/3/2022  1:47 AM  MELD score: 6 at 11/3/2022  1:47 AM  Calculated from:  Serum Creatinine: 0.7 mg/dL (Using min of 1 mg/dL) at 11/3/2022  1:47 AM  Serum Sodium: 141 mmol/L (Using max of 137 mmol/L) at 11/3/2022   1:47 AM  Total Bilirubin: 0.4 mg/dL (Using min of 1 mg/dL) at 11/3/2022  1:47 AM  INR(ratio): 1.0 at 11/1/2022  9:28 PM  Age: 51 years    Significant Labs:  CBC:  Recent Labs   Lab 11/14/22  0302 11/15/22  0403   WBC 8.50 8.71   HGB 7.4* 7.5*   HCT 24.7* 25.4*    257     CMP:  Recent Labs   Lab 11/14/22  0303 11/15/22  0403    139   K 4.0 3.9    106   CO2 24 26    108   BUN 14 16   CREATININE 0.8 0.9   CALCIUM 8.5* 8.9   PROT 5.5* 6.0   ALBUMIN 1.8* 1.9*   BILITOT 0.3 0.3   ALKPHOS 132 146*   AST 31 34   ALT 27 28   ANIONGAP 7* 7*

## 2022-11-15 NOTE — SUBJECTIVE & OBJECTIVE
Interval History:   No recurrent fever.    Repeat blood cx 11/14 NGTD   Repeat CXR this morning showing patchy airspace consolidation RLL and pleural fluid left lung - read as new development from yesterday.     Review of Systems   Constitutional:  Negative for chills, diaphoresis, fatigue and fever.   HENT:          With trach. Reports increase oral secretions.    Respiratory:  Positive for cough. Negative for shortness of breath.    Cardiovascular:  Negative for chest pain, palpitations and leg swelling.   Gastrointestinal:  Negative for diarrhea, nausea and vomiting.   Genitourinary:  Negative for difficulty urinating and dysuria.   Musculoskeletal:  Positive for myalgias. Negative for arthralgias and back pain.        Right leg pain    Skin:  Negative for color change, rash and wound.   Neurological:  Positive for speech difficulty (trach with o2 collar. communicates with white board and yes/no answers). Negative for dizziness, weakness and numbness.   Psychiatric/Behavioral:  Negative for agitation and confusion.    Objective:     Vital Signs (Most Recent):  Temp: 99.5 °F (37.5 °C) (11/15/22 0757)  Pulse: 65 (11/15/22 0757)  Resp: 20 (11/15/22 1346)  BP: (!) 91/53 (11/15/22 0757)  SpO2: 98 % (11/15/22 1644) Vital Signs (24h Range):  Temp:  [98.5 °F (36.9 °C)-99.5 °F (37.5 °C)] 99.5 °F (37.5 °C)  Pulse:  [57-72] 65  Resp:  [16-20] 20  SpO2:  [92 %-98 %] 98 %  BP: ()/(48-53) 91/53     Weight:  (daylight savings)  Body mass index is 22.6 kg/m².    Estimated Creatinine Clearance: 68.5 mL/min (based on SCr of 0.9 mg/dL).    Physical Exam  Vitals and nursing note reviewed.   Constitutional:       General: She is not in acute distress.     Appearance: Normal appearance. She is not ill-appearing, toxic-appearing or diaphoretic.      Comments: Lethargic but responsive.      HENT:      Head: Normocephalic and atraumatic.      Mouth/Throat:      Comments: Poor dentition    Eyes:      General: No scleral icterus.      Conjunctiva/sclera: Conjunctivae normal.   Neck:      Comments: Cervical collar   Trach - no significant secretions at time of my exam  Cardiovascular:      Rate and Rhythm: Normal rate and regular rhythm.      Heart sounds: No murmur heard.  Pulmonary:      Effort: Pulmonary effort is normal. No respiratory distress.      Breath sounds: No stridor. No wheezing.      Comments: Clear anteriorly  Crackles lower lobes  Abdominal:      General: Bowel sounds are normal. There is no distension.      Palpations: Abdomen is soft.      Tenderness: There is no abdominal tenderness. There is no guarding.   Musculoskeletal:      Right lower leg: No edema.      Left lower leg: No edema.   Skin:     General: Skin is warm and dry.   Neurological:      Mental Status: Mental status is at baseline.       Significant Labs:   Microbiology Results (last 7 days)       Procedure Component Value Units Date/Time    AFB Culture & Smear [[id number]] Collected: 10/28/22 0924    Order Status: Completed Specimen: Abscess from Neck Updated: 11/15/22 1557     AFB Culture & Smear ~Culture in progress     AFB CULTURE STAIN No acid fast bacilli seen.    Narrative:      2) Prevertebral absess    AFB Culture & Smear [[id number]] Collected: 10/28/22 0906    Order Status: Completed Specimen: Abscess from Back Updated: 11/15/22 1557     AFB Culture & Smear Culture in progress     AFB CULTURE STAIN No acid fast bacilli seen.    Narrative:      Prevertebral Abscess    Blood culture [[id number]] Collected: 11/14/22 0939    Order Status: Completed Specimen: Blood from Peripheral, Left Hand Updated: 11/15/22 1012     Blood Culture, Routine No Growth to date      No Growth to date    Blood culture [[id number]] Collected: 11/14/22 0940    Order Status: Completed Specimen: Blood from Peripheral, Right Hand Updated: 11/15/22 1012     Blood Culture, Routine No Growth to date      No Growth to date    Blood culture [[id number]]     Order Status: Canceled Specimen: Blood      Blood culture [[id number]]     Order Status: Canceled Specimen: Blood             Significant Imaging: I have reviewed all pertinent imaging results/findings within the past 24 hours.

## 2022-11-16 LAB
ALBUMIN SERPL BCP-MCNC: 1.8 G/DL (ref 3.5–5.2)
ALP SERPL-CCNC: 143 U/L (ref 55–135)
ALT SERPL W/O P-5'-P-CCNC: 34 U/L (ref 10–44)
ANION GAP SERPL CALC-SCNC: 9 MMOL/L (ref 8–16)
AST SERPL-CCNC: 46 U/L (ref 10–40)
BASOPHILS # BLD AUTO: 0.03 K/UL (ref 0–0.2)
BASOPHILS NFR BLD: 0.3 % (ref 0–1.9)
BILIRUB SERPL-MCNC: 0.3 MG/DL (ref 0.1–1)
BUN SERPL-MCNC: 18 MG/DL (ref 6–20)
CALCIUM SERPL-MCNC: 9.1 MG/DL (ref 8.7–10.5)
CHLORIDE SERPL-SCNC: 107 MMOL/L (ref 95–110)
CO2 SERPL-SCNC: 26 MMOL/L (ref 23–29)
CREAT SERPL-MCNC: 0.9 MG/DL (ref 0.5–1.4)
DIFFERENTIAL METHOD: ABNORMAL
EOSINOPHIL # BLD AUTO: 0.2 K/UL (ref 0–0.5)
EOSINOPHIL NFR BLD: 2.3 % (ref 0–8)
ERYTHROCYTE [DISTWIDTH] IN BLOOD BY AUTOMATED COUNT: 15.4 % (ref 11.5–14.5)
EST. GFR  (NO RACE VARIABLE): >60 ML/MIN/1.73 M^2
GLUCOSE SERPL-MCNC: 105 MG/DL (ref 70–110)
HCT VFR BLD AUTO: 23.5 % (ref 37–48.5)
HGB BLD-MCNC: 7.7 G/DL (ref 12–16)
IMM GRANULOCYTES # BLD AUTO: 0.22 K/UL (ref 0–0.04)
IMM GRANULOCYTES NFR BLD AUTO: 2.1 % (ref 0–0.5)
LYMPHOCYTES # BLD AUTO: 3 K/UL (ref 1–4.8)
LYMPHOCYTES NFR BLD: 28.6 % (ref 18–48)
MAGNESIUM SERPL-MCNC: 2.1 MG/DL (ref 1.6–2.6)
MCH RBC QN AUTO: 30.9 PG (ref 27–31)
MCHC RBC AUTO-ENTMCNC: 32.8 G/DL (ref 32–36)
MCV RBC AUTO: 94 FL (ref 82–98)
MONOCYTES # BLD AUTO: 1 K/UL (ref 0.3–1)
MONOCYTES NFR BLD: 9.7 % (ref 4–15)
NEUTROPHILS # BLD AUTO: 5.9 K/UL (ref 1.8–7.7)
NEUTROPHILS NFR BLD: 57 % (ref 38–73)
NRBC BLD-RTO: 0 /100 WBC
PHOSPHATE SERPL-MCNC: 4.3 MG/DL (ref 2.7–4.5)
PLATELET # BLD AUTO: 309 K/UL (ref 150–450)
PMV BLD AUTO: 11.9 FL (ref 9.2–12.9)
POTASSIUM SERPL-SCNC: 4.3 MMOL/L (ref 3.5–5.1)
PROT SERPL-MCNC: 6.1 G/DL (ref 6–8.4)
RBC # BLD AUTO: 2.49 M/UL (ref 4–5.4)
SODIUM SERPL-SCNC: 142 MMOL/L (ref 136–145)
WBC # BLD AUTO: 10.38 K/UL (ref 3.9–12.7)

## 2022-11-16 PROCEDURE — 84100 ASSAY OF PHOSPHORUS: CPT | Performed by: PHYSICIAN ASSISTANT

## 2022-11-16 PROCEDURE — 25000003 PHARM REV CODE 250: Performed by: STUDENT IN AN ORGANIZED HEALTH CARE EDUCATION/TRAINING PROGRAM

## 2022-11-16 PROCEDURE — 51702 INSERT TEMP BLADDER CATH: CPT

## 2022-11-16 PROCEDURE — 99024 PR POST-OP FOLLOW-UP VISIT: ICD-10-PCS | Mod: POP,,,

## 2022-11-16 PROCEDURE — 25000003 PHARM REV CODE 250: Performed by: PHYSICIAN ASSISTANT

## 2022-11-16 PROCEDURE — 25000242 PHARM REV CODE 250 ALT 637 W/ HCPCS: Performed by: STUDENT IN AN ORGANIZED HEALTH CARE EDUCATION/TRAINING PROGRAM

## 2022-11-16 PROCEDURE — 99024 POSTOP FOLLOW-UP VISIT: CPT | Mod: POP,,,

## 2022-11-16 PROCEDURE — 99233 PR SUBSEQUENT HOSPITAL CARE,LEVL III: ICD-10-PCS | Mod: ,,, | Performed by: NURSE PRACTITIONER

## 2022-11-16 PROCEDURE — 99900026 HC AIRWAY MAINTENANCE (STAT)

## 2022-11-16 PROCEDURE — 25000003 PHARM REV CODE 250: Performed by: NURSE PRACTITIONER

## 2022-11-16 PROCEDURE — 97535 SELF CARE MNGMENT TRAINING: CPT

## 2022-11-16 PROCEDURE — 97112 NEUROMUSCULAR REEDUCATION: CPT

## 2022-11-16 PROCEDURE — 99233 SBSQ HOSP IP/OBS HIGH 50: CPT | Mod: ,,, | Performed by: NURSE PRACTITIONER

## 2022-11-16 PROCEDURE — 27200966 HC CLOSED SUCTION SYSTEM

## 2022-11-16 PROCEDURE — 25000003 PHARM REV CODE 250

## 2022-11-16 PROCEDURE — 94761 N-INVAS EAR/PLS OXIMETRY MLT: CPT

## 2022-11-16 PROCEDURE — 11000001 HC ACUTE MED/SURG PRIVATE ROOM

## 2022-11-16 PROCEDURE — 63600175 PHARM REV CODE 636 W HCPCS: Performed by: NURSE PRACTITIONER

## 2022-11-16 PROCEDURE — A4216 STERILE WATER/SALINE, 10 ML: HCPCS | Performed by: STUDENT IN AN ORGANIZED HEALTH CARE EDUCATION/TRAINING PROGRAM

## 2022-11-16 PROCEDURE — 99232 SBSQ HOSP IP/OBS MODERATE 35: CPT | Mod: ,,, | Performed by: STUDENT IN AN ORGANIZED HEALTH CARE EDUCATION/TRAINING PROGRAM

## 2022-11-16 PROCEDURE — 27000221 HC OXYGEN, UP TO 24 HOURS

## 2022-11-16 PROCEDURE — 83735 ASSAY OF MAGNESIUM: CPT | Performed by: PHYSICIAN ASSISTANT

## 2022-11-16 PROCEDURE — 94760 N-INVAS EAR/PLS OXIMETRY 1: CPT

## 2022-11-16 PROCEDURE — 80053 COMPREHEN METABOLIC PANEL: CPT | Performed by: PHYSICIAN ASSISTANT

## 2022-11-16 PROCEDURE — 99900035 HC TECH TIME PER 15 MIN (STAT)

## 2022-11-16 PROCEDURE — 99232 PR SUBSEQUENT HOSPITAL CARE,LEVL II: ICD-10-PCS | Mod: ,,, | Performed by: STUDENT IN AN ORGANIZED HEALTH CARE EDUCATION/TRAINING PROGRAM

## 2022-11-16 PROCEDURE — 36415 COLL VENOUS BLD VENIPUNCTURE: CPT | Performed by: PHYSICIAN ASSISTANT

## 2022-11-16 PROCEDURE — 25000003 PHARM REV CODE 250: Performed by: PSYCHIATRY & NEUROLOGY

## 2022-11-16 PROCEDURE — 85025 COMPLETE CBC W/AUTO DIFF WBC: CPT | Performed by: PHYSICIAN ASSISTANT

## 2022-11-16 PROCEDURE — 94640 AIRWAY INHALATION TREATMENT: CPT

## 2022-11-16 PROCEDURE — S4991 NICOTINE PATCH NONLEGEND: HCPCS | Performed by: PHYSICIAN ASSISTANT

## 2022-11-16 RX ORDER — CEFEPIME HYDROCHLORIDE 1 G/50ML
2 INJECTION, SOLUTION INTRAVENOUS
Status: DISCONTINUED | OUTPATIENT
Start: 2022-11-16 | End: 2022-11-21 | Stop reason: HOSPADM

## 2022-11-16 RX ORDER — SODIUM CHLORIDE FOR INHALATION 3 %
4 VIAL, NEBULIZER (ML) INHALATION EVERY 6 HOURS PRN
Status: DISCONTINUED | OUTPATIENT
Start: 2022-11-16 | End: 2022-11-21 | Stop reason: HOSPADM

## 2022-11-16 RX ORDER — IPRATROPIUM BROMIDE AND ALBUTEROL SULFATE 2.5; .5 MG/3ML; MG/3ML
3 SOLUTION RESPIRATORY (INHALATION)
Status: DISCONTINUED | OUTPATIENT
Start: 2022-11-16 | End: 2022-11-21 | Stop reason: HOSPADM

## 2022-11-16 RX ADMIN — SILODOSIN 4 MG: 4 CAPSULE ORAL at 09:11

## 2022-11-16 RX ADMIN — IPRATROPIUM BROMIDE AND ALBUTEROL SULFATE 3 ML: 2.5; .5 SOLUTION RESPIRATORY (INHALATION) at 03:11

## 2022-11-16 RX ADMIN — Medication 10 ML: at 11:11

## 2022-11-16 RX ADMIN — OXACILLIN SODIUM 12 G: 10 INJECTION, POWDER, FOR SOLUTION INTRAVENOUS at 02:11

## 2022-11-16 RX ADMIN — HEPARIN SODIUM 5000 UNITS: 5000 INJECTION INTRAVENOUS; SUBCUTANEOUS at 02:11

## 2022-11-16 RX ADMIN — FAMOTIDINE 20 MG: 20 TABLET ORAL at 09:11

## 2022-11-16 RX ADMIN — POLYETHYLENE GLYCOL 3350 17 G: 17 POWDER, FOR SOLUTION ORAL at 09:11

## 2022-11-16 RX ADMIN — Medication 10 ML: at 12:11

## 2022-11-16 RX ADMIN — OXYCODONE 5 MG: 5 TABLET ORAL at 02:11

## 2022-11-16 RX ADMIN — OXYCODONE 5 MG: 5 TABLET ORAL at 06:11

## 2022-11-16 RX ADMIN — IPRATROPIUM BROMIDE AND ALBUTEROL SULFATE 3 ML: 2.5; .5 SOLUTION RESPIRATORY (INHALATION) at 08:11

## 2022-11-16 RX ADMIN — SCOPALAMINE 1 PATCH: 1 PATCH, EXTENDED RELEASE TRANSDERMAL at 08:11

## 2022-11-16 RX ADMIN — CETIRIZINE HYDROCHLORIDE 5 MG: 5 TABLET, FILM COATED ORAL at 08:11

## 2022-11-16 RX ADMIN — PREGABALIN 200 MG: 150 CAPSULE ORAL at 08:11

## 2022-11-16 RX ADMIN — Medication 1 PATCH: at 08:11

## 2022-11-16 RX ADMIN — METHADONE HYDROCHLORIDE 90 MG: 10 TABLET ORAL at 04:11

## 2022-11-16 RX ADMIN — HEPARIN SODIUM 5000 UNITS: 5000 INJECTION INTRAVENOUS; SUBCUTANEOUS at 08:11

## 2022-11-16 RX ADMIN — PREGABALIN 200 MG: 150 CAPSULE ORAL at 02:11

## 2022-11-16 RX ADMIN — Medication 10 ML: at 06:11

## 2022-11-16 RX ADMIN — OXYCODONE 5 MG: 5 TABLET ORAL at 11:11

## 2022-11-16 RX ADMIN — FAMOTIDINE 20 MG: 20 TABLET ORAL at 08:11

## 2022-11-16 RX ADMIN — SENNOSIDES AND DOCUSATE SODIUM 1 TABLET: 50; 8.6 TABLET ORAL at 09:11

## 2022-11-16 RX ADMIN — CEFEPIME 2 G: 2 INJECTION, POWDER, FOR SOLUTION INTRAVENOUS at 08:11

## 2022-11-16 RX ADMIN — HEPARIN SODIUM 5000 UNITS: 5000 INJECTION INTRAVENOUS; SUBCUTANEOUS at 06:11

## 2022-11-16 RX ADMIN — SENNOSIDES AND DOCUSATE SODIUM 1 TABLET: 50; 8.6 TABLET ORAL at 08:11

## 2022-11-16 NOTE — PROGRESS NOTES
[facility] - Neurosurgery ([facility])  Infectious Disease  Progress Note    Patient Name: Peter Stiles  MRN: [id number]  Admission Date: 10/25/2022  Length of Stay: 21 days  Attending Physician: Alejandro Recio MD  Primary Care Provider: Og Victoria NP    Isolation Status: No active isolations  Assessment/Plan:      * Fever     51 year old with  dilated cardiomyopathy and remote opioid dependence (on methadone as an outpatient) admitted 10/25 with neck and back pain and lower body numbness and was found to have MSSA bacteremia and C5-6 osteomyelitis, discitis and epidural collection.  TTE negative.  S/p corpectomy/ fusion and epidural abscess evacuation 10/28 and s/p C2-T2 decompression/fusion with NSGY 11/2, on 6 week course of IV oxacillin (unable to use rifampin due to DDI with methadone) (EOC 12/9).       ID re-consulted 11/14 for intermittent daily fevers.  Repeat blood cx on 11/14 NGTD. Bilateral LE US negative for DVT.  Noted to have increased secretions from tracheostomy (in place d/t multiple failed extubations).  CXR 11/14 was without focal consolidation or evidence of acute parenchymal process.  Repeat CXR today with newly developed patchy airspace consolidation right lung base, with loss of volume and pleural fluid left lung base.       Remains afebrile, no leukocytosis. CRP 93 (up mildly from 78). Somewhat lethargic today, though responsive to questions/commands.  On 5l O2 by trach collar with Sats 92-97%.  Pinzon's stain + (? Drug fever)  but no peripheral eosinophilia.  Creatinine wnl.  HDS      Recommendations:  - Continue oxacillin for MSSA bacteremia/epidural abscess for now  - Recommend Pulmonary evaluation - ? Diagnostic thoracentesis left pleural effusion?   - Will follow up tomorrow for further evaluation/recommendations.     Data reviewed and plan discussed with ID staff, Dr. Walker  Secure chat/Discussed above plan with Primary Team, Dr. Recio        Osteomyelitis of cervical  spine  See assessment/plan above         Thank you.   Please Secure Chat for any questions or concerns.  NEELA Leblanc, ANP-C    Subjective:     Principal Problem:Fever    HPI: Ms. Stiles is a 52 yo F with PMHx of HTN, dilated cardiomyopathy and remote opioid dependence who presented on 10/25 with neck and back pain and lower body numbness and was found to have C5-6 osteomyelitis, discitis and epidural collection.    She first noted the symptoms 2 weeks ago when she was in the car with her daughter. Her daughter slammed on the breaks and the patient noted a soreness in her neck. Since then, she has had increasing neck pain. This morning she noted numbess to her stomach and below and pain in her legs. She also endorses tingling to her fingers. WBC elevated and MRI spine revealed C5-6 osteomyelitis, discitis and epidural collection. She denies IVDU.     Of note, pt reports wounds to her arms after burning herself and her cat with hot grease. She also notes a knot to her R upper back at the site where her cat scratched her. Denies prior back surgery .          Interval History:   No recurrent fever.    Repeat blood cx 11/14 NGTD   Repeat CXR this morning showing patchy airspace consolidation RLL and pleural fluid left lung - read as new development from yesterday.     Review of Systems   Constitutional:  Negative for chills, diaphoresis, fatigue and fever.   HENT:          With trach. Reports increase oral secretions.    Respiratory:  Positive for cough. Negative for shortness of breath.    Cardiovascular:  Negative for chest pain, palpitations and leg swelling.   Gastrointestinal:  Negative for diarrhea, nausea and vomiting.   Genitourinary:  Negative for difficulty urinating and dysuria.   Musculoskeletal:  Positive for myalgias. Negative for arthralgias and back pain.        Right leg pain    Skin:  Negative for color change, rash and wound.   Neurological:  Positive for speech difficulty (trach with o2 collar.  communicates with white board and yes/no answers). Negative for dizziness, weakness and numbness.   Psychiatric/Behavioral:  Negative for agitation and confusion.    Objective:     Vital Signs (Most Recent):  Temp: 99.5 °F (37.5 °C) (11/15/22 0757)  Pulse: 65 (11/15/22 0757)  Resp: 20 (11/15/22 1346)  BP: (!) 91/53 (11/15/22 0757)  SpO2: 98 % (11/15/22 1644) Vital Signs (24h Range):  Temp:  [98.5 °F (36.9 °C)-99.5 °F (37.5 °C)] 99.5 °F (37.5 °C)  Pulse:  [57-72] 65  Resp:  [16-20] 20  SpO2:  [92 %-98 %] 98 %  BP: ()/(48-53) 91/53     Weight:  (daylight savings)  Body mass index is 22.6 kg/m².    Estimated Creatinine Clearance: 68.5 mL/min (based on SCr of 0.9 mg/dL).    Physical Exam  Vitals and nursing note reviewed.   Constitutional:       General: She is not in acute distress.     Appearance: Normal appearance. She is not ill-appearing, toxic-appearing or diaphoretic.      Comments: Lethargic but responsive.      HENT:      Head: Normocephalic and atraumatic.      Mouth/Throat:      Comments: Poor dentition    Eyes:      General: No scleral icterus.     Conjunctiva/sclera: Conjunctivae normal.   Neck:      Comments: Cervical collar   Trach - no significant secretions at time of my exam  Cardiovascular:      Rate and Rhythm: Normal rate and regular rhythm.      Heart sounds: No murmur heard.  Pulmonary:      Effort: Pulmonary effort is normal. No respiratory distress.      Breath sounds: No stridor. No wheezing.      Comments: Clear anteriorly  Crackles lower lobes  Abdominal:      General: Bowel sounds are normal. There is no distension.      Palpations: Abdomen is soft.      Tenderness: There is no abdominal tenderness. There is no guarding.   Musculoskeletal:      Right lower leg: No edema.      Left lower leg: No edema.   Skin:     General: Skin is warm and dry.   Neurological:      Mental Status: Mental status is at baseline.       Significant Labs:   Microbiology Results (last 7 days)       Procedure  Component Value Units Date/Time    AFB Culture & Smear [[id number]] Collected: 10/28/22 0924    Order Status: Completed Specimen: Abscess from Neck Updated: 11/15/22 1557     AFB Culture & Smear ~Culture in progress     AFB CULTURE STAIN No acid fast bacilli seen.    Narrative:      2) Prevertebral absess    AFB Culture & Smear [[id number]] Collected: 10/28/22 0906    Order Status: Completed Specimen: Abscess from Back Updated: 11/15/22 1557     AFB Culture & Smear Culture in progress     AFB CULTURE STAIN No acid fast bacilli seen.    Narrative:      Prevertebral Abscess    Blood culture [[id number]] Collected: 11/14/22 0939    Order Status: Completed Specimen: Blood from Peripheral, Left Hand Updated: 11/15/22 1012     Blood Culture, Routine No Growth to date      No Growth to date    Blood culture [[id number]] Collected: 11/14/22 0940    Order Status: Completed Specimen: Blood from Peripheral, Right Hand Updated: 11/15/22 1012     Blood Culture, Routine No Growth to date      No Growth to date    Blood culture [[id number]]     Order Status: Canceled Specimen: Blood     Blood culture [[id number]]     Order Status: Canceled Specimen: Blood             Significant Imaging: I have reviewed all pertinent imaging results/findings within the past 24 hours.

## 2022-11-16 NOTE — RESPIRATORY THERAPY
RAPID RESPONSE RESPIRATORY THERAPY PROACTIVE NOTE           Time of visit: 937     Code Status: Full Code   : 1970  Bed: 952/952 A:   MRN: [id number]  Time spent at the bedside: < 15 min    SITUATION    Evaluated patient for: LDA Check     BACKGROUND    Patient has a past medical history of CHF (congestive heart failure), Essential (primary) hypertension, Opioid dependence on maintenance agonist therapy, no symptoms, and Smoking.  Clinically Significant Surgical Hx: tracheostomy    24 Hours Vitals Range:  Temp:  [97.1 °F (36.2 °C)-100.5 °F (38.1 °C)]   Pulse:  [54-63]   Resp:  [16-20]   BP: ()/(47-58)   SpO2:  [94 %-98 %]     Labs:    Recent Labs     22  0303 11/15/22  0403 22  0400    139 142   K 4.0 3.9 4.3    106 107   CO2 24 26 26   CREATININE 0.8 0.9 0.9    108 105   PHOS 4.2 3.5 4.3   MG 2.0 2.1 2.1        No results for input(s): PH, PCO2, PO2, HCO3, POCSATURATED, BE in the last 72 hours.    ASSESSMENT/INTERVENTIONS  RN and tech at bedside with patient. All safety supplies visible at bedside. No respiratory concerns at this time.      Last VS   Temp: 100.5 °F (38.1 °C) (817)  Pulse: 61 (817)  Resp: 16 (817)  BP: 98/47 (817)  SpO2: 94 % (817)      Extra trachs at bedside: 6 & 8 cuffed  Level of Consciousness: Level of Consciousness (AVPU): alert  Respiratory Effort: Respiratory Effort: Unlabored Expansion/Accessory Muscle Usage: Expansion/Accessory Muscles/Retractions: no use of accessory muscles  All Lung Field Breath Sounds: All Lung Fields Breath Sounds: Anterior:, Lateral:, coarse  NANETTE Breath Sounds: equal bilaterally  LLL Breath Sounds: coarse  RLL Breath Sounds: coarse  O2 Device/Concentration: 5L 28% TC  Surgical airway: Yes, Type: Shiley Size: 8, cuffed  Ambu at bedside: Ambu bag with the patient?: Yes, Adult Ambu     Active Orders   Respiratory Care    Inhalation Treatment Q4H PRN     Frequency: Q4H PRN     Number of  Occurrences: Until Specified    Oxygen Continuous     Frequency: Continuous     Number of Occurrences: Until Specified     Order Questions:      Device type: Low flow      Device: Trach Collar      FiO2%: 28      Titrate O2 per Oxygen Titration Protocol: Yes      To maintain SpO2 goal of: >= 90%      Notify MD of: Inability to achieve desired SpO2; Sudden change in patient status and requires 20% increase in FiO2; Patient requires >60% FiO2    POCT ARTERIAL BLOOD GAS Blood Gas     Frequency: PRN     Number of Occurrences: Until Specified     Order Comments: Notify Physician if: see parameters below.       Order Questions:      Component: Blood Gas    Trach care every 12 hours and as needed     Frequency: BID     Number of Occurrences: Until Specified    Trach: Assess suction need every 2 hours and as needed     Frequency: BID     Number of Occurrences: Until Specified       RECOMMENDATIONS    We recommend: RRT Recs: Continue POC per primary team.      FOLLOW-UP    Please call back the Rapid Response RT, Samantha Parker, RRT at x [phone] for any questions or concerns.

## 2022-11-16 NOTE — PLAN OF CARE
Problem: Adult Inpatient Plan of Care  Goal: Plan of Care Review  Outcome: Ongoing, Progressing     Problem: Fall Injury Risk  Goal: Absence of Fall and Fall-Related Injury  Outcome: Ongoing, Progressing     Patient is AAO x4. POC reviewed with patient. Patient verbalized understanding. Patient's breathing is unlabored with equal chest expansion. Patient has a trache with a trache collar in place with 5L. Patient has an incision to posterior neck. Patient has NGT to R nare with Diabetisource infusing at goal rate of 50 ml/hr. Patient has PICC to SYD with oxacillin infusing at 20.8ml/hr. Patient voids per courtney due to urinary retention. Patient remained free from falls. Patient rested well through shift. Bed in lowest position,bed alarm on, side rails up x3, no complaints or signs of distress. WCTM during shift.  See flowsheets for full assessment and VS info.

## 2022-11-16 NOTE — SUBJECTIVE & OBJECTIVE
Interval History:  Patient spiked fever again 100.5. Per pulm recs added nebs and HTS nebs prn. ID following. Repeat blood cx NGTD     Review of Systems  Objective:     Vital Signs (Most Recent):  Temp: (!) 100.4 °F (38 °C) (11/16/22 1155)  Pulse: 65 (11/16/22 1218)  Resp: 15 (11/16/22 1218)  BP: 112/66 (11/16/22 1155)  SpO2: 96 % (11/16/22 1218)   Vital Signs (24h Range):  Temp:  [97.1 °F (36.2 °C)-100.5 °F (38.1 °C)] 100.4 °F (38 °C)  Pulse:  [54-67] 65  Resp:  [15-20] 15  SpO2:  [94 %-98 %] 96 %  BP: ()/(47-66) 112/66     Weight:  (daylight savings)  Body mass index is 22.6 kg/m².    Intake/Output Summary (Last 24 hours) at 11/16/2022 1246  Last data filed at 11/16/2022 0730  Gross per 24 hour   Intake 490 ml   Output 1700 ml   Net -1210 ml      Physical Exam  Vitals and nursing note reviewed.   HENT:      Head: Normocephalic.      Nose: Nose normal.      Mouth/Throat:      Mouth: Mucous membranes are moist.      Pharynx: Oropharynx is clear.   Neck:      Comments: Trach collar   Cardiovascular:      Rate and Rhythm: Normal rate and regular rhythm.      Pulses: Normal pulses.      Heart sounds: Normal heart sounds.   Pulmonary:      Effort: Pulmonary effort is normal.      Breath sounds: Normal breath sounds.   Abdominal:      General: Bowel sounds are normal.      Palpations: Abdomen is soft.   Musculoskeletal:         General: Normal range of motion.   Skin:     General: Skin is warm and dry.   Neurological:      Mental Status: She is alert and oriented to person, place, and time. Mental status is at baseline.      Comments:      Psychiatric:         Mood and Affect: Mood normal.         Behavior: Behavior normal.       MELD-Na score: 6 at 11/3/2022  1:47 AM  MELD score: 6 at 11/3/2022  1:47 AM  Calculated from:  Serum Creatinine: 0.7 mg/dL (Using min of 1 mg/dL) at 11/3/2022  1:47 AM  Serum Sodium: 141 mmol/L (Using max of 137 mmol/L) at 11/3/2022  1:47 AM  Total Bilirubin: 0.4 mg/dL (Using min of 1  mg/dL) at 11/3/2022  1:47 AM  INR(ratio): 1.0 at 11/1/2022  9:28 PM  Age: 51 years    Significant Labs:  CBC:  Recent Labs   Lab 11/15/22  0403 11/16/22  0400   WBC 8.71 10.38   HGB 7.5* 7.7*   HCT 25.4* 23.5*    309     CMP:  Recent Labs   Lab 11/15/22  0403 11/16/22  0400    142   K 3.9 4.3    107   CO2 26 26    105   BUN 16 18   CREATININE 0.9 0.9   CALCIUM 8.9 9.1   PROT 6.0 6.1   ALBUMIN 1.9* 1.8*   BILITOT 0.3 0.3   ALKPHOS 146* 143*   AST 34 46*   ALT 28 34   ANIONGAP 7* 9
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
## 2022-11-16 NOTE — SUBJECTIVE & OBJECTIVE
Interval History: NAEON. Febrile today around noon to 100.5. Pulm recs for nebs and HTS nebs prn. Neuro stable. Nsgy will continue to follow peripherally until discharge. Follow up will be arranged.    Medications:  Continuous Infusions:  Scheduled Meds:   albuterol-ipratropium  3 mL Nebulization Q6H WAKE    cetirizine  5 mg Per NG tube Daily    famotidine  20 mg Per NG tube BID    heparin (porcine)  5,000 Units Subcutaneous Q8H    methadone  90 mg Per NG tube Daily    nicotine  1 patch Transdermal Daily    oxacillin 12 g in  mL CONTINUOUS INFUSION  12 g Intravenous Q24H    oxyCODONE  5 mg Per NG tube Q6H    polyethylene glycol  17 g Per NG tube Daily    pregabalin  200 mg Per NG tube TID    scopolamine  1 patch Transdermal Q3 Days    senna-docusate 8.6-50 mg  1 tablet Per NG tube BID    silodosin  4 mg Per NG tube Daily    sodium chloride 0.9%  10 mL Intravenous Q6H     PRN Meds:acetaminophen, albuterol-ipratropium, benzocaine, bisacodyL, dextrose 10%, dextrose 10%, diazePAM, fentaNYL, glucagon (human recombinant), hydrALAZINE, hydrOXYzine HCL, magnesium oxide, magnesium oxide, ondansetron, potassium bicarbonate, potassium bicarbonate, potassium bicarbonate, potassium, sodium phosphates, potassium, sodium phosphates, potassium, sodium phosphates, racepinephrine, simethicone, sodium chloride 0.9%, Flushing PICC Protocol **AND** sodium chloride 0.9% **AND** sodium chloride 0.9%, sodium chloride 3%     Review of Systems  Objective:     Weight:  (daylight savings)  Body mass index is 22.6 kg/m².  Vital Signs (Most Recent):  Temp: 99.2 °F (37.3 °C) (11/16/22 1200)  Pulse: 65 (11/16/22 1218)  Resp: 20 (11/16/22 1431)  BP: 112/66 (11/16/22 1155)  SpO2: 96 % (11/16/22 1218) Vital Signs (24h Range):  Temp:  [97.1 °F (36.2 °C)-100.5 °F (38.1 °C)] 99.2 °F (37.3 °C)  Pulse:  [54-67] 65  Resp:  [15-20] 20  SpO2:  [94 %-98 %] 96 %  BP: ()/(47-66) 112/66     Date 11/16/22 0700 - 11/17/22 0659   Shift 5835-4274  "5732-0730 9487-5730 24 Hour Total   INTAKE   NG/   150   Shift Total(mL/kg) 150(2.4)   150(2.4)   OUTPUT   Shift Total(mL/kg)       Weight (kg) 63.5 63.5 63.5 63.5              Oxygen Concentration (%):  [28] 28         NG/OG Tube 11/06/22 Right nostril (Active)   Placement Check placement verified by aspirate characteristics;placement verified by x-ray 11/13/22 1105   Tolerance no signs/symptoms of discomfort 11/16/22 0730   Securement secured to nostril center 11/16/22 0730   Clamp Status/Tolerance unclamped 11/16/22 0730   Suction Setting/Drainage Method dependent drainage 11/16/22 0400   Insertion Site Appearance no redness, warmth, tenderness, skin breakdown, drainage 11/14/22 2140   Drainage Milky 11/16/22 0400   Flush/Irrigation flushed w/;sterile normal saline 11/14/22 2140   Feeding Type continuous;by pump 11/14/22 2140   Feeding Action feeding continued 11/14/22 2140   Current Rate (mL/hr) 50 mL/hr 11/16/22 0730   Goal Rate (mL/hr) 50 mL/hr 11/16/22 0730   Intake (mL) 40 mL 11/15/22 2046   Water Bolus (mL) 150 mL 11/16/22 0730   Formula Name Diabetasource 11/16/22 0730   Intake (mL) - Formula Tube Feeding 550 11/13/22 1648   Residual Amount (ml) 60 ml 11/15/22 2334            Urethral Catheter 11/10/22 1335 Latex 16 Fr. (Active)   $ Patiño Insertion Bedside Insertion Performed 11/16/22 0852   Site Assessment Clean;Intact 11/16/22 0852   Collection Container Urimeter 11/16/22 0852   Securement Method secured to top of thigh w/ adhesive device 11/16/22 0852   Catheter Care Performed yes 11/16/22 0852   Reason for Continuing Urinary Catheterization Urinary retention 11/16/22 0852   CAUTI Prevention Bundle Securement Device in place with 1" slack;Intact seal between catheter & drainage tubing;Drainage bag/urimeter off the floor;Sheeting clip in use;No dependent loops or kinks;Drainage bag/urimeter not overfilled (<2/3 full);Drainage bag/urimeter below bladder 11/16/22 0852   Output (mL) 1700 mL 11/16/22 " 0354       Neurosurgery Physical Exam  General: Well developed, well nourished, not in acute distress.   Head: Normocephalic, atraumatic.  Neck: Trach in place.  Neurologic: Alert and oriented x 4. Thought content appropriate.  GCS: Motor:6  Verbal:5  Eyes:4   GCS Total: 15  Language: No aphasia.  Speech: No dysarthria.  Cranial nerves: Face symmetric, tongue midline, CN II-XII grossly intact.   Eyes: Pupils equal, round, reactive to light with accomodation, EOMI.  Pulmonary: Normal respirations, no signs of respiratory distress.  Abdomen: Soft, non-distended, non-tender to palpation.  Sensory: Intact to light touch throughout.  Motor Strength: Moves all extremities spontaneously with good tone. BUE full strength, BLE significantly pain limited and hypersensitivity but at least AG. No abnormal movements seen.   Skin: Skin is warm, dry and intact.  Posterior incision clean/dry/intact with prineo.   Anterior incision with limited visualization under trach collar but clean/dry/intact with dermabond.     Significant Labs:  Recent Labs   Lab 11/15/22  0403 11/16/22  0400    105    142   K 3.9 4.3    107   CO2 26 26   BUN 16 18   CREATININE 0.9 0.9   CALCIUM 8.9 9.1   MG 2.1 2.1     Recent Labs   Lab 11/15/22  0403 11/16/22  0400   WBC 8.71 10.38   HGB 7.5* 7.7*   HCT 25.4* 23.5*    309     No results for input(s): LABPT, INR, APTT in the last 48 hours.  Microbiology Results (last 7 days)       Procedure Component Value Units Date/Time    Fungus culture [[id number]] Collected: 10/28/22 1029    Order Status: Completed Specimen: Wound from Neck Updated: 11/16/22 1303     Fungus (Mycology) Culture Culture in progress      No fungus isolated after 2 weeks    Narrative:      4) Epidural phlegman    Fungus culture [[id number]] Collected: 10/28/22 0950    Order Status: Completed Specimen: Wound from Neck Updated: 11/16/22 1303     Fungus (Mycology) Culture Culture in progress      No fungus isolated  after 2 weeks    Narrative:      3) Osteodiscitis    Fungus culture [[id number]] Collected: 10/28/22 0924    Order Status: Completed Specimen: Abscess from Neck Updated: 11/16/22 1303     Fungus (Mycology) Culture Culture in progress      No fungus isolated after 2 weeks    Narrative:      2) Prevertebral absess    Fungus culture [[id number]] Collected: 10/28/22 0906    Order Status: Completed Specimen: Abscess from Back Updated: 11/16/22 1303     Fungus (Mycology) Culture Culture in progress      No fungus isolated after 2 weeks    Narrative:      Prevertebral Abscess    Culture, Respiratory with Gram Stain [[id number]]     Order Status: No result Specimen: Respiratory     Blood culture [[id number]] Collected: 11/14/22 0940    Order Status: Completed Specimen: Blood from Peripheral, Right Hand Updated: 11/16/22 1012     Blood Culture, Routine No Growth to date      No Growth to date      No Growth to date    Blood culture [[id number]] Collected: 11/14/22 0939    Order Status: Completed Specimen: Blood from Peripheral, Left Hand Updated: 11/16/22 1012     Blood Culture, Routine No Growth to date      No Growth to date      No Growth to date    AFB Culture & Smear [[id number]] Collected: 10/28/22 0924    Order Status: Completed Specimen: Abscess from Neck Updated: 11/15/22 1557     AFB Culture & Smear ~Culture in progress     AFB CULTURE STAIN No acid fast bacilli seen.    Narrative:      2) Prevertebral absess    AFB Culture & Smear [[id number]] Collected: 10/28/22 0906    Order Status: Completed Specimen: Abscess from Back Updated: 11/15/22 1557     AFB Culture & Smear Culture in progress     AFB CULTURE STAIN No acid fast bacilli seen.    Narrative:      Prevertebral Abscess    Blood culture [[id number]]     Order Status: Canceled Specimen: Blood     Blood culture [[id number]]     Order Status: Canceled Specimen: Blood           All pertinent labs from the last 24 hours have been reviewed.    Significant Diagnostics:  I  have reviewed and interpreted all pertinent imaging results/findings within the past 24 hours.

## 2022-11-16 NOTE — ASSESSMENT & PLAN NOTE
51 year old with  dilated cardiomyopathy and remote opioid dependence (on methadone as an outpatient) admitted 10/25 with neck and back pain and lower body numbness and was found to have MSSA bacteremia and C5-6 osteomyelitis, discitis and epidural collection.  TTE negative.  S/p corpectomy/ fusion and epidural abscess evacuation 10/28 and s/p C2-T2 decompression/fusion with NSGY 11/2, on 6 week course of IV oxacillin (unable to use rifampin due to DDI with methadone) (EOC 12/9).       ID re-consulted 11/14 for intermittent daily fevers.  Repeat blood cx on 11/14 NGTD. Bilateral LE US negative for DVT.  Noted to have increased secretions from tracheostomy (in place d/t multiple failed extubations).  CXR 11/14 was without focal consolidation or evidence of acute parenchymal process.  Repeat CXR today with newly developed patchy airspace consolidation right lung base, with loss of volume and pleural fluid left lung base.       Remains afebrile, no leukocytosis. CRP 93 (up mildly from 78). Somewhat lethargic today, though responsive to questions/commands.  On 5l O2 by trach collar with Sats 92-97%.  Pinzon's stain + (? Drug fever)  but no peripheral eosinophilia.  Creatinine wnl.  HDS      Recommendations:  - Continue oxacillin for MSSA bacteremia/epidural abscess for now  - Recommend Pulmonary evaluation - ? Diagnostic thoracentesis left pleural effusion?   - Will follow up tomorrow for further evaluation/recommendations.     Data reviewed and plan discussed with ID staff, Dr. Walker  Secure chat/Discussed above plan with Primary Team, Dr. Recio

## 2022-11-16 NOTE — ASSESSMENT & PLAN NOTE
Peter Stiles is a 51 y.o. female with PMHx of HTN, dilated cardiomyopathy, h/o opioid dependence, cigarette smoker (2pks/day), and daily baby ASA use who presents with 1 day of decreased sensation from T5 level down and tingling in her bilateral fingers and bilateral toes. MRI imaging obtained in the ED showing possible C5-6 osteodiscitis/myelitis with cord compression. Patient tachycardic and hypertensive on arrival, afebrile, with leukocytosis.    Now s/p C5-6 corpectomy on 10/28/22; C2-T2 PCDF on 11/2.    - Stepped down to medicine service.  - Q4hr neurochecks.  - All pertinent imaging/labs personally reviewed and interpreted.   - Surgical drains removed with no issues.  - Requiring trach on 11/7 after failed extubation multiple times.  - Holding off on gtube placement to see if progressing with speech therapy. Ongoing.  - ID:   --Intermittently febrile. BLE US negative. Rest of fever workup per primary. Encourage IS hourly. Repeat CXR showing increased consolidation and pleural effusion at right lung base. Pulm recs appreciated.   --BCx 10/25 w/ MSSA, Repeat BCx's 10/27 NGTD   --OR cultures 10/28 w/ MSSA   --ID consulted, rec for IV Oxacillin.  - Continue c-collar when out of bed.  - Pain control with multimodal regimen.  - Okay for SQH.  - Please call NSGY with any questions/concerns    Dispo: per primary

## 2022-11-16 NOTE — PT/OT/SLP PROGRESS
Physical Therapy Co-Treatment    Patient Name:  Peter Stiles   MRN:  [id number]    Recommendations:     Discharge Recommendations:  rehabilitation facility   Discharge Equipment Recommendations: hospital bed, lift device, wheelchair   Barriers to discharge: Decreased caregiver support at current functional level    Assessment:     Peter Stiles is a 51 y.o. female admitted with a medical diagnosis of Fever.  She presents with the following impairments/functional limitations:  weakness, impaired endurance, impaired self care skills, impaired functional mobility, gait instability, impaired cognition, decreased coordination, decreased upper extremity function, decreased lower extremity function, decreased ROM, impaired sensation, impaired coordination, impaired balance, decreased safety awareness, impaired cardiopulmonary response to activity, pain, impaired skin. Pt presents agreeable to participate in therapy session but remains with global weakness. Pt continues to require increased time and assistance to complete functional mobility at this time. Sat EOB with assist while MD performed pulmonary US. Pt demo'ing impaired seated postural control and requiring assist to maintain seated balance; able to maintain balance without physical assist for very brief trials. Impaired endurance noted with progressive fatigue while sitting EOB, requiring return to supine for recovery. Pt would continue to benefit from skilled acute PT in order to address current deficits and progress functional mobility.     Rehab Prognosis: Good; patient would benefit from acute skilled PT services to address these deficits and reach maximum level of function.    Recent Surgery: Procedure(s) (LRB):  CREATION, TRACHEOSTOMY (N/A)  LARYNGOSCOPY, DIRECT 9 Days Post-Op    Plan:     During this hospitalization, patient to be seen 4 x/week to address the identified rehab impairments via therapeutic activities, therapeutic exercises,  neuromuscular re-education, gait training and progress toward the following goals:    Plan of Care Expires:  22    Subjective     Chief Complaint: hypersensitivity LLE  Patient/Family Comments/goals: At end of session, pt asking if PT/OT had seen her mother. Described her mother as a lady with big black hair. S.O. at bedside informs therapists and pt that her mother has been  for 9 years.   Pain/Comfort:  Pain Rating 1:  (reported LLE pain to touch and abdominal discomfort)  Pain Addressed 1: Reposition, Distraction, Cessation of Activity      Objective:     Communicated with RN prior to session.  Patient found supine with telemetry, courtney catheter, bed alarm, NG tube, PICC line, oxygen, Tracheostomy, cervical collar upon PT entry to room.     General Precautions: Standard, fall, aspiration, NPO   Orthopedic Precautions:spinal precautions   Braces: Cervical collar  Respiratory Status:  trach collar in place     Functional Mobility:  Bed Mobility:     Rolling Left:  maximal assistance  Scooting: total assistance and of 2 persons, supine with drawsheet to HOB  Supine to Sit: maximal assistance and of 2 persons using bed rail  Cues provided for sequencing and technique   With increased time needed to complete   Sit to Supine: maximal assistance and of 2 persons using bed rail   Cues provided for sequencing and technique  With increased time needed to complete   Transfers:  deferred 2* increased assist needed to maintain sitting balance and increasing fatigue    Balance:   sitting EOB: x~15-20 min with min-maxA; occasional CGA for very brief trials   Increased L posterolateral lean  Max cues for upright posture, midline positioning, hand placement, balance corrections  C-collar adjusted for improved pt fit  MD at bedside to complete pulmonary US  Increasing fatigue noted with pt requesting return to supine     AM-PAC 6 CLICK MOBILITY  Turning over in bed (including adjusting bedclothes, sheets and  blankets)?: 2  Sitting down on and standing up from a chair with arms (e.g., wheelchair, bedside commode, etc.): 1  Moving from lying on back to sitting on the side of the bed?: 2  Moving to and from a bed to a chair (including a wheelchair)?: 1  Need to walk in hospital room?: 1  Climbing 3-5 steps with a railing?: 1  Basic Mobility Total Score: 8       Treatment & Education:  Pt educated on role of PT and PT POC. Pt indicated understanding.   Pt oriented to call bell and instructed to call for staff assist as needed. Pt indicated understanding.     Patient left supine with all lines intact, call button in reach, bed alarm on, RN and PCT notified, and pt's S.O. present..    GOALS:   Multidisciplinary Problems       Physical Therapy Goals          Problem: Physical Therapy    Goal Priority Disciplines Outcome Goal Variances Interventions   Physical Therapy Goal     PT, PT/OT Ongoing, Progressing     Description: PT goals until 11/23/22    1. Pt supine to sit with Min(A)   2. Pt sit to supine with Min(A)  3. Pt sit to stand with RW with minimal assist-not met  4. Pt to perform gait 30ft with RW with minimal assist.-not met  5. Pt to transfer bed to/from bedside chair with RW with minimal assist.-not met  6. Pt to up/down 5 steps with L UE rail with minimal assist.-not met  7. Pt to perform B LE exs in sitting or supine x 10 reps to strengthen B LE to improve functional mobility.-not met                         Time Tracking:     PT Received On: 11/16/22  PT Start Time: 1045     PT Stop Time: 1116  PT Total Time (min): 31 min     Billable Minutes: Neuromuscular Re-education 25  (co-treatment performed this date due to need for 2 skilled therapists in order to ensure pt safety, accomodate for pt activity tolerance, and maximize functional potential)     Treatment Type: Treatment  PT/PTA: PT     PTA Visit Number: 0     11/16/2022

## 2022-11-16 NOTE — PT/OT/SLP PROGRESS
Occupational Therapy   Co-Treatment with PT    Name: Peter Stiles  MRN: [id number]  Admitting Diagnosis:  Fever    Recent Surgery: Procedure(s) (LRB):  CREATION, TRACHEOSTOMY (N/A)  LARYNGOSCOPY, DIRECT 9 Days Post-Op    Co-treatment performed due to patient's multiple deficits requiring two skilled therapists to appropriately and safely assess patient's strength and endurance while facilitating functional tasks in addition to accommodating for patient's activity tolerance.    Recommendations:     Discharge Recommendations: rehabilitation facility  Discharge Equipment Recommendations:  other (see comments) (TBD at next level of care)  Barriers to discharge:  Decreased caregiver support    Assessment:     Peter Stiles is a 51 y.o. female with a medical diagnosis of Fever.  She presents with the following performance deficits affecting function are weakness, impaired endurance, impaired sensation, impaired self care skills, impaired functional mobility, gait instability, impaired balance, impaired cognition, decreased upper extremity function, decreased lower extremity function, decreased safety awareness, pain, decreased coordination, impaired coordination, impaired cardiopulmonary response to activity, orthopedic precautions. Pt is motivated to participate with therapy. Pt would benefit from continued skilled acute OT services in order to maximize independence and safety with ADLs and functional mobility to ensure safe return to PLOF in the least restrictive environment. OT recommending Rehab once pt is medically appropriate for d/c.      Pt presents agreeable to participate in therapy, but continues to be limited by generalized weakness and pain, requiring increased time for all tasks. Pt continues to require assistance for all functional mobility and ADLs. Pt sat EOB for 15 min while provider performed pulmonary US. Pt with poor trunk control, requiring fluctuating level of assist (min-max A) to  "maintain sitting balance. Pt required frequent cues to maintain hand on siderail for added support. Pt easily distracted throughout session, requiring frequent verbal cues to redirect to task. Pt with progressive fatigue, requiring return to supine for recovery.     Rehab Prognosis:  Good; patient would benefit from acute skilled OT services to address these deficits and reach maximum level of function.       Plan:     Patient to be seen 4 x/week to address the above listed problems via self-care/home management, neuromuscular re-education, therapeutic activities, therapeutic exercises  Plan of Care Expires: 22  Plan of Care Reviewed with: patient, significant other    Subjective   "Did you see my mom? She was here today. Big black hair" S.O. at bedside informing Th and pt that her mother has been  for 9 years.  Pain/Comfort:  Pain Rating 1:  (reported LLE pain to touch and abdominal discomfort; did not rate)  Pain Addressed 1: Reposition, Distraction, Cessation of Activity    Objective:     Communicated with: RN prior to session.  Patient found supine with Tracheostomy, telemetry, pressure relief boots, cervical collar, PICC line, bed alarm, courtney catheter, oxygen upon OT entry to room.    General Precautions: Standard, aspiration, fall, NPO   Orthopedic Precautions:spinal precautions   Braces: Cervical collar  Respiratory Status:  trach collar in place, 5L     Occupational Performance:     Bed Mobility:    Patient completed Rolling/Turning to Left with  maximal assistance  A to reach R hand across to grasp bed rail   Patient completed Scooting/Bridging with maximal assistance  Patient completed Supine to Sit with maximal assistance and 2 persons  Patient completed Sit to Supine with maximal assistance and 2 persons   A to hook R foot under L ankle to lift LLE into bed; CGA to maintain foot position  Balance: Sitting EOB with min-max A for 15 min    Functional Mobility/Transfers:  Deferred due to " safety concerns, as pt requiring max A to maintain sitting balacne    Activities of Daily Living:  Grooming: pt grasped hairbrush and brought to side of head, but did not continue due to fatigue and pain. Required max A to maintain sitting balance during task   Upper Body Dressing: total A to adjust c-collar  Lower Body Dressing: total A to adjust socks bed level      AMPA 6 Click ADL: 8    Treatment & Education:  Educated pt on hooking R foot under L ankle to lift LLE into bed  Therapist provided facilitation and instruction of proper body mechanics and fall prevention strategies during tasks listed above.  Instructed patient to sit in bedside chair daily to increase OOB/activity tolerance.  Instructed patient to use call light to have nursing staff assist with needs/transfers.  Discussed OT POC and answered all questions within OT scope of practice.  Whiteboard updated      Patient left HOB elevated with all lines intact, call button in reach, bed alarm on, and RN notified    GOALS:   Multidisciplinary Problems       Occupational Therapy Goals          Problem: Occupational Therapy    Goal Priority Disciplines Outcome Interventions   Occupational Therapy Goal     OT, PT/OT Ongoing, Progressing    Description: Goals to be met by: 12/09    Patient will increase functional independence with ADLs by performing:      UE Dressing with Contact Guard Assistance.  Grooming while EOB with Contact Guard Assistance  Patient will complete sit to stand with RW min (A).  Stand pivot to chair with mod (A)                         Time Tracking:     OT Date of Treatment: 11/16/22  OT Start Time: 1046  OT Stop Time: 1117  OT Total Time (min): 31 min    Billable Minutes:Self Care/Home Management 9  Therapeutic Activity 22    OT/BE: OT     BE Visit Number: 0    11/16/2022

## 2022-11-17 LAB
ALBUMIN SERPL BCP-MCNC: 1.9 G/DL (ref 3.5–5.2)
ALP SERPL-CCNC: 142 U/L (ref 55–135)
ALT SERPL W/O P-5'-P-CCNC: 29 U/L (ref 10–44)
ANION GAP SERPL CALC-SCNC: 11 MMOL/L (ref 8–16)
AST SERPL-CCNC: 41 U/L (ref 10–40)
BASOPHILS # BLD AUTO: 0.03 K/UL (ref 0–0.2)
BASOPHILS NFR BLD: 0.2 % (ref 0–1.9)
BILIRUB SERPL-MCNC: 0.3 MG/DL (ref 0.1–1)
BUN SERPL-MCNC: 15 MG/DL (ref 6–20)
CALCIUM SERPL-MCNC: 9.4 MG/DL (ref 8.7–10.5)
CHLORIDE SERPL-SCNC: 105 MMOL/L (ref 95–110)
CO2 SERPL-SCNC: 24 MMOL/L (ref 23–29)
CREAT SERPL-MCNC: 0.8 MG/DL (ref 0.5–1.4)
DIFFERENTIAL METHOD: ABNORMAL
EOSINOPHIL # BLD AUTO: 0.2 K/UL (ref 0–0.5)
EOSINOPHIL NFR BLD: 1.6 % (ref 0–8)
ERYTHROCYTE [DISTWIDTH] IN BLOOD BY AUTOMATED COUNT: 15.3 % (ref 11.5–14.5)
EST. GFR  (NO RACE VARIABLE): >60 ML/MIN/1.73 M^2
GLUCOSE SERPL-MCNC: 121 MG/DL (ref 70–110)
HCT VFR BLD AUTO: 25.4 % (ref 37–48.5)
HGB BLD-MCNC: 7.8 G/DL (ref 12–16)
IMM GRANULOCYTES # BLD AUTO: 0.17 K/UL (ref 0–0.04)
IMM GRANULOCYTES NFR BLD AUTO: 1.4 % (ref 0–0.5)
LYMPHOCYTES # BLD AUTO: 2.2 K/UL (ref 1–4.8)
LYMPHOCYTES NFR BLD: 18.3 % (ref 18–48)
MAGNESIUM SERPL-MCNC: 2.1 MG/DL (ref 1.6–2.6)
MCH RBC QN AUTO: 30.2 PG (ref 27–31)
MCHC RBC AUTO-ENTMCNC: 30.7 G/DL (ref 32–36)
MCV RBC AUTO: 98 FL (ref 82–98)
MONOCYTES # BLD AUTO: 1.3 K/UL (ref 0.3–1)
MONOCYTES NFR BLD: 10.5 % (ref 4–15)
NEUTROPHILS # BLD AUTO: 8.3 K/UL (ref 1.8–7.7)
NEUTROPHILS NFR BLD: 68 % (ref 38–73)
NRBC BLD-RTO: 0 /100 WBC
PHOSPHATE SERPL-MCNC: 4.2 MG/DL (ref 2.7–4.5)
PLATELET # BLD AUTO: 337 K/UL (ref 150–450)
PMV BLD AUTO: 11.1 FL (ref 9.2–12.9)
POTASSIUM SERPL-SCNC: 4.7 MMOL/L (ref 3.5–5.1)
PROT SERPL-MCNC: 6.5 G/DL (ref 6–8.4)
RBC # BLD AUTO: 2.58 M/UL (ref 4–5.4)
SODIUM SERPL-SCNC: 140 MMOL/L (ref 136–145)
WBC # BLD AUTO: 12.21 K/UL (ref 3.9–12.7)

## 2022-11-17 PROCEDURE — 25000003 PHARM REV CODE 250: Performed by: PHYSICIAN ASSISTANT

## 2022-11-17 PROCEDURE — 87040 BLOOD CULTURE FOR BACTERIA: CPT | Mod: 59 | Performed by: NURSE PRACTITIONER

## 2022-11-17 PROCEDURE — 99900026 HC AIRWAY MAINTENANCE (STAT)

## 2022-11-17 PROCEDURE — 99233 SBSQ HOSP IP/OBS HIGH 50: CPT | Mod: ,,, | Performed by: NURSE PRACTITIONER

## 2022-11-17 PROCEDURE — 25000003 PHARM REV CODE 250

## 2022-11-17 PROCEDURE — 99233 PR SUBSEQUENT HOSPITAL CARE,LEVL III: ICD-10-PCS | Mod: ,,, | Performed by: STUDENT IN AN ORGANIZED HEALTH CARE EDUCATION/TRAINING PROGRAM

## 2022-11-17 PROCEDURE — 92526 ORAL FUNCTION THERAPY: CPT

## 2022-11-17 PROCEDURE — 85025 COMPLETE CBC W/AUTO DIFF WBC: CPT | Performed by: PHYSICIAN ASSISTANT

## 2022-11-17 PROCEDURE — 25000003 PHARM REV CODE 250: Performed by: STUDENT IN AN ORGANIZED HEALTH CARE EDUCATION/TRAINING PROGRAM

## 2022-11-17 PROCEDURE — 36415 COLL VENOUS BLD VENIPUNCTURE: CPT | Performed by: PHYSICIAN ASSISTANT

## 2022-11-17 PROCEDURE — 94640 AIRWAY INHALATION TREATMENT: CPT

## 2022-11-17 PROCEDURE — 99024 POSTOP FOLLOW-UP VISIT: CPT | Mod: POP,,, | Performed by: PHYSICIAN ASSISTANT

## 2022-11-17 PROCEDURE — 25000003 PHARM REV CODE 250: Performed by: PSYCHIATRY & NEUROLOGY

## 2022-11-17 PROCEDURE — 84100 ASSAY OF PHOSPHORUS: CPT | Performed by: PHYSICIAN ASSISTANT

## 2022-11-17 PROCEDURE — 11000001 HC ACUTE MED/SURG PRIVATE ROOM

## 2022-11-17 PROCEDURE — 63600175 PHARM REV CODE 636 W HCPCS: Performed by: NURSE PRACTITIONER

## 2022-11-17 PROCEDURE — 63600175 PHARM REV CODE 636 W HCPCS: Performed by: STUDENT IN AN ORGANIZED HEALTH CARE EDUCATION/TRAINING PROGRAM

## 2022-11-17 PROCEDURE — 83735 ASSAY OF MAGNESIUM: CPT | Performed by: PHYSICIAN ASSISTANT

## 2022-11-17 PROCEDURE — 99900035 HC TECH TIME PER 15 MIN (STAT)

## 2022-11-17 PROCEDURE — S4991 NICOTINE PATCH NONLEGEND: HCPCS | Performed by: PHYSICIAN ASSISTANT

## 2022-11-17 PROCEDURE — 99233 PR SUBSEQUENT HOSPITAL CARE,LEVL III: ICD-10-PCS | Mod: ,,, | Performed by: NURSE PRACTITIONER

## 2022-11-17 PROCEDURE — 80053 COMPREHEN METABOLIC PANEL: CPT | Performed by: PHYSICIAN ASSISTANT

## 2022-11-17 PROCEDURE — 25000003 PHARM REV CODE 250: Performed by: NURSE PRACTITIONER

## 2022-11-17 PROCEDURE — 94761 N-INVAS EAR/PLS OXIMETRY MLT: CPT

## 2022-11-17 PROCEDURE — 25000242 PHARM REV CODE 250 ALT 637 W/ HCPCS: Performed by: STUDENT IN AN ORGANIZED HEALTH CARE EDUCATION/TRAINING PROGRAM

## 2022-11-17 PROCEDURE — 99024 PR POST-OP FOLLOW-UP VISIT: ICD-10-PCS | Mod: POP,,, | Performed by: PHYSICIAN ASSISTANT

## 2022-11-17 PROCEDURE — 99233 SBSQ HOSP IP/OBS HIGH 50: CPT | Mod: ,,, | Performed by: STUDENT IN AN ORGANIZED HEALTH CARE EDUCATION/TRAINING PROGRAM

## 2022-11-17 PROCEDURE — 27000221 HC OXYGEN, UP TO 24 HOURS

## 2022-11-17 PROCEDURE — A4216 STERILE WATER/SALINE, 10 ML: HCPCS | Performed by: STUDENT IN AN ORGANIZED HEALTH CARE EDUCATION/TRAINING PROGRAM

## 2022-11-17 PROCEDURE — 97112 NEUROMUSCULAR REEDUCATION: CPT

## 2022-11-17 RX ADMIN — Medication 1 PATCH: at 09:11

## 2022-11-17 RX ADMIN — HEPARIN SODIUM 5000 UNITS: 5000 INJECTION INTRAVENOUS; SUBCUTANEOUS at 02:11

## 2022-11-17 RX ADMIN — Medication 10 ML: at 05:11

## 2022-11-17 RX ADMIN — CEFEPIME 2 G: 2 INJECTION, POWDER, FOR SOLUTION INTRAVENOUS at 09:11

## 2022-11-17 RX ADMIN — IPRATROPIUM BROMIDE AND ALBUTEROL SULFATE 3 ML: 2.5; .5 SOLUTION RESPIRATORY (INHALATION) at 08:11

## 2022-11-17 RX ADMIN — METHADONE HYDROCHLORIDE 90 MG: 10 TABLET ORAL at 02:11

## 2022-11-17 RX ADMIN — HEPARIN SODIUM 5000 UNITS: 5000 INJECTION INTRAVENOUS; SUBCUTANEOUS at 05:11

## 2022-11-17 RX ADMIN — CEFEPIME 2 G: 2 INJECTION, POWDER, FOR SOLUTION INTRAVENOUS at 02:11

## 2022-11-17 RX ADMIN — DIAZEPAM 5 MG: 5 TABLET ORAL at 09:11

## 2022-11-17 RX ADMIN — OXYCODONE 5 MG: 5 TABLET ORAL at 01:11

## 2022-11-17 RX ADMIN — VANCOMYCIN HYDROCHLORIDE 750 MG: 750 INJECTION, POWDER, LYOPHILIZED, FOR SOLUTION INTRAVENOUS at 12:11

## 2022-11-17 RX ADMIN — OXYCODONE 5 MG: 5 TABLET ORAL at 05:11

## 2022-11-17 RX ADMIN — Medication 10 ML: at 06:11

## 2022-11-17 RX ADMIN — VANCOMYCIN HYDROCHLORIDE 1500 MG: 1.5 INJECTION, POWDER, LYOPHILIZED, FOR SOLUTION INTRAVENOUS at 12:11

## 2022-11-17 RX ADMIN — FAMOTIDINE 20 MG: 20 TABLET ORAL at 09:11

## 2022-11-17 RX ADMIN — Medication 10 ML: at 12:11

## 2022-11-17 RX ADMIN — HEPARIN SODIUM 5000 UNITS: 5000 INJECTION INTRAVENOUS; SUBCUTANEOUS at 09:11

## 2022-11-17 RX ADMIN — CETIRIZINE HYDROCHLORIDE 5 MG: 5 TABLET, FILM COATED ORAL at 09:11

## 2022-11-17 RX ADMIN — IPRATROPIUM BROMIDE AND ALBUTEROL SULFATE 3 ML: 2.5; .5 SOLUTION RESPIRATORY (INHALATION) at 12:11

## 2022-11-17 RX ADMIN — PREGABALIN 200 MG: 150 CAPSULE ORAL at 02:11

## 2022-11-17 RX ADMIN — SENNOSIDES AND DOCUSATE SODIUM 1 TABLET: 50; 8.6 TABLET ORAL at 09:11

## 2022-11-17 RX ADMIN — POLYETHYLENE GLYCOL 3350 17 G: 17 POWDER, FOR SOLUTION ORAL at 09:11

## 2022-11-17 RX ADMIN — PREGABALIN 200 MG: 150 CAPSULE ORAL at 09:11

## 2022-11-17 RX ADMIN — SILODOSIN 4 MG: 4 CAPSULE ORAL at 09:11

## 2022-11-17 NOTE — ASSESSMENT & PLAN NOTE
# MSSA Bacteremia  # Epidural abscess    Peter Stiles is a 51 y.o. female with PMHx of HTN, dilated cardiomyopathy, h/o opioid dependence, cigarette smoker (2pks/day), and daily baby ASA use who presents with 1 day of decreased sensation from T5 level down and tingling in her bilateral fingers and bilateral toes. MRI imaging obtained in the ED showing possible C5-6 osteodiscitis/myelitis with cord compression. Patient tachycardic and hypertensive on arrival, afebrile, with leukocytosis. Now s/p C5-6 corpectomy on 10/28/22; C2-T2 PCDF on 11/2; S/p trach 11/7.         MRI C/T/L spine w/o contrast 10/25: focal kyphotic deformity at C5/6 with possible discitis/osteomyelitis,   epidural collection extending into the central spinal canal resulting in severe stenosis and cord signal  abnormality.  Prevertebral soft tissue edema and probable paravertebral abscess or phlegmon at this level. Thoracic and lumbar spine unremarkable. MRI C spine w/ contrast 10/25: confirmed enhancement at C5/6 consistent with osteomyelitis/discitis and epidural phlegmon        -Admitted to neuro ICU initially.  --Intubated after index procedure and kept intubated for posterior fixation and for tentative LISHA 11/4 which was eventually cancelled due to inability to adequately extend neck  --Step down to medicine on 11/11  --Continue Speech therapy. GI consulted for G Tube eval. Patient wishes to see her progress in next 2 weeks before getting Gtube.   --BCx 10/25 growing MSSA, Repeat BCx's 10/27 NGTD  --OR cultures 10/28 no organisms on gram stain, culture MSSA. ID following  --Continue C collar when upright   --CT Soft tissue neck reviewed with stable hardware position s/p 360 cervical fusion ow expected postop changes  --Pain control as needed  --PT/OT. SQH     -- Patient noted to be febrile on 11/12 and 11/3.  -- Reconsulted ID  -- Repeat blood cx 11/14 NGTD. Tested neg for RSV and covid 19.  -- CXR 11/15 Patchy infiltrates and pleural  effusion and developed since yesterday. Consulted Pulm.  --Repeat inflammatory markers- sed rate/crp   - Sent ghosh stain/urine eosinophil   ---------------------------------  ID recs for discharge:   -continue oxacillin for MSSA, anticipate 6w course from cleared blcx, rosalino 12/9  -unable to use adjunctive rifampin due to DDI (methadone)  -follow up cx data  -plan for placement per patient (SNF/LTAC) - pt requested [facility]     Outpatient Antibiotic Therapy Plan:     1) Infection: MSSA epidural abscess/bacteremia     2) Discharge Antibiotics:     Intravenous antibiotics:  Oxacillin 12g continuous infusion IV daily      3) Therapy Duration:  6w     Estimated end date of IV antibiotics: 12/9/22     4) Outpatient Weekly Labs:     Order the following labs to be drawn on Mondays:               CBC              CMP               CRP     5) Fax Lab Results to Infectious Diseases Provider: Bernard     [facility] Fax Number: [phone]     6) Outpatient Infectious Diseases Follow-up

## 2022-11-17 NOTE — RESPIRATORY THERAPY
RAPID RESPONSE RESPIRATORY THERAPY PROACTIVE NOTE           Time of visit: 0849     Code Status: Full Code   : 1970  Bed: 952/952 A:   MRN: [id number]  Time spent at the bedside: < 15 min    SITUATION    Evaluated patient for: LDA Check     BACKGROUND    Patient has a past medical history of CHF (congestive heart failure), Essential (primary) hypertension, Opioid dependence on maintenance agonist therapy, no symptoms, and Smoking.  Clinically Significant Surgical Hx: tracheostomy    24 Hours Vitals Range:  Temp:  [96.5 °F (35.8 °C)-99.8 °F (37.7 °C)]   Pulse:  [69-95]   Resp:  [13-32]   BP: (101-156)/(56-77)   SpO2:  [87 %-98 %]     Labs:    Recent Labs     11/15/22  0403 22  0400 22  0647    142 140   K 3.9 4.3 4.7    107 105   CO2 26 26 24   CREATININE 0.9 0.9 0.8    105 121*   PHOS 3.5 4.3 4.2   MG 2.1 2.1 2.1        No results for input(s): PH, PCO2, PO2, HCO3, POCSATURATED, BE in the last 72 hours.    ASSESSMENT/INTERVENTIONS  Pt on 5L 28% trach collar. Shiley size 6 cuffed trach in place. All supplies in room. Extra cuffed trachs sizes 4 and 6 at bedside.      Last VS   Temp: 96.5 °F (35.8 °C) ( 1043)  Pulse: 70 ( 1225)  Resp: 16 ( 1225)  BP: 146/77 ( 1043)  SpO2: 98 % ( 122)      Extra trachs at bedside: cuffed 4 and 6.  Level of Consciousness: Level of Consciousness (AVPU): alert  Respiratory Effort: Respiratory Effort: Unlabored Expansion/Accessory Muscle Usage: Expansion/Accessory Muscles/Retractions: no use of accessory muscles  All Lung Field Breath Sounds: All Lung Fields Breath Sounds: coarse  NANETTE Breath Sounds: equal bilaterally  LLL Breath Sounds: coarse  RLL Breath Sounds: coarse  O2 Device/Concentration: 5L 28% TC.  Surgical airway: Yes, Type: Shiley Size: 6, cuffed  Ambu at bedside: Ambu bag with the patient?: Yes, Adult Ambu     Active Orders   Respiratory Care    Inhalation Treatment Once     Frequency: Once     Number of  Occurrences: 1 Occurrences    Inhalation Treatment Q4H PRN     Frequency: Q4H PRN     Number of Occurrences: Until Specified    Inhalation Treatment Q6H WAKE     Frequency: Q6H WAKE     Number of Occurrences: Until Specified    Oxygen Continuous     Frequency: Continuous     Number of Occurrences: Until Specified     Order Questions:      Device type: Low flow      Device: Trach Collar      FiO2%: 28      Titrate O2 per Oxygen Titration Protocol: Yes      To maintain SpO2 goal of: >= 90%      Notify MD of: Inability to achieve desired SpO2; Sudden change in patient status and requires 20% increase in FiO2; Patient requires >60% FiO2    POCT ARTERIAL BLOOD GAS Blood Gas     Frequency: PRN     Number of Occurrences: Until Specified     Order Comments: Notify Physician if: see parameters below.       Order Questions:      Component: Blood Gas    Trach care every 12 hours and as needed     Frequency: BID     Number of Occurrences: Until Specified    Trach: Assess suction need every 2 hours and as needed     Frequency: BID     Number of Occurrences: Until Specified       RECOMMENDATIONS    We recommend: RRT Recs: Continue POC per primary team.      FOLLOW-UP    Please call back the Rapid Response RTDante RRT at x [phone] for any questions or concerns.

## 2022-11-17 NOTE — ASSESSMENT & PLAN NOTE
51 year old with  dilated cardiomyopathy and remote opioid dependence (on methadone as an outpatient) admitted 10/25 with neck and back pain and lower body numbness and was found to have MSSA bacteremia and C5-6 osteomyelitis, discitis and epidural collection.  TTE negative.  S/p corpectomy/ fusion and epidural abscess evacuation 10/28 and s/p C2-T2 decompression/fusion with NSGY 11/2, on 6 week course of IV oxacillin (unable to use rifampin due to DDI with methadone) (EOC 12/9).       ID re-consulted 11/14 for intermittent daily fevers.   Bilateral LE US negative for DVT.  Noted to have increased secretions from tracheostomy (in place d/t multiple failed extubations).  CXR 11/14 was without focal consolidation or evidence of acute parenchymal process.  Repeat CXR 11/15  with newly developed patchy airspace consolidation right lung base, with loss of volume and pleural fluid left lung base.  Pulmonary consulted for evaluation. Bedside US performed with minimal pleural fluid, no signs of complicated or loculated effusions  Suspicion for pleural space infection low.  Noted some consolidation at bases.  Recommend sputum cx and nebulizer tx.       Recurrent fever to 100.5 today and abx broadened to cefepime to cover MSSA and respiratory pathogens including pseudomonas.  Blood cultures of 11/14 now positive for GPR, ID pending.      More alert today. Denies increased cough/SOB.  Primary complaint is fatigue. On 5 liters O2 per trach collar.  O2 Sats 94-98%.  Hemodynamically and neurologically stable      Recommendations:  - Discontinued oxacillin and started cefepime 2 g IV q 8 hours for pulmonary coverage and MSSA   - Start IV vancomycin (pharmacy to dose) pending ID of GPR in blood cultures.  Ordered  - Repeat blood cultures ordered  - Will follow up tomorrow.     Data reviewed and plan discussed with ID staff, Dr. Walker

## 2022-11-17 NOTE — PROGRESS NOTES
Pharmacokinetic Initial Assessment: IV Vancomycin    Assessment/Plan:    Initiate intravenous vancomycin with loading dose of 1500 mg once followed by a maintenance dose of vancomycin 750 mg IV every 12 hours  Desired empiric serum trough concentration is 15 to 20 mcg/mL  Draw vancomycin trough level 60 min prior to fourth dose on 11/18 at approximately 1030  Pharmacy will continue to follow and monitor vancomycin.      Please contact pharmacy at extension [phone] with any questions regarding this assessment.     Thank you for the consult,   Beth Ware       Patient brief summary:  Peter Stiles is a 51 y.o. female initiated on antimicrobial therapy with IV Vancomycin for treatment of suspected bacteremia    Drug Allergies:   Review of patient's allergies indicates:   Allergen Reactions    Morphine Shortness Of Breath and Other (See Comments)     Throat closed       Actual Body Weight:   63.5 kg    Renal Function:   Estimated Creatinine Clearance: 68.5 mL/min (based on SCr of 0.9 mg/dL).     Dialysis Method (if applicable):  N/A    CBC (last 72 hours):  Recent Labs   Lab Result Units 11/14/22  0302 11/15/22  0403 11/16/22  0400   WBC K/uL 8.50 8.71 10.38   Hemoglobin g/dL 7.4* 7.5* 7.7*   Hematocrit % 24.7* 25.4* 23.5*   Platelets K/uL 229 257 309   Gran % % 47.4 52.5 57.0   Lymph % % 36.0 34.7 28.6   Mono % % 13.1 10.3 9.7   Eosinophil % % 2.2 1.6 2.3   Basophil % % 0.4 0.1 0.3   Differential Method  Automated Automated Automated       Metabolic Panel (last 72 hours):  Recent Labs   Lab Result Units 11/14/22  0303 11/15/22  0403 11/16/22  0400   Sodium mmol/L 140 139 142   Potassium mmol/L 4.0 3.9 4.3   Chloride mmol/L 109 106 107   CO2 mmol/L 24 26 26   Glucose mg/dL 101 108 105   BUN mg/dL 14 16 18   Creatinine mg/dL 0.8 0.9 0.9   Albumin g/dL 1.8* 1.9* 1.8*   Total Bilirubin mg/dL 0.3 0.3 0.3   Alkaline Phosphatase U/L 132 146* 143*   AST U/L 31 34 46*   ALT U/L 27 28 34   Magnesium mg/dL 2.0 2.1 2.1    Phosphorus mg/dL 4.2 3.5 4.3       Drug levels (last 3 results):  No results for input(s): VANCOMYCINRA, VANCORANDOM, VANCOMYCINPE, VANCOPEAK, VANCOMYCINTR, VANCOTROUGH in the last 72 hours.    Microbiologic Results:  Microbiology Results (last 7 days)       Procedure Component Value Units Date/Time    Blood culture [[id number]] Collected: 11/14/22 0939    Order Status: Completed Specimen: Blood from Peripheral, Left Hand Updated: 11/16/22 1537     Blood Culture, Routine Gram stain yumi bottle: Gram positive rods      Results called to and read back by:Kristy BEASLEY RN 11/16/2022  15:37    Fungus culture [[id number]] Collected: 10/28/22 1029    Order Status: Completed Specimen: Wound from Neck Updated: 11/16/22 1303     Fungus (Mycology) Culture Culture in progress      No fungus isolated after 2 weeks    Narrative:      4) Epidural phlegman    Fungus culture [[id number]] Collected: 10/28/22 0950    Order Status: Completed Specimen: Wound from Neck Updated: 11/16/22 1303     Fungus (Mycology) Culture Culture in progress      No fungus isolated after 2 weeks    Narrative:      3) Osteodiscitis    Fungus culture [[id number]] Collected: 10/28/22 0924    Order Status: Completed Specimen: Abscess from Neck Updated: 11/16/22 1303     Fungus (Mycology) Culture Culture in progress      No fungus isolated after 2 weeks    Narrative:      2) Prevertebral absess    Fungus culture [[id number]] Collected: 10/28/22 0906    Order Status: Completed Specimen: Abscess from Back Updated: 11/16/22 1303     Fungus (Mycology) Culture Culture in progress      No fungus isolated after 2 weeks    Narrative:      Prevertebral Abscess    Culture, Respiratory with Gram Stain [[id number]]     Order Status: No result Specimen: Respiratory     Blood culture [[id number]] Collected: 11/14/22 0940    Order Status: Completed Specimen: Blood from Peripheral, Right Hand Updated: 11/16/22 1012     Blood Culture, Routine No Growth to date      No Growth to date       No Growth to date    AFB Culture & Smear [[id number]] Collected: 10/28/22 0924    Order Status: Completed Specimen: Abscess from Neck Updated: 11/15/22 1557     AFB Culture & Smear ~Culture in progress     AFB CULTURE STAIN No acid fast bacilli seen.    Narrative:      2) Prevertebral absess    AFB Culture & Smear [[id number]] Collected: 10/28/22 0906    Order Status: Completed Specimen: Abscess from Back Updated: 11/15/22 1557     AFB Culture & Smear Culture in progress     AFB CULTURE STAIN No acid fast bacilli seen.    Narrative:      Prevertebral Abscess    Blood culture [[id number]]     Order Status: Canceled Specimen: Blood     Blood culture [[id number]]     Order Status: Canceled Specimen: Blood

## 2022-11-17 NOTE — PT/OT/SLP PROGRESS
"Speech Language Pathology Treatment    Patient Name:  Peter Stiles   MRN:  [id number]  Admitting Diagnosis: Fever    Recommendations:                 General Recommendations:  Dysphagia therapy and One Way Speaking Valve training   Diet recommendations:  NPO, Liquid Diet Level: NPO   Aspiration Precautions: Frequent oral care and Strict aspiration precautions   General Precautions: Standard, aspiration, fall, NPO  Communication strategies:  go to room if call light pushed and Pt using writing/mouthing to communicate    Subjective     SLP attempted x3. Patient refused 1st attempt, soiled second attempt.  Patient appearing agitated, not able to redirect, frequently reposition self in bed, greatly distracted by internal stimuli.   Friend present at the bedside.     Pain/Comfort:       Respiratory Status:  trach collar 5L, 28%     Objective:     Has the patient been evaluated by SLP for swallowing?   Yes  Keep patient NPO? Yes   Current Respiratory Status:    trach collar 5L, 28%    Patient seen for ongoing PMSV trials and ongoing dysphagia therapy. Shiley 6.0 uncuffed trach and oxygen in place. Patient agitated and greatly distracted by internal stimuli. Reporting "leg pain".  Patient required frequent education on role of the SLP and benefits to participation with SLP. Able to place PMSV which patient tolerated well with intermittent dysphonia, though no apparent respiratory distress.  Patient continued  with decreased attention and decrased awareness to PMSV training/education. PMSV removed.  At this time, patient's friend at the bedside began projectile vomiting. Session terminated.  Patient is not appropriate  for further use of PMSV trials outside of therapy or PO diet at this time.     Assessment:     Peter Stiles is a 51 y.o. female with an SLP diagnosis of Dysphagia, Dysphonia, S/P Trach, and One Way Speaking Valve Training.  She presents with waxing/waning alertness.      Goals:   Multidisciplinary " Problems       SLP Goals          Problem: SLP    Goal Priority Disciplines Outcome   SLP Goal     SLP Ongoing, Progressing   Description: Speech Language Pathology Goals  Goals expected to be met by 11/16/22    1. Pt will tolerate one-way speaking valve for 10 minutes w/o S/S respiratory distress, MIN A  2. Pt will recall 3 +safety precautions for PMSV with cuffed trach, 90%, MIN A  3. Pt will participate in ongoing swallow assessment to determine safest, least restrictive means nutrition/hydration    4. Educate Pt and family on safety awareness and safety precautions for one-way speaking valve                         Plan:     Patient to be seen:  4 x/week   Plan of Care expires:  12/09/22  Plan of Care reviewed with:  patient, friend   SLP Follow-Up:  Yes       Discharge recommendations:  rehabilitation facility   Barriers to Discharge:   NPO    Time Tracking:     SLP Treatment Date:   11/17/22  Speech Start Time:  1015  Speech Stop Time:  1025     Speech Total Time (min):  10 min    Billable Minutes: Self Care/Home Management Training 10    11/17/2022

## 2022-11-17 NOTE — SUBJECTIVE & OBJECTIVE
Interval History: Afebrile. Denies new weakness or numbness. Patient resting in bed, c-collar removed. Patient required to wear collar when up and out of bed. BLE remain tender to touch.    Medications:  Continuous Infusions:  Scheduled Meds:   albuterol-ipratropium  3 mL Nebulization Q6H WAKE    ceFEPime (MAXIPIME) IVPB  2 g Intravenous Q8H    cetirizine  5 mg Per NG tube Daily    famotidine  20 mg Per NG tube BID    heparin (porcine)  5,000 Units Subcutaneous Q8H    methadone  90 mg Per NG tube Daily    nicotine  1 patch Transdermal Daily    oxyCODONE  5 mg Per NG tube Q6H    polyethylene glycol  17 g Per NG tube Daily    pregabalin  200 mg Per NG tube TID    scopolamine  1 patch Transdermal Q3 Days    senna-docusate 8.6-50 mg  1 tablet Per NG tube BID    silodosin  4 mg Per NG tube Daily    sodium chloride 0.9%  10 mL Intravenous Q6H    vancomycin (VANCOCIN) IVPB  750 mg Intravenous Q12H     PRN Meds:acetaminophen, albuterol-ipratropium, benzocaine, bisacodyL, dextrose 10%, dextrose 10%, diazePAM, glucagon (human recombinant), hydrALAZINE, hydrOXYzine HCL, magnesium oxide, magnesium oxide, ondansetron, potassium bicarbonate, potassium bicarbonate, potassium bicarbonate, potassium, sodium phosphates, potassium, sodium phosphates, potassium, sodium phosphates, racepinephrine, simethicone, sodium chloride 0.9%, Flushing PICC Protocol **AND** sodium chloride 0.9% **AND** sodium chloride 0.9%, sodium chloride 3%, Pharmacy to dose Vancomycin consult **AND** vancomycin - pharmacy to dose     Review of Systems  Objective:     Weight:  (daylight savings)  Body mass index is 22.6 kg/m².  Vital Signs (Most Recent):  Temp: 98.2 °F (36.8 °C) (11/17/22 0807)  Pulse: 77 (11/17/22 0843)  Resp: 16 (11/17/22 0843)  BP: (!) 156/56 (11/17/22 0807)  SpO2: 96 % (11/17/22 0843)   Vital Signs (24h Range):  Temp:  [97.1 °F (36.2 °C)-100.4 °F (38 °C)] 98.2 °F (36.8 °C)  Pulse:  [65-95] 77  Resp:  [13-32] 16  SpO2:  [87 %-98 %] 96 %  BP:  "(101-156)/(56-70) 156/56                Oxygen Concentration (%):  [28] 28         NG/OG Tube 11/06/22 Right nostril (Active)   Placement Check placement verified by aspirate characteristics;placement verified by x-ray 11/13/22 1105   Tolerance no signs/symptoms of discomfort 11/17/22 0400   Securement secured to nostril center w/ adhesive device 11/17/22 0400   Clamp Status/Tolerance unclamped 11/17/22 0400   Suction Setting/Drainage Method dependent drainage 11/17/22 0400   Insertion Site Appearance no redness, warmth, tenderness, skin breakdown, drainage 11/14/22 2140   Drainage Milky 11/17/22 0400   Flush/Irrigation flushed w/;sterile normal saline 11/14/22 2140   Feeding Type continuous;by pump 11/14/22 2140   Feeding Action feeding continued 11/14/22 2140   Current Rate (mL/hr) 50 mL/hr 11/17/22 0557   Goal Rate (mL/hr) 50 mL/hr 11/17/22 0557   Intake (mL) 40 mL 11/15/22 2046   Water Bolus (mL) 150 mL 11/17/22 0557   Formula Name Diabetasource 11/17/22 0400   Intake (mL) - Formula Tube Feeding 550 11/13/22 1648   Residual Amount (ml) 0 ml 11/17/22 0557            Urethral Catheter 11/10/22 1335 Latex 16 Fr. (Active)   $ Patiño Insertion Bedside Insertion Performed 11/16/22 0852   Site Assessment Clean;Intact 11/17/22 0400   Collection Container Urimeter 11/17/22 0400   Securement Method secured to top of thigh w/ adhesive device 11/17/22 0400   Catheter Care Performed yes 11/17/22 0400   Reason for Continuing Urinary Catheterization Urinary retention 11/17/22 0400   CAUTI Prevention Bundle Securement Device in place with 1" slack;Intact seal between catheter & drainage tubing;Drainage bag/urimeter off the floor;Sheeting clip in use;No dependent loops or kinks;Drainage bag/urimeter not overfilled (<2/3 full);Drainage bag/urimeter below bladder 11/16/22 2000   Output (mL) 1700 mL 11/16/22 0354       Neurosurgery Physical Exam  General: Well developed, well nourished, not in acute distress.   Head: Normocephalic, " atraumatic.  Neck: Trach in place.  Neurologic: Alert and oriented x 4. Thought content appropriate.  GCS: Motor:6  Verbal:5  Eyes:4   GCS Total: 15  Language: No aphasia.  Speech: No dysarthria.  Cranial nerves: Face symmetric, tongue midline, CN II-XII grossly intact.   Eyes: Pupils equal, round, reactive to light with accomodation, EOMI.  Pulmonary: Normal respirations, no signs of respiratory distress.  Abdomen: Soft, non-distended, non-tender to palpation.  Sensory: Intact to light touch throughout.  Motor Strength: Moves all extremities spontaneously with good tone. BUE full strength, BLE significantly pain limited and hypersensitivity but at least AG. No abnormal movements seen.   Skin: Skin is warm, dry and intact.  Posterior incision clean/dry/intact with prineo.   Anterior incision with limited visualization under trach collar but clean/dry/intact with dermabond.    Significant Labs:  Recent Labs   Lab 11/16/22  0400 11/17/22  0647    121*    140   K 4.3 4.7    105   CO2 26 24   BUN 18 15   CREATININE 0.9 0.8   CALCIUM 9.1 9.4   MG 2.1 2.1     Recent Labs   Lab 11/16/22  0400 11/17/22  0647   WBC 10.38 12.21   HGB 7.7* 7.8*   HCT 23.5* 25.4*    337     No results for input(s): LABPT, INR, APTT in the last 48 hours.  Microbiology Results (last 7 days)       Procedure Component Value Units Date/Time    Blood culture [[id number]] Collected: 11/14/22 0939    Order Status: Completed Specimen: Blood from Peripheral, Left Hand Updated: 11/17/22 0836     Blood Culture, Routine Gram stain yumi bottle: Gram positive rods      Results called to and read back by:Kristy BEASLEY RN 11/16/2022  15:37    Blood culture [[id number]] Collected: 11/17/22 0724    Order Status: Sent Specimen: Blood Updated: 11/17/22 0732    Blood culture [[id number]] Collected: 11/17/22 0724    Order Status: Sent Specimen: Blood Updated: 11/17/22 0732    Fungus culture [[id number]] Collected: 10/28/22 1029    Order Status:  Completed Specimen: Wound from Neck Updated: 11/16/22 1303     Fungus (Mycology) Culture Culture in progress      No fungus isolated after 2 weeks    Narrative:      4) Epidural phlegman    Fungus culture [[id number]] Collected: 10/28/22 0950    Order Status: Completed Specimen: Wound from Neck Updated: 11/16/22 1303     Fungus (Mycology) Culture Culture in progress      No fungus isolated after 2 weeks    Narrative:      3) Osteodiscitis    Fungus culture [[id number]] Collected: 10/28/22 0924    Order Status: Completed Specimen: Abscess from Neck Updated: 11/16/22 1303     Fungus (Mycology) Culture Culture in progress      No fungus isolated after 2 weeks    Narrative:      2) Prevertebral absess    Fungus culture [[id number]] Collected: 10/28/22 0906    Order Status: Completed Specimen: Abscess from Back Updated: 11/16/22 1303     Fungus (Mycology) Culture Culture in progress      No fungus isolated after 2 weeks    Narrative:      Prevertebral Abscess    Culture, Respiratory with Gram Stain [[id number]]     Order Status: No result Specimen: Respiratory     Blood culture [[id number]] Collected: 11/14/22 0940    Order Status: Completed Specimen: Blood from Peripheral, Right Hand Updated: 11/16/22 1012     Blood Culture, Routine No Growth to date      No Growth to date      No Growth to date    AFB Culture & Smear [[id number]] Collected: 10/28/22 0924    Order Status: Completed Specimen: Abscess from Neck Updated: 11/15/22 1557     AFB Culture & Smear ~Culture in progress     AFB CULTURE STAIN No acid fast bacilli seen.    Narrative:      2) Prevertebral absess    AFB Culture & Smear [[id number]] Collected: 10/28/22 0906    Order Status: Completed Specimen: Abscess from Back Updated: 11/15/22 1557     AFB Culture & Smear Culture in progress     AFB CULTURE STAIN No acid fast bacilli seen.    Narrative:      Prevertebral Abscess    Blood culture [[id number]]     Order Status: Canceled Specimen: Blood     Blood culture  [[id number]]     Order Status: Canceled Specimen: Blood           All pertinent labs from the last 24 hours have been reviewed.    Significant Diagnostics:  I have reviewed all pertinent imaging results/findings within the past 24 hours.

## 2022-11-17 NOTE — PT/OT/SLP PROGRESS
Occupational Therapy   Treatment    Name: Peter Stiles  MRN: [id number]  Admitting Diagnosis:  Fever  10 Days Post-Op    Recommendations:     Discharge Recommendations: rehabilitation facility  Discharge Equipment Recommendations:   (TBD)  Barriers to discharge:  Decreased caregiver support    Assessment:     Peter Stiles is a 51 y.o. female with a medical diagnosis of Fever.  She presents with very drowsy difficult to rouse. Performance deficits affecting function are weakness, gait instability, impaired endurance, impaired balance, decreased safety awareness, impaired self care skills, impaired cognition, impaired functional mobility, decreased coordination. Nursing reports patient pulled NG tube, patient not following commands at this moment - sleepy. Aspen collar off, new pads ordered secondary to blood and mucous on pads. Patient positioned in neutral position. Oxygen saturation 97% ofn 5 L trach collar.     Rehab Prognosis:  Good; patient would benefit from acute skilled OT services to address these deficits and reach maximum level of function.       Plan:     Patient to be seen 4 x/week to address the above listed problems via self-care/home management, therapeutic activities, therapeutic exercises, neuromuscular re-education  Plan of Care Expires: 12/09/22  Plan of Care Reviewed with: patient    Subjective     Pain/Comfort:  Pain Rating 1:  (not reported)    Objective:     Communicated with: RN prior to session.  Patient found HOB elevated with bed alarm, cervical collar, telemetry, Tracheostomy, oxygen, SCD, courtney catheter, peripheral IV upon OT entry to room.    General Precautions: Standard, aspiration, fall, NPO   Orthopedic Precautions:spinal precautions   Braces: Aspen collar  Respiratory Status:  trach collar 5 L       AMPAC 6 Click ADL: 6    Treatment & Education:  SO asleep on bed, no active learner awake    Patient left  positioned with head in neutral arms supported.  with all lines  intact, call button in reach, bed alarm on, and RN notified    GOALS:   Multidisciplinary Problems       Occupational Therapy Goals          Problem: Occupational Therapy    Goal Priority Disciplines Outcome Interventions   Occupational Therapy Goal     OT, PT/OT Ongoing, Progressing    Description: Goals to be met by: 12/09    Patient will increase functional independence with ADLs by performing:      UE Dressing with Contact Guard Assistance.  Grooming while EOB with Contact Guard Assistance  Patient will complete sit to stand with RW min (A).  Stand pivot to chair with mod (A)                         Time Tracking:     OT Date of Treatment: 11/17/22  OT Start Time: 1601  OT Stop Time: 1613  OT Total Time (min): 12 min    Billable Minutes:Neuromuscular Re-education 12    OT/BE: OT     BE Visit Number: 0    11/17/2022

## 2022-11-17 NOTE — PLAN OF CARE
Problem: Adult Inpatient Plan of Care  Goal: Plan of Care Review  Outcome: Ongoing, Progressing     Problem: Fall Injury Risk  Goal: Absence of Fall and Fall-Related Injury  Outcome: Ongoing, Progressing     Patient is AAO x4. POC reviewed with patient. Patient verbalized understanding. Patient's breathing is unlabored with equal chest expansion. Patient has a trache with a trache collar in place with 5L. Patient has an incision to posterior neck. Patient has NGT to R nare with Diabetisource infusing at goal rate of 50 ml/hr. Patient has PICC to SYD. Patient voids per courtney due to urinary retention. Patient remained free from falls. Patient rested well through shift. Bed in lowest position,bed alarm on, side rails up x3, no complaints or signs of distress. WCTM during shift.  See flowsheets for full assessment and VS info.
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
## 2022-11-17 NOTE — PROGRESS NOTES
Alessandro Graf - Neurosurgery ([facility])  Infectious Disease  Progress Note    Patient Name: Peter Stiles  MRN: [id number]  Admission Date: 10/25/2022  Length of Stay: 22 days  Attending Physician: Alejandro Recio MD  Primary Care Provider: Og Victoria NP    Isolation Status: No active isolations  Assessment/Plan:      * Fever     51 year old with  dilated cardiomyopathy and remote opioid dependence (on methadone as an outpatient) admitted 10/25 with neck and back pain and lower body numbness and was found to have MSSA bacteremia and C5-6 osteomyelitis, discitis and epidural collection.  TTE negative.  S/p corpectomy/ fusion and epidural abscess evacuation 10/28 and s/p C2-T2 decompression/fusion with NSGY 11/2, on 6 week course of IV oxacillin (unable to use rifampin due to DDI with methadone) (EOC 12/9).       ID re-consulted 11/14 for intermittent daily fevers.   Bilateral LE US negative for DVT.  Noted to have increased secretions from tracheostomy (in place d/t multiple failed extubations).  CXR 11/14 was without focal consolidation or evidence of acute parenchymal process.  Repeat CXR 11/15  with newly developed patchy airspace consolidation right lung base, with loss of volume and pleural fluid left lung base.  Pulmonary consulted for evaluation. Bedside US performed with minimal pleural fluid, no signs of complicated or loculated effusions  Suspicion for pleural space infection low.  Noted some consolidation at bases.  Recommend sputum cx and nebulizer tx.       Recurrent fever to 100.5 today and abx broadened to cefepime to cover MSSA and respiratory pathogens including pseudomonas.  Blood cultures of 11/14 now positive for GPR, ID pending.      More alert today. Denies increased cough/SOB.  Primary complaint is fatigue. On 5 liters O2 per trach collar.  O2 Sats 94-98%.  Hemodynamically and neurologically stable      Recommendations:  - Discontinued oxacillin and started cefepime 2 g IV q 8 hours for  pulmonary coverage and MSSA   - Start IV vancomycin (pharmacy to dose) pending ID of GPR in blood cultures.  Ordered  - Repeat blood cultures ordered  - Will follow up tomorrow.     Data reviewed and plan discussed with ID staff, Dr. Walker          Osteomyelitis of cervical spine  See assessment/plan above        Thank you.   Please Secure Chat for any questions or concerns.  NEELA Leblanc, ANP-C      Subjective:     Principal Problem:Fever    HPI: Ms. Stiles is a 52 yo F with PMHx of HTN, dilated cardiomyopathy and remote opioid dependence who presented on 10/25 with neck and back pain and lower body numbness and was found to have C5-6 osteomyelitis, discitis and epidural collection.    She first noted the symptoms 2 weeks ago when she was in the car with her daughter. Her daughter slammed on the breaks and the patient noted a soreness in her neck. Since then, she has had increasing neck pain. This morning she noted numbess to her stomach and below and pain in her legs. She also endorses tingling to her fingers. WBC elevated and MRI spine revealed C5-6 osteomyelitis, discitis and epidural collection. She denies IVDU.     Of note, pt reports wounds to her arms after burning herself and her cat with hot grease. She also notes a knot to her R upper back at the site where her cat scratched her. Denies prior back surgery .          Interval History:   Fever this afternoon to 100.5  Reports feels better today.  More alert.   C/o fatigue.  Denies SOB  Blood cx of 11/14 - now with GPR showing late this afternoon  Pulmonary evaluated - bedside US performed with minimal pleural fluid - suspicion for pleural space infection low.  No signs of complicated/loculated effusions.  Some consolidation at bases. Recommend sputum cx, nebulizer treatments    Review of Systems   Constitutional:  Negative for chills, diaphoresis, fatigue and fever.   HENT:          Tracheostomy. Increased secretions     Respiratory:  Positive for  cough. Negative for shortness of breath.    Cardiovascular:  Negative for chest pain, palpitations and leg swelling.   Gastrointestinal:  Negative for diarrhea, nausea and vomiting.   Genitourinary:  Negative for difficulty urinating and dysuria.   Musculoskeletal:  Positive for neck pain. Negative for arthralgias, back pain and myalgias.   Skin:  Negative for color change, rash and wound.   Neurological:  Positive for speech difficulty (trach with o2 collar. communicates with white board and yes/no answers). Negative for dizziness, weakness and numbness.   Psychiatric/Behavioral:  Negative for agitation and confusion.    Objective:     Vital Signs (Most Recent):  Temp: 98.7 °F (37.1 °C) (11/16/22 1913)  Pulse: 83 (11/16/22 2053)  Resp: 18 (11/16/22 2053)  BP: 104/61 (11/16/22 2053)  SpO2: 97 % (11/16/22 2053) Vital Signs (24h Range):  Temp:  [97.1 °F (36.2 °C)-100.5 °F (38.1 °C)] 98.7 °F (37.1 °C)  Pulse:  [54-95] 83  Resp:  [13-20] 18  SpO2:  [94 %-98 %] 97 %  BP: ()/(47-66) 104/61     Weight:  (daylight savings)  Body mass index is 22.6 kg/m².    Estimated Creatinine Clearance: 68.5 mL/min (based on SCr of 0.9 mg/dL).    Physical Exam  Vitals and nursing note reviewed.   Constitutional:       General: She is not in acute distress.     Appearance: Normal appearance. She is ill-appearing. She is not toxic-appearing or diaphoretic.      Comments:      HENT:      Head: Normocephalic and atraumatic.      Nose:      Comments: NG tube       Mouth/Throat:      Comments: Poor dentition    Eyes:      General: No scleral icterus.     Conjunctiva/sclera: Conjunctivae normal.   Neck:      Comments: Cervical collar   Trach - no significant secretions at time of my exam  Cardiovascular:      Rate and Rhythm: Normal rate and regular rhythm.      Heart sounds: No murmur heard.  Pulmonary:      Effort: Pulmonary effort is normal. No respiratory distress.      Breath sounds: No stridor. No wheezing.      Comments: Clear  anteriorly  Crackles lower lobes  Abdominal:      General: Bowel sounds are normal. There is no distension.      Palpations: Abdomen is soft.      Tenderness: There is no abdominal tenderness. There is no guarding.   Musculoskeletal:      Right lower leg: No edema.      Left lower leg: No edema.   Skin:     General: Skin is warm and dry.   Neurological:      Mental Status: She is alert. Mental status is at baseline.       Significant Labs:   Microbiology Results (last 7 days)       Procedure Component Value Units Date/Time    Blood culture [[id number]] Collected: 11/14/22 0939    Order Status: Completed Specimen: Blood from Peripheral, Left Hand Updated: 11/16/22 1537     Blood Culture, Routine Gram stain yumi bottle: Gram positive rods      Results called to and read back by:Kristy BEASLEY RN 11/16/2022  15:37    Fungus culture [[id number]] Collected: 10/28/22 1029    Order Status: Completed Specimen: Wound from Neck Updated: 11/16/22 1303     Fungus (Mycology) Culture Culture in progress      No fungus isolated after 2 weeks    Narrative:      4) Epidural phlegman    Fungus culture [[id number]] Collected: 10/28/22 0950    Order Status: Completed Specimen: Wound from Neck Updated: 11/16/22 1303     Fungus (Mycology) Culture Culture in progress      No fungus isolated after 2 weeks    Narrative:      3) Osteodiscitis    Fungus culture [[id number]] Collected: 10/28/22 0924    Order Status: Completed Specimen: Abscess from Neck Updated: 11/16/22 1303     Fungus (Mycology) Culture Culture in progress      No fungus isolated after 2 weeks    Narrative:      2) Prevertebral absess    Fungus culture [[id number]] Collected: 10/28/22 0906    Order Status: Completed Specimen: Abscess from Back Updated: 11/16/22 1303     Fungus (Mycology) Culture Culture in progress      No fungus isolated after 2 weeks    Narrative:      Prevertebral Abscess    Culture, Respiratory with Gram Stain [[id number]]     Order Status: No result Specimen:  Respiratory     Blood culture [[id number]] Collected: 11/14/22 0940    Order Status: Completed Specimen: Blood from Peripheral, Right Hand Updated: 11/16/22 1012     Blood Culture, Routine No Growth to date      No Growth to date      No Growth to date    AFB Culture & Smear [[id number]] Collected: 10/28/22 0924    Order Status: Completed Specimen: Abscess from Neck Updated: 11/15/22 1557     AFB Culture & Smear ~Culture in progress     AFB CULTURE STAIN No acid fast bacilli seen.    Narrative:      2) Prevertebral absess    AFB Culture & Smear [[id number]] Collected: 10/28/22 0906    Order Status: Completed Specimen: Abscess from Back Updated: 11/15/22 1557     AFB Culture & Smear Culture in progress     AFB CULTURE STAIN No acid fast bacilli seen.    Narrative:      Prevertebral Abscess    Blood culture [[id number]]     Order Status: Canceled Specimen: Blood     Blood culture [[id number]]     Order Status: Canceled Specimen: Blood             Significant Imaging: I have reviewed all pertinent imaging results/findings within the past 24 hours.

## 2022-11-17 NOTE — PROGRESS NOTES
Alessandro Graf - Neurosurgery ([facility])  [facility] Medicine  Progress Note    Patient Name: Peter Stiles  MRN: [id number]  Patient Class: IP- Inpatient   Admission Date: 10/25/2022  Length of Stay: 23 days  Attending Physician: René Chang*  Primary Care Provider: Og Victoria NP        Subjective:     Principal Problem:Fever        HPI:  51 y.o F with hx of htn, opioid dependence on methadone, smoking (2ppd since 17 y.o), and dilated cardiomyopathy presents with neck pain and abdominal numbness. 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 At the time the daughter slammed the breaks and came to an abrupt stop. The patient braced herself by covering her face with both her forearms. She did not notice severe symptoms at the time or a popping sensation but a vague soreness in her neck. In the following 2 weeks her symptoms gradually deteriorated with increased neck pain notably. Her symptoms became worse early this morning when she went to the bathroom. She noticed an unusual numbness and bloating of her stomach and generalized numbness below that level. She was able to urinate. She also had increased pain in her legs, bilateral shoulders, and tingling in fingers and toes. Patient denies IV drug or alcohol use. She smoke 2ppd since 17 y.o.      Overview/Hospital Course:  Ms. Stiles presented for acute neck pain, along with numbness and weakness of the bilateral upper and lower extremities. MRI demonstrated C5-6 osteomyelitis, discitis, and epidural abscess, as well as narrowing and signal abnormality of the spinal cord. She was admitted to the neuroICU and started on Rocephin and vancomycin. Stepped down to hospital medicine on 10/26/22. Patient completed C5-C6 corpectomy and C4-C7 anterior column reconstruction with NSGY with assistance from ENT. BCX's subsequently grew MSSA and patient transitioned from vanc/ceftriaxone to oxacillin  per ID recommendations.       Interval History: Culture positive GPR  Vancomycin added  Afebrile 24h    Review of Systems  Objective:     Vital Signs (Most Recent):  Temp: 96.5 °F (35.8 °C) (11/17/22 1043)  Pulse: 70 (11/17/22 1225)  Resp: 20 (11/17/22 1313)  BP: (!) 146/77 (11/17/22 1043)  SpO2: 98 % (11/17/22 1225)   Vital Signs (24h Range):  Temp:  [96.5 °F (35.8 °C)-99.8 °F (37.7 °C)] 96.5 °F (35.8 °C)  Pulse:  [69-95] 70  Resp:  [13-32] 20  SpO2:  [87 %-98 %] 98 %  BP: (101-156)/(56-77) 146/77     Weight:  (daylight savings)  Body mass index is 22.6 kg/m².    Intake/Output Summary (Last 24 hours) at 11/17/2022 1359  Last data filed at 11/17/2022 0558  Gross per 24 hour   Intake 600 ml   Output 3400 ml   Net -2800 ml      Physical Exam  Vitals and nursing note reviewed.   HENT:      Head: Normocephalic.      Nose: Nose normal.      Mouth/Throat:      Mouth: Mucous membranes are moist.      Pharynx: Oropharynx is clear.   Neck:      Comments: Trach collar   Cardiovascular:      Rate and Rhythm: Normal rate and regular rhythm.      Pulses: Normal pulses.      Heart sounds: Normal heart sounds.   Pulmonary:      Effort: Pulmonary effort is normal.      Breath sounds: Normal breath sounds.   Abdominal:      General: Bowel sounds are normal.      Palpations: Abdomen is soft.   Musculoskeletal:         General: Normal range of motion.   Skin:     General: Skin is warm and dry.   Neurological:      Mental Status: She is alert and oriented to person, place, and time. Mental status is at baseline.      Comments:      Psychiatric:         Mood and Affect: Mood normal.         Behavior: Behavior normal.       MELD-Na score: 6 at 11/3/2022  1:47 AM  MELD score: 6 at 11/3/2022  1:47 AM  Calculated from:  Serum Creatinine: 0.7 mg/dL (Using min of 1 mg/dL) at 11/3/2022  1:47 AM  Serum Sodium: 141 mmol/L (Using max of 137 mmol/L) at 11/3/2022  1:47 AM  Total Bilirubin: 0.4 mg/dL (Using min of 1 mg/dL) at 11/3/2022  1:47  AM  INR(ratio): 1.0 at 11/1/2022  9:28 PM  Age: 51 years    Significant Labs:  CBC:  Recent Labs   Lab 11/16/22  0400 11/17/22  0647   WBC 10.38 12.21   HGB 7.7* 7.8*   HCT 23.5* 25.4*    337     CMP:  Recent Labs   Lab 11/16/22  0400 11/17/22  0647    140   K 4.3 4.7    105   CO2 26 24    121*   BUN 18 15   CREATININE 0.9 0.8   CALCIUM 9.1 9.4   PROT 6.1 6.5   ALBUMIN 1.8* 1.9*   BILITOT 0.3 0.3   ALKPHOS 143* 142*   AST 46* 41*   ALT 34 29   ANIONGAP 9 11     PTINR:  No results for input(s): INR in the last 48 hours.    Significant Procedures:   Dobutamine Stress Test with Color Flow: No results found for this or any previous visit.      Assessment/Plan:      Hyperglycemia  No noted history of diabetes. Hba1c 6.1. Gluc 269 on admission.  Continue MDSSI  140-180 goal  Accuchecks 4X daily    Opioid use disorder, severe, on maintenance therapy, dependence  -methadone 45mg daily  -multimodal pain regimen includes Tylenol, Robaxin, and Lyrica      Osteomyelitis of cervical spine  # MSSA Bacteremia  # Epidural abscess    Peter Stiles is a 51 y.o. female with PMHx of HTN, dilated cardiomyopathy, h/o opioid dependence, cigarette smoker (2pks/day), and daily baby ASA use who presents with 1 day of decreased sensation from T5 level down and tingling in her bilateral fingers and bilateral toes. MRI imaging obtained in the ED showing possible C5-6 osteodiscitis/myelitis with cord compression. Patient tachycardic and hypertensive on arrival, afebrile, with leukocytosis. Now s/p C5-6 corpectomy on 10/28/22; C2-T2 PCDF on 11/2; S/p trach 11/7.         MRI C/T/L spine w/o contrast 10/25: focal kyphotic deformity at C5/6 with possible discitis/osteomyelitis,   epidural collection extending into the central spinal canal resulting in severe stenosis and cord signal  abnormality.  Prevertebral soft tissue edema and probable paravertebral abscess or phlegmon at this level. Thoracic and lumbar spine  unremarkable. MRI C spine w/ contrast 10/25: confirmed enhancement at C5/6 consistent with osteomyelitis/discitis and epidural phlegmon        -Admitted to neuro ICU initially.  --Intubated after index procedure and kept intubated for posterior fixation and for tentative LISHA 11/4 which was eventually cancelled due to inability to adequately extend neck  --Step down to medicine on 11/11  --Continue Speech therapy. GI consulted for G Tube eval. Patient wishes to see her progress in next 2 weeks before getting Gtube.   --BCx 10/25 growing MSSA, Repeat BCx's 10/27 NGTD  --OR cultures 10/28 no organisms on gram stain, culture MSSA. ID following  --Continue C collar when upright   --CT Soft tissue neck reviewed with stable hardware position s/p 360 cervical fusion ow expected postop changes  --Pain control as needed  --PT/OT. SQH     -- Patient noted to be febrile on 11/12 and 11/3.  -- Reconsulted ID  -- Repeat blood cx 11/14 NGTD. Tested neg for RSV and covid 19.  -- CXR 11/15 Patchy infiltrates and pleural effusion and developed since yesterday. Consulted Pulm.  --Repeat inflammatory markers- sed rate/crp   - Sent ghosh stain/urine eosinophil   ---------------------------------  ID recs for discharge:   -continue oxacillin for MSSA, anticipate 6w course from cleared blcx, rosalino 12/9  -unable to use adjunctive rifampin due to DDI (methadone)  -follow up cx data  -plan for placement per patient (SNF/LTAC) - pt requested [facility]     Outpatient Antibiotic Therapy Plan:     1) Infection: MSSA epidural abscess/bacteremia     2) Discharge Antibiotics:     Intravenous antibiotics:  Oxacillin 12g continuous infusion IV daily      3) Therapy Duration:  6w     Estimated end date of IV antibiotics: 12/9/22     4) Outpatient Weekly Labs:     Order the following labs to be drawn on Mondays:               CBC              CMP               CRP     5) Fax Lab Results to Infectious Diseases Provider: Bernard     [facility] Fax  Number: [phone]     6) Outpatient Infectious Diseases Follow-up    Anxiety and depression  -Atarax prn for agitation      VTE Risk Mitigation (From admission, onward)         Ordered     heparin (porcine) injection 5,000 Units  Every 8 hours         11/04/22 1437     IP VTE LOW RISK PATIENT  Once         10/25/22 1109     Place sequential compression device  Until discontinued         10/25/22 1109                Discharge Planning   YESSI: 11/18/2022     Code Status: Full Code   Is the patient medically ready for discharge?: No    Reason for patient still in hospital (select all that apply): Patient trending condition, Treatment and Consult recommendations  Discharge Plan A: Long-term acute care facility (LTAC)   Discharge Delays: None known at this time      René Garcia MD  Department of Hospital Medicine   [facility] - Neurosurgery ([facility])

## 2022-11-17 NOTE — PT/OT/SLP PROGRESS
"Speech Language Pathology Treatment    Patient Name:  Peter Stiles   MRN:  [id number]  Admitting Diagnosis: Fever    Recommendations:                 General Recommendations:  Dysphagia therapy and One Way Speaking Valve training   Diet recommendations:  NPO, Liquid Diet Level: NPO   Aspiration Precautions: Frequent oral care and Strict aspiration precautions   General Precautions: Standard, aspiration, fall, NPO  Communication strategies:  go to room if call light pushed and Pt using writing/mouthing to communicate  Passy Autumn Speaking Valve Precautions: Only with therapy at this time, remove valve with any S/S respiratory distress, remove valve when sleeping. Please note, to clean valve:  1. Swish valve in pure soap and warm water, 2. Rinse valve thoroughly in warm running water, 3. Allow valve to air dry thoroughly before placing it in storage container, 4. DO NOT use hot water, peroxide, bleach, vinegar, alcohol, brushes, or cotton swabs to clean valve.       Subjective     SLP reviewed Pt with RN, RN cleared for tx  Pt presents lethargic  She mouths, "I'm trying to hold on"     Pain/Comfort:  Pain Rating 1: other (see comments) (Pt did not rate)    Respiratory Status:  trach collar 5L, 28%     Objective:     Has the patient been evaluated by SLP for swallowing?   Yes  Keep patient NPO? Yes   Current Respiratory Status:    trach collar 5L, 28%    Pt working with therapy upon am attempt. Upon pm attempt, Pt found awake in bed with cervical collar, NG, Shiley 6.0 uncuffed trach and oxygen in place. RN at bedside to provide medications then exited the room. Pt denied the need for suctioning prior to one-way speaking valve trials. Pt with inconsistent eye contact and easily transitioned to sleep state which required consistent cues to wake and redirect to ST.  Pt and friend at bedside were educated on SLP role, ongoing safety precautions for PMSV and SLP POC. Pt with minimal sustained alertness, did not " demonstrate understanding and easily fell back to sleep provided cues. One-way speaking trials deferred 2/2 lethargy. No additional questions noted. RN notified. Pt remained in bed with call light in reach, friend at bedside, upon SLP exit.     Assessment:     Peter Stiles is a 51 y.o. female with an SLP diagnosis of Dysphagia, Dysphonia, S/P Trach, and One Way Speaking Valve Training.  She presents with waxing/waning alertness.  Current goals remain ongoing    Goals:   Multidisciplinary Problems       SLP Goals          Problem: SLP    Goal Priority Disciplines Outcome   SLP Goal     SLP Ongoing, Progressing   Description: Speech Language Pathology Goals  Goals expected to be met by 11/16/22    1. Pt will tolerate one-way speaking valve for 10 minutes w/o S/S respiratory distress, MIN A  2. Pt will recall 3 +safety precautions for PMSV with cuffed trach, 90%, MIN A  3. Pt will participate in ongoing swallow assessment to determine safest, least restrictive means nutrition/hydration    4. Educate Pt and family on safety awareness and safety precautions for one-way speaking valve                         Plan:     Patient to be seen:  4 x/week   Plan of Care expires:  12/09/22  Plan of Care reviewed with:  patient, friend   SLP Follow-Up:  Yes       Discharge recommendations:  rehabilitation facility   Barriers to Discharge:   NPO    Time Tracking:     SLP Treatment Date:   11/16/22  Speech Start Time:  1434  Speech Stop Time:  1448     Speech Total Time (min):  14 min    Billable Minutes: Self Care/Home Management Training 9    11/16/2022

## 2022-11-17 NOTE — SUBJECTIVE & OBJECTIVE
Interval History: Culture positive GPR  Vancomycin added  Afebrile 24h    Review of Systems  Objective:     Vital Signs (Most Recent):  Temp: 96.5 °F (35.8 °C) (11/17/22 1043)  Pulse: 70 (11/17/22 1225)  Resp: 20 (11/17/22 1313)  BP: (!) 146/77 (11/17/22 1043)  SpO2: 98 % (11/17/22 1225)   Vital Signs (24h Range):  Temp:  [96.5 °F (35.8 °C)-99.8 °F (37.7 °C)] 96.5 °F (35.8 °C)  Pulse:  [69-95] 70  Resp:  [13-32] 20  SpO2:  [87 %-98 %] 98 %  BP: (101-156)/(56-77) 146/77     Weight:  (daylight savings)  Body mass index is 22.6 kg/m².    Intake/Output Summary (Last 24 hours) at 11/17/2022 1359  Last data filed at 11/17/2022 0558  Gross per 24 hour   Intake 600 ml   Output 3400 ml   Net -2800 ml      Physical Exam  Vitals and nursing note reviewed.   HENT:      Head: Normocephalic.      Nose: Nose normal.      Mouth/Throat:      Mouth: Mucous membranes are moist.      Pharynx: Oropharynx is clear.   Neck:      Comments: Trach collar   Cardiovascular:      Rate and Rhythm: Normal rate and regular rhythm.      Pulses: Normal pulses.      Heart sounds: Normal heart sounds.   Pulmonary:      Effort: Pulmonary effort is normal.      Breath sounds: Normal breath sounds.   Abdominal:      General: Bowel sounds are normal.      Palpations: Abdomen is soft.   Musculoskeletal:         General: Normal range of motion.   Skin:     General: Skin is warm and dry.   Neurological:      Mental Status: She is alert and oriented to person, place, and time. Mental status is at baseline.      Comments:      Psychiatric:         Mood and Affect: Mood normal.         Behavior: Behavior normal.       MELD-Na score: 6 at 11/3/2022  1:47 AM  MELD score: 6 at 11/3/2022  1:47 AM  Calculated from:  Serum Creatinine: 0.7 mg/dL (Using min of 1 mg/dL) at 11/3/2022  1:47 AM  Serum Sodium: 141 mmol/L (Using max of 137 mmol/L) at 11/3/2022  1:47 AM  Total Bilirubin: 0.4 mg/dL (Using min of 1 mg/dL) at 11/3/2022  1:47 AM  INR(ratio): 1.0 at 11/1/2022   9:28 PM  Age: 51 years    Significant Labs:  CBC:  Recent Labs   Lab 11/16/22  0400 11/17/22  0647   WBC 10.38 12.21   HGB 7.7* 7.8*   HCT 23.5* 25.4*    337     CMP:  Recent Labs   Lab 11/16/22  0400 11/17/22  0647    140   K 4.3 4.7    105   CO2 26 24    121*   BUN 18 15   CREATININE 0.9 0.8   CALCIUM 9.1 9.4   PROT 6.1 6.5   ALBUMIN 1.8* 1.9*   BILITOT 0.3 0.3   ALKPHOS 143* 142*   AST 46* 41*   ALT 34 29   ANIONGAP 9 11     PTINR:  No results for input(s): INR in the last 48 hours.    Significant Procedures:   Dobutamine Stress Test with Color Flow: No results found for this or any previous visit.

## 2022-11-17 NOTE — PLAN OF CARE
Alessandro Graf - Neurosurgery ([facility])  Discharge Reassessment    Primary Care Provider: Og Victoria NP    Expected Discharge Date: 11/18/2022    Patient is not medically ready for discharge.  Patient with fever and is having workup done.  Patient is pending [facility] rec [facility].  [facility] with NG Tube in place.    Reassessment (most recent)       Discharge Reassessment - 11/17/22 1158          Discharge Reassessment    Assessment Type Discharge Planning Reassessment     Did the patient's condition or plan change since previous assessment? No     Discharge Plan discussed with: Patient;Spouse/sig other     Communicated YESSI with patient/caregiver Date not available/Unable to determine     Discharge Plan A Long-term acute care facility (LTAC)     Discharge Plan B Skilled Nursing Facility     DME Needed Upon Discharge  other (see comments)   tbd    Discharge Barriers Identified No family/friends to help     Why the patient remains in the hospital Requires continued medical care        Post-Acute Status    Post-Acute Authorization Placement     Post-Acute Placement Status Pending medical clearance/testing     Discharge Delays None known at this time

## 2022-11-17 NOTE — SUBJECTIVE & OBJECTIVE
Interval History:   Fever this afternoon to 100.5  Reports feels better today.  More alert.   C/o fatigue.  Denies SOB  Blood cx of 11/14 - now with GPR showing late this afternoon  Pulmonary evaluated - bedside US performed with minimal pleural fluid - suspicion for pleural space infection low.  No signs of complicated/loculated effusions.  Some consolidation at bases. Recommend sputum cx, nebulizer treatments    Review of Systems   Constitutional:  Negative for chills, diaphoresis, fatigue and fever.   HENT:          Tracheostomy. Increased secretions     Respiratory:  Positive for cough. Negative for shortness of breath.    Cardiovascular:  Negative for chest pain, palpitations and leg swelling.   Gastrointestinal:  Negative for diarrhea, nausea and vomiting.   Genitourinary:  Negative for difficulty urinating and dysuria.   Musculoskeletal:  Positive for neck pain. Negative for arthralgias, back pain and myalgias.   Skin:  Negative for color change, rash and wound.   Neurological:  Positive for speech difficulty (trach with o2 collar. communicates with white board and yes/no answers). Negative for dizziness, weakness and numbness.   Psychiatric/Behavioral:  Negative for agitation and confusion.    Objective:     Vital Signs (Most Recent):  Temp: 98.7 °F (37.1 °C) (11/16/22 1913)  Pulse: 83 (11/16/22 2053)  Resp: 18 (11/16/22 2053)  BP: 104/61 (11/16/22 2053)  SpO2: 97 % (11/16/22 2053) Vital Signs (24h Range):  Temp:  [97.1 °F (36.2 °C)-100.5 °F (38.1 °C)] 98.7 °F (37.1 °C)  Pulse:  [54-95] 83  Resp:  [13-20] 18  SpO2:  [94 %-98 %] 97 %  BP: ()/(47-66) 104/61     Weight:  (daylight savings)  Body mass index is 22.6 kg/m².    Estimated Creatinine Clearance: 68.5 mL/min (based on SCr of 0.9 mg/dL).    Physical Exam  Vitals and nursing note reviewed.   Constitutional:       General: She is not in acute distress.     Appearance: Normal appearance. She is ill-appearing. She is not toxic-appearing or  diaphoretic.      Comments:      HENT:      Head: Normocephalic and atraumatic.      Nose:      Comments: NG tube       Mouth/Throat:      Comments: Poor dentition    Eyes:      General: No scleral icterus.     Conjunctiva/sclera: Conjunctivae normal.   Neck:      Comments: Cervical collar   Trach - no significant secretions at time of my exam  Cardiovascular:      Rate and Rhythm: Normal rate and regular rhythm.      Heart sounds: No murmur heard.  Pulmonary:      Effort: Pulmonary effort is normal. No respiratory distress.      Breath sounds: No stridor. No wheezing.      Comments: Clear anteriorly  Crackles lower lobes  Abdominal:      General: Bowel sounds are normal. There is no distension.      Palpations: Abdomen is soft.      Tenderness: There is no abdominal tenderness. There is no guarding.   Musculoskeletal:      Right lower leg: No edema.      Left lower leg: No edema.   Skin:     General: Skin is warm and dry.   Neurological:      Mental Status: She is alert. Mental status is at baseline.       Significant Labs:   Microbiology Results (last 7 days)       Procedure Component Value Units Date/Time    Blood culture [[id number]] Collected: 11/14/22 0939    Order Status: Completed Specimen: Blood from Peripheral, Left Hand Updated: 11/16/22 1537     Blood Culture, Routine Gram stain yumi bottle: Gram positive rods      Results called to and read back by:Kristy BEASLEY RN 11/16/2022  15:37    Fungus culture [[id number]] Collected: 10/28/22 1029    Order Status: Completed Specimen: Wound from Neck Updated: 11/16/22 1303     Fungus (Mycology) Culture Culture in progress      No fungus isolated after 2 weeks    Narrative:      4) Epidural phlegman    Fungus culture [[id number]] Collected: 10/28/22 0950    Order Status: Completed Specimen: Wound from Neck Updated: 11/16/22 1303     Fungus (Mycology) Culture Culture in progress      No fungus isolated after 2 weeks    Narrative:      3) Osteodiscitis    Fungus culture  [[id number]] Collected: 10/28/22 0924    Order Status: Completed Specimen: Abscess from Neck Updated: 11/16/22 1303     Fungus (Mycology) Culture Culture in progress      No fungus isolated after 2 weeks    Narrative:      2) Prevertebral absess    Fungus culture [[id number]] Collected: 10/28/22 0906    Order Status: Completed Specimen: Abscess from Back Updated: 11/16/22 1303     Fungus (Mycology) Culture Culture in progress      No fungus isolated after 2 weeks    Narrative:      Prevertebral Abscess    Culture, Respiratory with Gram Stain [[id number]]     Order Status: No result Specimen: Respiratory     Blood culture [[id number]] Collected: 11/14/22 0940    Order Status: Completed Specimen: Blood from Peripheral, Right Hand Updated: 11/16/22 1012     Blood Culture, Routine No Growth to date      No Growth to date      No Growth to date    AFB Culture & Smear [[id number]] Collected: 10/28/22 0924    Order Status: Completed Specimen: Abscess from Neck Updated: 11/15/22 1557     AFB Culture & Smear ~Culture in progress     AFB CULTURE STAIN No acid fast bacilli seen.    Narrative:      2) Prevertebral absess    AFB Culture & Smear [[id number]] Collected: 10/28/22 0906    Order Status: Completed Specimen: Abscess from Back Updated: 11/15/22 1557     AFB Culture & Smear Culture in progress     AFB CULTURE STAIN No acid fast bacilli seen.    Narrative:      Prevertebral Abscess    Blood culture [[id number]]     Order Status: Canceled Specimen: Blood     Blood culture [[id number]]     Order Status: Canceled Specimen: Blood             Significant Imaging: I have reviewed all pertinent imaging results/findings within the past 24 hours.

## 2022-11-17 NOTE — ASSESSMENT & PLAN NOTE
Peter Stiles is a 51 y.o. female with PMHx of HTN, dilated cardiomyopathy, h/o opioid dependence, cigarette smoker (2pks/day), and daily baby ASA use who presents with 1 day of decreased sensation from T5 level down and tingling in her bilateral fingers and bilateral toes. MRI imaging obtained in the ED showing possible C5-6 osteodiscitis/myelitis with cord compression. Patient tachycardic and hypertensive on arrival, afebrile, with leukocytosis.    Now s/p C5-6 corpectomy on 10/28/22; C2-T2 PCDF on 11/2.    - Stepped down to medicine service.  - Q4hr neurochecks.  - All pertinent imaging/labs personally reviewed and interpreted.   - Surgical drains removed with no issues.  - Requiring trach on 11/7 after failed extubation multiple times.  - Holding off on gtube placement to see if progressing with speech therapy. Ongoing.  - ID:   --Intermittently febrile. BLE US negative. Rest of fever workup per primary. Encourage IS hourly. Repeat CXR showing increased consolidation and pleural effusion at right lung base. Pulm recommending sputum culture from tracheal aspirate.    --BCx 10/25 w/ MSSA, Repeat BCx's 10/27 NGTD, Blood cultures 11/17 pending   --OR cultures 10/28 w/ MSSA   --ID consulted, Oxacillin dc'd. Cefepime and IV vanc started.   - Continue c-collar when out of bed.  - Pain control with multimodal regimen.  - Okay for [facility].  - Please call NSGY with any questions/concerns. Neurosurgery will continue to follow peripherally.     Dispo: per primary

## 2022-11-18 PROBLEM — A41.9 SEPTICEMIA: Status: ACTIVE | Noted: 2022-11-18

## 2022-11-18 LAB
ALBUMIN SERPL BCP-MCNC: 2 G/DL (ref 3.5–5.2)
ALP SERPL-CCNC: 139 U/L (ref 55–135)
ALT SERPL W/O P-5'-P-CCNC: 33 U/L (ref 10–44)
ANION GAP SERPL CALC-SCNC: 8 MMOL/L (ref 8–16)
AST SERPL-CCNC: 47 U/L (ref 10–40)
BACTERIA BLD CULT: ABNORMAL
BASOPHILS # BLD AUTO: 0.04 K/UL (ref 0–0.2)
BASOPHILS NFR BLD: 0.3 % (ref 0–1.9)
BILIRUB SERPL-MCNC: 0.4 MG/DL (ref 0.1–1)
BUN SERPL-MCNC: 13 MG/DL (ref 6–20)
CALCIUM SERPL-MCNC: 9.8 MG/DL (ref 8.7–10.5)
CHLORIDE SERPL-SCNC: 104 MMOL/L (ref 95–110)
CO2 SERPL-SCNC: 29 MMOL/L (ref 23–29)
CREAT SERPL-MCNC: 0.8 MG/DL (ref 0.5–1.4)
DIFFERENTIAL METHOD: ABNORMAL
EOSINOPHIL # BLD AUTO: 0.2 K/UL (ref 0–0.5)
EOSINOPHIL NFR BLD: 1.8 % (ref 0–8)
ERYTHROCYTE [DISTWIDTH] IN BLOOD BY AUTOMATED COUNT: 15.2 % (ref 11.5–14.5)
EST. GFR  (NO RACE VARIABLE): >60 ML/MIN/1.73 M^2
GLUCOSE SERPL-MCNC: 109 MG/DL (ref 70–110)
HCT VFR BLD AUTO: 26.4 % (ref 37–48.5)
HGB BLD-MCNC: 8 G/DL (ref 12–16)
IMM GRANULOCYTES # BLD AUTO: 0.21 K/UL (ref 0–0.04)
IMM GRANULOCYTES NFR BLD AUTO: 1.8 % (ref 0–0.5)
LYMPHOCYTES # BLD AUTO: 2.6 K/UL (ref 1–4.8)
LYMPHOCYTES NFR BLD: 22.7 % (ref 18–48)
MAGNESIUM SERPL-MCNC: 2.2 MG/DL (ref 1.6–2.6)
MCH RBC QN AUTO: 29.4 PG (ref 27–31)
MCHC RBC AUTO-ENTMCNC: 30.3 G/DL (ref 32–36)
MCV RBC AUTO: 97 FL (ref 82–98)
MONOCYTES # BLD AUTO: 1 K/UL (ref 0.3–1)
MONOCYTES NFR BLD: 8.9 % (ref 4–15)
NEUTROPHILS # BLD AUTO: 7.5 K/UL (ref 1.8–7.7)
NEUTROPHILS NFR BLD: 64.5 % (ref 38–73)
NRBC BLD-RTO: 0 /100 WBC
PHOSPHATE SERPL-MCNC: 4.1 MG/DL (ref 2.7–4.5)
PLATELET # BLD AUTO: 380 K/UL (ref 150–450)
PMV BLD AUTO: 10.4 FL (ref 9.2–12.9)
POCT GLUCOSE: 104 MG/DL (ref 70–110)
POCT GLUCOSE: 117 MG/DL (ref 70–110)
POCT GLUCOSE: 84 MG/DL (ref 70–110)
POTASSIUM SERPL-SCNC: 4.3 MMOL/L (ref 3.5–5.1)
PROT SERPL-MCNC: 6.8 G/DL (ref 6–8.4)
RBC # BLD AUTO: 2.72 M/UL (ref 4–5.4)
SODIUM SERPL-SCNC: 141 MMOL/L (ref 136–145)
VANCOMYCIN TROUGH SERPL-MCNC: 16.8 UG/ML (ref 10–22)
WBC # BLD AUTO: 11.65 K/UL (ref 3.9–12.7)

## 2022-11-18 PROCEDURE — 99900035 HC TECH TIME PER 15 MIN (STAT)

## 2022-11-18 PROCEDURE — 99900026 HC AIRWAY MAINTENANCE (STAT)

## 2022-11-18 PROCEDURE — 97535 SELF CARE MNGMENT TRAINING: CPT

## 2022-11-18 PROCEDURE — 63600175 PHARM REV CODE 636 W HCPCS: Performed by: NURSE PRACTITIONER

## 2022-11-18 PROCEDURE — 25000003 PHARM REV CODE 250

## 2022-11-18 PROCEDURE — 25000003 PHARM REV CODE 250: Performed by: STUDENT IN AN ORGANIZED HEALTH CARE EDUCATION/TRAINING PROGRAM

## 2022-11-18 PROCEDURE — 84100 ASSAY OF PHOSPHORUS: CPT | Performed by: PHYSICIAN ASSISTANT

## 2022-11-18 PROCEDURE — 80202 ASSAY OF VANCOMYCIN: CPT | Performed by: STUDENT IN AN ORGANIZED HEALTH CARE EDUCATION/TRAINING PROGRAM

## 2022-11-18 PROCEDURE — 36415 COLL VENOUS BLD VENIPUNCTURE: CPT | Performed by: STUDENT IN AN ORGANIZED HEALTH CARE EDUCATION/TRAINING PROGRAM

## 2022-11-18 PROCEDURE — A4216 STERILE WATER/SALINE, 10 ML: HCPCS | Performed by: STUDENT IN AN ORGANIZED HEALTH CARE EDUCATION/TRAINING PROGRAM

## 2022-11-18 PROCEDURE — S4991 NICOTINE PATCH NONLEGEND: HCPCS | Performed by: PHYSICIAN ASSISTANT

## 2022-11-18 PROCEDURE — 25000003 PHARM REV CODE 250: Performed by: PSYCHIATRY & NEUROLOGY

## 2022-11-18 PROCEDURE — 27000221 HC OXYGEN, UP TO 24 HOURS

## 2022-11-18 PROCEDURE — 11000001 HC ACUTE MED/SURG PRIVATE ROOM

## 2022-11-18 PROCEDURE — 80053 COMPREHEN METABOLIC PANEL: CPT | Performed by: PHYSICIAN ASSISTANT

## 2022-11-18 PROCEDURE — 25000242 PHARM REV CODE 250 ALT 637 W/ HCPCS: Performed by: STUDENT IN AN ORGANIZED HEALTH CARE EDUCATION/TRAINING PROGRAM

## 2022-11-18 PROCEDURE — 25000003 PHARM REV CODE 250: Performed by: NURSE PRACTITIONER

## 2022-11-18 PROCEDURE — 36415 COLL VENOUS BLD VENIPUNCTURE: CPT | Performed by: PHYSICIAN ASSISTANT

## 2022-11-18 PROCEDURE — 63600175 PHARM REV CODE 636 W HCPCS: Performed by: STUDENT IN AN ORGANIZED HEALTH CARE EDUCATION/TRAINING PROGRAM

## 2022-11-18 PROCEDURE — 97112 NEUROMUSCULAR REEDUCATION: CPT

## 2022-11-18 PROCEDURE — 97530 THERAPEUTIC ACTIVITIES: CPT | Mod: CQ

## 2022-11-18 PROCEDURE — 99233 SBSQ HOSP IP/OBS HIGH 50: CPT | Mod: ,,, | Performed by: STUDENT IN AN ORGANIZED HEALTH CARE EDUCATION/TRAINING PROGRAM

## 2022-11-18 PROCEDURE — 99232 SBSQ HOSP IP/OBS MODERATE 35: CPT | Mod: ,,, | Performed by: STUDENT IN AN ORGANIZED HEALTH CARE EDUCATION/TRAINING PROGRAM

## 2022-11-18 PROCEDURE — 25000003 PHARM REV CODE 250: Performed by: PHYSICIAN ASSISTANT

## 2022-11-18 PROCEDURE — 99233 PR SUBSEQUENT HOSPITAL CARE,LEVL III: ICD-10-PCS | Mod: ,,, | Performed by: STUDENT IN AN ORGANIZED HEALTH CARE EDUCATION/TRAINING PROGRAM

## 2022-11-18 PROCEDURE — 92507 TX SP LANG VOICE COMM INDIV: CPT

## 2022-11-18 PROCEDURE — 94640 AIRWAY INHALATION TREATMENT: CPT

## 2022-11-18 PROCEDURE — 85025 COMPLETE CBC W/AUTO DIFF WBC: CPT | Performed by: PHYSICIAN ASSISTANT

## 2022-11-18 PROCEDURE — 83735 ASSAY OF MAGNESIUM: CPT | Performed by: PHYSICIAN ASSISTANT

## 2022-11-18 PROCEDURE — 94761 N-INVAS EAR/PLS OXIMETRY MLT: CPT

## 2022-11-18 PROCEDURE — 99232 PR SUBSEQUENT HOSPITAL CARE,LEVL II: ICD-10-PCS | Mod: ,,, | Performed by: STUDENT IN AN ORGANIZED HEALTH CARE EDUCATION/TRAINING PROGRAM

## 2022-11-18 RX ADMIN — SENNOSIDES AND DOCUSATE SODIUM 1 TABLET: 50; 8.6 TABLET ORAL at 08:11

## 2022-11-18 RX ADMIN — IPRATROPIUM BROMIDE AND ALBUTEROL SULFATE 3 ML: 2.5; .5 SOLUTION RESPIRATORY (INHALATION) at 01:11

## 2022-11-18 RX ADMIN — Medication 10 ML: at 06:11

## 2022-11-18 RX ADMIN — OXYCODONE 5 MG: 5 TABLET ORAL at 05:11

## 2022-11-18 RX ADMIN — DIAZEPAM 5 MG: 5 TABLET ORAL at 08:11

## 2022-11-18 RX ADMIN — CETIRIZINE HYDROCHLORIDE 5 MG: 5 TABLET, FILM COATED ORAL at 08:11

## 2022-11-18 RX ADMIN — VANCOMYCIN HYDROCHLORIDE 750 MG: 750 INJECTION, POWDER, LYOPHILIZED, FOR SOLUTION INTRAVENOUS at 11:11

## 2022-11-18 RX ADMIN — Medication 10 ML: at 12:11

## 2022-11-18 RX ADMIN — IPRATROPIUM BROMIDE AND ALBUTEROL SULFATE 3 ML: 2.5; .5 SOLUTION RESPIRATORY (INHALATION) at 08:11

## 2022-11-18 RX ADMIN — VANCOMYCIN HYDROCHLORIDE 750 MG: 750 INJECTION, POWDER, LYOPHILIZED, FOR SOLUTION INTRAVENOUS at 12:11

## 2022-11-18 RX ADMIN — CEFEPIME 2 G: 2 INJECTION, POWDER, FOR SOLUTION INTRAVENOUS at 09:11

## 2022-11-18 RX ADMIN — HEPARIN SODIUM 5000 UNITS: 5000 INJECTION INTRAVENOUS; SUBCUTANEOUS at 08:11

## 2022-11-18 RX ADMIN — Medication 1 PATCH: at 08:11

## 2022-11-18 RX ADMIN — OXYCODONE 5 MG: 5 TABLET ORAL at 12:11

## 2022-11-18 RX ADMIN — FAMOTIDINE 20 MG: 20 TABLET ORAL at 09:11

## 2022-11-18 RX ADMIN — CEFEPIME 2 G: 2 INJECTION, POWDER, FOR SOLUTION INTRAVENOUS at 05:11

## 2022-11-18 RX ADMIN — PREGABALIN 200 MG: 150 CAPSULE ORAL at 08:11

## 2022-11-18 RX ADMIN — POLYETHYLENE GLYCOL 3350 17 G: 17 POWDER, FOR SOLUTION ORAL at 08:11

## 2022-11-18 RX ADMIN — SILODOSIN 4 MG: 4 CAPSULE ORAL at 08:11

## 2022-11-18 RX ADMIN — HEPARIN SODIUM 5000 UNITS: 5000 INJECTION INTRAVENOUS; SUBCUTANEOUS at 01:11

## 2022-11-18 RX ADMIN — CEFEPIME 2 G: 2 INJECTION, POWDER, FOR SOLUTION INTRAVENOUS at 01:11

## 2022-11-18 RX ADMIN — OXYCODONE 5 MG: 5 TABLET ORAL at 11:11

## 2022-11-18 RX ADMIN — FAMOTIDINE 20 MG: 20 TABLET ORAL at 12:11

## 2022-11-18 RX ADMIN — IPRATROPIUM BROMIDE AND ALBUTEROL SULFATE 3 ML: 2.5; .5 SOLUTION RESPIRATORY (INHALATION) at 09:11

## 2022-11-18 RX ADMIN — METHADONE HYDROCHLORIDE 90 MG: 10 TABLET ORAL at 03:11

## 2022-11-18 RX ADMIN — SENNOSIDES AND DOCUSATE SODIUM 1 TABLET: 50; 8.6 TABLET ORAL at 09:11

## 2022-11-18 RX ADMIN — SODIUM CHLORIDE, SODIUM LACTATE, POTASSIUM CHLORIDE, AND CALCIUM CHLORIDE 1000 ML: .6; .31; .03; .02 INJECTION, SOLUTION INTRAVENOUS at 10:11

## 2022-11-18 RX ADMIN — FAMOTIDINE 20 MG: 20 TABLET ORAL at 08:11

## 2022-11-18 RX ADMIN — HEPARIN SODIUM 5000 UNITS: 5000 INJECTION INTRAVENOUS; SUBCUTANEOUS at 05:11

## 2022-11-18 RX ADMIN — PREGABALIN 200 MG: 150 CAPSULE ORAL at 03:11

## 2022-11-18 NOTE — PLAN OF CARE
Problem: Adult Inpatient Plan of Care  Goal: Plan of Care Review  Outcome: Ongoing, Progressing  Goal: Patient-Specific Goal (Individualized)  Description: Admit Date: 10/25/2022  Outcome: Ongoing, Progressing  Goal: Absence of Hospital-Acquired Illness or Injury  Outcome: Ongoing, Progressing  Goal: Optimal Comfort and Wellbeing  Outcome: Ongoing, Progressing     Problem: Fall Injury Risk  Goal: Absence of Fall and Fall-Related Injury  Outcome: Ongoing, Progressing     Problem: Skin Injury Risk Increased  Goal: Skin Health and Integrity  Outcome: Ongoing, Progressing     Problem: Pain Acute  Goal: Acceptable Pain Control and Functional Ability  Outcome: Ongoing, Progressing     Problem: Autonomic Dysreflexia (Spinal Cord Injury)  Goal: Effective Autonomic Dysreflexia Prevention  Outcome: Ongoing, Progressing     Problem: Bowel Elimination Impaired (Spinal Cord Injury)  Goal: Effective Bowel Elimination  Outcome: Ongoing, Progressing     Problem: Extended Neurologic Impairment (Spinal Cord Injury)  Goal: Absence of Extended Neurologic Injury  Outcome: Ongoing, Progressing     Problem: Functional Ability Impaired (Spinal Cord Injury)  Goal: Optimal Functional Ability  Outcome: Ongoing, Progressing     Problem: Neurogenic Shock (Spinal Cord Injury)  Goal: Effective Tissue Perfusion  Outcome: Ongoing, Progressing     Problem: Nutrition Impaired (Spinal Cord Injury)  Goal: Optimal Nutrition Intake  Outcome: Ongoing, Progressing     Problem: Infection  Goal: Absence of Infection Signs and Symptoms  Outcome: Ongoing, Progressing     Problem: Communication Impairment (Artificial Airway)  Goal: Effective Communication  Outcome: Ongoing, Progressing     Problem: Device-Related Complication Risk (Artificial Airway)  Goal: Optimal Device Function  Outcome: Ongoing, Progressing     Problem: Skin and Tissue Injury (Artificial Airway)  Goal: Absence of Device-Related Skin or Tissue Injury  Outcome: Ongoing, Progressing      Problem: Noninvasive Ventilation Acute  Goal: Effective Unassisted Ventilation and Oxygenation  Outcome: Ongoing, Progressing     Problem: Adjustment to Illness (Delirium)  Goal: Optimal Coping  Outcome: Ongoing, Progressing     Problem: Sleep Disturbance (Delirium)  Goal: Improved Sleep  Outcome: Ongoing, Progressing     Problem: Adjustment to Illness (Sepsis/Septic Shock)  Goal: Optimal Coping  Outcome: Ongoing, Progressing     Problem: Bleeding (Sepsis/Septic Shock)  Goal: Absence of Bleeding  Outcome: Ongoing, Progressing     Problem: Glycemic Control Impaired (Sepsis/Septic Shock)  Goal: Blood Glucose Level Within Desired Range  Outcome: Ongoing, Progressing     Problem: Infection Progression (Sepsis/Septic Shock)  Goal: Absence of Infection Signs and Symptoms  Outcome: Ongoing, Progressing     Problem: Nutrition Impaired (Sepsis/Septic Shock)  Goal: Optimal Nutrition Intake  Outcome: Ongoing, Progressing    POC reviewed with the patient and they verbalized understanding. All comments and concerns addressed. Bed locked in lowest position with bed alarm set, call light within reach. Safety precautions maintained. VSS, see flowsheets. No events this shift. Will continue to monitor for changes to POC and clinical condition.

## 2022-11-18 NOTE — NURSING
Patient alert and oriented x3. VS stable. No signs of acute distress. No acute events over night. Pt seems to be improving with speech aeb able to speak full words to communicate now. Pt has had a bed change. Incontinence care, and been turned. No further needs at this time

## 2022-11-18 NOTE — PLAN OF CARE
[facility].  [facility] with [facility].  Patient is pending cultures and final id rec to be transferred to [facility].   11/18/22 1242   Post-Acute Status   Post-Acute Authorization Placement   Post-Acute Placement Status Pending medical clearance/testing

## 2022-11-18 NOTE — PROGRESS NOTES
Alessandro Graf - Neurosurgery ([facility])  [facility] Medicine  Progress Note    Patient Name: Peter Stiles  MRN: [id number]  Patient Class: IP- Inpatient   Admission Date: 10/25/2022  Length of Stay: 24 days  Attending Physician: René Chang*  Primary Care Provider: Og Victoria NP        Subjective:     Principal Problem:Fever        HPI:  51 y.o F with hx of htn, opioid dependence on methadone, smoking (2ppd since 17 y.o), and dilated cardiomyopathy presents with neck pain and abdominal numbness. 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 At the time the daughter slammed the breaks and came to an abrupt stop. The patient braced herself by covering her face with both her forearms. She did not notice severe symptoms at the time or a popping sensation but a vague soreness in her neck. In the following 2 weeks her symptoms gradually deteriorated with increased neck pain notably. Her symptoms became worse early this morning when she went to the bathroom. She noticed an unusual numbness and bloating of her stomach and generalized numbness below that level. She was able to urinate. She also had increased pain in her legs, bilateral shoulders, and tingling in fingers and toes. Patient denies IV drug or alcohol use. She smoke 2ppd since 17 y.o.      Overview/Hospital Course:  Ms. Stiles presented for acute neck pain, along with numbness and weakness of the bilateral upper and lower extremities. MRI demonstrated C5-6 osteomyelitis, discitis, and epidural abscess, as well as narrowing and signal abnormality of the spinal cord. She was admitted to the neuroICU and started on Rocephin and vancomycin. Stepped down to hospital medicine on 10/26/22. Patient completed C5-C6 corpectomy and C4-C7 anterior column reconstruction with NSGY with assistance from ENT. BCX's subsequently grew MSSA and patient transitioned from vanc/ceftriaxone to oxacillin  per ID recommendations.       Interval History: No acute events  Afebrile    Review of Systems  Objective:     Vital Signs (Most Recent):  Temp: 99.1 °F (37.3 °C) (11/18/22 0756)  Pulse: 94 (11/18/22 1312)  Resp: 20 (11/18/22 1312)  BP: 122/73 (11/18/22 1312)  SpO2: 96 % (11/18/22 1312) Vital Signs (24h Range):  Temp:  [98.2 °F (36.8 °C)-99.1 °F (37.3 °C)] 99.1 °F (37.3 °C)  Pulse:  [64-94] 94  Resp:  [17-20] 20  SpO2:  [95 %-97 %] 96 %  BP: (110-137)/(56-82) 122/73     Weight:  (daylight savings)  Body mass index is 22.6 kg/m².    Intake/Output Summary (Last 24 hours) at 11/18/2022 1409  Last data filed at 11/18/2022 0612  Gross per 24 hour   Intake --   Output 1650 ml   Net -1650 ml      Physical Exam  Vitals and nursing note reviewed.   HENT:      Head: Normocephalic.      Nose: Nose normal.      Mouth/Throat:      Mouth: Mucous membranes are moist.      Pharynx: Oropharynx is clear.   Neck:      Comments: Trach collar   Cardiovascular:      Rate and Rhythm: Normal rate and regular rhythm.      Pulses: Normal pulses.      Heart sounds: Normal heart sounds.   Pulmonary:      Effort: Pulmonary effort is normal.      Breath sounds: Normal breath sounds.   Abdominal:      General: Bowel sounds are normal.      Palpations: Abdomen is soft.   Musculoskeletal:         General: Normal range of motion.   Skin:     General: Skin is warm and dry.   Neurological:      Mental Status: She is alert and oriented to person, place, and time. Mental status is at baseline.      Comments:      Psychiatric:         Mood and Affect: Mood normal.         Behavior: Behavior normal.       MELD-Na score: 6 at 11/3/2022  1:47 AM  MELD score: 6 at 11/3/2022  1:47 AM  Calculated from:  Serum Creatinine: 0.7 mg/dL (Using min of 1 mg/dL) at 11/3/2022  1:47 AM  Serum Sodium: 141 mmol/L (Using max of 137 mmol/L) at 11/3/2022  1:47 AM  Total Bilirubin: 0.4 mg/dL (Using min of 1 mg/dL) at 11/3/2022  1:47 AM  INR(ratio): 1.0 at 11/1/2022  9:28  PM  Age: 51 years    Significant Labs:  CBC:  Recent Labs   Lab 11/17/22  0647 11/18/22  0458   WBC 12.21 11.65   HGB 7.8* 8.0*   HCT 25.4* 26.4*    380     CMP:  Recent Labs   Lab 11/17/22  0647 11/18/22  0458    141   K 4.7 4.3    104   CO2 24 29   * 109   BUN 15 13   CREATININE 0.8 0.8   CALCIUM 9.4 9.8   PROT 6.5 6.8   ALBUMIN 1.9* 2.0*   BILITOT 0.3 0.4   ALKPHOS 142* 139*   AST 41* 47*   ALT 29 33   ANIONGAP 11 8     PTINR:  No results for input(s): INR in the last 48 hours.    Significant Procedures:   Dobutamine Stress Test with Color Flow: No results found for this or any previous visit.      Assessment/Plan:      Hyperglycemia  No noted history of diabetes. Hba1c 6.1. Gluc 269 on admission.  Continue MDSSI  140-180 goal  Accuchecks 4X daily    Opioid use disorder, severe, on maintenance therapy, dependence  -methadone 45mg daily  -multimodal pain regimen includes Tylenol, Robaxin, and Lyrica      Osteomyelitis of cervical spine  # MSSA Bacteremia  # Epidural abscess    Peter Stephane Stiles is a 51 y.o. female with PMHx of HTN, dilated cardiomyopathy, h/o opioid dependence, cigarette smoker (2pks/day), and daily baby ASA use who presents with 1 day of decreased sensation from T5 level down and tingling in her bilateral fingers and bilateral toes. MRI imaging obtained in the ED showing possible C5-6 osteodiscitis/myelitis with cord compression. Patient tachycardic and hypertensive on arrival, afebrile, with leukocytosis. Now s/p C5-6 corpectomy on 10/28/22; C2-T2 PCDF on 11/2; S/p trach 11/7.         MRI C/T/L spine w/o contrast 10/25: focal kyphotic deformity at C5/6 with possible discitis/osteomyelitis,   epidural collection extending into the central spinal canal resulting in severe stenosis and cord signal  abnormality.  Prevertebral soft tissue edema and probable paravertebral abscess or phlegmon at this level. Thoracic and lumbar spine unremarkable. MRI C spine w/ contrast  10/25: confirmed enhancement at C5/6 consistent with osteomyelitis/discitis and epidural phlegmon        -Admitted to neuro ICU initially.  --Intubated after index procedure and kept intubated for posterior fixation and for tentative LISHA 11/4 which was eventually cancelled due to inability to adequately extend neck  --Step down to medicine on 11/11  --Continue Speech therapy. GI consulted for G Tube eval. Patient wishes to see her progress in next 2 weeks before getting Gtube.   --BCx 10/25 growing MSSA, Repeat BCx's 10/27 NGTD  --OR cultures 10/28 no organisms on gram stain, culture MSSA. ID following  --Continue C collar when upright   --CT Soft tissue neck reviewed with stable hardware position s/p 360 cervical fusion ow expected postop changes  --Pain control as needed  --PT/OT. SQH     -- Patient noted to be febrile on 11/12 and 11/3.  -- Reconsulted ID  -- Repeat blood cx 11/14 NGTD. Tested neg for RSV and covid 19.  -- CXR 11/15 Patchy infiltrates and pleural effusion and developed since yesterday. Consulted Pulm.  --Repeat inflammatory markers- sed rate/crp   - Sent ghosh stain/urine eosinophil   ---------------------------------  ID recs for discharge:   -continue oxacillin for MSSA, anticipate 6w course from cleared blcx, rosalino 12/9  -unable to use adjunctive rifampin due to DDI (methadone)  -follow up cx data  -plan for placement per patient (SNF/LTAC) - pt requested [facility]     Outpatient Antibiotic Therapy Plan:     1) Infection: MSSA epidural abscess/bacteremia     2) Discharge Antibiotics:     Intravenous antibiotics:  Oxacillin 12g continuous infusion IV daily      3) Therapy Duration:  6w     Estimated end date of IV antibiotics: 12/9/22     4) Outpatient Weekly Labs:     Order the following labs to be drawn on Mondays:               CBC              CMP               CRP     5) Fax Lab Results to Infectious Diseases Provider: Bernard     [facility] Fax Number: [phone]     6)  Outpatient Infectious Diseases Follow-up    Anxiety and depression  -Atarax prn for agitation        VTE Risk Mitigation (From admission, onward)         Ordered     heparin (porcine) injection 5,000 Units  Every 8 hours         11/04/22 1437     IP VTE LOW RISK PATIENT  Once         10/25/22 1109     Place sequential compression device  Until discontinued         10/25/22 1109                Discharge Planning   YESSI: 11/21/2022     Code Status: Full Code   Is the patient medically ready for discharge?: No    Reason for patient still in hospital (select all that apply): Patient trending condition and Treatment  Discharge Plan A: Long-term acute care facility (LTAC)   Discharge Delays: None known at this time      René Garcia MD  Department of Hospital Medicine   [facility] - Neurosurgery ([facility])

## 2022-11-18 NOTE — PROGRESS NOTES
Therapy with Vancomycin complete and/or consult discontinued by provider.  Pharmacy will sign off, please re-consult as needed.        Thank you,   Radhika Garcia, PharmD

## 2022-11-18 NOTE — CONSULTS
Pharmacokinetic Assessment Follow Up: IV Vancomycin    Vancomycin serum concentration assessment(s):    The trough level was drawn correctly and can be used to guide therapy at this time. The measurement is within the desired definitive target range of 15 to 20 mcg/mL.    Vancomycin Regimen Plan:    Continue regimen to Vancomycin 750 mg IV every 12 hours with next serum trough concentration measured at 1100 prior to next dose on 11/20/22  or sooner if clinically indicated    Drug levels (last 3 results):  Recent Labs   Lab Result Units 11/18/22  1035   Vancomycin-Trough ug/mL 16.8       Pharmacy will continue to follow and monitor vancomycin.    Please contact pharmacy at extension [phone] for questions regarding this assessment.    Thank you for the consult,   Abbi Jj, PharmD       Patient brief summary:  Peter Stiles is a 51 y.o. female initiated on antimicrobial therapy with IV Vancomycin for treatment of bacteremia    Drug Allergies:   Review of patient's allergies indicates:   Allergen Reactions    Morphine Shortness Of Breath and Other (See Comments)     Throat closed       Actual Body Weight:   63.5 kg    Renal Function:   Estimated Creatinine Clearance: 77 mL/min (based on SCr of 0.8 mg/dL).,     Dialysis Method (if applicable):  N/A    CBC (last 72 hours):  Recent Labs   Lab Result Units 11/16/22  0400 11/17/22  0647 11/18/22  0458   WBC K/uL 10.38 12.21 11.65   Hemoglobin g/dL 7.7* 7.8* 8.0*   Hematocrit % 23.5* 25.4* 26.4*   Platelets K/uL 309 337 380   Gran % % 57.0 68.0 64.5   Lymph % % 28.6 18.3 22.7   Mono % % 9.7 10.5 8.9   Eosinophil % % 2.3 1.6 1.8   Basophil % % 0.3 0.2 0.3   Differential Method  Automated Automated Automated       Metabolic Panel (last 72 hours):  Recent Labs   Lab Result Units 11/16/22  0400 11/17/22  0647 11/18/22  0458   Sodium mmol/L 142 140 141   Potassium mmol/L 4.3 4.7 4.3   Chloride mmol/L 107 105 104   CO2 mmol/L 26 24 29   Glucose mg/dL 105 121* 109   BUN mg/dL  18 15 13   Creatinine mg/dL 0.9 0.8 0.8   Albumin g/dL 1.8* 1.9* 2.0*   Total Bilirubin mg/dL 0.3 0.3 0.4   Alkaline Phosphatase U/L 143* 142* 139*   AST U/L 46* 41* 47*   ALT U/L 34 29 33   Magnesium mg/dL 2.1 2.1 2.2   Phosphorus mg/dL 4.3 4.2 4.1       Vancomycin Administrations:  vancomycin given in the last 96 hours                     vancomycin 750 mg in dextrose 5 % 250 mL IVPB (ready to mix system) (mg) 750 mg New Bag 11/18/22 0000     750 mg New Bag 11/17/22 1200    vancomycin 1.5 g in dextrose 5 % 250 mL IVPB (ready to mix) (mg) 1,500 mg New Bag 11/17/22 0001                    Microbiologic Results:  Microbiology Results (last 7 days)       Procedure Component Value Units Date/Time    Blood culture [[id number]] Collected: 11/14/22 0939    Order Status: Completed Specimen: Blood from Peripheral, Left Hand Updated: 11/18/22 1024     Blood Culture, Routine Gram stain yumi bottle: Gram positive rods      Results called to and read back by:Kristy BEASLEY RN 11/16/2022  15:37    Blood culture [[id number]] Collected: 11/14/22 0940    Order Status: Completed Specimen: Blood from Peripheral, Right Hand Updated: 11/18/22 1012     Blood Culture, Routine No Growth to date      No Growth to date      No Growth to date      No Growth to date      No Growth to date    Blood culture [[id number]] Collected: 11/17/22 0724    Order Status: Completed Specimen: Blood Updated: 11/18/22 1012     Blood Culture, Routine No Growth to date      No Growth to date    Narrative:      From 2 different sites 30 minutes apart    Blood culture [[id number]] Collected: 11/17/22 0724    Order Status: Completed Specimen: Blood Updated: 11/18/22 1012     Blood Culture, Routine No Growth to date      No Growth to date    Narrative:      From 2 different sites 30 minutes apart    Fungus culture [[id number]] Collected: 10/28/22 1029    Order Status: Completed Specimen: Wound from Neck Updated: 11/16/22 1303     Fungus (Mycology) Culture Culture in progress       No fungus isolated after 2 weeks    Narrative:      4) Epidural phlegman    Fungus culture [[id number]] Collected: 10/28/22 0950    Order Status: Completed Specimen: Wound from Neck Updated: 11/16/22 1303     Fungus (Mycology) Culture Culture in progress      No fungus isolated after 2 weeks    Narrative:      3) Osteodiscitis    Fungus culture [[id number]] Collected: 10/28/22 0924    Order Status: Completed Specimen: Abscess from Neck Updated: 11/16/22 1303     Fungus (Mycology) Culture Culture in progress      No fungus isolated after 2 weeks    Narrative:      2) Prevertebral absess    Fungus culture [[id number]] Collected: 10/28/22 0906    Order Status: Completed Specimen: Abscess from Back Updated: 11/16/22 1303     Fungus (Mycology) Culture Culture in progress      No fungus isolated after 2 weeks    Narrative:      Prevertebral Abscess    Culture, Respiratory with Gram Stain [[id number]]     Order Status: No result Specimen: Respiratory     AFB Culture & Smear [[id number]] Collected: 10/28/22 0924    Order Status: Completed Specimen: Abscess from Neck Updated: 11/15/22 1557     AFB Culture & Smear ~Culture in progress     AFB CULTURE STAIN No acid fast bacilli seen.    Narrative:      2) Prevertebral absess    AFB Culture & Smear [[id number]] Collected: 10/28/22 0906    Order Status: Completed Specimen: Abscess from Back Updated: 11/15/22 1557     AFB Culture & Smear Culture in progress     AFB CULTURE STAIN No acid fast bacilli seen.    Narrative:      Prevertebral Abscess    Blood culture [[id number]]     Order Status: Canceled Specimen: Blood     Blood culture [[id number]]     Order Status: Canceled Specimen: Blood
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
## 2022-11-18 NOTE — ASSESSMENT & PLAN NOTE
51 year old with  dilated cardiomyopathy and remote opioid dependence (on methadone as an outpatient) admitted 10/25 with neck and back pain and lower body numbness and was found to have MSSA bacteremia and C5-6 osteomyelitis, discitis and epidural collection.  TTE negative.  S/p corpectomy/ fusion and epidural abscess evacuation 10/28 and s/p C2-T2 decompression/fusion with NSGY 11/2, on 6 week course of IV oxacillin (unable to use rifampin due to DDI with methadone) (EOC 12/9).      ID re-consulted 11/14 for intermittent daily fevers.   Bilateral LE US negative for DVT.  Noted to have increased secretions from tracheostomy (in place d/t multiple failed extubations).  CXR 11/14 was without focal consolidation or evidence of acute parenchymal process.  Repeat CXR 11/15  with newly developed patchy airspace consolidation right lung base, with loss of volume and pleural fluid left lung base.  Pulmonary consulted for evaluation. Bedside US performed with minimal pleural fluid, no signs of complicated or loculated effusions-- Suspicion for pleural space infection low.  Noted some consolidation at bases.  Recommend sputum cx and nebulizer tx.      Afebrile X 24 hours. Abx broadened to cefepime (oxacillin d/c)  to cover MSSA and respiratory pathogens including pseudomonas. Repeat blood cultures NGTD. Respiratory cx pending collection.    Persistent productive cough/SOB, denies worsening. Cervical collar in-place. Primary complaint today is cough exacerbating neck pain, and worsening pain extending from L-spine to feet; with notable pain at left hip/pelvis. Patient on 5-liters O2 per trach collar. O2 Sats 87-98%. Hemodynamically stable.    MRI C/L/T spine (10/25): No acute abnormality identified in the thoracic or lumbar spine. Partially visualized nonspecific marrow signal abnormality involving the left iliac bone.  Consider follow-up with pelvic MRI or CT to further evaluate       Recommendations:  -- Continue cefepime  2g IV Q 8-hours for pulmonary (PSA/nosocomial coverage) and MSSA -VOM with bacteremia (10/25).  -- Discontinued IV vancomycin -- [GPR= +Diphtheroids 1/2 bld cx 11/14, likley contaminant; repeat blood cx (11/17) NGTD]  -- Recommend collecting sputum culture.  -- Recommend repeat imaging of spine / pelvis for further eval of pain and neurologic sxs.  -- Following repeat blood cxs (11/17) NGTD.  -- Will continue to follow for further / final recs  -- Discussed with ID staff.

## 2022-11-18 NOTE — SUBJECTIVE & OBJECTIVE
Interval History: Afebrile >24hrs, last fever (100.5) 11/16. No leukocytosis.   Blood cx 11/14; 1/2 gram stain with GPR --ID pending.  Repeat blood cx (11/17): NGTD  Patient still with productive cough, no hemoptysis; as well as neck pain, and neuropathic pain with weakness in legs B/L.  SOB and speaking diffiuclty due to tracheostomy.    Review of Systems   Constitutional:  Negative for chills, diaphoresis, fatigue and fever.   HENT:          Tracheostomy. Increased secretions     Respiratory:  Positive for cough. Negative for shortness of breath.    Cardiovascular:  Negative for chest pain, palpitations and leg swelling.   Gastrointestinal:  Negative for diarrhea, nausea and vomiting.   Genitourinary:  Negative for difficulty urinating and dysuria.   Musculoskeletal:  Positive for neck pain. Negative for arthralgias, back pain and myalgias.   Skin:  Negative for color change, rash and wound.   Neurological:  Positive for speech difficulty (trach with o2 collar. communicates with white board and yes/no answers). Negative for dizziness, weakness and numbness.   Psychiatric/Behavioral:  Negative for agitation and confusion.    Objective:     Vital Signs (Most Recent):  Temp: 99.1 °F (37.3 °C) (11/18/22 0756)  Pulse: 94 (11/18/22 1312)  Resp: 20 (11/18/22 1312)  BP: 122/73 (11/18/22 1312)  SpO2: 96 % (11/18/22 1312) Vital Signs (24h Range):  Temp:  [98.2 °F (36.8 °C)-99.1 °F (37.3 °C)] 99.1 °F (37.3 °C)  Pulse:  [64-94] 94  Resp:  [17-20] 20  SpO2:  [95 %-97 %] 96 %  BP: (110-137)/(56-82) 122/73     Weight:  (daylight savings)  Body mass index is 22.6 kg/m².    Estimated Creatinine Clearance: 77 mL/min (based on SCr of 0.8 mg/dL).    Physical Exam    Significant Labs: All pertinent labs within the past 24 hours have been reviewed.    Significant Imaging: I have reviewed all pertinent imaging results/findings within the past 24 hours.

## 2022-11-18 NOTE — RESPIRATORY THERAPY
[facility]ID [facility] RESPIRATORY THERAPY PROACTIVE NOTE           Time of visit: 935     Code Status: Full Code   : 1970  Bed: 952/952 A:   MRN: [id number]  Time spent at the bedside: < 15 min    SITUATION    Evaluated patient for: LDA Check     BACKGROUND    Patient has a past medical history of CHF (congestive heart failure), Essential (primary) hypertension, Opioid dependence on maintenance agonist therapy, no symptoms, and Smoking.  Clinically Significant Surgical Hx: tracheostomy    24 Hours Vitals Range:  Temp:  [98.2 °F (36.8 °C)-100.3 °F (37.9 °C)]   Pulse:  [64-94]   Resp:  [14-20]   BP: (110-137)/(53-82)   SpO2:  [92 %-97 %]     Labs:    Recent Labs     22  0400 22  0647 22  0458    140 141   K 4.3 4.7 4.3    105 104   CO2 26 24 29   CREATININE 0.9 0.8 0.8    121* 109   PHOS 4.3 4.2 4.1   MG 2.1 2.1 2.2        No results for input(s): PH, PCO2, PO2, HCO3, POCSATURATED, BE in the last 72 hours.    ASSESSMENT/INTERVENTIONS  All supplies at bedside. No new concerns at this time. All supplies at bedside.      Last VS   Temp: 100.3 °F (37.9 °C) ( 1525)  Pulse: 70 ( 1702)  Resp: 14 ( 1548)  BP: 110/53 ( 1525)  SpO2: 92 % ( 1702)      Extra trachs at bedside: 4 & 6 cuffed shiley   Level of Consciousness: Level of Consciousness (AVPU): responds to voice  Respiratory Effort: Respiratory Effort: Unlabored Expansion/Accessory Muscle Usage: Expansion/Accessory Muscles/Retractions: no retractions, no use of accessory muscles  All Lung Field Breath Sounds: All Lung Fields Breath Sounds: diminished  NANETTE Breath Sounds: equal bilaterally  LLL Breath Sounds: coarse  RLL Breath Sounds: coarse  O2 Device/Concentration: trach collar 5l 28%  Surgical airway: Yes, Type: Shiley Size: 6, cuffed  Ambu at bedside: Ambu bag with the patient?: Yes, Adult Ambu     Active Orders   Respiratory Care    Inhalation Treatment Once     Frequency: Once     Number of Occurrences: 1  Occurrences    Inhalation Treatment Q4H PRN     Frequency: Q4H PRN     Number of Occurrences: Until Specified    Inhalation Treatment Q6H WAKE     Frequency: Q6H WAKE     Number of Occurrences: Until Specified    Oxygen Continuous     Frequency: Continuous     Number of Occurrences: Until Specified     Order Questions:      Device type: Low flow      Device: Trach Collar      FiO2%: 28      Titrate O2 per Oxygen Titration Protocol: Yes      To maintain SpO2 goal of: >= 90%      Notify MD of: Inability to achieve desired SpO2; Sudden change in patient status and requires 20% increase in FiO2; Patient requires >60% FiO2    POCT ARTERIAL BLOOD GAS Blood Gas     Frequency: PRN     Number of Occurrences: Until Specified     Order Comments: Notify Physician if: see parameters below.       Order Questions:      Component: Blood Gas    Trach care every 12 hours and as needed     Frequency: BID     Number of Occurrences: Until Specified    Trach: Assess suction need every 2 hours and as needed     Frequency: BID     Number of Occurrences: Until Specified       RECOMMENDATIONS    We recommend: RRT Recs: Continue POC per primary team.      FOLLOW-UP    Please call back the Rapid Response RT, Salud Cardozo, RRT at x [phone] for any questions or concerns.

## 2022-11-18 NOTE — ASSESSMENT & PLAN NOTE
51 year old with  dilated cardiomyopathy and remote opioid dependence (on methadone as an outpatient) admitted 10/25 with neck and back pain and lower body numbness and was found to have MSSA bacteremia and C5-6 osteomyelitis, discitis and epidural collection.  TTE negative.  S/p corpectomy/ fusion and epidural abscess evacuation 10/28 and s/p C2-T2 decompression/fusion with NSGY 11/2, on 6 week course of IV oxacillin (unable to use rifampin due to DDI with methadone) (EOC 12/9).       ID re-consulted 11/14 for intermittent daily fevers.   Bilateral LE US negative for DVT.  Noted to have increased secretions from tracheostomy (in place d/t multiple failed extubations).  CXR 11/14 was without focal consolidation or evidence of acute parenchymal process.  Repeat CXR 11/15  with newly developed patchy airspace consolidation right lung base, with loss of volume and pleural fluid left lung base.  Pulmonary consulted for evaluation. Bedside US performed with minimal pleural fluid, no signs of complicated or loculated effusions  Suspicion for pleural space infection low.  Noted some consolidation at bases.  Recommend sputum cx and nebulizer tx.       Afebrile X 24 hours.   Abx broadened to cefepime (oxacillin d/c)  to cover MSSA and respiratory pathogens including pseudomonas.  Vancomycin added for blood cultures positive for GPR, ID still pending.  Repeat blood cultures NGTD.  Respiratory cx pending collection      Denies increased cough/SOB. Cervical collar removed.  Primary complaint today is right hip/leg pain. On 5 liters O2 per trach collar.  O2 Sats 87-98%.  Hemodynamically and neurologically stable      Recommendations:  - Continue cefepime 2 g IV q 8 hours for pulmonary coverage and MSSA  - Continue  IV vancomycin (pharmacy to dose) pending ID of GPR in blood cultures.    - Follow up repeat blood cx.   - Will follow up tomorrow.     Data reviewed and plan discussed with ID staff, Dr. Walker  Discussed with  Primary Team, Dr. Garcia
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
## 2022-11-18 NOTE — PT/OT/SLP PROGRESS
Occupational Therapy  Co Treatment    Name: Peter Stiles  MRN: [id number]  Admitting Diagnosis:  Fever  11 Days Post-Op    Recommendations:     Discharge Recommendations: rehabilitation facility  Discharge Equipment Recommendations:   (TBD)  Barriers to discharge:  Decreased caregiver support  Co-evaluation/treatment performed due to patient's multiple deficits requiring two skilled therapists to appropriately and safely assess patient's strength and endurance while facilitating functional tasks in addition to accommodating for patient's activity tolerance.    Assessment:     Peter Stiles is a 51 y.o. female with a medical diagnosis of Fever.  She presents with partner sleeping on couch. Performance deficits affecting function are weakness, gait instability, impaired endurance, impaired balance, decreased lower extremity function, impaired sensation, decreased safety awareness, impaired self care skills, impaired functional mobility, impaired cognition, decreased coordination, pain. Patient reports she is not sleeping at night, encouraged her to try to be more alert during day with lights on and blinds open. Patient drowsy requiring light turned on and covers removed to increase alertness and participation. Cervical collar adjusted prior to sitting up, total of two for supine to sit, EOB sit min to mod (A) for 8 minutes. Sit to stand at EOB with max (A) of two. Rolling to each side for eric care with max (A).     Rehab Prognosis:  Good; patient would benefit from acute skilled OT services to address these deficits and reach maximum level of function.       Plan:     Patient to be seen 4 x/week to address the above listed problems via self-care/home management, therapeutic activities, therapeutic exercises, neuromuscular re-education  Plan of Care Expires: 12/09/22  Plan of Care Reviewed with: patient    Subjective     Pain/Comfort:  Pain Rating 1:  (not rated, crying out on occasion with left leg  movement)    Objective:     Communicated with: Rn prior to session.  Patient found HOB elevated with bed alarm, cervical collar, Tracheostomy, oxygen, courtney catheter, peripheral IV upon OT entry to room.    General Precautions: Standard, aspiration, fall, NPO   Orthopedic Precautions:spinal precautions   Braces: Aspen collar  Respiratory Status:  5 L trach collar     Occupational Performance:     Bed Mobility:    Patient completed Supine to Sit with total assistance and 2 persons  Patient completed Sit to Supine with total assistance and 2 persons     Functional Mobility/Transfers:  Patient completed Sit <> Stand Transfer with maximal assistance and of 2 persons  with  hand-held assist       Activities of Daily Living:  Grooming: maximal assistance for oral care      [facility] 6 Click ADL: 6    Treatment & Education:  Education on orienting day night for sleep.     Patient left HOB elevated with all lines intact, call button in reach, bed alarm on, and respiratory present    GOALS:   Multidisciplinary Problems       Occupational Therapy Goals          Problem: Occupational Therapy    Goal Priority Disciplines Outcome Interventions   Occupational Therapy Goal     OT, PT/OT Ongoing, Progressing    Description: Goals to be met by: 12/09    Patient will increase functional independence with ADLs by performing:      UE Dressing with Contact Guard Assistance.  Grooming while EOB with Contact Guard Assistance  Patient will complete sit to stand with RW min (A).  Stand pivot to chair with mod (A)                         Time Tracking:     OT Date of Treatment: 11/18/22  OT Start Time: 1255  OT Stop Time: 1321  OT Total Time (min): 26 min    Billable Minutes:Self Care/Home Management 13  Neuromuscular Re-education 13    OT/BE: OT     BE Visit Number: 0    11/18/2022

## 2022-11-18 NOTE — SUBJECTIVE & OBJECTIVE
Interval History: No acute events  Afebrile    Review of Systems  Objective:     Vital Signs (Most Recent):  Temp: 99.1 °F (37.3 °C) (11/18/22 0756)  Pulse: 94 (11/18/22 1312)  Resp: 20 (11/18/22 1312)  BP: 122/73 (11/18/22 1312)  SpO2: 96 % (11/18/22 1312) Vital Signs (24h Range):  Temp:  [98.2 °F (36.8 °C)-99.1 °F (37.3 °C)] 99.1 °F (37.3 °C)  Pulse:  [64-94] 94  Resp:  [17-20] 20  SpO2:  [95 %-97 %] 96 %  BP: (110-137)/(56-82) 122/73     Weight:  (daylight savings)  Body mass index is 22.6 kg/m².    Intake/Output Summary (Last 24 hours) at 11/18/2022 1409  Last data filed at 11/18/2022 0612  Gross per 24 hour   Intake --   Output 1650 ml   Net -1650 ml      Physical Exam  Vitals and nursing note reviewed.   HENT:      Head: Normocephalic.      Nose: Nose normal.      Mouth/Throat:      Mouth: Mucous membranes are moist.      Pharynx: Oropharynx is clear.   Neck:      Comments: Trach collar   Cardiovascular:      Rate and Rhythm: Normal rate and regular rhythm.      Pulses: Normal pulses.      Heart sounds: Normal heart sounds.   Pulmonary:      Effort: Pulmonary effort is normal.      Breath sounds: Normal breath sounds.   Abdominal:      General: Bowel sounds are normal.      Palpations: Abdomen is soft.   Musculoskeletal:         General: Normal range of motion.   Skin:     General: Skin is warm and dry.   Neurological:      Mental Status: She is alert and oriented to person, place, and time. Mental status is at baseline.      Comments:      Psychiatric:         Mood and Affect: Mood normal.         Behavior: Behavior normal.       MELD-Na score: 6 at 11/3/2022  1:47 AM  MELD score: 6 at 11/3/2022  1:47 AM  Calculated from:  Serum Creatinine: 0.7 mg/dL (Using min of 1 mg/dL) at 11/3/2022  1:47 AM  Serum Sodium: 141 mmol/L (Using max of 137 mmol/L) at 11/3/2022  1:47 AM  Total Bilirubin: 0.4 mg/dL (Using min of 1 mg/dL) at 11/3/2022  1:47 AM  INR(ratio): 1.0 at 11/1/2022  9:28 PM  Age: 51 years    Significant  Labs:  CBC:  Recent Labs   Lab 11/17/22  0647 11/18/22  0458   WBC 12.21 11.65   HGB 7.8* 8.0*   HCT 25.4* 26.4*    380     CMP:  Recent Labs   Lab 11/17/22  0647 11/18/22  0458    141   K 4.7 4.3    104   CO2 24 29   * 109   BUN 15 13   CREATININE 0.8 0.8   CALCIUM 9.4 9.8   PROT 6.5 6.8   ALBUMIN 1.9* 2.0*   BILITOT 0.3 0.4   ALKPHOS 142* 139*   AST 41* 47*   ALT 29 33   ANIONGAP 11 8     PTINR:  No results for input(s): INR in the last 48 hours.    Significant Procedures:   Dobutamine Stress Test with Color Flow: No results found for this or any previous visit.

## 2022-11-18 NOTE — PLAN OF CARE
Problem: Adult Inpatient Plan of Care  Goal: Plan of Care Review  Outcome: Ongoing, Progressing  Goal: Patient-Specific Goal (Individualized)  Description: Admit Date: 10/25/2022    Osteomyelitis of cervical spine    Past Medical History:  No date: CHF (congestive heart failure)  No date: Essential (primary) hypertension  No date: Opioid dependence on maintenance agonist therapy, no symptoms  No date: Smoking    Past Surgical History:  No date: APPENDECTOMY  No date:  SECTION      Comment:  x2  2022: FUSION OF CERVICAL SPINE BY POSTERIOR APPROACH; N/A      Comment:  Procedure: FUSION, SPINE, CERVICAL, POSTERIOR APPROACH                C2-T2 posterior decompression/fusion; Depuy,                neuromonitoring Marrero, gell rolls;  Surgeon: West Merino DO;  [facility];  Service:                Neurosurgery;  Laterality: N/A;  No date: HYSTERECTOMY  10/28/2022: SURGICAL REMOVAL OF VERTEBRAL BODY OF CERVICAL SPINE; N/A      Comment:  Procedure: CORPECTOMY, SPINE, CERVICAL;  Surgeon:                West Merino DO;  Location: [facility];                 Service: Neurosurgery;  Laterality: N/A;  anterior C5-6                corpectomy, globus, caspar head prado and tongs,                omnipaque, 15lbs weights, microscrope, round cutting lynsey               and M8, ENT assistance for exposure    Individualization:   1.     Restraints:   Restraint Order  Length of Order: Order good for next 24 hours or when removed.  Date that the current order will : 22  Time that the current order will : 010  Order Upon Application: Yes          Outcome: Ongoing, Progressing  Goal: Absence of Hospital-Acquired Illness or Injury  Outcome: Ongoing, Progressing  Goal: Optimal Comfort and Wellbeing  Outcome: Ongoing, Progressing  Goal: Readiness for Transition of Care  Outcome: Ongoing, Progressing     Problem: Fall Injury Risk  Goal: Absence of Fall and  Fall-Related Injury  Outcome: Ongoing, Progressing     Problem: Skin Injury Risk Increased  Goal: Skin Health and Integrity  Outcome: Ongoing, Progressing     Problem: Pain Acute  Goal: Acceptable Pain Control and Functional Ability  Outcome: Ongoing, Progressing     Problem: Autonomic Dysreflexia (Spinal Cord Injury)  Goal: Effective Autonomic Dysreflexia Prevention  Outcome: Ongoing, Progressing     Problem: Bowel Elimination Impaired (Spinal Cord Injury)  Goal: Effective Bowel Elimination  Outcome: Ongoing, Progressing     Problem: Extended Neurologic Impairment (Spinal Cord Injury)  Goal: Absence of Extended Neurologic Injury  Outcome: Ongoing, Progressing     Problem: Functional Ability Impaired (Spinal Cord Injury)  Goal: Optimal Functional Ability  Outcome: Ongoing, Progressing     Problem: Neurogenic Shock (Spinal Cord Injury)  Goal: Effective Tissue Perfusion  Outcome: Ongoing, Progressing     Problem: Nutrition Impaired (Spinal Cord Injury)  Goal: Optimal Nutrition Intake  Outcome: Ongoing, Progressing     Problem: Infection  Goal: Absence of Infection Signs and Symptoms  Outcome: Ongoing, Progressing     Problem: Communication Impairment (Artificial Airway)  Goal: Effective Communication  Outcome: Ongoing, Progressing     Problem: Device-Related Complication Risk (Artificial Airway)  Goal: Optimal Device Function  Outcome: Ongoing, Progressing     Problem: Skin and Tissue Injury (Artificial Airway)  Goal: Absence of Device-Related Skin or Tissue Injury  Outcome: Ongoing, Progressing     Problem: Noninvasive Ventilation Acute  Goal: Effective Unassisted Ventilation and Oxygenation  Outcome: Ongoing, Progressing     Problem: Adjustment to Illness (Delirium)  Goal: Optimal Coping  Outcome: Ongoing, Progressing     Problem: Sleep Disturbance (Delirium)  Goal: Improved Sleep  Outcome: Ongoing, Progressing

## 2022-11-18 NOTE — SUBJECTIVE & OBJECTIVE
Interval History:   Afebrile X 24 hours  No leukocytosis  Blood cx 11/14 with GPR - ID pending  Repeat blood cx NGTD  Complaining of right hip/leg pain    Review of Systems   Constitutional:  Negative for chills, diaphoresis, fatigue and fever.   HENT:          Tracheostomy. Increased secretions     Respiratory:  Positive for cough. Negative for shortness of breath.    Cardiovascular:  Negative for chest pain, palpitations and leg swelling.   Gastrointestinal:  Negative for diarrhea, nausea and vomiting.   Genitourinary:  Negative for difficulty urinating and dysuria.   Musculoskeletal:  Positive for neck pain. Negative for arthralgias, back pain and myalgias.   Skin:  Negative for color change, rash and wound.   Neurological:  Positive for speech difficulty (trach with o2 collar. communicates with white board and yes/no answers). Negative for dizziness, weakness and numbness.   Psychiatric/Behavioral:  Negative for agitation and confusion.    Objective:     Vital Signs (Most Recent):  Temp: 96.5 °F (35.8 °C) (11/17/22 1043)  Pulse: 70 (11/17/22 1225)  Resp: 20 (11/17/22 1400)  BP: (!) 146/77 (11/17/22 1043)  SpO2: 97 % (11/17/22 1738) Vital Signs (24h Range):  Temp:  [96.5 °F (35.8 °C)-98.7 °F (37.1 °C)] 96.5 °F (35.8 °C)  Pulse:  [69-84] 70  Resp:  [14-32] 20  SpO2:  [87 %-98 %] 97 %  BP: (101-156)/(56-77) 146/77     Weight:  (daylight savings)  Body mass index is 22.6 kg/m².    Estimated Creatinine Clearance: 77 mL/min (based on SCr of 0.8 mg/dL).    Physical Exam  Vitals and nursing note reviewed.   Constitutional:       General: She is not in acute distress.     Appearance: She is ill-appearing. She is not toxic-appearing or diaphoretic.      Comments: Complaining of leg/hip pain     HENT:      Head: Normocephalic and atraumatic.      Nose:      Comments: NG tube       Mouth/Throat:      Comments: Poor dentition    Eyes:      General: No scleral icterus.     Conjunctiva/sclera: Conjunctivae normal.   Neck:       Comments: Cervical collar off   Trach - no significant secretions at time of my exam  Cardiovascular:      Rate and Rhythm: Normal rate and regular rhythm.      Heart sounds: No murmur heard.  Pulmonary:      Effort: Pulmonary effort is normal. No respiratory distress.      Breath sounds: No stridor. No wheezing.      Comments: Clear anteriorly  Persistent crackles lower lobes  Abdominal:      General: There is no distension.      Palpations: Abdomen is soft.      Tenderness: There is no abdominal tenderness. There is no guarding.   Musculoskeletal:      Right lower leg: No edema.      Left lower leg: No edema.   Skin:     General: Skin is warm and dry.   Neurological:      Mental Status: She is alert. Mental status is at baseline.   Psychiatric:         Behavior: Behavior normal.       Significant Labs:   Microbiology Results (last 7 days)       Procedure Component Value Units Date/Time    Blood culture [[id number]] Collected: 11/17/22 0724    Order Status: Completed Specimen: Blood Updated: 11/17/22 1515     Blood Culture, Routine No Growth to date    Narrative:      From 2 different sites 30 minutes apart    Blood culture [[id number]] Collected: 11/17/22 0724    Order Status: Completed Specimen: Blood Updated: 11/17/22 1515     Blood Culture, Routine No Growth to date    Narrative:      From 2 different sites 30 minutes apart    Blood culture [[id number]] Collected: 11/14/22 0940    Order Status: Completed Specimen: Blood from Peripheral, Right Hand Updated: 11/17/22 1012     Blood Culture, Routine No Growth to date      No Growth to date      No Growth to date      No Growth to date    Blood culture [[id number]] Collected: 11/14/22 0939    Order Status: Completed Specimen: Blood from Peripheral, Left Hand Updated: 11/17/22 0836     Blood Culture, Routine Gram stain yumi bottle: Gram positive rods      Results called to and read back by:Kristy BEASLEY RN 11/16/2022  15:37    Fungus culture [[id number]] Collected: 10/28/22  1029    Order Status: Completed Specimen: Wound from Neck Updated: 11/16/22 1303     Fungus (Mycology) Culture Culture in progress      No fungus isolated after 2 weeks    Narrative:      4) Epidural phlegman    Fungus culture [[id number]] Collected: 10/28/22 0950    Order Status: Completed Specimen: Wound from Neck Updated: 11/16/22 1303     Fungus (Mycology) Culture Culture in progress      No fungus isolated after 2 weeks    Narrative:      3) Osteodiscitis    Fungus culture [[id number]] Collected: 10/28/22 0924    Order Status: Completed Specimen: Abscess from Neck Updated: 11/16/22 1303     Fungus (Mycology) Culture Culture in progress      No fungus isolated after 2 weeks    Narrative:      2) Prevertebral absess    Fungus culture [[id number]] Collected: 10/28/22 0906    Order Status: Completed Specimen: Abscess from Back Updated: 11/16/22 1303     Fungus (Mycology) Culture Culture in progress      No fungus isolated after 2 weeks    Narrative:      Prevertebral Abscess    Culture, Respiratory with Gram Stain [[id number]]     Order Status: No result Specimen: Respiratory     AFB Culture & Smear [[id number]] Collected: 10/28/22 0924    Order Status: Completed Specimen: Abscess from Neck Updated: 11/15/22 1557     AFB Culture & Smear ~Culture in progress     AFB CULTURE STAIN No acid fast bacilli seen.    Narrative:      2) Prevertebral absess    AFB Culture & Smear [[id number]] Collected: 10/28/22 0906    Order Status: Completed Specimen: Abscess from Back Updated: 11/15/22 1557     AFB Culture & Smear Culture in progress     AFB CULTURE STAIN No acid fast bacilli seen.    Narrative:      Prevertebral Abscess    Blood culture [[id number]]     Order Status: Canceled Specimen: Blood     Blood culture [[id number]]     Order Status: Canceled Specimen: Blood             Significant Imaging: I have reviewed all pertinent imaging results/findings within the past 24 hours.

## 2022-11-18 NOTE — ASSESSMENT & PLAN NOTE
(10/25) Blood cx: +MSSA.  Repeat blood cx (10/27, 10/28, 10/29): No growth.  (11/14) Blood cx: 1/2 +Diphtheroids (GPR) -- suspect likely contaminant.   (11/17) Blood cx: NGTD    PLAN:  1. Continue IV-Vanc and Cefepime for the time being.   -- will continue to follow to determine further / final recs  -- discussed with ID-staff

## 2022-11-18 NOTE — PT/OT/SLP PROGRESS
Physical Therapy Treatment    Patient Name:  Peter Stiles   MRN:  [id number]    Recommendations:     Discharge Recommendations:  rehabilitation facility   Discharge Equipment Recommendations: hospital bed, lift device, wheelchair   Barriers to discharge: Decreased caregiver support    Assessment:     Peter Stiles is a 51 y.o. female admitted with a medical diagnosis of Fever.  She presents with the following impairments/functional limitations:  weakness, impaired endurance, impaired self care skills, impaired functional mobility, gait instability, impaired balance, impaired cognition, decreased coordination, decreased upper extremity function, decreased lower extremity function, pain, decreased safety awareness, decreased ROM, impaired skin, impaired cardiopulmonary response to activity. Pt participated fairly well with therapy, but continues to require significant assistance at this time. Pt will continue to benefit from skilled PT services to improve all deficits noted above. Resume PT POC as indicated.     Rehab Prognosis: Good and Fair; patient would benefit from acute skilled PT services to address these deficits and reach maximum level of function.    Recent Surgery: Procedure(s) (LRB):  CREATION, TRACHEOSTOMY (N/A)  LARYNGOSCOPY, DIRECT 11 Days Post-Op    Plan:     During this hospitalization, patient to be seen 4 x/week to address the identified rehab impairments via gait training, therapeutic activities, therapeutic exercises, neuromuscular re-education and progress toward the following goals:    Plan of Care Expires:  12/09/22    Subjective     Chief Complaint: pain to LLE  Patient/Family Comments/goals: none stated  Pain/Comfort:  Pain Rating 1: 6/10  Location - Side 1: Left  Location 1: leg  Pain Addressed 1: Reposition, Cessation of Activity, Pre-medicate for activity  Pain Rating Post-Intervention 1: 6/10      Objective:     Communicated with nursing prior to session.  Patient found HOB  elevated with  (lines intact and visitor present) upon PT entry to room.     General Precautions: Standard, fall, aspiration, NPO   Orthopedic Precautions:spinal precautions   Braces: Cervical collar       Functional Mobility:  Bed Mobility:  Scooting: Max A to scoot to EOB with cueing for technique  Supine to Sit: total assistance and of 2 persons  Sit to Supine: total assistance and of 2 persons  Balance: Pt sat EOB ~15 min with Min-Mod A       AM-PAC 6 CLICK MOBILITY  Turning over in bed (including adjusting bedclothes, sheets and blankets)?: 2  Sitting down on and standing up from a chair with arms (e.g., wheelchair, bedside commode, etc.): 1  Moving from lying on back to sitting on the side of the bed?: 2  Moving to and from a bed to a chair (including a wheelchair)?: 1  Need to walk in hospital room?: 1  Climbing 3-5 steps with a railing?: 1  Basic Mobility Total Score: 8       Treatment & Education:  -Pt repositioned in bed for comfort with total assistance.  -Answered all questions/concerns within PTA scope of practice.     Patient left HOB elevated with all lines intact, call button in reach, and OT present..    GOALS:   Multidisciplinary Problems       Physical Therapy Goals          Problem: Physical Therapy    Goal Priority Disciplines Outcome Goal Variances Interventions   Physical Therapy Goal     PT, PT/OT Ongoing, Progressing     Description: PT goals until 11/23/22    1. Pt supine to sit with Min(A)   2. Pt sit to supine with Min(A)  3. Pt sit to stand with RW with minimal assist-not met  4. Pt to perform gait 30ft with RW with minimal assist.-not met  5. Pt to transfer bed to/from bedside chair with RW with minimal assist.-not met  6. Pt to up/down 5 steps with L UE rail with minimal assist.-not met  7. Pt to perform B LE exs in sitting or supine x 10 reps to strengthen B LE to improve functional mobility.-not met                         Time Tracking:     PT Received On: 11/18/22  PT Start Time:  1256     PT Stop Time: 1319  PT Total Time (min): 23 min     Billable Minutes: Therapeutic Activity 23    Treatment Type: Treatment  PT/PTA: PTA     PTA Visit Number: 1     11/18/2022

## 2022-11-19 LAB
ALBUMIN SERPL BCP-MCNC: 1.9 G/DL (ref 3.5–5.2)
ALP SERPL-CCNC: 135 U/L (ref 55–135)
ALT SERPL W/O P-5'-P-CCNC: 33 U/L (ref 10–44)
ANION GAP SERPL CALC-SCNC: 10 MMOL/L (ref 8–16)
AST SERPL-CCNC: 38 U/L (ref 10–40)
BACTERIA BLD CULT: NORMAL
BASOPHILS # BLD AUTO: 0.03 K/UL (ref 0–0.2)
BASOPHILS NFR BLD: 0.3 % (ref 0–1.9)
BILIRUB SERPL-MCNC: 0.3 MG/DL (ref 0.1–1)
BUN SERPL-MCNC: 16 MG/DL (ref 6–20)
CALCIUM SERPL-MCNC: 9.3 MG/DL (ref 8.7–10.5)
CHLORIDE SERPL-SCNC: 104 MMOL/L (ref 95–110)
CO2 SERPL-SCNC: 29 MMOL/L (ref 23–29)
CREAT SERPL-MCNC: 0.8 MG/DL (ref 0.5–1.4)
DIFFERENTIAL METHOD: ABNORMAL
EOSINOPHIL # BLD AUTO: 0.3 K/UL (ref 0–0.5)
EOSINOPHIL NFR BLD: 2.4 % (ref 0–8)
ERYTHROCYTE [DISTWIDTH] IN BLOOD BY AUTOMATED COUNT: 15.1 % (ref 11.5–14.5)
EST. GFR  (NO RACE VARIABLE): >60 ML/MIN/1.73 M^2
GLUCOSE SERPL-MCNC: 98 MG/DL (ref 70–110)
HCT VFR BLD AUTO: 23.7 % (ref 37–48.5)
HGB BLD-MCNC: 7.1 G/DL (ref 12–16)
IMM GRANULOCYTES # BLD AUTO: 0.17 K/UL (ref 0–0.04)
IMM GRANULOCYTES NFR BLD AUTO: 1.6 % (ref 0–0.5)
LYMPHOCYTES # BLD AUTO: 2.7 K/UL (ref 1–4.8)
LYMPHOCYTES NFR BLD: 25.2 % (ref 18–48)
MAGNESIUM SERPL-MCNC: 2.2 MG/DL (ref 1.6–2.6)
MCH RBC QN AUTO: 29.7 PG (ref 27–31)
MCHC RBC AUTO-ENTMCNC: 30 G/DL (ref 32–36)
MCV RBC AUTO: 99 FL (ref 82–98)
MONOCYTES # BLD AUTO: 1 K/UL (ref 0.3–1)
MONOCYTES NFR BLD: 9.7 % (ref 4–15)
NEUTROPHILS # BLD AUTO: 6.6 K/UL (ref 1.8–7.7)
NEUTROPHILS NFR BLD: 60.8 % (ref 38–73)
NRBC BLD-RTO: 0 /100 WBC
PHOSPHATE SERPL-MCNC: 3.8 MG/DL (ref 2.7–4.5)
PLATELET # BLD AUTO: 385 K/UL (ref 150–450)
PMV BLD AUTO: 10.9 FL (ref 9.2–12.9)
POCT GLUCOSE: 119 MG/DL (ref 70–110)
POCT GLUCOSE: 122 MG/DL (ref 70–110)
POCT GLUCOSE: 141 MG/DL (ref 70–110)
POTASSIUM SERPL-SCNC: 4 MMOL/L (ref 3.5–5.1)
PROT SERPL-MCNC: 6.5 G/DL (ref 6–8.4)
RBC # BLD AUTO: 2.39 M/UL (ref 4–5.4)
SODIUM SERPL-SCNC: 143 MMOL/L (ref 136–145)
WBC # BLD AUTO: 10.76 K/UL (ref 3.9–12.7)

## 2022-11-19 PROCEDURE — 87070 CULTURE OTHR SPECIMN AEROBIC: CPT | Performed by: STUDENT IN AN ORGANIZED HEALTH CARE EDUCATION/TRAINING PROGRAM

## 2022-11-19 PROCEDURE — 25000003 PHARM REV CODE 250: Performed by: PHYSICIAN ASSISTANT

## 2022-11-19 PROCEDURE — 25000003 PHARM REV CODE 250: Performed by: NURSE PRACTITIONER

## 2022-11-19 PROCEDURE — 25000003 PHARM REV CODE 250

## 2022-11-19 PROCEDURE — 84100 ASSAY OF PHOSPHORUS: CPT | Performed by: PHYSICIAN ASSISTANT

## 2022-11-19 PROCEDURE — A4216 STERILE WATER/SALINE, 10 ML: HCPCS | Performed by: STUDENT IN AN ORGANIZED HEALTH CARE EDUCATION/TRAINING PROGRAM

## 2022-11-19 PROCEDURE — 63600175 PHARM REV CODE 636 W HCPCS: Performed by: NURSE PRACTITIONER

## 2022-11-19 PROCEDURE — 94761 N-INVAS EAR/PLS OXIMETRY MLT: CPT

## 2022-11-19 PROCEDURE — 99233 SBSQ HOSP IP/OBS HIGH 50: CPT | Mod: ,,, | Performed by: STUDENT IN AN ORGANIZED HEALTH CARE EDUCATION/TRAINING PROGRAM

## 2022-11-19 PROCEDURE — 99900035 HC TECH TIME PER 15 MIN (STAT)

## 2022-11-19 PROCEDURE — 11000001 HC ACUTE MED/SURG PRIVATE ROOM

## 2022-11-19 PROCEDURE — 63600175 PHARM REV CODE 636 W HCPCS: Performed by: PHYSICIAN ASSISTANT

## 2022-11-19 PROCEDURE — 94640 AIRWAY INHALATION TREATMENT: CPT

## 2022-11-19 PROCEDURE — 27200966 HC CLOSED SUCTION SYSTEM

## 2022-11-19 PROCEDURE — 36415 COLL VENOUS BLD VENIPUNCTURE: CPT | Performed by: PHYSICIAN ASSISTANT

## 2022-11-19 PROCEDURE — 83735 ASSAY OF MAGNESIUM: CPT | Performed by: PHYSICIAN ASSISTANT

## 2022-11-19 PROCEDURE — 87205 SMEAR GRAM STAIN: CPT | Performed by: STUDENT IN AN ORGANIZED HEALTH CARE EDUCATION/TRAINING PROGRAM

## 2022-11-19 PROCEDURE — 99233 PR SUBSEQUENT HOSPITAL CARE,LEVL III: ICD-10-PCS | Mod: ,,, | Performed by: STUDENT IN AN ORGANIZED HEALTH CARE EDUCATION/TRAINING PROGRAM

## 2022-11-19 PROCEDURE — 25000242 PHARM REV CODE 250 ALT 637 W/ HCPCS: Performed by: STUDENT IN AN ORGANIZED HEALTH CARE EDUCATION/TRAINING PROGRAM

## 2022-11-19 PROCEDURE — 25000003 PHARM REV CODE 250: Performed by: STUDENT IN AN ORGANIZED HEALTH CARE EDUCATION/TRAINING PROGRAM

## 2022-11-19 PROCEDURE — 27000221 HC OXYGEN, UP TO 24 HOURS

## 2022-11-19 PROCEDURE — 51702 INSERT TEMP BLADDER CATH: CPT

## 2022-11-19 PROCEDURE — 99900026 HC AIRWAY MAINTENANCE (STAT)

## 2022-11-19 PROCEDURE — 80053 COMPREHEN METABOLIC PANEL: CPT | Performed by: PHYSICIAN ASSISTANT

## 2022-11-19 PROCEDURE — 85025 COMPLETE CBC W/AUTO DIFF WBC: CPT | Performed by: PHYSICIAN ASSISTANT

## 2022-11-19 PROCEDURE — S4991 NICOTINE PATCH NONLEGEND: HCPCS | Performed by: NURSE PRACTITIONER

## 2022-11-19 RX ORDER — OXYCODONE HYDROCHLORIDE 5 MG/1
5 TABLET ORAL EVERY 6 HOURS PRN
Status: DISCONTINUED | OUTPATIENT
Start: 2022-11-19 | End: 2022-11-19

## 2022-11-19 RX ADMIN — OXYCODONE 5 MG: 5 TABLET ORAL at 09:11

## 2022-11-19 RX ADMIN — POLYETHYLENE GLYCOL 3350 17 G: 17 POWDER, FOR SOLUTION ORAL at 09:11

## 2022-11-19 RX ADMIN — IPRATROPIUM BROMIDE AND ALBUTEROL SULFATE 3 ML: 2.5; .5 SOLUTION RESPIRATORY (INHALATION) at 07:11

## 2022-11-19 RX ADMIN — CETIRIZINE HYDROCHLORIDE 5 MG: 5 TABLET, FILM COATED ORAL at 09:11

## 2022-11-19 RX ADMIN — HEPARIN SODIUM 5000 UNITS: 5000 INJECTION INTRAVENOUS; SUBCUTANEOUS at 10:11

## 2022-11-19 RX ADMIN — CEFEPIME 2 G: 2 INJECTION, POWDER, FOR SOLUTION INTRAVENOUS at 02:11

## 2022-11-19 RX ADMIN — HEPARIN SODIUM 5000 UNITS: 5000 INJECTION INTRAVENOUS; SUBCUTANEOUS at 06:11

## 2022-11-19 RX ADMIN — HEPARIN SODIUM 5000 UNITS: 5000 INJECTION INTRAVENOUS; SUBCUTANEOUS at 02:11

## 2022-11-19 RX ADMIN — PREGABALIN 200 MG: 150 CAPSULE ORAL at 10:11

## 2022-11-19 RX ADMIN — Medication 1 PATCH: at 09:11

## 2022-11-19 RX ADMIN — Medication 10 ML: at 01:11

## 2022-11-19 RX ADMIN — PREGABALIN 200 MG: 150 CAPSULE ORAL at 02:11

## 2022-11-19 RX ADMIN — SENNOSIDES AND DOCUSATE SODIUM 1 TABLET: 50; 8.6 TABLET ORAL at 09:11

## 2022-11-19 RX ADMIN — PREGABALIN 200 MG: 150 CAPSULE ORAL at 09:11

## 2022-11-19 RX ADMIN — SILODOSIN 4 MG: 4 CAPSULE ORAL at 09:11

## 2022-11-19 RX ADMIN — Medication 10 ML: at 06:11

## 2022-11-19 RX ADMIN — SENNOSIDES AND DOCUSATE SODIUM 1 TABLET: 50; 8.6 TABLET ORAL at 10:11

## 2022-11-19 RX ADMIN — FAMOTIDINE 20 MG: 20 TABLET ORAL at 09:11

## 2022-11-19 RX ADMIN — CEFEPIME 2 G: 2 INJECTION, POWDER, FOR SOLUTION INTRAVENOUS at 10:11

## 2022-11-19 RX ADMIN — METHADONE HYDROCHLORIDE 90 MG: 10 TABLET ORAL at 02:11

## 2022-11-19 RX ADMIN — IPRATROPIUM BROMIDE AND ALBUTEROL SULFATE 3 ML: 2.5; .5 SOLUTION RESPIRATORY (INHALATION) at 03:11

## 2022-11-19 RX ADMIN — DIAZEPAM 5 MG: 5 TABLET ORAL at 09:11

## 2022-11-19 RX ADMIN — Medication 10 ML: at 12:11

## 2022-11-19 RX ADMIN — IPRATROPIUM BROMIDE AND ALBUTEROL SULFATE 3 ML: 2.5; .5 SOLUTION RESPIRATORY (INHALATION) at 08:11

## 2022-11-19 RX ADMIN — FAMOTIDINE 20 MG: 20 TABLET ORAL at 10:11

## 2022-11-19 RX ADMIN — SCOPALAMINE 1 PATCH: 1 PATCH, EXTENDED RELEASE TRANSDERMAL at 09:11

## 2022-11-19 RX ADMIN — CEFEPIME 2 G: 2 INJECTION, POWDER, FOR SOLUTION INTRAVENOUS at 06:11

## 2022-11-19 RX ADMIN — HYDROXYZINE HYDROCHLORIDE 25 MG: 25 TABLET, FILM COATED ORAL at 02:11

## 2022-11-19 NOTE — PROGRESS NOTES
[facility] - Neurosurgery ([facility])  [facility] Medicine  Progress Note    Patient Name: Peter Stiles  MRN: [id number]  Patient Class: IP- Inpatient   Admission Date: 10/25/2022  Length of Stay: 25 days  Attending Physician: René Chang*  Primary Care Provider: Og Victoria NP        Subjective:     Principal Problem:Fever        HPI:  51 y.o F with hx of htn, opioid dependence on methadone, smoking (2ppd since 17 y.o), and dilated cardiomyopathy presents with neck pain and abdominal numbness. 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 At the time the daughter slammed the breaks and came to an abrupt stop. The patient braced herself by covering her face with both her forearms. She did not notice severe symptoms at the time or a popping sensation but a vague soreness in her neck. In the following 2 weeks her symptoms gradually deteriorated with increased neck pain notably. Her symptoms became worse early this morning when she went to the bathroom. She noticed an unusual numbness and bloating of her stomach and generalized numbness below that level. She was able to urinate. She also had increased pain in her legs, bilateral shoulders, and tingling in fingers and toes. Patient denies IV drug or alcohol use. She smoke 2ppd since 17 y.o.      Overview/Hospital Course:  Ms. Stiles presented for acute neck pain, along with numbness and weakness of the bilateral upper and lower extremities. MRI demonstrated C5-6 osteomyelitis, discitis, and epidural abscess, as well as narrowing and signal abnormality of the spinal cord. She was admitted to the neuroICU and started on Rocephin and vancomycin. Stepped down to hospital medicine on 10/26/22. Patient completed C5-C6 corpectomy and C4-C7 anterior column reconstruction with NSGY with assistance from ENT. BCX's subsequently grew MSSA and patient transitioned from vanc/ceftriaxone to oxacillin  per ID recommendations.       Interval History: Afebrile  Pt sleepy this am  Repeat spine / pelvis imaging  Tracheal aspirate ordered    Review of Systems  Objective:     Vital Signs (Most Recent):  Temp: 98.9 °F (37.2 °C) (11/19/22 1107)  Pulse: 60 (11/19/22 1107)  Resp: 16 (11/19/22 1107)  BP: (!) 104/53 (11/19/22 1107)  SpO2: 96 % (11/19/22 1107)   Vital Signs (24h Range):  Temp:  [97.8 °F (36.6 °C)-100.3 °F (37.9 °C)] 98.9 °F (37.2 °C)  Pulse:  [60-94] 60  Resp:  [8-20] 16  SpO2:  [78 %-99 %] 96 %  BP: ()/(45-73) 104/53     Weight:  (daylight savings)  Body mass index is 22.6 kg/m².    Intake/Output Summary (Last 24 hours) at 11/19/2022 1253  Last data filed at 11/19/2022 1018  Gross per 24 hour   Intake 1005.33 ml   Output 950 ml   Net 55.33 ml      Physical Exam  Vitals and nursing note reviewed.   Constitutional:       General: She is not in acute distress.  HENT:      Head: Normocephalic.      Nose: Nose normal.      Mouth/Throat:      Mouth: Mucous membranes are moist.      Pharynx: Oropharynx is clear.   Neck:      Comments: Trach collar   Cardiovascular:      Rate and Rhythm: Normal rate and regular rhythm.      Pulses: Normal pulses.      Heart sounds: Normal heart sounds.   Pulmonary:      Effort: Pulmonary effort is normal.      Breath sounds: Normal breath sounds.   Abdominal:      General: Bowel sounds are normal.      Palpations: Abdomen is soft.   Musculoskeletal:         General: Normal range of motion.   Skin:     General: Skin is warm and dry.   Neurological:      Comments:      Psychiatric:         Mood and Affect: Mood normal.         Behavior: Behavior normal.       MELD-Na score: 6 at 11/3/2022  1:47 AM  MELD score: 6 at 11/3/2022  1:47 AM  Calculated from:  Serum Creatinine: 0.7 mg/dL (Using min of 1 mg/dL) at 11/3/2022  1:47 AM  Serum Sodium: 141 mmol/L (Using max of 137 mmol/L) at 11/3/2022  1:47 AM  Total Bilirubin: 0.4 mg/dL (Using min of 1 mg/dL) at 11/3/2022  1:47  AM  INR(ratio): 1.0 at 11/1/2022  9:28 PM  Age: 51 years    Significant Labs:  CBC:  Recent Labs   Lab 11/18/22  0458 11/19/22  0551   WBC 11.65 10.76   HGB 8.0* 7.1*   HCT 26.4* 23.7*    385     CMP:  Recent Labs   Lab 11/18/22  0458 11/19/22  0551    143   K 4.3 4.0    104   CO2 29 29    98   BUN 13 16   CREATININE 0.8 0.8   CALCIUM 9.8 9.3   PROT 6.8 6.5   ALBUMIN 2.0* 1.9*   BILITOT 0.4 0.3   ALKPHOS 139* 135   AST 47* 38   ALT 33 33   ANIONGAP 8 10     PTINR:  No results for input(s): INR in the last 48 hours.    Significant Procedures:   Dobutamine Stress Test with Color Flow: No results found for this or any previous visit.      Assessment/Plan:      Bacteremia  - Blood cultures with MSSA  - ID recommending transition to oxacillin from vanc/ceftriaxone  - see osteo      Hyperglycemia  No noted history of diabetes. Hba1c 6.1. Gluc 269 on admission.  Continue MDSSI  140-180 goal  Accuchecks 4X daily    Opioid use disorder, severe, on maintenance therapy, dependence  -methadone 45mg daily  -multimodal pain regimen includes Tylenol, Robaxin, and Lyrica      Osteomyelitis of cervical spine  # MSSA Bacteremia  # Epidural abscess    Peter Stiles is a 51 y.o. female with PMHx of HTN, dilated cardiomyopathy, h/o opioid dependence, cigarette smoker (2pks/day), and daily baby ASA use who presents with 1 day of decreased sensation from T5 level down and tingling in her bilateral fingers and bilateral toes. MRI imaging obtained in the ED showing possible C5-6 osteodiscitis/myelitis with cord compression. Patient tachycardic and hypertensive on arrival, afebrile, with leukocytosis. Now s/p C5-6 corpectomy on 10/28/22; C2-T2 PCDF on 11/2; S/p trach 11/7.         MRI C/T/L spine w/o contrast 10/25: focal kyphotic deformity at C5/6 with possible discitis/osteomyelitis,   epidural collection extending into the central spinal canal resulting in severe stenosis and cord signal  abnormality.   Prevertebral soft tissue edema and probable paravertebral abscess or phlegmon at this level. Thoracic and lumbar spine unremarkable. MRI C spine w/ contrast 10/25: confirmed enhancement at C5/6 consistent with osteomyelitis/discitis and epidural phlegmon        -Admitted to neuro ICU initially.  --Intubated after index procedure and kept intubated for posterior fixation and for tentative LISHA 11/4 which was eventually cancelled due to inability to adequately extend neck  --Step down to medicine on 11/11  --Continue Speech therapy. GI consulted for G Tube eval. Patient wishes to see her progress in next 2 weeks before getting Gtube.   --BCx 10/25 growing MSSA, Repeat BCx's 10/27 NGTD  --OR cultures 10/28 no organisms on gram stain, culture MSSA. ID following  --Continue C collar when upright   --CT Soft tissue neck reviewed with stable hardware position s/p 360 cervical fusion ow expected postop changes  --Pain control as needed  --PT/OT. SQH     -- Patient noted to be febrile on 11/12 and 11/13.  -- Reconsulted ID  -- Repeat blood cx 11/14 NGTD. Tested neg for RSV and covid 19.  -- CXR 11/15 Patchy infiltrates and pleural effusion and developed since yesterday. Consulted Pulm.  --Repeat inflammatory markers- sed rate/crp   - Sent ghosh stain/urine eosinophil   ---------------------------------  ID recs  -continue oxacillin for MSSA, anticipate 6w course from cleared blcx, rosalino 12/9  -unable to use adjunctive rifampin due to DDI (methadone)  -follow up cx data  -plan for placement per patient (SNF/LTAC) - pt requested [facility]    -----------  Blood culture positive GPR, likely contaminant  Had intermittent fevers  Repeat imaging  Resp culture from tracheal aspirate    Anxiety and depression  -Atarax prn for agitation      VTE Risk Mitigation (From admission, onward)         Ordered     heparin (porcine) injection 5,000 Units  Every 8 hours         11/04/22 1437     IP VTE LOW RISK PATIENT  Once         10/25/22  1109     Place sequential compression device  Until discontinued         10/25/22 1109                Discharge Planning   YESSI: 11/21/2022     Code Status: Full Code   Is the patient medically ready for discharge?: No    Reason for patient still in hospital (select all that apply): Patient trending condition, Treatment and Consult recommendations  Discharge Plan A: Long-term acute care facility (LTAC)   Discharge Delays: None known at this time          René Garcia MD  Department of Hospital Medicine   [facility] - Neurosurgery ([facility])

## 2022-11-19 NOTE — PT/OT/SLP PROGRESS
"Speech Language Pathology Treatment    Patient Name:  Peter Stiles   MRN:  [id number]  Admitting Diagnosis: Fever    Recommendations:                 General Recommendations:  Dysphagia therapy and One Way Speaking Valve training   Diet recommendations:  NPO, Liquid Diet Level: NPO   Aspiration Precautions: Frequent oral care and Strict aspiration precautions   General Precautions: Standard, aspiration, fall, NPO  Communication strategies:  go to room if call light pushed and Pt using writing/mouthing to communicate  Passy Autumn Speaking Valve Precautions: Only with therapy at this time, remove valve with any S/S respiratory distress, remove valve when sleeping. Please note, to clean valve:  1. Swish valve in pure soap and warm water, 2. Rinse valve thoroughly in warm running water, 3. Allow valve to air dry thoroughly before placing it in storage container, 4. DO NOT use hot water, peroxide, bleach, vinegar, alcohol, brushes, or cotton swabs to clean valve.       Subjective     SLP reviewed Pt with RN pre/post session  Pt presents lethargic  She explains 'She just left, I'm having a hard time since it is my birthday"     Pain/Comfort:  Pain Rating 1: other (see comments) (did not rate)    Respiratory Status:  trach collar 5L, 28%    Objective:     Has the patient been evaluated by SLP for swallowing?   Yes  Keep patient NPO? Yes   Current Respiratory Status:    trach collar 5L, 28%    Pt found asleep in bed with cervical collar, Shiley 6.0 uncuffed trach with oxygen in place. Friend in room asleep on couch t/o session.  Pt woke per moderate verbal and tactile cues. Pt with minimal sustained attention and easily fell back to sleep without consistent verbal and tactile cues. RN in room and aware. Pt agreeable trials of one-way Passy Autumn Speaking Valve. Pt denied need for suction prior to placement of valve. SpO2 95% and HR 91 prior to placement of valve. SLP assisted in donning valve to trach. Pt tolerated valve " for 10 minutes without S/S respiratory distress. Her voice was mildly strained with intermittent wet change in vocal quality. Pt with frequent use of Yankauer to suction oral secretions t/o trials. She demonstrated decreased alertness and requested break from PMSV. SLP assisted in doffing valve and returned valve to case by trach supplies. Pt educated on ongoing sLP POC, need for further swallow assessment when more alert and ongoing safety precautions for PMSV.  Pt with minimal sustained alertness and did not verbalized understanding. RN in room with Pt upon SLP exit.     Assessment:     Peter Stiles is a 51 y.o. female with an SLP diagnosis of Dysphagia, Dysphonia, S/P Trach, and One Way Speaking Valve Training.  She presents with minimal sustained alertness this service day. Current goals remain ongoing. .    Goals:   Multidisciplinary Problems       SLP Goals          Problem: SLP    Goal Priority Disciplines Outcome   SLP Goal     SLP Ongoing, Progressing   Description: Speech Language Pathology Goals  Goals expected to be met by 11/16/22    1. Pt will tolerate one-way speaking valve for 10 minutes w/o S/S respiratory distress, MIN A  2. Pt will recall 3 +safety precautions for PMSV with cuffed trach, 90%, MIN A  3. Pt will participate in ongoing swallow assessment to determine safest, least restrictive means nutrition/hydration    4. Educate Pt and family on safety awareness and safety precautions for one-way speaking valve                         Plan:     Patient to be seen:  4 x/week   Plan of Care expires:  12/09/22  Plan of Care reviewed with:  patient   SLP Follow-Up:  Yes       Discharge recommendations:  rehabilitation facility       Time Tracking:     SLP Treatment Date:   11/18/22  Speech Start Time:  1545  Speech Stop Time:  1558     Speech Total Time (min):  13 min    Billable Minutes: Speech Therapy Individual 13    11/18/2022

## 2022-11-19 NOTE — NURSING
Patient's 6pm scheduled Oxycodone 5mg not given. Upon assessment, patient difficult to arouse. However, after multiple attempts patient became minimally awake enough to respond appropriately one orientation question. IM-A stat paged @ 6.26pm, but yet to receive a call back. Conferred with charge RN who okay'd to hold scheduled oxycodone 5mg. VS at this time - 118/60, 93% on 5L N/C, 17 RR, 71, HR.

## 2022-11-19 NOTE — ASSESSMENT & PLAN NOTE
- Blood cultures with MSSA  - ID recommending transition to oxacillin from vanc/ceftriaxone  - see osteo

## 2022-11-19 NOTE — ASSESSMENT & PLAN NOTE
# MSSA Bacteremia  # Epidural abscess    Peter Stiles is a 51 y.o. female with PMHx of HTN, dilated cardiomyopathy, h/o opioid dependence, cigarette smoker (2pks/day), and daily baby ASA use who presents with 1 day of decreased sensation from T5 level down and tingling in her bilateral fingers and bilateral toes. MRI imaging obtained in the ED showing possible C5-6 osteodiscitis/myelitis with cord compression. Patient tachycardic and hypertensive on arrival, afebrile, with leukocytosis. Now s/p C5-6 corpectomy on 10/28/22; C2-T2 PCDF on 11/2; S/p trach 11/7.         MRI C/T/L spine w/o contrast 10/25: focal kyphotic deformity at C5/6 with possible discitis/osteomyelitis,   epidural collection extending into the central spinal canal resulting in severe stenosis and cord signal  abnormality.  Prevertebral soft tissue edema and probable paravertebral abscess or phlegmon at this level. Thoracic and lumbar spine unremarkable. MRI C spine w/ contrast 10/25: confirmed enhancement at C5/6 consistent with osteomyelitis/discitis and epidural phlegmon        -Admitted to neuro ICU initially.  --Intubated after index procedure and kept intubated for posterior fixation and for tentative LISHA 11/4 which was eventually cancelled due to inability to adequately extend neck  --Step down to medicine on 11/11  --Continue Speech therapy. GI consulted for G Tube eval. Patient wishes to see her progress in next 2 weeks before getting Gtube.   --BCx 10/25 growing MSSA, Repeat BCx's 10/27 NGTD  --OR cultures 10/28 no organisms on gram stain, culture MSSA. ID following  --Continue C collar when upright   --CT Soft tissue neck reviewed with stable hardware position s/p 360 cervical fusion ow expected postop changes  --Pain control as needed  --PT/OT. SQH     -- Patient noted to be febrile on 11/12 and 11/13.  -- Reconsulted ID  -- Repeat blood cx 11/14 NGTD. Tested neg for RSV and covid 19.  -- CXR 11/15 Patchy infiltrates and pleural  effusion and developed since yesterday. Consulted Pulm.  --Repeat inflammatory markers- sed rate/crp   - Sent ghosh stain/urine eosinophil   ---------------------------------  ID recs  -continue oxacillin for MSSA, anticipate 6w course from cleared blcx, rosalino 12/9  -unable to use adjunctive rifampin due to DDI (methadone)  -follow up cx data  -plan for placement per patient (SNF/LTAC) - pt requested [facility]    -----------  Blood culture positive GPR, likely contaminant  Had intermittent fevers  Repeat imaging  Resp culture from tracheal aspirate

## 2022-11-19 NOTE — RESPIRATORY THERAPY
RAPID RESPONSE RESPIRATORY THERAPY PROACTIVE NOTE           Time of visit: 908     Code Status: Full Code   : 1970  Bed: 952/952 A:   MRN: [id number]  Time spent at the bedside: < 15 min    SITUATION    Evaluated patient for: LDA Check     BACKGROUND    Patient has a past medical history of CHF (congestive heart failure), Essential (primary) hypertension, Opioid dependence on maintenance agonist therapy, no symptoms, and Smoking.  Clinically Significant Surgical Hx: tracheostomy    24 Hours Vitals Range:  Temp:  [97.8 °F (36.6 °C)-100.3 °F (37.9 °C)]   Pulse:  [60-92]   Resp:  [8-20]   BP: ()/(45-62)   SpO2:  [78 %-99 %]     Labs:    Recent Labs     22  0647 22  0458 22  0551    141 143   K 4.7 4.3 4.0    104 104   CO2 24 29 29   CREATININE 0.8 0.8 0.8   * 109 98   PHOS 4.2 4.1 3.8   MG 2.1 2.2 2.2        No results for input(s): PH, PCO2, PO2, HCO3, POCSATURATED, BE in the last 72 hours.    ASSESSMENT/INTERVENTIONS  Pt on 5L 28% trach collar. Shiley uncuffed size 6 trach in place. All supplies in room. Extra cuffed trachs sizes 4 and 6 at bedside.      Last VS   Temp: 98.9 °F (37.2 °C) (1107)  Pulse: 60 (1107)  Resp: 16 (1107)  BP: 104/53 (1107)  SpO2: 96 % (1107)      Extra trachs at bedside: cuffed 4 and 6.  Level of Consciousness: Level of Consciousness (AVPU): alert  Respiratory Effort: Respiratory Effort: Unlabored Expansion/Accessory Muscle Usage: Expansion/Accessory Muscles/Retractions: no retractions, no use of accessory muscles  All Lung Field Breath Sounds: All Lung Fields Breath Sounds: coarse, diminished  NANETTE Breath Sounds: equal bilaterally, diminished, Anterior:  LLL Breath Sounds: coarse  RLL Breath Sounds: coarse  O2 Device/Concentration: 5L 28% TC.  Surgical airway: Yes, Type: Shiley Size: 6, uncuffed  Ambu at bedside: Ambu bag with the patient?: Yes, Adult Ambu     Active Orders   Respiratory Care    Inhalation  Treatment Once     Frequency: Once     Number of Occurrences: 1 Occurrences    Inhalation Treatment Q4H PRN     Frequency: Q4H PRN     Number of Occurrences: Until Specified    Inhalation Treatment Q6H WAKE     Frequency: Q6H WAKE     Number of Occurrences: Until Specified    Oxygen Continuous     Frequency: Continuous     Number of Occurrences: Until Specified     Order Questions:      Device type: Low flow      Device: Trach Collar      FiO2%: 28      Titrate O2 per Oxygen Titration Protocol: Yes      To maintain SpO2 goal of: >= 90%      Notify MD of: Inability to achieve desired SpO2; Sudden change in patient status and requires 20% increase in FiO2; Patient requires >60% FiO2    POCT ARTERIAL BLOOD GAS Blood Gas     Frequency: PRN     Number of Occurrences: Until Specified     Order Comments: Notify Physician if: see parameters below.       Order Questions:      Component: Blood Gas    Trach care every 12 hours and as needed     Frequency: BID     Number of Occurrences: Until Specified    Trach: Assess suction need every 2 hours and as needed     Frequency: BID     Number of Occurrences: Until Specified       RECOMMENDATIONS    We recommend: RRT Recs: Continue POC per primary team.      FOLLOW-UP    Please call back the Rapid Response RTDante RRT at x [phone] for any questions or concerns.

## 2022-11-19 NOTE — PLAN OF CARE
Problem: Adult Inpatient Plan of Care  Goal: Plan of Care Review  Outcome: Ongoing, Progressing  Goal: Patient-Specific Goal (Individualized)  Description: Admit Date: 10/25/2022    Osteomyelitis of cervical spine    Past Medical History:  No date: CHF (congestive heart failure)  No date: Essential (primary) hypertension  No date: Opioid dependence on maintenance agonist therapy, no symptoms  No date: Smoking    Past Surgical History:  No date: APPENDECTOMY  No date:  SECTION      Comment:  x2  2022: FUSION OF CERVICAL SPINE BY POSTERIOR APPROACH; N/A      Comment:  Procedure: FUSION, SPINE, CERVICAL, POSTERIOR APPROACH                C2-T2 posterior decompression/fusion; Depuy,                neuromonitoring Marrero, gell rolls;  Surgeon: West Merino DO;  [facility];  Service:                Neurosurgery;  Laterality: N/A;  No date: HYSTERECTOMY  10/28/2022: SURGICAL REMOVAL OF VERTEBRAL BODY OF CERVICAL SPINE; N/A      Comment:  Procedure: CORPECTOMY, SPINE, CERVICAL;  Surgeon:                West Merino DO;  Location: [facility] OR [facility];                 Service: Neurosurgery;  Laterality: N/A;  anterior C5-6                corpectomy, globus, caspar head prado and tongs,                omnipaque, 15lbs weights, microscrope, round cutting lynsey               and M8, ENT assistance for exposure    Individualization:   1.     Restraints:   Restraint Order  Length of Order: Order good for next 24 hours or when removed.  Date that the current order will : 22  Time that the current order will : 010  Order Upon Application: Yes          Outcome: Ongoing, Progressing  Goal: Absence of Hospital-Acquired Illness or Injury  Outcome: Ongoing, Progressing  Goal: Optimal Comfort and Wellbeing  Outcome: Ongoing, Progressing  Goal: Readiness for Transition of Care  Outcome: Ongoing, Progressing     Problem: Fall Injury Risk  Goal: Absence of Fall and  Fall-Related Injury  Outcome: Ongoing, Progressing     Problem: Skin Injury Risk Increased  Goal: Skin Health and Integrity  Outcome: Ongoing, Progressing     Problem: Pain Acute  Goal: Acceptable Pain Control and Functional Ability  Outcome: Ongoing, Progressing     Problem: Infection  Goal: Absence of Infection Signs and Symptoms  Outcome: Ongoing, Progressing

## 2022-11-19 NOTE — SUBJECTIVE & OBJECTIVE
Interval History: Afebrile  Pt sleepy this am  Repeat spine / pelvis imaging  Tracheal aspirate ordered    Review of Systems  Objective:     Vital Signs (Most Recent):  Temp: 98.9 °F (37.2 °C) (11/19/22 1107)  Pulse: 60 (11/19/22 1107)  Resp: 16 (11/19/22 1107)  BP: (!) 104/53 (11/19/22 1107)  SpO2: 96 % (11/19/22 1107)   Vital Signs (24h Range):  Temp:  [97.8 °F (36.6 °C)-100.3 °F (37.9 °C)] 98.9 °F (37.2 °C)  Pulse:  [60-94] 60  Resp:  [8-20] 16  SpO2:  [78 %-99 %] 96 %  BP: ()/(45-73) 104/53     Weight:  (daylight savings)  Body mass index is 22.6 kg/m².    Intake/Output Summary (Last 24 hours) at 11/19/2022 1253  Last data filed at 11/19/2022 1018  Gross per 24 hour   Intake 1005.33 ml   Output 950 ml   Net 55.33 ml      Physical Exam  Vitals and nursing note reviewed.   Constitutional:       General: She is not in acute distress.  HENT:      Head: Normocephalic.      Nose: Nose normal.      Mouth/Throat:      Mouth: Mucous membranes are moist.      Pharynx: Oropharynx is clear.   Neck:      Comments: Trach collar   Cardiovascular:      Rate and Rhythm: Normal rate and regular rhythm.      Pulses: Normal pulses.      Heart sounds: Normal heart sounds.   Pulmonary:      Effort: Pulmonary effort is normal.      Breath sounds: Normal breath sounds.   Abdominal:      General: Bowel sounds are normal.      Palpations: Abdomen is soft.   Musculoskeletal:         General: Normal range of motion.   Skin:     General: Skin is warm and dry.   Neurological:      Comments:      Psychiatric:         Mood and Affect: Mood normal.         Behavior: Behavior normal.       MELD-Na score: 6 at 11/3/2022  1:47 AM  MELD score: 6 at 11/3/2022  1:47 AM  Calculated from:  Serum Creatinine: 0.7 mg/dL (Using min of 1 mg/dL) at 11/3/2022  1:47 AM  Serum Sodium: 141 mmol/L (Using max of 137 mmol/L) at 11/3/2022  1:47 AM  Total Bilirubin: 0.4 mg/dL (Using min of 1 mg/dL) at 11/3/2022  1:47 AM  INR(ratio): 1.0 at 11/1/2022  9:28  PM  Age: 51 years    Significant Labs:  CBC:  Recent Labs   Lab 11/18/22 0458 11/19/22  0551   WBC 11.65 10.76   HGB 8.0* 7.1*   HCT 26.4* 23.7*    385     CMP:  Recent Labs   Lab 11/18/22 0458 11/19/22  0551    143   K 4.3 4.0    104   CO2 29 29    98   BUN 13 16   CREATININE 0.8 0.8   CALCIUM 9.8 9.3   PROT 6.8 6.5   ALBUMIN 2.0* 1.9*   BILITOT 0.4 0.3   ALKPHOS 139* 135   AST 47* 38   ALT 33 33   ANIONGAP 8 10     PTINR:  No results for input(s): INR in the last 48 hours.    Significant Procedures:   Dobutamine Stress Test with Color Flow: No results found for this or any previous visit.

## 2022-11-19 NOTE — CARE UPDATE
"RAPID RESPONSE NURSE CHART REVIEW       Chart Reviewed: 11/19/2022, 11:17 AM    MRN: [id number]  Bed: 952/952 A    Dx: Bogdan Stiles has a past medical history of CHF (congestive heart failure), Essential (primary) hypertension, Opioid dependence on maintenance agonist therapy, no symptoms, and Smoking.    Last VS: BP (!) 104/53 (BP Location: Left arm, Patient Position: Lying)   Pulse 60   Temp 98.9 °F (37.2 °C) (Oral)   Resp 16   Ht 5' 6" (1.676 m)   Wt 63.5 kg (139 lb 15.9 oz)   SpO2 96%   Breastfeeding No   BMI 22.60 kg/m²     24H Vital Sign Range:  Temp:  [97.8 °F (36.6 °C)-100.3 °F (37.9 °C)]   Pulse:  [60-94]   Resp:  [8-20]   BP: ()/(45-73)   SpO2:  [78 %-99 %]     Level of Consciousness (AVPU): alert    Recent Labs     11/17/22  0647 11/18/22  0458 11/19/22  0551   WBC 12.21 11.65 10.76   HGB 7.8* 8.0* 7.1*   HCT 25.4* 26.4* 23.7*    380 385       Recent Labs     11/17/22  0647 11/18/22  0458 11/19/22  0551    141 143   K 4.7 4.3 4.0    104 104   CO2 24 29 29   CREATININE 0.8 0.8 0.8   * 109 98   PHOS 4.2 4.1 3.8   MG 2.1 2.2 2.2        No results for input(s): PH, PCO2, PO2, HCO3, POCSATURATED, BE in the last 72 hours.     OXYGEN:  Flow (L/min): 5  Oxygen Concentration (%): 28  O2 Device (Oxygen Therapy): Trach Collar    MEWS score: 2    Charge Mica CAMILO  contacted. No concerns verbalized at this time. Instructed to call [phone] for further concerns or assistance.    Suzy Forbes RN        "

## 2022-11-19 NOTE — PLAN OF CARE
Problem: Adult Inpatient Plan of Care  Goal: Plan of Care Review  Outcome: Ongoing, Progressing  Goal: Patient-Specific Goal (Individualized)  Description: Admit Date: 10/25/2022    Osteomyelitis of cervical spine    Past Medical History:  No date: CHF (congestive heart failure)  No date: Essential (primary) hypertension  No date: Opioid dependence on maintenance agonist therapy, no symptoms  No date: Smoking    Past Surgical History:  No date: APPENDECTOMY  No date:  SECTION      Comment:  x2  2022: FUSION OF CERVICAL SPINE BY POSTERIOR APPROACH; N/A      Comment:  Procedure: FUSION, SPINE, CERVICAL, POSTERIOR APPROACH                C2-T2 posterior decompression/fusion; Depuy,                neuromonitoring Marrero, gell rolls;  Surgeon: West Merino DO;  [facility];  Service:                Neurosurgery;  Laterality: N/A;  No date: HYSTERECTOMY  10/28/2022: SURGICAL REMOVAL OF VERTEBRAL BODY OF CERVICAL SPINE; N/A      Comment:  Procedure: CORPECTOMY, SPINE, CERVICAL;  Surgeon:                West Merino DO;  Location: [facility] OR [facility];                 Service: Neurosurgery;  Laterality: N/A;  anterior C5-6                corpectomy, globus, caspar head prado and tongs,                omnipaque, 15lbs weights, microscrope, round cutting lynsey               and M8, ENT assistance for exposure    Individualization:   1.     Restraints:   Restraint Order  Length of Order: Order good for next 24 hours or when removed.  Date that the current order will : 22  Time that the current order will : 010  Order Upon Application: Yes          Outcome: Ongoing, Progressing  Goal: Absence of Hospital-Acquired Illness or Injury  Outcome: Ongoing, Progressing  Goal: Optimal Comfort and Wellbeing  Outcome: Ongoing, Progressing  Goal: Readiness for Transition of Care  Outcome: Ongoing, Progressing     Problem: Fall Injury Risk  Goal: Absence of Fall and  Fall-Related Injury  Outcome: Ongoing, Progressing     Problem: Skin Injury Risk Increased  Goal: Skin Health and Integrity  Outcome: Ongoing, Progressing     Problem: Pain Acute  Goal: Acceptable Pain Control and Functional Ability  Outcome: Ongoing, Progressing     Problem: Autonomic Dysreflexia (Spinal Cord Injury)  Goal: Effective Autonomic Dysreflexia Prevention  Outcome: Ongoing, Progressing     Problem: Bowel Elimination Impaired (Spinal Cord Injury)  Goal: Effective Bowel Elimination  Outcome: Ongoing, Progressing     Problem: Extended Neurologic Impairment (Spinal Cord Injury)  Goal: Absence of Extended Neurologic Injury  Outcome: Ongoing, Progressing     Problem: Functional Ability Impaired (Spinal Cord Injury)  Goal: Optimal Functional Ability  Outcome: Ongoing, Progressing     Problem: Neurogenic Shock (Spinal Cord Injury)  Goal: Effective Tissue Perfusion  Outcome: Ongoing, Progressing     Problem: Nutrition Impaired (Spinal Cord Injury)  Goal: Optimal Nutrition Intake  Outcome: Ongoing, Progressing     Problem: Infection  Goal: Absence of Infection Signs and Symptoms  Outcome: Ongoing, Progressing     Problem: Communication Impairment (Artificial Airway)  Goal: Effective Communication  Outcome: Ongoing, Progressing     Problem: Device-Related Complication Risk (Artificial Airway)  Goal: Optimal Device Function  Outcome: Ongoing, Progressing     Problem: Skin and Tissue Injury (Artificial Airway)  Goal: Absence of Device-Related Skin or Tissue Injury  Outcome: Ongoing, Progressing     Problem: Noninvasive Ventilation Acute  Goal: Effective Unassisted Ventilation and Oxygenation  Outcome: Ongoing, Progressing     Problem: Adjustment to Illness (Delirium)  Goal: Optimal Coping  Outcome: Ongoing, Progressing     Problem: Sleep Disturbance (Delirium)  Goal: Improved Sleep  Outcome: Ongoing, Progressing     Problem: Adjustment to Illness (Sepsis/Septic Shock)  Goal: Optimal Coping  Outcome: Ongoing,  Progressing     Problem: Bleeding (Sepsis/Septic Shock)  Goal: Absence of Bleeding  Outcome: Ongoing, Progressing     Problem: Glycemic Control Impaired (Sepsis/Septic Shock)  Goal: Blood Glucose Level Within Desired Range  Outcome: Ongoing, Progressing     Problem: Infection Progression (Sepsis/Septic Shock)  Goal: Absence of Infection Signs and Symptoms  Outcome: Ongoing, Progressing     Problem: Nutrition Impaired (Sepsis/Septic Shock)  Goal: Optimal Nutrition Intake  Outcome: Ongoing, Progressing

## 2022-11-19 NOTE — NURSING
Patient drowsy but easily aroused.. had a low BP overnight. MD notified. Fluids given per orders. VS currently stable. No signs of acute distress. Pain medication held due to low BP and drowsiness. No further needs at this time.

## 2022-11-20 LAB
ALBUMIN SERPL BCP-MCNC: 1.9 G/DL (ref 3.5–5.2)
ALLENS TEST: ABNORMAL
ALP SERPL-CCNC: 119 U/L (ref 55–135)
ALT SERPL W/O P-5'-P-CCNC: 21 U/L (ref 10–44)
AMPHET+METHAMPHET UR QL: NEGATIVE
ANION GAP SERPL CALC-SCNC: 7 MMOL/L (ref 8–16)
AST SERPL-CCNC: 22 U/L (ref 10–40)
BARBITURATES UR QL SCN>200 NG/ML: NEGATIVE
BASOPHILS # BLD AUTO: 0.05 K/UL (ref 0–0.2)
BASOPHILS NFR BLD: 0.5 % (ref 0–1.9)
BENZODIAZ UR QL SCN>200 NG/ML: ABNORMAL
BILIRUB SERPL-MCNC: 0.3 MG/DL (ref 0.1–1)
BUN SERPL-MCNC: 18 MG/DL (ref 6–20)
BZE UR QL SCN: NEGATIVE
CALCIUM SERPL-MCNC: 9.1 MG/DL (ref 8.7–10.5)
CANNABINOIDS UR QL SCN: NEGATIVE
CHLORIDE SERPL-SCNC: 104 MMOL/L (ref 95–110)
CO2 SERPL-SCNC: 33 MMOL/L (ref 23–29)
CREAT SERPL-MCNC: 0.8 MG/DL (ref 0.5–1.4)
CREAT UR-MCNC: 57 MG/DL (ref 15–325)
DELSYS: ABNORMAL
DIFFERENTIAL METHOD: ABNORMAL
EOSINOPHIL # BLD AUTO: 0.3 K/UL (ref 0–0.5)
EOSINOPHIL NFR BLD: 2.4 % (ref 0–8)
ERYTHROCYTE [DISTWIDTH] IN BLOOD BY AUTOMATED COUNT: 15 % (ref 11.5–14.5)
EST. GFR  (NO RACE VARIABLE): >60 ML/MIN/1.73 M^2
ETHANOL UR-MCNC: <10 MG/DL
FIO2: 35
FLOW: 8
GLUCOSE SERPL-MCNC: 114 MG/DL (ref 70–110)
HCO3 UR-SCNC: 35.9 MMOL/L (ref 24–28)
HCT VFR BLD AUTO: 24.2 % (ref 37–48.5)
HGB BLD-MCNC: 7.1 G/DL (ref 12–16)
IMM GRANULOCYTES # BLD AUTO: 0.17 K/UL (ref 0–0.04)
IMM GRANULOCYTES NFR BLD AUTO: 1.6 % (ref 0–0.5)
LYMPHOCYTES # BLD AUTO: 3.2 K/UL (ref 1–4.8)
LYMPHOCYTES NFR BLD: 29.3 % (ref 18–48)
MAGNESIUM SERPL-MCNC: 2.3 MG/DL (ref 1.6–2.6)
MCH RBC QN AUTO: 30.1 PG (ref 27–31)
MCHC RBC AUTO-ENTMCNC: 29.3 G/DL (ref 32–36)
MCV RBC AUTO: 103 FL (ref 82–98)
METHADONE UR QL SCN>300 NG/ML: ABNORMAL
MODE: ABNORMAL
MONOCYTES # BLD AUTO: 0.9 K/UL (ref 0.3–1)
MONOCYTES NFR BLD: 8.5 % (ref 4–15)
NEUTROPHILS # BLD AUTO: 6.2 K/UL (ref 1.8–7.7)
NEUTROPHILS NFR BLD: 57.7 % (ref 38–73)
NRBC BLD-RTO: 0 /100 WBC
OPIATES UR QL SCN: NEGATIVE
PCO2 BLDA: 55.4 MMHG (ref 35–45)
PCP UR QL SCN>25 NG/ML: NEGATIVE
PH SMN: 7.42 [PH] (ref 7.35–7.45)
PHOSPHATE SERPL-MCNC: 3.8 MG/DL (ref 2.7–4.5)
PLATELET # BLD AUTO: 390 K/UL (ref 150–450)
PMV BLD AUTO: 10.3 FL (ref 9.2–12.9)
PO2 BLDA: 38 MMHG (ref 40–60)
POC BE: 11 MMOL/L
POC SATURATED O2: 71 % (ref 95–100)
POC TCO2: 38 MMOL/L (ref 24–29)
POCT GLUCOSE: 101 MG/DL (ref 70–110)
POCT GLUCOSE: 108 MG/DL (ref 70–110)
POCT GLUCOSE: 113 MG/DL (ref 70–110)
POTASSIUM SERPL-SCNC: 4.2 MMOL/L (ref 3.5–5.1)
PROT SERPL-MCNC: 6.7 G/DL (ref 6–8.4)
RBC # BLD AUTO: 2.36 M/UL (ref 4–5.4)
SAMPLE: ABNORMAL
SITE: ABNORMAL
SODIUM SERPL-SCNC: 144 MMOL/L (ref 136–145)
TOXICOLOGY INFORMATION: ABNORMAL
WBC # BLD AUTO: 10.8 K/UL (ref 3.9–12.7)

## 2022-11-20 PROCEDURE — 36415 COLL VENOUS BLD VENIPUNCTURE: CPT | Performed by: NURSE PRACTITIONER

## 2022-11-20 PROCEDURE — 94761 N-INVAS EAR/PLS OXIMETRY MLT: CPT

## 2022-11-20 PROCEDURE — 82800 BLOOD PH: CPT

## 2022-11-20 PROCEDURE — 63600175 PHARM REV CODE 636 W HCPCS: Performed by: PHYSICIAN ASSISTANT

## 2022-11-20 PROCEDURE — 80307 DRUG TEST PRSMV CHEM ANLYZR: CPT | Performed by: STUDENT IN AN ORGANIZED HEALTH CARE EDUCATION/TRAINING PROGRAM

## 2022-11-20 PROCEDURE — 27200966 HC CLOSED SUCTION SYSTEM

## 2022-11-20 PROCEDURE — 85025 COMPLETE CBC W/AUTO DIFF WBC: CPT | Performed by: NURSE PRACTITIONER

## 2022-11-20 PROCEDURE — 25000003 PHARM REV CODE 250

## 2022-11-20 PROCEDURE — S4991 NICOTINE PATCH NONLEGEND: HCPCS | Performed by: NURSE PRACTITIONER

## 2022-11-20 PROCEDURE — 82803 BLOOD GASES ANY COMBINATION: CPT

## 2022-11-20 PROCEDURE — 25000003 PHARM REV CODE 250: Performed by: NURSE PRACTITIONER

## 2022-11-20 PROCEDURE — 25000003 PHARM REV CODE 250: Performed by: PHYSICIAN ASSISTANT

## 2022-11-20 PROCEDURE — 99900035 HC TECH TIME PER 15 MIN (STAT)

## 2022-11-20 PROCEDURE — 25000003 PHARM REV CODE 250: Performed by: STUDENT IN AN ORGANIZED HEALTH CARE EDUCATION/TRAINING PROGRAM

## 2022-11-20 PROCEDURE — 83735 ASSAY OF MAGNESIUM: CPT | Performed by: NURSE PRACTITIONER

## 2022-11-20 PROCEDURE — 99233 PR SUBSEQUENT HOSPITAL CARE,LEVL III: ICD-10-PCS | Mod: ,,, | Performed by: STUDENT IN AN ORGANIZED HEALTH CARE EDUCATION/TRAINING PROGRAM

## 2022-11-20 PROCEDURE — 25000242 PHARM REV CODE 250 ALT 637 W/ HCPCS: Performed by: STUDENT IN AN ORGANIZED HEALTH CARE EDUCATION/TRAINING PROGRAM

## 2022-11-20 PROCEDURE — 27000221 HC OXYGEN, UP TO 24 HOURS

## 2022-11-20 PROCEDURE — 80053 COMPREHEN METABOLIC PANEL: CPT | Performed by: NURSE PRACTITIONER

## 2022-11-20 PROCEDURE — 11000001 HC ACUTE MED/SURG PRIVATE ROOM

## 2022-11-20 PROCEDURE — A4216 STERILE WATER/SALINE, 10 ML: HCPCS | Performed by: STUDENT IN AN ORGANIZED HEALTH CARE EDUCATION/TRAINING PROGRAM

## 2022-11-20 PROCEDURE — 94640 AIRWAY INHALATION TREATMENT: CPT

## 2022-11-20 PROCEDURE — 63600175 PHARM REV CODE 636 W HCPCS: Performed by: NURSE PRACTITIONER

## 2022-11-20 PROCEDURE — 99233 SBSQ HOSP IP/OBS HIGH 50: CPT | Mod: ,,, | Performed by: STUDENT IN AN ORGANIZED HEALTH CARE EDUCATION/TRAINING PROGRAM

## 2022-11-20 PROCEDURE — 84100 ASSAY OF PHOSPHORUS: CPT | Performed by: NURSE PRACTITIONER

## 2022-11-20 RX ORDER — METHADONE HYDROCHLORIDE 10 MG/1
80 TABLET ORAL DAILY
Status: DISCONTINUED | OUTPATIENT
Start: 2022-11-20 | End: 2022-11-21 | Stop reason: HOSPADM

## 2022-11-20 RX ADMIN — SENNOSIDES AND DOCUSATE SODIUM 1 TABLET: 50; 8.6 TABLET ORAL at 10:11

## 2022-11-20 RX ADMIN — HEPARIN SODIUM 5000 UNITS: 5000 INJECTION INTRAVENOUS; SUBCUTANEOUS at 01:11

## 2022-11-20 RX ADMIN — CEFEPIME 2 G: 2 INJECTION, POWDER, FOR SOLUTION INTRAVENOUS at 05:11

## 2022-11-20 RX ADMIN — Medication 10 ML: at 06:11

## 2022-11-20 RX ADMIN — IPRATROPIUM BROMIDE AND ALBUTEROL SULFATE 3 ML: 2.5; .5 SOLUTION RESPIRATORY (INHALATION) at 08:11

## 2022-11-20 RX ADMIN — FAMOTIDINE 20 MG: 20 TABLET ORAL at 10:11

## 2022-11-20 RX ADMIN — CEFEPIME 2 G: 2 INJECTION, POWDER, FOR SOLUTION INTRAVENOUS at 10:11

## 2022-11-20 RX ADMIN — PREGABALIN 200 MG: 150 CAPSULE ORAL at 10:11

## 2022-11-20 RX ADMIN — HEPARIN SODIUM 5000 UNITS: 5000 INJECTION INTRAVENOUS; SUBCUTANEOUS at 05:11

## 2022-11-20 RX ADMIN — Medication 10 ML: at 01:11

## 2022-11-20 RX ADMIN — Medication 10 ML: at 12:11

## 2022-11-20 RX ADMIN — PREGABALIN 200 MG: 150 CAPSULE ORAL at 09:11

## 2022-11-20 RX ADMIN — Medication 1 PATCH: at 09:11

## 2022-11-20 RX ADMIN — CETIRIZINE HYDROCHLORIDE 5 MG: 5 TABLET, FILM COATED ORAL at 09:11

## 2022-11-20 RX ADMIN — Medication 10 ML: at 05:11

## 2022-11-20 RX ADMIN — SILODOSIN 4 MG: 4 CAPSULE ORAL at 09:11

## 2022-11-20 RX ADMIN — CEFEPIME 2 G: 2 INJECTION, POWDER, FOR SOLUTION INTRAVENOUS at 01:11

## 2022-11-20 RX ADMIN — IPRATROPIUM BROMIDE AND ALBUTEROL SULFATE 3 ML: 2.5; .5 SOLUTION RESPIRATORY (INHALATION) at 01:11

## 2022-11-20 RX ADMIN — METHADONE HYDROCHLORIDE 80 MG: 10 TABLET ORAL at 04:11

## 2022-11-20 RX ADMIN — FAMOTIDINE 20 MG: 20 TABLET ORAL at 09:11

## 2022-11-20 RX ADMIN — POLYETHYLENE GLYCOL 3350 17 G: 17 POWDER, FOR SOLUTION ORAL at 09:11

## 2022-11-20 RX ADMIN — SENNOSIDES AND DOCUSATE SODIUM 1 TABLET: 50; 8.6 TABLET ORAL at 09:11

## 2022-11-20 RX ADMIN — HEPARIN SODIUM 5000 UNITS: 5000 INJECTION INTRAVENOUS; SUBCUTANEOUS at 10:11

## 2022-11-20 RX ADMIN — PREGABALIN 200 MG: 150 CAPSULE ORAL at 03:11

## 2022-11-20 NOTE — ASSESSMENT & PLAN NOTE
-methadone 90mg daily, home regimen  -multimodal pain regimen includes Tylenol, Robaxin, and Lyrica  -decrease sedating meds

## 2022-11-20 NOTE — SUBJECTIVE & OBJECTIVE
Interval History: Afebrile  Patient has been more sleepy today  Repeat MRI ordered    Review of Systems  Objective:     Vital Signs (Most Recent):  Temp: 97.5 °F (36.4 °C) (11/20/22 1141)  Pulse: 66 (11/20/22 1141)  Resp: 16 (11/20/22 1141)  BP: (!) 115/54 (11/20/22 1141)  SpO2: 96 % (11/20/22 1211)   Vital Signs (24h Range):  Temp:  [97.5 °F (36.4 °C)-98.9 °F (37.2 °C)] 97.5 °F (36.4 °C)  Pulse:  [66-87] 66  Resp:  [14-20] 16  SpO2:  [89 %-99 %] 96 %  BP: ()/(52-58) 115/54     Weight:  (daylight savings)  Body mass index is 22.6 kg/m².    Intake/Output Summary (Last 24 hours) at 11/20/2022 1223  Last data filed at 11/20/2022 0934  Gross per 24 hour   Intake 2477.67 ml   Output 1701 ml   Net 776.67 ml      Physical Exam  Vitals and nursing note reviewed.   Constitutional:       General: She is not in acute distress.  HENT:      Head: Normocephalic.      Nose: Nose normal.      Mouth/Throat:      Mouth: Mucous membranes are moist.      Pharynx: Oropharynx is clear.   Neck:      Comments: Trach collar   Cardiovascular:      Rate and Rhythm: Normal rate and regular rhythm.      Pulses: Normal pulses.      Heart sounds: Normal heart sounds.   Pulmonary:      Effort: Pulmonary effort is normal.      Breath sounds: Normal breath sounds.   Abdominal:      General: Bowel sounds are normal.      Palpations: Abdomen is soft.   Musculoskeletal:         General: Normal range of motion.   Skin:     General: Skin is warm and dry.   Neurological:      Comments:      Psychiatric:         Mood and Affect: Mood normal.         Behavior: Behavior normal.       MELD-Na score: 6 at 11/3/2022  1:47 AM  MELD score: 6 at 11/3/2022  1:47 AM  Calculated from:  Serum Creatinine: 0.7 mg/dL (Using min of 1 mg/dL) at 11/3/2022  1:47 AM  Serum Sodium: 141 mmol/L (Using max of 137 mmol/L) at 11/3/2022  1:47 AM  Total Bilirubin: 0.4 mg/dL (Using min of 1 mg/dL) at 11/3/2022  1:47 AM  INR(ratio): 1.0 at 11/1/2022  9:28 PM  Age: 51  years    Significant Labs:  CBC:  Recent Labs   Lab 11/19/22  0551 11/20/22  0231   WBC 10.76 10.80   HGB 7.1* 7.1*   HCT 23.7* 24.2*    390     CMP:  Recent Labs   Lab 11/19/22  0551 11/20/22  0231    144   K 4.0 4.2    104   CO2 29 33*   GLU 98 114*   BUN 16 18   CREATININE 0.8 0.8   CALCIUM 9.3 9.1   PROT 6.5 6.7   ALBUMIN 1.9* 1.9*   BILITOT 0.3 0.3   ALKPHOS 135 119   AST 38 22   ALT 33 21   ANIONGAP 10 7*     PTINR:  No results for input(s): INR in the last 48 hours.    Significant Procedures:   Dobutamine Stress Test with Color Flow: No results found for this or any previous visit.

## 2022-11-20 NOTE — PLAN OF CARE
Problem: Adult Inpatient Plan of Care  Goal: Plan of Care Review  Outcome: Ongoing, Progressing  Goal: Patient-Specific Goal (Individualized)  Description: Admit Date: 10/25/2022    Osteomyelitis of cervical spine    Past Medical History:  No date: CHF (congestive heart failure)  No date: Essential (primary) hypertension  No date: Opioid dependence on maintenance agonist therapy, no symptoms  No date: Smoking    Past Surgical History:  No date: APPENDECTOMY  No date:  SECTION      Comment:  x2  2022: FUSION OF CERVICAL SPINE BY POSTERIOR APPROACH; N/A      Comment:  Procedure: FUSION, SPINE, CERVICAL, POSTERIOR APPROACH                C2-T2 posterior decompression/fusion; Depuy,                neuromonitoring Marrero, gell rolls;  Surgeon: West Merino DO;  [facility];  Service:                Neurosurgery;  Laterality: N/A;  No date: HYSTERECTOMY  10/28/2022: SURGICAL REMOVAL OF VERTEBRAL BODY OF CERVICAL SPINE; N/A      Comment:  Procedure: CORPECTOMY, SPINE, CERVICAL;  Surgeon:                West Merino DO;  Location: [facility] OR [facility];                 Service: Neurosurgery;  Laterality: N/A;  anterior C5-6                corpectomy, globus, caspar head prado and tongs,                omnipaque, 15lbs weights, microscrope, round cutting lynsey               and M8, ENT assistance for exposure    Individualization:   1.     Restraints:   Restraint Order  Length of Order: Order good for next 24 hours or when removed.  Date that the current order will : 22  Time that the current order will : 010  Order Upon Application: Yes          Outcome: Ongoing, Progressing  Goal: Absence of Hospital-Acquired Illness or Injury  Outcome: Ongoing, Progressing  Goal: Optimal Comfort and Wellbeing  Outcome: Ongoing, Progressing  Goal: Readiness for Transition of Care  Outcome: Ongoing, Progressing     Problem: Fall Injury Risk  Goal: Absence of Fall and  Fall-Related Injury  Outcome: Ongoing, Progressing     Problem: Skin Injury Risk Increased  Goal: Skin Health and Integrity  Outcome: Ongoing, Progressing     Problem: Pain Acute  Goal: Acceptable Pain Control and Functional Ability  Outcome: Ongoing, Progressing
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
## 2022-11-21 VITALS
HEIGHT: 66 IN | WEIGHT: 140 LBS | DIASTOLIC BLOOD PRESSURE: 59 MMHG | TEMPERATURE: 98 F | BODY MASS INDEX: 22.5 KG/M2 | OXYGEN SATURATION: 99 % | HEART RATE: 64 BPM | SYSTOLIC BLOOD PRESSURE: 120 MMHG | RESPIRATION RATE: 20 BRPM

## 2022-11-21 LAB
ALBUMIN SERPL BCP-MCNC: 1.9 G/DL (ref 3.5–5.2)
ALP SERPL-CCNC: 112 U/L (ref 55–135)
ALT SERPL W/O P-5'-P-CCNC: 17 U/L (ref 10–44)
ANION GAP SERPL CALC-SCNC: 9 MMOL/L (ref 8–16)
AST SERPL-CCNC: 20 U/L (ref 10–40)
BACTERIA SPEC AEROBE CULT: NO GROWTH
BASOPHILS # BLD AUTO: 0.03 K/UL (ref 0–0.2)
BASOPHILS NFR BLD: 0.3 % (ref 0–1.9)
BILIRUB SERPL-MCNC: 0.3 MG/DL (ref 0.1–1)
BUN SERPL-MCNC: 19 MG/DL (ref 6–20)
CALCIUM SERPL-MCNC: 9.4 MG/DL (ref 8.7–10.5)
CHLORIDE SERPL-SCNC: 101 MMOL/L (ref 95–110)
CO2 SERPL-SCNC: 33 MMOL/L (ref 23–29)
CREAT SERPL-MCNC: 0.8 MG/DL (ref 0.5–1.4)
DIFFERENTIAL METHOD: ABNORMAL
EOSINOPHIL # BLD AUTO: 0.4 K/UL (ref 0–0.5)
EOSINOPHIL NFR BLD: 3.1 % (ref 0–8)
ERYTHROCYTE [DISTWIDTH] IN BLOOD BY AUTOMATED COUNT: 14.6 % (ref 11.5–14.5)
EST. GFR  (NO RACE VARIABLE): >60 ML/MIN/1.73 M^2
GLUCOSE SERPL-MCNC: 97 MG/DL (ref 70–110)
GRAM STN SPEC: NORMAL
HCT VFR BLD AUTO: 23.3 % (ref 37–48.5)
HGB BLD-MCNC: 7 G/DL (ref 12–16)
IMM GRANULOCYTES # BLD AUTO: 0.26 K/UL (ref 0–0.04)
IMM GRANULOCYTES NFR BLD AUTO: 2.3 % (ref 0–0.5)
LYMPHOCYTES # BLD AUTO: 3.3 K/UL (ref 1–4.8)
LYMPHOCYTES NFR BLD: 29.5 % (ref 18–48)
MAGNESIUM SERPL-MCNC: 2.3 MG/DL (ref 1.6–2.6)
MCH RBC QN AUTO: 29.5 PG (ref 27–31)
MCHC RBC AUTO-ENTMCNC: 30 G/DL (ref 32–36)
MCV RBC AUTO: 98 FL (ref 82–98)
MONOCYTES # BLD AUTO: 1 K/UL (ref 0.3–1)
MONOCYTES NFR BLD: 9.1 % (ref 4–15)
NEUTROPHILS # BLD AUTO: 6.3 K/UL (ref 1.8–7.7)
NEUTROPHILS NFR BLD: 55.7 % (ref 38–73)
NRBC BLD-RTO: 0 /100 WBC
PHOSPHATE SERPL-MCNC: 4.1 MG/DL (ref 2.7–4.5)
PLATELET # BLD AUTO: 399 K/UL (ref 150–450)
PMV BLD AUTO: 10.4 FL (ref 9.2–12.9)
POCT GLUCOSE: 99 MG/DL (ref 70–110)
POTASSIUM SERPL-SCNC: 4.2 MMOL/L (ref 3.5–5.1)
PROT SERPL-MCNC: 6.4 G/DL (ref 6–8.4)
RBC # BLD AUTO: 2.37 M/UL (ref 4–5.4)
SODIUM SERPL-SCNC: 143 MMOL/L (ref 136–145)
WBC # BLD AUTO: 11.27 K/UL (ref 3.9–12.7)

## 2022-11-21 PROCEDURE — 25000003 PHARM REV CODE 250: Performed by: NURSE PRACTITIONER

## 2022-11-21 PROCEDURE — 25000003 PHARM REV CODE 250: Performed by: PHYSICIAN ASSISTANT

## 2022-11-21 PROCEDURE — 25000003 PHARM REV CODE 250: Performed by: STUDENT IN AN ORGANIZED HEALTH CARE EDUCATION/TRAINING PROGRAM

## 2022-11-21 PROCEDURE — 80053 COMPREHEN METABOLIC PANEL: CPT | Performed by: NURSE PRACTITIONER

## 2022-11-21 PROCEDURE — 63600175 PHARM REV CODE 636 W HCPCS: Performed by: NURSE PRACTITIONER

## 2022-11-21 PROCEDURE — 99239 PR HOSPITAL DISCHARGE DAY,>30 MIN: ICD-10-PCS | Mod: ,,, | Performed by: STUDENT IN AN ORGANIZED HEALTH CARE EDUCATION/TRAINING PROGRAM

## 2022-11-21 PROCEDURE — 99239 HOSP IP/OBS DSCHRG MGMT >30: CPT | Mod: ,,, | Performed by: STUDENT IN AN ORGANIZED HEALTH CARE EDUCATION/TRAINING PROGRAM

## 2022-11-21 PROCEDURE — 99900026 HC AIRWAY MAINTENANCE (STAT)

## 2022-11-21 PROCEDURE — 99024 POSTOP FOLLOW-UP VISIT: CPT | Mod: POP,,, | Performed by: PHYSICIAN ASSISTANT

## 2022-11-21 PROCEDURE — A4216 STERILE WATER/SALINE, 10 ML: HCPCS | Performed by: STUDENT IN AN ORGANIZED HEALTH CARE EDUCATION/TRAINING PROGRAM

## 2022-11-21 PROCEDURE — 99024 PR POST-OP FOLLOW-UP VISIT: ICD-10-PCS | Mod: POP,,, | Performed by: PHYSICIAN ASSISTANT

## 2022-11-21 PROCEDURE — 27200966 HC CLOSED SUCTION SYSTEM

## 2022-11-21 PROCEDURE — 99233 SBSQ HOSP IP/OBS HIGH 50: CPT | Mod: ,,, | Performed by: NURSE PRACTITIONER

## 2022-11-21 PROCEDURE — 94761 N-INVAS EAR/PLS OXIMETRY MLT: CPT

## 2022-11-21 PROCEDURE — 63600175 PHARM REV CODE 636 W HCPCS: Performed by: PHYSICIAN ASSISTANT

## 2022-11-21 PROCEDURE — 99900035 HC TECH TIME PER 15 MIN (STAT)

## 2022-11-21 PROCEDURE — 25000003 PHARM REV CODE 250

## 2022-11-21 PROCEDURE — 85025 COMPLETE CBC W/AUTO DIFF WBC: CPT | Performed by: NURSE PRACTITIONER

## 2022-11-21 PROCEDURE — 94640 AIRWAY INHALATION TREATMENT: CPT

## 2022-11-21 PROCEDURE — 84100 ASSAY OF PHOSPHORUS: CPT | Performed by: NURSE PRACTITIONER

## 2022-11-21 PROCEDURE — 99233 PR SUBSEQUENT HOSPITAL CARE,LEVL III: ICD-10-PCS | Mod: ,,, | Performed by: NURSE PRACTITIONER

## 2022-11-21 PROCEDURE — 27000221 HC OXYGEN, UP TO 24 HOURS

## 2022-11-21 PROCEDURE — 36415 COLL VENOUS BLD VENIPUNCTURE: CPT | Performed by: NURSE PRACTITIONER

## 2022-11-21 PROCEDURE — 25000242 PHARM REV CODE 250 ALT 637 W/ HCPCS: Performed by: STUDENT IN AN ORGANIZED HEALTH CARE EDUCATION/TRAINING PROGRAM

## 2022-11-21 PROCEDURE — 83735 ASSAY OF MAGNESIUM: CPT | Performed by: NURSE PRACTITIONER

## 2022-11-21 PROCEDURE — S4991 NICOTINE PATCH NONLEGEND: HCPCS | Performed by: NURSE PRACTITIONER

## 2022-11-21 RX ORDER — CETIRIZINE HYDROCHLORIDE 5 MG/1
5 TABLET ORAL DAILY
Status: CANCELLED | OUTPATIENT
Start: 2022-11-23

## 2022-11-21 RX ORDER — SODIUM CHLORIDE 0.9 % (FLUSH) 0.9 %
10 SYRINGE (ML) INJECTION
Status: CANCELLED | OUTPATIENT
Start: 2022-11-21

## 2022-11-21 RX ORDER — HYDROXYZINE HYDROCHLORIDE 25 MG/1
25 TABLET, FILM COATED ORAL 3 TIMES DAILY PRN
Status: CANCELLED | OUTPATIENT
Start: 2022-11-21

## 2022-11-21 RX ORDER — IPRATROPIUM BROMIDE AND ALBUTEROL SULFATE 2.5; .5 MG/3ML; MG/3ML
3 SOLUTION RESPIRATORY (INHALATION)
Status: CANCELLED | OUTPATIENT
Start: 2022-11-21

## 2022-11-21 RX ORDER — BISACODYL 10 MG
10 SUPPOSITORY, RECTAL RECTAL DAILY PRN
Status: CANCELLED | OUTPATIENT
Start: 2022-11-21

## 2022-11-21 RX ORDER — BALSAM PERU/CASTOR OIL
OINTMENT (GRAM) TOPICAL 2 TIMES DAILY
Status: CANCELLED | OUTPATIENT
Start: 2022-11-21

## 2022-11-21 RX ORDER — POLYETHYLENE GLYCOL 3350 17 G/17G
17 POWDER, FOR SOLUTION ORAL DAILY
Status: CANCELLED | OUTPATIENT
Start: 2022-11-22

## 2022-11-21 RX ORDER — BALSAM PERU/CASTOR OIL
OINTMENT (GRAM) TOPICAL 2 TIMES DAILY
Status: DISCONTINUED | OUTPATIENT
Start: 2022-11-21 | End: 2022-11-21 | Stop reason: HOSPADM

## 2022-11-21 RX ORDER — ONDANSETRON 2 MG/ML
4 INJECTION INTRAMUSCULAR; INTRAVENOUS EVERY 8 HOURS PRN
Status: CANCELLED | OUTPATIENT
Start: 2022-11-21

## 2022-11-21 RX ORDER — HYDRALAZINE HYDROCHLORIDE 20 MG/ML
10 INJECTION INTRAMUSCULAR; INTRAVENOUS EVERY 4 HOURS PRN
Status: CANCELLED | OUTPATIENT
Start: 2022-11-21

## 2022-11-21 RX ORDER — IBUPROFEN 200 MG
1 TABLET ORAL DAILY
Status: CANCELLED | OUTPATIENT
Start: 2022-11-23

## 2022-11-21 RX ORDER — SODIUM CHLORIDE 0.9 % (FLUSH) 0.9 %
10 SYRINGE (ML) INJECTION EVERY 6 HOURS
Status: CANCELLED | OUTPATIENT
Start: 2022-11-21

## 2022-11-21 RX ORDER — GLUCAGON 1 MG
1 KIT INJECTION
Status: CANCELLED | OUTPATIENT
Start: 2022-11-21

## 2022-11-21 RX ORDER — METHADONE HYDROCHLORIDE 10 MG/1
80 TABLET ORAL DAILY
Status: CANCELLED | OUTPATIENT
Start: 2022-11-21

## 2022-11-21 RX ORDER — ACETAMINOPHEN 325 MG/1
650 TABLET ORAL EVERY 6 HOURS PRN
Status: CANCELLED | OUTPATIENT
Start: 2022-11-21

## 2022-11-21 RX ORDER — SODIUM CHLORIDE FOR INHALATION 3 %
4 VIAL, NEBULIZER (ML) INHALATION EVERY 6 HOURS PRN
Status: CANCELLED | OUTPATIENT
Start: 2022-11-21

## 2022-11-21 RX ORDER — HEPARIN SODIUM 5000 [USP'U]/ML
5000 INJECTION, SOLUTION INTRAVENOUS; SUBCUTANEOUS EVERY 8 HOURS
Status: CANCELLED | OUTPATIENT
Start: 2022-11-21

## 2022-11-21 RX ORDER — AMOXICILLIN 250 MG
1 CAPSULE ORAL 2 TIMES DAILY
Status: CANCELLED | OUTPATIENT
Start: 2022-11-21

## 2022-11-21 RX ORDER — FAMOTIDINE 20 MG/1
20 TABLET, FILM COATED ORAL 2 TIMES DAILY
Status: CANCELLED | OUTPATIENT
Start: 2022-11-21

## 2022-11-21 RX ORDER — SILODOSIN 4 MG/1
4 CAPSULE ORAL DAILY
Status: CANCELLED | OUTPATIENT
Start: 2022-11-23

## 2022-11-21 RX ORDER — CEFEPIME HYDROCHLORIDE 1 G/50ML
2 INJECTION, SOLUTION INTRAVENOUS
Status: CANCELLED | OUTPATIENT
Start: 2022-11-21

## 2022-11-21 RX ORDER — IPRATROPIUM BROMIDE AND ALBUTEROL SULFATE 2.5; .5 MG/3ML; MG/3ML
3 SOLUTION RESPIRATORY (INHALATION) EVERY 4 HOURS PRN
Status: CANCELLED | OUTPATIENT
Start: 2022-11-21

## 2022-11-21 RX ORDER — SCOLOPAMINE TRANSDERMAL SYSTEM 1 MG/1
1 PATCH, EXTENDED RELEASE TRANSDERMAL
Status: CANCELLED | OUTPATIENT
Start: 2022-11-22

## 2022-11-21 RX ORDER — SIMETHICONE 80 MG
1 TABLET,CHEWABLE ORAL 3 TIMES DAILY PRN
Status: CANCELLED | OUTPATIENT
Start: 2022-11-21

## 2022-11-21 RX ADMIN — Medication 10 ML: at 12:11

## 2022-11-21 RX ADMIN — Medication 1 PATCH: at 10:11

## 2022-11-21 RX ADMIN — HEPARIN SODIUM 5000 UNITS: 5000 INJECTION INTRAVENOUS; SUBCUTANEOUS at 02:11

## 2022-11-21 RX ADMIN — CETIRIZINE HYDROCHLORIDE 5 MG: 5 TABLET, FILM COATED ORAL at 10:11

## 2022-11-21 RX ADMIN — Medication 10 ML: at 06:11

## 2022-11-21 RX ADMIN — CEFEPIME 2 G: 2 INJECTION, POWDER, FOR SOLUTION INTRAVENOUS at 06:11

## 2022-11-21 RX ADMIN — POLYETHYLENE GLYCOL 3350 17 G: 17 POWDER, FOR SOLUTION ORAL at 10:11

## 2022-11-21 RX ADMIN — SILODOSIN 4 MG: 4 CAPSULE ORAL at 10:11

## 2022-11-21 RX ADMIN — Medication 1 APPLICATION: at 12:11

## 2022-11-21 RX ADMIN — IPRATROPIUM BROMIDE AND ALBUTEROL SULFATE 3 ML: 2.5; .5 SOLUTION RESPIRATORY (INHALATION) at 02:11

## 2022-11-21 RX ADMIN — HEPARIN SODIUM 5000 UNITS: 5000 INJECTION INTRAVENOUS; SUBCUTANEOUS at 06:11

## 2022-11-21 RX ADMIN — FAMOTIDINE 20 MG: 20 TABLET ORAL at 10:11

## 2022-11-21 RX ADMIN — PREGABALIN 200 MG: 150 CAPSULE ORAL at 10:11

## 2022-11-21 RX ADMIN — SENNOSIDES AND DOCUSATE SODIUM 1 TABLET: 50; 8.6 TABLET ORAL at 10:11

## 2022-11-21 RX ADMIN — CEFEPIME 2 G: 2 INJECTION, POWDER, FOR SOLUTION INTRAVENOUS at 02:11

## 2022-11-21 RX ADMIN — METHADONE HYDROCHLORIDE 80 MG: 10 TABLET ORAL at 02:11

## 2022-11-21 RX ADMIN — IPRATROPIUM BROMIDE AND ALBUTEROL SULFATE 3 ML: 2.5; .5 SOLUTION RESPIRATORY (INHALATION) at 07:11

## 2022-11-21 RX ADMIN — PREGABALIN 200 MG: 150 CAPSULE ORAL at 02:11

## 2022-11-21 NOTE — PT/OT/SLP PROGRESS
Physical Therapy      Patient Name:  Peter Stiles   MRN:  [id number]    Patient not seen today (1st attempt) secondary to Nursing care. PT will continue to follow.

## 2022-11-21 NOTE — PLAN OF CARE
Khanh met with pt and family member.  Advised pt she will discharge to [facility] today.  KHANH notified pt's daughter, Aria [phone], via voice message of discharge today.  Pt agreeable.      KHANH scheduled d/c transportation to [facility] through [facility]. Patient is scheduled to be picked up at 3:30p.m.. KHANH provided patient's nurse with report number [phone]; ask for the nurse for room 216.  KHANH is in communication with patient's CM and patient's Care Team.  Pt will transport via stretcher/8 liters.       11/21/22 1420   Post-Acute Status   Post-Acute Authorization Placement   Post-Acute Placement Status Set-up Complete/Auth obtained   Coverage Medicare A & B   Discharge Delays None known at this time   Discharge Plan   Discharge Plan A Long-term acute care facility (LTAC)   Discharge Plan B Skilled Nursing Facility     Romina Jj LMSW  PRN-  [facility]  Ext. [phone]

## 2022-11-21 NOTE — PLAN OF CARE
Patient being discharged to [facility] today. VSS, Currently on 8L Trach Collar. Pt denies any c/o of distress or discomfort at this time. MICHELLE. Report called to LESLEE Fuentes at 1600 PM. Belongings all packed up at bedside. Patient educated on transition of care plans, verbalized understanding. Awaiting transport pickup.     Problem: Adult Inpatient Plan of Care  Goal: Plan of Care Review  Outcome: Adequate for Care Transition  Goal: Patient-Specific Goal (Individualized)  Description: Admit Date: 10/25/2022    Osteomyelitis of cervical spine    Past Medical History:  No date: CHF (congestive heart failure)  No date: Essential (primary) hypertension  No date: Opioid dependence on maintenance agonist therapy, no symptoms  No date: Smoking    Past Surgical History:  No date: APPENDECTOMY  No date:  SECTION      Comment:  x2  2022: FUSION OF CERVICAL SPINE BY POSTERIOR APPROACH; N/A      Comment:  Procedure: FUSION, SPINE, CERVICAL, POSTERIOR APPROACH                C2-T2 posterior decompression/fusion; Depuy,                neuromonitoring monica Marrero;  Surgeon: West Merino DO;  Location: [facility] OR [address] [place] [address];  Service:                Neurosurgery;  Laterality: N/A;  No date: HYSTERECTOMY  10/28/2022: SURGICAL REMOVAL OF VERTEBRAL BODY OF CERVICAL SPINE; N/A      Comment:  Procedure: CORPECTOMY, SPINE, CERVICAL;  Surgeon:                West Merino DO;  Location: [facility] OR [address] [place] [address];                 Service: Neurosurgery;  Laterality: N/A;  anterior C5-6                corpectomy, globus, caspar head prado and tongs,                omnipaque, 15lbs weights, microscrope, round cutting lynsey               and M8, ENT assistance for exposure    Individualization:   1.     Restraints:   Restraint Order  Length of Order: Order good for next 24 hours or when removed.  Date that the current order will : 22  Time that the current order will : 0104  Order Upon  Application: Yes          Outcome: Adequate for Care Transition  Goal: Absence of Hospital-Acquired Illness or Injury  Outcome: Adequate for Care Transition  Goal: Optimal Comfort and Wellbeing  Outcome: Adequate for Care Transition  Goal: Readiness for Transition of Care  Outcome: Adequate for Care Transition     Problem: Fall Injury Risk  Goal: Absence of Fall and Fall-Related Injury  Outcome: Adequate for Care Transition     Problem: Skin Injury Risk Increased  Goal: Skin Health and Integrity  Outcome: Adequate for Care Transition     Problem: Pain Acute  Goal: Acceptable Pain Control and Functional Ability  Outcome: Adequate for Care Transition     Problem: Autonomic Dysreflexia (Spinal Cord Injury)  Goal: Effective Autonomic Dysreflexia Prevention  Outcome: Adequate for Care Transition     Problem: Bowel Elimination Impaired (Spinal Cord Injury)  Goal: Effective Bowel Elimination  Outcome: Adequate for Care Transition     Problem: Extended Neurologic Impairment (Spinal Cord Injury)  Goal: Absence of Extended Neurologic Injury  Outcome: Adequate for Care Transition     Problem: Functional Ability Impaired (Spinal Cord Injury)  Goal: Optimal Functional Ability  Outcome: Adequate for Care Transition     Problem: Neurogenic Shock (Spinal Cord Injury)  Goal: Effective Tissue Perfusion  Outcome: Adequate for Care Transition     Problem: Nutrition Impaired (Spinal Cord Injury)  Goal: Optimal Nutrition Intake  Outcome: Adequate for Care Transition     Problem: Infection  Goal: Absence of Infection Signs and Symptoms  Outcome: Adequate for Care Transition     Problem: Communication Impairment (Artificial Airway)  Goal: Effective Communication  Outcome: Adequate for Care Transition     Problem: Device-Related Complication Risk (Artificial Airway)  Goal: Optimal Device Function  Outcome: Adequate for Care Transition     Problem: Skin and Tissue Injury (Artificial Airway)  Goal: Absence of Device-Related Skin or Tissue  Injury  Outcome: Adequate for Care Transition     Problem: Noninvasive Ventilation Acute  Goal: Effective Unassisted Ventilation and Oxygenation  Outcome: Adequate for Care Transition     Problem: Adjustment to Illness (Delirium)  Goal: Optimal Coping  Outcome: Adequate for Care Transition     Problem: Sleep Disturbance (Delirium)  Goal: Improved Sleep  Outcome: Adequate for Care Transition     Problem: Adjustment to Illness (Sepsis/Septic Shock)  Goal: Optimal Coping  Outcome: Adequate for Care Transition     Problem: Bleeding (Sepsis/Septic Shock)  Goal: Absence of Bleeding  Outcome: Adequate for Care Transition     Problem: Glycemic Control Impaired (Sepsis/Septic Shock)  Goal: Blood Glucose Level Within Desired Range  Outcome: Adequate for Care Transition     Problem: Infection Progression (Sepsis/Septic Shock)  Goal: Absence of Infection Signs and Symptoms  Outcome: Adequate for Care Transition     Problem: Nutrition Impaired (Sepsis/Septic Shock)  Goal: Optimal Nutrition Intake  Outcome: Adequate for Care Transition
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
## 2022-11-21 NOTE — SUBJECTIVE & OBJECTIVE
Interval History: T max 100.  No leukocytosis  Repeat blood cx (11/17): remain NGTD  Respiratory cx of 11/19 - no growth  No significant complaints today. Pain controlled.  For tsfr to LTAC      Review of Systems   Constitutional:  Negative for chills, diaphoresis, fatigue and fever.   HENT:          Tracheostomy.      Respiratory:  Positive for cough (decreased). Negative for shortness of breath.    Cardiovascular:  Negative for chest pain, palpitations and leg swelling.   Gastrointestinal:  Negative for diarrhea, nausea and vomiting.   Genitourinary:  Negative for difficulty urinating and dysuria.   Musculoskeletal:  Positive for neck pain. Negative for arthralgias, back pain and myalgias.   Skin:  Negative for color change, rash and wound.   Neurological:  Positive for speech difficulty (trach with o2 collar. communicates with white board and yes/no answers). Negative for dizziness, weakness and numbness.   Psychiatric/Behavioral:  Negative for agitation and confusion.    Objective:     Vital Signs (Most Recent):  Temp: 98.4 °F (36.9 °C) (11/21/22 1452)  Pulse: 64 (11/21/22 1452)  Resp: 20 (11/21/22 1448)  BP: (!) 120/59 (11/21/22 1452)  SpO2: 99 % (11/21/22 1452) Vital Signs (24h Range):  Temp:  [97.7 °F (36.5 °C)-100 °F (37.8 °C)] 98.4 °F (36.9 °C)  Pulse:  [63-80] 64  Resp:  [16-20] 20  SpO2:  [90 %-99 %] 99 %  BP: (109-120)/(56-61) 120/59     Weight:  (daylight savings)  Body mass index is 22.6 kg/m².    Estimated Creatinine Clearance: 76.1 mL/min (based on SCr of 0.8 mg/dL).    Physical Exam  Vitals and nursing note reviewed.   Constitutional:       General: She is not in acute distress.     Appearance: She is not ill-appearing, toxic-appearing or diaphoretic.      Comments:      HENT:      Head: Normocephalic and atraumatic.      Nose:      Comments: NG tube       Mouth/Throat:      Comments: Poor dentition    Eyes:      General: No scleral icterus.     Conjunctiva/sclera: Conjunctivae normal.   Neck:       Comments: Cervical collar off   Trach - no significant secretions noted  Cardiovascular:      Rate and Rhythm: Normal rate and regular rhythm.      Heart sounds: No murmur heard.  Pulmonary:      Effort: Pulmonary effort is normal. No respiratory distress.      Breath sounds: No stridor. No wheezing.      Comments: Clear anteriorly     Abdominal:      General: There is no distension.      Palpations: Abdomen is soft.      Tenderness: There is no abdominal tenderness. There is no guarding.   Musculoskeletal:      Right lower leg: No edema.      Left lower leg: No edema.   Skin:     General: Skin is warm and dry.      Comments: PICC RUE - c/d/i.    Neurological:      Mental Status: She is alert. Mental status is at baseline.   Psychiatric:         Behavior: Behavior normal.       Significant Labs: All pertinent labs within the past 24 hours have been reviewed.    Significant Imaging: I have reviewed all pertinent imaging results/findings within the past 24 hours.

## 2022-11-21 NOTE — PT/OT/SLP PROGRESS
Physical Therapy      Patient Name:  Peter Stiles   MRN:  [id number]    Patient not seen today.  Patient refused -- reporting fatigue and respiratory difficulty.  PT will continue to follow.
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
## 2022-11-21 NOTE — RESPIRATORY THERAPY
RAPID RESPONSE RESPIRATORY THERAPY PROACTIVE NOTE           Time of visit: 841     Code Status: Full Code   : 1970  Bed: 952/952 A:   MRN: [id number]  Time spent at the bedside: < 15 min    SITUATION    Evaluated patient for: LDA Check     BACKGROUND    Patient has a past medical history of CHF (congestive heart failure), Essential (primary) hypertension, Opioid dependence on maintenance agonist therapy, no symptoms, and Smoking.  Clinically Significant Surgical Hx: tracheostomy    24 Hours Vitals Range:  Temp:  [97.5 °F (36.4 °C)-98.9 °F (37.2 °C)]   Pulse:  [60-79]   Resp:  [16-19]   BP: (109-119)/(54-61)   SpO2:  [90 %-99 %]     Labs:    Recent Labs     22  0551 22  0231 22  0520    144 143   K 4.0 4.2 4.2    104 101   CO2 29 33* 33*   CREATININE 0.8 0.8 0.8   GLU 98 114* 97   PHOS 3.8 3.8 4.1   MG 2.2 2.3 2.3        Recent Labs     22  1411   PH 7.420   PCO2 55.4*   PO2 38*   HCO3 35.9*   POCSATURATED 71*   BE 11       ASSESSMENT/INTERVENTIONS  Pt in bed resting, no concern voiced during visit. Trach clean and secure, all supplies at bedside.      Last VS   Temp: 98.9 °F (37.2 °C) (738)  Pulse: 79 (759)  Resp: 18 (759)  BP: 109/56 (738)  SpO2: 99 % (759)      Extra trachs at bedside: 6.0 & 4.0 Shiley Cuffed  Level of Consciousness: Level of Consciousness (AVPU): alert  Respiratory Effort: Respiratory Effort: Normal, Unlabored Expansion/Accessory Muscle Usage: Expansion/Accessory Muscles/Retractions: expansion symmetric, no retractions, no use of accessory muscles  All Lung Field Breath Sounds: All Lung Fields Breath Sounds: Anterior:, Lateral:, diminished  NANETTE Breath Sounds: Anterior:, equal bilaterally, diminished  LLL Breath Sounds: coarse  RLL Breath Sounds: coarse  O2 Device/Concentration: trach collar 8L/35%  Surgical airway: Yes, Type: Shiley Size: 6, uncuffed  Ambu at bedside: Ambu bag with the patient?: Yes, Adult Ambu      Active Orders   Respiratory Care    Inhalation Treatment Once     Frequency: Once     Number of Occurrences: 1 Occurrences    Inhalation Treatment Q4H PRN     Frequency: Q4H PRN     Number of Occurrences: Until Specified    Inhalation Treatment Q6H WAKE     Frequency: Q6H WAKE     Number of Occurrences: Until Specified    Oxygen Continuous     Frequency: Continuous     Number of Occurrences: Until Specified     Order Questions:      Device type: Low flow      Device: Trach Collar      FiO2%: 28      Titrate O2 per Oxygen Titration Protocol: Yes      To maintain SpO2 goal of: >= 90%      Notify MD of: Inability to achieve desired SpO2; Sudden change in patient status and requires 20% increase in FiO2; Patient requires >60% FiO2    POCT ARTERIAL BLOOD GAS Blood Gas     Frequency: PRN     Number of Occurrences: Until Specified     Order Comments: Notify Physician if: see parameters below.       Order Questions:      Component: Blood Gas    Trach care every 12 hours and as needed     Frequency: BID     Number of Occurrences: Until Specified    Trach: Assess suction need every 2 hours and as needed     Frequency: BID     Number of Occurrences: Until Specified       RECOMMENDATIONS    We recommend: RRT Recs: Continue POC per primary team.      FOLLOW-UP    Please call back the Rapid Response RT, Steve Madrid RRT at x [phone] for any questions or concerns.
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
## 2022-11-21 NOTE — SUBJECTIVE & OBJECTIVE
Interval History: NAEON. Neuro stable. Pending discharge to LTAC today.     Medications:  Continuous Infusions:  Scheduled Meds:   albuterol-ipratropium  3 mL Nebulization Q6H WAKE    balsam peru-castor oiL   Topical (Top) BID    ceFEPime (MAXIPIME) IVPB  2 g Intravenous Q8H    cetirizine  5 mg Per NG tube Daily    famotidine  20 mg Per NG tube BID    heparin (porcine)  5,000 Units Subcutaneous Q8H    methadone  80 mg Per NG tube Daily    nicotine  1 patch Transdermal Daily    polyethylene glycol  17 g Per NG tube Daily    pregabalin  200 mg Per NG tube TID    scopolamine  1 patch Transdermal Q3 Days    senna-docusate 8.6-50 mg  1 tablet Per NG tube BID    silodosin  4 mg Per NG tube Daily    sodium chloride 0.9%  10 mL Intravenous Q6H     PRN Meds:acetaminophen, albuterol-ipratropium, benzocaine, bisacodyL, dextrose 10%, dextrose 10%, glucagon (human recombinant), hydrALAZINE, hydrOXYzine HCL, ondansetron, racepinephrine, simethicone, sodium chloride 0.9%, Flushing PICC Protocol **AND** sodium chloride 0.9% **AND** sodium chloride 0.9%, sodium chloride 3%     Review of Systems  Objective:     Weight:  (daylight savings)  Body mass index is 22.6 kg/m².  Vital Signs (Most Recent):  Temp: 100 °F (37.8 °C) (11/21/22 1137)  Pulse: 70 (11/21/22 1137)  Resp: 20 (11/21/22 1137)  BP: (!) 111/58 (11/21/22 1137)  SpO2: 97 % (11/21/22 1137)   Vital Signs (24h Range):  Temp:  [97.7 °F (36.5 °C)-100 °F (37.8 °C)] 100 °F (37.8 °C)  Pulse:  [60-79] 70  Resp:  [16-20] 20  SpO2:  [90 %-99 %] 97 %  BP: (109-119)/(56-61) 111/58                Oxygen Concentration (%):  [35] 35         NG/OG Tube 11/06/22 Right nostril (Active)   Placement Check placement verified by aspirate characteristics 11/20/22 0948   Tolerance no signs/symptoms of discomfort 11/20/22 1933   Securement secured to nostril center 11/20/22 1933   Clamp Status/Tolerance unclamped;no emesis;no nausea;no residual 11/20/22 1933   Suction Setting/Drainage Method  "dependent drainage 11/20/22 1933   Insertion Site Appearance drainage 11/20/22 1933   Drainage None 11/20/22 1933   Flush/Irrigation flushed w/;water 11/20/22 1933   Feeding Type continuous;by pump 11/20/22 1933   Feeding Action feeding continued 11/20/22 1933   Current Rate (mL/hr) 50 mL/hr 11/20/22 1933   Goal Rate (mL/hr) 50 mL/hr 11/20/22 1933   Intake (mL) 500 mL 11/20/22 0624   Water Bolus (mL) 150 mL 11/20/22 1933   Rate Formula Tube Feeding (mL/hr) 50 mL/hr 11/20/22 1933   Formula Name Diabetisource 11/20/22 1933   Intake (mL) - Formula Tube Feeding 265 11/20/22 1818   Residual Amount (ml) 5 ml 11/20/22 1933            Urethral Catheter 11/10/22 1335 Latex 16 Fr. (Active)   $ Patiño Insertion Bedside Insertion Performed 11/19/22 1348   Site Assessment Clean;Intact 11/20/22 0930   Collection Container Standard drainage bag 11/20/22 0930   Securement Method secured to top of thigh w/ tape 11/20/22 0930   Catheter Care Performed yes 11/20/22 0930   Reason for Continuing Urinary Catheterization Urinary retention 11/20/22 0930   CAUTI Prevention Bundle Securement Device in place with 1" slack;Intact seal between catheter & drainage tubing;Drainage bag/urimeter off the floor;Sheeting clip in use;No dependent loops or kinks;Drainage bag/urimeter not overfilled (<2/3 full);Drainage bag/urimeter below bladder 11/20/22 0930   Output (mL) 750 mL 11/21/22 0400     Neurosurgery Physical Exam  General: Well developed, well nourished, not in acute distress.   Head: Normocephalic, atraumatic.  Neck: Trach in place.  Neurologic: Alert and oriented x 4. Thought content appropriate.  GCS: Motor:6  Verbal:5  Eyes:4   GCS Total: 15  Language: No aphasia.  Speech: No dysarthria.  Cranial nerves: Face symmetric, tongue midline, CN II-XII grossly intact.   Eyes: Pupils equal, round, reactive to light with accomodation, EOMI.  Pulmonary: Normal respirations, no signs of respiratory distress.  Abdomen: Soft, non-distended, " non-tender to palpation.  Sensory: Intact to light touch throughout.  Motor Strength: Moves all extremities spontaneously with good tone. BUE full strength, BLE significantly pain limited and hypersensitivity but at least AG. No abnormal movements seen.   Skin: Skin is warm, dry and intact.  Posterior incision clean/dry/intact with prineo.          Significant Labs:  Recent Labs   Lab 11/20/22  0231 11/21/22  0520   * 97    143   K 4.2 4.2    101   CO2 33* 33*   BUN 18 19   CREATININE 0.8 0.8   CALCIUM 9.1 9.4   MG 2.3 2.3     Recent Labs   Lab 11/20/22  0231 11/21/22  0520   WBC 10.80 11.27   HGB 7.1* 7.0*   HCT 24.2* 23.3*    399     No results for input(s): LABPT, INR, APTT in the last 48 hours.  Microbiology Results (last 7 days)       Procedure Component Value Units Date/Time    Culture, Respiratory with Gram Stain [[id number]] Collected: 11/19/22 1149    Order Status: Completed Specimen: Respiratory from Tracheal Aspirate Updated: 11/21/22 1016     Respiratory Culture No growth     Gram Stain (Respiratory) <10 epithelial cells per low power field.     Gram Stain (Respiratory) Few WBC's     Gram Stain (Respiratory) Rare Gram negative rods    Narrative:      Tracheal aspirate    Blood culture [[id number]] Collected: 11/17/22 0724    Order Status: Completed Specimen: Blood Updated: 11/21/22 1012     Blood Culture, Routine No Growth to date      No Growth to date      No Growth to date      No Growth to date      No Growth to date    Narrative:      From 2 different sites 30 minutes apart    Blood culture [[id number]] Collected: 11/17/22 0724    Order Status: Completed Specimen: Blood Updated: 11/21/22 1012     Blood Culture, Routine No Growth to date      No Growth to date      No Growth to date      No Growth to date      No Growth to date    Narrative:      From 2 different sites 30 minutes apart    Blood culture [[id number]] Collected: 11/14/22 0940    Order Status: Completed Specimen:  Blood from Peripheral, Right Hand Updated: 11/19/22 1012     Blood Culture, Routine No growth after 5 days.    Blood culture [[id number]]  (Abnormal) Collected: 11/14/22 0939    Order Status: Completed Specimen: Blood from Peripheral, Left Hand Updated: 11/18/22 1445     Blood Culture, Routine Gram stain yumi bottle: Gram positive rods      Results called to and read back by:Kristy BEASLEY RN 11/16/2022  15:37      DIPHTHEROIDS    Fungus culture [[id number]] Collected: 10/28/22 1029    Order Status: Completed Specimen: Wound from Neck Updated: 11/16/22 1303     Fungus (Mycology) Culture Culture in progress      No fungus isolated after 2 weeks    Narrative:      4) Epidural phlegman    Fungus culture [[id number]] Collected: 10/28/22 0950    Order Status: Completed Specimen: Wound from Neck Updated: 11/16/22 1303     Fungus (Mycology) Culture Culture in progress      No fungus isolated after 2 weeks    Narrative:      3) Osteodiscitis    Fungus culture [[id number]] Collected: 10/28/22 0924    Order Status: Completed Specimen: Abscess from Neck Updated: 11/16/22 1303     Fungus (Mycology) Culture Culture in progress      No fungus isolated after 2 weeks    Narrative:      2) Prevertebral absess    Fungus culture [[id number]] Collected: 10/28/22 0906    Order Status: Completed Specimen: Abscess from Back Updated: 11/16/22 1303     Fungus (Mycology) Culture Culture in progress      No fungus isolated after 2 weeks    Narrative:      Prevertebral Abscess    Culture, Respiratory with Gram Stain [[id number]]     Order Status: Canceled Specimen: Respiratory     AFB Culture & Smear [[id number]] Collected: 10/28/22 0924    Order Status: Completed Specimen: Abscess from Neck Updated: 11/15/22 1557     AFB Culture & Smear ~Culture in progress     AFB CULTURE STAIN No acid fast bacilli seen.    Narrative:      2) Prevertebral absess    AFB Culture & Smear [[id number]] Collected: 10/28/22 0906    Order Status: Completed Specimen: Abscess from  Back Updated: 11/15/22 2846     AFB Culture & Smear Culture in progress     AFB CULTURE STAIN No acid fast bacilli seen.    Narrative:      Prevertebral Abscess          All pertinent labs from the last 24 hours have been reviewed.    Significant Diagnostics:  I have reviewed all pertinent imaging results/findings within the past 24 hours.

## 2022-11-21 NOTE — PT/OT/SLP PROGRESS
Occupational Therapy      Patient Name:  Peter Stiles   MRN:  [id number]    Patient not seen today secondary to anticipating pt d/c to LTAC this afternoon). Will follow-up as scheduled if pt remains in hospital.    11/21/2022

## 2022-11-21 NOTE — PLAN OF CARE
Problem: Adult Inpatient Plan of Care  Goal: Plan of Care Review  2022 1635 by Sapna Miramontes RN  Outcome: Met  2022 1632 by Sapna Miramontes RN  Outcome: Adequate for Care Transition  Goal: Patient-Specific Goal (Individualized)  Description: Admit Date: 10/25/2022    Osteomyelitis of cervical spine    Past Medical History:  No date: CHF (congestive heart failure)  No date: Essential (primary) hypertension  No date: Opioid dependence on maintenance agonist therapy, no symptoms  No date: Smoking    Past Surgical History:  No date: APPENDECTOMY  No date:  SECTION      Comment:  x2  2022: FUSION OF CERVICAL SPINE BY POSTERIOR APPROACH; N/A      Comment:  Procedure: FUSION, SPINE, CERVICAL, POSTERIOR APPROACH                C2-T2 posterior decompression/fusion; Depuy,                neuromonitoring monica Marrero;  Surgeon: West Merino DO;  Location: [facility] OR [address] [place] [address];  Service:                Neurosurgery;  Laterality: N/A;  No date: HYSTERECTOMY  10/28/2022: SURGICAL REMOVAL OF VERTEBRAL BODY OF CERVICAL SPINE; N/A      Comment:  Procedure: CORPECTOMY, SPINE, CERVICAL;  Surgeon:                West Merino DO;  Location: [facility] OR [address] [place] [address];                 Service: Neurosurgery;  Laterality: N/A;  anterior C5-6                corpectomy, globus, caspar head prado and tongs,                omnipaque, 15lbs weights, microscrope, round cutting lynsey               and M8, ENT assistance for exposure    Individualization:   1.     Restraints:   Restraint Order  Length of Order: Order good for next 24 hours or when removed.  Date that the current order will : 22  Time that the current order will : 010  Order Upon Application: Yes          2022 1635 by Sapna Miramontes RN  Outcome: Met  2022 1632 by Sapna Miramontes RN  Outcome: Adequate for Care Transition  Goal: Absence of Hospital-Acquired Illness or Injury  2022 1635 by Sapna  LESLEE Miramontes  Outcome: Met  11/21/2022 1632 by Sapna Miramontes RN  Outcome: Adequate for Care Transition  Goal: Optimal Comfort and Wellbeing  11/21/2022 1635 by Sapna Miramontes RN  Outcome: Met  11/21/2022 1632 by Sapna Miramontes RN  Outcome: Adequate for Care Transition  Goal: Readiness for Transition of Care  11/21/2022 1635 by Sapna Miramontes RN  Outcome: Met  11/21/2022 1632 by Sapna Miramontes RN  Outcome: Adequate for Care Transition     Problem: Fall Injury Risk  Goal: Absence of Fall and Fall-Related Injury  11/21/2022 1635 by Sapna Miramontes RN  Outcome: Met  11/21/2022 1632 by Sapna Miramontes RN  Outcome: Adequate for Care Transition     Problem: Skin Injury Risk Increased  Goal: Skin Health and Integrity  11/21/2022 1635 by Sapna Miramontes RN  Outcome: Met  11/21/2022 1632 by Sapna Miramontes RN  Outcome: Adequate for Care Transition     Problem: Pain Acute  Goal: Acceptable Pain Control and Functional Ability  11/21/2022 1635 by Sapna Miramontes RN  Outcome: Met  11/21/2022 1632 by Sapna Miramontes RN  Outcome: Adequate for Care Transition     Problem: Autonomic Dysreflexia (Spinal Cord Injury)  Goal: Effective Autonomic Dysreflexia Prevention  11/21/2022 1635 by Sapna Miramontes RN  Outcome: Met  11/21/2022 1632 by Sapna Miramontes RN  Outcome: Adequate for Care Transition     Problem: Bowel Elimination Impaired (Spinal Cord Injury)  Goal: Effective Bowel Elimination  11/21/2022 1635 by Sapna Miramontes RN  Outcome: Met  11/21/2022 1632 by Sapna Miramontes RN  Outcome: Adequate for Care Transition     Problem: Extended Neurologic Impairment (Spinal Cord Injury)  Goal: Absence of Extended Neurologic Injury  11/21/2022 1635 by Sapna Miramontes RN  Outcome: Met  11/21/2022 1632 by Sapna Miramontes RN  Outcome: Adequate for Care Transition     Problem: Functional Ability Impaired (Spinal Cord Injury)  Goal: Optimal Functional Ability  11/21/2022 1635 by Sapna Miramontes RN  Outcome: Met  11/21/2022 1632 by Sapna  LESLEE Miramontes  Outcome: Adequate for Care Transition     Problem: Neurogenic Shock (Spinal Cord Injury)  Goal: Effective Tissue Perfusion  11/21/2022 1635 by Sapna Miramontes RN  Outcome: Met  11/21/2022 1632 by Sapna Miramontes RN  Outcome: Adequate for Care Transition     Problem: Nutrition Impaired (Spinal Cord Injury)  Goal: Optimal Nutrition Intake  11/21/2022 1635 by Sapna Miramontes RN  Outcome: Met  11/21/2022 1632 by Sapna Miramontes RN  Outcome: Adequate for Care Transition     Problem: Infection  Goal: Absence of Infection Signs and Symptoms  11/21/2022 1635 by Sapna Miramontes RN  Outcome: Met  11/21/2022 1632 by Sapna Miramontes RN  Outcome: Adequate for Care Transition     Problem: Communication Impairment (Artificial Airway)  Goal: Effective Communication  11/21/2022 1635 by Sapna Miramontes RN  Outcome: Met  11/21/2022 1632 by Sapna Miramontes RN  Outcome: Adequate for Care Transition     Problem: Device-Related Complication Risk (Artificial Airway)  Goal: Optimal Device Function  11/21/2022 1635 by Sapna Miramontes RN  Outcome: Met  11/21/2022 1632 by Sapna Miramontes RN  Outcome: Adequate for Care Transition     Problem: Skin and Tissue Injury (Artificial Airway)  Goal: Absence of Device-Related Skin or Tissue Injury  11/21/2022 1635 by Sapna Miramontes RN  Outcome: Met  11/21/2022 1632 by Sapna Miramontes RN  Outcome: Adequate for Care Transition     Problem: Noninvasive Ventilation Acute  Goal: Effective Unassisted Ventilation and Oxygenation  11/21/2022 1635 by Sapna Miramontes RN  Outcome: Met  11/21/2022 1632 by Sapna Miramontes RN  Outcome: Adequate for Care Transition     Problem: Adjustment to Illness (Delirium)  Goal: Optimal Coping  11/21/2022 1635 by Sapna Miramontes RN  Outcome: Met  11/21/2022 1632 by Sapna Miramontes RN  Outcome: Adequate for Care Transition     Problem: Sleep Disturbance (Delirium)  Goal: Improved Sleep  11/21/2022 1635 by Sapna Miramontes RN  Outcome: Met  11/21/2022 1632 by Sapna  LESLEE Miramontes  Outcome: Adequate for Care Transition     Problem: Adjustment to Illness (Sepsis/Septic Shock)  Goal: Optimal Coping  11/21/2022 1635 by Sapna Miramontes RN  Outcome: Met  11/21/2022 1632 by Sapna Miramontes RN  Outcome: Adequate for Care Transition     Problem: Bleeding (Sepsis/Septic Shock)  Goal: Absence of Bleeding  11/21/2022 1635 by Sapna Miramontes RN  Outcome: Met  11/21/2022 1632 by Sapna Miramontes RN  Outcome: Adequate for Care Transition     Problem: Glycemic Control Impaired (Sepsis/Septic Shock)  Goal: Blood Glucose Level Within Desired Range  11/21/2022 1635 by Sapna Miramontes RN  Outcome: Met  11/21/2022 1632 by Sapna Miramontes RN  Outcome: Adequate for Care Transition     Problem: Infection Progression (Sepsis/Septic Shock)  Goal: Absence of Infection Signs and Symptoms  11/21/2022 1635 by Sapna Miramontes RN  Outcome: Met  11/21/2022 1632 by Sapna Miramontes RN  Outcome: Adequate for Care Transition     Problem: Nutrition Impaired (Sepsis/Septic Shock)  Goal: Optimal Nutrition Intake  11/21/2022 1635 by Sapna Miramontes RN  Outcome: Met  11/21/2022 1632 by Sapna Miramontes RN  Outcome: Adequate for Care Transition

## 2022-11-21 NOTE — CONSULTS
[facility] - Neurosurgery ([facility])  Wound Care    Patient Name:  Peter Stiles   MRN:  [id number]  Date: 11/21/2022  Diagnosis: Fever    History:     Past Medical History:   Diagnosis Date    CHF (congestive heart failure)     Essential (primary) hypertension     Opioid dependence on maintenance agonist therapy, no symptoms     Smoking        Social History     Socioeconomic History    Marital status: Single   Tobacco Use    Smoking status: Every Day     Packs/day: 2.00     Years: 34.00     Pack years: 68.00     Types: Cigarettes    Smokeless tobacco: Current   Substance and Sexual Activity    Alcohol use: No    Drug use: No       Precautions:     Allergies as of 10/25/2022 - Reviewed 10/25/2022   Allergen Reaction Noted    Morphine Shortness Of Breath and Other (See Comments) 10/09/2014       [facility] Assessment Details/Treatment       Wound consult received for patient's buttocks. Stage 2 pressure injury noted over sacrum, partial thickness skin loss. Recommend venelex ointment twice a day and as needed to assist with capillary bed stimulation and moist wound healing. Waffle overlay ordered, nursing to apply.  Continue pressure injury prevention measures per nursing.  Updated Dr. Radha MD approved. Contact wound care dept for any further needs.       11/21/22 0920        Altered Skin Integrity 11/19/22 0800 Sacral spine Partial thickness tissue loss. Shallow open ulcer with a red or pink wound bed, without slough. Intact or Open/Ruptured Serum-filled blister.   Date First Assessed/Time First Assessed: 11/19/22 0800   Altered Skin Integrity Present on Admission: suspected hospital acquired  Location: Sacral spine  Description of Altered Skin Integrity: Partial thickness tissue loss. Shallow open ulcer with a ...   Description of Altered Skin Integrity Partial thickness tissue loss. Shallow open ulcer with a red or pink wound bed, without slough. Intact or Open/Ruptured Serum-filled blister.   Drainage Amount  None   Drainage Characteristics/Odor No odor   Appearance Moist;Pink   Tissue loss description Partial thickness   Red (%), Wound Tissue Color 100 %   Periwound Area Redness   Wound Edges Open   Wound Length (cm) 2 cm   Wound Width (cm) 2 cm   Wound Depth (cm) 0.1 cm   Wound Volume (cm^3) 0.4 cm^3   Wound Surface Area (cm^2) 4 cm^2

## 2022-11-21 NOTE — ASSESSMENT & PLAN NOTE
Prior MSSA bacteremia - cleared 10/27   Diphtheroids 11/14 in 1 of 4 bottles.  Likely contaminant.  Repeats of 11/17 NGTD

## 2022-11-21 NOTE — ASSESSMENT & PLAN NOTE
51 year old with dilated cardiomyopathy and remote history opioid dependence (on methadone as an outpatient) admitted 10/25 with MSSA bacteremia and C5-6 osteomyelitis, discitis and epidural collection (MSSA),  s/p corpectomy/ fusion and epidural abscess evacuation 10/28 and s/p C2-T2 decompression/fusion with NSGY 11/2.  TTE negative.  On IV oxacillin continuous infusion when ID reconsulted for fevers. A  Repeat CXR 11/15  with newly developed patchy airspace consolidation right lung base, with loss of volume and pleural fluid left lung base.  Pulmonary consulted for evaluation. Bedside US performed with minimal pleural fluid, no signs of complicated or loculated effusions-- suspicion for pleural space infection low.  Sputum cx negative.  Oxacillin discontinued in favor of Cefepime  to cover MSSA and  respiratory pathogens including pseudomonas.  Follow up MRI also completed 11/20 given complaints of right leg/hip pain -  Noted trace fluid within the right SI joint, nonspecific but possibly inflammatory or degenerative. Possible right sciatic neuritis.  No marrow signal abnormality to suggest osteomyelitis.  No soft tissue abscess. Also, possible right sciatic neuritis.  Repeat MRI C/T/L spine 11/20 without evidence of residual epidural fluid collection.      Afebrile, no leukocytosis. Reports pain controlled. Remains on O2 8 l per trach collar.  O2 Sats 90-99%.  Hemodynamically stable.  To LTAC today    Recommendations/Plan:  1. Continue cefepime 2g IV Q 8-hours for possible PNA (PSA/nosocomial coverage) for total of 7 days - through 11/23/22.    2. Upon completion of cefepime, resume Oxacillin 12 g IV q 24 hours continuous infusion  for MSSA vertebral osteo/epidural abscess to completed 8 weeks from date of last surgery - EOC 12/28/22.  Need for and duration of  continued suppressive antibiotics given hardware placement (? Doxycycline) after completion of IV to be determined at ID follow up.    3. Weekly cbc,  cmp, crp while on antibiotics   4. Please consult ID for follow up at LTAC in 7-10 days or sooner as needed.   5. ID follow up in clinic after d/c from LTAC TBA by ID   6. Will sign off.  Please call with questions or re-consult as needed.   7. See OPAT summary below    Data reviewed and plan discussed with ID staff, Dr. Najera  Secure chat/Discussed above plan with Primary Team,  Dr. Garcia

## 2022-11-21 NOTE — ASSESSMENT & PLAN NOTE
51 year old with  dilated cardiomyopathy and remote opioid dependence (on methadone as an outpatient) admitted 10/25 with neck and back pain and lower body numbness and was found to have MSSA bacteremia and C5-6 osteomyelitis, discitis and epidural collection.  TTE negative.  S/p corpectomy/ fusion and epidural abscess evacuation 10/28 and s/p C2-T2 decompression/fusion with NSGY 11/2.      ID re-consulted 11/14 for intermittent daily fevers.   Bilateral LE US negative for DVT.  Noted to have increased secretions from tracheostomy (in place d/t multiple failed extubations).  CXR 11/14 was without focal consolidation or evidence of acute parenchymal process.  Repeat CXR 11/15  with newly developed patchy airspace consolidation right lung base, with loss of volume and pleural fluid left lung base.  Pulmonary consulted for evaluation. Bedside US performed with minimal pleural fluid, no signs of complicated or loculated effusions-- Suspicion for pleural space infection low.  Noted some consolidation at bases.  Recommend sputum cx and nebulizer tx.      Afebrile X 24 hours. Abx broadened to cefepime (oxacillin d/c)  to cover MSSA and respiratory pathogens including pseudomonas. Repeat blood cultures NGTD.Persistent productive cough/SOB, denies worsening. Cervical collar in-place. Primary complaint today is cough exacerbating neck pain, and worsening pain extending from L-spine to feet; with notable pain at left hip/pelvis. Patient on 5-liters O2 per trach collar. O2 Sats 87-98%. Hemodynamically stable.    MRI C/L/T spine (10/25): No acute abnormality identified in the thoracic or lumbar spine. Partially visualized nonspecific marrow signal abnormality involving the left iliac bone.     Follow-up pelvic MRI (11/20): showed Trace fluid within the right SI joint, nonspecific but possibly inflammatory or degenerative.  Sequela of early septic arthritis can present with similar findings and cannot be excluded.  No marrow  signal abnormality to suggest osteomyelitis.  No soft tissue abscess. Also, possible right sciatic neuritis.  MRI C/T/L (11/20): Examination markedly degraded by patient motion artifact; showed Postoperative change of C5-C6 corpectomy, C4-C7 anterior cervical fusion, C3-C7 decompressive laminectomies and C2-T2 posterior fusion.  No large residual epidural fluid collection.  Persistent prevertebral soft tissue edema without definite focal fluid collection.     Tracheal aspirate (trach in place) 11/19: rare GNR; with no growth on culture.  Repeat blood cxs (11/17): NGTD    -- Discontinued IV vancomycin 11/18 -- [GPR= +Diphtheroids 1/2 bld cx 11/14, hakanley contaminant; repeat blood cx (11/17) NGTD]    Recommendations/Plan:  1. Continue cefepime 2g IV Q 8-hours for possible PNA (PSA/nosocomial coverage) for 7d s/p initial dose (11/16), end 11/23 -- at which point, may transition patient back to IV-Oxacillin for MSSA -VOM with bacteremia (10/25) -- to complete recommended 8wk course s/p last c-spine surg 11/02, EOC 12/28/22.    2. Following repeat blood cxs (11/17) NGTD.    3. Plan for placement per patient (SNF/LTAC) [facility]    -- Discussed with ID staff.  -- ID will follow-up Monday with tentative sign-off then.

## 2022-11-21 NOTE — ASSESSMENT & PLAN NOTE
"Peter Stiles is a 52 y.o. female with PMHx of HTN, dilated cardiomyopathy, h/o opioid dependence, cigarette smoker (2pks/day), and daily baby ASA use who presents with 1 day of decreased sensation from T5 level down and tingling in her bilateral fingers and bilateral toes. MRI imaging obtained in the ED showing possible C5-6 osteodiscitis/myelitis with cord compression. Patient tachycardic and hypertensive on arrival, afebrile, with leukocytosis.    Now s/p C5-6 corpectomy on 10/28/22; C2-T2 PCDF on 11/2.    - Stepped down to medicine service.  - Q4hr neurochecks.  - All pertinent imaging/labs personally reviewed.   - Surgical drains removed with no issues.  - Requiring trach on 11/7 after failed extubation multiple times.  - Holding off on gtube placement to see if progressing with speech therapy. Ongoing.  - ID:   --Intermittently febrile. BLE US negative. Rest of fever workup per primary. Encourage IS hourly. Repeat CXR showing increased consolidation and pleural effusion at right lung base.  Per ID:  -"Continue cefepime 2g IV Q 8-hours for possible PNA (PSA/nosocomial coverage) for 7d s/p initial dose (11/16), end 11/23 -- at which point, may transition patient back to IV-Oxacillin for MSSA -VOM with bacteremia (10/25) -- to complete recommended 8wk course s/p last c-spine surg 11/02, EOC 12/28/22."   --BCx 10/25 w/ MSSA, Repeat BCx's 10/27 NGTD, Blood cultures 11/17 pending. Blood cultures 11/14 with diphtheroids, likely contaminant.    --OR cultures 10/28 w/ MSSA  - Continue c-collar when out of bed. OK to remove when resting in bed.   - Pain control with multimodal regimen.  - Okay for SQH.  - Please call NSGY with any questions/concerns. Neurosurgery will continue to follow peripherally and ensure post op follow up scheduled.     Dispo: per primary  "

## 2022-11-22 PROBLEM — R50.9 FEVER: Status: RESOLVED | Noted: 2022-11-15 | Resolved: 2022-11-22

## 2022-11-22 PROBLEM — D63.8 ANEMIA OF CHRONIC DISEASE: Status: ACTIVE | Noted: 2022-11-22

## 2022-11-22 PROBLEM — L89.150 PRESSURE INJURY OF SACRAL REGION, UNSTAGEABLE: Status: ACTIVE | Noted: 2022-11-22

## 2022-11-22 PROBLEM — A41.9 SEPTICEMIA: Status: RESOLVED | Noted: 2022-11-18 | Resolved: 2022-11-22

## 2022-11-22 PROBLEM — D62 ACUTE BLOOD LOSS ANEMIA: Status: ACTIVE | Noted: 2022-11-22

## 2022-11-22 PROBLEM — J15.9 HEALTHCARE ASSOCIATED BACTERIAL PNEUMONIA: Status: ACTIVE | Noted: 2022-11-22

## 2022-11-22 LAB
BACTERIA BLD CULT: NORMAL
BACTERIA BLD CULT: NORMAL

## 2022-11-22 NOTE — PLAN OF CARE
[facility] - Neurosurgery (Hospital)  Discharge Final Note    Primary Care Provider: Og Victoria NP    Expected Discharge Date: 11/21/2022    Pt discharged to [facility].  Pt and pt's daughter notified.  Pt transported via stretcher/8 liters Trach collar.    Final Discharge Note (most recent)       Final Note - 11/22/22 0734          Final Note    Assessment Type Final Discharge Note     Anticipated Discharge Disposition Long Term Acute Care        Post-Acute Status    Post-Acute Authorization Placement     Post-Acute Placement Status Set-up Complete/Auth obtained     Discharge Delays None known at this time                     Important Message from Medicare Sandra Encalade, LMSW  PRN-  [facility]  Ext. [phone]

## 2022-11-23 ENCOUNTER — TELEPHONE (OUTPATIENT)
Dept: NEUROSURGERY | Facility: CLINIC | Age: 52
End: 2022-11-23
Payer: MEDICARE

## 2022-11-23 PROBLEM — Z93.0 TRACHEOSTOMY DEPENDENCE: Status: ACTIVE | Noted: 2022-11-23

## 2022-11-23 PROBLEM — Z93.0 TRACHEOSTOMY IN PLACE: Status: ACTIVE | Noted: 2022-11-23

## 2022-11-23 NOTE — TELEPHONE ENCOUNTER
Spoke with Kristy (micro labs ) she sates that patient labs came back positive informed Kristy that I will let Dr. Merino know she verbalized understanding.

## 2022-11-23 NOTE — TELEPHONE ENCOUNTER
----- Message from Brayan Robin sent at 11/23/2022  1:55 PM CST -----  Regarding: Critical Value  Contact: [phone]  Kristy Del Valle) is calling with a critical value for the pt. Please call back to discuss further @[phone]

## 2022-11-28 LAB
FUNGUS SPEC CULT: NORMAL

## 2022-12-04 NOTE — DISCHARGE SUMMARY
[facility] - Neurosurgery ([facility])  [facility] Medicine  Discharge Summary      Patient Name: Peter Stiles  MRN: [id number]  KENDAL: [id number]  Patient Class: IP- Inpatient  Admission Date: 10/25/2022  Hospital Length of Stay: 27 days  Discharge Date and Time: 11/21/22  Attending Physician: Minerva att. providers found   Discharging Provider: René Garcia MD  Primary Care Provider: Og Victoria NP  [facility] Medicine Team: [facility] HOSP MED A René Garcia MD  Primary Care Team: [facility] HOSP MED A    HPI:   51 y.o F with hx of htn, opioid dependence on methadone, smoking (2ppd since 17 y.o), and dilated cardiomyopathy presents with neck pain and abdominal numbness. 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 At the time the daughter slammed the breaks and came to an abrupt stop. The patient braced herself by covering her face with both her forearms. She did not notice severe symptoms at the time or a popping sensation but a vague soreness in her neck. In the following 2 weeks her symptoms gradually deteriorated with increased neck pain notably. Her symptoms became worse early this morning when she went to the bathroom. She noticed an unusual numbness and bloating of her stomach and generalized numbness below that level. She was able to urinate. She also had increased pain in her legs, bilateral shoulders, and tingling in fingers and toes. Patient denies IV drug or alcohol use. She smoke 2ppd since 17 y.o.      Procedure(s) (LRB):  CREATION, TRACHEOSTOMY (N/A)  LARYNGOSCOPY, DIRECT      Hospital Course:   Ms. Stiles presented for acute neck pain, along with numbness and weakness of the bilateral upper and lower extremities. MRI demonstrated C5-6 osteomyelitis, discitis, and epidural abscess, as well as narrowing and signal abnormality of the spinal cord. She was admitted to the neuroICU and started on Rocephin and vancomycin. Stepped down  to [facility] medicine on 10/26/22. Patient completed C5-C6 corpectomy and C4-C7 anterior column reconstruction with NSGY with assistance from ENT. BCX's subsequently grew MSSA and patient transitioned from vanc/ceftriaxone to oxacillin per ID recommendations. Spiked fever again and antbiotics broadened and re-imaging without new/worsening source identified. Fever did not recur and she felt better. Plan to discharge on long course of IV antibiotics. Patient deemed ready for discharge. Plan discussed with pt, who was agreeable and amenable; medications were discussed and reviewed, outpatient follow-up arranged, ER precautions were given, all questions were answered to the pt's satisfaction, and Peter Stiles  was subsequently discharged.         Goals of Care Treatment Preferences:  Code Status: Full Code      Consults:   Consults (From admission, onward)        Status Ordering Provider     Inpatient consult to Pulmonology  Once        Provider:  (Not yet assigned)    Completed DAMON, SEEMAL     Inpatient consult to Infectious Diseases  Once        Provider:  (Not yet assigned)    Completed DAMON, SEEMAL     Inpatient consult to PICC team ([facility] [place])  Once        Provider:  (Not yet assigned)    Completed DAMON, SEEMAL     Inpatient consult to Gastroenterology  Once        Provider:  (Not yet assigned)    Completed DAMON, SEEMAL     Inpatient consult to ENT  Once        Provider:  (Not yet assigned)    Completed ADRIENNE YOUSIF     Inpatient consult to Registered Dietitian/Nutritionist  Once        Provider:  (Not yet assigned)    Completed FABI RICH     Inpatient consult to Midline team  Once        Provider:  (Not yet assigned)    Completed NELSON DENNIS     Inpatient consult to Infectious Diseases  Once        Provider:  (Not yet assigned)    Completed HUBER CRAWFORD     Inpatient consult to ENT  Once        Provider:  (Not yet assigned)    Completed TAYLOR ATKINS     Inpatient consult to Social  Work/Case Management  Once        Provider:  (Not yet assigned)    Completed BORIS ALBERTO     Inpatient consult to Physical Medicine Rehab  Once        Provider:  (Not yet assigned)    Completed GRAHAM SANTORO     Inpatient consult to Neurosurgery  Once        Provider:  (Not yet assigned)    Completed MOHINDER JOYA          No new Assessment & Plan notes have been filed under this hospital service since the last note was generated.  Service: Hospital Medicine    Final Active Diagnoses:    Diagnosis Date Noted POA    Staphylococcus aureus bacteremia [R78.81, B95.61] 10/27/2022 Yes    Osteomyelitis of cervical spine [M46.22] 10/25/2022 Yes    Opioid use disorder, severe, on maintenance therapy, dependence [F11.20] 10/25/2022 Yes    Hyperglycemia [R73.9] 10/25/2022 Yes    Anxiety and depression [F41.9, F32.A] 10/09/2014 Yes      Problems Resolved During this Admission:    Diagnosis Date Noted Date Resolved POA    PRINCIPAL PROBLEM:  Fever [R50.9] 11/15/2022 11/22/2022 No    Septicemia [A41.9] 11/18/2022 11/22/2022 Yes    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] 11/10/2022 11/15/2022 No    Cervical stenosis of spinal canal [M48.02] 11/05/2022 11/15/2022 Yes    Endotracheally intubated [Z97.8] 10/31/2022 11/15/2022 No    Acute respiratory failure with hypoxia [J96.01] 10/28/2022 11/15/2022 Yes    Hyponatremia [E87.1] 10/25/2022 11/02/2022 Yes    Epidural abscess [G06.2] 10/25/2022 11/15/2022 Yes    Spinal cord compression [G95.20] 10/25/2022 11/15/2022 Yes    Tobacco abuse [Z72.0] 10/09/2014 11/15/2022 Yes    Dilated cardiomyopathy [I42.0] 10/09/2014 11/15/2022 Yes       Discharged Condition: good    Disposition: Long Term Acute Care    Follow Up:    Patient Instructions:   No discharge procedures on file.        Pending Diagnostic Studies:     None         Medications:  Reconciled Home Medications:      Medication List      ASK your doctor about these medications    aspirin 81 MG EC  tablet  Commonly known as: ECOTRIN  Take 81 mg by mouth once daily.     ICY HOT PATCH (LIDO-MENTHOL) 4-1 % Ptmd  Generic drug: LIDOcaine-menthol  Apply 1 patch topically 2 (two) times daily as needed (Pain).     LORazepam 1 MG tablet  Commonly known as: ATIVAN  Take one-half to one tablet     methadone 40 mg disintegrating tablet  Commonly known as: METHADOSE  Take 2 &1/4 tablet (90 mg) by mouth in water once daily.     ONE DAILY MULTIVITAMIN per tablet  Generic drug: multivitamin  Take 1 tablet by mouth once daily.            Indwelling Lines/Drains at time of discharge:   Lines/Drains/Airways     Peripherally Inserted Central Catheter Line  Duration           PICC Double Lumen 11/14/22 1550 right basilic 20 days          Drain  Duration                NG/OG Tube 11/06/22 Right nostril 28 days         Urethral Catheter 11/21/22 1234 13 days          Airway  Duration                Surgical Airway 11/07/22 1449 Shiley Cuffed 27 days                Time spent on the discharge of patient: 35 minutes         René Garcia MD  Department of Hospital Medicine  [facility] Neurosurgery ([facility])

## 2023-01-05 ENCOUNTER — TELEPHONE (OUTPATIENT)
Dept: SPINE | Facility: CLINIC | Age: 53
End: 2023-01-05
Payer: MEDICARE

## 2023-01-05 LAB
AFB SUSCEPTIBILITY, RAPID GROWER: NORMAL
M TB DNA SPEC QL NAA+PROBE: ABNORMAL

## 2023-01-05 NOTE — TELEPHONE ENCOUNTER
----- Message from Fiorella Savage sent at 1/5/2023  3:02 PM CST -----  Priscilla nurse @  [facility] calling regarding on behalf of Pt stated that she would like to speak with for lower extremity weakness, she been in [facility] since 12/29/22.  she would like to speak with dr. Merino.  She is in-patient at  call back pt cell # [phone] or  rm [phone]  unit # [phone]

## 2023-01-09 ENCOUNTER — TELEPHONE (OUTPATIENT)
Dept: NEUROSURGERY | Facility: CLINIC | Age: 53
End: 2023-01-09
Payer: MEDICARE

## 2023-01-09 NOTE — TELEPHONE ENCOUNTER
----- Message from Kathryn Torres sent at 1/9/2023  9:27 AM CST -----  Regarding: pt aspen collar  Name of Who is Calling: Velma/ [facility]           What is the request in detail:  Velma is calling asking when pt should be wearing her  [place] collar. Please c/b Velma at [phone]        Can the clinic reply by [facility]:          What Number [facility] Back [facility]:[phone]

## 2023-01-17 ENCOUNTER — TELEPHONE (OUTPATIENT)
Dept: INFECTIOUS DISEASES | Facility: CLINIC | Age: 53
End: 2023-01-17

## 2023-01-17 NOTE — TELEPHONE ENCOUNTER
----- Message from Minna Vega sent at 1/17/2023  9:29 AM CST -----  Regarding: appt  Contact: [phone]  PT has an appt today at 11:00 am. Pt states she's currently in [facility]. Please call to discuss further.

## 2023-01-17 NOTE — TELEPHONE ENCOUNTER
Called pt and asked if she was admitted to . She confirmed she is. I told her to just let us know whenever she is discharged and we can reschedule her appt. She acknowledged and confirmed.

## 2023-01-20 LAB — ACID FAST SUSCEPTIBILITY, SLOW GROWER: ABNORMAL

## 2023-01-24 LAB
M TB DNA SPEC QL NAA+PROBE: ABNORMAL

## 2023-01-26 LAB — ACID FAST SUSCEPTIBILITY, SLOW GROWER: ABNORMAL

## 2023-02-08 LAB — ACID FAST SUSCEPTIBILITY, SLOW GROWER: ABNORMAL

## 2023-02-09 LAB
ACID FAST SUSCEPTIBILITY, SLOW GROWER: ABNORMAL
ACID FAST SUSCEPTIBILITY, SLOW GROWER: ABNORMAL

## 2023-02-10 LAB
ACID FAST MOD KINY STN SPEC: ABNORMAL
ACID FAST MOD KINY STN SPEC: ABNORMAL
MYCOBACTERIUM SPEC QL CULT: ABNORMAL

## 2023-02-27 PROBLEM — J15.9 HEALTHCARE ASSOCIATED BACTERIAL PNEUMONIA: Status: RESOLVED | Noted: 2022-11-22 | Resolved: 2023-02-27

## 2023-03-14 LAB
ACID FAST MOD KINY STN SPEC: ABNORMAL
MYCOBACTERIUM SPEC QL CULT: ABNORMAL

## 2023-03-31 LAB
ACID FAST MOD KINY STN SPEC: ABNORMAL
MYCOBACTERIUM SPEC QL CULT: ABNORMAL

## 2023-09-29 ENCOUNTER — TELEPHONE (OUTPATIENT)
Dept: NEUROSURGERY | Facility: CLINIC | Age: 53
End: 2023-09-29
Payer: MEDICARE

## 2023-09-29 NOTE — TELEPHONE ENCOUNTER
----- Message from Imani Victoria sent at 9/29/2023 10:36 AM CDT -----  Name of Who is Calling:SÁNCHEZ TIDWELL [[id number]]           What is the request in detail: PT STATED THAT SHE WAS IN THE HOSPITAL FOR 5 MONTHS AND NEEDS AN FOLLOW UP APPT WITH THE OFFICE.Please contact to further discuss and advise.            Can the clinic reply by [facility]:[phone]           What Number [facility] Call Back [facility]:[phone]

## 2023-10-04 ENCOUNTER — TELEPHONE (OUTPATIENT)
Dept: NEUROSURGERY | Facility: CLINIC | Age: 53
End: 2023-10-04
Payer: MEDICARE

## 2023-10-04 DIAGNOSIS — M54.2 CERVICALGIA: Primary | ICD-10-CM

## 2023-10-04 NOTE — TELEPHONE ENCOUNTER
----- Message from Ruth Gramajo MA sent at 10/3/2023  4:43 PM CDT -----  Regarding: FW: Appt  Contact: Pt [phone]    ----- Message -----  From: Jessica Fernandez  Sent: 10/3/2023  12:06 PM CDT  To: [facility] Staff  Subject: Appt                                             Pt calling to reschedule post-op appt on 10/2. Please call [phone]

## 2023-10-26 ENCOUNTER — OFFICE VISIT (OUTPATIENT)
Dept: NEUROSURGERY | Facility: CLINIC | Age: 53
End: 2023-10-26
Payer: MEDICARE

## 2023-10-26 ENCOUNTER — HOSPITAL ENCOUNTER (OUTPATIENT)
Dept: RADIOLOGY | Facility: HOSPITAL | Age: 53
Discharge: HOME | End: 2023-10-26
Payer: MEDICARE

## 2023-10-26 VITALS — SYSTOLIC BLOOD PRESSURE: 144 MMHG | HEART RATE: 83 BPM | TEMPERATURE: 98 F | DIASTOLIC BLOOD PRESSURE: 86 MMHG

## 2023-10-26 DIAGNOSIS — M46.22 OSTEOMYELITIS OF CERVICAL SPINE: Primary | ICD-10-CM

## 2023-10-26 DIAGNOSIS — M54.2 CERVICALGIA: ICD-10-CM

## 2023-10-26 DIAGNOSIS — Z98.1 S/P CERVICAL SPINAL FUSION: ICD-10-CM

## 2023-10-26 PROCEDURE — 99999 PR PBB SHADOW E&M-EST. PATIENT-LVL III: ICD-10-PCS | Mod: PBBFAC,,,

## 2023-10-26 PROCEDURE — 99215 PR OFFICE/OUTPT VISIT, EST, LEVL V, 40-54 MIN: ICD-10-PCS | Mod: S$PBB,,,

## 2023-10-26 PROCEDURE — 99215 OFFICE O/P EST HI 40 MIN: CPT | Mod: S$PBB,,,

## 2023-10-26 PROCEDURE — 72125 CT NECK SPINE W/O DYE: CPT | Mod: 26,,, | Performed by: RADIOLOGY

## 2023-10-26 PROCEDURE — 99999 PR PBB SHADOW E&M-EST. PATIENT-LVL III: CPT | Mod: PBBFAC,,,

## 2023-10-26 PROCEDURE — 99213 OFFICE O/P EST LOW 20 MIN: CPT | Mod: PBBFAC,25

## 2023-10-26 PROCEDURE — 72125 CT NECK SPINE W/O DYE: CPT | Mod: TC

## 2023-10-26 PROCEDURE — 72125 CT CERVICAL SPINE WITHOUT CONTRAST: ICD-10-PCS | Mod: 26,,, | Performed by: RADIOLOGY

## 2023-10-26 NOTE — PROGRESS NOTES
[facility]  Neurosurgery    SUBJECTIVE:   History of Present Illness:  Peter Stiles is a 53 y.o. female with a PMHx of HTN, dilated cardiomyopathy, h/o opioid dependence and cigarette smoker who presents in follow up for C5/6 osteodiscitis/myeltis s/p C5-6 corpectomy on 10/28/22 and then C2-T2 PCF on 11/2/22. The patient was transferred to LTAC post op, discharged from there in January to [facility] rehab and then subsequently lost to follow up. Patient states she has been doing well since discharging. She states she completed her antibiotic regimen. She has some lingering neck stiffness and pain but otherwise denies symptoms. She denies focal weakness, paraesthesias, bowel/bladder dysfunction, gait difficulties, fevers/chills. She has not done PT/OT since leaving rehab and is interested in restarting for her neck stiffness.       Review of patient's allergies indicates:   Allergen Reactions    Morphine Shortness Of Breath and Other (See Comments)     Throat closed    Lisinopril Other (See Comments)     cough       Current Outpatient Medications:     methadone (DOLOPHINE) 10 MG tablet, Take 8 tablets (80 mg total) by mouth once daily. Pt reports home dose 80 mg (has been self weaning prior to hospitalization), Disp: 30 tablet, Rfl: 0    aspirin (ECOTRIN) 81 MG EC tablet, Take 81 mg by mouth once daily., Disp: , Rfl:     cetirizine (ZYRTEC) 10 MG tablet, Take 1 tablet (10 mg total) by mouth once daily., Disp: , Rfl: 0    dronabinoL (MARINOL) 5 MG capsule, Take 1 capsule (5 mg total) by mouth once daily., Disp: , Rfl:     ethambutoL (MYAMBUTOL) 400 MG Tab, Take 2 tablets (800 mg total) by mouth once daily., Disp: 60 tablet, Rfl: 5    famotidine (PEPCID) 20 MG tablet, Take 1 tablet (20 mg total) by mouth 2 (two) times daily., Disp: 60 tablet, Rfl: 1    hydrOXYzine HCL (ATARAX) 25 MG tablet, Take 1 tablet (25 mg total) by mouth 3 (three) times daily as needed for Anxiety., Disp: , Rfl:     levoFLOXacin  (LEVAQUIN) 750 MG tablet, Take 1 tablet (750 mg total) by mouth once daily., Disp: , Rfl:     LIDOcaine-menthol (ICY HOT PATCH, LIDO-MENTHOL,) 4-1 % PtMd, Apply 1 patch topically 2 (two) times daily as needed (Pain)., Disp: , Rfl:     multivitamin (ONE DAILY MULTIVITAMIN) per tablet, Take 1 tablet by mouth once daily., Disp: , Rfl:     nicotine (NICODERM CQ) 21 mg/24 hr, Place 1 patch onto the skin once daily., Disp: , Rfl: 0    pregabalin (LYRICA) 200 MG Cap, Take 1 capsule (200 mg total) by mouth 3 (three) times daily., Disp: 90 capsule, Rfl: 0    promethazine-dextromethorphan (PROMETHAZINE-DM) 6.25-15 mg/5 mL Syrp, Take 5 mLs by mouth every 4 (four) hours as needed (cough)., Disp: 180 mL, Rfl: 0    silodosin (RAPAFLO) 4 mg Cap capsule, Take 1 capsule (4 mg total) by mouth once daily., Disp: 30 capsule, Rfl: 0     Past Medical History:   Diagnosis Date    CHF (congestive heart failure)     Essential (primary) hypertension     Opioid dependence on maintenance agonist therapy, no symptoms     Smoking      Past Surgical History:   Procedure Laterality Date    APPENDECTOMY       SECTION      x2    FUSION OF CERVICAL SPINE BY POSTERIOR APPROACH N/A 2022    Procedure: FUSION, SPINE, CERVICAL, POSTERIOR APPROACH C2-T2 posterior decompression/fusion; Depuy, neuromonitoring [facility] lin;  Surgeon: West Merino DO;  Location: [address];  Service: Neurosurgery;  Laterality: N/A;    HYSTERECTOMY      SURGICAL REMOVAL OF VERTEBRAL BODY OF CERVICAL SPINE N/A 10/28/2022    Procedure: CORPECTOMY, SPINE, CERVICAL;  Surgeon: West Merino DO;  Location: [facility] OR [address] [place] [address];  Service: Neurosurgery;  Laterality: N/A;  anterior C5-6 corpectomy, globus, caspar head prado and tongs, omnipaque, 15lbs weights, microscrope, round cutting lynsey and M8, ENT assistance for exposure    TRACHEOSTOMY N/A 2022    Procedure: CREATION, TRACHEOSTOMY;  Surgeon: Fritz Nogueira MD;  Location: [facility] OR [address] [place] [address];   Service: ENT;  Laterality: N/A;     Family History   Adopted: Yes   Problem Relation Age of Onset    Cancer Mother          69    Hearing loss Father         valve problem;  75     Social History     Tobacco Use    Smoking status: Every Day     Current packs/day: 2.00     Average packs/day: 2.0 packs/day for 34.0 years (68.0 ttl pk-yrs)     Types: Cigarettes    Smokeless tobacco: Current   Substance Use Topics    Alcohol use: No    Drug use: No       OBJECTIVE:     Vital Signs (Most Recent):  Temp: 97.5 °F (36.4 °C) (10/26/23 1520)  Pulse: 83 (10/26/23 1520)  BP: (!) 144/86 (10/26/23 1520)    Physical Exam:  General: Well developed, well nourished, no distress.  Head: Normocephalic, atraumatic.  Neurologic: Alert and oriented. Thought content appropriate  GCS: Motor: 6/Verbal: 5/Eyes: 4 GCS Total: 15  Mental Status: Awake, Alert, Oriented x 4.  Language: No aphasia.  Speech: No dysarthria.  Cranial nerves: Face symmetric, tongue midline, CN II-XII grossly intact.   Eyes: Pupils equal, round, reactive to light with accommodation, EOMI.   Pulmonary: Normal respirations, not labored, no accessory muscles used.  Abdomen: Soft, non-distended, not tender to palpation.  Sensory: Intact to light touch throughout.  Motor Strength: Moves all extremities spontaneously with good tone.  Full strength upper and lower extremities. No abnormal movements seen.     Strength  Deltoids Triceps Biceps Wrist Extension Wrist Flexion Hand    Upper: R 5/5 5/5 5/5 5/5 5/5 5/5    L 5/5 5/5 5/5 5/5 5/5 5/5     Iliopsoas Quadriceps Knee  Flexion Tibialis  anterior Gastro- cnemius EHL   Lower: R 5/5 5/5 5/5 5/5 5/5 5/5    L 5/5 5/5 5/5 5/5 5/5 5/5     Skin: Warm, dry and intact, no rashes.  Gait: Normal.      Laboratory:  I have reviewed all pertinent lab results within the past 24 hours.    Diagnostic Results:  Imaging personally reviewed.   MRI pan spine without contrast 22: no residual epidural abscess or fluid  collection.  CT cervical spine without contrast 10/26/23: No hardware complication. Some possible mild lucency around the right T2 screw but there is adequate bony fusion throughout the entire hardware construct.    ASSESSMENT/PLAN:     Peter Stiles is a 53 y.o. female s/p C5-6 corpectomy on 10/28/22 and then C2-T2 PCF on 11/2/22 for cervical osteomyelitis/discitis. Patient has been doing excellent post op. She is neurologically intact with only some residual neck stiffness/pain. Referral to PT/OT placed. There is some possible mild lucency about the right T2 screw however there is adequate osseous fusion throughout the hardware construct. Patient may RTC in 6 months with an X-Ray cervical spine AP and Lateral.     All symptoms and signs that require emergent or urgent treatment were reviewed. The plan was discussed with the patient. All of the the patient's questions and concerns were answered and she voiced understanding. Please feel free to call with any further questions.     Rachel Pate PA-C  Neurosurgery   [facility]

## 2023-11-04 ENCOUNTER — PATIENT MESSAGE (OUTPATIENT)
Dept: ADMINISTRATIVE | Facility: OTHER | Age: 53
End: 2023-11-04
Payer: MEDICARE

## 2023-11-17 ENCOUNTER — OFFICE VISIT (OUTPATIENT)
Dept: URGENT CARE | Facility: CLINIC | Age: 53
End: 2023-11-17
Payer: MEDICARE

## 2023-11-17 VITALS
RESPIRATION RATE: 18 BRPM | SYSTOLIC BLOOD PRESSURE: 142 MMHG | HEART RATE: 79 BPM | HEIGHT: 66 IN | OXYGEN SATURATION: 98 % | TEMPERATURE: 98 F | BODY MASS INDEX: 26.03 KG/M2 | DIASTOLIC BLOOD PRESSURE: 83 MMHG | WEIGHT: 162 LBS

## 2023-11-17 DIAGNOSIS — R05.1 ACUTE COUGH: Primary | ICD-10-CM

## 2023-11-17 DIAGNOSIS — J06.9 VIRAL URI WITH COUGH: ICD-10-CM

## 2023-11-17 LAB
CTP QC/QA: YES
CTP QC/QA: YES
POC MOLECULAR INFLUENZA A AGN: NEGATIVE
POC MOLECULAR INFLUENZA B AGN: NEGATIVE
SARS-COV-2 AG RESP QL IA.RAPID: NEGATIVE

## 2023-11-17 PROCEDURE — 87502 POCT INFLUENZA A/B MOLECULAR: ICD-10-PCS | Mod: QW,S$GLB,,

## 2023-11-17 PROCEDURE — 87811 SARS CORONAVIRUS 2 ANTIGEN POCT, MANUAL READ: ICD-10-PCS | Mod: QW,S$GLB,,

## 2023-11-17 PROCEDURE — 99213 PR OFFICE/OUTPT VISIT, EST, LEVL III, 20-29 MIN: ICD-10-PCS | Mod: S$GLB,,,

## 2023-11-17 PROCEDURE — 87502 INFLUENZA DNA AMP PROBE: CPT | Mod: QW,S$GLB,,

## 2023-11-17 PROCEDURE — 99213 OFFICE O/P EST LOW 20 MIN: CPT | Mod: S$GLB,,,

## 2023-11-17 PROCEDURE — 87811 SARS-COV-2 COVID19 W/OPTIC: CPT | Mod: QW,S$GLB,,

## 2023-11-17 RX ORDER — PROMETHAZINE HYDROCHLORIDE AND DEXTROMETHORPHAN HYDROBROMIDE 6.25; 15 MG/5ML; MG/5ML
5 SYRUP ORAL EVERY 4 HOURS PRN
Qty: 180 ML | Refills: 0 | Status: SHIPPED | OUTPATIENT
Start: 2023-11-17 | End: 2023-11-24

## 2023-11-17 RX ORDER — PROMETHAZINE HYDROCHLORIDE AND DEXTROMETHORPHAN HYDROBROMIDE 6.25; 15 MG/5ML; MG/5ML
5 SYRUP ORAL EVERY 4 HOURS PRN
Qty: 180 ML | Refills: 0 | Status: SHIPPED | OUTPATIENT
Start: 2023-11-17 | End: 2023-11-17 | Stop reason: SDUPTHER

## 2023-11-17 NOTE — PROGRESS NOTES
"Subjective:      Patient ID: Peter Stiles is a 52 y.o. female.    Vitals:  height is 5' 6" (1.676 m) and weight is 73.5 kg (162 lb). Her oral temperature is 97.8 °F (36.6 °C). Her blood pressure is 142/83 (abnormal) and her pulse is 79. Her respiration is 18 and oxygen saturation is 98%.     Chief Complaint: Cough    Reports two negative at-home COVID tests performed last night and 2 days ago; though, she adds that the tests were .     Cough  This is a new problem. The current episode started in the past 7 days (onset 2 days ago). The problem has been gradually worsening. The problem occurs constantly. The cough is Productive of purulent sputum. Associated symptoms include chest pain, chills, ear congestion, ear pain, headaches, myalgias, nasal congestion, postnasal drip, rhinorrhea and a sore throat. Pertinent negatives include no fever, heartburn, hemoptysis, rash, shortness of breath, sweats, weight loss or wheezing. Associated symptoms comments: Positive for: eye swelling. Treatments tried: OTC antihistamine, ibuprofen. The treatment provided no relief. There is no history of asthma, bronchiectasis, bronchitis, COPD, emphysema, environmental allergies or pneumonia.       Constitution: Positive for chills. Negative for fever.   HENT:  Positive for ear pain, postnasal drip and sore throat.    Cardiovascular:  Positive for chest pain.   Respiratory:  Positive for cough. Negative for bloody sputum, shortness of breath and wheezing.    Gastrointestinal:  Negative for heartburn.   Musculoskeletal:  Positive for muscle ache.   Skin:  Negative for rash.   Allergic/Immunologic: Negative for environmental allergies.   Neurological:  Positive for headaches.      Objective:     Physical Exam   Constitutional: She is oriented to person, place, and time. She appears well-developed. She is cooperative.  Non-toxic appearance. She does not appear ill. No distress.      Comments:Patient sits comfortably in exam " chair. Answers questions in complete sentences. Does not show any signs of distress or discoloration.        HENT:   Head: Normocephalic and atraumatic.   Ears:   Right Ear: Hearing, tympanic membrane, external ear and ear canal normal. impacted cerumen  Left Ear: Hearing, tympanic membrane, external ear and ear canal normal. impacted cerumen  Nose: Mucosal edema, rhinorrhea and congestion present. No nasal deformity. No epistaxis. Right sinus exhibits no maxillary sinus tenderness and no frontal sinus tenderness. Left sinus exhibits no maxillary sinus tenderness and no frontal sinus tenderness.   Mouth/Throat: Uvula is midline and mucous membranes are normal. No trismus in the jaw. Normal dentition. No uvula swelling. Posterior oropharyngeal erythema present. No oropharyngeal exudate or posterior oropharyngeal edema.   Eyes: Conjunctivae and lids are normal. No scleral icterus.   Neck: Trachea normal and phonation normal. Neck supple. No edema present. No erythema present. No neck rigidity present.   Cardiovascular: Normal rate, regular rhythm, normal heart sounds and normal pulses.   Pulmonary/Chest: Effort normal. No stridor. No respiratory distress. She has no decreased breath sounds. She has no wheezes. She has no rhonchi. She has no rales.   Abdominal: Normal appearance.   Musculoskeletal: Normal range of motion.         General: No deformity. Normal range of motion.   Lymphadenopathy:     She has no cervical adenopathy.        Right cervical: No superficial cervical, no deep cervical and no posterior cervical adenopathy present.       Left cervical: No superficial cervical, no deep cervical and no posterior cervical adenopathy present.   Neurological: She is alert and oriented to person, place, and time. She exhibits normal muscle tone. Coordination normal.   Skin: Skin is warm, dry, intact, not diaphoretic and not pale.   Psychiatric: Her speech is normal and behavior is normal. Judgment and thought content  normal.   Nursing note and vitals reviewed.    Results for orders placed or performed in visit on 11/17/23   POCT Influenza A/B MOLECULAR   Result Value Ref Range    POC Molecular Influenza A Ag Negative Negative, Not Reported    POC Molecular Influenza B Ag Negative Negative, Not Reported     Acceptable Yes    SARS Coronavirus 2 Antigen, POCT Manual Read   Result Value Ref Range    SARS Coronavirus 2 Antigen Negative Negative     Acceptable Yes        Assessment:     1. Acute cough    2. Viral URI with cough        Plan:       Acute cough  -     POCT Influenza A/B MOLECULAR  -     SARS Coronavirus 2 Antigen, POCT Manual Read    Viral URI with cough  -     promethazine-dextromethorphan (PROMETHAZINE-DM) 6.25-15 mg/5 mL Syrp; Take 5 mLs by mouth every 4 (four) hours as needed (cough).  Dispense: 180 mL; Refill: 0                Patient Instructions   - Rest.    - Drink plenty of fluids. Increasing your fluid intake will help loosen up mucous.  - Viral upper respiratory infections typically run their course in 10-14 days.      - You can take over-the-counter claritin, zyrtec, allegra, OR xyzal as directed. These are antihistamines that can help with runny nose, nasal congestion, sneezing, and helps to dry up post-nasal drip, which usually causes sore throat and cough.    - You can use Flonase (fluticasone) nasal spray as directed for sinus congestion and postnasal drip. This is a steroid nasal spray that works locally over time to decrease the inflammation in your nose/sinuses and help with allergic symptoms. This is not an quick- relief spray like afrin, but it works well if used daily.  Discontinue if you develop nose bleed  - Use nasal saline prior to Flonase.  - Use Ocean Spray Nasal Saline 1-3 puffs each nostril every 2-3 hours then blow out onto tissue. This is to irrigate the nasal passage way to clear the sinus openings. Use until sinus problem resolved.    - A Neti Pot with  sterile saline can help break up nasal congestion and give relief.      - Chloraseptic throat spray can help numb the throat.     - Warm salt water gargles can help with sore throat.  - Warm tea with honey can help with sore throat and cough. Honey is a natural cough suppressant.     - Acetaminophen (tylenol) or Ibuprofen (advil,motrin) as directed as needed for fever/pain. Avoid tylenol if you have a history of liver disease. Do not take ibuprofen if you have a history of GI bleeding, kidney disease, or if you take blood thinners.   - Ibuprofen dosing for adults: 400 mg by mouth every 4-6 hours as needed. Max: 2400 mg/day; Info: use lowest effective dose, shortest effective treatment duration; give w/ food if GI upset occurs.  - Ibuprofen dosing for children: [6 mo-12 yo] Dose: 5-10 mg/kg/dose by mouth every 6-8h as needed; Max: 40 mg/kg/day; Info: use lower dose for fever <102.5 F, higher dose for fever >102.5 F; use shortest effective tx duration; give w/ food if GI upset occurs. [11 yo and older] Dose: 200-400 mg by mouth every 4-6 hours as needed; Max: 1200 mg/day; Info: use lowest effective dose, shortest effective tx duration; give w/ food if GI upset occurs.  - Tylenol dosing for adults: [By mouth route, immediate-release form] Dose: 325-1000 mg by mouth every 4-6h as needed; Max: 1 g/4h and 4 g/day from all sources. [By mouth route, extended-release form] Dose: 650-1300 mg Extended Release by mouth every 8h as needed; Max: 4 g/day from all sources.   - Tylenol dosing for children: 6-12 yo [ oral tablet/capsule ] Dose: 1 tab/cap by mouth every 4-6h as needed; Max: 5 tabs or caps/24h; Info: do not exceed 75 mg/kg/day, up to 1 g/4h and 4 g/day, from all sources. 11 yo and older [ oral tablet/capsule ] Dose: 1-2 tabs/caps by mouth every 4-6h as needed; Max: 10 tabs or caps/24h; Info: do not exceed 1 g/4h and 4 g/day from all sources.    - You must understand that you have received an Urgent Care treatment only  and that you may be released before all of your medical problems are known or treated.   - You, the patient, will arrange for follow up care as instructed.   - If your condition worsens or fails to improve we recommend that you receive another evaluation at the ER immediately or contact your PCP to discuss your concerns or return here.   - Follow up with your PCP or specialty clinic as directed in the next 1-2 weeks if not improved or as needed.  You can call [phone] to schedule an appointment with the appropriate provider.    If your symptoms do not improve or worsen, go to the emergency room immediately.

## 2023-11-17 NOTE — PATIENT INSTRUCTIONS
- Rest.    - Drink plenty of fluids. Increasing your fluid intake will help loosen up mucous.  - Viral upper respiratory infections typically run their course in 10-14 days.      - You can take over-the-counter claritin, zyrtec, allegra, OR xyzal as directed. These are antihistamines that can help with runny nose, nasal congestion, sneezing, and helps to dry up post-nasal drip, which usually causes sore throat and cough.    - You can use Flonase (fluticasone) nasal spray as directed for sinus congestion and postnasal drip. This is a steroid nasal spray that works locally over time to decrease the inflammation in your nose/sinuses and help with allergic symptoms. This is not an quick- relief spray like afrin, but it works well if used daily.  Discontinue if you develop nose bleed  - Use nasal saline prior to Flonase.  - Use Ocean Spray Nasal Saline 1-3 puffs each nostril every 2-3 hours then blow out onto tissue. This is to irrigate the nasal passage way to clear the sinus openings. Use until sinus problem resolved.    - A Neti Pot with sterile saline can help break up nasal congestion and give relief.      - Chloraseptic throat spray can help numb the throat.     - Warm salt water gargles can help with sore throat.  - Warm tea with honey can help with sore throat and cough. Honey is a natural cough suppressant.     - Acetaminophen (tylenol) or Ibuprofen (advil,motrin) as directed as needed for fever/pain. Avoid tylenol if you have a history of liver disease. Do not take ibuprofen if you have a history of GI bleeding, kidney disease, or if you take blood thinners.   - Ibuprofen dosing for adults: 400 mg by mouth every 4-6 hours as needed. Max: 2400 mg/day; Info: use lowest effective dose, shortest effective treatment duration; give w/ food if GI upset occurs.  - Ibuprofen dosing for children: [6 mo-10 yo] Dose: 5-10 mg/kg/dose by mouth every 6-8h as needed; Max: 40 mg/kg/day; Info: use lower dose for fever <102.5 F,  higher dose for fever >102.5 F; use shortest effective tx duration; give w/ food if GI upset occurs. [13 yo and older] Dose: 200-400 mg by mouth every 4-6 hours as needed; Max: 1200 mg/day; Info: use lowest effective dose, shortest effective tx duration; give w/ food if GI upset occurs.  - Tylenol dosing for adults: [By mouth route, immediate-release form] Dose: 325-1000 mg by mouth every 4-6h as needed; Max: 1 g/4h and 4 g/day from all sources. [By mouth route, extended-release form] Dose: 650-1300 mg Extended Release by mouth every 8h as needed; Max: 4 g/day from all sources.   - Tylenol dosing for children: 6-10 yo [ oral tablet/capsule ] Dose: 1 tab/cap by mouth every 4-6h as needed; Max: 5 tabs or caps/24h; Info: do not exceed 75 mg/kg/day, up to 1 g/4h and 4 g/day, from all sources. 13 yo and older [ oral tablet/capsule ] Dose: 1-2 tabs/caps by mouth every 4-6h as needed; Max: 10 tabs or caps/24h; Info: do not exceed 1 g/4h and 4 g/day from all sources.    - You must understand that you have received an Urgent Care treatment only and that you may be released before all of your medical problems are known or treated.   - You, the patient, will arrange for follow up care as instructed.   - If your condition worsens or fails to improve we recommend that you receive another evaluation at the ER immediately or contact your PCP to discuss your concerns or return here.   - Follow up with your PCP or specialty clinic as directed in the next 1-2 weeks if not improved or as needed.  You can call [phone] to schedule an appointment with the appropriate provider.    If your symptoms do not improve or worsen, go to the emergency room immediately.

## 2023-12-19 NOTE — PT/OT/SLP PROGRESS
Occupational Therapy   Co-Treatment    Name: Peter Stiles  MRN: [id number]  Admitting Diagnosis:  Osteomyelitis of cervical spine  6 Days Post-Op    Recommendations:     Discharge Recommendations: rehabilitation facility  Discharge Equipment Recommendations:   (TBD)  Barriers to discharge:  Decreased caregiver support    Assessment:     Peter Stiles is a 51 y.o. female with a medical diagnosis of Osteomyelitis of cervical spine.  Performance deficits affecting function are weakness, gait instability, decreased upper extremity function, decreased ROM, impaired endurance, impaired balance, decreased lower extremity function, impaired sensation, decreased safety awareness, impaired self care skills, impaired cognition, impaired functional mobility, decreased coordination, pain, impaired skin, impaired fine motor, impaired cardiopulmonary response to activity.     Pt tolerated session well, with main c/o LLE pain to light touch and movement. Pt utilized hand gestures and writing on notepad to communicate throughout session. Tx session focus on orienting to upright sitting to increase trunk control/strengthening, sitting balance, and increasing activity tolerance while engaging in salient tasks of drawing and brushing hair for ~15 minutes. Pt req'd  Max A x 2  w/ fxnl bed mobility, EOB balance requiring  Mod-Max A with 1 HHA , Mod A x 2 for functional stand , and minimal assist w/ ADLs on this date as further detailed below. Pt is progressing towards fxnl goals, D/C rec to  rehab facility . Cont POC.   Plan:     Patient to be seen 4 x/week to address the above listed problems via self-care/home management, therapeutic activities, therapeutic exercises, neuromuscular re-education  Plan of Care Expires: 12/09/22  Plan of Care Reviewed with: patient    Subjective     Pain/Comfort:  Pain Rating 1: 6/10  Location - Side 1: Left  Location 1: leg  Pain Addressed 1: Pre-medicate for activity, Reposition, Distraction,  Cessation of Activity  Pain Rating Post-Intervention 1: 6/10    Objective:     Communicated with: nurse prior to session.  Patient found supine with bed alarm, pressure relief boots, peripheral IV, telemetry, pulse ox (continuous), oxygen, cervical collar, NG tube, PEG Tube upon OT entry to room.    General Precautions: Standard, aspiration, fall, NPO   Orthopedic Precautions:spinal precautions   Braces: Aspen collar  Respiratory Status:  trach    Bed Mobility:    Patient completed Rolling/Turning to Left with  maximal assistance   Patient completed Rolling/Turning to Right with maximal assistance  Patient completed Scooting/Bridging with maximal assistance  Patient completed Supine to Sit with maximal assistance and 2 persons  Patient completed Sit to Supine with maximal assistance and 2 persons  Scoot to HOB: Max A x 2     Functional Mobility/Transfers:  Patient completed Sit <> Stand Transfer with moderate assistance x 2  with BUE hand-held assist   Stood for ~10 seconds with max cueing for upright posture  Sitting EOB static balance: Pt sat EOB ~15 min with Mod-Max A  Max cueing for upright posture  Brief moments of CGA, utilizing LUE as stabilizing assist on bed rail    Dynamic EOB sitting balance: pt engaged in functional reaching activity within/outside MO to promote anterior trunk flexion and lateral weight shifting x 5 in each plane with maximal effort and tactile cues to reorient back to midline.     Activities of Daily Living:  Grooming: minimum assistance to brush hair while seated EOB   Pt completed 1 drawing of herself with paper and pen on hard notepad while seated EOB  Lower Body Dressing: maximal assistance to carito socks while long sitting in bed      [facility] 6 Click ADL: 11    Treatment & Education:  Pt educated on role of OT, POC, and goals for therapy.    POC was dicussed with patient/caregiver, who was included in its development and is in agreement with the identified goals and treatment plan.    Patient and family aware of patient's deficits and therapy progression.   Time provided for therapeutic counseling and discussion of health disposition.   Educated on importance of EOB/OOB mobility, maintaining routine, sitting up in chair, and maximizing independence with ADLs during admission   Pt completed ADLs and functional mobility for treatment session as noted above   Pt/caregiver verbalized understanding and expressed no further concerns/questions.      Patient left supine with all lines intact, call button in reach, bed alarm on, and nurse notified    GOALS:   Multidisciplinary Problems       Occupational Therapy Goals          Problem: Occupational Therapy    Goal Priority Disciplines Outcome Interventions   Occupational Therapy Goal     OT, PT/OT Ongoing, Progressing    Description: Goals to be met by: 12/09    Patient will increase functional independence with ADLs by performing:      UE Dressing with Contact Guard Assistance.  Grooming while EOB with Contact Guard Assistance  Patient will complete sit to stand with RW min (A).  Stand pivot to chair with mod (A)                         Time Tracking:     OT Date of Treatment: 11/13/22  OT Start Time: 1308  OT Stop Time: 1348  OT Total Time (min): 40 min    Billable Minutes:Self Care/Home Management 10  Neuromuscular Re-education 30    OT/BE: OT     BE Visit Number: 0    11/13/2022   Render Post-Care Instructions In Note?: no Consent: The patient's consent was obtained and the risks including but not limited to risks of crusting, scabbing, blistering, scarring, darker or lighter pigmentary change, recurrence, incomplete removal and infection were discussed prior to the procedure. Show Aperture Variable?: Yes Detail Level: Detailed Post-Care Instructions: I reviewed with the patient in detail post-care instructions. Patient is to wear sunprotection, and avoid picking at any of the treated lesions. Pt may apply Vaseline to crusted or scabbing areas. Duration Of Freeze Thaw-Cycle (Seconds): 0

## 2024-01-11 ENCOUNTER — OFFICE VISIT (OUTPATIENT)
Dept: URGENT CARE | Facility: CLINIC | Age: 54
End: 2024-01-11
Payer: MEDICARE

## 2024-01-11 VITALS
TEMPERATURE: 99 F | BODY MASS INDEX: 26.03 KG/M2 | OXYGEN SATURATION: 95 % | DIASTOLIC BLOOD PRESSURE: 90 MMHG | HEIGHT: 66 IN | RESPIRATION RATE: 18 BRPM | WEIGHT: 162 LBS | HEART RATE: 92 BPM | SYSTOLIC BLOOD PRESSURE: 150 MMHG

## 2024-01-11 DIAGNOSIS — R52 PAIN: ICD-10-CM

## 2024-01-11 DIAGNOSIS — L02.91 ABSCESS: Primary | ICD-10-CM

## 2024-01-11 PROCEDURE — 10060 I&D ABSCESS SIMPLE/SINGLE: CPT | Mod: S$GLB,,,

## 2024-01-11 PROCEDURE — 96372 THER/PROPH/DIAG INJ SC/IM: CPT | Mod: 59,S$GLB,,

## 2024-01-11 PROCEDURE — 99213 OFFICE O/P EST LOW 20 MIN: CPT | Mod: 25,S$GLB,,

## 2024-01-11 RX ORDER — KETOROLAC TROMETHAMINE 30 MG/ML
15 INJECTION, SOLUTION INTRAMUSCULAR; INTRAVENOUS ONCE
Status: COMPLETED | OUTPATIENT
Start: 2024-01-11 | End: 2024-01-11

## 2024-01-11 RX ORDER — SULFAMETHOXAZOLE AND TRIMETHOPRIM 800; 160 MG/1; MG/1
1 TABLET ORAL 2 TIMES DAILY
Qty: 14 TABLET | Refills: 0 | Status: SHIPPED | OUTPATIENT
Start: 2024-01-11 | End: 2024-01-18

## 2024-01-11 RX ADMIN — KETOROLAC TROMETHAMINE 15 MG: 30 INJECTION, SOLUTION INTRAMUSCULAR; INTRAVENOUS at 02:01

## 2024-01-11 NOTE — PATIENT INSTRUCTIONS
Take Bactrim twice a day for 7 days. Follow up with infectious disease if wound worsens after course of antibiotics. You may remove packing in 2 days.      What can be done to prevent this health problem?   Practice proper hygiene. Practice good hand washing, especially before and after touching the cut site.  Do not share towels, razors, and other personal things with someone else.  Be careful when you shave your face, underarms, or legs to avoid scratching or cutting your skin.  If you cut yourself, keep the area clean and put petroleum jelly on it.  If your infection is caused by MRSA, ask your doctor how to try to get rid of this bacteria.  When do I need to call the doctor?   You get a fever of 100.4°F (38°C) or higher and have a red rash all over your body with red eyes, diarrhea, and/or mouth sores.  You are confused or very sleepy.  To have your packing changed if your wound is packed.  You have a fever of 100.4°F (38°C) or higher or chills.  Your wound is swollen, red, or warm.  Your wound has thick yellow or green drainage.  - Rest.    - Drink plenty of fluids.    - Acetaminophen (tylenol) or Ibuprofen (advil,motrin) as directed as needed for fever/pain. Avoid tylenol if you have a history of liver disease. Do not take ibuprofen if you have a history of GI bleeding, kidney disease, or if you take blood thinners.     - Follow up with your PCP or specialty clinic as directed in the next 1-2 weeks if not improved or as needed.  You can call [phone] to schedule an appointment with the appropriate provider.    - Go to the ER or seek medical attention immediately if you develop new or worsening symptoms.     - You must understand that you have received an Urgent Care treatment only and that you may be released before all of your medical problems are known or treated.   - You, the patient, will arrange for follow up care as instructed.   - If your condition worsens or fails to improve we recommend that you  receive another evaluation at the ER immediately or contact your PCP to discuss your concerns or return here.      Quality 265: Biopsy Follow-Up: Biopsy results reviewed, communicated, tracked, and documented Detail Level: Detailed Quality 130: Documentation Of Current Medications In The Medical Record: Current Medications Documented Quality 110: Preventive Care And Screening: Influenza Immunization: Influenza Immunization Ordered or Recommended, but not Administered Quality 431: Preventive Care And Screening: Unhealthy Alcohol Use - Screening: Patient screened for unhealthy alcohol use using a single question and scores less than 2 times per year Quality 226: Preventive Care And Screening: Tobacco Use: Screening And Cessation Intervention: Patient screened for tobacco and never smoked

## 2024-01-11 NOTE — PROCEDURES
Incision & Drainage    Date/Time: 1/11/2024 1:30 PM    Performed by: Santana Mo NP  Authorized by: Santana Mo NP    Consent Done?:  Yes (Verbal)    Type:  Abscess  Body area:  Upper extremity  Location details:  Right shoulder  Anesthesia:  Local infiltration  Local anesthetic: Lidocaine 1% without epinephrine  Anesthetic total (ml):  1  Scalpel size:  10  Incision type:  Single straight  Incision depth: dermal    Complexity:  Simple  Drainage:  Pus, serosanguinous and purulent  Drainage amount:  Moderate  Wound treatment:  Drainage  Packing material:  1/4 in gauze  Patient tolerance:  Patient tolerated the procedure well with no immediate complications

## 2024-01-11 NOTE — PROGRESS NOTES
"Subjective:      Patient ID: Peter Stiles is a 53 y.o. female.    Vitals:  height is 5' 6" (1.676 m) and weight is 73.5 kg (162 lb). Her oral temperature is 98.9 °F (37.2 °C). Her blood pressure is 150/90 (abnormal) and her pulse is 92. Her respiration is 18 and oxygen saturation is 95%.     Chief Complaint: Abscess    53-year-old female patient presents with abscess to her right shoulder ongoing for the last week.  Patient denies any fever or discharge.  Patient states it is very painful and she has been taking ibuprofen for her pain with mild relief.  Patient is here to have it drained.        Abscess  Chronicity:  RecurrentProgression Since Onset: rapidly worsening  Location:  Shoulder/arm  Associated Symptoms: no fever, no chills  Characteristics: painful, swelling and bruising    Characteristics: no itching    Pain Scale:  7/10  Ineffective treatments: ibuprofen.  Relieved by: ibuprofen.  Worsened by:  None tried      Constitution: Negative for activity change, appetite change, chills, sweating, fatigue and fever.   HENT:  Negative for ear pain, ear discharge, foreign body in ear, tinnitus, hearing loss, foreign body in nose, postnasal drip, sinus pain, sinus pressure and sore throat.    Neck: Negative for neck pain, neck stiffness, painful lymph nodes and neck swelling.   Cardiovascular:  Negative for chest trauma, chest pain and leg swelling.   Eyes:  Negative for eye trauma, foreign body in eye, eye discharge, eye itching, eye pain, eye redness and photophobia.   Respiratory:  Negative for sleep apnea, chest tightness, cough, sputum production, bloody sputum, COPD and asthma.    Gastrointestinal:  Negative for abdominal trauma, abdominal pain, abdominal bloating and history of abdominal surgery.   Endocrine: hair loss, cold intolerance, heat intolerance and excessive thirst.   Genitourinary:  Negative for dysuria, frequency, urgency, urine decreased, flank pain and bladder incontinence. "   Musculoskeletal:  Negative for pain, trauma, joint pain and joint swelling.   Skin:  Positive for abscess. Negative for color change, pale, rash, wound, abrasion, laceration, lesion, skin thickening/induration, puncture wound, erythema and bruising.        Right shoulder abscess   Allergic/Immunologic: Negative for environmental allergies, seasonal allergies, food allergies, eczema, asthma, immunocompromised state and recurrent sinus infections.   Neurological:  Negative for dizziness, history of vertigo, light-headedness, passing out, facial drooping, disorientation and altered mental status.   Hematologic/Lymphatic: Negative for swollen lymph nodes, easy bruising/bleeding, trouble clotting and history of blood clots. Does not bruise/bleed easily.   Psychiatric/Behavioral:  Negative for altered mental status, disorientation, confusion, agitation, nervous/anxious and sleep disturbance. The patient is not nervous/anxious.       Objective:     Physical Exam   Constitutional: She is oriented to person, place, and time. She appears well-developed.   HENT:   Head: Normocephalic and atraumatic. Head is without abrasion, without contusion and without laceration.   Ears:   Right Ear: External ear normal.   Left Ear: External ear normal.   Nose: Nose normal.   Mouth/Throat: Oropharynx is clear and moist and mucous membranes are normal.   Eyes: Conjunctivae, EOM and lids are normal. Pupils are equal, round, and reactive to light.   Neck: Trachea normal and phonation normal. Neck supple.   Cardiovascular: Normal rate, regular rhythm and normal heart sounds.   Pulmonary/Chest: Effort normal and breath sounds normal. No stridor. No respiratory distress.   Abdominal: Bowel sounds are normal. Soft.   Musculoskeletal: Normal range of motion.         General: Normal range of motion.   Neurological: She is alert and oriented to person, place, and time.   Skin: Skin is warm, dry, intact, not pale and no rash. Capillary refill takes  less than 2 seconds. No abrasion, No burn, No bruising, No erythema, No ecchymosis and No lesion         Comments: Abscess to right shoulder jaundice  Psychiatric: Her speech is normal and behavior is normal. Judgment and thought content normal.   Nursing note and vitals reviewed.      Assessment:     1. Abscess    2. Pain        Plan:       Abscess  -     Incision and drainage; Future  -     sulfamethoxazole-trimethoprim 800-160mg (BACTRIM DS) 800-160 mg Tab; Take 1 tablet by mouth 2 (two) times daily. for 7 days  Dispense: 14 tablet; Refill: 0  -     Ambulatory referral/consult to Infectious Disease  -     Incision & Drainage    Pain  -     ketorolac injection 15 mg             Additional MDM:     Heart Failure Score:   COPD = No

## 2024-03-04 NOTE — ASSESSMENT & PLAN NOTE
Hx of, but echo today 55% EF and normal size chambers    The left ventricle is normal in size with normal systolic function. The estimated ejection fraction is 55%.   Normal left ventricular diastolic function.   Normal right ventricular size with normal right ventricular systolic function.   Mild tricuspid regurgitation.   The estimated PA systolic pressure is 20 mmHg.   Normal central venous pressure (3 mmHg).   after neurosurgical intervention     LISHA 11/4 on hold per cardiology     yes bone marrow biopsy/yes

## 2024-09-12 NOTE — SUBJECTIVE & OBJECTIVE
"Hematology/Oncology Follow-up Note  [facility]    Today's Date: 09/12/24   Date of Admission:  9/9/2024   Reason for Consult: leukocytosis       ASSESSMENT/ PLAN : Alessio Teixeira is a 86 year old year old male who presented this hospitalization with dark stools and elevated troponin. They have a pertinent medical history of Afib (coumadin), PE, previous colectomy d/t perforated bowel, myasthenia gravis, HTN.     Leukocytosis   Admission WBC 43.4   Increase baso- myelocytes and mild tear drops   Today WBC 32.3  Myeloproliferative neoplasm work-up pending    Anemia   Likely related to GI bleed   Admission hemoglobin 7.6--  acute decline from 11-12   EGD 9/11/24 healing gastritis- no active bleed    PLAN:  Per GI okay to restart anticoagulant 9/13 if no sign/symptoms of bleeding.   Lab work-up pending  Will continue to follow       DISCUSSION:  Discussed with patient, wife, and daughter present.     We discussed the lab work that was collected 9/11 in the afternoon, and the differential diagnosis.   Until we get more information, if would be ideal to hold off on a CABG procedure.     Patient and family are understanding and agreeable to the above plan. All questions were answered in person.     PHYSICAL EXAM:  Vital signs:  Temp: 97.8  F (36.6  C) Temp src: Oral BP: 112/64 Pulse: 73   Resp: 10 SpO2: 97 % O2 Device: None (Room air) Oxygen Delivery: 2 LPM   Weight: 101.4 kg (223 lb 8.7 oz)  Estimated body mass index is 32.18 kg/m  as calculated from the following:    Height as of 8/29/24: 1.775 m (5' 9.88\").    Weight as of this encounter: 101.4 kg (223 lb 8.7 oz).  GENERAL/CONSTITUTIONAL: No acute distress.    Labs Reviewed: CBC, CMP             45 minutes spent on the date of the encounter doing review of outside records, review of test results, interpretation of tests, patient visit, and documentation     Amarilys CASTILLO CNP  Hematology/Oncology  [facility]     Securely message with Dimmi   Pager " Interval History: NAEON. Neuro stable. Febrile with Tmax 100.6 @0422. Patient denies fevers or chills. BLE very tender. Recommend BLE US. No leukocytosis. Encourage IS hourly.     Medications:  Continuous Infusions:  Scheduled Meds:   cetirizine  5 mg Per NG tube Daily    famotidine  20 mg Per NG tube BID    heparin (porcine)  5,000 Units Subcutaneous Q8H    methadone  90 mg Per NG tube Daily    nicotine  1 patch Transdermal Daily    oxacillin 12 g in  mL CONTINUOUS INFUSION  12 g Intravenous Q24H    oxyCODONE  5 mg Per NG tube Q6H    polyethylene glycol  17 g Per NG tube Daily    pregabalin  200 mg Per NG tube TID    scopolamine  1 patch Transdermal Q3 Days    senna-docusate 8.6-50 mg  1 tablet Per NG tube BID    silodosin  4 mg Per NG tube Daily     PRN Meds:acetaminophen, albuterol-ipratropium, benzocaine, bisacodyL, dextrose 10%, dextrose 10%, diazePAM, fentaNYL, glucagon (human recombinant), hydrALAZINE, hydrOXYzine HCL, labetalol, magnesium oxide, magnesium oxide, ondansetron, potassium bicarbonate, potassium bicarbonate, potassium bicarbonate, potassium, sodium phosphates, potassium, sodium phosphates, potassium, sodium phosphates, racepinephrine, simethicone, sodium chloride 0.9%     Review of Systems  Objective:     Weight:  (daylight savings)  Body mass index is 22.6 kg/m².  Vital Signs (Most Recent):  Temp: 98.7 °F (37.1 °C) (11/12/22 0730)  Pulse: (!) 59 (11/12/22 0746)  Resp: 16 (11/12/22 0730)  BP: 110/62 (11/12/22 0730)  SpO2: 96 % (11/12/22 0730)   Vital Signs (24h Range):  Temp:  [98.5 °F (36.9 °C)-100.6 °F (38.1 °C)] 98.7 °F (37.1 °C)  Pulse:  [50-84] 59  Resp:  [9-21] 16  SpO2:  [93 %-98 %] 96 %  BP: (100-131)/(56-84) 110/62                Oxygen Concentration (%):  [28] 28         NG/OG Tube 11/06/22 Right nostril (Active)   Placement Check placement verified by aspirate characteristics;placement verified by distal tube length measurement;placement verified by x-ray 11/10/22 0704  "  Tolerance no signs/symptoms of discomfort 11/11/22 1800   Securement secured to nostril center 11/11/22 1800   Clamp Status/Tolerance unclamped 11/11/22 1800   Suction Setting/Drainage Method low;intermittent setting;suction at 11/07/22 1501   Insertion Site Appearance no redness, warmth, tenderness, skin breakdown, drainage 11/10/22 0702   Drainage Green 11/07/22 1501   Flush/Irrigation flushed w/;sterile normal saline;no resistance met 11/11/22 2000   Feeding Type continuous 11/11/22 2000   Feeding Action feeding continued 11/11/22 2000   Current Rate (mL/hr) 50 mL/hr 11/11/22 2000   Goal Rate (mL/hr) 50 mL/hr 11/11/22 2000   Intake (mL) 150 mL 11/10/22 2000   Water Bolus (mL) 150 mL 11/10/22 2000   Formula Name diabetasource 11/11/22 2000   Intake (mL) - Formula Tube Feeding 50 11/10/22 1002   Residual Amount (ml) 40 ml 11/11/22 2000            Urethral Catheter 11/10/22 1335 Latex 16 Fr. (Active)   Site Assessment Clean;Intact 11/11/22 2000   Collection Container Standard drainage bag 11/11/22 2000   Securement Method secured to top of thigh w/ adhesive device 11/11/22 2000   Catheter Care Performed yes 11/11/22 1800   Reason for Continuing Urinary Catheterization Urinary retention 11/11/22 1800   CAUTI Prevention Bundle Securement Device in place with 1" slack;Intact seal between catheter & drainage tubing;Drainage bag/urimeter off the floor;Sheeting clip in use;No dependent loops or kinks;Drainage bag/urimeter not overfilled (<2/3 full);Drainage bag/urimeter below bladder 11/11/22 0800   Output (mL) 350 mL 11/11/22 0532       Neurosurgery Physical Exam  General: Well developed, well nourished, not in acute distress.   Head: Normocephalic, atraumatic.  Neck: Trach in place.  Neurologic: Alert and oriented x 4. Thought content appropriate.  GCS: Motor:6  Verbal:5  Eyes:4   GCS Total: 15  Language: No aphasia.  Speech: No dysarthria.  Cranial nerves: Face symmetric, tongue midline, CN II-XII grossly intact. " #[phone]       Eyes: Pupils equal, round, reactive to light with accomodation, EOMI.  Pulmonary: Normal respirations, no signs of respiratory distress.  Abdomen: Soft, non-distended, non-tender to palpation.  Sensory: Intact to light touch throughout.  Motor Strength: Moves all extremities spontaneously with good tone. Full strength upper and lower extremities. No abnormal movements seen.      Strength   Deltoids Triceps Biceps Wrist Extension Wrist Flexion Hand    Upper: R 5/5 5/5 5/5 5/5 5/5 5/5     L 5/5 5/5 5/5 5/5 5/5 5/5       Hip Flexion Knee Extension Knee  Flexion Dorsiflexion Plantar flexion EHL   Lower: R 5/5 5/5 5/5 5/5 5/5 5/5     L 5/5 5/5 5/5 5/5 5/5 5/5      Skin: Skin is warm, dry and intact.  Significant Labs:  Recent Labs   Lab 11/10/22  1906 11/11/22  0301 11/12/22  0522   GLU  --  120* 90   NA  --  143 142   K 3.8 4.0 3.9   CL  --  107 106   CO2  --  27 28   BUN  --  16 16   CREATININE  --  0.8 0.7   CALCIUM  --  9.0 9.2   MG  --  2.0 2.0     Recent Labs   Lab 11/11/22  0459 11/12/22  0522   WBC 8.90 9.52   HGB 7.5* 7.7*   HCT 23.9* 23.9*    276     No results for input(s): LABPT, INR, APTT in the last 48 hours.  Microbiology Results (last 7 days)       Procedure Component Value Units Date/Time    Culture, Anaerobe [[id number]] Collected: 10/28/22 1029    Order Status: Completed Specimen: Wound from Neck Updated: 11/07/22 0632     Anaerobic Culture No anaerobes isolated    Narrative:      4) Epidural phlegman          All pertinent labs from the last 24 hours have been reviewed.    Significant Diagnostics:  I have reviewed all pertinent imaging results/findings within the past 24 hours.

## 2024-09-30 ENCOUNTER — TELEPHONE (OUTPATIENT)
Dept: INTERNAL MEDICINE | Facility: CLINIC | Age: 54
End: 2024-09-30
Payer: MEDICARE

## 2025-06-05 ENCOUNTER — LAB VISIT (OUTPATIENT)
Dept: LAB | Facility: HOSPITAL | Age: 55
End: 2025-06-05
Attending: INTERNAL MEDICINE
Payer: MEDICARE

## 2025-06-05 DIAGNOSIS — I10 HYPERTENSION, UNSPECIFIED TYPE: ICD-10-CM

## 2025-06-05 DIAGNOSIS — D53.9 NUTRITIONAL ANEMIA, UNSPECIFIED: ICD-10-CM

## 2025-06-05 DIAGNOSIS — Z13.1 SCREENING FOR DIABETES MELLITUS: ICD-10-CM

## 2025-06-05 DIAGNOSIS — G56.93 NEUROPATHY OF BOTH UPPER EXTREMITIES: ICD-10-CM

## 2025-06-05 DIAGNOSIS — D63.8 ANEMIA OF CHRONIC DISEASE: ICD-10-CM

## 2025-06-05 PROBLEM — B95.61 STAPHYLOCOCCUS AUREUS BACTEREMIA: Status: RESOLVED | Noted: 2022-10-27 | Resolved: 2025-06-05

## 2025-06-05 PROBLEM — R73.09 ELEVATED HEMOGLOBIN A1C: Status: ACTIVE | Noted: 2025-06-05

## 2025-06-05 PROBLEM — D62 ACUTE BLOOD LOSS ANEMIA: Status: RESOLVED | Noted: 2022-11-22 | Resolved: 2025-06-05

## 2025-06-05 PROBLEM — F11.20 OPIOID USE DISORDER, SEVERE, ON MAINTENANCE THERAPY, DEPENDENCE: Status: RESOLVED | Noted: 2022-10-25 | Resolved: 2025-06-05

## 2025-06-05 PROBLEM — R41.3 MEMORY IMPAIRMENT: Status: ACTIVE | Noted: 2025-06-05

## 2025-06-05 PROBLEM — Z98.890 HISTORY OF TRACHEOSTOMY: Status: ACTIVE | Noted: 2025-06-05

## 2025-06-05 PROBLEM — Z93.0 TRACHEOSTOMY IN PLACE: Status: RESOLVED | Noted: 2022-11-23 | Resolved: 2025-06-05

## 2025-06-05 PROBLEM — M46.22 OSTEOMYELITIS OF CERVICAL SPINE: Status: RESOLVED | Noted: 2022-10-25 | Resolved: 2025-06-05

## 2025-06-05 PROBLEM — Z87.11 HISTORY OF PEPTIC ULCER: Status: ACTIVE | Noted: 2025-06-05

## 2025-06-05 PROBLEM — R16.2 HEPATOSPLENOMEGALY: Status: ACTIVE | Noted: 2025-06-05

## 2025-06-05 PROBLEM — R78.81 STAPHYLOCOCCUS AUREUS BACTEREMIA: Status: RESOLVED | Noted: 2022-10-27 | Resolved: 2025-06-05

## 2025-06-05 PROBLEM — Z93.0 TRACHEOSTOMY DEPENDENCE: Status: RESOLVED | Noted: 2022-11-23 | Resolved: 2025-06-05

## 2025-06-05 PROBLEM — M54.2 NECK PAIN: Status: ACTIVE | Noted: 2025-06-05

## 2025-06-05 PROBLEM — L89.150 PRESSURE INJURY OF SACRAL REGION, UNSTAGEABLE: Status: RESOLVED | Noted: 2022-11-22 | Resolved: 2025-06-05

## 2025-06-05 LAB
ABSOLUTE EOSINOPHIL (OHS): 0.22 K/UL
ABSOLUTE MONOCYTE (OHS): 0.8 K/UL (ref 0.3–1)
ABSOLUTE NEUTROPHIL COUNT (OHS): 6.16 K/UL (ref 1.8–7.7)
ALBUMIN SERPL BCP-MCNC: 3.8 G/DL (ref 3.5–5.2)
ALP SERPL-CCNC: 137 UNIT/L (ref 40–150)
ALT SERPL W/O P-5'-P-CCNC: 35 UNIT/L (ref 10–44)
ANION GAP (OHS): 9 MMOL/L (ref 8–16)
AST SERPL-CCNC: 40 UNIT/L (ref 11–45)
BASOPHILS # BLD AUTO: 0.03 K/UL
BASOPHILS NFR BLD AUTO: 0.2 %
BILIRUB SERPL-MCNC: 0.6 MG/DL (ref 0.1–1)
BUN SERPL-MCNC: 11 MG/DL (ref 6–20)
CALCIUM SERPL-MCNC: 9.8 MG/DL (ref 8.7–10.5)
CHLORIDE SERPL-SCNC: 101 MMOL/L (ref 95–110)
CHOLEST SERPL-MCNC: 207 MG/DL (ref 120–199)
CHOLEST/HDLC SERPL: 6.7 {RATIO} (ref 2–5)
CO2 SERPL-SCNC: 24 MMOL/L (ref 23–29)
CREAT SERPL-MCNC: 1 MG/DL (ref 0.5–1.4)
EAG (OHS): 272 MG/DL (ref 68–131)
ERYTHROCYTE [DISTWIDTH] IN BLOOD BY AUTOMATED COUNT: 12.8 % (ref 11.5–14.5)
FERRITIN SERPL-MCNC: 175.7 NG/ML (ref 20–300)
FOLATE SERPL-MCNC: 13 NG/ML (ref 4–24)
GFR SERPLBLD CREATININE-BSD FMLA CKD-EPI: >60 ML/MIN/1.73/M2
GLUCOSE SERPL-MCNC: 317 MG/DL (ref 70–110)
HBA1C MFR BLD: 11.1 % (ref 4–5.6)
HCT VFR BLD AUTO: 40.8 % (ref 37–48.5)
HDLC SERPL-MCNC: 31 MG/DL (ref 40–75)
HDLC SERPL: 15 % (ref 20–50)
HGB BLD-MCNC: 13.9 GM/DL (ref 12–16)
IMM GRANULOCYTES # BLD AUTO: 0.07 K/UL (ref 0–0.04)
IMM GRANULOCYTES NFR BLD AUTO: 0.6 % (ref 0–0.5)
IRON SATN MFR SERPL: 25 % (ref 20–50)
IRON SERPL-MCNC: 92 UG/DL (ref 30–160)
LDLC SERPL CALC-MCNC: 133.4 MG/DL (ref 63–159)
LYMPHOCYTES # BLD AUTO: 4.88 K/UL (ref 1–4.8)
MCH RBC QN AUTO: 30.8 PG (ref 27–31)
MCHC RBC AUTO-ENTMCNC: 34.1 G/DL (ref 32–36)
MCV RBC AUTO: 90 FL (ref 82–98)
NONHDLC SERPL-MCNC: 176 MG/DL
NUCLEATED RBC (/100WBC) (OHS): 0 /100 WBC
PLATELET # BLD AUTO: 257 K/UL (ref 150–450)
PMV BLD AUTO: 10.3 FL (ref 9.2–12.9)
POTASSIUM SERPL-SCNC: 4.6 MMOL/L (ref 3.5–5.1)
PROT SERPL-MCNC: 8.4 GM/DL (ref 6–8.4)
RBC # BLD AUTO: 4.52 M/UL (ref 4–5.4)
RELATIVE EOSINOPHIL (OHS): 1.8 %
RELATIVE LYMPHOCYTE (OHS): 40.1 % (ref 18–48)
RELATIVE MONOCYTE (OHS): 6.6 % (ref 4–15)
RELATIVE NEUTROPHIL (OHS): 50.7 % (ref 38–73)
RETICS/RBC NFR AUTO: 1.7 % (ref 0.5–2.5)
SODIUM SERPL-SCNC: 134 MMOL/L (ref 136–145)
TIBC SERPL-MCNC: 364 UG/DL (ref 250–450)
TRANSFERRIN SERPL-MCNC: 246 MG/DL (ref 200–375)
TRIGL SERPL-MCNC: 213 MG/DL (ref 30–150)
VIT B12 SERPL-MCNC: 399 PG/ML (ref 210–950)
WBC # BLD AUTO: 12.16 K/UL (ref 3.9–12.7)

## 2025-06-05 PROCEDURE — 85045 AUTOMATED RETICULOCYTE COUNT: CPT

## 2025-06-05 PROCEDURE — 84466 ASSAY OF TRANSFERRIN: CPT

## 2025-06-05 PROCEDURE — 85025 COMPLETE CBC W/AUTO DIFF WBC: CPT

## 2025-06-05 PROCEDURE — 36415 COLL VENOUS BLD VENIPUNCTURE: CPT

## 2025-06-05 PROCEDURE — 80053 COMPREHEN METABOLIC PANEL: CPT

## 2025-06-05 PROCEDURE — 82607 VITAMIN B-12: CPT

## 2025-06-05 PROCEDURE — 82728 ASSAY OF FERRITIN: CPT

## 2025-06-05 PROCEDURE — 83036 HEMOGLOBIN GLYCOSYLATED A1C: CPT

## 2025-06-05 PROCEDURE — 82746 ASSAY OF FOLIC ACID SERUM: CPT

## 2025-06-05 PROCEDURE — 80061 LIPID PANEL: CPT
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
## 2025-06-09 PROBLEM — E11.42 TYPE 2 DIABETES MELLITUS WITH DIABETIC POLYNEUROPATHY, WITHOUT LONG-TERM CURRENT USE OF INSULIN: Status: ACTIVE | Noted: 2025-06-09

## 2025-06-10 ENCOUNTER — HOSPITAL ENCOUNTER (EMERGENCY)
Facility: HOSPITAL | Age: 55
Discharge: HOME | End: 2025-06-10
Attending: EMERGENCY MEDICINE
Payer: MEDICARE

## 2025-06-10 VITALS
DIASTOLIC BLOOD PRESSURE: 74 MMHG | HEART RATE: 72 BPM | HEIGHT: 66 IN | WEIGHT: 163 LBS | RESPIRATION RATE: 16 BRPM | OXYGEN SATURATION: 96 % | TEMPERATURE: 98 F | BODY MASS INDEX: 26.2 KG/M2 | SYSTOLIC BLOOD PRESSURE: 132 MMHG

## 2025-06-10 DIAGNOSIS — R10.9 FLANK PAIN: Primary | ICD-10-CM

## 2025-06-10 LAB
ABSOLUTE EOSINOPHIL (OHS): 0.13 K/UL
ABSOLUTE MONOCYTE (OHS): 0.71 K/UL (ref 0.3–1)
ABSOLUTE NEUTROPHIL COUNT (OHS): 9.32 K/UL (ref 1.8–7.7)
ALBUMIN SERPL BCP-MCNC: 4 G/DL (ref 3.5–5.2)
ALP SERPL-CCNC: 137 UNIT/L (ref 40–150)
ALT SERPL W/O P-5'-P-CCNC: 32 UNIT/L (ref 10–44)
ANION GAP (OHS): 9 MMOL/L (ref 8–16)
AST SERPL-CCNC: 40 UNIT/L (ref 11–45)
BASOPHILS # BLD AUTO: 0.06 K/UL
BASOPHILS NFR BLD AUTO: 0.4 %
BILIRUB SERPL-MCNC: 0.4 MG/DL (ref 0.1–1)
BILIRUB UR QL STRIP.AUTO: NEGATIVE
BUN SERPL-MCNC: 11 MG/DL (ref 6–30)
BUN SERPL-MCNC: 12 MG/DL (ref 6–20)
CALCIUM SERPL-MCNC: 10 MG/DL (ref 8.7–10.5)
CHLORIDE SERPL-SCNC: 100 MMOL/L (ref 95–110)
CHLORIDE SERPL-SCNC: 100 MMOL/L (ref 95–110)
CLARITY UR: CLEAR
CO2 SERPL-SCNC: 26 MMOL/L (ref 23–29)
COLOR UR AUTO: YELLOW
CREAT SERPL-MCNC: 0.8 MG/DL (ref 0.5–1.4)
CREAT SERPL-MCNC: 0.9 MG/DL (ref 0.5–1.4)
ERYTHROCYTE [DISTWIDTH] IN BLOOD BY AUTOMATED COUNT: 12.7 % (ref 11.5–14.5)
GFR SERPLBLD CREATININE-BSD FMLA CKD-EPI: >60 ML/MIN/1.73/M2
GLUCOSE SERPL-MCNC: 258 MG/DL (ref 70–110)
GLUCOSE SERPL-MCNC: 265 MG/DL (ref 70–110)
GLUCOSE UR QL STRIP: NEGATIVE
HCT VFR BLD AUTO: 42.3 % (ref 37–48.5)
HCT VFR BLD CALC: 44 %PCV (ref 36–54)
HCV AB SERPL QL IA: NORMAL
HGB BLD-MCNC: 14.2 GM/DL (ref 12–16)
HGB UR QL STRIP: ABNORMAL
HIV 1+2 AB+HIV1 P24 AG SERPL QL IA: NORMAL
HOLD SPECIMEN: NORMAL
HOLD SPECIMEN: NORMAL
IMM GRANULOCYTES # BLD AUTO: 0.09 K/UL (ref 0–0.04)
IMM GRANULOCYTES NFR BLD AUTO: 0.6 % (ref 0–0.5)
KETONES UR QL STRIP: NEGATIVE
LEUKOCYTE ESTERASE UR QL STRIP: NEGATIVE
LIPASE SERPL-CCNC: 14 U/L (ref 4–60)
LYMPHOCYTES # BLD AUTO: 4.97 K/UL (ref 1–4.8)
MCH RBC QN AUTO: 30.4 PG (ref 27–31)
MCHC RBC AUTO-ENTMCNC: 33.6 G/DL (ref 32–36)
MCV RBC AUTO: 91 FL (ref 82–98)
NITRITE UR QL STRIP: NEGATIVE
NUCLEATED RBC (/100WBC) (OHS): 0 /100 WBC
PH UR STRIP: 7 [PH]
PLATELET # BLD AUTO: 316 K/UL (ref 150–450)
PMV BLD AUTO: 10.1 FL (ref 9.2–12.9)
POC IONIZED CALCIUM: 1.15 MMOL/L (ref 1.06–1.42)
POC TCO2 (MEASURED): 24 MMOL/L (ref 23–27)
POTASSIUM BLD-SCNC: 4.4 MMOL/L (ref 3.5–5.1)
POTASSIUM SERPL-SCNC: 4.4 MMOL/L (ref 3.5–5.1)
PROT SERPL-MCNC: 8.1 GM/DL (ref 6–8.4)
PROT UR QL STRIP: NEGATIVE
RBC # BLD AUTO: 4.67 M/UL (ref 4–5.4)
RELATIVE EOSINOPHIL (OHS): 0.9 %
RELATIVE LYMPHOCYTE (OHS): 32.5 % (ref 18–48)
RELATIVE MONOCYTE (OHS): 4.6 % (ref 4–15)
RELATIVE NEUTROPHIL (OHS): 61 % (ref 38–73)
SAMPLE: ABNORMAL
SODIUM BLD-SCNC: 136 MMOL/L (ref 136–145)
SODIUM SERPL-SCNC: 135 MMOL/L (ref 136–145)
SP GR UR STRIP: 1.01
UROBILINOGEN UR STRIP-ACNC: NEGATIVE EU/DL
WBC # BLD AUTO: 15.28 K/UL (ref 3.9–12.7)

## 2025-06-10 PROCEDURE — 86803 HEPATITIS C AB TEST: CPT | Performed by: PHYSICIAN ASSISTANT

## 2025-06-10 PROCEDURE — 87389 HIV-1 AG W/HIV-1&-2 AB AG IA: CPT | Performed by: PHYSICIAN ASSISTANT

## 2025-06-10 PROCEDURE — 99285 EMERGENCY DEPT VISIT HI MDM: CPT | Mod: 25

## 2025-06-10 PROCEDURE — 85025 COMPLETE CBC W/AUTO DIFF WBC: CPT | Performed by: EMERGENCY MEDICINE

## 2025-06-10 PROCEDURE — 63600175 PHARM REV CODE 636 W HCPCS: Mod: JZ,TB | Performed by: PHYSICIAN ASSISTANT

## 2025-06-10 PROCEDURE — 81003 URINALYSIS AUTO W/O SCOPE: CPT | Performed by: EMERGENCY MEDICINE

## 2025-06-10 PROCEDURE — 83690 ASSAY OF LIPASE: CPT | Performed by: EMERGENCY MEDICINE

## 2025-06-10 PROCEDURE — 80047 BASIC METABLC PNL IONIZED CA: CPT

## 2025-06-10 PROCEDURE — 63600175 PHARM REV CODE 636 W HCPCS: Performed by: EMERGENCY MEDICINE

## 2025-06-10 PROCEDURE — 82330 ASSAY OF CALCIUM: CPT | Mod: 59

## 2025-06-10 PROCEDURE — 84450 TRANSFERASE (AST) (SGOT): CPT | Performed by: EMERGENCY MEDICINE

## 2025-06-10 PROCEDURE — 96374 THER/PROPH/DIAG INJ IV PUSH: CPT

## 2025-06-10 PROCEDURE — 96375 TX/PRO/DX INJ NEW DRUG ADDON: CPT

## 2025-06-10 RX ORDER — ONDANSETRON HYDROCHLORIDE 2 MG/ML
4 INJECTION, SOLUTION INTRAVENOUS
Status: COMPLETED | OUTPATIENT
Start: 2025-06-10 | End: 2025-06-10

## 2025-06-10 RX ORDER — DICYCLOMINE HYDROCHLORIDE 20 MG/1
20 TABLET ORAL 2 TIMES DAILY
Qty: 60 TABLET | Refills: 0 | Status: SHIPPED | OUTPATIENT
Start: 2025-06-10 | End: 2025-07-10

## 2025-06-10 RX ORDER — KETOROLAC TROMETHAMINE 30 MG/ML
10 INJECTION, SOLUTION INTRAMUSCULAR; INTRAVENOUS
Status: COMPLETED | OUTPATIENT
Start: 2025-06-10 | End: 2025-06-10

## 2025-06-10 RX ORDER — ONDANSETRON 4 MG/1
4 TABLET, ORALLY DISINTEGRATING ORAL EVERY 6 HOURS PRN
Qty: 28 TABLET | Refills: 0 | Status: SHIPPED | OUTPATIENT
Start: 2025-06-10 | End: 2025-06-17

## 2025-06-10 RX ADMIN — ONDANSETRON 4 MG: 2 INJECTION INTRAMUSCULAR; INTRAVENOUS at 02:06

## 2025-06-10 RX ADMIN — KETOROLAC TROMETHAMINE 10 MG: 30 INJECTION, SOLUTION INTRAMUSCULAR; INTRAVENOUS at 02:06

## 2025-06-10 NOTE — Clinical Note
"Peter Stiles (Brie) was seen and treated in our emergency department on 6/10/2025.  She may return to work on 06/13/2025.       If you have any questions or concerns, please don't hesitate to call.      Leonor Welch LPN    "

## 2025-06-10 NOTE — DISCHARGE INSTRUCTIONS
Your CT did not show any emergent pathology.  Your blood work and urine test today were normal.  There were no emergent pathology seen and it is unclear what is the cause of your pain.  I have prescribed you medications to help with abdominal pain as well as nausea.  If you continue to have symptoms please follow-up with your primary care doctor.  You may return to ED sooner if you develop any new or worsening symptoms.

## 2025-06-10 NOTE — FIRST PROVIDER EVALUATION
"Medical screening examination initiated.  I have conducted a focused provider triage encounter, findings are as follows:    Brief history of present illness:  54 F hx of DM ( dx on Friday) here for left back/flank pain w/ associated nausea started last night.     Vitals:    06/10/25 1309   BP: 134/85   Pulse: (!) 116   Resp: 18   Temp: 97.9 °F (36.6 °C)   TempSrc: Oral   SpO2: 100%   Weight: 73.9 kg (163 lb)   Height: 5' 6" (1.676 m)       Pertinent physical exam:  uncomfortable, tearful, ambulatory    Brief workup plan:  labs, UA    Preliminary workup initiated; this workup will be continued and followed by the physician or advanced practice provider that is assigned to the patient when roomed.  "

## 2025-06-10 NOTE — ED NOTES
I-STAT Chem-8+ Results:   Value Reference Range   Sodium 136 136-145 mmol/L   Potassium  4.4 3.5-5.1 mmol/L   Chloride 100  mmol/L   Ionized Calcium 1.15 1.06-1.42 mmol/L   CO2 (measured) 24 23-29 mmol/L   Glucose 265  mg/dL   BUN 11 6-30 mg/dL   Creatinine 0.8 0.5-1.4 mg/dL   Hematocrit 44 36-54%

## 2025-06-10 NOTE — ED TRIAGE NOTES
Patient identifiers verified and correct for Peter Stiles  C/C: left flank/back pain  APPEARANCE: awake and alert in NAD.   SKIN: warm, dry and intact. No breakdown or bruising.  MUSCULOSKELETAL: Patient moving all extremities spontaneously, no obvious swelling or deformities noted. Ambulates independently.  RESPIRATORY: Denies shortness of breath.Respirations unlabored.   CARDIAC: Denies CP, 2+ distal pulses; no peripheral edema  ABDOMEN: S/ND/NT, reports nausea and vomiting  : voids spontaneously, denies difficulty  Neurologic: AAO x 4; follows commands equal strength in all extremities; denies numbness/tingling. Denies dizziness       Left side back pain x1 day.

## 2025-06-10 NOTE — ED PROVIDER NOTES
"Encounter Date: 6/10/2025       History     Chief Complaint   Patient presents with    Flank Pain     L flank pain, going to [facility]     54-year-old female with past medical history of osteomyelitis of the C-spine, CHF, diabetes who presents ED for left flank pain.  Recently started on Mounjaro, metformin and Cymbalta.  Last night she began having left flank pain that radiates to the left lower abdomen.  States the pain is colicky comes in waves when it does occur she does have nausea and feels "sweaty".  Denies any fevers/chills, dysuria or hematuria.  No history of kidney stones.  She states her PCP is following her for an enlarged spleen and liver as well in his a bloating in her upper abdomen in his plan for an outpatient ultrasound.        Review of patient's allergies indicates:   Allergen Reactions    Morphine Shortness Of Breath and Other (See Comments)     Throat closed    Lisinopril Other (See Comments)     cough     Past Medical History:   Diagnosis Date    CHF (congestive heart failure)     Diabetes mellitus     Essential (primary) hypertension     Opioid dependence on maintenance agonist therapy, no symptoms     Osteomyelitis of cervical spine 10/25/2022    Smoking     Staphylococcus aureus bacteremia 10/27/2022    Tracheostomy dependence 2022    Tracheostomy in place 2022     Past Surgical History:   Procedure Laterality Date    APPENDECTOMY       SECTION      x2    FUSION OF CERVICAL SPINE BY POSTERIOR APPROACH N/A 2022    Procedure: FUSION, SPINE, CERVICAL, POSTERIOR APPROACH C2-T2 posterior decompression/fusion; Depuy, neuromonitoring monica Marrero;  Surgeon: West Merino DO;  Location: [facility] OR [address] [place] [address];  Service: Neurosurgery;  Laterality: N/A;    HYSTERECTOMY      SURGICAL REMOVAL OF VERTEBRAL BODY OF CERVICAL SPINE N/A 10/28/2022    Procedure: CORPECTOMY, SPINE, CERVICAL;  Surgeon: West Merino DO;  Location: [facility] OR [address] [place] [address];  Service: Neurosurgery;  " Laterality: N/A;  anterior C5-6 corpectomy, globus, caspar head prado and tongs, omnipaque, 15lbs weights, microscrope, round cutting lynsey and M8, ENT assistance for exposure    TRACHEOSTOMY N/A 2022    Procedure: CREATION, TRACHEOSTOMY;  Surgeon: Fritz Nogueira MD;  Location: [facility] OR [address] [place] [address];  Service: ENT;  Laterality: N/A;     Family History   Adopted: Yes   Problem Relation Name Age of Onset    Cancer Mother           69    Hearing loss Father          valve problem;  75     Social History[1]  Review of Systems    Physical Exam     Initial Vitals [06/10/25 1309]   BP Pulse Resp Temp SpO2   134/85 (!) 116 18 97.9 °F (36.6 °C) 100 %      MAP       --         Physical Exam    Nursing note and vitals reviewed.  Constitutional: She appears well-developed and well-nourished.   HENT:   Head: Normocephalic and atraumatic.   Eyes: Conjunctivae are normal. Pupils are equal, round, and reactive to light.   Neck: Neck supple.   Normal range of motion.  Cardiovascular:  Normal rate.           Pulmonary/Chest: Breath sounds normal.   Abdominal: Abdomen is soft. She exhibits distension. There is abdominal tenderness (Mild distention across the upper abdomen with mild tenderness).   Left CVA tenderness   Musculoskeletal:         General: Normal range of motion.      Cervical back: Normal range of motion and neck supple.     Neurological: She is alert and oriented to person, place, and time. GCS score is 15. GCS eye subscore is 4. GCS verbal subscore is 5. GCS motor subscore is 6.   Skin: Skin is warm and dry. Capillary refill takes less than 2 seconds.         ED Course   Procedures  Labs Reviewed   COMPREHENSIVE METABOLIC PANEL - Abnormal       Result Value    Sodium 135 (*)     Potassium 4.4      Chloride 100      CO2 26      Glucose 258 (*)     BUN 12      Creatinine 0.9      Calcium 10.0      Protein Total 8.1      Albumin 4.0      Bilirubin Total 0.4            AST 40      ALT 32      Anion Gap 9       eGFR >60     URINALYSIS, REFLEX TO URINE CULTURE - Abnormal    Color, UA Yellow      Appearance, UA Clear      pH, UA 7.0      Spec Grav UA 1.015      Protein, UA Negative      Glucose, UA Negative      Ketones, UA Negative      Bilirubin, UA Negative      Blood, UA Trace (*)     Nitrites, UA Negative      Urobilinogen, UA Negative      Leukocyte Esterase, UA Negative     CBC WITH DIFFERENTIAL - Abnormal    WBC 15.28 (*)     RBC 4.67      HGB 14.2      HCT 42.3      MCV 91      MCH 30.4      MCHC 33.6      RDW 12.7      Platelet Count 316      MPV 10.1      Nucleated RBC 0      Neut % 61.0      Lymph % 32.5      Mono % 4.6      Eos % 0.9      Basophil % 0.4      Imm Grans % 0.6 (*)     Neut # 9.32 (*)     Lymph # 4.97 (*)     Mono # 0.71      Eos # 0.13      Baso # 0.06      Imm Grans # 0.09 (*)    ISTAT PROCEDURE - Abnormal    POC Glucose 265 (*)     POC BUN 11      POC Creatinine 0.8      POC Sodium 136      POC Potassium 4.4      POC Chloride 100      POC TCO2 (MEASURED) 24      POC Ionized Calcium 1.15      POC Hematocrit 44      Sample AJAY     LIPASE - Normal    Lipase Level 14     HEPATITIS C ANTIBODY - Normal    Hep C Ab Interp Non-Reactive     HIV 1 / 2 ANTIBODY - Normal    HIV 1/2 Ag/Ab Non-Reactive     CBC W/ AUTO DIFFERENTIAL    Narrative:     The following orders were created for panel order CBC W/ AUTO DIFFERENTIAL.  Procedure                               Abnormality         Status                     ---------                               -----------         ------                     CBC with Differential[[id number]]       Abnormal            Final result                 Please view results for these tests on the individual orders.   HEP C VIRUS HOLD SPECIMEN    Extra Tube Hold for add-ons.     GREY TOP URINE HOLD    Extra Tube Hold for add-ons.     EXTRA TUBES    Narrative:     The following orders were created for panel order EXTRA TUBES.  Procedure                               Abnormality          Status                     ---------                               -----------         ------                     Light Green Top Hold[[id number]]                                                       Gold Top Hold[[id number]]                                                                Please view results for these tests on the individual orders.   LIGHT GREEN TOP HOLD   GOLD TOP HOLD   ISTAT CHEM8          Imaging Results              CT Renal Stone Study ABD Pelvis WO (Final result)  Result time 06/10/25 15:39:02      Final result by Dhiraj Nye MD (06/10/25 15:39:02)                   Impression:      Hepatomegaly and hepatic steatosis.    No radiopaque renal stone or hydronephrosis.  No acute abnormality in the abdomen or pelvis.    Moderate stool through the colon.    Electronically signed by resident: Radha Jordan  Date:    06/10/2025  Time:    15:10    Electronically signed by: Dhiraj Nye MD  Date:    06/10/2025  Time:    15:39               Narrative:    EXAMINATION:  CT RENAL STONE STUDY ABD PELVIS WO    CLINICAL HISTORY:  Flank pain, kidney stone suspected;    TECHNIQUE:  Axial images of the abdomen and pelvis were acquired without the use of IV contrast.  Coronal and sagittal reconstructions were also obtained    COMPARISON:  Ultrasound 12/05/2022    FINDINGS:  Thoracic soft tissues: Partially visualized thoracic soft tissues appear within normal limits.    Heart: Partially visualized heart appears normal in size. No pericardial effusion.    Lungs: Mild bibasilar atelectasis.  No pleural effusion.    Esophagus: Unremarkable.    Stomach and duodenum: Stomach is mildly distended with ingested contents.  Duodenum appears within normal limits.    Liver: Hepatomegaly measuring 22 cm in craniocaudal dimension.  Hepatic steatosis with focal fatty sparing near the gallbladder.    Gallbladder: No calcified gallstones.    Bile Ducts: No evidence of dilated ducts.    Spleen: Upper limit of normal in  size.    Pancreas: No mass or peripancreatic fat stranding.    Adrenals: Unremarkable.    Kidneys/ Ureters: Normal in size and location.  No hydronephrosis or nephrolithiasis. No ureteral dilatation.    Bladder: Bladder is decompressed which limits evaluation.  No radiopaque stone.    Reproductive organs: Hysterectomy.    Bowel/Mesentery: No obstruction or inflammation.  Moderate stool through the colon.  Appendectomy.    Peritoneum: No intraperitoneal free air or fluid    Lymph nodes: No retroperitoneal lymphadenopathy.    Vasculature: No aneurysm.  Mild-moderate calcific atherosclerosis through the abdominal aorta and its branches..    Abdominal wall:  Few foci of air in the subcutaneous tissues along the anterior abdominal wall likely reflect recent injection.  Diastasis recti.    Bones: No acute fracture. No suspicious osseous lesions.                                       Medications   ondansetron injection 4 mg (4 mg Intravenous Given 6/10/25 1416)   ketorolac injection 9.999 mg (9.999 mg Intravenous Given 6/10/25 1417)     Medical Decision Making  54-year-old female presents ED with left flank pain.      Differential includes but not limited to pyelonephritis, nephrolithiasis, UTI    Patient is colicky in nature and suspect kidney stone however CT renal stone negative for any obstructive uropathy.  No emergent pathology seen on CT and her urine was unremarkable.  No signs of infection and doubt UTI or pyelonephritis.  Vital signs are stable here in the ED. will discharge home with Bentyl for symptomatic management and discussed PCP follow-up.  Return ED precautions given.    Amount and/or Complexity of Data Reviewed  Labs:  Decision-making details documented in ED Course.  Radiology: ordered.    Risk  Prescription drug management.               ED Course as of 06/10/25 1550   Tue Jorge 10, 2025   1408 WBC(!): 15.28  +  leukocytosis [HJ]      ED Course User Index  [HJ] Ezequiel Vega PA-C                            Clinical Impression:  Final diagnoses:  [R10.9] Flank pain (Primary)          ED Disposition Condition    Discharge Stable          ED Prescriptions       Medication Sig Dispense Start Date End Date Auth. Provider    dicyclomine (BENTYL) 20 mg tablet Take 1 tablet (20 mg total) by mouth 2 (two) times daily. 60 tablet 6/10/2025 7/10/2025 Ezequiel Vega PA-C    ondansetron (ZOFRAN-ODT) 4 MG TbDL Take 1 tablet (4 mg total) by mouth every 6 (six) hours as needed (Nausea). 28 tablet 6/10/2025 6/17/2025 Ezequiel Vega PA-C          Follow-up Information       Follow up With Specialties Details Why Contact Info    Benjamin Corey MD Internal Medicine Schedule an appointment as soon as possible for a visit   [address] [facility]  [address] [place] [address] [id number] [phone]                     [1]   Social History  Tobacco Use    Smoking status: Every Day     Current packs/day: 2.00     Average packs/day: 2.0 packs/day for 34.0 years (68.0 ttl pk-yrs)     Types: Cigarettes    Smokeless tobacco: Current   Substance Use Topics    Alcohol use: No    Drug use: No        Ezequiel Vega PA-C  06/10/25 8726

## 2025-06-12 PROBLEM — K76.0 STEATOSIS, LIVER: Status: ACTIVE | Noted: 2025-06-12

## 2025-07-31 ENCOUNTER — OFFICE VISIT (OUTPATIENT)
Dept: NEUROSURGERY | Facility: CLINIC | Age: 55
End: 2025-07-31
Payer: MEDICARE

## 2025-07-31 VITALS — SYSTOLIC BLOOD PRESSURE: 133 MMHG | DIASTOLIC BLOOD PRESSURE: 85 MMHG | HEART RATE: 85 BPM

## 2025-07-31 DIAGNOSIS — Z98.1 S/P CERVICAL SPINAL FUSION: Primary | ICD-10-CM

## 2025-07-31 PROCEDURE — 99999 PR PBB SHADOW E&M-EST. PATIENT-LVL III: CPT | Mod: PBBFAC,,,

## 2025-07-31 PROCEDURE — 99213 OFFICE O/P EST LOW 20 MIN: CPT | Mod: PBBFAC

## 2025-07-31 NOTE — LETTER
July 31, 2025      [facility] - Neurosurgery 8th Fl  [address]  [facility] [address]  Phone: [phone]  Fax: [phone]       Patient: Peter Stiles   YOB: 1970  Date of Visit: 07/31/2025    To Whom It May Concern:    Crow Stiles  was at [facility] on 07/31/2025. I recommend that she refrain from lifting more than 20 lbs due to her health condition. If you have any questions or concerns, or if I can be of further assistance, please do not hesitate to contact me.    Sincerely,    Rachel Pate PA-C

## 2025-07-31 NOTE — PROGRESS NOTES
Neurosurgery  History & Physical    SUBJECTIVE:     Chief Complaint: neck pain    History of Present Illness    Patient  who is s/p C5-6 corpectomy on 10/28/22 and then C2-T2 PCF on 22 for osteodiscitis/myelitis presents today with posterior neck pain described as stiffness. She reports ongoing symptoms for 6-12 months, coinciding with starting her current job. She experiences significant stiffness limiting range of motion. When her neck becomes very stiff, she develops headaches with pain radiating to skull. The pain localizes to the back of her neck and can at times extend up her head and to her mid back. Symptoms have not responded to Tylenol ,NSAIDs or lidocaine patches. She has tried Voltaren gel which has helped a lot. She has a recent diabetes diagnosis and describes bilateral arm/hand and foot neuropathy. Previously tried Cymbalta for almost 2 months without benefit. Has history of adverse reaction to Gabapentin and Lyrica so discontinued due to side effects. Works as a veterinary nurse. Pain is triggered by handling larger animals, specifically when pulling or jerking movements occur, resulting in day-long pain. She denies radiating arm or leg pain, new weakness, bowel/bladder dysfunction or falls. Patient is a current smoker.           Review of patient's allergies indicates:   Allergen Reactions    Morphine Shortness Of Breath and Other (See Comments)     Throat closed    Lisinopril Other (See Comments)     cough       Current Medications[1]    Past Medical History:   Diagnosis Date    CHF (congestive heart failure)     Diabetes mellitus     Essential (primary) hypertension     Opioid dependence on maintenance agonist therapy, no symptoms     Osteomyelitis of cervical spine 10/25/2022    Smoking     Staphylococcus aureus bacteremia 10/27/2022    Tracheostomy dependence 2022    Tracheostomy in place 2022     Past Surgical History:   Procedure Laterality Date    APPENDECTOMY        SECTION      x2    FUSION OF CERVICAL SPINE BY POSTERIOR APPROACH N/A 11/2/2022    Procedure: FUSION, SPINE, CERVICAL, POSTERIOR APPROACH C2-T2 posterior decompression/fusion; Depuy, neuromonitoring monica Marrero;  Surgeon: West Merino DO;  [facility];  Service: Neurosurgery;  Laterality: N/A;    HYSTERECTOMY      SURGICAL REMOVAL OF VERTEBRAL BODY OF CERVICAL SPINE N/A 10/28/2022    Procedure: CORPECTOMY, SPINE, CERVICAL;  Surgeon: West Merino DO;  Location: [facility] OR [facility];  Service: Neurosurgery;  Laterality: N/A;  anterior C5-6 corpectomy, globus, caspar head prado and tongs, omnipaque, 15lbs weights, microscrope, round cutting lynsey and M8, ENT assistance for exposure    TRACHEOSTOMY N/A 11/7/2022    Procedure: CREATION, TRACHEOSTOMY;  Surgeon: Fritz Nogueira MD;  [facility];  Service: ENT;  Laterality: N/A;     Family History       Problem Relation (Age of Onset)    Cancer Mother    Hearing loss Father          Social History     Socioeconomic History    Marital status:    Tobacco Use    Smoking status: Every Day     Current packs/day: 2.00     Average packs/day: 2.0 packs/day for 34.0 years (68.0 ttl pk-yrs)     Types: Cigarettes    Smokeless tobacco: Current   Substance and Sexual Activity    Alcohol use: No    Drug use: No       Review of Systems  Positive per HPI, otherwise a pertinent ROS was performed and was negative.    OBJECTIVE:     Vital Signs  Pulse: 85  BP: 133/85  Pain Score:   3  There is no height or weight on file to calculate BMI.      Neurosurgery Physical Exam  General: Well developed, well nourished, no distress.   Head: Normocephalic, atraumatic.  Neurologic: Alert and oriented. Thought content appropriate.  GCS: E4 V5 M6; Total: 15  Mental Status: Awake, Alert, Oriented x 4.  Language: No aphasia.  Speech: No dysarthria.  Cranial nerves: Face symmetric, tongue midline, CN II-XII grossly intact.   Eyes: Pupils equal, round,  reactive to light with accomodation, EOMI.   Pulmonary: Normal respirations, no signs of respiratory distress.  Abdomen: Soft, non-distended, not tender to palpation.  Vascular: Pulses 2+ and symmetric radial and dorsalis pedis. No LE edema.   Skin: Skin is warm, dry and intact.  Sensory: Intact to light touch throughout  Motor Strength: Moves all extremities spontaneously with good tone. Full strength upper and lower extremities. No abnormal movements seen.     Strength  Deltoids Triceps Biceps Wrist Extension Wrist Flexion Hand    Upper: R 5/5 5/5 5/5 5/5 5/5 5/5    L 5/5 5/5 5/5 5/5 5/5 5/5     Iliopsoas Quadriceps Knee  Flexion Tibialis  anterior Gastro- cnemius EHL   Lower: R 5/5 5/5 5/5 5/5 5/5 5/5    L 5/5 5/5 5/5 5/5 5/5 5/5     Reflexes:   DTR: 2+ symmetrically throughout.     Coordination/Cerebellar:   Gait: Stable.     Cervical:   ROM: Limited with flexion, extension, lateral rotation and ear-to-shoulder bend.   Midline TTP: Negative.      Diagnostic Results:  Imaging was independently reviewed by myself.      ASSESSMENT/PLAN:     Peter Stiles is a 54 y.o. female s/p C5-6 corpectomy on 10/28/22 and then C2-T2 PCF on 11/2/22 for osteodiscitis/myelitis presents today with posterior neck pain described as stiffness.     Diagnoses addressed and orders placed this encounter:    Assessment & Plan    S/P CERVICAL SPINAL FUSION:  - Ordered PT for neck stiffness and mobility improvement.  - Continue Voltaren gel for pain. Recommend using heat/ice as well.  - Ordered CT Neck to evaluate hardware and fusion status.  - Evaluated need for work restrictions due to neck condition.  - Patient to limit lifting to no more than 20 lbs per day at work.  - Will type up a work restriction note limiting lifting to no more than 20 lbs per day.  - Will follow up with patient once CT C spine completed.    This note was generated with the assistance of ambient listening technology. Verbal consent was obtained by the  patient and accompanying visitor(s) for the recording of patient appointment to facilitate this note. I attest to having reviewed and edited the generated note for accuracy, though some syntax or spelling errors may persist. Please contact the author of this note for any clarification.     I spent a total of 30 minutes on the day of the visit.  This includes face to face time and non-face to face time preparing to see the patient (eg, review of tests), obtaining and/or reviewing separately obtained history, documenting clinical information in the electronic or other health record, independently interpreting results and communicating results to the patient/family/caregiver, or care coordinator.    Rachel Pate PA-C  Neurosurgery [facility]          [1]   Current Outpatient Medications   Medication Sig Dispense Refill    metFORMIN (GLUCOPHAGE) 500 MG tablet Take 1 tablet (500 mg total) by mouth 2 (two) times daily with meals. 180 tablet 3    MOUNJARO 5 mg/0.5 mL PnIj Inject 5 mg into the skin every 7 days. 7 each 5    ondansetron (ZOFRAN-ODT) 4 MG TbDL DISSOLVE 1 TABLET(4 MG) ON THE TONGUE EVERY 6 HOURS AS NEEDED FOR NAUSEA 28 tablet 0    DULoxetine (CYMBALTA) 30 MG capsule Take 1 capsule (30 mg total) by mouth once daily. (Patient not taking: Reported on 7/31/2025) 30 capsule 1     No current facility-administered medications for this visit.

## 2025-08-14 ENCOUNTER — OFFICE VISIT (OUTPATIENT)
Dept: URGENT CARE | Facility: CLINIC | Age: 55
End: 2025-08-14
Payer: MEDICARE

## 2025-08-14 ENCOUNTER — HOSPITAL ENCOUNTER (OUTPATIENT)
Dept: RADIOLOGY | Facility: HOSPITAL | Age: 55
Discharge: HOME | End: 2025-08-14
Payer: MEDICARE

## 2025-08-14 ENCOUNTER — OFFICE VISIT (OUTPATIENT)
Dept: UROLOGY | Facility: CLINIC | Age: 55
End: 2025-08-14
Payer: MEDICARE

## 2025-08-14 VITALS
TEMPERATURE: 98 F | OXYGEN SATURATION: 95 % | RESPIRATION RATE: 18 BRPM | SYSTOLIC BLOOD PRESSURE: 133 MMHG | HEIGHT: 66 IN | HEART RATE: 105 BPM | DIASTOLIC BLOOD PRESSURE: 91 MMHG | BODY MASS INDEX: 26.2 KG/M2 | WEIGHT: 163 LBS

## 2025-08-14 VITALS
HEART RATE: 73 BPM | HEIGHT: 66 IN | DIASTOLIC BLOOD PRESSURE: 71 MMHG | BODY MASS INDEX: 24.91 KG/M2 | SYSTOLIC BLOOD PRESSURE: 117 MMHG | WEIGHT: 155 LBS

## 2025-08-14 DIAGNOSIS — E11.9 TYPE 2 DIABETES MELLITUS WITHOUT COMPLICATION, UNSPECIFIED WHETHER LONG TERM INSULIN USE: ICD-10-CM

## 2025-08-14 DIAGNOSIS — Z98.1 S/P CERVICAL SPINAL FUSION: ICD-10-CM

## 2025-08-14 DIAGNOSIS — R31.9 URINARY TRACT INFECTION WITH HEMATURIA, SITE UNSPECIFIED: ICD-10-CM

## 2025-08-14 DIAGNOSIS — R39.11 URINARY HESITANCY: ICD-10-CM

## 2025-08-14 DIAGNOSIS — R10.9 LEFT FLANK PAIN: Primary | ICD-10-CM

## 2025-08-14 DIAGNOSIS — M54.9 BACK PAIN, UNSPECIFIED BACK LOCATION, UNSPECIFIED BACK PAIN LATERALITY, UNSPECIFIED CHRONICITY: Primary | ICD-10-CM

## 2025-08-14 DIAGNOSIS — R31.29 OTHER MICROSCOPIC HEMATURIA: ICD-10-CM

## 2025-08-14 DIAGNOSIS — R31.9 HEMATURIA, UNSPECIFIED TYPE: ICD-10-CM

## 2025-08-14 DIAGNOSIS — N39.0 URINARY TRACT INFECTION WITH HEMATURIA, SITE UNSPECIFIED: ICD-10-CM

## 2025-08-14 LAB
AMORPH CRY UR QL COMP ASSIST: NORMAL
BACTERIA #/AREA URNS AUTO: NORMAL /HPF
BILIRUBIN, UA POC OHS: ABNORMAL
BILIRUBIN, UA POC OHS: ABNORMAL
BLOOD, UA POC OHS: ABNORMAL
BLOOD, UA POC OHS: ABNORMAL
CLARITY, UA POC OHS: CLEAR
CLARITY, UA POC OHS: CLEAR
COLOR, UA POC OHS: YELLOW
COLOR, UA POC OHS: YELLOW
GLUCOSE SERPL-MCNC: 122 MG/DL (ref 70–110)
GLUCOSE, UA POC OHS: NEGATIVE
GLUCOSE, UA POC OHS: NEGATIVE
KETONES, UA POC OHS: NEGATIVE
KETONES, UA POC OHS: NEGATIVE
LEUKOCYTES, UA POC OHS: NEGATIVE
LEUKOCYTES, UA POC OHS: NEGATIVE
MICROSCOPIC COMMENT: NORMAL
NITRITE, UA POC OHS: NEGATIVE
NITRITE, UA POC OHS: NEGATIVE
PH, UA POC OHS: 5.5
PH, UA POC OHS: 5.5
POC RESIDUAL URINE VOLUME: 0 ML (ref 0–100)
PROTEIN, UA POC OHS: 30
PROTEIN, UA POC OHS: ABNORMAL
SPECIFIC GRAVITY, UA POC OHS: >=1.03
SPECIFIC GRAVITY, UA POC OHS: >=1.03
SQUAMOUS #/AREA URNS AUTO: 3 /HPF
UROBILINOGEN, UA POC OHS: 0.2
UROBILINOGEN, UA POC OHS: 0.2

## 2025-08-14 PROCEDURE — 81003 URINALYSIS AUTO W/O SCOPE: CPT | Mod: PBBFAC

## 2025-08-14 PROCEDURE — 72125 CT NECK SPINE W/O DYE: CPT | Mod: TC

## 2025-08-14 PROCEDURE — 51798 US URINE CAPACITY MEASURE: CPT | Mod: PBBFAC

## 2025-08-14 PROCEDURE — 99999PBSHW POCT URINALYSIS(INSTRUMENT): Mod: PBBFAC,,,

## 2025-08-14 PROCEDURE — 82962 GLUCOSE BLOOD TEST: CPT | Mod: S$GLB,,, | Performed by: FAMILY MEDICINE

## 2025-08-14 PROCEDURE — 81001 URINALYSIS AUTO W/SCOPE: CPT

## 2025-08-14 PROCEDURE — 72125 CT NECK SPINE W/O DYE: CPT | Mod: 26,,, | Performed by: RADIOLOGY

## 2025-08-14 PROCEDURE — 99999 PR PBB SHADOW E&M-EST. PATIENT-LVL III: CPT | Mod: PBBFAC,,,

## 2025-08-14 PROCEDURE — 99999PBSHW PR PBB SHADOW TECHNICAL ONLY FILED TO HB: Mod: PBBFAC,,,

## 2025-08-14 PROCEDURE — 99999PBSHW POCT BLADDER SCAN: Mod: PBBFAC,,,

## 2025-08-14 PROCEDURE — 99213 OFFICE O/P EST LOW 20 MIN: CPT | Mod: PBBFAC,25

## 2025-08-14 RX ORDER — CEPHALEXIN 500 MG/1
500 CAPSULE ORAL EVERY 8 HOURS
Qty: 21 CAPSULE | Refills: 0 | Status: SHIPPED | OUTPATIENT
Start: 2025-08-14 | End: 2025-08-21

## 2025-08-14 RX ORDER — METHOCARBAMOL 500 MG/1
500 TABLET, FILM COATED ORAL 4 TIMES DAILY
Qty: 28 TABLET | Refills: 0 | Status: SHIPPED | OUTPATIENT
Start: 2025-08-14 | End: 2025-08-21

## 2025-08-14 RX ORDER — GABAPENTIN 300 MG/1
300 CAPSULE ORAL EVERY 8 HOURS PRN
Qty: 21 CAPSULE | Refills: 11 | Status: SHIPPED | OUTPATIENT
Start: 2025-08-14 | End: 2026-08-14

## 2025-08-14 RX ORDER — KETOROLAC TROMETHAMINE 30 MG/ML
30 INJECTION, SOLUTION INTRAMUSCULAR; INTRAVENOUS
Status: COMPLETED | OUTPATIENT
Start: 2025-08-14 | End: 2025-08-14

## 2025-08-14 RX ADMIN — KETOROLAC TROMETHAMINE 30 MG: 30 INJECTION, SOLUTION INTRAMUSCULAR; INTRAVENOUS at 12:08

## 2025-08-16 LAB — BACTERIA UR CULT: NO GROWTH

## 2025-08-17 ENCOUNTER — RESULTS FOLLOW-UP (OUTPATIENT)
Dept: URGENT CARE | Facility: CLINIC | Age: 55
End: 2025-08-17
Payer: MEDICARE

## 2025-08-18 PROBLEM — E11.9 TYPE 2 DIABETES MELLITUS WITHOUT COMPLICATION: Status: RESOLVED | Noted: 2025-08-14 | Resolved: 2025-08-18

## 2025-08-21 ENCOUNTER — PATIENT MESSAGE (OUTPATIENT)
Dept: NEUROSURGERY | Facility: CLINIC | Age: 55
End: 2025-08-21
Payer: MEDICARE

## 2025-08-29 ENCOUNTER — PATIENT MESSAGE (OUTPATIENT)
Dept: NEUROSURGERY | Facility: CLINIC | Age: 55
End: 2025-08-29
Payer: MEDICARE

## 2025-09-02 ENCOUNTER — TELEPHONE (OUTPATIENT)
Dept: NEUROSURGERY | Facility: CLINIC | Age: 55
End: 2025-09-02
Payer: MEDICARE

## 2025-09-04 ENCOUNTER — HOSPITAL ENCOUNTER (OUTPATIENT)
Dept: RADIOLOGY | Facility: HOSPITAL | Age: 55
Discharge: HOME | End: 2025-09-04
Payer: MEDICARE

## 2025-09-04 DIAGNOSIS — R10.9 LEFT FLANK PAIN: ICD-10-CM

## 2025-09-04 PROCEDURE — 76770 US EXAM ABDO BACK WALL COMP: CPT | Mod: 26,,, | Performed by: RADIOLOGY

## 2025-09-04 PROCEDURE — 76770 US EXAM ABDO BACK WALL COMP: CPT | Mod: TC

## (undated) DEVICE — BUR BONE CUT MICRO TPS 3X3.8MM

## (undated) DEVICE — PACK SET UP CONVERTORS

## (undated) DEVICE — ELECTRODE BLADE INSULATED 1 IN

## (undated) DEVICE — BLADE 4IN EDGE INSULATED

## (undated) DEVICE — TOWEL OR DISP STRL BLUE 4/PK

## (undated) DEVICE — GOWN SURGICAL X-LARGE

## (undated) DEVICE — DURAPREP SURG SCRUB 26ML

## (undated) DEVICE — TAPE SILK 3IN

## (undated) DEVICE — DRAPE C-ARMOR EQUIPMENT COVER

## (undated) DEVICE — NDL HYPO REG 25G X 1 1/2

## (undated) DEVICE — TAP SHORT SURGICAL NS 3.5MM

## (undated) DEVICE — ADHESIVE DERMABOND ADVANCED

## (undated) DEVICE — TUBE FRAZIER 5MM 2FT SOFT TIP

## (undated) DEVICE — DRAPE OPMI STERILE

## (undated) DEVICE — SUT CTD VICRYL 3-0 CR/SH

## (undated) DEVICE — BURR RND FLUT SFT TOUCH 2.0MM

## (undated) DEVICE — COVER LIGHT HANDLE 80/CA

## (undated) DEVICE — COVER PROXIMA MAYO STAND

## (undated) DEVICE — DRAPE C-ARM ELAS CLIP 42X120IN

## (undated) DEVICE — SUT VICRYL PLUS 0 CT1 18IN

## (undated) DEVICE — ELECTRODE REM PLYHSV RETURN 9

## (undated) DEVICE — SUT 3/0 30IN ETHILON BLK M

## (undated) DEVICE — TRACH TUBE CUFF FLEX DISP 7.5

## (undated) DEVICE — CHLORAPREP 10.5 ML APPLICATOR

## (undated) DEVICE — SUT VICRYL 3-0 27 SH

## (undated) DEVICE — TRAY SKIN SCRUB WET PREMIUM

## (undated) DEVICE — GAUZE SPONGE 4X4 12PLY

## (undated) DEVICE — SUT VICRYL 2-0 8-18 CP-2

## (undated) DEVICE — DRAPE TOP 53X102IN

## (undated) DEVICE — DRAPE EENT SPLIT STERILE

## (undated) DEVICE — DRESSING SURGICAL 1/2X1/2

## (undated) DEVICE — MARKER SKIN STND TIP BLUE BARR

## (undated) DEVICE — DRAPE STERI INSTRUMENT 1018

## (undated) DEVICE — KIT EVACUATOR 3-SPRING 1/8 DRN

## (undated) DEVICE — SUT MONOCRYL 4-0 PS-1 UND

## (undated) DEVICE — SEE MEDLINE ITEM 156905

## (undated) DEVICE — TRAY MINOR GEN SURG OMC

## (undated) DEVICE — SUT SILK 2-0 SH 18IN BLACK

## (undated) DEVICE — SKINMARKER & RULER REGULAR X-F

## (undated) DEVICE — SUT 3-0 12-18IN SILK

## (undated) DEVICE — KIT SURGIFLO HEMOSTATIC MATRIX

## (undated) DEVICE — DRESSING MEPILEX BORDR AG 4X12

## (undated) DEVICE — CORD BIPOLAR 12 FOOT

## (undated) DEVICE — DIFFUSER

## (undated) DEVICE — DRAPE T THYROID STERILE

## (undated) DEVICE — DRAPE STERI-DRAPE 1000 17X11IN

## (undated) DEVICE — CARTRIDGE OIL

## (undated) DEVICE — GAUZE SPONGE PEANUT STRL

## (undated) DEVICE — SUT STRATAFIX 1 PDS CT-1

## (undated) DEVICE — PINS SKULL ADULT MAYFIELD
Type: IMPLANTABLE DEVICE | Site: SCALP | Status: NON-FUNCTIONAL
Removed: 2022-11-02

## (undated) DEVICE — 4.5 TAP

## (undated) DEVICE — NDL SPINAL 18GX3.5 SPINOCAN